# Patient Record
Sex: MALE | Race: WHITE | Employment: OTHER | URBAN - METROPOLITAN AREA
[De-identification: names, ages, dates, MRNs, and addresses within clinical notes are randomized per-mention and may not be internally consistent; named-entity substitution may affect disease eponyms.]

---

## 2017-01-01 ENCOUNTER — APPOINTMENT (OUTPATIENT)
Dept: GENERAL RADIOLOGY | Facility: CLINIC | Age: 77
DRG: 682 | End: 2017-01-01
Attending: FAMILY MEDICINE
Payer: COMMERCIAL

## 2017-01-01 ENCOUNTER — OFFICE VISIT (OUTPATIENT)
Dept: ORTHOPEDICS | Facility: CLINIC | Age: 77
End: 2017-01-01

## 2017-01-01 ENCOUNTER — MYC MEDICAL ADVICE (OUTPATIENT)
Dept: ORTHOPEDICS | Facility: CLINIC | Age: 77
End: 2017-01-01

## 2017-01-01 ENCOUNTER — APPOINTMENT (OUTPATIENT)
Dept: OCCUPATIONAL THERAPY | Facility: CLINIC | Age: 77
DRG: 682 | End: 2017-01-01
Payer: COMMERCIAL

## 2017-01-01 ENCOUNTER — RESULTS ONLY (OUTPATIENT)
Dept: GASTROENTEROLOGY | Facility: CLINIC | Age: 77
End: 2017-01-01

## 2017-01-01 ENCOUNTER — APPOINTMENT (OUTPATIENT)
Dept: SPEECH THERAPY | Facility: CLINIC | Age: 77
DRG: 682 | End: 2017-01-01
Payer: COMMERCIAL

## 2017-01-01 ENCOUNTER — THERAPY VISIT (OUTPATIENT)
Dept: PHYSICAL THERAPY | Facility: CLINIC | Age: 77
End: 2017-01-01
Payer: COMMERCIAL

## 2017-01-01 ENCOUNTER — APPOINTMENT (OUTPATIENT)
Dept: CT IMAGING | Facility: CLINIC | Age: 77
DRG: 682 | End: 2017-01-01
Attending: INTERNAL MEDICINE
Payer: COMMERCIAL

## 2017-01-01 ENCOUNTER — APPOINTMENT (OUTPATIENT)
Dept: GENERAL RADIOLOGY | Facility: CLINIC | Age: 77
DRG: 057 | End: 2017-01-01
Attending: EMERGENCY MEDICINE
Payer: COMMERCIAL

## 2017-01-01 ENCOUNTER — ANESTHESIA EVENT (OUTPATIENT)
Dept: SURGERY | Facility: CLINIC | Age: 77
DRG: 682 | End: 2017-01-01
Payer: COMMERCIAL

## 2017-01-01 ENCOUNTER — MYC MEDICAL ADVICE (OUTPATIENT)
Dept: PHARMACY | Facility: CLINIC | Age: 77
End: 2017-01-01

## 2017-01-01 ENCOUNTER — APPOINTMENT (OUTPATIENT)
Dept: CT IMAGING | Facility: CLINIC | Age: 77
DRG: 682 | End: 2017-01-01
Payer: COMMERCIAL

## 2017-01-01 ENCOUNTER — CARE COORDINATION (OUTPATIENT)
Dept: CARE COORDINATION | Facility: CLINIC | Age: 77
End: 2017-01-01

## 2017-01-01 ENCOUNTER — APPOINTMENT (OUTPATIENT)
Dept: PHYSICAL THERAPY | Facility: CLINIC | Age: 77
DRG: 057 | End: 2017-01-01
Attending: NURSE PRACTITIONER
Payer: COMMERCIAL

## 2017-01-01 ENCOUNTER — HOSPITAL ENCOUNTER (INPATIENT)
Facility: CLINIC | Age: 77
LOS: 3 days | Discharge: NURSING, REHAB OR LONG-TERM CARE | DRG: 057 | End: 2017-02-22
Attending: EMERGENCY MEDICINE | Admitting: SURGERY
Payer: COMMERCIAL

## 2017-01-01 ENCOUNTER — CARE COORDINATION (OUTPATIENT)
Dept: CARDIOLOGY | Facility: CLINIC | Age: 77
End: 2017-01-01

## 2017-01-01 ENCOUNTER — APPOINTMENT (OUTPATIENT)
Dept: CT IMAGING | Facility: CLINIC | Age: 77
DRG: 682 | End: 2017-01-01
Attending: FAMILY MEDICINE
Payer: COMMERCIAL

## 2017-01-01 ENCOUNTER — TELEPHONE (OUTPATIENT)
Dept: ORTHOPEDICS | Facility: CLINIC | Age: 77
End: 2017-01-01

## 2017-01-01 ENCOUNTER — APPOINTMENT (OUTPATIENT)
Dept: GENERAL RADIOLOGY | Facility: CLINIC | Age: 77
DRG: 682 | End: 2017-01-01
Payer: COMMERCIAL

## 2017-01-01 ENCOUNTER — APPOINTMENT (OUTPATIENT)
Dept: GENERAL RADIOLOGY | Facility: CLINIC | Age: 77
DRG: 682 | End: 2017-01-01
Attending: INTERNAL MEDICINE
Payer: COMMERCIAL

## 2017-01-01 ENCOUNTER — APPOINTMENT (OUTPATIENT)
Dept: CARDIOLOGY | Facility: CLINIC | Age: 77
DRG: 682 | End: 2017-01-01
Attending: FAMILY MEDICINE
Payer: COMMERCIAL

## 2017-01-01 ENCOUNTER — APPOINTMENT (OUTPATIENT)
Dept: ULTRASOUND IMAGING | Facility: CLINIC | Age: 77
DRG: 682 | End: 2017-01-01
Attending: FAMILY MEDICINE
Payer: COMMERCIAL

## 2017-01-01 ENCOUNTER — OFFICE VISIT (OUTPATIENT)
Dept: NEUROLOGY | Facility: CLINIC | Age: 77
End: 2017-01-01

## 2017-01-01 ENCOUNTER — MEDICAL CORRESPONDENCE (OUTPATIENT)
Dept: HEALTH INFORMATION MANAGEMENT | Facility: CLINIC | Age: 77
End: 2017-01-01

## 2017-01-01 ENCOUNTER — APPOINTMENT (OUTPATIENT)
Dept: GENERAL RADIOLOGY | Facility: CLINIC | Age: 77
DRG: 682 | End: 2017-01-01
Attending: PHYSICIAN ASSISTANT
Payer: COMMERCIAL

## 2017-01-01 ENCOUNTER — APPOINTMENT (OUTPATIENT)
Dept: MRI IMAGING | Facility: CLINIC | Age: 77
DRG: 057 | End: 2017-01-01
Payer: COMMERCIAL

## 2017-01-01 ENCOUNTER — APPOINTMENT (OUTPATIENT)
Dept: CT IMAGING | Facility: CLINIC | Age: 77
DRG: 057 | End: 2017-01-01
Attending: EMERGENCY MEDICINE
Payer: COMMERCIAL

## 2017-01-01 ENCOUNTER — HOSPITAL ENCOUNTER (EMERGENCY)
Facility: CLINIC | Age: 77
Discharge: HOME | End: 2017-03-11
Attending: EMERGENCY MEDICINE | Admitting: EMERGENCY MEDICINE
Payer: COMMERCIAL

## 2017-01-01 ENCOUNTER — TRANSFERRED RECORDS (OUTPATIENT)
Dept: HEALTH INFORMATION MANAGEMENT | Facility: CLINIC | Age: 77
End: 2017-01-01

## 2017-01-01 ENCOUNTER — TELEPHONE (OUTPATIENT)
Dept: FAMILY MEDICINE | Facility: CLINIC | Age: 77
End: 2017-01-01

## 2017-01-01 ENCOUNTER — APPOINTMENT (OUTPATIENT)
Dept: ULTRASOUND IMAGING | Facility: CLINIC | Age: 77
DRG: 057 | End: 2017-01-01
Attending: PSYCHIATRY & NEUROLOGY
Payer: COMMERCIAL

## 2017-01-01 ENCOUNTER — HOSPITAL ENCOUNTER (INPATIENT)
Facility: CLINIC | Age: 77
LOS: 24 days | Discharge: NURSING, REHAB OR LONG-TERM CARE | DRG: 682 | End: 2017-04-25
Attending: INTERNAL MEDICINE | Admitting: FAMILY MEDICINE
Payer: COMMERCIAL

## 2017-01-01 ENCOUNTER — APPOINTMENT (OUTPATIENT)
Dept: CT IMAGING | Facility: CLINIC | Age: 77
DRG: 682 | End: 2017-01-01
Attending: HOSPITALIST
Payer: COMMERCIAL

## 2017-01-01 ENCOUNTER — APPOINTMENT (OUTPATIENT)
Dept: MRI IMAGING | Facility: CLINIC | Age: 77
DRG: 682 | End: 2017-01-01
Payer: COMMERCIAL

## 2017-01-01 ENCOUNTER — APPOINTMENT (OUTPATIENT)
Dept: ULTRASOUND IMAGING | Facility: CLINIC | Age: 77
DRG: 682 | End: 2017-01-01
Attending: HOSPITALIST
Payer: COMMERCIAL

## 2017-01-01 ENCOUNTER — ANESTHESIA (OUTPATIENT)
Dept: SURGERY | Facility: CLINIC | Age: 77
DRG: 682 | End: 2017-01-01
Payer: COMMERCIAL

## 2017-01-01 VITALS
OXYGEN SATURATION: 96 % | TEMPERATURE: 97.3 F | WEIGHT: 205 LBS | HEART RATE: 61 BPM | DIASTOLIC BLOOD PRESSURE: 75 MMHG | RESPIRATION RATE: 18 BRPM | HEIGHT: 64 IN | BODY MASS INDEX: 35 KG/M2 | SYSTOLIC BLOOD PRESSURE: 119 MMHG

## 2017-01-01 VITALS
TEMPERATURE: 98.5 F | OXYGEN SATURATION: 100 % | SYSTOLIC BLOOD PRESSURE: 135 MMHG | RESPIRATION RATE: 22 BRPM | HEIGHT: 64 IN | BODY MASS INDEX: 28.87 KG/M2 | HEART RATE: 80 BPM | WEIGHT: 169.09 LBS | DIASTOLIC BLOOD PRESSURE: 66 MMHG

## 2017-01-01 VITALS
HEIGHT: 64 IN | OXYGEN SATURATION: 98 % | HEART RATE: 61 BPM | WEIGHT: 199 LBS | BODY MASS INDEX: 33.97 KG/M2 | RESPIRATION RATE: 25 BRPM | SYSTOLIC BLOOD PRESSURE: 140 MMHG | DIASTOLIC BLOOD PRESSURE: 60 MMHG

## 2017-01-01 VITALS — BODY MASS INDEX: 30.61 KG/M2 | HEIGHT: 67 IN | WEIGHT: 195 LBS

## 2017-01-01 VITALS
HEART RATE: 63 BPM | DIASTOLIC BLOOD PRESSURE: 68 MMHG | SYSTOLIC BLOOD PRESSURE: 149 MMHG | RESPIRATION RATE: 18 BRPM | HEIGHT: 67 IN | OXYGEN SATURATION: 97 % | WEIGHT: 192.5 LBS | BODY MASS INDEX: 30.21 KG/M2 | TEMPERATURE: 96.2 F

## 2017-01-01 VITALS — WEIGHT: 190 LBS | BODY MASS INDEX: 32.44 KG/M2 | HEIGHT: 64 IN

## 2017-01-01 DIAGNOSIS — M85.9 LOW BONE DENSITY: ICD-10-CM

## 2017-01-01 DIAGNOSIS — M25.579 ANKLE PAIN: Primary | ICD-10-CM

## 2017-01-01 DIAGNOSIS — K59.00 CONSTIPATION, UNSPECIFIED CONSTIPATION TYPE: ICD-10-CM

## 2017-01-01 DIAGNOSIS — R13.10 DYSPHAGIA, UNSPECIFIED TYPE: Primary | ICD-10-CM
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
## 2017-01-01 DIAGNOSIS — E11.649 TYPE 2 DIABETES MELLITUS WITH HYPOGLYCEMIA WITHOUT COMA, WITH LONG-TERM CURRENT USE OF INSULIN (H): ICD-10-CM

## 2017-01-01 DIAGNOSIS — K21.9 GASTROESOPHAGEAL REFLUX DISEASE WITHOUT ESOPHAGITIS: ICD-10-CM

## 2017-01-01 DIAGNOSIS — S42.402A ELBOW FRACTURE, LEFT, CLOSED, INITIAL ENCOUNTER: ICD-10-CM

## 2017-01-01 DIAGNOSIS — S82.892A CLOSED LEFT ANKLE FRACTURE, INITIAL ENCOUNTER: Primary | ICD-10-CM

## 2017-01-01 DIAGNOSIS — I63.9 CEREBROVASCULAR ACCIDENT (CVA), UNSPECIFIED MECHANISM (H): ICD-10-CM

## 2017-01-01 DIAGNOSIS — H04.123 DRY EYES: ICD-10-CM
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
## 2017-01-01 DIAGNOSIS — D62 ACUTE POSTHEMORRHAGIC ANEMIA: ICD-10-CM

## 2017-01-01 DIAGNOSIS — T83.511A URINARY TRACT INFECTION ASSOCIATED WITH INDWELLING URETHRAL CATHETER, INITIAL ENCOUNTER (H): ICD-10-CM

## 2017-01-01 DIAGNOSIS — I15.9 SECONDARY HYPERTENSION: ICD-10-CM

## 2017-01-01 DIAGNOSIS — F01.518 VASCULAR DEMENTIA WITH BEHAVIOR DISTURBANCE (H): ICD-10-CM

## 2017-01-01 DIAGNOSIS — E11.22 TYPE 2 DIABETES MELLITUS WITH STAGE 3 CHRONIC KIDNEY DISEASE, WITH LONG-TERM CURRENT USE OF INSULIN (H): ICD-10-CM

## 2017-01-01 DIAGNOSIS — Z79.4 TYPE 2 DIABETES MELLITUS WITH HYPOGLYCEMIA WITHOUT COMA, WITH LONG-TERM CURRENT USE OF INSULIN (H): ICD-10-CM

## 2017-01-01 DIAGNOSIS — I60.9 SUBARACHNOID HEMORRHAGE (H): ICD-10-CM

## 2017-01-01 DIAGNOSIS — Z51.5 END OF LIFE CARE: ICD-10-CM

## 2017-01-01 DIAGNOSIS — I48.0 PAF (PAROXYSMAL ATRIAL FIBRILLATION) (H): Primary | ICD-10-CM

## 2017-01-01 DIAGNOSIS — Z79.4 TYPE 2 DIABETES MELLITUS WITH STAGE 3 CHRONIC KIDNEY DISEASE, WITH LONG-TERM CURRENT USE OF INSULIN (H): ICD-10-CM

## 2017-01-01 DIAGNOSIS — I69.319 COGNITIVE DEFICIT FOLLOWING CEREBROVASCULAR ACCIDENT (CVA): Primary | ICD-10-CM

## 2017-01-01 DIAGNOSIS — S82.892A CLOSED LEFT ANKLE FRACTURE, INITIAL ENCOUNTER: ICD-10-CM

## 2017-01-01 DIAGNOSIS — I69.319 COGNITIVE DEFICIT, POST-STROKE: ICD-10-CM

## 2017-01-01 DIAGNOSIS — R68.2 DRY MOUTH: ICD-10-CM

## 2017-01-01 DIAGNOSIS — S92.902S FOOT FRACTURE, LEFT, SEQUELA: Primary | ICD-10-CM

## 2017-01-01 DIAGNOSIS — B37.0 ORAL THRUSH: ICD-10-CM

## 2017-01-01 DIAGNOSIS — R11.0 NAUSEA: ICD-10-CM

## 2017-01-01 DIAGNOSIS — N18.30 TYPE 2 DIABETES MELLITUS WITH STAGE 3 CHRONIC KIDNEY DISEASE, WITH LONG-TERM CURRENT USE OF INSULIN (H): ICD-10-CM

## 2017-01-01 DIAGNOSIS — N17.9 ACUTE RENAL FAILURE, UNSPECIFIED ACUTE RENAL FAILURE TYPE (H): ICD-10-CM

## 2017-01-01 DIAGNOSIS — N39.0 URINARY TRACT INFECTION ASSOCIATED WITH INDWELLING URETHRAL CATHETER, INITIAL ENCOUNTER (H): ICD-10-CM

## 2017-01-01 DIAGNOSIS — E46 MALNUTRITION (H): ICD-10-CM

## 2017-01-01 DIAGNOSIS — M25.572 LEFT ANKLE PAIN, UNSPECIFIED CHRONICITY: Primary | ICD-10-CM

## 2017-01-01 DIAGNOSIS — G91.2 IDIOPATHIC NORMAL PRESSURE HYDROCEPHALUS (H): ICD-10-CM

## 2017-01-01 DIAGNOSIS — R53.81 PHYSICAL DECONDITIONING: Primary | ICD-10-CM

## 2017-01-01 DIAGNOSIS — J44.9 CHRONIC OBSTRUCTIVE PULMONARY DISEASE, UNSPECIFIED COPD TYPE (H): ICD-10-CM

## 2017-01-01 DIAGNOSIS — I50.33 DIASTOLIC CHF, ACUTE ON CHRONIC (H): ICD-10-CM

## 2017-01-01 DIAGNOSIS — F01.53 MULTI-INFARCT DEMENTIA WITH DEPRESSION (H): ICD-10-CM

## 2017-01-01 DIAGNOSIS — E16.2 HYPOGLYCEMIA: ICD-10-CM

## 2017-01-01 LAB
ABO + RH BLD: NORMAL
ALBUMIN SERPL-MCNC: 1.2 G/DL (ref 3.4–5)
ALBUMIN SERPL-MCNC: 1.4 G/DL (ref 3.4–5)
ALBUMIN SERPL-MCNC: 1.5 G/DL (ref 3.4–5)
ALBUMIN SERPL-MCNC: 1.6 G/DL (ref 3.4–5)
ALBUMIN SERPL-MCNC: 2 G/DL (ref 3.4–5)
ALBUMIN SERPL-MCNC: 2.8 G/DL (ref 3.4–5)
ALBUMIN SERPL-MCNC: 2.9 G/DL (ref 3.4–5)
ALBUMIN UR-MCNC: 10 MG/DL
ALBUMIN UR-MCNC: 30 MG/DL
ALP SERPL-CCNC: 100 U/L (ref 40–150)
ALP SERPL-CCNC: 108 U/L (ref 40–150)
ALP SERPL-CCNC: 110 U/L (ref 40–150)
ALP SERPL-CCNC: 112 U/L (ref 40–150)
ALP SERPL-CCNC: 118 U/L (ref 40–150)
ALP SERPL-CCNC: 55 U/L (ref 40–150)
ALP SERPL-CCNC: 69 U/L (ref 40–150)
ALP SERPL-CCNC: 70 U/L (ref 40–150)
ALP SERPL-CCNC: 71 U/L (ref 40–150)
ALP SERPL-CCNC: 73 U/L (ref 40–150)
ALP SERPL-CCNC: 79 U/L (ref 40–150)
ALP SERPL-CCNC: 79 U/L (ref 40–150)
ALP SERPL-CCNC: 80 U/L (ref 40–150)
ALP SERPL-CCNC: 83 U/L (ref 40–150)
ALP SERPL-CCNC: 83 U/L (ref 40–150)
ALP SERPL-CCNC: 86 U/L (ref 40–150)
ALP SERPL-CCNC: 91 U/L (ref 40–150)
ALP SERPL-CCNC: 96 U/L (ref 40–150)
ALT SERPL W P-5'-P-CCNC: 102 U/L (ref 0–70)
ALT SERPL W P-5'-P-CCNC: 128 U/L (ref 0–70)
ALT SERPL W P-5'-P-CCNC: 184 U/L (ref 0–70)
ALT SERPL W P-5'-P-CCNC: 233 U/L (ref 0–70)
ALT SERPL W P-5'-P-CCNC: 254 U/L (ref 0–70)
ALT SERPL W P-5'-P-CCNC: 26 U/L (ref 0–70)
ALT SERPL W P-5'-P-CCNC: 29 U/L (ref 0–70)
ALT SERPL W P-5'-P-CCNC: 31 U/L (ref 0–70)
ALT SERPL W P-5'-P-CCNC: 32 U/L (ref 0–70)
ALT SERPL W P-5'-P-CCNC: 33 U/L (ref 0–70)
ALT SERPL W P-5'-P-CCNC: 36 U/L (ref 0–70)
ALT SERPL W P-5'-P-CCNC: 378 U/L (ref 0–70)
ALT SERPL W P-5'-P-CCNC: 47 U/L (ref 0–70)
ALT SERPL W P-5'-P-CCNC: 49 U/L (ref 0–70)
ALT SERPL W P-5'-P-CCNC: 505 U/L (ref 0–70)
ALT SERPL W P-5'-P-CCNC: 582 U/L (ref 0–70)
ALT SERPL W P-5'-P-CCNC: 633 U/L (ref 0–70)
ALT SERPL W P-5'-P-CCNC: 80 U/L (ref 0–70)
AMMONIA PLAS-SCNC: 36 UMOL/L (ref 10–50)
ANION GAP SERPL CALCULATED.3IONS-SCNC: 10 MMOL/L (ref 3–14)
ANION GAP SERPL CALCULATED.3IONS-SCNC: 11 MMOL/L (ref 3–14)
ANION GAP SERPL CALCULATED.3IONS-SCNC: 13 MMOL/L (ref 3–14)
ANION GAP SERPL CALCULATED.3IONS-SCNC: 14 MMOL/L (ref 3–14)
ANION GAP SERPL CALCULATED.3IONS-SCNC: 6 MMOL/L (ref 3–14)
ANION GAP SERPL CALCULATED.3IONS-SCNC: 7 MMOL/L (ref 3–14)
ANION GAP SERPL CALCULATED.3IONS-SCNC: 8 MMOL/L (ref 3–14)
ANION GAP SERPL CALCULATED.3IONS-SCNC: 9 MMOL/L (ref 3–14)
ANISOCYTOSIS BLD QL SMEAR: SLIGHT
ANISOCYTOSIS BLD QL SMEAR: SLIGHT
APAP SERPL-MCNC: 6 MG/L (ref 10–20)
APPEARANCE UR: ABNORMAL
APPEARANCE UR: CLEAR
APTT PPP: 29 SEC (ref 22–37)
AST SERPL W P-5'-P-CCNC: 122 U/L (ref 0–45)
AST SERPL W P-5'-P-CCNC: 14 U/L (ref 0–45)
AST SERPL W P-5'-P-CCNC: 15 U/L (ref 0–45)
AST SERPL W P-5'-P-CCNC: 17 U/L (ref 0–45)
AST SERPL W P-5'-P-CCNC: 21 U/L (ref 0–45)
AST SERPL W P-5'-P-CCNC: 21 U/L (ref 0–45)
AST SERPL W P-5'-P-CCNC: 22 U/L (ref 0–45)
AST SERPL W P-5'-P-CCNC: 23 U/L (ref 0–45)
AST SERPL W P-5'-P-CCNC: 263 U/L (ref 0–45)
AST SERPL W P-5'-P-CCNC: 27 U/L (ref 0–45)
AST SERPL W P-5'-P-CCNC: 39 U/L (ref 0–45)
AST SERPL W P-5'-P-CCNC: 408 U/L (ref 0–45)
AST SERPL W P-5'-P-CCNC: 590 U/L (ref 0–45)
AST SERPL W P-5'-P-CCNC: 7 U/L (ref 0–45)
AST SERPL W P-5'-P-CCNC: 7 U/L (ref 0–45)
AST SERPL W P-5'-P-CCNC: 78 U/L (ref 0–45)
AST SERPL W P-5'-P-CCNC: 814 U/L (ref 0–45)
AST SERPL W P-5'-P-CCNC: ABNORMAL U/L (ref 0–45)
BACTERIA #/AREA URNS HPF: ABNORMAL /HPF
BACTERIA #/AREA URNS HPF: ABNORMAL /HPF
BACTERIA SPEC CULT: ABNORMAL
BACTERIA SPEC CULT: ABNORMAL
BACTERIA SPEC CULT: NO GROWTH
BACTERIA SPEC CULT: NORMAL
BASE DEFICIT BLDV-SCNC: 2.6 MMOL/L
BASOPHILS # BLD AUTO: 0 10E9/L (ref 0–0.2)
BASOPHILS # BLD AUTO: 0.1 10E9/L (ref 0–0.2)
BASOPHILS # BLD AUTO: 0.1 10E9/L (ref 0–0.2)
BASOPHILS NFR BLD AUTO: 0 %
BASOPHILS NFR BLD AUTO: 0 %
BASOPHILS NFR BLD AUTO: 0.1 %
BASOPHILS NFR BLD AUTO: 0.1 %
BASOPHILS NFR BLD AUTO: 0.2 %
BASOPHILS NFR BLD AUTO: 0.3 %
BASOPHILS NFR BLD AUTO: 0.4 %
BASOPHILS NFR BLD AUTO: 0.5 %
BASOPHILS NFR BLD AUTO: 0.6 %
BASOPHILS NFR BLD AUTO: 0.7 %
BASOPHILS NFR BLD AUTO: 0.8 %
BASOPHILS NFR BLD AUTO: 0.9 %
BASOPHILS NFR BLD AUTO: 1.2 %
BILIRUB DIRECT SERPL-MCNC: 0.1 MG/DL (ref 0–0.2)
BILIRUB DIRECT SERPL-MCNC: 0.2 MG/DL (ref 0–0.2)
BILIRUB SERPL-MCNC: 0.2 MG/DL (ref 0.2–1.3)
BILIRUB SERPL-MCNC: 0.2 MG/DL (ref 0.2–1.3)
BILIRUB SERPL-MCNC: 0.3 MG/DL (ref 0.2–1.3)
BILIRUB SERPL-MCNC: 0.4 MG/DL (ref 0.2–1.3)
BILIRUB SERPL-MCNC: 0.5 MG/DL (ref 0.2–1.3)
BILIRUB SERPL-MCNC: 0.6 MG/DL (ref 0.2–1.3)
BILIRUB SERPL-MCNC: 0.8 MG/DL (ref 0.2–1.3)
BILIRUB SERPL-MCNC: 1.2 MG/DL (ref 0.2–1.3)
BILIRUB SERPL-MCNC: 2.3 MG/DL (ref 0.2–1.3)
BILIRUB UR QL STRIP: NEGATIVE
BLD GP AB SCN SERPL QL: NORMAL
BLD PROD TYP BPU: NORMAL
BLD UNIT ID BPU: 0
BLOOD BANK CMNT PATIENT-IMP: NORMAL
BLOOD PRODUCT CODE: NORMAL
BPU ID: NORMAL
BUN SERPL-MCNC: 10 MG/DL (ref 7–30)
BUN SERPL-MCNC: 10 MG/DL (ref 7–30)
BUN SERPL-MCNC: 11 MG/DL (ref 7–30)
BUN SERPL-MCNC: 11 MG/DL (ref 7–30)
BUN SERPL-MCNC: 113 MG/DL (ref 7–30)
BUN SERPL-MCNC: 115 MG/DL (ref 7–30)
BUN SERPL-MCNC: 12 MG/DL (ref 7–30)
BUN SERPL-MCNC: 13 MG/DL (ref 7–30)
BUN SERPL-MCNC: 14 MG/DL (ref 7–30)
BUN SERPL-MCNC: 15 MG/DL (ref 7–30)
BUN SERPL-MCNC: 15 MG/DL (ref 7–30)
BUN SERPL-MCNC: 17 MG/DL (ref 7–30)
BUN SERPL-MCNC: 19 MG/DL (ref 7–30)
BUN SERPL-MCNC: 20 MG/DL (ref 7–30)
BUN SERPL-MCNC: 22 MG/DL (ref 7–30)
BUN SERPL-MCNC: 24 MG/DL (ref 7–30)
BUN SERPL-MCNC: 25 MG/DL (ref 7–30)
BUN SERPL-MCNC: 26 MG/DL (ref 7–30)
BUN SERPL-MCNC: 27 MG/DL (ref 7–30)
BUN SERPL-MCNC: 28 MG/DL (ref 7–30)
BUN SERPL-MCNC: 35 MG/DL (ref 7–30)
BUN SERPL-MCNC: 37 MG/DL (ref 7–30)
BUN SERPL-MCNC: 42 MG/DL (ref 7–30)
BUN SERPL-MCNC: 47 MG/DL (ref 7–30)
BUN SERPL-MCNC: 55 MG/DL (ref 7–30)
BUN SERPL-MCNC: 59 MG/DL (ref 7–30)
BUN SERPL-MCNC: 6 MG/DL (ref 7–30)
BUN SERPL-MCNC: 68 MG/DL (ref 7–30)
BUN SERPL-MCNC: 7 MG/DL (ref 7–30)
BUN SERPL-MCNC: 73 MG/DL (ref 7–30)
BUN SERPL-MCNC: 75 MG/DL (ref 7–30)
BUN SERPL-MCNC: 76 MG/DL (ref 7–30)
BUN SERPL-MCNC: 76 MG/DL (ref 7–30)
BUN SERPL-MCNC: 77 MG/DL (ref 7–30)
BUN SERPL-MCNC: 77 MG/DL (ref 7–30)
BUN SERPL-MCNC: 80 MG/DL (ref 7–30)
BUN SERPL-MCNC: 84 MG/DL (ref 7–30)
BUN SERPL-MCNC: 9 MG/DL (ref 7–30)
BUN SERPL-MCNC: 93 MG/DL (ref 7–30)
C DIFF TOX B STL QL: NORMAL
CA-I BLD-MCNC: 4.5 MG/DL (ref 4.4–5.2)
CALCIUM SERPL-MCNC: 5.6 MG/DL (ref 8.5–10.1)
CALCIUM SERPL-MCNC: 6.8 MG/DL (ref 8.5–10.1)
CALCIUM SERPL-MCNC: 6.8 MG/DL (ref 8.5–10.1)
CALCIUM SERPL-MCNC: 6.9 MG/DL (ref 8.5–10.1)
CALCIUM SERPL-MCNC: 6.9 MG/DL (ref 8.5–10.1)
CALCIUM SERPL-MCNC: 7 MG/DL (ref 8.5–10.1)
CALCIUM SERPL-MCNC: 7.1 MG/DL (ref 8.5–10.1)
CALCIUM SERPL-MCNC: 7.2 MG/DL (ref 8.5–10.1)
CALCIUM SERPL-MCNC: 7.3 MG/DL (ref 8.5–10.1)
CALCIUM SERPL-MCNC: 7.4 MG/DL (ref 8.5–10.1)
CALCIUM SERPL-MCNC: 7.5 MG/DL (ref 8.5–10.1)
CALCIUM SERPL-MCNC: 7.6 MG/DL (ref 8.5–10.1)
CALCIUM SERPL-MCNC: 7.7 MG/DL (ref 8.5–10.1)
CALCIUM SERPL-MCNC: 7.7 MG/DL (ref 8.5–10.1)
CALCIUM SERPL-MCNC: 7.8 MG/DL (ref 8.5–10.1)
CALCIUM SERPL-MCNC: 7.9 MG/DL (ref 8.5–10.1)
CALCIUM SERPL-MCNC: 8.1 MG/DL (ref 8.5–10.1)
CALCIUM SERPL-MCNC: 8.2 MG/DL (ref 8.5–10.1)
CALCIUM SERPL-MCNC: 8.2 MG/DL (ref 8.5–10.1)
CALCIUM SERPL-MCNC: 8.3 MG/DL (ref 8.5–10.1)
CALCIUM SERPL-MCNC: 8.3 MG/DL (ref 8.5–10.1)
CALCIUM SERPL-MCNC: 8.4 MG/DL (ref 8.5–10.1)
CALCIUM SERPL-MCNC: 8.5 MG/DL (ref 8.5–10.1)
CALCIUM SERPL-MCNC: 8.9 MG/DL (ref 8.5–10.1)
CALCIUM SERPL-MCNC: 9 MG/DL (ref 8.5–10.1)
CALCIUM SERPL-MCNC: 9.2 MG/DL (ref 8.5–10.1)
CALCIUM SERPL-MCNC: 9.3 MG/DL (ref 8.5–10.1)
CALCIUM SERPL-MCNC: 9.4 MG/DL (ref 8.5–10.1)
CAOX CRY #/AREA URNS HPF: ABNORMAL /HPF
CHLORIDE SERPL-SCNC: 101 MMOL/L (ref 94–109)
CHLORIDE SERPL-SCNC: 102 MMOL/L (ref 94–109)
CHLORIDE SERPL-SCNC: 103 MMOL/L (ref 94–109)
CHLORIDE SERPL-SCNC: 104 MMOL/L (ref 94–109)
CHLORIDE SERPL-SCNC: 104 MMOL/L (ref 94–109)
CHLORIDE SERPL-SCNC: 105 MMOL/L (ref 94–109)
CHLORIDE SERPL-SCNC: 105 MMOL/L (ref 94–109)
CHLORIDE SERPL-SCNC: 106 MMOL/L (ref 94–109)
CHLORIDE SERPL-SCNC: 108 MMOL/L (ref 94–109)
CHLORIDE SERPL-SCNC: 109 MMOL/L (ref 94–109)
CHLORIDE SERPL-SCNC: 110 MMOL/L (ref 94–109)
CHLORIDE SERPL-SCNC: 111 MMOL/L (ref 94–109)
CHLORIDE SERPL-SCNC: 112 MMOL/L (ref 94–109)
CHLORIDE SERPL-SCNC: 113 MMOL/L (ref 94–109)
CHLORIDE SERPL-SCNC: 114 MMOL/L (ref 94–109)
CHLORIDE SERPL-SCNC: 115 MMOL/L (ref 94–109)
CHLORIDE SERPL-SCNC: 116 MMOL/L (ref 94–109)
CHLORIDE SERPL-SCNC: 117 MMOL/L (ref 94–109)
CHLORIDE SERPL-SCNC: 118 MMOL/L (ref 94–109)
CHLORIDE SERPL-SCNC: 119 MMOL/L (ref 94–109)
CHLORIDE SERPL-SCNC: 119 MMOL/L (ref 94–109)
CHLORIDE SERPL-SCNC: 120 MMOL/L (ref 94–109)
CHLORIDE SERPL-SCNC: 120 MMOL/L (ref 94–109)
CHLORIDE SERPL-SCNC: 121 MMOL/L (ref 94–109)
CHLORIDE SERPL-SCNC: 122 MMOL/L (ref 94–109)
CHLORIDE SERPL-SCNC: 124 MMOL/L (ref 94–109)
CHLORIDE SERPL-SCNC: 124 MMOL/L (ref 94–109)
CHLORIDE SERPL-SCNC: 126 MMOL/L (ref 94–109)
CHLORIDE SERPL-SCNC: 127 MMOL/L (ref 94–109)
CHLORIDE SERPL-SCNC: 127 MMOL/L (ref 94–109)
CHOLEST SERPL-MCNC: 92 MG/DL
CHOLEST SERPL-MCNC: 93 MG/DL
CMV IGG SERPL QL IA: 0.4 AI (ref 0–0.8)
CO2 BLDCOV-SCNC: 24 MMOL/L (ref 21–28)
CO2 SERPL-SCNC: 17 MMOL/L (ref 20–32)
CO2 SERPL-SCNC: 19 MMOL/L (ref 20–32)
CO2 SERPL-SCNC: 19 MMOL/L (ref 20–32)
CO2 SERPL-SCNC: 20 MMOL/L (ref 20–32)
CO2 SERPL-SCNC: 21 MMOL/L (ref 20–32)
CO2 SERPL-SCNC: 22 MMOL/L (ref 20–32)
CO2 SERPL-SCNC: 23 MMOL/L (ref 20–32)
CO2 SERPL-SCNC: 23 MMOL/L (ref 20–32)
CO2 SERPL-SCNC: 24 MMOL/L (ref 20–32)
CO2 SERPL-SCNC: 24 MMOL/L (ref 20–32)
CO2 SERPL-SCNC: 25 MMOL/L (ref 20–32)
CO2 SERPL-SCNC: 25 MMOL/L (ref 20–32)
CO2 SERPL-SCNC: 26 MMOL/L (ref 20–32)
CO2 SERPL-SCNC: 27 MMOL/L (ref 20–32)
CO2 SERPL-SCNC: 27 MMOL/L (ref 20–32)
CO2 SERPL-SCNC: 28 MMOL/L (ref 20–32)
CO2 SERPL-SCNC: 29 MMOL/L (ref 20–32)
CO2 SERPL-SCNC: 30 MMOL/L (ref 20–32)
CO2 SERPL-SCNC: 32 MMOL/L (ref 20–32)
COLOR UR AUTO: ABNORMAL
COLOR UR AUTO: ABNORMAL
COLOR UR AUTO: YELLOW
COLOR UR AUTO: YELLOW
CREAT SERPL-MCNC: 0.91 MG/DL (ref 0.66–1.25)
CREAT SERPL-MCNC: 0.95 MG/DL (ref 0.66–1.25)
CREAT SERPL-MCNC: 0.96 MG/DL (ref 0.66–1.25)
CREAT SERPL-MCNC: 0.97 MG/DL (ref 0.66–1.25)
CREAT SERPL-MCNC: 0.97 MG/DL (ref 0.66–1.25)
CREAT SERPL-MCNC: 1 MG/DL (ref 0.66–1.25)
CREAT SERPL-MCNC: 1 MG/DL (ref 0.66–1.25)
CREAT SERPL-MCNC: 1.05 MG/DL (ref 0.66–1.25)
CREAT SERPL-MCNC: 1.06 MG/DL (ref 0.66–1.25)
CREAT SERPL-MCNC: 1.1 MG/DL (ref 0.66–1.25)
CREAT SERPL-MCNC: 1.13 MG/DL (ref 0.66–1.25)
CREAT SERPL-MCNC: 1.23 MG/DL (ref 0.66–1.25)
CREAT SERPL-MCNC: 1.23 MG/DL (ref 0.66–1.25)
CREAT SERPL-MCNC: 1.26 MG/DL (ref 0.66–1.25)
CREAT SERPL-MCNC: 1.29 MG/DL (ref 0.66–1.25)
CREAT SERPL-MCNC: 1.38 MG/DL (ref 0.66–1.25)
CREAT SERPL-MCNC: 1.41 MG/DL (ref 0.66–1.25)
CREAT SERPL-MCNC: 1.41 MG/DL (ref 0.66–1.25)
CREAT SERPL-MCNC: 1.42 MG/DL (ref 0.66–1.25)
CREAT SERPL-MCNC: 1.48 MG/DL (ref 0.66–1.25)
CREAT SERPL-MCNC: 1.49 MG/DL (ref 0.66–1.25)
CREAT SERPL-MCNC: 1.5 MG/DL (ref 0.66–1.25)
CREAT SERPL-MCNC: 1.51 MG/DL (ref 0.66–1.25)
CREAT SERPL-MCNC: 1.52 MG/DL (ref 0.66–1.25)
CREAT SERPL-MCNC: 1.55 MG/DL (ref 0.66–1.25)
CREAT SERPL-MCNC: 1.65 MG/DL (ref 0.66–1.25)
CREAT SERPL-MCNC: 1.72 MG/DL (ref 0.66–1.25)
CREAT SERPL-MCNC: 1.72 MG/DL (ref 0.66–1.25)
CREAT SERPL-MCNC: 1.8 MG/DL (ref 0.66–1.25)
CREAT SERPL-MCNC: 1.85 MG/DL (ref 0.66–1.25)
CREAT SERPL-MCNC: 1.87 MG/DL (ref 0.66–1.25)
CREAT SERPL-MCNC: 1.94 MG/DL (ref 0.66–1.25)
CREAT SERPL-MCNC: 2.04 MG/DL (ref 0.66–1.25)
CREAT SERPL-MCNC: 2.06 MG/DL (ref 0.66–1.25)
CREAT SERPL-MCNC: 2.08 MG/DL (ref 0.66–1.25)
CREAT SERPL-MCNC: 2.14 MG/DL (ref 0.66–1.25)
CREAT SERPL-MCNC: 2.25 MG/DL (ref 0.66–1.25)
CREAT SERPL-MCNC: 2.41 MG/DL (ref 0.66–1.25)
CREAT SERPL-MCNC: 2.53 MG/DL (ref 0.66–1.25)
CREAT SERPL-MCNC: 2.68 MG/DL (ref 0.66–1.25)
CREAT SERPL-MCNC: 2.92 MG/DL (ref 0.66–1.25)
CREAT SERPL-MCNC: 3.14 MG/DL (ref 0.66–1.25)
CREAT SERPL-MCNC: 3.57 MG/DL (ref 0.66–1.25)
CREAT SERPL-MCNC: 4.98 MG/DL (ref 0.66–1.25)
CREAT SERPL-MCNC: 5.39 MG/DL (ref 0.66–1.25)
CRP SERPL-MCNC: 120 MG/L (ref 0–8)
CRP SERPL-MCNC: 13 MG/L (ref 0–8)
CRP SERPL-MCNC: 18 MG/L (ref 0–8)
CRP SERPL-MCNC: 190 MG/L (ref 0–8)
CRP SERPL-MCNC: 21 MG/L (ref 0–8)
DIFFERENTIAL METHOD BLD: ABNORMAL
EBV VCA IGG SER QL IA: ABNORMAL AI (ref 0–0.8)
EBV VCA IGM SER QL IA: NORMAL AI (ref 0–0.8)
EOSINOPHIL # BLD AUTO: 0 10E9/L (ref 0–0.7)
EOSINOPHIL # BLD AUTO: 0.1 10E9/L (ref 0–0.7)
EOSINOPHIL # BLD AUTO: 0.2 10E9/L (ref 0–0.7)
EOSINOPHIL # BLD AUTO: 0.3 10E9/L (ref 0–0.7)
EOSINOPHIL # BLD AUTO: 0.4 10E9/L (ref 0–0.7)
EOSINOPHIL NFR BLD AUTO: 0.1 %
EOSINOPHIL NFR BLD AUTO: 0.9 %
EOSINOPHIL NFR BLD AUTO: 1 %
EOSINOPHIL NFR BLD AUTO: 1.1 %
EOSINOPHIL NFR BLD AUTO: 1.4 %
EOSINOPHIL NFR BLD AUTO: 1.7 %
EOSINOPHIL NFR BLD AUTO: 2 %
EOSINOPHIL NFR BLD AUTO: 2.2 %
EOSINOPHIL NFR BLD AUTO: 2.5 %
EOSINOPHIL NFR BLD AUTO: 2.6 %
EOSINOPHIL NFR BLD AUTO: 2.6 %
EOSINOPHIL NFR BLD AUTO: 2.8 %
EOSINOPHIL NFR BLD AUTO: 3.1 %
EOSINOPHIL NFR BLD AUTO: 3.2 %
EOSINOPHIL NFR BLD AUTO: 3.3 %
EOSINOPHIL NFR BLD AUTO: 3.5 %
EOSINOPHIL NFR BLD AUTO: 3.6 %
EOSINOPHIL NFR BLD AUTO: 4 %
EOSINOPHIL NFR BLD AUTO: 4.3 %
EOSINOPHIL NFR BLD AUTO: 4.4 %
EOSINOPHIL NFR BLD AUTO: 4.6 %
ERYTHROCYTE [DISTWIDTH] IN BLOOD BY AUTOMATED COUNT: 15.2 % (ref 10–15)
ERYTHROCYTE [DISTWIDTH] IN BLOOD BY AUTOMATED COUNT: 15.2 % (ref 10–15)
ERYTHROCYTE [DISTWIDTH] IN BLOOD BY AUTOMATED COUNT: 17.1 % (ref 10–15)
ERYTHROCYTE [DISTWIDTH] IN BLOOD BY AUTOMATED COUNT: 17.3 % (ref 10–15)
ERYTHROCYTE [DISTWIDTH] IN BLOOD BY AUTOMATED COUNT: 17.3 % (ref 10–15)
ERYTHROCYTE [DISTWIDTH] IN BLOOD BY AUTOMATED COUNT: 17.4 % (ref 10–15)
ERYTHROCYTE [DISTWIDTH] IN BLOOD BY AUTOMATED COUNT: 17.5 % (ref 10–15)
ERYTHROCYTE [DISTWIDTH] IN BLOOD BY AUTOMATED COUNT: 17.6 % (ref 10–15)
ERYTHROCYTE [DISTWIDTH] IN BLOOD BY AUTOMATED COUNT: 17.7 % (ref 10–15)
ERYTHROCYTE [DISTWIDTH] IN BLOOD BY AUTOMATED COUNT: 17.7 % (ref 10–15)
ERYTHROCYTE [DISTWIDTH] IN BLOOD BY AUTOMATED COUNT: 17.8 % (ref 10–15)
ERYTHROCYTE [DISTWIDTH] IN BLOOD BY AUTOMATED COUNT: 17.9 % (ref 10–15)
ERYTHROCYTE [DISTWIDTH] IN BLOOD BY AUTOMATED COUNT: 17.9 % (ref 10–15)
ERYTHROCYTE [DISTWIDTH] IN BLOOD BY AUTOMATED COUNT: 18.2 % (ref 10–15)
ERYTHROCYTE [DISTWIDTH] IN BLOOD BY AUTOMATED COUNT: 18.4 % (ref 10–15)
ERYTHROCYTE [DISTWIDTH] IN BLOOD BY AUTOMATED COUNT: 18.5 % (ref 10–15)
ERYTHROCYTE [DISTWIDTH] IN BLOOD BY AUTOMATED COUNT: 19.1 % (ref 10–15)
ERYTHROCYTE [DISTWIDTH] IN BLOOD BY AUTOMATED COUNT: 19.3 % (ref 10–15)
ERYTHROCYTE [DISTWIDTH] IN BLOOD BY AUTOMATED COUNT: 19.4 % (ref 10–15)
ERYTHROCYTE [DISTWIDTH] IN BLOOD BY AUTOMATED COUNT: 19.9 % (ref 10–15)
ERYTHROCYTE [DISTWIDTH] IN BLOOD BY AUTOMATED COUNT: 20 % (ref 10–15)
ERYTHROCYTE [DISTWIDTH] IN BLOOD BY AUTOMATED COUNT: 20.2 % (ref 10–15)
FLEXIBLE SIGMOIDOSCOPY: NORMAL
GFR SERPL CREATININE-BSD FRML MDRD: 10 ML/MIN/1.7M2
GFR SERPL CREATININE-BSD FRML MDRD: 11 ML/MIN/1.7M2
GFR SERPL CREATININE-BSD FRML MDRD: 17 ML/MIN/1.7M2
GFR SERPL CREATININE-BSD FRML MDRD: 19 ML/MIN/1.7M2
GFR SERPL CREATININE-BSD FRML MDRD: 21 ML/MIN/1.7M2
GFR SERPL CREATININE-BSD FRML MDRD: 23 ML/MIN/1.7M2
GFR SERPL CREATININE-BSD FRML MDRD: 25 ML/MIN/1.7M2
GFR SERPL CREATININE-BSD FRML MDRD: 26 ML/MIN/1.7M2
GFR SERPL CREATININE-BSD FRML MDRD: 28 ML/MIN/1.7M2
GFR SERPL CREATININE-BSD FRML MDRD: 30 ML/MIN/1.7M2
GFR SERPL CREATININE-BSD FRML MDRD: 31 ML/MIN/1.7M2
GFR SERPL CREATININE-BSD FRML MDRD: 31 ML/MIN/1.7M2
GFR SERPL CREATININE-BSD FRML MDRD: 32 ML/MIN/1.7M2
GFR SERPL CREATININE-BSD FRML MDRD: 34 ML/MIN/1.7M2
GFR SERPL CREATININE-BSD FRML MDRD: 35 ML/MIN/1.7M2
GFR SERPL CREATININE-BSD FRML MDRD: 36 ML/MIN/1.7M2
GFR SERPL CREATININE-BSD FRML MDRD: 37 ML/MIN/1.7M2
GFR SERPL CREATININE-BSD FRML MDRD: 39 ML/MIN/1.7M2
GFR SERPL CREATININE-BSD FRML MDRD: 39 ML/MIN/1.7M2
GFR SERPL CREATININE-BSD FRML MDRD: 41 ML/MIN/1.7M2
GFR SERPL CREATININE-BSD FRML MDRD: 44 ML/MIN/1.7M2
GFR SERPL CREATININE-BSD FRML MDRD: 45 ML/MIN/1.7M2
GFR SERPL CREATININE-BSD FRML MDRD: 46 ML/MIN/1.7M2
GFR SERPL CREATININE-BSD FRML MDRD: 46 ML/MIN/1.7M2
GFR SERPL CREATININE-BSD FRML MDRD: 48 ML/MIN/1.7M2
GFR SERPL CREATININE-BSD FRML MDRD: 49 ML/MIN/1.7M2
GFR SERPL CREATININE-BSD FRML MDRD: 49 ML/MIN/1.7M2
GFR SERPL CREATININE-BSD FRML MDRD: 50 ML/MIN/1.7M2
GFR SERPL CREATININE-BSD FRML MDRD: 54 ML/MIN/1.7M2
GFR SERPL CREATININE-BSD FRML MDRD: 55 ML/MIN/1.7M2
GFR SERPL CREATININE-BSD FRML MDRD: 57 ML/MIN/1.7M2
GFR SERPL CREATININE-BSD FRML MDRD: 57 ML/MIN/1.7M2
GFR SERPL CREATININE-BSD FRML MDRD: 63 ML/MIN/1.7M2
GFR SERPL CREATININE-BSD FRML MDRD: 65 ML/MIN/1.7M2
GFR SERPL CREATININE-BSD FRML MDRD: 68 ML/MIN/1.7M2
GFR SERPL CREATININE-BSD FRML MDRD: 68 ML/MIN/1.7M2
GFR SERPL CREATININE-BSD FRML MDRD: 72 ML/MIN/1.7M2
GFR SERPL CREATININE-BSD FRML MDRD: 72 ML/MIN/1.7M2
GFR SERPL CREATININE-BSD FRML MDRD: 75 ML/MIN/1.7M2
GFR SERPL CREATININE-BSD FRML MDRD: 75 ML/MIN/1.7M2
GFR SERPL CREATININE-BSD FRML MDRD: 76 ML/MIN/1.7M2
GFR SERPL CREATININE-BSD FRML MDRD: 77 ML/MIN/1.7M2
GFR SERPL CREATININE-BSD FRML MDRD: 80 ML/MIN/1.7M2
GLUCOSE BLDC GLUCOMTR-MCNC: 100 MG/DL (ref 70–99)
GLUCOSE BLDC GLUCOMTR-MCNC: 100 MG/DL (ref 70–99)
GLUCOSE BLDC GLUCOMTR-MCNC: 102 MG/DL (ref 70–99)
GLUCOSE BLDC GLUCOMTR-MCNC: 102 MG/DL (ref 70–99)
GLUCOSE BLDC GLUCOMTR-MCNC: 103 MG/DL (ref 70–99)
GLUCOSE BLDC GLUCOMTR-MCNC: 104 MG/DL (ref 70–99)
GLUCOSE BLDC GLUCOMTR-MCNC: 106 MG/DL (ref 70–99)
GLUCOSE BLDC GLUCOMTR-MCNC: 108 MG/DL (ref 70–99)
GLUCOSE BLDC GLUCOMTR-MCNC: 108 MG/DL (ref 70–99)
GLUCOSE BLDC GLUCOMTR-MCNC: 109 MG/DL (ref 70–99)
GLUCOSE BLDC GLUCOMTR-MCNC: 109 MG/DL (ref 70–99)
GLUCOSE BLDC GLUCOMTR-MCNC: 115 MG/DL (ref 70–99)
GLUCOSE BLDC GLUCOMTR-MCNC: 118 MG/DL (ref 70–99)
GLUCOSE BLDC GLUCOMTR-MCNC: 118 MG/DL (ref 70–99)
GLUCOSE BLDC GLUCOMTR-MCNC: 119 MG/DL (ref 70–99)
GLUCOSE BLDC GLUCOMTR-MCNC: 120 MG/DL (ref 70–99)
GLUCOSE BLDC GLUCOMTR-MCNC: 121 MG/DL (ref 70–99)
GLUCOSE BLDC GLUCOMTR-MCNC: 124 MG/DL (ref 70–99)
GLUCOSE BLDC GLUCOMTR-MCNC: 124 MG/DL (ref 70–99)
GLUCOSE BLDC GLUCOMTR-MCNC: 126 MG/DL (ref 70–99)
GLUCOSE BLDC GLUCOMTR-MCNC: 127 MG/DL (ref 70–99)
GLUCOSE BLDC GLUCOMTR-MCNC: 127 MG/DL (ref 70–99)
GLUCOSE BLDC GLUCOMTR-MCNC: 128 MG/DL (ref 70–99)
GLUCOSE BLDC GLUCOMTR-MCNC: 128 MG/DL (ref 70–99)
GLUCOSE BLDC GLUCOMTR-MCNC: 129 MG/DL (ref 70–99)
GLUCOSE BLDC GLUCOMTR-MCNC: 130 MG/DL (ref 70–99)
GLUCOSE BLDC GLUCOMTR-MCNC: 131 MG/DL (ref 70–99)
GLUCOSE BLDC GLUCOMTR-MCNC: 131 MG/DL (ref 70–99)
GLUCOSE BLDC GLUCOMTR-MCNC: 132 MG/DL (ref 70–99)
GLUCOSE BLDC GLUCOMTR-MCNC: 133 MG/DL (ref 70–99)
GLUCOSE BLDC GLUCOMTR-MCNC: 134 MG/DL (ref 70–99)
GLUCOSE BLDC GLUCOMTR-MCNC: 135 MG/DL (ref 70–99)
GLUCOSE BLDC GLUCOMTR-MCNC: 136 MG/DL (ref 70–99)
GLUCOSE BLDC GLUCOMTR-MCNC: 140 MG/DL (ref 70–99)
GLUCOSE BLDC GLUCOMTR-MCNC: 141 MG/DL (ref 70–99)
GLUCOSE BLDC GLUCOMTR-MCNC: 142 MG/DL (ref 70–99)
GLUCOSE BLDC GLUCOMTR-MCNC: 142 MG/DL (ref 70–99)
GLUCOSE BLDC GLUCOMTR-MCNC: 145 MG/DL (ref 70–99)
GLUCOSE BLDC GLUCOMTR-MCNC: 146 MG/DL (ref 70–99)
GLUCOSE BLDC GLUCOMTR-MCNC: 147 MG/DL (ref 70–99)
GLUCOSE BLDC GLUCOMTR-MCNC: 148 MG/DL (ref 70–99)
GLUCOSE BLDC GLUCOMTR-MCNC: 149 MG/DL (ref 70–99)
GLUCOSE BLDC GLUCOMTR-MCNC: 152 MG/DL (ref 70–99)
GLUCOSE BLDC GLUCOMTR-MCNC: 159 MG/DL (ref 70–99)
GLUCOSE BLDC GLUCOMTR-MCNC: 160 MG/DL (ref 70–99)
GLUCOSE BLDC GLUCOMTR-MCNC: 162 MG/DL (ref 70–99)
GLUCOSE BLDC GLUCOMTR-MCNC: 162 MG/DL (ref 70–99)
GLUCOSE BLDC GLUCOMTR-MCNC: 163 MG/DL (ref 70–99)
GLUCOSE BLDC GLUCOMTR-MCNC: 165 MG/DL (ref 70–99)
GLUCOSE BLDC GLUCOMTR-MCNC: 165 MG/DL (ref 70–99)
GLUCOSE BLDC GLUCOMTR-MCNC: 166 MG/DL (ref 70–99)
GLUCOSE BLDC GLUCOMTR-MCNC: 167 MG/DL (ref 70–99)
GLUCOSE BLDC GLUCOMTR-MCNC: 168 MG/DL (ref 70–99)
GLUCOSE BLDC GLUCOMTR-MCNC: 168 MG/DL (ref 70–99)
GLUCOSE BLDC GLUCOMTR-MCNC: 169 MG/DL (ref 70–99)
GLUCOSE BLDC GLUCOMTR-MCNC: 170 MG/DL (ref 70–99)
GLUCOSE BLDC GLUCOMTR-MCNC: 171 MG/DL (ref 70–99)
GLUCOSE BLDC GLUCOMTR-MCNC: 172 MG/DL (ref 70–99)
GLUCOSE BLDC GLUCOMTR-MCNC: 173 MG/DL (ref 70–99)
GLUCOSE BLDC GLUCOMTR-MCNC: 176 MG/DL (ref 70–99)
GLUCOSE BLDC GLUCOMTR-MCNC: 177 MG/DL (ref 70–99)
GLUCOSE BLDC GLUCOMTR-MCNC: 178 MG/DL (ref 70–99)
GLUCOSE BLDC GLUCOMTR-MCNC: 182 MG/DL (ref 70–99)
GLUCOSE BLDC GLUCOMTR-MCNC: 183 MG/DL (ref 70–99)
GLUCOSE BLDC GLUCOMTR-MCNC: 183 MG/DL (ref 70–99)
GLUCOSE BLDC GLUCOMTR-MCNC: 184 MG/DL (ref 70–99)
GLUCOSE BLDC GLUCOMTR-MCNC: 185 MG/DL (ref 70–99)
GLUCOSE BLDC GLUCOMTR-MCNC: 185 MG/DL (ref 70–99)
GLUCOSE BLDC GLUCOMTR-MCNC: 187 MG/DL (ref 70–99)
GLUCOSE BLDC GLUCOMTR-MCNC: 187 MG/DL (ref 70–99)
GLUCOSE BLDC GLUCOMTR-MCNC: 189 MG/DL (ref 70–99)
GLUCOSE BLDC GLUCOMTR-MCNC: 190 MG/DL (ref 70–99)
GLUCOSE BLDC GLUCOMTR-MCNC: 190 MG/DL (ref 70–99)
GLUCOSE BLDC GLUCOMTR-MCNC: 192 MG/DL (ref 70–99)
GLUCOSE BLDC GLUCOMTR-MCNC: 193 MG/DL (ref 70–99)
GLUCOSE BLDC GLUCOMTR-MCNC: 194 MG/DL (ref 70–99)
GLUCOSE BLDC GLUCOMTR-MCNC: 194 MG/DL (ref 70–99)
GLUCOSE BLDC GLUCOMTR-MCNC: 199 MG/DL (ref 70–99)
GLUCOSE BLDC GLUCOMTR-MCNC: 203 MG/DL (ref 70–99)
GLUCOSE BLDC GLUCOMTR-MCNC: 205 MG/DL (ref 70–99)
GLUCOSE BLDC GLUCOMTR-MCNC: 207 MG/DL (ref 70–99)
GLUCOSE BLDC GLUCOMTR-MCNC: 208 MG/DL (ref 70–99)
GLUCOSE BLDC GLUCOMTR-MCNC: 209 MG/DL (ref 70–99)
GLUCOSE BLDC GLUCOMTR-MCNC: 214 MG/DL (ref 70–99)
GLUCOSE BLDC GLUCOMTR-MCNC: 216 MG/DL (ref 70–99)
GLUCOSE BLDC GLUCOMTR-MCNC: 218 MG/DL (ref 70–99)
GLUCOSE BLDC GLUCOMTR-MCNC: 219 MG/DL (ref 70–99)
GLUCOSE BLDC GLUCOMTR-MCNC: 219 MG/DL (ref 70–99)
GLUCOSE BLDC GLUCOMTR-MCNC: 220 MG/DL (ref 70–99)
GLUCOSE BLDC GLUCOMTR-MCNC: 221 MG/DL (ref 70–99)
GLUCOSE BLDC GLUCOMTR-MCNC: 222 MG/DL (ref 70–99)
GLUCOSE BLDC GLUCOMTR-MCNC: 223 MG/DL (ref 70–99)
GLUCOSE BLDC GLUCOMTR-MCNC: 223 MG/DL (ref 70–99)
GLUCOSE BLDC GLUCOMTR-MCNC: 224 MG/DL (ref 70–99)
GLUCOSE BLDC GLUCOMTR-MCNC: 226 MG/DL (ref 70–99)
GLUCOSE BLDC GLUCOMTR-MCNC: 230 MG/DL (ref 70–99)
GLUCOSE BLDC GLUCOMTR-MCNC: 231 MG/DL (ref 70–99)
GLUCOSE BLDC GLUCOMTR-MCNC: 234 MG/DL (ref 70–99)
GLUCOSE BLDC GLUCOMTR-MCNC: 235 MG/DL (ref 70–99)
GLUCOSE BLDC GLUCOMTR-MCNC: 237 MG/DL (ref 70–99)
GLUCOSE BLDC GLUCOMTR-MCNC: 239 MG/DL (ref 70–99)
GLUCOSE BLDC GLUCOMTR-MCNC: 241 MG/DL (ref 70–99)
GLUCOSE BLDC GLUCOMTR-MCNC: 241 MG/DL (ref 70–99)
GLUCOSE BLDC GLUCOMTR-MCNC: 242 MG/DL (ref 70–99)
GLUCOSE BLDC GLUCOMTR-MCNC: 244 MG/DL (ref 70–99)
GLUCOSE BLDC GLUCOMTR-MCNC: 247 MG/DL (ref 70–99)
GLUCOSE BLDC GLUCOMTR-MCNC: 248 MG/DL (ref 70–99)
GLUCOSE BLDC GLUCOMTR-MCNC: 249 MG/DL (ref 70–99)
GLUCOSE BLDC GLUCOMTR-MCNC: 252 MG/DL (ref 70–99)
GLUCOSE BLDC GLUCOMTR-MCNC: 253 MG/DL (ref 70–99)
GLUCOSE BLDC GLUCOMTR-MCNC: 256 MG/DL (ref 70–99)
GLUCOSE BLDC GLUCOMTR-MCNC: 259 MG/DL (ref 70–99)
GLUCOSE BLDC GLUCOMTR-MCNC: 262 MG/DL (ref 70–99)
GLUCOSE BLDC GLUCOMTR-MCNC: 263 MG/DL (ref 70–99)
GLUCOSE BLDC GLUCOMTR-MCNC: 264 MG/DL (ref 70–99)
GLUCOSE BLDC GLUCOMTR-MCNC: 265 MG/DL (ref 70–99)
GLUCOSE BLDC GLUCOMTR-MCNC: 265 MG/DL (ref 70–99)
GLUCOSE BLDC GLUCOMTR-MCNC: 266 MG/DL (ref 70–99)
GLUCOSE BLDC GLUCOMTR-MCNC: 267 MG/DL (ref 70–99)
GLUCOSE BLDC GLUCOMTR-MCNC: 270 MG/DL (ref 70–99)
GLUCOSE BLDC GLUCOMTR-MCNC: 272 MG/DL (ref 70–99)
GLUCOSE BLDC GLUCOMTR-MCNC: 273 MG/DL (ref 70–99)
GLUCOSE BLDC GLUCOMTR-MCNC: 273 MG/DL (ref 70–99)
GLUCOSE BLDC GLUCOMTR-MCNC: 274 MG/DL (ref 70–99)
GLUCOSE BLDC GLUCOMTR-MCNC: 274 MG/DL (ref 70–99)
GLUCOSE BLDC GLUCOMTR-MCNC: 276 MG/DL (ref 70–99)
GLUCOSE BLDC GLUCOMTR-MCNC: 278 MG/DL (ref 70–99)
GLUCOSE BLDC GLUCOMTR-MCNC: 279 MG/DL (ref 70–99)
GLUCOSE BLDC GLUCOMTR-MCNC: 280 MG/DL (ref 70–99)
GLUCOSE BLDC GLUCOMTR-MCNC: 282 MG/DL (ref 70–99)
GLUCOSE BLDC GLUCOMTR-MCNC: 283 MG/DL (ref 70–99)
GLUCOSE BLDC GLUCOMTR-MCNC: 283 MG/DL (ref 70–99)
GLUCOSE BLDC GLUCOMTR-MCNC: 284 MG/DL (ref 70–99)
GLUCOSE BLDC GLUCOMTR-MCNC: 285 MG/DL (ref 70–99)
GLUCOSE BLDC GLUCOMTR-MCNC: 286 MG/DL (ref 70–99)
GLUCOSE BLDC GLUCOMTR-MCNC: 287 MG/DL (ref 70–99)
GLUCOSE BLDC GLUCOMTR-MCNC: 290 MG/DL (ref 70–99)
GLUCOSE BLDC GLUCOMTR-MCNC: 292 MG/DL (ref 70–99)
GLUCOSE BLDC GLUCOMTR-MCNC: 292 MG/DL (ref 70–99)
GLUCOSE BLDC GLUCOMTR-MCNC: 293 MG/DL (ref 70–99)
GLUCOSE BLDC GLUCOMTR-MCNC: 294 MG/DL (ref 70–99)
GLUCOSE BLDC GLUCOMTR-MCNC: 294 MG/DL (ref 70–99)
GLUCOSE BLDC GLUCOMTR-MCNC: 298 MG/DL (ref 70–99)
GLUCOSE BLDC GLUCOMTR-MCNC: 304 MG/DL (ref 70–99)
GLUCOSE BLDC GLUCOMTR-MCNC: 304 MG/DL (ref 70–99)
GLUCOSE BLDC GLUCOMTR-MCNC: 310 MG/DL (ref 70–99)
GLUCOSE BLDC GLUCOMTR-MCNC: 315 MG/DL (ref 70–99)
GLUCOSE BLDC GLUCOMTR-MCNC: 317 MG/DL (ref 70–99)
GLUCOSE BLDC GLUCOMTR-MCNC: 318 MG/DL (ref 70–99)
GLUCOSE BLDC GLUCOMTR-MCNC: 322 MG/DL (ref 70–99)
GLUCOSE BLDC GLUCOMTR-MCNC: 324 MG/DL (ref 70–99)
GLUCOSE BLDC GLUCOMTR-MCNC: 330 MG/DL (ref 70–99)
GLUCOSE BLDC GLUCOMTR-MCNC: 331 MG/DL (ref 70–99)
GLUCOSE BLDC GLUCOMTR-MCNC: 333 MG/DL (ref 70–99)
GLUCOSE BLDC GLUCOMTR-MCNC: 341 MG/DL (ref 70–99)
GLUCOSE BLDC GLUCOMTR-MCNC: 342 MG/DL (ref 70–99)
GLUCOSE BLDC GLUCOMTR-MCNC: 347 MG/DL (ref 70–99)
GLUCOSE BLDC GLUCOMTR-MCNC: 347 MG/DL (ref 70–99)
GLUCOSE BLDC GLUCOMTR-MCNC: 351 MG/DL (ref 70–99)
GLUCOSE BLDC GLUCOMTR-MCNC: 353 MG/DL (ref 70–99)
GLUCOSE BLDC GLUCOMTR-MCNC: 354 MG/DL (ref 70–99)
GLUCOSE BLDC GLUCOMTR-MCNC: 358 MG/DL (ref 70–99)
GLUCOSE BLDC GLUCOMTR-MCNC: 364 MG/DL (ref 70–99)
GLUCOSE BLDC GLUCOMTR-MCNC: 366 MG/DL (ref 70–99)
GLUCOSE BLDC GLUCOMTR-MCNC: 368 MG/DL (ref 70–99)
GLUCOSE BLDC GLUCOMTR-MCNC: 376 MG/DL (ref 70–99)
GLUCOSE BLDC GLUCOMTR-MCNC: 385 MG/DL (ref 70–99)
GLUCOSE BLDC GLUCOMTR-MCNC: 57 MG/DL (ref 70–99)
GLUCOSE BLDC GLUCOMTR-MCNC: 66 MG/DL (ref 70–99)
GLUCOSE BLDC GLUCOMTR-MCNC: 69 MG/DL (ref 70–99)
GLUCOSE BLDC GLUCOMTR-MCNC: 70 MG/DL (ref 70–99)
GLUCOSE BLDC GLUCOMTR-MCNC: 72 MG/DL (ref 70–99)
GLUCOSE BLDC GLUCOMTR-MCNC: 73 MG/DL (ref 70–99)
GLUCOSE BLDC GLUCOMTR-MCNC: 74 MG/DL (ref 70–99)
GLUCOSE BLDC GLUCOMTR-MCNC: 75 MG/DL (ref 70–99)
GLUCOSE BLDC GLUCOMTR-MCNC: 83 MG/DL (ref 70–99)
GLUCOSE BLDC GLUCOMTR-MCNC: 84 MG/DL (ref 70–99)
GLUCOSE BLDC GLUCOMTR-MCNC: 84 MG/DL (ref 70–99)
GLUCOSE BLDC GLUCOMTR-MCNC: 89 MG/DL (ref 70–99)
GLUCOSE BLDC GLUCOMTR-MCNC: 91 MG/DL (ref 70–99)
GLUCOSE BLDC GLUCOMTR-MCNC: 92 MG/DL (ref 70–99)
GLUCOSE BLDC GLUCOMTR-MCNC: 95 MG/DL (ref 70–99)
GLUCOSE SERPL-MCNC: 111 MG/DL (ref 70–99)
GLUCOSE SERPL-MCNC: 120 MG/DL (ref 70–99)
GLUCOSE SERPL-MCNC: 137 MG/DL (ref 70–99)
GLUCOSE SERPL-MCNC: 141 MG/DL (ref 70–99)
GLUCOSE SERPL-MCNC: 142 MG/DL (ref 70–99)
GLUCOSE SERPL-MCNC: 143 MG/DL (ref 70–99)
GLUCOSE SERPL-MCNC: 148 MG/DL (ref 70–99)
GLUCOSE SERPL-MCNC: 156 MG/DL (ref 70–99)
GLUCOSE SERPL-MCNC: 167 MG/DL (ref 70–99)
GLUCOSE SERPL-MCNC: 174 MG/DL (ref 70–99)
GLUCOSE SERPL-MCNC: 183 MG/DL (ref 70–99)
GLUCOSE SERPL-MCNC: 187 MG/DL (ref 70–99)
GLUCOSE SERPL-MCNC: 190 MG/DL (ref 70–99)
GLUCOSE SERPL-MCNC: 192 MG/DL (ref 70–99)
GLUCOSE SERPL-MCNC: 192 MG/DL (ref 70–99)
GLUCOSE SERPL-MCNC: 199 MG/DL (ref 70–99)
GLUCOSE SERPL-MCNC: 216 MG/DL (ref 70–99)
GLUCOSE SERPL-MCNC: 219 MG/DL (ref 70–99)
GLUCOSE SERPL-MCNC: 221 MG/DL (ref 70–99)
GLUCOSE SERPL-MCNC: 222 MG/DL (ref 70–99)
GLUCOSE SERPL-MCNC: 222 MG/DL (ref 70–99)
GLUCOSE SERPL-MCNC: 226 MG/DL (ref 70–99)
GLUCOSE SERPL-MCNC: 229 MG/DL (ref 70–99)
GLUCOSE SERPL-MCNC: 230 MG/DL (ref 70–99)
GLUCOSE SERPL-MCNC: 233 MG/DL (ref 70–99)
GLUCOSE SERPL-MCNC: 244 MG/DL (ref 70–99)
GLUCOSE SERPL-MCNC: 252 MG/DL (ref 70–99)
GLUCOSE SERPL-MCNC: 252 MG/DL (ref 70–99)
GLUCOSE SERPL-MCNC: 253 MG/DL (ref 70–99)
GLUCOSE SERPL-MCNC: 254 MG/DL (ref 70–99)
GLUCOSE SERPL-MCNC: 256 MG/DL (ref 70–99)
GLUCOSE SERPL-MCNC: 266 MG/DL (ref 70–99)
GLUCOSE SERPL-MCNC: 267 MG/DL (ref 70–99)
GLUCOSE SERPL-MCNC: 270 MG/DL (ref 70–99)
GLUCOSE SERPL-MCNC: 282 MG/DL (ref 70–99)
GLUCOSE SERPL-MCNC: 285 MG/DL (ref 70–99)
GLUCOSE SERPL-MCNC: 286 MG/DL (ref 70–99)
GLUCOSE SERPL-MCNC: 299 MG/DL (ref 70–99)
GLUCOSE SERPL-MCNC: 344 MG/DL (ref 70–99)
GLUCOSE SERPL-MCNC: 50 MG/DL (ref 70–99)
GLUCOSE SERPL-MCNC: 80 MG/DL (ref 70–99)
GLUCOSE SERPL-MCNC: 83 MG/DL (ref 70–99)
GLUCOSE SERPL-MCNC: 85 MG/DL (ref 70–99)
GLUCOSE SERPL-MCNC: 87 MG/DL (ref 70–99)
GLUCOSE SERPL-MCNC: 95 MG/DL (ref 70–99)
GLUCOSE UR STRIP-MCNC: 70 MG/DL
GLUCOSE UR STRIP-MCNC: >1000 MG/DL
GLUCOSE UR STRIP-MCNC: NEGATIVE MG/DL
GLUCOSE UR STRIP-MCNC: NEGATIVE MG/DL
H PYLORI AG STL QL IA: NORMAL
HAV IGG SER QL IA: ABNORMAL
HBA1C MFR BLD: 6.1 % (ref 4.3–6)
HBA1C MFR BLD: 6.4 % (ref 4.3–6)
HBV SURFACE AG SERPL QL IA: NONREACTIVE
HCO3 BLDV-SCNC: 22 MMOL/L (ref 21–28)
HCT VFR BLD AUTO: 24.1 % (ref 40–53)
HCT VFR BLD AUTO: 24.3 % (ref 40–53)
HCT VFR BLD AUTO: 25.2 % (ref 40–53)
HCT VFR BLD AUTO: 25.6 % (ref 40–53)
HCT VFR BLD AUTO: 26.3 % (ref 40–53)
HCT VFR BLD AUTO: 26.8 % (ref 40–53)
HCT VFR BLD AUTO: 27 % (ref 40–53)
HCT VFR BLD AUTO: 27 % (ref 40–53)
HCT VFR BLD AUTO: 27.3 % (ref 40–53)
HCT VFR BLD AUTO: 27.5 % (ref 40–53)
HCT VFR BLD AUTO: 27.5 % (ref 40–53)
HCT VFR BLD AUTO: 27.6 % (ref 40–53)
HCT VFR BLD AUTO: 27.9 % (ref 40–53)
HCT VFR BLD AUTO: 27.9 % (ref 40–53)
HCT VFR BLD AUTO: 28 % (ref 40–53)
HCT VFR BLD AUTO: 28.2 % (ref 40–53)
HCT VFR BLD AUTO: 28.2 % (ref 40–53)
HCT VFR BLD AUTO: 28.7 % (ref 40–53)
HCT VFR BLD AUTO: 28.7 % (ref 40–53)
HCT VFR BLD AUTO: 29.1 % (ref 40–53)
HCT VFR BLD AUTO: 29.4 % (ref 40–53)
HCT VFR BLD AUTO: 29.7 % (ref 40–53)
HCT VFR BLD AUTO: 29.8 % (ref 40–53)
HCT VFR BLD AUTO: 30.2 % (ref 40–53)
HCT VFR BLD AUTO: 30.3 % (ref 40–53)
HCT VFR BLD AUTO: 30.4 % (ref 40–53)
HCT VFR BLD AUTO: 30.7 % (ref 40–53)
HCT VFR BLD AUTO: 36.1 % (ref 40–53)
HCT VFR BLD AUTO: 37.4 % (ref 40–53)
HCT VFR BLD AUTO: 37.4 % (ref 40–53)
HCT VFR BLD AUTO: 39.2 % (ref 40–53)
HCV AB SERPL QL IA: NORMAL
HDLC SERPL-MCNC: 24 MG/DL
HDLC SERPL-MCNC: 25 MG/DL
HEMOCCULT STL QL IA: POSITIVE
HGB BLD-MCNC: 10.3 G/DL (ref 13.3–17.7)
HGB BLD-MCNC: 10.4 G/DL (ref 13.3–17.7)
HGB BLD-MCNC: 11.3 G/DL (ref 13.3–17.7)
HGB BLD-MCNC: 11.5 G/DL (ref 13.3–17.7)
HGB BLD-MCNC: 12.1 G/DL (ref 13.3–17.7)
HGB BLD-MCNC: 7 G/DL (ref 13.3–17.7)
HGB BLD-MCNC: 7.4 G/DL (ref 13.3–17.7)
HGB BLD-MCNC: 7.6 G/DL (ref 13.3–17.7)
HGB BLD-MCNC: 7.7 G/DL (ref 13.3–17.7)
HGB BLD-MCNC: 7.7 G/DL (ref 13.3–17.7)
HGB BLD-MCNC: 7.8 G/DL (ref 13.3–17.7)
HGB BLD-MCNC: 7.9 G/DL (ref 13.3–17.7)
HGB BLD-MCNC: 7.9 G/DL (ref 13.3–17.7)
HGB BLD-MCNC: 8 G/DL (ref 13.3–17.7)
HGB BLD-MCNC: 8 G/DL (ref 13.3–17.7)
HGB BLD-MCNC: 8.1 G/DL (ref 13.3–17.7)
HGB BLD-MCNC: 8.2 G/DL (ref 13.3–17.7)
HGB BLD-MCNC: 8.2 G/DL (ref 13.3–17.7)
HGB BLD-MCNC: 8.3 G/DL (ref 13.3–17.7)
HGB BLD-MCNC: 8.4 G/DL (ref 13.3–17.7)
HGB BLD-MCNC: 8.5 G/DL (ref 13.3–17.7)
HGB BLD-MCNC: 8.5 G/DL (ref 13.3–17.7)
HGB BLD-MCNC: 8.6 G/DL (ref 13.3–17.7)
HGB BLD-MCNC: 8.6 G/DL (ref 13.3–17.7)
HGB BLD-MCNC: 8.8 G/DL (ref 13.3–17.7)
HGB BLD-MCNC: 8.9 G/DL (ref 13.3–17.7)
HGB BLD-MCNC: 9 G/DL (ref 13.3–17.7)
HGB BLD-MCNC: 9 G/DL (ref 13.3–17.7)
HGB BLD-MCNC: 9.1 G/DL (ref 13.3–17.7)
HGB BLD-MCNC: 9.1 G/DL (ref 13.3–17.7)
HGB BLD-MCNC: 9.3 G/DL (ref 13.3–17.7)
HGB BLD-MCNC: 9.5 G/DL (ref 13.3–17.7)
HGB BLD-MCNC: 9.6 G/DL (ref 13.3–17.7)
HGB BLD-MCNC: 9.7 G/DL (ref 13.3–17.7)
HGB BLD-MCNC: 9.8 G/DL (ref 13.3–17.7)
HGB BLD-MCNC: 9.9 G/DL (ref 13.3–17.7)
HGB BLD-MCNC: 9.9 G/DL (ref 13.3–17.7)
HGB UR QL STRIP: ABNORMAL
HGB UR QL STRIP: NEGATIVE
IMM GRANULOCYTES # BLD: 0 10E9/L (ref 0–0.4)
IMM GRANULOCYTES # BLD: 0.1 10E9/L (ref 0–0.4)
IMM GRANULOCYTES # BLD: 0.2 10E9/L (ref 0–0.4)
IMM GRANULOCYTES # BLD: 0.5 10E9/L (ref 0–0.4)
IMM GRANULOCYTES NFR BLD: 0 %
IMM GRANULOCYTES NFR BLD: 0.1 %
IMM GRANULOCYTES NFR BLD: 0.1 %
IMM GRANULOCYTES NFR BLD: 0.2 %
IMM GRANULOCYTES NFR BLD: 0.3 %
IMM GRANULOCYTES NFR BLD: 0.4 %
IMM GRANULOCYTES NFR BLD: 0.4 %
IMM GRANULOCYTES NFR BLD: 0.6 %
IMM GRANULOCYTES NFR BLD: 1 %
IMM GRANULOCYTES NFR BLD: 1.3 %
IMM GRANULOCYTES NFR BLD: 1.6 %
IMM GRANULOCYTES NFR BLD: 4.1 %
IMM PLASMA CELLS NFR BLD: 0.9 %
INR PPP: 1.12 (ref 0.86–1.14)
INR PPP: 1.16 (ref 0.86–1.14)
INR PPP: 1.17 (ref 0.86–1.14)
INR PPP: 1.18 (ref 0.86–1.14)
INR PPP: 1.23 (ref 0.86–1.14)
INR PPP: 1.25 (ref 0.86–1.14)
INR PPP: 1.25 (ref 0.86–1.14)
INR PPP: 1.27 (ref 0.86–1.14)
INR PPP: 1.33 (ref 0.86–1.14)
INR PPP: 1.36 (ref 0.86–1.14)
INR PPP: 1.4 (ref 0.86–1.14)
INR PPP: 1.49 (ref 0.86–1.14)
INR PPP: 1.93 (ref 0.86–1.14)
INR PPP: 2.1 (ref 0.86–1.14)
INR PPP: 2.68 (ref 0.86–1.14)
INR PPP: 3.58 (ref 0.86–1.14)
INR PPP: 6.26 (ref 0.86–1.14)
INTERPRETATION ECG - MUSE: NORMAL
KETONES UR STRIP-MCNC: NEGATIVE MG/DL
LACTATE BLD-SCNC: 1.2 MMOL/L (ref 0.7–2.1)
LACTATE BLD-SCNC: 1.3 MMOL/L (ref 0.7–2.1)
LACTATE BLD-SCNC: 1.4 MMOL/L (ref 0.7–2.1)
LACTATE BLD-SCNC: 1.7 MMOL/L (ref 0.7–2.1)
LACTATE BLD-SCNC: 2.1 MMOL/L (ref 0.7–2.1)
LACTATE SERPL-SCNC: 1.2 MMOL/L (ref 0.4–2)
LACTATE SERPL-SCNC: 2.5 MMOL/L (ref 0.4–2)
LDLC SERPL CALC-MCNC: 43 MG/DL
LDLC SERPL CALC-MCNC: 48 MG/DL
LEUKOCYTE ESTERASE UR QL STRIP: ABNORMAL
LIPASE SERPL-CCNC: 203 U/L (ref 73–393)
LYMPHOCYTES # BLD AUTO: 1 10E9/L (ref 0.8–5.3)
LYMPHOCYTES # BLD AUTO: 1.1 10E9/L (ref 0.8–5.3)
LYMPHOCYTES # BLD AUTO: 1.1 10E9/L (ref 0.8–5.3)
LYMPHOCYTES # BLD AUTO: 1.2 10E9/L (ref 0.8–5.3)
LYMPHOCYTES # BLD AUTO: 1.3 10E9/L (ref 0.8–5.3)
LYMPHOCYTES # BLD AUTO: 1.4 10E9/L (ref 0.8–5.3)
LYMPHOCYTES # BLD AUTO: 1.4 10E9/L (ref 0.8–5.3)
LYMPHOCYTES # BLD AUTO: 1.5 10E9/L (ref 0.8–5.3)
LYMPHOCYTES # BLD AUTO: 1.6 10E9/L (ref 0.8–5.3)
LYMPHOCYTES # BLD AUTO: 1.7 10E9/L (ref 0.8–5.3)
LYMPHOCYTES # BLD AUTO: 1.8 10E9/L (ref 0.8–5.3)
LYMPHOCYTES # BLD AUTO: 1.9 10E9/L (ref 0.8–5.3)
LYMPHOCYTES # BLD AUTO: 2 10E9/L (ref 0.8–5.3)
LYMPHOCYTES # BLD AUTO: 2 10E9/L (ref 0.8–5.3)
LYMPHOCYTES # BLD AUTO: 2.1 10E9/L (ref 0.8–5.3)
LYMPHOCYTES # BLD AUTO: 2.3 10E9/L (ref 0.8–5.3)
LYMPHOCYTES NFR BLD AUTO: 10.7 %
LYMPHOCYTES NFR BLD AUTO: 13.2 %
LYMPHOCYTES NFR BLD AUTO: 15.8 %
LYMPHOCYTES NFR BLD AUTO: 19.5 %
LYMPHOCYTES NFR BLD AUTO: 19.7 %
LYMPHOCYTES NFR BLD AUTO: 20.1 %
LYMPHOCYTES NFR BLD AUTO: 20.3 %
LYMPHOCYTES NFR BLD AUTO: 20.6 %
LYMPHOCYTES NFR BLD AUTO: 22 %
LYMPHOCYTES NFR BLD AUTO: 22.2 %
LYMPHOCYTES NFR BLD AUTO: 22.7 %
LYMPHOCYTES NFR BLD AUTO: 23 %
LYMPHOCYTES NFR BLD AUTO: 23.6 %
LYMPHOCYTES NFR BLD AUTO: 23.7 %
LYMPHOCYTES NFR BLD AUTO: 23.8 %
LYMPHOCYTES NFR BLD AUTO: 24.9 %
LYMPHOCYTES NFR BLD AUTO: 25.3 %
LYMPHOCYTES NFR BLD AUTO: 25.5 %
LYMPHOCYTES NFR BLD AUTO: 25.9 %
LYMPHOCYTES NFR BLD AUTO: 26 %
LYMPHOCYTES NFR BLD AUTO: 27.1 %
LYMPHOCYTES NFR BLD AUTO: 27.3 %
LYMPHOCYTES NFR BLD AUTO: 28 %
LYMPHOCYTES NFR BLD AUTO: 28.8 %
LYMPHOCYTES NFR BLD AUTO: 31 %
LYMPHOCYTES NFR BLD AUTO: 31.1 %
LYMPHOCYTES NFR BLD AUTO: 8.3 %
Lab: ABNORMAL
Lab: ABNORMAL
Lab: NORMAL
Lab: NORMAL
MAGNESIUM SERPL-MCNC: 1 MG/DL (ref 1.6–2.3)
MAGNESIUM SERPL-MCNC: 1.5 MG/DL (ref 1.6–2.3)
MAGNESIUM SERPL-MCNC: 1.6 MG/DL (ref 1.6–2.3)
MAGNESIUM SERPL-MCNC: 1.7 MG/DL (ref 1.6–2.3)
MAGNESIUM SERPL-MCNC: 1.7 MG/DL (ref 1.6–2.3)
MAGNESIUM SERPL-MCNC: 1.8 MG/DL (ref 1.6–2.3)
MAGNESIUM SERPL-MCNC: 1.9 MG/DL (ref 1.6–2.3)
MAGNESIUM SERPL-MCNC: 2 MG/DL (ref 1.6–2.3)
MAGNESIUM SERPL-MCNC: 2.1 MG/DL (ref 1.6–2.3)
MAGNESIUM SERPL-MCNC: 2.1 MG/DL (ref 1.6–2.3)
MAGNESIUM SERPL-MCNC: 2.2 MG/DL (ref 1.6–2.3)
MAGNESIUM SERPL-MCNC: 2.2 MG/DL (ref 1.6–2.3)
MAGNESIUM SERPL-MCNC: 2.6 MG/DL (ref 1.6–2.3)
MAGNESIUM SERPL-MCNC: 2.6 MG/DL (ref 1.6–2.3)
MAGNESIUM SERPL-MCNC: 2.7 MG/DL (ref 1.6–2.3)
MAGNESIUM SERPL-MCNC: 3.1 MG/DL (ref 1.6–2.3)
MCH RBC QN AUTO: 26.6 PG (ref 26.5–33)
MCH RBC QN AUTO: 26.9 PG (ref 26.5–33)
MCH RBC QN AUTO: 27 PG (ref 26.5–33)
MCH RBC QN AUTO: 27.2 PG (ref 26.5–33)
MCH RBC QN AUTO: 27.2 PG (ref 26.5–33)
MCH RBC QN AUTO: 27.3 PG (ref 26.5–33)
MCH RBC QN AUTO: 27.4 PG (ref 26.5–33)
MCH RBC QN AUTO: 27.5 PG (ref 26.5–33)
MCH RBC QN AUTO: 27.6 PG (ref 26.5–33)
MCH RBC QN AUTO: 27.6 PG (ref 26.5–33)
MCH RBC QN AUTO: 27.8 PG (ref 26.5–33)
MCH RBC QN AUTO: 27.9 PG (ref 26.5–33)
MCH RBC QN AUTO: 28.1 PG (ref 26.5–33)
MCH RBC QN AUTO: 28.3 PG (ref 26.5–33)
MCH RBC QN AUTO: 28.4 PG (ref 26.5–33)
MCH RBC QN AUTO: 28.5 PG (ref 26.5–33)
MCH RBC QN AUTO: 28.5 PG (ref 26.5–33)
MCH RBC QN AUTO: 28.7 PG (ref 26.5–33)
MCHC RBC AUTO-ENTMCNC: 28.5 G/DL (ref 31.5–36.5)
MCHC RBC AUTO-ENTMCNC: 28.8 G/DL (ref 31.5–36.5)
MCHC RBC AUTO-ENTMCNC: 29.1 G/DL (ref 31.5–36.5)
MCHC RBC AUTO-ENTMCNC: 29.4 G/DL (ref 31.5–36.5)
MCHC RBC AUTO-ENTMCNC: 29.5 G/DL (ref 31.5–36.5)
MCHC RBC AUTO-ENTMCNC: 29.6 G/DL (ref 31.5–36.5)
MCHC RBC AUTO-ENTMCNC: 29.6 G/DL (ref 31.5–36.5)
MCHC RBC AUTO-ENTMCNC: 29.9 G/DL (ref 31.5–36.5)
MCHC RBC AUTO-ENTMCNC: 29.9 G/DL (ref 31.5–36.5)
MCHC RBC AUTO-ENTMCNC: 30 G/DL (ref 31.5–36.5)
MCHC RBC AUTO-ENTMCNC: 30 G/DL (ref 31.5–36.5)
MCHC RBC AUTO-ENTMCNC: 30.1 G/DL (ref 31.5–36.5)
MCHC RBC AUTO-ENTMCNC: 30.2 G/DL (ref 31.5–36.5)
MCHC RBC AUTO-ENTMCNC: 30.3 G/DL (ref 31.5–36.5)
MCHC RBC AUTO-ENTMCNC: 30.4 G/DL (ref 31.5–36.5)
MCHC RBC AUTO-ENTMCNC: 30.6 G/DL (ref 31.5–36.5)
MCHC RBC AUTO-ENTMCNC: 30.7 G/DL (ref 31.5–36.5)
MCHC RBC AUTO-ENTMCNC: 30.7 G/DL (ref 31.5–36.5)
MCHC RBC AUTO-ENTMCNC: 30.8 G/DL (ref 31.5–36.5)
MCHC RBC AUTO-ENTMCNC: 30.9 G/DL (ref 31.5–36.5)
MCHC RBC AUTO-ENTMCNC: 31.1 G/DL (ref 31.5–36.5)
MCHC RBC AUTO-ENTMCNC: 31.2 G/DL (ref 31.5–36.5)
MCHC RBC AUTO-ENTMCNC: 31.7 G/DL (ref 31.5–36.5)
MCHC RBC AUTO-ENTMCNC: 31.9 G/DL (ref 31.5–36.5)
MCHC RBC AUTO-ENTMCNC: 32.7 G/DL (ref 31.5–36.5)
MCV RBC AUTO: 87 FL (ref 78–100)
MCV RBC AUTO: 89 FL (ref 78–100)
MCV RBC AUTO: 89 FL (ref 78–100)
MCV RBC AUTO: 90 FL (ref 78–100)
MCV RBC AUTO: 91 FL (ref 78–100)
MCV RBC AUTO: 92 FL (ref 78–100)
MCV RBC AUTO: 93 FL (ref 78–100)
MCV RBC AUTO: 94 FL (ref 78–100)
MCV RBC AUTO: 95 FL (ref 78–100)
METAMYELOCYTES # BLD: 0.1 10E9/L
METAMYELOCYTES # BLD: 0.2 10E9/L
METAMYELOCYTES NFR BLD MANUAL: 0.9 %
METAMYELOCYTES NFR BLD MANUAL: 1.8 %
MICRO REPORT STATUS: ABNORMAL
MICRO REPORT STATUS: ABNORMAL
MICRO REPORT STATUS: NORMAL
MICROORGANISM SPEC CULT: ABNORMAL
MICROORGANISM SPEC CULT: ABNORMAL
MONOCYTES # BLD AUTO: 0.2 10E9/L (ref 0–1.3)
MONOCYTES # BLD AUTO: 0.4 10E9/L (ref 0–1.3)
MONOCYTES # BLD AUTO: 0.4 10E9/L (ref 0–1.3)
MONOCYTES # BLD AUTO: 0.5 10E9/L (ref 0–1.3)
MONOCYTES # BLD AUTO: 0.6 10E9/L (ref 0–1.3)
MONOCYTES # BLD AUTO: 0.7 10E9/L (ref 0–1.3)
MONOCYTES # BLD AUTO: 0.8 10E9/L (ref 0–1.3)
MONOCYTES # BLD AUTO: 0.9 10E9/L (ref 0–1.3)
MONOCYTES # BLD AUTO: 1 10E9/L (ref 0–1.3)
MONOCYTES # BLD AUTO: 1.2 10E9/L (ref 0–1.3)
MONOCYTES # BLD AUTO: 1.3 10E9/L (ref 0–1.3)
MONOCYTES NFR BLD AUTO: 1.7 %
MONOCYTES NFR BLD AUTO: 10.4 %
MONOCYTES NFR BLD AUTO: 10.8 %
MONOCYTES NFR BLD AUTO: 10.9 %
MONOCYTES NFR BLD AUTO: 11.2 %
MONOCYTES NFR BLD AUTO: 11.3 %
MONOCYTES NFR BLD AUTO: 11.8 %
MONOCYTES NFR BLD AUTO: 12.2 %
MONOCYTES NFR BLD AUTO: 14.3 %
MONOCYTES NFR BLD AUTO: 14.7 %
MONOCYTES NFR BLD AUTO: 16.1 %
MONOCYTES NFR BLD AUTO: 5.9 %
MONOCYTES NFR BLD AUTO: 6.2 %
MONOCYTES NFR BLD AUTO: 6.6 %
MONOCYTES NFR BLD AUTO: 6.7 %
MONOCYTES NFR BLD AUTO: 7 %
MONOCYTES NFR BLD AUTO: 7 %
MONOCYTES NFR BLD AUTO: 7.5 %
MONOCYTES NFR BLD AUTO: 8.4 %
MONOCYTES NFR BLD AUTO: 8.7 %
MONOCYTES NFR BLD AUTO: 9.2 %
MONOCYTES NFR BLD AUTO: 9.4 %
MONOCYTES NFR BLD AUTO: 9.5 %
MONOCYTES NFR BLD AUTO: 9.6 %
MONOCYTES NFR BLD AUTO: 9.9 %
MUCOUS THREADS #/AREA URNS LPF: PRESENT /LPF
NEUTROPHILS # BLD AUTO: 10.5 10E9/L (ref 1.6–8.3)
NEUTROPHILS # BLD AUTO: 2.1 10E9/L (ref 1.6–8.3)
NEUTROPHILS # BLD AUTO: 2.6 10E9/L (ref 1.6–8.3)
NEUTROPHILS # BLD AUTO: 2.6 10E9/L (ref 1.6–8.3)
NEUTROPHILS # BLD AUTO: 2.7 10E9/L (ref 1.6–8.3)
NEUTROPHILS # BLD AUTO: 2.8 10E9/L (ref 1.6–8.3)
NEUTROPHILS # BLD AUTO: 2.9 10E9/L (ref 1.6–8.3)
NEUTROPHILS # BLD AUTO: 3 10E9/L (ref 1.6–8.3)
NEUTROPHILS # BLD AUTO: 3.1 10E9/L (ref 1.6–8.3)
NEUTROPHILS # BLD AUTO: 3.2 10E9/L (ref 1.6–8.3)
NEUTROPHILS # BLD AUTO: 3.4 10E9/L (ref 1.6–8.3)
NEUTROPHILS # BLD AUTO: 3.8 10E9/L (ref 1.6–8.3)
NEUTROPHILS # BLD AUTO: 3.8 10E9/L (ref 1.6–8.3)
NEUTROPHILS # BLD AUTO: 4 10E9/L (ref 1.6–8.3)
NEUTROPHILS # BLD AUTO: 4.2 10E9/L (ref 1.6–8.3)
NEUTROPHILS # BLD AUTO: 4.3 10E9/L (ref 1.6–8.3)
NEUTROPHILS # BLD AUTO: 4.5 10E9/L (ref 1.6–8.3)
NEUTROPHILS # BLD AUTO: 4.7 10E9/L (ref 1.6–8.3)
NEUTROPHILS # BLD AUTO: 5 10E9/L (ref 1.6–8.3)
NEUTROPHILS # BLD AUTO: 5.5 10E9/L (ref 1.6–8.3)
NEUTROPHILS # BLD AUTO: 6.1 10E9/L (ref 1.6–8.3)
NEUTROPHILS # BLD AUTO: 6.8 10E9/L (ref 1.6–8.3)
NEUTROPHILS # BLD AUTO: 6.9 10E9/L (ref 1.6–8.3)
NEUTROPHILS # BLD AUTO: 7 10E9/L (ref 1.6–8.3)
NEUTROPHILS # BLD AUTO: 7.4 10E9/L (ref 1.6–8.3)
NEUTROPHILS # BLD AUTO: 7.6 10E9/L (ref 1.6–8.3)
NEUTROPHILS # BLD AUTO: 7.7 10E9/L (ref 1.6–8.3)
NEUTROPHILS NFR BLD AUTO: 53.1 %
NEUTROPHILS NFR BLD AUTO: 53.7 %
NEUTROPHILS NFR BLD AUTO: 56 %
NEUTROPHILS NFR BLD AUTO: 56.2 %
NEUTROPHILS NFR BLD AUTO: 56.2 %
NEUTROPHILS NFR BLD AUTO: 56.8 %
NEUTROPHILS NFR BLD AUTO: 58.3 %
NEUTROPHILS NFR BLD AUTO: 58.7 %
NEUTROPHILS NFR BLD AUTO: 59.1 %
NEUTROPHILS NFR BLD AUTO: 60.3 %
NEUTROPHILS NFR BLD AUTO: 60.7 %
NEUTROPHILS NFR BLD AUTO: 61 %
NEUTROPHILS NFR BLD AUTO: 61.7 %
NEUTROPHILS NFR BLD AUTO: 62.1 %
NEUTROPHILS NFR BLD AUTO: 64.5 %
NEUTROPHILS NFR BLD AUTO: 64.6 %
NEUTROPHILS NFR BLD AUTO: 64.8 %
NEUTROPHILS NFR BLD AUTO: 66.4 %
NEUTROPHILS NFR BLD AUTO: 67.6 %
NEUTROPHILS NFR BLD AUTO: 68.2 %
NEUTROPHILS NFR BLD AUTO: 69.4 %
NEUTROPHILS NFR BLD AUTO: 70 %
NEUTROPHILS NFR BLD AUTO: 70 %
NEUTROPHILS NFR BLD AUTO: 73.7 %
NEUTROPHILS NFR BLD AUTO: 77.9 %
NEUTROPHILS NFR BLD AUTO: 80.7 %
NEUTROPHILS NFR BLD AUTO: 81.9 %
NITRATE UR QL: NEGATIVE
NONHDLC SERPL-MCNC: 67 MG/DL
NONHDLC SERPL-MCNC: 69 MG/DL
NRBC # BLD AUTO: 0 10*3/UL
NRBC # BLD AUTO: 0.1 10*3/UL
NRBC BLD AUTO-RTO: 0 /100
NRBC BLD AUTO-RTO: 1 /100
NT-PROBNP SERPL-MCNC: 1049 PG/ML (ref 0–1800)
NT-PROBNP SERPL-MCNC: 2552 PG/ML (ref 0–1800)
NUM BPU REQUESTED: 1
NUM BPU REQUESTED: 3
NUM BPU REQUESTED: 4
O2/TOTAL GAS SETTING VFR VENT: 21 %
OSMOLALITY SERPL: 297 MMOL/KG (ref 280–301)
OSMOLALITY SERPL: 299 MMOL/KG (ref 280–301)
OSMOLALITY UR: 262 MMOL/KG (ref 100–1200)
OSMOLALITY UR: 337 MMOL/KG (ref 100–1200)
PCO2 BLDV: 32 MM HG (ref 40–50)
PCO2 BLDV: 34 MM HG (ref 40–50)
PH BLDV: 7.43 PH (ref 7.32–7.43)
PH BLDV: 7.47 PH (ref 7.32–7.43)
PH UR STRIP: 6 PH (ref 5–7)
PH UR STRIP: 6.5 PH (ref 5–7)
PH UR STRIP: 7.5 PH (ref 5–7)
PHOSPHATE SERPL-MCNC: 1.5 MG/DL (ref 2.5–4.5)
PHOSPHATE SERPL-MCNC: 1.7 MG/DL (ref 2.5–4.5)
PHOSPHATE SERPL-MCNC: 1.7 MG/DL (ref 2.5–4.5)
PHOSPHATE SERPL-MCNC: 1.8 MG/DL (ref 2.5–4.5)
PHOSPHATE SERPL-MCNC: 1.8 MG/DL (ref 2.5–4.5)
PHOSPHATE SERPL-MCNC: 1.9 MG/DL (ref 2.5–4.5)
PHOSPHATE SERPL-MCNC: 2 MG/DL (ref 2.5–4.5)
PHOSPHATE SERPL-MCNC: 2.2 MG/DL (ref 2.5–4.5)
PHOSPHATE SERPL-MCNC: 2.2 MG/DL (ref 2.5–4.5)
PHOSPHATE SERPL-MCNC: 2.3 MG/DL (ref 2.5–4.5)
PHOSPHATE SERPL-MCNC: 2.3 MG/DL (ref 2.5–4.5)
PHOSPHATE SERPL-MCNC: 2.4 MG/DL (ref 2.5–4.5)
PHOSPHATE SERPL-MCNC: 2.4 MG/DL (ref 2.5–4.5)
PHOSPHATE SERPL-MCNC: 2.5 MG/DL (ref 2.5–4.5)
PHOSPHATE SERPL-MCNC: 2.5 MG/DL (ref 2.5–4.5)
PHOSPHATE SERPL-MCNC: 2.7 MG/DL (ref 2.5–4.5)
PHOSPHATE SERPL-MCNC: 2.9 MG/DL (ref 2.5–4.5)
PHOSPHATE SERPL-MCNC: 3.1 MG/DL (ref 2.5–4.5)
PHOSPHATE SERPL-MCNC: 3.2 MG/DL (ref 2.5–4.5)
PHOSPHATE SERPL-MCNC: 3.4 MG/DL (ref 2.5–4.5)
PLASMA CELLS # BLD MANUAL: 0.1 10E9/L
PLATELET # BLD AUTO: 107 10E9/L (ref 150–450)
PLATELET # BLD AUTO: 115 10E9/L (ref 150–450)
PLATELET # BLD AUTO: 116 10E9/L (ref 150–450)
PLATELET # BLD AUTO: 122 10E9/L (ref 150–450)
PLATELET # BLD AUTO: 124 10E9/L (ref 150–450)
PLATELET # BLD AUTO: 124 10E9/L (ref 150–450)
PLATELET # BLD AUTO: 125 10E9/L (ref 150–450)
PLATELET # BLD AUTO: 130 10E9/L (ref 150–450)
PLATELET # BLD AUTO: 134 10E9/L (ref 150–450)
PLATELET # BLD AUTO: 138 10E9/L (ref 150–450)
PLATELET # BLD AUTO: 138 10E9/L (ref 150–450)
PLATELET # BLD AUTO: 139 10E9/L (ref 150–450)
PLATELET # BLD AUTO: 140 10E9/L (ref 150–450)
PLATELET # BLD AUTO: 143 10E9/L (ref 150–450)
PLATELET # BLD AUTO: 143 10E9/L (ref 150–450)
PLATELET # BLD AUTO: 152 10E9/L (ref 150–450)
PLATELET # BLD AUTO: 156 10E9/L (ref 150–450)
PLATELET # BLD AUTO: 165 10E9/L (ref 150–450)
PLATELET # BLD AUTO: 165 10E9/L (ref 150–450)
PLATELET # BLD AUTO: 166 10E9/L (ref 150–450)
PLATELET # BLD AUTO: 167 10E9/L (ref 150–450)
PLATELET # BLD AUTO: 170 10E9/L (ref 150–450)
PLATELET # BLD AUTO: 172 10E9/L (ref 150–450)
PLATELET # BLD AUTO: 178 10E9/L (ref 150–450)
PLATELET # BLD AUTO: 189 10E9/L (ref 150–450)
PLATELET # BLD AUTO: 197 10E9/L (ref 150–450)
PLATELET # BLD AUTO: 205 10E9/L (ref 150–450)
PLATELET # BLD AUTO: 206 10E9/L (ref 150–450)
PLATELET # BLD AUTO: 210 10E9/L (ref 150–450)
PLATELET # BLD AUTO: 253 10E9/L (ref 150–450)
PLATELET # BLD AUTO: 278 10E9/L (ref 150–450)
PLATELET # BLD EST: ABNORMAL 10*3/UL
PO2 BLDV: 34 MM HG (ref 25–47)
PO2 BLDV: 37 MM HG (ref 25–47)
POIKILOCYTOSIS BLD QL SMEAR: SLIGHT
POTASSIUM SERPL-SCNC: 3 MMOL/L (ref 3.4–5.3)
POTASSIUM SERPL-SCNC: 3.1 MMOL/L (ref 3.4–5.3)
POTASSIUM SERPL-SCNC: 3.2 MMOL/L (ref 3.4–5.3)
POTASSIUM SERPL-SCNC: 3.3 MMOL/L (ref 3.4–5.3)
POTASSIUM SERPL-SCNC: 3.3 MMOL/L (ref 3.4–5.3)
POTASSIUM SERPL-SCNC: 3.4 MMOL/L (ref 3.4–5.3)
POTASSIUM SERPL-SCNC: 3.5 MMOL/L (ref 3.4–5.3)
POTASSIUM SERPL-SCNC: 3.6 MMOL/L (ref 3.4–5.3)
POTASSIUM SERPL-SCNC: 3.7 MMOL/L (ref 3.4–5.3)
POTASSIUM SERPL-SCNC: 3.8 MMOL/L (ref 3.4–5.3)
POTASSIUM SERPL-SCNC: 3.9 MMOL/L (ref 3.4–5.3)
POTASSIUM SERPL-SCNC: 4 MMOL/L (ref 3.4–5.3)
POTASSIUM SERPL-SCNC: 4 MMOL/L (ref 3.4–5.3)
POTASSIUM SERPL-SCNC: 4.1 MMOL/L (ref 3.4–5.3)
POTASSIUM SERPL-SCNC: 4.2 MMOL/L (ref 3.4–5.3)
POTASSIUM SERPL-SCNC: 4.3 MMOL/L (ref 3.4–5.3)
POTASSIUM SERPL-SCNC: 4.3 MMOL/L (ref 3.4–5.3)
POTASSIUM SERPL-SCNC: 4.4 MMOL/L (ref 3.4–5.3)
POTASSIUM SERPL-SCNC: 4.4 MMOL/L (ref 3.4–5.3)
POTASSIUM SERPL-SCNC: 4.5 MMOL/L (ref 3.4–5.3)
POTASSIUM SERPL-SCNC: 5.6 MMOL/L (ref 3.4–5.3)
PREALB SERPL IA-MCNC: 9 MG/DL (ref 15–45)
PROCALCITONIN SERPL-MCNC: 0.06 NG/ML
PROCALCITONIN SERPL-MCNC: 0.07 NG/ML
PROCALCITONIN SERPL-MCNC: 0.08 NG/ML
PROCALCITONIN SERPL-MCNC: 0.32 NG/ML
PROT SERPL-MCNC: 3.6 G/DL (ref 6.8–8.8)
PROT SERPL-MCNC: 4.7 G/DL (ref 6.8–8.8)
PROT SERPL-MCNC: 4.8 G/DL (ref 6.8–8.8)
PROT SERPL-MCNC: 4.9 G/DL (ref 6.8–8.8)
PROT SERPL-MCNC: 5 G/DL (ref 6.8–8.8)
PROT SERPL-MCNC: 5.1 G/DL (ref 6.8–8.8)
PROT SERPL-MCNC: 5.1 G/DL (ref 6.8–8.8)
PROT SERPL-MCNC: 5.2 G/DL (ref 6.8–8.8)
PROT SERPL-MCNC: 6.8 G/DL (ref 6.8–8.8)
PROT SERPL-MCNC: 6.9 G/DL (ref 6.8–8.8)
PROT SERPL-MCNC: 7.1 G/DL (ref 6.8–8.8)
RADIOLOGIST FLAGS: ABNORMAL
RBC # BLD AUTO: 2.59 10E12/L (ref 4.4–5.9)
RBC # BLD AUTO: 2.73 10E12/L (ref 4.4–5.9)
RBC # BLD AUTO: 2.8 10E12/L (ref 4.4–5.9)
RBC # BLD AUTO: 2.87 10E12/L (ref 4.4–5.9)
RBC # BLD AUTO: 2.91 10E12/L (ref 4.4–5.9)
RBC # BLD AUTO: 2.93 10E12/L (ref 4.4–5.9)
RBC # BLD AUTO: 2.97 10E12/L (ref 4.4–5.9)
RBC # BLD AUTO: 2.97 10E12/L (ref 4.4–5.9)
RBC # BLD AUTO: 2.98 10E12/L (ref 4.4–5.9)
RBC # BLD AUTO: 2.99 10E12/L (ref 4.4–5.9)
RBC # BLD AUTO: 3.07 10E12/L (ref 4.4–5.9)
RBC # BLD AUTO: 3.08 10E12/L (ref 4.4–5.9)
RBC # BLD AUTO: 3.08 10E12/L (ref 4.4–5.9)
RBC # BLD AUTO: 3.1 10E12/L (ref 4.4–5.9)
RBC # BLD AUTO: 3.11 10E12/L (ref 4.4–5.9)
RBC # BLD AUTO: 3.17 10E12/L (ref 4.4–5.9)
RBC # BLD AUTO: 3.17 10E12/L (ref 4.4–5.9)
RBC # BLD AUTO: 3.2 10E12/L (ref 4.4–5.9)
RBC # BLD AUTO: 3.23 10E12/L (ref 4.4–5.9)
RBC # BLD AUTO: 3.23 10E12/L (ref 4.4–5.9)
RBC # BLD AUTO: 3.27 10E12/L (ref 4.4–5.9)
RBC # BLD AUTO: 3.31 10E12/L (ref 4.4–5.9)
RBC # BLD AUTO: 3.35 10E12/L (ref 4.4–5.9)
RBC # BLD AUTO: 3.44 10E12/L (ref 4.4–5.9)
RBC # BLD AUTO: 3.47 10E12/L (ref 4.4–5.9)
RBC # BLD AUTO: 3.82 10E12/L (ref 4.4–5.9)
RBC # BLD AUTO: 4 10E12/L (ref 4.4–5.9)
RBC # BLD AUTO: 4.05 10E12/L (ref 4.4–5.9)
RBC # BLD AUTO: 4.31 10E12/L (ref 4.4–5.9)
RBC #/AREA URNS AUTO: 1 /HPF (ref 0–2)
RBC #/AREA URNS AUTO: 14 /HPF (ref 0–2)
RBC #/AREA URNS AUTO: 30 /HPF (ref 0–2)
RBC #/AREA URNS AUTO: 7 /HPF (ref 0–2)
SAO2 % BLDV FROM PO2: 71 %
SODIUM SERPL-SCNC: 137 MMOL/L (ref 133–144)
SODIUM SERPL-SCNC: 138 MMOL/L (ref 133–144)
SODIUM SERPL-SCNC: 139 MMOL/L (ref 133–144)
SODIUM SERPL-SCNC: 140 MMOL/L (ref 133–144)
SODIUM SERPL-SCNC: 141 MMOL/L (ref 133–144)
SODIUM SERPL-SCNC: 142 MMOL/L (ref 133–144)
SODIUM SERPL-SCNC: 143 MMOL/L (ref 133–144)
SODIUM SERPL-SCNC: 143 MMOL/L (ref 133–144)
SODIUM SERPL-SCNC: 144 MMOL/L (ref 133–144)
SODIUM SERPL-SCNC: 144 MMOL/L (ref 133–144)
SODIUM SERPL-SCNC: 146 MMOL/L (ref 133–144)
SODIUM SERPL-SCNC: 146 MMOL/L (ref 133–144)
SODIUM SERPL-SCNC: 147 MMOL/L (ref 133–144)
SODIUM SERPL-SCNC: 147 MMOL/L (ref 133–144)
SODIUM SERPL-SCNC: 148 MMOL/L (ref 133–144)
SODIUM SERPL-SCNC: 149 MMOL/L (ref 133–144)
SODIUM SERPL-SCNC: 149 MMOL/L (ref 133–144)
SODIUM SERPL-SCNC: 150 MMOL/L (ref 133–144)
SODIUM SERPL-SCNC: 151 MMOL/L (ref 133–144)
SODIUM SERPL-SCNC: 151 MMOL/L (ref 133–144)
SODIUM SERPL-SCNC: 153 MMOL/L (ref 133–144)
SODIUM SERPL-SCNC: 154 MMOL/L (ref 133–144)
SODIUM SERPL-SCNC: 155 MMOL/L (ref 133–144)
SODIUM SERPL-SCNC: 157 MMOL/L (ref 133–144)
SP GR UR STRIP: 1 (ref 1–1.03)
SP GR UR STRIP: 1.01 (ref 1–1.03)
SPECIMEN EXP DATE BLD: NORMAL
SPECIMEN SOURCE: ABNORMAL
SPECIMEN SOURCE: ABNORMAL
SPECIMEN SOURCE: NORMAL
T4 FREE SERPL-MCNC: 1.01 NG/DL (ref 0.76–1.46)
TRANSFUSION STATUS PATIENT QL: NORMAL
TRIGL SERPL-MCNC: 106 MG/DL
TRIGL SERPL-MCNC: 124 MG/DL
TROPONIN I SERPL-MCNC: 0.05 UG/L (ref 0–0.04)
TROPONIN I SERPL-MCNC: 0.06 UG/L (ref 0–0.04)
TROPONIN I SERPL-MCNC: 0.06 UG/L (ref 0–0.04)
TROPONIN I SERPL-MCNC: 0.07 UG/L (ref 0–0.04)
TROPONIN I SERPL-MCNC: 0.08 UG/L (ref 0–0.04)
TROPONIN I SERPL-MCNC: 0.08 UG/L (ref 0–0.04)
TROPONIN I SERPL-MCNC: 0.12 UG/L (ref 0–0.04)
TROPONIN I SERPL-MCNC: 0.13 UG/L (ref 0–0.04)
TSH SERPL DL<=0.05 MIU/L-ACNC: 3.07 MU/L (ref 0.4–4)
UPPER GI ENDOSCOPY: NORMAL
URN SPEC COLLECT METH UR: ABNORMAL
UROBILINOGEN UR STRIP-MCNC: NORMAL MG/DL (ref 0–2)
VANCOMYCIN SERPL-MCNC: 12.8 MG/L
VANCOMYCIN SERPL-MCNC: 22.3 MG/L
WBC # BLD AUTO: 11 10E9/L (ref 4–11)
WBC # BLD AUTO: 11.4 10E9/L (ref 4–11)
WBC # BLD AUTO: 12.9 10E9/L (ref 4–11)
WBC # BLD AUTO: 3.9 10E9/L (ref 4–11)
WBC # BLD AUTO: 4.4 10E9/L (ref 4–11)
WBC # BLD AUTO: 4.7 10E9/L (ref 4–11)
WBC # BLD AUTO: 4.7 10E9/L (ref 4–11)
WBC # BLD AUTO: 4.8 10E9/L (ref 4–11)
WBC # BLD AUTO: 4.9 10E9/L (ref 4–11)
WBC # BLD AUTO: 4.9 10E9/L (ref 4–11)
WBC # BLD AUTO: 5.1 10E9/L (ref 4–11)
WBC # BLD AUTO: 5.1 10E9/L (ref 4–11)
WBC # BLD AUTO: 5.3 10E9/L (ref 4–11)
WBC # BLD AUTO: 5.9 10E9/L (ref 4–11)
WBC # BLD AUTO: 6 10E9/L (ref 4–11)
WBC # BLD AUTO: 6.1 10E9/L (ref 4–11)
WBC # BLD AUTO: 6.6 10E9/L (ref 4–11)
WBC # BLD AUTO: 6.7 10E9/L (ref 4–11)
WBC # BLD AUTO: 6.8 10E9/L (ref 4–11)
WBC # BLD AUTO: 7 10E9/L (ref 4–11)
WBC # BLD AUTO: 7.2 10E9/L (ref 4–11)
WBC # BLD AUTO: 8 10E9/L (ref 4–11)
WBC # BLD AUTO: 8.1 10E9/L (ref 4–11)
WBC # BLD AUTO: 8.5 10E9/L (ref 4–11)
WBC # BLD AUTO: 9 10E9/L (ref 4–11)
WBC # BLD AUTO: 9.1 10E9/L (ref 4–11)
WBC # BLD AUTO: 9.3 10E9/L (ref 4–11)
WBC # BLD AUTO: 9.4 10E9/L (ref 4–11)
WBC # BLD AUTO: 9.8 10E9/L (ref 4–11)
WBC #/AREA URNS AUTO: 135 /HPF (ref 0–2)
WBC #/AREA URNS AUTO: 20 /HPF (ref 0–2)
WBC #/AREA URNS AUTO: >182 /HPF (ref 0–2)
WBC #/AREA URNS AUTO: >182 /HPF (ref 0–2)
WBC CLUMPS #/AREA URNS HPF: PRESENT /HPF

## 2017-01-01 PROCEDURE — 99212 OFFICE O/P EST SF 10 MIN: CPT

## 2017-01-01 PROCEDURE — 25000131 ZZH RX MED GY IP 250 OP 636 PS 637: Performed by: NURSE PRACTITIONER

## 2017-01-01 PROCEDURE — 12000008 ZZH R&B INTERMEDIATE UMMC

## 2017-01-01 PROCEDURE — 71000016 ZZH RECOVERY PHASE 1 LEVEL 3 FIRST HR: Performed by: SURGERY

## 2017-01-01 PROCEDURE — 93010 ELECTROCARDIOGRAM REPORT: CPT | Performed by: INTERNAL MEDICINE

## 2017-01-01 PROCEDURE — 85610 PROTHROMBIN TIME: CPT | Performed by: FAMILY MEDICINE

## 2017-01-01 PROCEDURE — 86850 RBC ANTIBODY SCREEN: CPT | Performed by: FAMILY MEDICINE

## 2017-01-01 PROCEDURE — 36000051 ZZH SURGERY LEVEL 2 1ST 30 MIN - UMMC: Performed by: SURGERY

## 2017-01-01 PROCEDURE — 80061 LIPID PANEL: CPT | Performed by: FAMILY MEDICINE

## 2017-01-01 PROCEDURE — 86900 BLOOD TYPING SEROLOGIC ABO: CPT | Performed by: FAMILY MEDICINE

## 2017-01-01 PROCEDURE — 36569 INSJ PICC 5 YR+ W/O IMAGING: CPT

## 2017-01-01 PROCEDURE — 70553 MRI BRAIN STEM W/O & W/DYE: CPT

## 2017-01-01 PROCEDURE — 00000146 ZZHCL STATISTIC GLUCOSE BY METER IP

## 2017-01-01 PROCEDURE — 83935 ASSAY OF URINE OSMOLALITY: CPT | Performed by: HOSPITALIST

## 2017-01-01 PROCEDURE — 93005 ELECTROCARDIOGRAM TRACING: CPT | Performed by: INTERNAL MEDICINE

## 2017-01-01 PROCEDURE — 25000132 ZZH RX MED GY IP 250 OP 250 PS 637: Performed by: FAMILY MEDICINE

## 2017-01-01 PROCEDURE — 80053 COMPREHEN METABOLIC PANEL: CPT | Performed by: EMERGENCY MEDICINE

## 2017-01-01 PROCEDURE — 86923 COMPATIBILITY TEST ELECTRIC: CPT | Performed by: FAMILY MEDICINE

## 2017-01-01 PROCEDURE — 85025 COMPLETE CBC W/AUTO DIFF WBC: CPT | Performed by: FAMILY MEDICINE

## 2017-01-01 PROCEDURE — 25800025 ZZH RX 258: Performed by: FAMILY MEDICINE

## 2017-01-01 PROCEDURE — 94640 AIRWAY INHALATION TREATMENT: CPT

## 2017-01-01 PROCEDURE — 85610 PROTHROMBIN TIME: CPT | Performed by: PSYCHIATRY & NEUROLOGY

## 2017-01-01 PROCEDURE — 99285 EMERGENCY DEPT VISIT HI MDM: CPT | Mod: 25 | Performed by: INTERNAL MEDICINE

## 2017-01-01 PROCEDURE — 40000558 ZZH STATISTIC CVC DRESSING CHANGE

## 2017-01-01 PROCEDURE — 27210577 ZZ H INTRODUCER MICRO SET

## 2017-01-01 PROCEDURE — 36415 COLL VENOUS BLD VENIPUNCTURE: CPT | Performed by: STUDENT IN AN ORGANIZED HEALTH CARE EDUCATION/TRAINING PROGRAM

## 2017-01-01 PROCEDURE — 27210437 ZZH NUTRITION PRODUCT SEMIELEM INTERMED LITER

## 2017-01-01 PROCEDURE — 37000008 ZZH ANESTHESIA TECHNICAL FEE, 1ST 30 MIN: Performed by: SURGERY

## 2017-01-01 PROCEDURE — 36592 COLLECT BLOOD FROM PICC: CPT | Performed by: FAMILY MEDICINE

## 2017-01-01 PROCEDURE — 73502 X-RAY EXAM HIP UNI 2-3 VIEWS: CPT

## 2017-01-01 PROCEDURE — 84100 ASSAY OF PHOSPHORUS: CPT | Performed by: FAMILY MEDICINE

## 2017-01-01 PROCEDURE — 85018 HEMOGLOBIN: CPT | Performed by: FAMILY MEDICINE

## 2017-01-01 PROCEDURE — 40000275 ZZH STATISTIC RCP TIME EA 10 MIN

## 2017-01-01 PROCEDURE — 40000739 ZZH STATISTIC STROKE CODE W/O ACCESS

## 2017-01-01 PROCEDURE — 25000132 ZZH RX MED GY IP 250 OP 250 PS 637: Performed by: NURSE PRACTITIONER

## 2017-01-01 PROCEDURE — 12000006 ZZH R&B IMCU INTERMEDIATE UMMC

## 2017-01-01 PROCEDURE — 40000264 ECHO COMPLETE WITH OPTISON

## 2017-01-01 PROCEDURE — 86140 C-REACTIVE PROTEIN: CPT | Performed by: FAMILY MEDICINE

## 2017-01-01 PROCEDURE — 83690 ASSAY OF LIPASE: CPT | Performed by: FAMILY MEDICINE

## 2017-01-01 PROCEDURE — 80053 COMPREHEN METABOLIC PANEL: CPT | Performed by: FAMILY MEDICINE

## 2017-01-01 PROCEDURE — 25000131 ZZH RX MED GY IP 250 OP 636 PS 637: Performed by: FAMILY MEDICINE

## 2017-01-01 PROCEDURE — 40000802 ZZH SITE CHECK

## 2017-01-01 PROCEDURE — 40000264 ECHO LIMITED WITH OPTISON

## 2017-01-01 PROCEDURE — 40000133 ZZH STATISTIC OT WARD VISIT: Performed by: OCCUPATIONAL THERAPIST

## 2017-01-01 PROCEDURE — 83735 ASSAY OF MAGNESIUM: CPT | Performed by: FAMILY MEDICINE

## 2017-01-01 PROCEDURE — 25000125 ZZHC RX 250: Performed by: FAMILY MEDICINE

## 2017-01-01 PROCEDURE — 36415 COLL VENOUS BLD VENIPUNCTURE: CPT | Performed by: PSYCHIATRY & NEUROLOGY

## 2017-01-01 PROCEDURE — 25000128 H RX IP 250 OP 636: Performed by: FAMILY MEDICINE

## 2017-01-01 PROCEDURE — 92526 ORAL FUNCTION THERAPY: CPT | Mod: GN

## 2017-01-01 PROCEDURE — 84443 ASSAY THYROID STIM HORMONE: CPT | Performed by: PSYCHIATRY & NEUROLOGY

## 2017-01-01 PROCEDURE — 36415 COLL VENOUS BLD VENIPUNCTURE: CPT | Performed by: FAMILY MEDICINE

## 2017-01-01 PROCEDURE — 96361 HYDRATE IV INFUSION ADD-ON: CPT | Performed by: INTERNAL MEDICINE

## 2017-01-01 PROCEDURE — 80053 COMPREHEN METABOLIC PANEL: CPT | Performed by: NURSE PRACTITIONER

## 2017-01-01 PROCEDURE — 73610 X-RAY EXAM OF ANKLE: CPT | Mod: LT

## 2017-01-01 PROCEDURE — 97110 THERAPEUTIC EXERCISES: CPT | Mod: GO

## 2017-01-01 PROCEDURE — 0DH97UZ INSERTION OF FEEDING DEVICE INTO DUODENUM, VIA NATURAL OR ARTIFICIAL OPENING: ICD-10-PCS | Performed by: RADIOLOGY

## 2017-01-01 PROCEDURE — 25000125 ZZHC RX 250: Performed by: STUDENT IN AN ORGANIZED HEALTH CARE EDUCATION/TRAINING PROGRAM

## 2017-01-01 PROCEDURE — 99223 1ST HOSP IP/OBS HIGH 75: CPT | Performed by: CLINICAL NURSE SPECIALIST

## 2017-01-01 PROCEDURE — 94660 CPAP INITIATION&MGMT: CPT

## 2017-01-01 PROCEDURE — 25000125 ZZHC RX 250

## 2017-01-01 PROCEDURE — 36592 COLLECT BLOOD FROM PICC: CPT | Performed by: HOSPITALIST

## 2017-01-01 PROCEDURE — 93975 VASCULAR STUDY: CPT | Mod: TC

## 2017-01-01 PROCEDURE — 97110 THERAPEUTIC EXERCISES: CPT | Mod: GO | Performed by: OCCUPATIONAL THERAPIST

## 2017-01-01 PROCEDURE — 93880 EXTRACRANIAL BILAT STUDY: CPT

## 2017-01-01 PROCEDURE — 87088 URINE BACTERIA CULTURE: CPT | Performed by: EMERGENCY MEDICINE

## 2017-01-01 PROCEDURE — 27210794 ZZH OR GENERAL SUPPLY STERILE: Performed by: SURGERY

## 2017-01-01 PROCEDURE — P9016 RBC LEUKOCYTES REDUCED: HCPCS | Performed by: FAMILY MEDICINE

## 2017-01-01 PROCEDURE — 71010 XR CHEST PORT 1 VW: CPT

## 2017-01-01 PROCEDURE — 80048 BASIC METABOLIC PNL TOTAL CA: CPT | Performed by: FAMILY MEDICINE

## 2017-01-01 PROCEDURE — 25000132 ZZH RX MED GY IP 250 OP 250 PS 637

## 2017-01-01 PROCEDURE — 94640 AIRWAY INHALATION TREATMENT: CPT | Mod: 76

## 2017-01-01 PROCEDURE — 97530 THERAPEUTIC ACTIVITIES: CPT | Mod: GP | Performed by: PHYSICAL THERAPIST

## 2017-01-01 PROCEDURE — 85018 HEMOGLOBIN: CPT | Performed by: HOSPITALIST

## 2017-01-01 PROCEDURE — 85027 COMPLETE CBC AUTOMATED: CPT | Performed by: NURSE PRACTITIONER

## 2017-01-01 PROCEDURE — 99221 1ST HOSP IP/OBS SF/LOW 40: CPT | Performed by: CLINICAL NURSE SPECIALIST

## 2017-01-01 PROCEDURE — 87086 URINE CULTURE/COLONY COUNT: CPT | Performed by: FAMILY MEDICINE

## 2017-01-01 PROCEDURE — 74176 CT ABD & PELVIS W/O CONTRAST: CPT

## 2017-01-01 PROCEDURE — 25800025 ZZH RX 258: Performed by: ANESTHESIOLOGY

## 2017-01-01 PROCEDURE — 25000132 ZZH RX MED GY IP 250 OP 250 PS 637: Performed by: HOSPITALIST

## 2017-01-01 PROCEDURE — 25000128 H RX IP 250 OP 636: Performed by: INTERNAL MEDICINE

## 2017-01-01 PROCEDURE — 84145 PROCALCITONIN (PCT): CPT | Performed by: FAMILY MEDICINE

## 2017-01-01 PROCEDURE — 25500064 ZZH RX 255 OP 636: Performed by: PSYCHIATRY & NEUROLOGY

## 2017-01-01 PROCEDURE — 81003 URINALYSIS AUTO W/O SCOPE: CPT | Performed by: FAMILY MEDICINE

## 2017-01-01 PROCEDURE — 25000128 H RX IP 250 OP 636: Performed by: NURSE PRACTITIONER

## 2017-01-01 PROCEDURE — 96365 THER/PROPH/DIAG IV INF INIT: CPT | Performed by: INTERNAL MEDICINE

## 2017-01-01 PROCEDURE — 97535 SELF CARE MNGMENT TRAINING: CPT | Mod: GO | Performed by: OCCUPATIONAL THERAPIST

## 2017-01-01 PROCEDURE — 40000225 ZZH STATISTIC SLP WARD VISIT

## 2017-01-01 PROCEDURE — 99223 1ST HOSP IP/OBS HIGH 75: CPT | Mod: GC | Performed by: INTERNAL MEDICINE

## 2017-01-01 PROCEDURE — 40000225 ZZH STATISTIC SLP WARD VISIT: Performed by: SPEECH-LANGUAGE PATHOLOGIST

## 2017-01-01 PROCEDURE — 40000940 XR CHEST PORT 1 VW

## 2017-01-01 PROCEDURE — 40000171 ZZH STATISTIC PRE-PROCEDURE ASSESSMENT III: Performed by: SURGERY

## 2017-01-01 PROCEDURE — 87493 C DIFF AMPLIFIED PROBE: CPT | Performed by: HOSPITALIST

## 2017-01-01 PROCEDURE — 80076 HEPATIC FUNCTION PANEL: CPT | Performed by: FAMILY MEDICINE

## 2017-01-01 PROCEDURE — 0DJD8ZZ INSPECTION OF LOWER INTESTINAL TRACT, VIA NATURAL OR ARTIFICIAL OPENING ENDOSCOPIC: ICD-10-PCS | Performed by: INTERNAL MEDICINE

## 2017-01-01 PROCEDURE — 93010 ELECTROCARDIOGRAM REPORT: CPT | Mod: Z6 | Performed by: INTERNAL MEDICINE

## 2017-01-01 PROCEDURE — 93971 EXTREMITY STUDY: CPT | Mod: LT

## 2017-01-01 PROCEDURE — 25900017 H RX MED GY IP 259 OP 259 PS 637: Performed by: NURSE PRACTITIONER

## 2017-01-01 PROCEDURE — 37000009 ZZH ANESTHESIA TECHNICAL FEE, EACH ADDTL 15 MIN: Performed by: SURGERY

## 2017-01-01 PROCEDURE — 81001 URINALYSIS AUTO W/SCOPE: CPT | Performed by: HOSPITALIST

## 2017-01-01 PROCEDURE — 25000128 H RX IP 250 OP 636: Performed by: STUDENT IN AN ORGANIZED HEALTH CARE EDUCATION/TRAINING PROGRAM

## 2017-01-01 PROCEDURE — 92610 EVALUATE SWALLOWING FUNCTION: CPT | Mod: GN | Performed by: SPEECH-LANGUAGE PATHOLOGIST

## 2017-01-01 PROCEDURE — 70450 CT HEAD/BRAIN W/O DYE: CPT

## 2017-01-01 PROCEDURE — 36000053 ZZH SURGERY LEVEL 2 EA 15 ADDTL MIN - UMMC: Performed by: SURGERY

## 2017-01-01 PROCEDURE — 29515 APPLICATION SHORT LEG SPLINT: CPT | Performed by: EMERGENCY MEDICINE

## 2017-01-01 PROCEDURE — 27210995 ZZH RX 272

## 2017-01-01 PROCEDURE — 70547 MR ANGIOGRAPHY NECK W/O DYE: CPT

## 2017-01-01 PROCEDURE — 82803 BLOOD GASES ANY COMBINATION: CPT

## 2017-01-01 PROCEDURE — 83735 ASSAY OF MAGNESIUM: CPT | Performed by: NURSE PRACTITIONER

## 2017-01-01 PROCEDURE — 36415 COLL VENOUS BLD VENIPUNCTURE: CPT | Performed by: EMERGENCY MEDICINE

## 2017-01-01 PROCEDURE — 25000566 ZZH SEVOFLURANE, EA 15 MIN: Performed by: SURGERY

## 2017-01-01 PROCEDURE — 86901 BLOOD TYPING SEROLOGIC RH(D): CPT | Performed by: FAMILY MEDICINE

## 2017-01-01 PROCEDURE — 85610 PROTHROMBIN TIME: CPT | Performed by: HOSPITALIST

## 2017-01-01 PROCEDURE — 86708 HEPATITIS A ANTIBODY: CPT | Performed by: FAMILY MEDICINE

## 2017-01-01 PROCEDURE — 86140 C-REACTIVE PROTEIN: CPT | Performed by: EMERGENCY MEDICINE

## 2017-01-01 PROCEDURE — 99285 EMERGENCY DEPT VISIT HI MDM: CPT | Mod: 25 | Performed by: EMERGENCY MEDICINE

## 2017-01-01 PROCEDURE — 94660 CPAP INITIATION&MGMT: CPT | Performed by: OPTOMETRIST

## 2017-01-01 PROCEDURE — 84100 ASSAY OF PHOSPHORUS: CPT | Performed by: SURGERY

## 2017-01-01 PROCEDURE — 40000115 ZZH STATISTIC NURSE TLC VISIT: Performed by: CLINICAL NURSE SPECIALIST

## 2017-01-01 PROCEDURE — 97166 OT EVAL MOD COMPLEX 45 MIN: CPT | Mod: GO | Performed by: OCCUPATIONAL THERAPIST

## 2017-01-01 PROCEDURE — 86665 EPSTEIN-BARR CAPSID VCA: CPT | Performed by: FAMILY MEDICINE

## 2017-01-01 PROCEDURE — 84295 ASSAY OF SERUM SODIUM: CPT | Performed by: HOSPITALIST

## 2017-01-01 PROCEDURE — 36415 COLL VENOUS BLD VENIPUNCTURE: CPT | Performed by: NURSE PRACTITIONER

## 2017-01-01 PROCEDURE — 80048 BASIC METABOLIC PNL TOTAL CA: CPT | Performed by: INTERNAL MEDICINE

## 2017-01-01 PROCEDURE — 97110 THERAPEUTIC EXERCISES: CPT | Mod: GP | Performed by: PHYSICAL THERAPIST

## 2017-01-01 PROCEDURE — 84100 ASSAY OF PHOSPHORUS: CPT | Performed by: HOSPITALIST

## 2017-01-01 PROCEDURE — 43235 EGD DIAGNOSTIC BRUSH WASH: CPT | Performed by: INTERNAL MEDICINE

## 2017-01-01 PROCEDURE — 0DH63UZ INSERTION OF FEEDING DEVICE INTO STOMACH, PERCUTANEOUS APPROACH: ICD-10-PCS | Performed by: SURGERY

## 2017-01-01 PROCEDURE — 93005 ELECTROCARDIOGRAM TRACING: CPT | Performed by: OPTOMETRIST

## 2017-01-01 PROCEDURE — 40000556 ZZH STATISTIC PERIPHERAL IV START W US GUIDANCE

## 2017-01-01 PROCEDURE — 84484 ASSAY OF TROPONIN QUANT: CPT | Performed by: FAMILY MEDICINE

## 2017-01-01 PROCEDURE — 83036 HEMOGLOBIN GLYCOSYLATED A1C: CPT | Performed by: NURSE PRACTITIONER

## 2017-01-01 PROCEDURE — 27210195 ZZH KIT POWER PICC DOUBLE LUMEN

## 2017-01-01 PROCEDURE — 92526 ORAL FUNCTION THERAPY: CPT | Mod: GN | Performed by: SPEECH-LANGUAGE PATHOLOGIST

## 2017-01-01 PROCEDURE — 99233 SBSQ HOSP IP/OBS HIGH 50: CPT | Performed by: HOSPITALIST

## 2017-01-01 PROCEDURE — 40000275 ZZH STATISTIC RCP TIME EA 10 MIN: Performed by: OPTOMETRIST

## 2017-01-01 PROCEDURE — 80048 BASIC METABOLIC PNL TOTAL CA: CPT | Performed by: EMERGENCY MEDICINE

## 2017-01-01 PROCEDURE — 27210995 ZZH RX 272: Performed by: FAMILY MEDICINE

## 2017-01-01 PROCEDURE — 87186 SC STD MICRODIL/AGAR DIL: CPT | Performed by: EMERGENCY MEDICINE

## 2017-01-01 PROCEDURE — 85610 PROTHROMBIN TIME: CPT | Performed by: NURSE PRACTITIONER

## 2017-01-01 PROCEDURE — 36415 COLL VENOUS BLD VENIPUNCTURE: CPT | Performed by: INTERNAL MEDICINE

## 2017-01-01 PROCEDURE — 25000125 ZZHC RX 250: Performed by: ANESTHESIOLOGY

## 2017-01-01 PROCEDURE — 40000193 ZZH STATISTIC PT WARD VISIT

## 2017-01-01 PROCEDURE — 93005 ELECTROCARDIOGRAM TRACING: CPT

## 2017-01-01 PROCEDURE — 25800025 ZZH RX 258

## 2017-01-01 PROCEDURE — 85027 COMPLETE CBC AUTOMATED: CPT | Performed by: FAMILY MEDICINE

## 2017-01-01 PROCEDURE — 84134 ASSAY OF PREALBUMIN: CPT | Performed by: HOSPITALIST

## 2017-01-01 PROCEDURE — 84484 ASSAY OF TROPONIN QUANT: CPT | Performed by: EMERGENCY MEDICINE

## 2017-01-01 PROCEDURE — 97112 NEUROMUSCULAR REEDUCATION: CPT | Mod: GO | Performed by: OCCUPATIONAL THERAPIST

## 2017-01-01 PROCEDURE — 36416 COLLJ CAPILLARY BLOOD SPEC: CPT | Performed by: FAMILY MEDICINE

## 2017-01-01 PROCEDURE — 99283 EMERGENCY DEPT VISIT LOW MDM: CPT | Performed by: EMERGENCY MEDICINE

## 2017-01-01 PROCEDURE — 84295 ASSAY OF SERUM SODIUM: CPT | Performed by: FAMILY MEDICINE

## 2017-01-01 PROCEDURE — 84132 ASSAY OF SERUM POTASSIUM: CPT | Performed by: FAMILY MEDICINE

## 2017-01-01 PROCEDURE — 87186 SC STD MICRODIL/AGAR DIL: CPT | Performed by: INTERNAL MEDICINE

## 2017-01-01 PROCEDURE — 97530 THERAPEUTIC ACTIVITIES: CPT | Mod: GO | Performed by: OCCUPATIONAL THERAPIST

## 2017-01-01 PROCEDURE — C9399 UNCLASSIFIED DRUGS OR BIOLOG: HCPCS | Performed by: NURSE ANESTHETIST, CERTIFIED REGISTERED

## 2017-01-01 PROCEDURE — 83605 ASSAY OF LACTIC ACID: CPT | Performed by: EMERGENCY MEDICINE

## 2017-01-01 PROCEDURE — 83735 ASSAY OF MAGNESIUM: CPT | Performed by: HOSPITALIST

## 2017-01-01 PROCEDURE — 99283 EMERGENCY DEPT VISIT LOW MDM: CPT | Mod: Z6 | Performed by: EMERGENCY MEDICINE

## 2017-01-01 PROCEDURE — 87086 URINE CULTURE/COLONY COUNT: CPT | Performed by: INTERNAL MEDICINE

## 2017-01-01 PROCEDURE — 80048 BASIC METABOLIC PNL TOTAL CA: CPT | Performed by: STUDENT IN AN ORGANIZED HEALTH CARE EDUCATION/TRAINING PROGRAM

## 2017-01-01 PROCEDURE — 85025 COMPLETE CBC W/AUTO DIFF WBC: CPT | Performed by: HOSPITALIST

## 2017-01-01 PROCEDURE — 70549 MR ANGIOGRAPH NECK W/O&W/DYE: CPT

## 2017-01-01 PROCEDURE — 85025 COMPLETE CBC W/AUTO DIFF WBC: CPT | Performed by: EMERGENCY MEDICINE

## 2017-01-01 PROCEDURE — 82803 BLOOD GASES ANY COMBINATION: CPT | Performed by: FAMILY MEDICINE

## 2017-01-01 PROCEDURE — 83605 ASSAY OF LACTIC ACID: CPT | Performed by: FAMILY MEDICINE

## 2017-01-01 PROCEDURE — 84100 ASSAY OF PHOSPHORUS: CPT | Performed by: PHYSICIAN ASSISTANT

## 2017-01-01 PROCEDURE — 82140 ASSAY OF AMMONIA: CPT | Performed by: FAMILY MEDICINE

## 2017-01-01 PROCEDURE — 44500 INTRO GASTROINTESTINAL TUBE: CPT

## 2017-01-01 PROCEDURE — 80202 ASSAY OF VANCOMYCIN: CPT | Performed by: FAMILY MEDICINE

## 2017-01-01 PROCEDURE — 40000133 ZZH STATISTIC OT WARD VISIT

## 2017-01-01 PROCEDURE — 36592 COLLECT BLOOD FROM PICC: CPT | Performed by: SURGERY

## 2017-01-01 PROCEDURE — 93306 TTE W/DOPPLER COMPLETE: CPT | Mod: 26 | Performed by: INTERNAL MEDICINE

## 2017-01-01 PROCEDURE — 83880 ASSAY OF NATRIURETIC PEPTIDE: CPT | Performed by: STUDENT IN AN ORGANIZED HEALTH CARE EDUCATION/TRAINING PROGRAM

## 2017-01-01 PROCEDURE — 83690 ASSAY OF LIPASE: CPT | Performed by: HOSPITALIST

## 2017-01-01 PROCEDURE — 87338 HPYLORI STOOL AG IA: CPT | Performed by: HOSPITALIST

## 2017-01-01 PROCEDURE — 83930 ASSAY OF BLOOD OSMOLALITY: CPT | Performed by: FAMILY MEDICINE

## 2017-01-01 PROCEDURE — 96374 THER/PROPH/DIAG INJ IV PUSH: CPT | Performed by: EMERGENCY MEDICINE

## 2017-01-01 PROCEDURE — 81001 URINALYSIS AUTO W/SCOPE: CPT | Performed by: INTERNAL MEDICINE

## 2017-01-01 PROCEDURE — 97535 SELF CARE MNGMENT TRAINING: CPT | Mod: GO

## 2017-01-01 PROCEDURE — 99285 EMERGENCY DEPT VISIT HI MDM: CPT | Performed by: EMERGENCY MEDICINE

## 2017-01-01 PROCEDURE — 40000894 ZZH STATISTIC OT IP EVAL DEFER: Performed by: OCCUPATIONAL THERAPIST

## 2017-01-01 PROCEDURE — 83605 ASSAY OF LACTIC ACID: CPT | Performed by: PHYSICIAN ASSISTANT

## 2017-01-01 PROCEDURE — 85049 AUTOMATED PLATELET COUNT: CPT | Performed by: NURSE PRACTITIONER

## 2017-01-01 PROCEDURE — 97161 PT EVAL LOW COMPLEX 20 MIN: CPT | Mod: GP

## 2017-01-01 PROCEDURE — 29515 APPLICATION SHORT LEG SPLINT: CPT | Mod: Z6 | Performed by: EMERGENCY MEDICINE

## 2017-01-01 PROCEDURE — 99207 ZZC CDG-CORRECTLY CODED, REVIEWED AND AGREE: CPT | Performed by: CLINICAL NURSE SPECIALIST

## 2017-01-01 PROCEDURE — 76700 US EXAM ABDOM COMPLETE: CPT

## 2017-01-01 PROCEDURE — 86644 CMV ANTIBODY: CPT | Performed by: FAMILY MEDICINE

## 2017-01-01 PROCEDURE — 25000132 ZZH RX MED GY IP 250 OP 250 PS 637: Performed by: INTERNAL MEDICINE

## 2017-01-01 PROCEDURE — 25000132 ZZH RX MED GY IP 250 OP 250 PS 637: Performed by: PSYCHIATRY & NEUROLOGY

## 2017-01-01 PROCEDURE — 84100 ASSAY OF PHOSPHORUS: CPT | Performed by: NURSE PRACTITIONER

## 2017-01-01 PROCEDURE — 25000128 H RX IP 250 OP 636: Performed by: NURSE ANESTHETIST, CERTIFIED REGISTERED

## 2017-01-01 PROCEDURE — 36415 COLL VENOUS BLD VENIPUNCTURE: CPT | Performed by: HOSPITALIST

## 2017-01-01 PROCEDURE — 25000125 ZZHC RX 250: Performed by: INTERNAL MEDICINE

## 2017-01-01 PROCEDURE — 85014 HEMATOCRIT: CPT | Performed by: HOSPITALIST

## 2017-01-01 PROCEDURE — 83036 HEMOGLOBIN GLYCOSYLATED A1C: CPT | Performed by: FAMILY MEDICINE

## 2017-01-01 PROCEDURE — 83605 ASSAY OF LACTIC ACID: CPT | Performed by: HOSPITALIST

## 2017-01-01 PROCEDURE — 0DJ08ZZ INSPECTION OF UPPER INTESTINAL TRACT, VIA NATURAL OR ARTIFICIAL OPENING ENDOSCOPIC: ICD-10-PCS | Performed by: INTERNAL MEDICINE

## 2017-01-01 PROCEDURE — 40000274 ZZH STATISTIC RCP CONSULT EA 30 MIN

## 2017-01-01 PROCEDURE — 25000128 H RX IP 250 OP 636: Performed by: EMERGENCY MEDICINE

## 2017-01-01 PROCEDURE — 83935 ASSAY OF URINE OSMOLALITY: CPT | Performed by: FAMILY MEDICINE

## 2017-01-01 PROCEDURE — 87040 BLOOD CULTURE FOR BACTERIA: CPT | Performed by: FAMILY MEDICINE

## 2017-01-01 PROCEDURE — 80048 BASIC METABOLIC PNL TOTAL CA: CPT | Performed by: SURGERY

## 2017-01-01 PROCEDURE — 45330 DIAGNOSTIC SIGMOIDOSCOPY: CPT | Performed by: INTERNAL MEDICINE

## 2017-01-01 PROCEDURE — C1769 GUIDE WIRE: HCPCS

## 2017-01-01 PROCEDURE — 83735 ASSAY OF MAGNESIUM: CPT | Performed by: SURGERY

## 2017-01-01 PROCEDURE — 83880 ASSAY OF NATRIURETIC PEPTIDE: CPT | Performed by: FAMILY MEDICINE

## 2017-01-01 PROCEDURE — 82274 ASSAY TEST FOR BLOOD FECAL: CPT | Performed by: FAMILY MEDICINE

## 2017-01-01 PROCEDURE — A9585 GADOBUTROL INJECTION: HCPCS | Performed by: PSYCHIATRY & NEUROLOGY

## 2017-01-01 PROCEDURE — 99152 MOD SED SAME PHYS/QHP 5/>YRS: CPT | Performed by: INTERNAL MEDICINE

## 2017-01-01 PROCEDURE — 36415 COLL VENOUS BLD VENIPUNCTURE: CPT

## 2017-01-01 PROCEDURE — 82330 ASSAY OF CALCIUM: CPT | Performed by: FAMILY MEDICINE

## 2017-01-01 PROCEDURE — 80329 ANALGESICS NON-OPIOID 1 OR 2: CPT | Performed by: FAMILY MEDICINE

## 2017-01-01 PROCEDURE — 80076 HEPATIC FUNCTION PANEL: CPT | Performed by: HOSPITALIST

## 2017-01-01 PROCEDURE — 80048 BASIC METABOLIC PNL TOTAL CA: CPT | Performed by: HOSPITALIST

## 2017-01-01 PROCEDURE — 84132 ASSAY OF SERUM POTASSIUM: CPT | Performed by: HOSPITALIST

## 2017-01-01 PROCEDURE — 87088 URINE BACTERIA CULTURE: CPT | Performed by: INTERNAL MEDICINE

## 2017-01-01 PROCEDURE — 86803 HEPATITIS C AB TEST: CPT | Performed by: FAMILY MEDICINE

## 2017-01-01 PROCEDURE — 25000125 ZZHC RX 250: Performed by: NURSE ANESTHETIST, CERTIFIED REGISTERED

## 2017-01-01 PROCEDURE — 81001 URINALYSIS AUTO W/SCOPE: CPT | Performed by: FAMILY MEDICINE

## 2017-01-01 PROCEDURE — 68200002 ZZH TRAUMA EVALUATION W/O CC LEVEL II: Performed by: EMERGENCY MEDICINE

## 2017-01-01 PROCEDURE — 81001 URINALYSIS AUTO W/SCOPE: CPT | Performed by: EMERGENCY MEDICINE

## 2017-01-01 PROCEDURE — 87086 URINE CULTURE/COLONY COUNT: CPT | Performed by: EMERGENCY MEDICINE

## 2017-01-01 PROCEDURE — 85730 THROMBOPLASTIN TIME PARTIAL: CPT | Performed by: FAMILY MEDICINE

## 2017-01-01 PROCEDURE — 85610 PROTHROMBIN TIME: CPT | Performed by: EMERGENCY MEDICINE

## 2017-01-01 PROCEDURE — 85025 COMPLETE CBC W/AUTO DIFF WBC: CPT | Performed by: SURGERY

## 2017-01-01 PROCEDURE — 83605 ASSAY OF LACTIC ACID: CPT

## 2017-01-01 PROCEDURE — 36592 COLLECT BLOOD FROM PICC: CPT | Performed by: PHYSICIAN ASSISTANT

## 2017-01-01 PROCEDURE — P9041 ALBUMIN (HUMAN),5%, 50ML: HCPCS | Performed by: NURSE ANESTHETIST, CERTIFIED REGISTERED

## 2017-01-01 PROCEDURE — 87040 BLOOD CULTURE FOR BACTERIA: CPT | Performed by: INTERNAL MEDICINE

## 2017-01-01 PROCEDURE — 99207 ZZC NO BILLABLE SERVICE THIS VISIT: CPT | Performed by: FAMILY MEDICINE

## 2017-01-01 PROCEDURE — 40000141 ZZH STATISTIC PERIPHERAL IV START W/O US GUIDANCE

## 2017-01-01 PROCEDURE — 84484 ASSAY OF TROPONIN QUANT: CPT | Performed by: INTERNAL MEDICINE

## 2017-01-01 PROCEDURE — 73630 X-RAY EXAM OF FOOT: CPT | Mod: LT

## 2017-01-01 PROCEDURE — 80053 COMPREHEN METABOLIC PANEL: CPT | Performed by: HOSPITALIST

## 2017-01-01 PROCEDURE — 25000125 ZZHC RX 250: Performed by: RADIOLOGY

## 2017-01-01 PROCEDURE — 0DH67UZ INSERTION OF FEEDING DEVICE INTO STOMACH, VIA NATURAL OR ARTIFICIAL OPENING: ICD-10-PCS

## 2017-01-01 PROCEDURE — 25500064 ZZH RX 255 OP 636: Performed by: FAMILY MEDICINE

## 2017-01-01 PROCEDURE — 73590 X-RAY EXAM OF LOWER LEG: CPT | Mod: LT

## 2017-01-01 PROCEDURE — 84439 ASSAY OF FREE THYROXINE: CPT | Performed by: PSYCHIATRY & NEUROLOGY

## 2017-01-01 PROCEDURE — 87340 HEPATITIS B SURFACE AG IA: CPT | Performed by: FAMILY MEDICINE

## 2017-01-01 RX ORDER — ACETAMINOPHEN 325 MG/1
975 TABLET ORAL
Status: DISCONTINUED | OUTPATIENT
Start: 2017-01-01 | End: 2017-01-01

## 2017-01-01 RX ORDER — FENTANYL CITRATE 50 UG/ML
25 INJECTION, SOLUTION INTRAMUSCULAR; INTRAVENOUS ONCE
Status: COMPLETED | OUTPATIENT
Start: 2017-01-01 | End: 2017-01-01

## 2017-01-01 RX ORDER — METOPROLOL TARTRATE 1 MG/ML
2.5 INJECTION, SOLUTION INTRAVENOUS EVERY 6 HOURS
Status: DISCONTINUED | OUTPATIENT
Start: 2017-01-01 | End: 2017-01-01

## 2017-01-01 RX ORDER — AMOXICILLIN 250 MG
1-2 CAPSULE ORAL
Status: DISCONTINUED | OUTPATIENT
Start: 2017-01-01 | End: 2017-01-01 | Stop reason: HOSPADM

## 2017-01-01 RX ORDER — POTASSIUM CHLORIDE 750 MG/1
20-40 TABLET, EXTENDED RELEASE ORAL
Status: DISCONTINUED | OUTPATIENT
Start: 2017-01-01 | End: 2017-01-01 | Stop reason: HOSPADM

## 2017-01-01 RX ORDER — SULFAMETHOXAZOLE/TRIMETHOPRIM 800-160 MG
1 TABLET ORAL ONCE
Refills: 0 | DISCHARGE
Start: 2017-01-01 | End: 2017-01-01

## 2017-01-01 RX ORDER — ONDANSETRON 2 MG/ML
INJECTION INTRAMUSCULAR; INTRAVENOUS PRN
Status: DISCONTINUED | OUTPATIENT
Start: 2017-01-01 | End: 2017-01-01

## 2017-01-01 RX ORDER — HYDROMORPHONE HYDROCHLORIDE 1 MG/ML
0.5 INJECTION, SOLUTION INTRAMUSCULAR; INTRAVENOUS; SUBCUTANEOUS ONCE
Status: COMPLETED | OUTPATIENT
Start: 2017-01-01 | End: 2017-01-01

## 2017-01-01 RX ORDER — WARFARIN SODIUM 3 MG/1
3 TABLET ORAL
Status: COMPLETED | OUTPATIENT
Start: 2017-01-01 | End: 2017-01-01

## 2017-01-01 RX ORDER — SODIUM CHLORIDE 9 MG/ML
1000 INJECTION, SOLUTION INTRAVENOUS CONTINUOUS
Status: DISCONTINUED | OUTPATIENT
Start: 2017-01-01 | End: 2017-01-01

## 2017-01-01 RX ORDER — AMOXICILLIN 250 MG
1 CAPSULE ORAL DAILY
Qty: 100 TABLET | Refills: 0 | DISCHARGE
Start: 2017-01-01

## 2017-01-01 RX ORDER — ATROPINE SULFATE 10 MG/ML
1-2 SOLUTION/ DROPS OPHTHALMIC
Status: DISCONTINUED | OUTPATIENT
Start: 2017-01-01 | End: 2017-01-01 | Stop reason: HOSPADM

## 2017-01-01 RX ORDER — HYDROMORPHONE HYDROCHLORIDE 1 MG/ML
.5-1 INJECTION, SOLUTION INTRAMUSCULAR; INTRAVENOUS; SUBCUTANEOUS
Status: DISCONTINUED | OUTPATIENT
Start: 2017-01-01 | End: 2017-01-01

## 2017-01-01 RX ORDER — DEXTROSE, SODIUM CHLORIDE, SODIUM LACTATE, POTASSIUM CHLORIDE, AND CALCIUM CHLORIDE 5; .6; .31; .03; .02 G/100ML; G/100ML; G/100ML; G/100ML; G/100ML
INJECTION, SOLUTION INTRAVENOUS CONTINUOUS
Status: DISCONTINUED | OUTPATIENT
Start: 2017-01-01 | End: 2017-01-01

## 2017-01-01 RX ORDER — POTASSIUM CHLORIDE 7.45 MG/ML
10 INJECTION INTRAVENOUS
Status: DISCONTINUED | OUTPATIENT
Start: 2017-01-01 | End: 2017-01-01 | Stop reason: DRUGHIGH

## 2017-01-01 RX ORDER — CEFDINIR 300 MG/1
300 CAPSULE ORAL 2 TIMES DAILY
Status: DISCONTINUED | OUTPATIENT
Start: 2017-01-01 | End: 2017-01-01

## 2017-01-01 RX ORDER — ACETAMINOPHEN 325 MG/1
975 TABLET ORAL EVERY 6 HOURS PRN
Qty: 100 TABLET | Status: ON HOLD | DISCHARGE
Start: 2017-01-01 | End: 2017-01-01

## 2017-01-01 RX ORDER — HEPARIN SODIUM,PORCINE 10 UNIT/ML
5-10 VIAL (ML) INTRAVENOUS EVERY 24 HOURS
Status: DISCONTINUED | OUTPATIENT
Start: 2017-01-01 | End: 2017-01-01 | Stop reason: HOSPADM

## 2017-01-01 RX ORDER — ROSUVASTATIN CALCIUM 10 MG/1
20 TABLET, COATED ORAL AT BEDTIME
Status: DISCONTINUED | OUTPATIENT
Start: 2017-01-01 | End: 2017-01-01 | Stop reason: HOSPADM

## 2017-01-01 RX ORDER — FEXOFENADINE HCL 180 MG/1
180 TABLET ORAL DAILY
Status: DISCONTINUED | OUTPATIENT
Start: 2017-01-01 | End: 2017-01-01

## 2017-01-01 RX ORDER — WARFARIN SODIUM 3 MG/1
3 TABLET ORAL DAILY
Qty: 30 TABLET | Status: ON HOLD | DISCHARGE
Start: 2017-01-01 | End: 2017-01-01

## 2017-01-01 RX ORDER — MORPHINE SULFATE 2 MG/ML
1-2 INJECTION, SOLUTION INTRAMUSCULAR; INTRAVENOUS
Status: DISCONTINUED | OUTPATIENT
Start: 2017-01-01 | End: 2017-01-01 | Stop reason: HOSPADM

## 2017-01-01 RX ORDER — DEXTROSE MONOHYDRATE 50 MG/ML
INJECTION, SOLUTION INTRAVENOUS CONTINUOUS
Status: DISCONTINUED | OUTPATIENT
Start: 2017-01-01 | End: 2017-01-01

## 2017-01-01 RX ORDER — FEXOFENADINE HCL 180 MG/1
180 TABLET ORAL DAILY
Status: DISCONTINUED | OUTPATIENT
Start: 2017-01-01 | End: 2017-01-01 | Stop reason: HOSPADM

## 2017-01-01 RX ORDER — METOCLOPRAMIDE HYDROCHLORIDE 5 MG/ML
5 INJECTION INTRAMUSCULAR; INTRAVENOUS EVERY 6 HOURS PRN
Status: DISCONTINUED | OUTPATIENT
Start: 2017-01-01 | End: 2017-01-01 | Stop reason: HOSPADM

## 2017-01-01 RX ORDER — LIDOCAINE HYDROCHLORIDE 20 MG/ML
INJECTION, SOLUTION INFILTRATION; PERINEURAL PRN
Status: DISCONTINUED | OUTPATIENT
Start: 2017-01-01 | End: 2017-01-01

## 2017-01-01 RX ORDER — POTASSIUM CHLORIDE 1.5 G/1.58G
20-40 POWDER, FOR SOLUTION ORAL
Status: DISCONTINUED | OUTPATIENT
Start: 2017-01-01 | End: 2017-01-01 | Stop reason: DRUGHIGH

## 2017-01-01 RX ORDER — AMOXICILLIN 250 MG
1-2 CAPSULE ORAL 2 TIMES DAILY
Status: DISCONTINUED | OUTPATIENT
Start: 2017-01-01 | End: 2017-01-01 | Stop reason: HOSPADM

## 2017-01-01 RX ORDER — WARFARIN SODIUM 3 MG/1
3 TABLET ORAL
Status: DISCONTINUED | OUTPATIENT
Start: 2017-01-01 | End: 2017-01-01 | Stop reason: HOSPADM

## 2017-01-01 RX ORDER — LORAZEPAM 0.5 MG/1
.5-1 TABLET ORAL
Status: DISCONTINUED | OUTPATIENT
Start: 2017-01-01 | End: 2017-01-01 | Stop reason: HOSPADM

## 2017-01-01 RX ORDER — CEFTRIAXONE 1 G/1
1 INJECTION, POWDER, FOR SOLUTION INTRAMUSCULAR; INTRAVENOUS EVERY 24 HOURS
Status: DISCONTINUED | OUTPATIENT
Start: 2017-01-01 | End: 2017-01-01

## 2017-01-01 RX ORDER — BISACODYL 10 MG
10 SUPPOSITORY, RECTAL RECTAL
Status: DISCONTINUED | OUTPATIENT
Start: 2017-01-01 | End: 2017-01-01 | Stop reason: HOSPADM

## 2017-01-01 RX ORDER — DEXTROSE MONOHYDRATE, SODIUM CHLORIDE, AND POTASSIUM CHLORIDE 50; 1.49; 4.5 G/1000ML; G/1000ML; G/1000ML
INJECTION, SOLUTION INTRAVENOUS CONTINUOUS
Status: DISCONTINUED | OUTPATIENT
Start: 2017-01-01 | End: 2017-01-01

## 2017-01-01 RX ORDER — PROCHLORPERAZINE 25 MG
12.5 SUPPOSITORY, RECTAL RECTAL EVERY 12 HOURS PRN
Status: DISCONTINUED | OUTPATIENT
Start: 2017-01-01 | End: 2017-01-01 | Stop reason: HOSPADM

## 2017-01-01 RX ORDER — ACETAMINOPHEN 325 MG/1
975 TABLET ORAL EVERY 8 HOURS
Status: DISCONTINUED | OUTPATIENT
Start: 2017-01-01 | End: 2017-01-01 | Stop reason: HOSPADM

## 2017-01-01 RX ORDER — SULFAMETHOXAZOLE/TRIMETHOPRIM 800-160 MG
1 TABLET ORAL DAILY
Status: ON HOLD | COMMUNITY
End: 2017-01-01

## 2017-01-01 RX ORDER — FENTANYL CITRATE 50 UG/ML
25-50 INJECTION, SOLUTION INTRAMUSCULAR; INTRAVENOUS EVERY 5 MIN PRN
Status: DISCONTINUED | OUTPATIENT
Start: 2017-01-01 | End: 2017-01-01 | Stop reason: HOSPADM

## 2017-01-01 RX ORDER — DEXTROSE MONOHYDRATE, SODIUM CHLORIDE, AND POTASSIUM CHLORIDE 50; 1.49; 4.5 G/1000ML; G/1000ML; G/1000ML
INJECTION, SOLUTION INTRAVENOUS
Status: COMPLETED
Start: 2017-01-01 | End: 2017-01-01

## 2017-01-01 RX ORDER — POTASSIUM CHLORIDE 750 MG/1
20-40 TABLET, EXTENDED RELEASE ORAL
Status: DISCONTINUED | OUTPATIENT
Start: 2017-01-01 | End: 2017-01-01 | Stop reason: DRUGHIGH

## 2017-01-01 RX ORDER — AMPICILLIN AND SULBACTAM 2; 1 G/1; G/1
3 INJECTION, POWDER, FOR SOLUTION INTRAMUSCULAR; INTRAVENOUS EVERY 6 HOURS
Status: DISCONTINUED | OUTPATIENT
Start: 2017-01-01 | End: 2017-01-01

## 2017-01-01 RX ORDER — FERROUS SULFATE 325(65) MG
325 TABLET ORAL
Status: DISCONTINUED | OUTPATIENT
Start: 2017-01-01 | End: 2017-01-01

## 2017-01-01 RX ORDER — NALOXONE HYDROCHLORIDE 0.4 MG/ML
.1-.4 INJECTION, SOLUTION INTRAMUSCULAR; INTRAVENOUS; SUBCUTANEOUS
Status: DISCONTINUED | OUTPATIENT
Start: 2017-01-01 | End: 2017-01-01 | Stop reason: HOSPADM

## 2017-01-01 RX ORDER — POLYETHYLENE GLYCOL 3350 17 G/17G
17 POWDER, FOR SOLUTION ORAL 2 TIMES DAILY
Status: DISCONTINUED | OUTPATIENT
Start: 2017-01-01 | End: 2017-01-01

## 2017-01-01 RX ORDER — ASPIRIN 325 MG
325 TABLET ORAL DAILY
Status: DISCONTINUED | OUTPATIENT
Start: 2017-01-01 | End: 2017-01-01

## 2017-01-01 RX ORDER — EPHEDRINE SULFATE 50 MG/ML
INJECTION, SOLUTION INTRAMUSCULAR; INTRAVENOUS; SUBCUTANEOUS PRN
Status: DISCONTINUED | OUTPATIENT
Start: 2017-01-01 | End: 2017-01-01

## 2017-01-01 RX ORDER — AMOXICILLIN 250 MG
1-2 CAPSULE ORAL 2 TIMES DAILY
Status: DISCONTINUED | OUTPATIENT
Start: 2017-01-01 | End: 2017-01-01

## 2017-01-01 RX ORDER — SODIUM CHLORIDE, SODIUM LACTATE, POTASSIUM CHLORIDE, CALCIUM CHLORIDE 600; 310; 30; 20 MG/100ML; MG/100ML; MG/100ML; MG/100ML
INJECTION, SOLUTION INTRAVENOUS CONTINUOUS
Status: DISCONTINUED | OUTPATIENT
Start: 2017-01-01 | End: 2017-01-01 | Stop reason: HOSPADM

## 2017-01-01 RX ORDER — FENTANYL CITRATE 50 UG/ML
INJECTION, SOLUTION INTRAMUSCULAR; INTRAVENOUS PRN
Status: DISCONTINUED | OUTPATIENT
Start: 2017-01-01 | End: 2017-01-01

## 2017-01-01 RX ORDER — CEFAZOLIN SODIUM 2 G/100ML
2 INJECTION, SOLUTION INTRAVENOUS
Status: DISCONTINUED | OUTPATIENT
Start: 2017-01-01 | End: 2017-01-01

## 2017-01-01 RX ORDER — BUMETANIDE 0.5 MG/1
0.5 TABLET ORAL DAILY
Status: DISCONTINUED | OUTPATIENT
Start: 2017-01-01 | End: 2017-01-01 | Stop reason: HOSPADM

## 2017-01-01 RX ORDER — ASPIRIN 300 MG/1
300 SUPPOSITORY RECTAL DAILY
Status: DISCONTINUED | OUTPATIENT
Start: 2017-01-01 | End: 2017-01-01

## 2017-01-01 RX ORDER — POTASSIUM CHLORIDE 750 MG/1
10 TABLET, EXTENDED RELEASE ORAL DAILY
Qty: 30 TABLET | Refills: 0 | DISCHARGE
Start: 2017-01-01 | End: 2017-01-01

## 2017-01-01 RX ORDER — ACETAMINOPHEN 650 MG/1
650 SUPPOSITORY RECTAL EVERY 6 HOURS PRN
Qty: 60 SUPPOSITORY | DISCHARGE
Start: 2017-01-01

## 2017-01-01 RX ORDER — FUROSEMIDE 10 MG/ML
40 INJECTION INTRAMUSCULAR; INTRAVENOUS ONCE
Status: COMPLETED | OUTPATIENT
Start: 2017-01-01 | End: 2017-01-01

## 2017-01-01 RX ORDER — METOPROLOL SUCCINATE 50 MG/1
50 TABLET, EXTENDED RELEASE ORAL AT BEDTIME
Status: DISCONTINUED | OUTPATIENT
Start: 2017-01-01 | End: 2017-01-01 | Stop reason: HOSPADM

## 2017-01-01 RX ORDER — LACTOBACILLUS RHAMNOSUS GG 10B CELL
1 CAPSULE ORAL 2 TIMES DAILY
Status: DISCONTINUED | OUTPATIENT
Start: 2017-01-01 | End: 2017-01-01 | Stop reason: HOSPADM

## 2017-01-01 RX ORDER — ONDANSETRON 4 MG/1
4 TABLET, ORALLY DISINTEGRATING ORAL EVERY 6 HOURS PRN
Status: DISCONTINUED | OUTPATIENT
Start: 2017-01-01 | End: 2017-01-01 | Stop reason: HOSPADM

## 2017-01-01 RX ORDER — MAGNESIUM CARB/ALUMINUM HYDROX 105-160MG
148 TABLET,CHEWABLE ORAL
Status: DISCONTINUED | OUTPATIENT
Start: 2017-01-01 | End: 2017-01-01

## 2017-01-01 RX ORDER — MAGNESIUM SULFATE HEPTAHYDRATE 40 MG/ML
4 INJECTION, SOLUTION INTRAVENOUS EVERY 4 HOURS PRN
Status: DISCONTINUED | OUTPATIENT
Start: 2017-01-01 | End: 2017-01-01 | Stop reason: HOSPADM

## 2017-01-01 RX ORDER — DEXTROSE MONOHYDRATE 25 G/50ML
25-50 INJECTION, SOLUTION INTRAVENOUS
DISCHARGE
Start: 2017-01-01

## 2017-01-01 RX ORDER — FENTANYL CITRATE 50 UG/ML
INJECTION, SOLUTION INTRAMUSCULAR; INTRAVENOUS PRN
Status: DISCONTINUED | OUTPATIENT
Start: 2017-01-01 | End: 2017-01-01 | Stop reason: HOSPADM

## 2017-01-01 RX ORDER — AMOXICILLIN 250 MG
1-2 CAPSULE ORAL 2 TIMES DAILY
Qty: 100 TABLET | Status: ON HOLD | DISCHARGE
Start: 2017-01-01 | End: 2017-01-01

## 2017-01-01 RX ORDER — MORPHINE SULFATE 20 MG/ML
5-10 SOLUTION ORAL
Status: DISCONTINUED | OUTPATIENT
Start: 2017-01-01 | End: 2017-01-01 | Stop reason: HOSPADM

## 2017-01-01 RX ORDER — LIDOCAINE 40 MG/G
CREAM TOPICAL
Status: DISCONTINUED | OUTPATIENT
Start: 2017-01-01 | End: 2017-01-01

## 2017-01-01 RX ORDER — LORAZEPAM 2 MG/ML
1 INJECTION INTRAMUSCULAR ONCE
Status: DISCONTINUED | OUTPATIENT
Start: 2017-01-01 | End: 2017-01-01 | Stop reason: CLARIF

## 2017-01-01 RX ORDER — ONDANSETRON 4 MG/1
4 TABLET, ORALLY DISINTEGRATING ORAL EVERY 6 HOURS PRN
Qty: 120 TABLET | DISCHARGE
Start: 2017-01-01

## 2017-01-01 RX ORDER — NYSTATIN 100000/ML
SUSPENSION, ORAL (FINAL DOSE FORM) ORAL
Qty: 280 ML | DISCHARGE
Start: 2017-01-01

## 2017-01-01 RX ORDER — L. ACIDOPHILUS/PECTIN, CITRUS 25MM-100MG
1 TABLET ORAL
Status: DISCONTINUED | OUTPATIENT
Start: 2017-01-01 | End: 2017-01-01

## 2017-01-01 RX ORDER — MORPHINE SULFATE 10 MG/5ML
5-10 SOLUTION ORAL
Status: DISCONTINUED | OUTPATIENT
Start: 2017-01-01 | End: 2017-01-01 | Stop reason: HOSPADM

## 2017-01-01 RX ORDER — NICOTINE POLACRILEX 4 MG
15-30 LOZENGE BUCCAL
Status: DISCONTINUED | OUTPATIENT
Start: 2017-01-01 | End: 2017-01-01

## 2017-01-01 RX ORDER — NITROFURANTOIN 25; 75 MG/1; MG/1
100 CAPSULE ORAL EVERY 12 HOURS SCHEDULED
Status: DISCONTINUED | OUTPATIENT
Start: 2017-01-01 | End: 2017-01-01 | Stop reason: HOSPADM

## 2017-01-01 RX ORDER — POTASSIUM CHLORIDE 1.5 G/1.58G
20-40 POWDER, FOR SOLUTION ORAL
Status: DISCONTINUED | OUTPATIENT
Start: 2017-01-01 | End: 2017-01-01 | Stop reason: HOSPADM

## 2017-01-01 RX ORDER — SULFAMETHOXAZOLE/TRIMETHOPRIM 800-160 MG
1 TABLET ORAL ONCE
Status: COMPLETED | OUTPATIENT
Start: 2017-01-01 | End: 2017-01-01

## 2017-01-01 RX ORDER — DEXTROSE MONOHYDRATE 25 G/50ML
25-50 INJECTION, SOLUTION INTRAVENOUS
Status: DISCONTINUED | OUTPATIENT
Start: 2017-01-01 | End: 2017-01-01 | Stop reason: HOSPADM

## 2017-01-01 RX ORDER — BUMETANIDE 0.5 MG/1
0.5 TABLET ORAL
Status: DISCONTINUED | OUTPATIENT
Start: 2017-01-01 | End: 2017-01-01

## 2017-01-01 RX ORDER — ASPIRIN 81 MG/1
81 TABLET ORAL DAILY
Status: DISCONTINUED | OUTPATIENT
Start: 2017-01-01 | End: 2017-01-01

## 2017-01-01 RX ORDER — SIMETHICONE
LIQUID (ML) MISCELLANEOUS PRN
Status: DISCONTINUED | OUTPATIENT
Start: 2017-01-01 | End: 2017-01-01 | Stop reason: HOSPADM

## 2017-01-01 RX ORDER — POLYETHYLENE GLYCOL 3350 17 G/17G
17 POWDER, FOR SOLUTION ORAL DAILY PRN
Status: DISCONTINUED | OUTPATIENT
Start: 2017-01-01 | End: 2017-01-01 | Stop reason: HOSPADM

## 2017-01-01 RX ORDER — PHYTONADIONE 1 MG/.5ML
5 INJECTION, EMULSION INTRAMUSCULAR; INTRAVENOUS; SUBCUTANEOUS ONCE
Status: DISCONTINUED | OUTPATIENT
Start: 2017-01-01 | End: 2017-01-01

## 2017-01-01 RX ORDER — AMOXICILLIN 250 MG
1-2 CAPSULE ORAL
Status: DISCONTINUED | OUTPATIENT
Start: 2017-01-01 | End: 2017-01-01

## 2017-01-01 RX ORDER — HEPARIN SODIUM,PORCINE 10 UNIT/ML
5-10 VIAL (ML) INTRAVENOUS
Status: DISCONTINUED | OUTPATIENT
Start: 2017-01-01 | End: 2017-01-01 | Stop reason: HOSPADM

## 2017-01-01 RX ORDER — NIACIN 500 MG
500 TABLET ORAL AT BEDTIME
Status: DISCONTINUED | OUTPATIENT
Start: 2017-01-01 | End: 2017-01-01

## 2017-01-01 RX ORDER — FLUTICASONE PROPIONATE 50 MCG
2 SPRAY, SUSPENSION (ML) NASAL DAILY
Status: DISCONTINUED | OUTPATIENT
Start: 2017-01-01 | End: 2017-01-01

## 2017-01-01 RX ORDER — SODIUM CHLORIDE, SODIUM LACTATE, POTASSIUM CHLORIDE, CALCIUM CHLORIDE 600; 310; 30; 20 MG/100ML; MG/100ML; MG/100ML; MG/100ML
INJECTION, SOLUTION INTRAVENOUS CONTINUOUS
Status: DISCONTINUED | OUTPATIENT
Start: 2017-01-01 | End: 2017-01-01

## 2017-01-01 RX ORDER — NYSTATIN 100000/ML
500000 SUSPENSION, ORAL (FINAL DOSE FORM) ORAL 4 TIMES DAILY
Status: DISCONTINUED | OUTPATIENT
Start: 2017-01-01 | End: 2017-01-01 | Stop reason: HOSPADM

## 2017-01-01 RX ORDER — FENTANYL CITRATE 50 UG/ML
INJECTION, SOLUTION INTRAMUSCULAR; INTRAVENOUS
Status: DISCONTINUED
Start: 2017-01-01 | End: 2017-01-01 | Stop reason: HOSPADM

## 2017-01-01 RX ORDER — LACTOBACILLUS RHAMNOSUS GG 10B CELL
1 CAPSULE ORAL 2 TIMES DAILY
Status: DISCONTINUED | OUTPATIENT
Start: 2017-01-01 | End: 2017-01-01

## 2017-01-01 RX ORDER — FERROUS SULFATE 325(65) MG
325 TABLET ORAL
Status: DISCONTINUED | OUTPATIENT
Start: 2017-01-01 | End: 2017-01-01 | Stop reason: HOSPADM

## 2017-01-01 RX ORDER — POTASSIUM CHLORIDE 750 MG/1
20-40 TABLET, EXTENDED RELEASE ORAL
Status: DISCONTINUED | OUTPATIENT
Start: 2017-01-01 | End: 2017-01-01

## 2017-01-01 RX ORDER — NICOTINE POLACRILEX 4 MG
15-30 LOZENGE BUCCAL
DISCHARGE
Start: 2017-01-01

## 2017-01-01 RX ORDER — NITROFURANTOIN 25; 75 MG/1; MG/1
100 CAPSULE ORAL EVERY 12 HOURS
Qty: 11 CAPSULE | Refills: 0 | DISCHARGE
Start: 2017-01-01 | End: 2017-01-01

## 2017-01-01 RX ORDER — MEPERIDINE HYDROCHLORIDE 25 MG/ML
12.5 INJECTION INTRAMUSCULAR; INTRAVENOUS; SUBCUTANEOUS
Status: DISCONTINUED | OUTPATIENT
Start: 2017-01-01 | End: 2017-01-01 | Stop reason: HOSPADM

## 2017-01-01 RX ORDER — HEPARIN SODIUM 5000 [USP'U]/.5ML
5000 INJECTION, SOLUTION INTRAVENOUS; SUBCUTANEOUS EVERY 12 HOURS
Status: DISCONTINUED | OUTPATIENT
Start: 2017-01-01 | End: 2017-01-01 | Stop reason: HOSPADM

## 2017-01-01 RX ORDER — MULTIPLE VITAMINS W/ MINERALS TAB 9MG-400MCG
1 TAB ORAL DAILY
Status: DISCONTINUED | OUTPATIENT
Start: 2017-01-01 | End: 2017-01-01 | Stop reason: HOSPADM

## 2017-01-01 RX ORDER — ASPIRIN 81 MG/1
81 TABLET ORAL
Status: DISCONTINUED | OUTPATIENT
Start: 2017-01-01 | End: 2017-01-01

## 2017-01-01 RX ORDER — POTASSIUM CHLORIDE 1.5 G/1.58G
20-40 POWDER, FOR SOLUTION ORAL
Status: DISCONTINUED | OUTPATIENT
Start: 2017-01-01 | End: 2017-01-01

## 2017-01-01 RX ORDER — POLYETHYLENE GLYCOL 3350 17 G/17G
17 POWDER, FOR SOLUTION ORAL DAILY PRN
Qty: 7 PACKET | Refills: 11 | DISCHARGE
Start: 2017-01-01

## 2017-01-01 RX ORDER — POLYETHYLENE GLYCOL 3350 17 G/17G
17 POWDER, FOR SOLUTION ORAL DAILY PRN
Status: DISCONTINUED | OUTPATIENT
Start: 2017-01-01 | End: 2017-01-01

## 2017-01-01 RX ORDER — ONDANSETRON 4 MG/1
4 TABLET, ORALLY DISINTEGRATING ORAL EVERY 30 MIN PRN
Status: DISCONTINUED | OUTPATIENT
Start: 2017-01-01 | End: 2017-01-01 | Stop reason: HOSPADM

## 2017-01-01 RX ORDER — POTASSIUM CHLORIDE 7.45 MG/ML
10 INJECTION INTRAVENOUS
Status: DISCONTINUED | OUTPATIENT
Start: 2017-01-01 | End: 2017-01-01 | Stop reason: HOSPADM

## 2017-01-01 RX ORDER — LIDOCAINE 40 MG/G
CREAM TOPICAL
Status: DISCONTINUED | OUTPATIENT
Start: 2017-01-01 | End: 2017-01-01 | Stop reason: HOSPADM

## 2017-01-01 RX ORDER — MAGNESIUM OXIDE 400 MG/1
800 TABLET ORAL DAILY
Status: DISCONTINUED | OUTPATIENT
Start: 2017-01-01 | End: 2017-01-01 | Stop reason: HOSPADM

## 2017-01-01 RX ORDER — BUMETANIDE 0.5 MG/1
0.5 TABLET ORAL 2 TIMES DAILY
Status: CANCELLED | OUTPATIENT
Start: 2017-01-01

## 2017-01-01 RX ORDER — ONDANSETRON 2 MG/ML
4 INJECTION INTRAMUSCULAR; INTRAVENOUS EVERY 6 HOURS PRN
Status: DISCONTINUED | OUTPATIENT
Start: 2017-01-01 | End: 2017-01-01 | Stop reason: HOSPADM

## 2017-01-01 RX ORDER — SODIUM CHLORIDE 450 MG/100ML
INJECTION, SOLUTION INTRAVENOUS CONTINUOUS
Status: DISCONTINUED | OUTPATIENT
Start: 2017-01-01 | End: 2017-01-01

## 2017-01-01 RX ORDER — DEXTROSE MONOHYDRATE 50 MG/ML
INJECTION, SOLUTION INTRAVENOUS CONTINUOUS
Status: DISPENSED | OUTPATIENT
Start: 2017-01-01 | End: 2017-01-01

## 2017-01-01 RX ORDER — LORAZEPAM 2 MG/ML
.5-1 INJECTION INTRAMUSCULAR
Status: DISCONTINUED | OUTPATIENT
Start: 2017-01-01 | End: 2017-01-01 | Stop reason: HOSPADM

## 2017-01-01 RX ORDER — NICOTINE POLACRILEX 4 MG
15-30 LOZENGE BUCCAL
Status: DISCONTINUED | OUTPATIENT
Start: 2017-01-01 | End: 2017-01-01 | Stop reason: HOSPADM

## 2017-01-01 RX ORDER — SODIUM CHLORIDE 9 MG/ML
INJECTION, SOLUTION INTRAVENOUS
Status: DISCONTINUED
Start: 2017-01-01 | End: 2017-01-01 | Stop reason: HOSPADM

## 2017-01-01 RX ORDER — AMPICILLIN AND SULBACTAM 1; .5 G/1; G/1
1.5 INJECTION, POWDER, FOR SOLUTION INTRAMUSCULAR; INTRAVENOUS EVERY 6 HOURS
Status: DISCONTINUED | OUTPATIENT
Start: 2017-01-01 | End: 2017-01-01

## 2017-01-01 RX ORDER — MINERAL OIL/HYDROPHIL PETROLAT
OINTMENT (GRAM) TOPICAL EVERY 8 HOURS PRN
Status: DISCONTINUED | OUTPATIENT
Start: 2017-01-01 | End: 2017-01-01 | Stop reason: HOSPADM

## 2017-01-01 RX ORDER — ROSUVASTATIN CALCIUM 20 MG/1
20 TABLET, COATED ORAL
Status: DISCONTINUED | OUTPATIENT
Start: 2017-01-01 | End: 2017-01-01

## 2017-01-01 RX ORDER — ACETAMINOPHEN 325 MG/1
975 TABLET ORAL EVERY 6 HOURS PRN
Status: DISCONTINUED | OUTPATIENT
Start: 2017-01-01 | End: 2017-01-01

## 2017-01-01 RX ORDER — POTASSIUM CHLORIDE 7.45 MG/ML
10 INJECTION INTRAVENOUS
Status: DISCONTINUED | OUTPATIENT
Start: 2017-01-01 | End: 2017-01-01

## 2017-01-01 RX ORDER — ASPIRIN 81 MG/1
81 TABLET ORAL DAILY
Status: DISCONTINUED | OUTPATIENT
Start: 2017-01-01 | End: 2017-01-01 | Stop reason: HOSPADM

## 2017-01-01 RX ORDER — FLUTICASONE PROPIONATE 50 MCG
2 SPRAY, SUSPENSION (ML) NASAL DAILY
Status: DISCONTINUED | OUTPATIENT
Start: 2017-01-01 | End: 2017-01-01 | Stop reason: HOSPADM

## 2017-01-01 RX ORDER — UREA 10 %
500 LOTION (ML) TOPICAL DAILY
Status: DISCONTINUED | OUTPATIENT
Start: 2017-01-01 | End: 2017-01-01 | Stop reason: HOSPADM

## 2017-01-01 RX ORDER — NYSTATIN 100000/ML
500000 SUSPENSION, ORAL (FINAL DOSE FORM) ORAL 4 TIMES DAILY
Status: DISCONTINUED | OUTPATIENT
Start: 2017-01-01 | End: 2017-01-01

## 2017-01-01 RX ORDER — FUROSEMIDE 10 MG/ML
40 INJECTION INTRAMUSCULAR; INTRAVENOUS ONCE
Status: DISCONTINUED | OUTPATIENT
Start: 2017-01-01 | End: 2017-01-01

## 2017-01-01 RX ORDER — POTASSIUM CHLORIDE 29.8 MG/ML
20 INJECTION INTRAVENOUS
Status: DISCONTINUED | OUTPATIENT
Start: 2017-01-01 | End: 2017-01-01 | Stop reason: DRUGHIGH

## 2017-01-01 RX ORDER — LORAZEPAM 2 MG/ML
0.5 INJECTION INTRAMUSCULAR
Status: COMPLETED | OUTPATIENT
Start: 2017-01-01 | End: 2017-01-01

## 2017-01-01 RX ORDER — ACETAMINOPHEN 650 MG/1
650 SUPPOSITORY RECTAL EVERY 6 HOURS PRN
Status: DISCONTINUED | OUTPATIENT
Start: 2017-01-01 | End: 2017-01-01 | Stop reason: HOSPADM

## 2017-01-01 RX ORDER — ROSUVASTATIN CALCIUM 20 MG/1
20 TABLET, COATED ORAL DAILY
Status: DISCONTINUED | OUTPATIENT
Start: 2017-01-01 | End: 2017-01-01 | Stop reason: HOSPADM

## 2017-01-01 RX ORDER — SODIUM CHLORIDE AND POTASSIUM CHLORIDE 150; 900 MG/100ML; MG/100ML
INJECTION, SOLUTION INTRAVENOUS CONTINUOUS
Status: DISCONTINUED | OUTPATIENT
Start: 2017-01-01 | End: 2017-01-01

## 2017-01-01 RX ORDER — DEXTROSE MONOHYDRATE 25 G/50ML
25-50 INJECTION, SOLUTION INTRAVENOUS
Status: DISCONTINUED | OUTPATIENT
Start: 2017-01-01 | End: 2017-01-01

## 2017-01-01 RX ORDER — METOPROLOL SUCCINATE 50 MG/1
50 TABLET, EXTENDED RELEASE ORAL AT BEDTIME
Status: DISCONTINUED | OUTPATIENT
Start: 2017-01-01 | End: 2017-01-01

## 2017-01-01 RX ORDER — NIACIN 500 MG/1
500 TABLET, EXTENDED RELEASE ORAL AT BEDTIME
Status: DISCONTINUED | OUTPATIENT
Start: 2017-01-01 | End: 2017-01-01

## 2017-01-01 RX ORDER — POTASSIUM CHLORIDE 1.5 G/1.58G
40 POWDER, FOR SOLUTION ORAL ONCE
Status: COMPLETED | OUTPATIENT
Start: 2017-01-01 | End: 2017-01-01

## 2017-01-01 RX ORDER — CEFAZOLIN SODIUM 1 G/3ML
1 INJECTION, POWDER, FOR SOLUTION INTRAMUSCULAR; INTRAVENOUS SEE ADMIN INSTRUCTIONS
Status: DISCONTINUED | OUTPATIENT
Start: 2017-01-01 | End: 2017-01-01

## 2017-01-01 RX ORDER — PROCHLORPERAZINE MALEATE 5 MG
5 TABLET ORAL EVERY 6 HOURS PRN
Status: DISCONTINUED | OUTPATIENT
Start: 2017-01-01 | End: 2017-01-01 | Stop reason: HOSPADM

## 2017-01-01 RX ORDER — FUROSEMIDE 10 MG/ML
INJECTION INTRAMUSCULAR; INTRAVENOUS
Status: DISCONTINUED
Start: 2017-01-01 | End: 2017-01-01 | Stop reason: HOSPADM

## 2017-01-01 RX ORDER — BUMETANIDE 0.5 MG/1
TABLET ORAL
Qty: 60 TABLET | Refills: 0 | DISCHARGE
Start: 2017-01-01

## 2017-01-01 RX ORDER — HALOPERIDOL 5 MG/ML
.5-1 INJECTION INTRAMUSCULAR
Status: DISCONTINUED | OUTPATIENT
Start: 2017-01-01 | End: 2017-01-01 | Stop reason: HOSPADM

## 2017-01-01 RX ORDER — CEFTRIAXONE 1 G/1
1 INJECTION, POWDER, FOR SOLUTION INTRAMUSCULAR; INTRAVENOUS ONCE
Status: COMPLETED | OUTPATIENT
Start: 2017-01-01 | End: 2017-01-01

## 2017-01-01 RX ORDER — HYDROMORPHONE HCL/0.9% NACL/PF 0.2MG/0.2
0.2 SYRINGE (ML) INTRAVENOUS
Status: DISCONTINUED | OUTPATIENT
Start: 2017-01-01 | End: 2017-01-01 | Stop reason: HOSPADM

## 2017-01-01 RX ORDER — GADOBUTROL 604.72 MG/ML
10 INJECTION INTRAVENOUS ONCE
Status: COMPLETED | OUTPATIENT
Start: 2017-01-01 | End: 2017-01-01

## 2017-01-01 RX ORDER — ALBUMIN, HUMAN INJ 5% 5 %
SOLUTION INTRAVENOUS CONTINUOUS PRN
Status: DISCONTINUED | OUTPATIENT
Start: 2017-01-01 | End: 2017-01-01

## 2017-01-01 RX ORDER — BUMETANIDE 0.5 MG/1
0.5 TABLET ORAL
Qty: 60 TABLET | Refills: 0 | DISCHARGE
Start: 2017-01-01 | End: 2017-01-01

## 2017-01-01 RX ORDER — NIACIN 500 MG/1
500 TABLET, EXTENDED RELEASE ORAL AT BEDTIME
Status: DISCONTINUED | OUTPATIENT
Start: 2017-01-01 | End: 2017-01-01 | Stop reason: HOSPADM

## 2017-01-01 RX ORDER — MINERAL OIL/HYDROPHIL PETROLAT
OINTMENT (GRAM) TOPICAL
Status: DISCONTINUED | OUTPATIENT
Start: 2017-01-01 | End: 2017-01-01 | Stop reason: HOSPADM

## 2017-01-01 RX ORDER — METOCLOPRAMIDE 5 MG/1
5 TABLET ORAL EVERY 6 HOURS PRN
Status: DISCONTINUED | OUTPATIENT
Start: 2017-01-01 | End: 2017-01-01 | Stop reason: HOSPADM

## 2017-01-01 RX ORDER — ONDANSETRON 2 MG/ML
4 INJECTION INTRAMUSCULAR; INTRAVENOUS EVERY 30 MIN PRN
Status: DISCONTINUED | OUTPATIENT
Start: 2017-01-01 | End: 2017-01-01 | Stop reason: HOSPADM

## 2017-01-01 RX ORDER — LIDOCAINE 50 MG/G
1-2 PATCH TOPICAL
Status: DISCONTINUED | OUTPATIENT
Start: 2017-01-01 | End: 2017-01-01 | Stop reason: HOSPADM

## 2017-01-01 RX ORDER — PROPOFOL 10 MG/ML
INJECTION, EMULSION INTRAVENOUS PRN
Status: DISCONTINUED | OUTPATIENT
Start: 2017-01-01 | End: 2017-01-01

## 2017-01-01 RX ORDER — FERROUS SULFATE 325(65) MG
325 TABLET ORAL DAILY
Status: DISCONTINUED | OUTPATIENT
Start: 2017-01-01 | End: 2017-01-01

## 2017-01-01 RX ORDER — POTASSIUM CHLORIDE 750 MG/1
TABLET, EXTENDED RELEASE ORAL
Qty: 30 TABLET | Refills: 0 | DISCHARGE
Start: 2017-01-01

## 2017-01-01 RX ORDER — HEPARIN SODIUM,PORCINE 10 UNIT/ML
2-5 VIAL (ML) INTRAVENOUS
Status: COMPLETED | OUTPATIENT
Start: 2017-01-01 | End: 2017-01-01

## 2017-01-01 RX ORDER — ROSUVASTATIN CALCIUM 20 MG/1
20 TABLET, COATED ORAL DAILY
Status: DISCONTINUED | OUTPATIENT
Start: 2017-01-01 | End: 2017-01-01

## 2017-01-01 RX ORDER — POTASSIUM CHLORIDE 29.8 MG/ML
20 INJECTION INTRAVENOUS
Status: DISCONTINUED | OUTPATIENT
Start: 2017-01-01 | End: 2017-01-01

## 2017-01-01 RX ORDER — POTASSIUM CHLORIDE 29.8 MG/ML
20 INJECTION INTRAVENOUS
Status: DISCONTINUED | OUTPATIENT
Start: 2017-01-01 | End: 2017-01-01 | Stop reason: HOSPADM

## 2017-01-01 RX ORDER — FUROSEMIDE 10 MG/ML
20 INJECTION INTRAMUSCULAR; INTRAVENOUS ONCE
Status: COMPLETED | OUTPATIENT
Start: 2017-01-01 | End: 2017-01-01

## 2017-01-01 RX ORDER — WARFARIN SODIUM 1 MG/1
1 TABLET ORAL
Status: COMPLETED | OUTPATIENT
Start: 2017-01-01 | End: 2017-01-01

## 2017-01-01 RX ORDER — HYDROMORPHONE HYDROCHLORIDE 1 MG/ML
.3-.5 INJECTION, SOLUTION INTRAMUSCULAR; INTRAVENOUS; SUBCUTANEOUS EVERY 10 MIN PRN
Status: DISCONTINUED | OUTPATIENT
Start: 2017-01-01 | End: 2017-01-01 | Stop reason: HOSPADM

## 2017-01-01 RX ADMIN — METOPROLOL TARTRATE 2.5 MG: 5 INJECTION INTRAVENOUS at 08:55

## 2017-01-01 RX ADMIN — Medication 500 MCG: at 09:10

## 2017-01-01 RX ADMIN — Medication 325 MG: at 08:10

## 2017-01-01 RX ADMIN — Medication 7 ML: at 08:16

## 2017-01-01 RX ADMIN — METOPROLOL TARTRATE 2.5 MG: 5 INJECTION, SOLUTION INTRAVENOUS at 04:18

## 2017-01-01 RX ADMIN — NYSTATIN 500000 UNITS: 500000 SUSPENSION ORAL at 12:23

## 2017-01-01 RX ADMIN — POTASSIUM CHLORIDE 10 MEQ: 14.9 INJECTION, SOLUTION, CONCENTRATE PARENTERAL at 03:09

## 2017-01-01 RX ADMIN — INSULIN ASPART 3 UNITS: 100 INJECTION, SOLUTION INTRAVENOUS; SUBCUTANEOUS at 15:55

## 2017-01-01 RX ADMIN — AMPICILLIN SODIUM AND SULBACTAM SODIUM 3 G: 2; 1 INJECTION, POWDER, FOR SOLUTION INTRAMUSCULAR; INTRAVENOUS at 09:34

## 2017-01-01 RX ADMIN — PANTOPRAZOLE SODIUM 40 MG: 40 INJECTION, POWDER, FOR SOLUTION INTRAVENOUS at 08:00

## 2017-01-01 RX ADMIN — METOPROLOL TARTRATE 2.5 MG: 5 INJECTION INTRAVENOUS at 22:26

## 2017-01-01 RX ADMIN — METOPROLOL TARTRATE 2.5 MG: 5 INJECTION, SOLUTION INTRAVENOUS at 17:32

## 2017-01-01 RX ADMIN — INSULIN GLARGINE 15 UNITS: 100 INJECTION, SOLUTION SUBCUTANEOUS at 08:51

## 2017-01-01 RX ADMIN — SODIUM CHLORIDE, POTASSIUM CHLORIDE, SODIUM LACTATE AND CALCIUM CHLORIDE: 600; 310; 30; 20 INJECTION, SOLUTION INTRAVENOUS at 08:08

## 2017-01-01 RX ADMIN — METOPROLOL TARTRATE 2.5 MG: 5 INJECTION INTRAVENOUS at 06:13

## 2017-01-01 RX ADMIN — CEFTRIAXONE 1 G: 1 INJECTION, POWDER, FOR SOLUTION INTRAMUSCULAR; INTRAVENOUS at 23:21

## 2017-01-01 RX ADMIN — METOPROLOL TARTRATE 50 MG: 100 TABLET, FILM COATED ORAL at 09:19

## 2017-01-01 RX ADMIN — PANTOPRAZOLE SODIUM 40 MG: 40 TABLET, DELAYED RELEASE ORAL at 19:54

## 2017-01-01 RX ADMIN — NYSTATIN 500000 UNITS: 500000 SUSPENSION ORAL at 21:12

## 2017-01-01 RX ADMIN — POTASSIUM PHOSPHATE, MONOBASIC AND POTASSIUM PHOSPHATE, DIBASIC 20 MMOL: 224; 236 INJECTION, SOLUTION INTRAVENOUS at 11:29

## 2017-01-01 RX ADMIN — Medication 1 CAPSULE: at 09:15

## 2017-01-01 RX ADMIN — PANTOPRAZOLE SODIUM 40 MG: 40 INJECTION, POWDER, FOR SOLUTION INTRAVENOUS at 12:05

## 2017-01-01 RX ADMIN — IPRATROPIUM BROMIDE 0.5 MG: 0.5 SOLUTION RESPIRATORY (INHALATION) at 08:09

## 2017-01-01 RX ADMIN — POTASSIUM CHLORIDE, DEXTROSE MONOHYDRATE AND SODIUM CHLORIDE: 150; 5; 450 INJECTION, SOLUTION INTRAVENOUS at 13:53

## 2017-01-01 RX ADMIN — SODIUM CHLORIDE, SODIUM LACTATE, POTASSIUM CHLORIDE, CALCIUM CHLORIDE AND DEXTROSE MONOHYDRATE: 5; 600; 310; 30; 20 INJECTION, SOLUTION INTRAVENOUS at 10:17

## 2017-01-01 RX ADMIN — SODIUM CHLORIDE, PRESERVATIVE FREE 5 ML: 5 INJECTION INTRAVENOUS at 07:31

## 2017-01-01 RX ADMIN — Medication 1 CAPSULE: at 08:16

## 2017-01-01 RX ADMIN — METOPROLOL TARTRATE 2.5 MG: 5 INJECTION, SOLUTION INTRAVENOUS at 10:29

## 2017-01-01 RX ADMIN — ACETAMINOPHEN 975 MG: 325 TABLET, FILM COATED ORAL at 09:27

## 2017-01-01 RX ADMIN — INSULIN ASPART 1 UNITS: 100 INJECTION, SOLUTION INTRAVENOUS; SUBCUTANEOUS at 12:54

## 2017-01-01 RX ADMIN — SODIUM CHLORIDE 1000 ML: 9 INJECTION, SOLUTION INTRAVENOUS at 04:30

## 2017-01-01 RX ADMIN — FEXOFENADINE HYDROCHLORIDE 180 MG: 180 TABLET, FILM COATED ORAL at 17:50

## 2017-01-01 RX ADMIN — NYSTATIN 500000 UNITS: 500000 SUSPENSION ORAL at 10:03

## 2017-01-01 RX ADMIN — MULTIVITAMIN 15 ML: LIQUID ORAL at 07:49

## 2017-01-01 RX ADMIN — POTASSIUM CHLORIDE 20 MEQ: 29.8 INJECTION, SOLUTION INTRAVENOUS at 07:33

## 2017-01-01 RX ADMIN — INSULIN ASPART 1 UNITS: 100 INJECTION, SOLUTION INTRAVENOUS; SUBCUTANEOUS at 04:25

## 2017-01-01 RX ADMIN — Medication 2 TABLET: at 09:28

## 2017-01-01 RX ADMIN — FLUTICASONE PROPIONATE 2 SPRAY: 50 SPRAY, METERED NASAL at 09:22

## 2017-01-01 RX ADMIN — INSULIN ASPART 4 UNITS: 100 INJECTION, SOLUTION INTRAVENOUS; SUBCUTANEOUS at 18:14

## 2017-01-01 RX ADMIN — SODIUM CHLORIDE, PRESERVATIVE FREE 5 ML: 5 INJECTION INTRAVENOUS at 09:53

## 2017-01-01 RX ADMIN — NYSTATIN 500000 UNITS: 500000 SUSPENSION ORAL at 20:01

## 2017-01-01 RX ADMIN — IPRATROPIUM BROMIDE 0.5 MG: 0.5 SOLUTION RESPIRATORY (INHALATION) at 20:19

## 2017-01-01 RX ADMIN — INSULIN ASPART 3 UNITS: 100 INJECTION, SOLUTION INTRAVENOUS; SUBCUTANEOUS at 08:50

## 2017-01-01 RX ADMIN — IPRATROPIUM BROMIDE 0.5 MG: 0.5 SOLUTION RESPIRATORY (INHALATION) at 16:09

## 2017-01-01 RX ADMIN — Medication 220 MG: at 07:49

## 2017-01-01 RX ADMIN — METOPROLOL TARTRATE 2.5 MG: 5 INJECTION, SOLUTION INTRAVENOUS at 15:25

## 2017-01-01 RX ADMIN — INSULIN ASPART 7 UNITS: 100 INJECTION, SOLUTION INTRAVENOUS; SUBCUTANEOUS at 16:31

## 2017-01-01 RX ADMIN — NYSTATIN 500000 UNITS: 500000 SUSPENSION ORAL at 12:27

## 2017-01-01 RX ADMIN — Medication 500 MG: at 21:39

## 2017-01-01 RX ADMIN — PANTOPRAZOLE SODIUM 40 MG: 40 TABLET, DELAYED RELEASE ORAL at 07:52

## 2017-01-01 RX ADMIN — INSULIN ASPART 2 UNITS: 100 INJECTION, SOLUTION INTRAVENOUS; SUBCUTANEOUS at 18:00

## 2017-01-01 RX ADMIN — METOPROLOL TARTRATE 2.5 MG: 5 INJECTION, SOLUTION INTRAVENOUS at 03:45

## 2017-01-01 RX ADMIN — DEXTROSE MONOHYDRATE: 50 INJECTION, SOLUTION INTRAVENOUS at 09:20

## 2017-01-01 RX ADMIN — IRON 325 MG: 65 TABLET ORAL at 08:44

## 2017-01-01 RX ADMIN — PANTOPRAZOLE SODIUM 40 MG: 40 INJECTION, POWDER, FOR SOLUTION INTRAVENOUS at 20:27

## 2017-01-01 RX ADMIN — SUGAMMADEX 200 MG: 100 INJECTION, SOLUTION INTRAVENOUS at 13:46

## 2017-01-01 RX ADMIN — IPRATROPIUM BROMIDE 1 PUFF: 17 AEROSOL, METERED RESPIRATORY (INHALATION) at 19:39

## 2017-01-01 RX ADMIN — POTASSIUM CHLORIDE, DEXTROSE MONOHYDRATE AND SODIUM CHLORIDE: 150; 5; 450 INJECTION, SOLUTION INTRAVENOUS at 22:25

## 2017-01-01 RX ADMIN — METOPROLOL TARTRATE 25 MG: 100 TABLET, FILM COATED ORAL at 08:10

## 2017-01-01 RX ADMIN — NYSTATIN 500000 UNITS: 500000 SUSPENSION ORAL at 20:57

## 2017-01-01 RX ADMIN — FEXOFENADINE HYDROCHLORIDE 180 MG: 180 TABLET, FILM COATED ORAL at 09:11

## 2017-01-01 RX ADMIN — PANTOPRAZOLE SODIUM 40 MG: 40 INJECTION, POWDER, FOR SOLUTION INTRAVENOUS at 07:52

## 2017-01-01 RX ADMIN — SODIUM CHLORIDE, PRESERVATIVE FREE 5 ML: 5 INJECTION INTRAVENOUS at 16:42

## 2017-01-01 RX ADMIN — NYSTATIN 500000 UNITS: 500000 SUSPENSION ORAL at 21:26

## 2017-01-01 RX ADMIN — METOPROLOL TARTRATE 2.5 MG: 5 INJECTION, SOLUTION INTRAVENOUS at 22:06

## 2017-01-01 RX ADMIN — POTASSIUM & SODIUM PHOSPHATES POWDER PACK 280-160-250 MG 1 PACKET: 280-160-250 PACK at 08:13

## 2017-01-01 RX ADMIN — ROSUVASTATIN CALCIUM 20 MG: 10 TABLET, FILM COATED ORAL at 21:12

## 2017-01-01 RX ADMIN — SODIUM CHLORIDE 1000 ML: 900 INJECTION, SOLUTION INTRAVENOUS at 22:50

## 2017-01-01 RX ADMIN — INSULIN ASPART 2 UNITS: 100 INJECTION, SOLUTION INTRAVENOUS; SUBCUTANEOUS at 09:34

## 2017-01-01 RX ADMIN — SODIUM CHLORIDE, PRESERVATIVE FREE 5 ML: 5 INJECTION INTRAVENOUS at 05:58

## 2017-01-01 RX ADMIN — Medication 1 CAPSULE: at 20:01

## 2017-01-01 RX ADMIN — ACETAMINOPHEN 650 MG: 160 SOLUTION ORAL at 19:44

## 2017-01-01 RX ADMIN — FLUTICASONE PROPIONATE 2 SPRAY: 50 SPRAY, METERED NASAL at 08:44

## 2017-01-01 RX ADMIN — AMPICILLIN SODIUM AND SULBACTAM SODIUM 3 G: 2; 1 INJECTION, POWDER, FOR SOLUTION INTRAMUSCULAR; INTRAVENOUS at 10:29

## 2017-01-01 RX ADMIN — METOPROLOL TARTRATE 2.5 MG: 5 INJECTION INTRAVENOUS at 08:00

## 2017-01-01 RX ADMIN — NYSTATIN 500000 UNITS: 500000 SUSPENSION ORAL at 15:40

## 2017-01-01 RX ADMIN — PANTOPRAZOLE SODIUM 40 MG: 40 TABLET, DELAYED RELEASE ORAL at 08:12

## 2017-01-01 RX ADMIN — ACETAMINOPHEN 975 MG: 325 TABLET, FILM COATED ORAL at 09:11

## 2017-01-01 RX ADMIN — SODIUM CHLORIDE, PRESERVATIVE FREE 5 ML: 5 INJECTION INTRAVENOUS at 07:14

## 2017-01-01 RX ADMIN — NITROFURANTOIN (MONOHYDRATE/MACROCRYSTALS) 100 MG: 75; 25 CAPSULE ORAL at 21:22

## 2017-01-01 RX ADMIN — SODIUM CHLORIDE, POTASSIUM CHLORIDE, SODIUM LACTATE AND CALCIUM CHLORIDE: 600; 310; 30; 20 INJECTION, SOLUTION INTRAVENOUS at 20:10

## 2017-01-01 RX ADMIN — POTASSIUM & SODIUM PHOSPHATES POWDER PACK 280-160-250 MG 1 PACKET: 280-160-250 PACK at 13:42

## 2017-01-01 RX ADMIN — INSULIN ASPART 2 UNITS: 100 INJECTION, SOLUTION INTRAVENOUS; SUBCUTANEOUS at 15:55

## 2017-01-01 RX ADMIN — NYSTATIN 500000 UNITS: 500000 SUSPENSION ORAL at 07:59

## 2017-01-01 RX ADMIN — HEPARIN SODIUM 5000 UNITS: 5000 INJECTION, SOLUTION INTRAVENOUS; SUBCUTANEOUS at 09:29

## 2017-01-01 RX ADMIN — FLUTICASONE FUROATE AND VILANTEROL TRIFENATATE 1 PUFF: 100; 25 POWDER RESPIRATORY (INHALATION) at 09:30

## 2017-01-01 RX ADMIN — MULTIVITAMIN 15 ML: LIQUID ORAL at 07:56

## 2017-01-01 RX ADMIN — INSULIN GLARGINE 30 UNITS: 100 INJECTION, SOLUTION SUBCUTANEOUS at 09:11

## 2017-01-01 RX ADMIN — DEXTROSE MONOHYDRATE: 50 INJECTION, SOLUTION INTRAVENOUS at 23:14

## 2017-01-01 RX ADMIN — IPRATROPIUM BROMIDE 0.5 MG: 0.5 SOLUTION RESPIRATORY (INHALATION) at 16:14

## 2017-01-01 RX ADMIN — IPRATROPIUM BROMIDE 0.5 MG: 0.5 SOLUTION RESPIRATORY (INHALATION) at 17:01

## 2017-01-01 RX ADMIN — POTASSIUM & SODIUM PHOSPHATES POWDER PACK 280-160-250 MG 1 PACKET: 280-160-250 PACK at 07:57

## 2017-01-01 RX ADMIN — METOPROLOL TARTRATE 2.5 MG: 5 INJECTION INTRAVENOUS at 10:14

## 2017-01-01 RX ADMIN — ASPIRIN 300 MG: 300 SUPPOSITORY RECTAL at 16:22

## 2017-01-01 RX ADMIN — NYSTATIN 500000 UNITS: 500000 SUSPENSION ORAL at 20:13

## 2017-01-01 RX ADMIN — Medication 1 CAPSULE: at 20:26

## 2017-01-01 RX ADMIN — INSULIN GLARGINE 6 UNITS: 100 INJECTION, SOLUTION SUBCUTANEOUS at 19:55

## 2017-01-01 RX ADMIN — ROSUVASTATIN CALCIUM 20 MG: 20 TABLET, FILM COATED ORAL at 10:05

## 2017-01-01 RX ADMIN — IPRATROPIUM BROMIDE 0.5 MG: 0.5 SOLUTION RESPIRATORY (INHALATION) at 00:00

## 2017-01-01 RX ADMIN — METOPROLOL TARTRATE 25 MG: 100 TABLET, FILM COATED ORAL at 19:56

## 2017-01-01 RX ADMIN — LORAZEPAM 0.5 MG: 2 INJECTION INTRAMUSCULAR; INTRAVENOUS at 18:03

## 2017-01-01 RX ADMIN — FLUTICASONE FUROATE AND VILANTEROL TRIFENATATE 1 PUFF: 200; 25 POWDER RESPIRATORY (INHALATION) at 17:53

## 2017-01-01 RX ADMIN — NYSTATIN 500000 UNITS: 500000 SUSPENSION ORAL at 16:59

## 2017-01-01 RX ADMIN — INSULIN ASPART 2 UNITS: 100 INJECTION, SOLUTION INTRAVENOUS; SUBCUTANEOUS at 20:00

## 2017-01-01 RX ADMIN — PHENYLEPHRINE HYDROCHLORIDE 200 MCG: 10 INJECTION, SOLUTION INTRAMUSCULAR; INTRAVENOUS; SUBCUTANEOUS at 13:34

## 2017-01-01 RX ADMIN — IPRATROPIUM BROMIDE 0.5 MG: 0.5 SOLUTION RESPIRATORY (INHALATION) at 04:03

## 2017-01-01 RX ADMIN — Medication 7 ML: at 09:21

## 2017-01-01 RX ADMIN — METOPROLOL TARTRATE 2.5 MG: 5 INJECTION, SOLUTION INTRAVENOUS at 11:03

## 2017-01-01 RX ADMIN — Medication 1 CAPSULE: at 07:56

## 2017-01-01 RX ADMIN — IPRATROPIUM BROMIDE 0.5 MG: 0.5 SOLUTION RESPIRATORY (INHALATION) at 07:58

## 2017-01-01 RX ADMIN — METOPROLOL TARTRATE 2.5 MG: 5 INJECTION INTRAVENOUS at 21:33

## 2017-01-01 RX ADMIN — IPRATROPIUM BROMIDE 1 PUFF: 17 AEROSOL, METERED RESPIRATORY (INHALATION) at 12:09

## 2017-01-01 RX ADMIN — DEXTROSE MONOHYDRATE: 50 INJECTION, SOLUTION INTRAVENOUS at 17:16

## 2017-01-01 RX ADMIN — INSULIN ASPART 2 UNITS: 100 INJECTION, SOLUTION INTRAVENOUS; SUBCUTANEOUS at 18:18

## 2017-01-01 RX ADMIN — IPRATROPIUM BROMIDE 0.5 MG: 0.5 SOLUTION RESPIRATORY (INHALATION) at 20:11

## 2017-01-01 RX ADMIN — PANTOPRAZOLE SODIUM 40 MG: 40 INJECTION, POWDER, FOR SOLUTION INTRAVENOUS at 08:38

## 2017-01-01 RX ADMIN — SODIUM CHLORIDE, PRESERVATIVE FREE 5 ML: 5 INJECTION INTRAVENOUS at 07:08

## 2017-01-01 RX ADMIN — INSULIN ASPART 8 UNITS: 100 INJECTION, SOLUTION INTRAVENOUS; SUBCUTANEOUS at 17:14

## 2017-01-01 RX ADMIN — AMPICILLIN SODIUM AND SULBACTAM SODIUM 3 G: 2; 1 INJECTION, POWDER, FOR SOLUTION INTRAMUSCULAR; INTRAVENOUS at 22:27

## 2017-01-01 RX ADMIN — ROSUVASTATIN CALCIUM 20 MG: 10 TABLET, FILM COATED ORAL at 21:26

## 2017-01-01 RX ADMIN — INSULIN ASPART 1 UNITS: 100 INJECTION, SOLUTION INTRAVENOUS; SUBCUTANEOUS at 00:44

## 2017-01-01 RX ADMIN — Medication 1 CAPSULE: at 21:27

## 2017-01-01 RX ADMIN — VITAMIN D, TAB 1000IU (100/BT) 2000 UNITS: 25 TAB at 09:10

## 2017-01-01 RX ADMIN — POTASSIUM & SODIUM PHOSPHATES POWDER PACK 280-160-250 MG 1 PACKET: 280-160-250 PACK at 20:01

## 2017-01-01 RX ADMIN — UMECLIDINIUM BROMIDE AND VILANTEROL TRIFENATATE 1 PUFF: 62.5; 25 POWDER RESPIRATORY (INHALATION) at 08:44

## 2017-01-01 RX ADMIN — INSULIN ASPART 1 UNITS: 100 INJECTION, SOLUTION INTRAVENOUS; SUBCUTANEOUS at 03:51

## 2017-01-01 RX ADMIN — IPRATROPIUM BROMIDE 1 PUFF: 17 AEROSOL, METERED RESPIRATORY (INHALATION) at 20:02

## 2017-01-01 RX ADMIN — NYSTATIN 500000 UNITS: 500000 SUSPENSION ORAL at 12:21

## 2017-01-01 RX ADMIN — SODIUM CHLORIDE, POTASSIUM CHLORIDE, SODIUM LACTATE AND CALCIUM CHLORIDE: 600; 310; 30; 20 INJECTION, SOLUTION INTRAVENOUS at 11:29

## 2017-01-01 RX ADMIN — AMPICILLIN SODIUM AND SULBACTAM SODIUM 3 G: 2; 1 INJECTION, POWDER, FOR SOLUTION INTRAMUSCULAR; INTRAVENOUS at 10:39

## 2017-01-01 RX ADMIN — SODIUM CHLORIDE, PRESERVATIVE FREE 5 ML: 5 INJECTION INTRAVENOUS at 13:54

## 2017-01-01 RX ADMIN — SODIUM CHLORIDE, PRESERVATIVE FREE 5 ML: 5 INJECTION INTRAVENOUS at 11:10

## 2017-01-01 RX ADMIN — RANITIDINE HYDROCHLORIDE 150 MG: 150 TABLET, FILM COATED ORAL at 09:29

## 2017-01-01 RX ADMIN — Medication 1 CAPSULE: at 19:57

## 2017-01-01 RX ADMIN — IPRATROPIUM BROMIDE 0.5 MG: 0.5 SOLUTION RESPIRATORY (INHALATION) at 09:01

## 2017-01-01 RX ADMIN — INSULIN ASPART 3 UNITS: 100 INJECTION, SOLUTION INTRAVENOUS; SUBCUTANEOUS at 13:30

## 2017-01-01 RX ADMIN — NYSTATIN 500000 UNITS: 500000 SUSPENSION ORAL at 15:36

## 2017-01-01 RX ADMIN — AMPICILLIN SODIUM AND SULBACTAM SODIUM 3 G: 2; 1 INJECTION, POWDER, FOR SOLUTION INTRAMUSCULAR; INTRAVENOUS at 05:08

## 2017-01-01 RX ADMIN — NYSTATIN 500000 UNITS: 500000 SUSPENSION ORAL at 15:53

## 2017-01-01 RX ADMIN — ROSUVASTATIN CALCIUM 20 MG: 20 TABLET, FILM COATED ORAL at 22:16

## 2017-01-01 RX ADMIN — BUMETANIDE 0.5 MG: 0.5 TABLET ORAL at 16:30

## 2017-01-01 RX ADMIN — NYSTATIN 500000 UNITS: 500000 SUSPENSION ORAL at 07:55

## 2017-01-01 RX ADMIN — MULTIVITAMIN 15 ML: LIQUID ORAL at 08:38

## 2017-01-01 RX ADMIN — NYSTATIN 500000 UNITS: 500000 SUSPENSION ORAL at 20:53

## 2017-01-01 RX ADMIN — FLUTICASONE PROPIONATE 2 SPRAY: 50 SPRAY, METERED NASAL at 08:55

## 2017-01-01 RX ADMIN — SODIUM CHLORIDE, PRESERVATIVE FREE 5 ML: 5 INJECTION INTRAVENOUS at 16:48

## 2017-01-01 RX ADMIN — INSULIN ASPART 1 UNITS: 100 INJECTION, SOLUTION INTRAVENOUS; SUBCUTANEOUS at 16:17

## 2017-01-01 RX ADMIN — FEXOFENADINE HYDROCHLORIDE 180 MG: 180 TABLET, FILM COATED ORAL at 08:23

## 2017-01-01 RX ADMIN — IPRATROPIUM BROMIDE 1 PUFF: 17 AEROSOL, METERED RESPIRATORY (INHALATION) at 07:57

## 2017-01-01 RX ADMIN — AMPICILLIN SODIUM AND SULBACTAM SODIUM 3 G: 2; 1 INJECTION, POWDER, FOR SOLUTION INTRAMUSCULAR; INTRAVENOUS at 16:20

## 2017-01-01 RX ADMIN — PANTOPRAZOLE SODIUM 40 MG: 40 TABLET, DELAYED RELEASE ORAL at 21:11

## 2017-01-01 RX ADMIN — FLUTICASONE PROPIONATE 2 SPRAY: 50 SPRAY, METERED NASAL at 08:17

## 2017-01-01 RX ADMIN — ROCURONIUM BROMIDE 30 MG: 10 INJECTION INTRAVENOUS at 13:22

## 2017-01-01 RX ADMIN — DEXTROSE MONOHYDRATE: 50 INJECTION, SOLUTION INTRAVENOUS at 16:29

## 2017-01-01 RX ADMIN — SODIUM CHLORIDE, SODIUM LACTATE, POTASSIUM CHLORIDE, CALCIUM CHLORIDE AND DEXTROSE MONOHYDRATE: 5; 600; 310; 30; 20 INJECTION, SOLUTION INTRAVENOUS at 01:20

## 2017-01-01 RX ADMIN — AMPICILLIN SODIUM AND SULBACTAM SODIUM 3 G: 2; 1 INJECTION, POWDER, FOR SOLUTION INTRAMUSCULAR; INTRAVENOUS at 11:07

## 2017-01-01 RX ADMIN — INSULIN ASPART 9 UNITS: 100 INJECTION, SOLUTION INTRAVENOUS; SUBCUTANEOUS at 07:54

## 2017-01-01 RX ADMIN — IPRATROPIUM BROMIDE 0.5 MG: 0.5 SOLUTION RESPIRATORY (INHALATION) at 20:48

## 2017-01-01 RX ADMIN — AMPICILLIN SODIUM AND SULBACTAM SODIUM 3 G: 2; 1 INJECTION, POWDER, FOR SOLUTION INTRAMUSCULAR; INTRAVENOUS at 22:38

## 2017-01-01 RX ADMIN — PANTOPRAZOLE SODIUM 40 MG: 40 INJECTION, POWDER, FOR SOLUTION INTRAVENOUS at 19:13

## 2017-01-01 RX ADMIN — UMECLIDINIUM BROMIDE AND VILANTEROL TRIFENATATE 1 PUFF: 62.5; 25 POWDER RESPIRATORY (INHALATION) at 17:56

## 2017-01-01 RX ADMIN — PANTOPRAZOLE SODIUM 40 MG: 40 TABLET, DELAYED RELEASE ORAL at 08:16

## 2017-01-01 RX ADMIN — UMECLIDINIUM BROMIDE AND VILANTEROL TRIFENATATE 1 PUFF: 62.5; 25 POWDER RESPIRATORY (INHALATION) at 09:30

## 2017-01-01 RX ADMIN — FLUTICASONE PROPIONATE 2 SPRAY: 50 SPRAY, METERED NASAL at 07:52

## 2017-01-01 RX ADMIN — NYSTATIN 500000 UNITS: 500000 SUSPENSION ORAL at 16:14

## 2017-01-01 RX ADMIN — BUMETANIDE 0.5 MG: 0.5 TABLET ORAL at 09:30

## 2017-01-01 RX ADMIN — NYSTATIN 500000 UNITS: 500000 SUSPENSION ORAL at 19:54

## 2017-01-01 RX ADMIN — FLUTICASONE PROPIONATE 2 SPRAY: 50 SPRAY, METERED NASAL at 08:22

## 2017-01-01 RX ADMIN — METOPROLOL TARTRATE 25 MG: 100 TABLET, FILM COATED ORAL at 09:26

## 2017-01-01 RX ADMIN — MAGNESIUM OXIDE TAB 400 MG (241.3 MG ELEMENTAL MG) 800 MG: 400 (241.3 MG) TAB at 08:43

## 2017-01-01 RX ADMIN — IPRATROPIUM BROMIDE 1 PUFF: 17 AEROSOL, METERED RESPIRATORY (INHALATION) at 20:59

## 2017-01-01 RX ADMIN — INSULIN ASPART 2 UNITS: 100 INJECTION, SOLUTION INTRAVENOUS; SUBCUTANEOUS at 19:30

## 2017-01-01 RX ADMIN — FLUTICASONE FUROATE AND VILANTEROL TRIFENATATE 1 PUFF: 200; 25 POWDER RESPIRATORY (INHALATION) at 12:08

## 2017-01-01 RX ADMIN — FLUTICASONE PROPIONATE 2 SPRAY: 50 SPRAY, METERED NASAL at 09:07

## 2017-01-01 RX ADMIN — INSULIN ASPART 1 UNITS: 100 INJECTION, SOLUTION INTRAVENOUS; SUBCUTANEOUS at 10:01

## 2017-01-01 RX ADMIN — POTASSIUM PHOSPHATE, MONOBASIC AND POTASSIUM PHOSPHATE, DIBASIC 15 MMOL: 224; 236 INJECTION, SOLUTION INTRAVENOUS at 23:37

## 2017-01-01 RX ADMIN — DEXTROSE MONOHYDRATE 500 ML: 50 INJECTION, SOLUTION INTRAVENOUS at 14:11

## 2017-01-01 RX ADMIN — NYSTATIN 500000 UNITS: 500000 SUSPENSION ORAL at 21:20

## 2017-01-01 RX ADMIN — NYSTATIN 500000 UNITS: 500000 SUSPENSION ORAL at 07:57

## 2017-01-01 RX ADMIN — NYSTATIN 500000 UNITS: 500000 SUSPENSION ORAL at 19:59

## 2017-01-01 RX ADMIN — ALBUMIN (HUMAN): 12.5 SOLUTION INTRAVENOUS at 13:30

## 2017-01-01 RX ADMIN — BUMETANIDE 0.5 MG: 0.5 TABLET ORAL at 09:19

## 2017-01-01 RX ADMIN — METOPROLOL TARTRATE 2.5 MG: 5 INJECTION, SOLUTION INTRAVENOUS at 22:20

## 2017-01-01 RX ADMIN — PANTOPRAZOLE SODIUM 40 MG: 40 INJECTION, POWDER, FOR SOLUTION INTRAVENOUS at 07:55

## 2017-01-01 RX ADMIN — ONDANSETRON 4 MG: 2 INJECTION INTRAMUSCULAR; INTRAVENOUS at 13:44

## 2017-01-01 RX ADMIN — IPRATROPIUM BROMIDE 0.5 MG: 0.5 SOLUTION RESPIRATORY (INHALATION) at 12:28

## 2017-01-01 RX ADMIN — METOPROLOL TARTRATE 50 MG: 100 TABLET, FILM COATED ORAL at 20:38

## 2017-01-01 RX ADMIN — AMPICILLIN SODIUM AND SULBACTAM SODIUM 3 G: 2; 1 INJECTION, POWDER, FOR SOLUTION INTRAMUSCULAR; INTRAVENOUS at 04:25

## 2017-01-01 RX ADMIN — PANTOPRAZOLE SODIUM 40 MG: 40 INJECTION, POWDER, FOR SOLUTION INTRAVENOUS at 21:25

## 2017-01-01 RX ADMIN — SODIUM CHLORIDE, POTASSIUM CHLORIDE, SODIUM LACTATE AND CALCIUM CHLORIDE 1000 ML: 600; 310; 30; 20 INJECTION, SOLUTION INTRAVENOUS at 05:11

## 2017-01-01 RX ADMIN — FUROSEMIDE 40 MG: 10 INJECTION, SOLUTION INTRAVENOUS at 09:50

## 2017-01-01 RX ADMIN — IPRATROPIUM BROMIDE 0.5 MG: 0.5 SOLUTION RESPIRATORY (INHALATION) at 07:26

## 2017-01-01 RX ADMIN — Medication 220 MG: at 09:19

## 2017-01-01 RX ADMIN — NYSTATIN 500000 UNITS: 500000 SUSPENSION ORAL at 08:21

## 2017-01-01 RX ADMIN — NYSTATIN 500000 UNITS: 500000 SUSPENSION ORAL at 11:29

## 2017-01-01 RX ADMIN — Medication 1 CAPSULE: at 09:19

## 2017-01-01 RX ADMIN — POTASSIUM PHOSPHATE, MONOBASIC AND POTASSIUM PHOSPHATE, DIBASIC 15 MMOL: 224; 236 INJECTION, SOLUTION INTRAVENOUS at 12:55

## 2017-01-01 RX ADMIN — PANTOPRAZOLE SODIUM 40 MG: 40 TABLET, DELAYED RELEASE ORAL at 20:01

## 2017-01-01 RX ADMIN — METOPROLOL TARTRATE 25 MG: 100 TABLET, FILM COATED ORAL at 21:16

## 2017-01-01 RX ADMIN — IPRATROPIUM BROMIDE 1 PUFF: 17 AEROSOL, METERED RESPIRATORY (INHALATION) at 08:05

## 2017-01-01 RX ADMIN — NYSTATIN 500000 UNITS: 500000 SUSPENSION ORAL at 11:22

## 2017-01-01 RX ADMIN — PANTOPRAZOLE SODIUM 40 MG: 40 INJECTION, POWDER, FOR SOLUTION INTRAVENOUS at 20:13

## 2017-01-01 RX ADMIN — FLUTICASONE FUROATE AND VILANTEROL TRIFENATATE 1 PUFF: 200; 25 POWDER RESPIRATORY (INHALATION) at 08:55

## 2017-01-01 RX ADMIN — FLUTICASONE PROPIONATE 2 SPRAY: 50 SPRAY, METERED NASAL at 09:29

## 2017-01-01 RX ADMIN — INSULIN ASPART 3 UNITS: 100 INJECTION, SOLUTION INTRAVENOUS; SUBCUTANEOUS at 00:25

## 2017-01-01 RX ADMIN — POTASSIUM PHOSPHATE, MONOBASIC AND POTASSIUM PHOSPHATE, DIBASIC 15 MMOL: 224; 236 INJECTION, SOLUTION INTRAVENOUS at 08:18

## 2017-01-01 RX ADMIN — IPRATROPIUM BROMIDE 1 PUFF: 17 AEROSOL, METERED RESPIRATORY (INHALATION) at 08:21

## 2017-01-01 RX ADMIN — FLUTICASONE FUROATE AND VILANTEROL TRIFENATATE 1 PUFF: 200; 25 POWDER RESPIRATORY (INHALATION) at 08:21

## 2017-01-01 RX ADMIN — METOPROLOL TARTRATE 50 MG: 100 TABLET, FILM COATED ORAL at 10:06

## 2017-01-01 RX ADMIN — Medication 1 CAPSULE: at 19:14

## 2017-01-01 RX ADMIN — PANTOPRAZOLE SODIUM 40 MG: 40 INJECTION, POWDER, FOR SOLUTION INTRAVENOUS at 08:23

## 2017-01-01 RX ADMIN — NYSTATIN 500000 UNITS: 500000 SUSPENSION ORAL at 16:34

## 2017-01-01 RX ADMIN — SODIUM CHLORIDE, PRESERVATIVE FREE 5 ML: 5 INJECTION INTRAVENOUS at 15:57

## 2017-01-01 RX ADMIN — SODIUM CHLORIDE 1000 ML: 9 INJECTION, SOLUTION INTRAVENOUS at 07:57

## 2017-01-01 RX ADMIN — METOPROLOL TARTRATE 2.5 MG: 5 INJECTION, SOLUTION INTRAVENOUS at 15:50

## 2017-01-01 RX ADMIN — INSULIN ASPART 2 UNITS: 100 INJECTION, SOLUTION INTRAVENOUS; SUBCUTANEOUS at 09:41

## 2017-01-01 RX ADMIN — IPRATROPIUM BROMIDE 0.5 MG: 0.5 SOLUTION RESPIRATORY (INHALATION) at 16:47

## 2017-01-01 RX ADMIN — PANTOPRAZOLE SODIUM 40 MG: 40 INJECTION, POWDER, FOR SOLUTION INTRAVENOUS at 08:13

## 2017-01-01 RX ADMIN — IPRATROPIUM BROMIDE 0.5 MG: 0.5 SOLUTION RESPIRATORY (INHALATION) at 16:08

## 2017-01-01 RX ADMIN — METOPROLOL TARTRATE 2.5 MG: 5 INJECTION INTRAVENOUS at 08:21

## 2017-01-01 RX ADMIN — AMPICILLIN SODIUM AND SULBACTAM SODIUM 3 G: 2; 1 INJECTION, POWDER, FOR SOLUTION INTRAMUSCULAR; INTRAVENOUS at 15:35

## 2017-01-01 RX ADMIN — AMPICILLIN SODIUM AND SULBACTAM SODIUM 3 G: 2; 1 INJECTION, POWDER, FOR SOLUTION INTRAMUSCULAR; INTRAVENOUS at 15:56

## 2017-01-01 RX ADMIN — INSULIN GLARGINE 14 UNITS: 100 INJECTION, SOLUTION SUBCUTANEOUS at 21:22

## 2017-01-01 RX ADMIN — FLUTICASONE FUROATE AND VILANTEROL TRIFENATATE 1 PUFF: 200; 25 POWDER RESPIRATORY (INHALATION) at 08:13

## 2017-01-01 RX ADMIN — INSULIN GLARGINE 14 UNITS: 100 INJECTION, SOLUTION SUBCUTANEOUS at 22:09

## 2017-01-01 RX ADMIN — Medication 10 MG: at 13:37

## 2017-01-01 RX ADMIN — FEXOFENADINE HYDROCHLORIDE 180 MG: 180 TABLET, FILM COATED ORAL at 08:44

## 2017-01-01 RX ADMIN — UMECLIDINIUM BROMIDE AND VILANTEROL TRIFENATATE 1 PUFF: 62.5; 25 POWDER RESPIRATORY (INHALATION) at 09:07

## 2017-01-01 RX ADMIN — ACETAMINOPHEN 650 MG: 160 SOLUTION ORAL at 18:14

## 2017-01-01 RX ADMIN — NYSTATIN 500000 UNITS: 500000 SUSPENSION ORAL at 08:13

## 2017-01-01 RX ADMIN — DEXTROSE MONOHYDRATE: 50 INJECTION, SOLUTION INTRAVENOUS at 23:40

## 2017-01-01 RX ADMIN — NITROFURANTOIN (MONOHYDRATE/MACROCRYSTALS) 100 MG: 75; 25 CAPSULE ORAL at 19:50

## 2017-01-01 RX ADMIN — NYSTATIN 500000 UNITS: 500000 SUSPENSION ORAL at 12:57

## 2017-01-01 RX ADMIN — SODIUM CHLORIDE, PRESERVATIVE FREE 5 ML: 5 INJECTION INTRAVENOUS at 15:41

## 2017-01-01 RX ADMIN — SODIUM CHLORIDE, POTASSIUM CHLORIDE, SODIUM LACTATE AND CALCIUM CHLORIDE: 600; 310; 30; 20 INJECTION, SOLUTION INTRAVENOUS at 13:10

## 2017-01-01 RX ADMIN — Medication 1 CAPSULE: at 21:11

## 2017-01-01 RX ADMIN — DEXTROSE MONOHYDRATE 50 ML: 25 INJECTION, SOLUTION INTRAVENOUS at 12:27

## 2017-01-01 RX ADMIN — POTASSIUM CHLORIDE 10 MEQ: 7.46 INJECTION, SOLUTION INTRAVENOUS at 16:50

## 2017-01-01 RX ADMIN — IPRATROPIUM BROMIDE 0.5 MG: 0.5 SOLUTION RESPIRATORY (INHALATION) at 08:08

## 2017-01-01 RX ADMIN — METOPROLOL TARTRATE 25 MG: 100 TABLET, FILM COATED ORAL at 07:49

## 2017-01-01 RX ADMIN — POTASSIUM CHLORIDE 20 MEQ: 1.5 POWDER, FOR SOLUTION ORAL at 15:57

## 2017-01-01 RX ADMIN — SULFAMETHOXAZOLE AND TRIMETHOPRIM 1 TABLET: 800; 160 TABLET ORAL at 17:51

## 2017-01-01 RX ADMIN — NYSTATIN 500000 UNITS: 500000 SUSPENSION ORAL at 11:39

## 2017-01-01 RX ADMIN — BUMETANIDE 0.5 MG: 0.5 TABLET ORAL at 08:16

## 2017-01-01 RX ADMIN — DEXTROSE MONOHYDRATE 25 ML: 25 INJECTION, SOLUTION INTRAVENOUS at 02:17

## 2017-01-01 RX ADMIN — INSULIN GLARGINE 14 UNITS: 100 INJECTION, SOLUTION SUBCUTANEOUS at 22:46

## 2017-01-01 RX ADMIN — METOPROLOL TARTRATE 2.5 MG: 5 INJECTION, SOLUTION INTRAVENOUS at 23:10

## 2017-01-01 RX ADMIN — INSULIN GLARGINE 7 UNITS: 100 INJECTION, SOLUTION SUBCUTANEOUS at 08:21

## 2017-01-01 RX ADMIN — WARFARIN SODIUM 1 MG: 1 TABLET ORAL at 18:00

## 2017-01-01 RX ADMIN — Medication 2 TABLET: at 08:45

## 2017-01-01 RX ADMIN — ASPIRIN 300 MG: 300 SUPPOSITORY RECTAL at 11:45

## 2017-01-01 RX ADMIN — NYSTATIN 500000 UNITS: 500000 SUSPENSION ORAL at 08:04

## 2017-01-01 RX ADMIN — METOPROLOL SUCCINATE 50 MG: 50 TABLET, FILM COATED, EXTENDED RELEASE ORAL at 21:24

## 2017-01-01 RX ADMIN — ASPIRIN 300 MG: 300 SUPPOSITORY RECTAL at 09:33

## 2017-01-01 RX ADMIN — RANITIDINE HYDROCHLORIDE 150 MG: 150 TABLET, FILM COATED ORAL at 17:50

## 2017-01-01 RX ADMIN — SENNOSIDES AND DOCUSATE SODIUM 2 TABLET: 8.6; 5 TABLET ORAL at 09:30

## 2017-01-01 RX ADMIN — IPRATROPIUM BROMIDE 1 PUFF: 17 AEROSOL, METERED RESPIRATORY (INHALATION) at 19:56

## 2017-01-01 RX ADMIN — MULTIVITAMIN 15 ML: LIQUID ORAL at 08:14

## 2017-01-01 RX ADMIN — IPRATROPIUM BROMIDE 0.5 MG: 0.5 SOLUTION RESPIRATORY (INHALATION) at 20:15

## 2017-01-01 RX ADMIN — FLUTICASONE PROPIONATE 2 SPRAY: 50 SPRAY, METERED NASAL at 08:23

## 2017-01-01 RX ADMIN — SODIUM CHLORIDE, POTASSIUM CHLORIDE, SODIUM LACTATE AND CALCIUM CHLORIDE 500 ML: 600; 310; 30; 20 INJECTION, SOLUTION INTRAVENOUS at 06:13

## 2017-01-01 RX ADMIN — SENNOSIDES AND DOCUSATE SODIUM 2 TABLET: 8.6; 5 TABLET ORAL at 09:10

## 2017-01-01 RX ADMIN — INSULIN ASPART 3 UNITS: 100 INJECTION, SOLUTION INTRAVENOUS; SUBCUTANEOUS at 17:56

## 2017-01-01 RX ADMIN — IPRATROPIUM BROMIDE 0.5 MG: 0.5 SOLUTION RESPIRATORY (INHALATION) at 07:56

## 2017-01-01 RX ADMIN — SODIUM CHLORIDE, POTASSIUM CHLORIDE, SODIUM LACTATE AND CALCIUM CHLORIDE: 600; 310; 30; 20 INJECTION, SOLUTION INTRAVENOUS at 00:12

## 2017-01-01 RX ADMIN — IPRATROPIUM BROMIDE 1 PUFF: 17 AEROSOL, METERED RESPIRATORY (INHALATION) at 08:13

## 2017-01-01 RX ADMIN — METOPROLOL SUCCINATE 50 MG: 50 TABLET, FILM COATED, EXTENDED RELEASE ORAL at 21:26

## 2017-01-01 RX ADMIN — ASPIRIN 81 MG: 81 TABLET, COATED ORAL at 09:30

## 2017-01-01 RX ADMIN — INSULIN ASPART 10 UNITS: 100 INJECTION, SOLUTION INTRAVENOUS; SUBCUTANEOUS at 11:39

## 2017-01-01 RX ADMIN — ASPIRIN 300 MG: 300 SUPPOSITORY RECTAL at 21:34

## 2017-01-01 RX ADMIN — Medication 325 MG: at 09:26

## 2017-01-01 RX ADMIN — SODIUM CHLORIDE 1000 ML: 9 INJECTION, SOLUTION INTRAVENOUS at 02:53

## 2017-01-01 RX ADMIN — INSULIN ASPART 2 UNITS: 100 INJECTION, SOLUTION INTRAVENOUS; SUBCUTANEOUS at 18:37

## 2017-01-01 RX ADMIN — PANTOPRAZOLE SODIUM 40 MG: 40 INJECTION, POWDER, FOR SOLUTION INTRAVENOUS at 08:14

## 2017-01-01 RX ADMIN — FLUTICASONE PROPIONATE 2 SPRAY: 50 SPRAY, METERED NASAL at 08:02

## 2017-01-01 RX ADMIN — MULTIVITAMIN 15 ML: LIQUID ORAL at 09:34

## 2017-01-01 RX ADMIN — FLUTICASONE FUROATE AND VILANTEROL TRIFENATATE 1 PUFF: 200; 25 POWDER RESPIRATORY (INHALATION) at 08:02

## 2017-01-01 RX ADMIN — PANTOPRAZOLE SODIUM 40 MG: 40 INJECTION, POWDER, FOR SOLUTION INTRAVENOUS at 21:17

## 2017-01-01 RX ADMIN — NYSTATIN 500000 UNITS: 500000 SUSPENSION ORAL at 12:03

## 2017-01-01 RX ADMIN — METOPROLOL TARTRATE 2.5 MG: 5 INJECTION, SOLUTION INTRAVENOUS at 16:14

## 2017-01-01 RX ADMIN — POTASSIUM CHLORIDE AND SODIUM CHLORIDE: 900; 150 INJECTION, SOLUTION INTRAVENOUS at 00:58

## 2017-01-01 RX ADMIN — SODIUM CHLORIDE, PRESERVATIVE FREE 5 ML: 5 INJECTION INTRAVENOUS at 06:17

## 2017-01-01 RX ADMIN — POTASSIUM & SODIUM PHOSPHATES POWDER PACK 280-160-250 MG 1 PACKET: 280-160-250 PACK at 15:53

## 2017-01-01 RX ADMIN — Medication 1 CAPSULE: at 08:38

## 2017-01-01 RX ADMIN — Medication 220 MG: at 09:25

## 2017-01-01 RX ADMIN — PANTOPRAZOLE SODIUM 40 MG: 40 INJECTION, POWDER, FOR SOLUTION INTRAVENOUS at 09:50

## 2017-01-01 RX ADMIN — AMPICILLIN SODIUM AND SULBACTAM SODIUM 3 G: 2; 1 INJECTION, POWDER, FOR SOLUTION INTRAMUSCULAR; INTRAVENOUS at 21:22

## 2017-01-01 RX ADMIN — METOPROLOL TARTRATE 2.5 MG: 5 INJECTION, SOLUTION INTRAVENOUS at 15:56

## 2017-01-01 RX ADMIN — METOPROLOL TARTRATE 2.5 MG: 5 INJECTION, SOLUTION INTRAVENOUS at 16:19

## 2017-01-01 RX ADMIN — FLUTICASONE PROPIONATE 2 SPRAY: 50 SPRAY, METERED NASAL at 09:30

## 2017-01-01 RX ADMIN — NYSTATIN 500000 UNITS: 500000 SUSPENSION ORAL at 20:09

## 2017-01-01 RX ADMIN — METOPROLOL TARTRATE 2.5 MG: 5 INJECTION INTRAVENOUS at 13:48

## 2017-01-01 RX ADMIN — DEXTROSE MONOHYDRATE 50 ML: 25 INJECTION, SOLUTION INTRAVENOUS at 06:21

## 2017-01-01 RX ADMIN — SODIUM CHLORIDE, POTASSIUM CHLORIDE, SODIUM LACTATE AND CALCIUM CHLORIDE: 600; 310; 30; 20 INJECTION, SOLUTION INTRAVENOUS at 06:39

## 2017-01-01 RX ADMIN — MULTIPLE VITAMINS W/ MINERALS TAB 1 TABLET: TAB at 17:51

## 2017-01-01 RX ADMIN — PROPOFOL 80 MG: 10 INJECTION, EMULSION INTRAVENOUS at 13:22

## 2017-01-01 RX ADMIN — LIDOCAINE HYDROCHLORIDE 15 ML: 20 SOLUTION ORAL; TOPICAL at 11:21

## 2017-01-01 RX ADMIN — Medication 220 MG: at 19:55

## 2017-01-01 RX ADMIN — NITROFURANTOIN (MONOHYDRATE/MACROCRYSTALS) 100 MG: 75; 25 CAPSULE ORAL at 08:45

## 2017-01-01 RX ADMIN — MULTIPLE VITAMINS W/ MINERALS TAB 1 TABLET: TAB at 09:29

## 2017-01-01 RX ADMIN — MAGNESIUM OXIDE TAB 400 MG (241.3 MG ELEMENTAL MG) 800 MG: 400 (241.3 MG) TAB at 09:28

## 2017-01-01 RX ADMIN — ACETAMINOPHEN 975 MG: 325 TABLET, FILM COATED ORAL at 00:09

## 2017-01-01 RX ADMIN — LIDOCAINE HYDROCHLORIDE 5 ML: 10 INJECTION, SOLUTION INFILTRATION; PERINEURAL at 21:38

## 2017-01-01 RX ADMIN — INSULIN GLARGINE 30 UNITS: 100 INJECTION, SOLUTION SUBCUTANEOUS at 09:58

## 2017-01-01 RX ADMIN — SENNOSIDES AND DOCUSATE SODIUM 2 TABLET: 8.6; 5 TABLET ORAL at 19:50

## 2017-01-01 RX ADMIN — INSULIN ASPART 2 UNITS: 100 INJECTION, SOLUTION INTRAVENOUS; SUBCUTANEOUS at 23:52

## 2017-01-01 RX ADMIN — INSULIN ASPART 1 UNITS: 100 INJECTION, SOLUTION INTRAVENOUS; SUBCUTANEOUS at 10:04

## 2017-01-01 RX ADMIN — PANTOPRAZOLE SODIUM 40 MG: 40 TABLET, DELAYED RELEASE ORAL at 20:27

## 2017-01-01 RX ADMIN — INSULIN ASPART 1 UNITS: 100 INJECTION, SOLUTION INTRAVENOUS; SUBCUTANEOUS at 08:24

## 2017-01-01 RX ADMIN — INSULIN ASPART 2 UNITS: 100 INJECTION, SOLUTION INTRAVENOUS; SUBCUTANEOUS at 12:57

## 2017-01-01 RX ADMIN — ACETAMINOPHEN 975 MG: 325 TABLET, FILM COATED ORAL at 17:26

## 2017-01-01 RX ADMIN — Medication 2 TABLET: at 09:10

## 2017-01-01 RX ADMIN — MAGNESIUM OXIDE TAB 400 MG (241.3 MG ELEMENTAL MG) 800 MG: 400 (241.3 MG) TAB at 09:10

## 2017-01-01 RX ADMIN — POTASSIUM & SODIUM PHOSPHATES POWDER PACK 280-160-250 MG 1 PACKET: 280-160-250 PACK at 15:13

## 2017-01-01 RX ADMIN — METOPROLOL TARTRATE 2.5 MG: 5 INJECTION, SOLUTION INTRAVENOUS at 10:03

## 2017-01-01 RX ADMIN — Medication 1 CAPSULE: at 20:02

## 2017-01-01 RX ADMIN — PANTOPRAZOLE SODIUM 40 MG: 40 TABLET, DELAYED RELEASE ORAL at 09:18

## 2017-01-01 RX ADMIN — INSULIN ASPART 2 UNITS: 100 INJECTION, SOLUTION INTRAVENOUS; SUBCUTANEOUS at 04:25

## 2017-01-01 RX ADMIN — NYSTATIN 500000 UNITS: 500000 SUSPENSION ORAL at 16:49

## 2017-01-01 RX ADMIN — INSULIN ASPART 1 UNITS: 100 INJECTION, SOLUTION INTRAVENOUS; SUBCUTANEOUS at 19:43

## 2017-01-01 RX ADMIN — NIACIN 500 MG: 500 TABLET, FILM COATED, EXTENDED RELEASE ORAL at 21:26

## 2017-01-01 RX ADMIN — INSULIN ASPART 6 UNITS: 100 INJECTION, SOLUTION INTRAVENOUS; SUBCUTANEOUS at 08:18

## 2017-01-01 RX ADMIN — SODIUM CHLORIDE, POTASSIUM CHLORIDE, SODIUM LACTATE AND CALCIUM CHLORIDE: 600; 310; 30; 20 INJECTION, SOLUTION INTRAVENOUS at 11:43

## 2017-01-01 RX ADMIN — METOPROLOL TARTRATE 2.5 MG: 5 INJECTION, SOLUTION INTRAVENOUS at 15:27

## 2017-01-01 RX ADMIN — METOPROLOL TARTRATE 25 MG: 100 TABLET, FILM COATED ORAL at 20:01

## 2017-01-01 RX ADMIN — IPRATROPIUM BROMIDE 1 PUFF: 17 AEROSOL, METERED RESPIRATORY (INHALATION) at 08:16

## 2017-01-01 RX ADMIN — NYSTATIN 500000 UNITS: 500000 SUSPENSION ORAL at 11:28

## 2017-01-01 RX ADMIN — NYSTATIN 500000 UNITS: 500000 SUSPENSION ORAL at 12:29

## 2017-01-01 RX ADMIN — IPRATROPIUM BROMIDE 0.5 MG: 0.5 SOLUTION RESPIRATORY (INHALATION) at 07:54

## 2017-01-01 RX ADMIN — IPRATROPIUM BROMIDE 0.5 MG: 0.5 SOLUTION RESPIRATORY (INHALATION) at 07:57

## 2017-01-01 RX ADMIN — NYSTATIN 500000 UNITS: 500000 SUSPENSION ORAL at 15:28

## 2017-01-01 RX ADMIN — METOPROLOL TARTRATE 2.5 MG: 5 INJECTION, SOLUTION INTRAVENOUS at 09:54

## 2017-01-01 RX ADMIN — VITAMIN D, TAB 1000IU (100/BT) 2000 UNITS: 25 TAB at 17:50

## 2017-01-01 RX ADMIN — Medication 500 MCG: at 09:29

## 2017-01-01 RX ADMIN — PANTOPRAZOLE SODIUM 40 MG: 40 INJECTION, POWDER, FOR SOLUTION INTRAVENOUS at 20:00

## 2017-01-01 RX ADMIN — PANTOPRAZOLE SODIUM 40 MG: 40 TABLET, DELAYED RELEASE ORAL at 21:26

## 2017-01-01 RX ADMIN — FLUTICASONE PROPIONATE 2 SPRAY: 50 SPRAY, METERED NASAL at 09:49

## 2017-01-01 RX ADMIN — CEFTRIAXONE 1 G: 1 INJECTION, POWDER, FOR SOLUTION INTRAMUSCULAR; INTRAVENOUS at 21:30

## 2017-01-01 RX ADMIN — SODIUM CHLORIDE, POTASSIUM CHLORIDE, SODIUM LACTATE AND CALCIUM CHLORIDE: 600; 310; 30; 20 INJECTION, SOLUTION INTRAVENOUS at 00:15

## 2017-01-01 RX ADMIN — IPRATROPIUM BROMIDE 1 PUFF: 17 AEROSOL, METERED RESPIRATORY (INHALATION) at 21:04

## 2017-01-01 RX ADMIN — FLUTICASONE FUROATE AND VILANTEROL TRIFENATATE 1 PUFF: 100; 25 POWDER RESPIRATORY (INHALATION) at 09:07

## 2017-01-01 RX ADMIN — Medication 500 MCG: at 08:45

## 2017-01-01 RX ADMIN — METOPROLOL TARTRATE 2.5 MG: 5 INJECTION, SOLUTION INTRAVENOUS at 09:29

## 2017-01-01 RX ADMIN — MIDAZOLAM HYDROCHLORIDE 1 MG: 1 INJECTION, SOLUTION INTRAMUSCULAR; INTRAVENOUS at 16:15

## 2017-01-01 RX ADMIN — VITAMIN D, TAB 1000IU (100/BT) 2000 UNITS: 25 TAB at 08:44

## 2017-01-01 RX ADMIN — POTASSIUM PHOSPHATE, MONOBASIC AND POTASSIUM PHOSPHATE, DIBASIC 20 MMOL: 224; 236 INJECTION, SOLUTION INTRAVENOUS at 12:55

## 2017-01-01 RX ADMIN — Medication 220 MG: at 08:10

## 2017-01-01 RX ADMIN — Medication 2 TABLET: at 17:52

## 2017-01-01 RX ADMIN — PANTOPRAZOLE SODIUM 40 MG: 40 INJECTION, POWDER, FOR SOLUTION INTRAVENOUS at 20:11

## 2017-01-01 RX ADMIN — METOPROLOL TARTRATE 25 MG: 100 TABLET, FILM COATED ORAL at 21:27

## 2017-01-01 RX ADMIN — ASPIRIN 300 MG: 300 SUPPOSITORY RECTAL at 09:57

## 2017-01-01 RX ADMIN — POTASSIUM & SODIUM PHOSPHATES POWDER PACK 280-160-250 MG 1 PACKET: 280-160-250 PACK at 11:18

## 2017-01-01 RX ADMIN — SENNOSIDES AND DOCUSATE SODIUM 2 TABLET: 8.6; 5 TABLET ORAL at 20:10

## 2017-01-01 RX ADMIN — INSULIN GLARGINE 14 UNITS: 100 INJECTION, SOLUTION SUBCUTANEOUS at 21:33

## 2017-01-01 RX ADMIN — Medication 1 CAPSULE: at 20:28

## 2017-01-01 RX ADMIN — SODIUM CHLORIDE, PRESERVATIVE FREE 5 ML: 5 INJECTION INTRAVENOUS at 17:28

## 2017-01-01 RX ADMIN — FLUTICASONE PROPIONATE 2 SPRAY: 50 SPRAY, METERED NASAL at 07:53

## 2017-01-01 RX ADMIN — METOPROLOL TARTRATE 2.5 MG: 5 INJECTION, SOLUTION INTRAVENOUS at 15:36

## 2017-01-01 RX ADMIN — INSULIN GLARGINE 14 UNITS: 100 INJECTION, SOLUTION SUBCUTANEOUS at 22:27

## 2017-01-01 RX ADMIN — INSULIN ASPART 1 UNITS: 100 INJECTION, SOLUTION INTRAVENOUS; SUBCUTANEOUS at 00:32

## 2017-01-01 RX ADMIN — NYSTATIN 500000 UNITS: 500000 SUSPENSION ORAL at 20:07

## 2017-01-01 RX ADMIN — PHENYLEPHRINE HYDROCHLORIDE 200 MCG: 10 INJECTION, SOLUTION INTRAMUSCULAR; INTRAVENOUS; SUBCUTANEOUS at 13:27

## 2017-01-01 RX ADMIN — POTASSIUM CHLORIDE 40 MEQ: 1.5 POWDER, FOR SOLUTION ORAL at 08:08

## 2017-01-01 RX ADMIN — METOPROLOL SUCCINATE 50 MG: 50 TABLET, FILM COATED, EXTENDED RELEASE ORAL at 22:16

## 2017-01-01 RX ADMIN — INSULIN GLARGINE 14 UNITS: 100 INJECTION, SOLUTION SUBCUTANEOUS at 12:19

## 2017-01-01 RX ADMIN — Medication 325 MG: at 07:49

## 2017-01-01 RX ADMIN — INSULIN ASPART 1 UNITS: 100 INJECTION, SOLUTION INTRAVENOUS; SUBCUTANEOUS at 20:50

## 2017-01-01 RX ADMIN — POTASSIUM CHLORIDE AND SODIUM CHLORIDE: 900; 150 INJECTION, SOLUTION INTRAVENOUS at 06:51

## 2017-01-01 RX ADMIN — IPRATROPIUM BROMIDE 0.5 MG: 0.5 SOLUTION RESPIRATORY (INHALATION) at 20:12

## 2017-01-01 RX ADMIN — FLUTICASONE PROPIONATE 2 SPRAY: 50 SPRAY, METERED NASAL at 07:55

## 2017-01-01 RX ADMIN — SODIUM CHLORIDE, PRESERVATIVE FREE 5 ML: 5 INJECTION INTRAVENOUS at 07:12

## 2017-01-01 RX ADMIN — Medication 7 ML: at 09:30

## 2017-01-01 RX ADMIN — NIACIN 500 MG: 500 TABLET, FILM COATED, EXTENDED RELEASE ORAL at 22:51

## 2017-01-01 RX ADMIN — INSULIN ASPART 2 UNITS: 100 INJECTION, SOLUTION INTRAVENOUS; SUBCUTANEOUS at 12:19

## 2017-01-01 RX ADMIN — METOPROLOL TARTRATE 2.5 MG: 5 INJECTION, SOLUTION INTRAVENOUS at 09:35

## 2017-01-01 RX ADMIN — RANITIDINE HYDROCHLORIDE 150 MG: 150 TABLET, FILM COATED ORAL at 09:10

## 2017-01-01 RX ADMIN — SODIUM CHLORIDE, POTASSIUM CHLORIDE, SODIUM LACTATE AND CALCIUM CHLORIDE: 600; 310; 30; 20 INJECTION, SOLUTION INTRAVENOUS at 01:14

## 2017-01-01 RX ADMIN — IPRATROPIUM BROMIDE 1 PUFF: 17 AEROSOL, METERED RESPIRATORY (INHALATION) at 09:20

## 2017-01-01 RX ADMIN — Medication 1 CAPSULE: at 08:23

## 2017-01-01 RX ADMIN — FEXOFENADINE HYDROCHLORIDE 180 MG: 180 TABLET, FILM COATED ORAL at 09:15

## 2017-01-01 RX ADMIN — RANITIDINE HYDROCHLORIDE 150 MG: 150 TABLET, FILM COATED ORAL at 08:43

## 2017-01-01 RX ADMIN — IPRATROPIUM BROMIDE 0.5 MG: 0.5 SOLUTION RESPIRATORY (INHALATION) at 20:27

## 2017-01-01 RX ADMIN — POTASSIUM CHLORIDE 10 MEQ: 14.9 INJECTION, SOLUTION, CONCENTRATE PARENTERAL at 04:52

## 2017-01-01 RX ADMIN — SODIUM CHLORIDE 1000 ML: 9 INJECTION, SOLUTION INTRAVENOUS at 13:16

## 2017-01-01 RX ADMIN — FLUTICASONE FUROATE AND VILANTEROL TRIFENATATE 1 PUFF: 200; 25 POWDER RESPIRATORY (INHALATION) at 09:43

## 2017-01-01 RX ADMIN — POTASSIUM CHLORIDE 20 MEQ: 29.8 INJECTION, SOLUTION INTRAVENOUS at 08:08

## 2017-01-01 RX ADMIN — FLUTICASONE FUROATE AND VILANTEROL TRIFENATATE 1 PUFF: 200; 25 POWDER RESPIRATORY (INHALATION) at 07:57

## 2017-01-01 RX ADMIN — METOPROLOL TARTRATE 2.5 MG: 5 INJECTION, SOLUTION INTRAVENOUS at 21:38

## 2017-01-01 RX ADMIN — ROSUVASTATIN CALCIUM 20 MG: 20 TABLET, FILM COATED ORAL at 09:21

## 2017-01-01 RX ADMIN — ASPIRIN 300 MG: 300 SUPPOSITORY RECTAL at 08:18

## 2017-01-01 RX ADMIN — FENTANYL CITRATE 25 MCG: 50 INJECTION, SOLUTION INTRAMUSCULAR; INTRAVENOUS at 12:00

## 2017-01-01 RX ADMIN — INSULIN ASPART 2 UNITS: 100 INJECTION, SOLUTION INTRAVENOUS; SUBCUTANEOUS at 03:40

## 2017-01-01 RX ADMIN — ROSUVASTATIN CALCIUM 20 MG: 10 TABLET, FILM COATED ORAL at 21:11

## 2017-01-01 RX ADMIN — HEPARIN SODIUM 5000 UNITS: 5000 INJECTION, SOLUTION INTRAVENOUS; SUBCUTANEOUS at 09:11

## 2017-01-01 RX ADMIN — INSULIN ASPART 8 UNITS: 100 INJECTION, SOLUTION INTRAVENOUS; SUBCUTANEOUS at 17:05

## 2017-01-01 RX ADMIN — FLUTICASONE PROPIONATE 2 SPRAY: 50 SPRAY, METERED NASAL at 07:57

## 2017-01-01 RX ADMIN — INSULIN ASPART 2 UNITS: 100 INJECTION, SOLUTION INTRAVENOUS; SUBCUTANEOUS at 20:07

## 2017-01-01 RX ADMIN — NYSTATIN 500000 UNITS: 500000 SUSPENSION ORAL at 07:52

## 2017-01-01 RX ADMIN — POTASSIUM CHLORIDE, DEXTROSE MONOHYDRATE AND SODIUM CHLORIDE: 150; 5; 450 INJECTION, SOLUTION INTRAVENOUS at 21:19

## 2017-01-01 RX ADMIN — ASPIRIN 300 MG: 300 SUPPOSITORY RECTAL at 08:25

## 2017-01-01 RX ADMIN — METOPROLOL TARTRATE 2.5 MG: 5 INJECTION, SOLUTION INTRAVENOUS at 10:30

## 2017-01-01 RX ADMIN — PANTOPRAZOLE SODIUM 40 MG: 40 INJECTION, POWDER, FOR SOLUTION INTRAVENOUS at 19:57

## 2017-01-01 RX ADMIN — Medication 220 MG: at 08:16

## 2017-01-01 RX ADMIN — METOPROLOL TARTRATE 25 MG: 100 TABLET, FILM COATED ORAL at 19:55

## 2017-01-01 RX ADMIN — DEXTROSE MONOHYDRATE: 50 INJECTION, SOLUTION INTRAVENOUS at 06:54

## 2017-01-01 RX ADMIN — SODIUM CHLORIDE, PRESERVATIVE FREE 5 ML: 5 INJECTION INTRAVENOUS at 07:21

## 2017-01-01 RX ADMIN — POTASSIUM CHLORIDE 40 MEQ: 1.5 POWDER, FOR SOLUTION ORAL at 12:57

## 2017-01-01 RX ADMIN — POTASSIUM & SODIUM PHOSPHATES POWDER PACK 280-160-250 MG 1 PACKET: 280-160-250 PACK at 20:28

## 2017-01-01 RX ADMIN — FLUTICASONE PROPIONATE 2 SPRAY: 50 SPRAY, METERED NASAL at 10:05

## 2017-01-01 RX ADMIN — IPRATROPIUM BROMIDE 1 PUFF: 17 AEROSOL, METERED RESPIRATORY (INHALATION) at 08:55

## 2017-01-01 RX ADMIN — ROSUVASTATIN CALCIUM 20 MG: 10 TABLET, FILM COATED ORAL at 22:01

## 2017-01-01 RX ADMIN — IPRATROPIUM BROMIDE 1 PUFF: 17 AEROSOL, METERED RESPIRATORY (INHALATION) at 08:35

## 2017-01-01 RX ADMIN — FLUTICASONE PROPIONATE 2 SPRAY: 50 SPRAY, METERED NASAL at 08:15

## 2017-01-01 RX ADMIN — MULTIVITAMIN 15 ML: LIQUID ORAL at 08:23

## 2017-01-01 RX ADMIN — VANCOMYCIN HYDROCHLORIDE 1500 MG: 10 INJECTION, POWDER, LYOPHILIZED, FOR SOLUTION INTRAVENOUS at 01:29

## 2017-01-01 RX ADMIN — AMPICILLIN SODIUM AND SULBACTAM SODIUM 3 G: 2; 1 INJECTION, POWDER, FOR SOLUTION INTRAMUSCULAR; INTRAVENOUS at 23:04

## 2017-01-01 RX ADMIN — INSULIN GLARGINE 7 UNITS: 100 INJECTION, SOLUTION SUBCUTANEOUS at 20:00

## 2017-01-01 RX ADMIN — INSULIN ASPART 3 UNITS: 100 INJECTION, SOLUTION INTRAVENOUS; SUBCUTANEOUS at 13:26

## 2017-01-01 RX ADMIN — SODIUM CHLORIDE, PRESERVATIVE FREE 5 ML: 5 INJECTION INTRAVENOUS at 05:48

## 2017-01-01 RX ADMIN — Medication 1 CAPSULE: at 20:53

## 2017-01-01 RX ADMIN — SODIUM CHLORIDE, PRESERVATIVE FREE 5 ML: 5 INJECTION INTRAVENOUS at 09:23

## 2017-01-01 RX ADMIN — BUMETANIDE 0.5 MG: 0.5 TABLET ORAL at 08:44

## 2017-01-01 RX ADMIN — ACETAMINOPHEN 975 MG: 325 TABLET, FILM COATED ORAL at 00:38

## 2017-01-01 RX ADMIN — METOPROLOL TARTRATE 2.5 MG: 5 INJECTION INTRAVENOUS at 15:27

## 2017-01-01 RX ADMIN — MULTIVITAMIN 15 ML: LIQUID ORAL at 08:16

## 2017-01-01 RX ADMIN — POTASSIUM CHLORIDE 10 MEQ: 7.46 INJECTION, SOLUTION INTRAVENOUS at 18:54

## 2017-01-01 RX ADMIN — IPRATROPIUM BROMIDE 0.5 MG: 0.5 SOLUTION RESPIRATORY (INHALATION) at 04:00

## 2017-01-01 RX ADMIN — LIDOCAINE HYDROCHLORIDE 3.5 ML: 10 INJECTION, SOLUTION INFILTRATION; PERINEURAL at 15:32

## 2017-01-01 RX ADMIN — Medication 325 MG: at 09:18

## 2017-01-01 RX ADMIN — METOPROLOL TARTRATE 2.5 MG: 5 INJECTION INTRAVENOUS at 02:18

## 2017-01-01 RX ADMIN — Medication 325 MG: at 08:17

## 2017-01-01 RX ADMIN — NYSTATIN 500000 UNITS: 500000 SUSPENSION ORAL at 11:51

## 2017-01-01 RX ADMIN — INSULIN ASPART 3 UNITS: 100 INJECTION, SOLUTION INTRAVENOUS; SUBCUTANEOUS at 16:21

## 2017-01-01 RX ADMIN — POTASSIUM CHLORIDE, DEXTROSE MONOHYDRATE AND SODIUM CHLORIDE: 150; 5; 450 INJECTION, SOLUTION INTRAVENOUS at 05:47

## 2017-01-01 RX ADMIN — POTASSIUM PHOSPHATE, MONOBASIC AND POTASSIUM PHOSPHATE, DIBASIC 15 MMOL: 224; 236 INJECTION, SOLUTION INTRAVENOUS at 21:19

## 2017-01-01 RX ADMIN — FLUTICASONE PROPIONATE 1 SPRAY: 50 SPRAY, METERED NASAL at 09:43

## 2017-01-01 RX ADMIN — METOPROLOL TARTRATE 25 MG: 100 TABLET, FILM COATED ORAL at 20:28

## 2017-01-01 RX ADMIN — IPRATROPIUM BROMIDE 0.5 MG: 0.5 SOLUTION RESPIRATORY (INHALATION) at 12:05

## 2017-01-01 RX ADMIN — Medication 1 CAPSULE: at 08:21

## 2017-01-01 RX ADMIN — SODIUM CHLORIDE, PRESERVATIVE FREE 5 ML: 5 INJECTION INTRAVENOUS at 18:52

## 2017-01-01 RX ADMIN — FLUTICASONE PROPIONATE 2 SPRAY: 50 SPRAY, METERED NASAL at 08:12

## 2017-01-01 RX ADMIN — PANTOPRAZOLE SODIUM 40 MG: 40 INJECTION, POWDER, FOR SOLUTION INTRAVENOUS at 20:53

## 2017-01-01 RX ADMIN — POTASSIUM CHLORIDE 20 MEQ: 29.8 INJECTION, SOLUTION INTRAVENOUS at 09:34

## 2017-01-01 RX ADMIN — GADOBUTROL 10 ML: 604.72 INJECTION INTRAVENOUS at 14:37

## 2017-01-01 RX ADMIN — NYSTATIN 500000 UNITS: 500000 SUSPENSION ORAL at 16:48

## 2017-01-01 RX ADMIN — IPRATROPIUM BROMIDE 0.5 MG: 0.5 SOLUTION RESPIRATORY (INHALATION) at 00:05

## 2017-01-01 RX ADMIN — LIDOCAINE HYDROCHLORIDE 10 ML: 20 SOLUTION ORAL; TOPICAL at 14:52

## 2017-01-01 RX ADMIN — ACETAMINOPHEN 975 MG: 325 TABLET, FILM COATED ORAL at 17:45

## 2017-01-01 RX ADMIN — Medication 2 TABLET: at 17:45

## 2017-01-01 RX ADMIN — FLUTICASONE FUROATE AND VILANTEROL TRIFENATATE 1 PUFF: 200; 25 POWDER RESPIRATORY (INHALATION) at 08:24

## 2017-01-01 RX ADMIN — PANTOPRAZOLE SODIUM 40 MG: 40 INJECTION, POWDER, FOR SOLUTION INTRAVENOUS at 08:21

## 2017-01-01 RX ADMIN — MULTIVITAMIN 15 ML: LIQUID ORAL at 08:21

## 2017-01-01 RX ADMIN — NYSTATIN 500000 UNITS: 500000 SUSPENSION ORAL at 09:45

## 2017-01-01 RX ADMIN — IPRATROPIUM BROMIDE 0.5 MG: 0.5 SOLUTION RESPIRATORY (INHALATION) at 12:26

## 2017-01-01 RX ADMIN — FEXOFENADINE HYDROCHLORIDE 180 MG: 180 TABLET, FILM COATED ORAL at 09:21

## 2017-01-01 RX ADMIN — METOPROLOL TARTRATE 2.5 MG: 5 INJECTION, SOLUTION INTRAVENOUS at 22:35

## 2017-01-01 RX ADMIN — INSULIN ASPART 1 UNITS: 100 INJECTION, SOLUTION INTRAVENOUS; SUBCUTANEOUS at 09:23

## 2017-01-01 RX ADMIN — ROSUVASTATIN CALCIUM 20 MG: 20 TABLET, FILM COATED ORAL at 21:22

## 2017-01-01 RX ADMIN — NYSTATIN 500000 UNITS: 500000 SUSPENSION ORAL at 20:28

## 2017-01-01 RX ADMIN — METOPROLOL TARTRATE 50 MG: 100 TABLET, FILM COATED ORAL at 19:56

## 2017-01-01 RX ADMIN — IPRATROPIUM BROMIDE 0.5 MG: 0.5 SOLUTION RESPIRATORY (INHALATION) at 08:18

## 2017-01-01 RX ADMIN — NYSTATIN 500000 UNITS: 100000 SUSPENSION ORAL at 08:23

## 2017-01-01 RX ADMIN — FENTANYL CITRATE 100 MCG: 50 INJECTION, SOLUTION INTRAMUSCULAR; INTRAVENOUS at 13:22

## 2017-01-01 RX ADMIN — DEXTROSE MONOHYDRATE: 50 INJECTION, SOLUTION INTRAVENOUS at 03:31

## 2017-01-01 RX ADMIN — Medication 2 TABLET: at 17:26

## 2017-01-01 RX ADMIN — ROSUVASTATIN CALCIUM 20 MG: 20 TABLET, FILM COATED ORAL at 21:26

## 2017-01-01 RX ADMIN — FEXOFENADINE HYDROCHLORIDE 180 MG: 180 TABLET, FILM COATED ORAL at 10:06

## 2017-01-01 RX ADMIN — METOPROLOL TARTRATE 2.5 MG: 5 INJECTION, SOLUTION INTRAVENOUS at 05:11

## 2017-01-01 RX ADMIN — FLUTICASONE PROPIONATE 2 SPRAY: 50 SPRAY, METERED NASAL at 09:20

## 2017-01-01 RX ADMIN — INSULIN ASPART 3 UNITS: 100 INJECTION, SOLUTION INTRAVENOUS; SUBCUTANEOUS at 08:23

## 2017-01-01 RX ADMIN — POTASSIUM PHOSPHATE, MONOBASIC AND POTASSIUM PHOSPHATE, DIBASIC 20 MMOL: 224; 236 INJECTION, SOLUTION INTRAVENOUS at 09:39

## 2017-01-01 RX ADMIN — SODIUM CHLORIDE, PRESERVATIVE FREE 5 ML: 5 INJECTION INTRAVENOUS at 16:14

## 2017-01-01 RX ADMIN — Medication 7 ML: at 08:10

## 2017-01-01 RX ADMIN — SODIUM CHLORIDE, POTASSIUM CHLORIDE, SODIUM LACTATE AND CALCIUM CHLORIDE: 600; 310; 30; 20 INJECTION, SOLUTION INTRAVENOUS at 13:11

## 2017-01-01 RX ADMIN — FLUTICASONE FUROATE AND VILANTEROL TRIFENATATE 1 PUFF: 100; 25 POWDER RESPIRATORY (INHALATION) at 17:56

## 2017-01-01 RX ADMIN — INSULIN ASPART 1 UNITS: 100 INJECTION, SOLUTION INTRAVENOUS; SUBCUTANEOUS at 04:39

## 2017-01-01 RX ADMIN — SENNOSIDES AND DOCUSATE SODIUM 2 TABLET: 8.6; 5 TABLET ORAL at 09:21

## 2017-01-01 RX ADMIN — FUROSEMIDE 40 MG: 10 INJECTION, SOLUTION INTRAVENOUS at 12:06

## 2017-01-01 RX ADMIN — Medication 325 MG: at 08:22

## 2017-01-01 RX ADMIN — INSULIN ASPART 3 UNITS: 100 INJECTION, SOLUTION INTRAVENOUS; SUBCUTANEOUS at 17:57

## 2017-01-01 RX ADMIN — ACETAMINOPHEN 975 MG: 325 TABLET, FILM COATED ORAL at 00:01

## 2017-01-01 RX ADMIN — PANTOPRAZOLE SODIUM 40 MG: 40 INJECTION, POWDER, FOR SOLUTION INTRAVENOUS at 09:53

## 2017-01-01 RX ADMIN — METOPROLOL TARTRATE 2.5 MG: 5 INJECTION, SOLUTION INTRAVENOUS at 22:27

## 2017-01-01 RX ADMIN — NYSTATIN 500000 UNITS: 500000 SUSPENSION ORAL at 08:17

## 2017-01-01 RX ADMIN — NYSTATIN 500000 UNITS: 500000 SUSPENSION ORAL at 16:21

## 2017-01-01 RX ADMIN — NYSTATIN 500000 UNITS: 500000 SUSPENSION ORAL at 15:48

## 2017-01-01 RX ADMIN — SODIUM CHLORIDE, PRESERVATIVE FREE 5 ML: 5 INJECTION INTRAVENOUS at 05:33

## 2017-01-01 RX ADMIN — WARFARIN SODIUM 3 MG: 3 TABLET ORAL at 18:38

## 2017-01-01 RX ADMIN — Medication 1 CAPSULE: at 08:13

## 2017-01-01 RX ADMIN — PANTOPRAZOLE SODIUM 40 MG: 40 INJECTION, POWDER, FOR SOLUTION INTRAVENOUS at 20:09

## 2017-01-01 RX ADMIN — IPRATROPIUM BROMIDE 1 PUFF: 17 AEROSOL, METERED RESPIRATORY (INHALATION) at 09:47

## 2017-01-01 RX ADMIN — HUMAN ALBUMIN MICROSPHERES AND PERFLUTREN 3 ML: 10; .22 INJECTION, SOLUTION INTRAVENOUS at 11:45

## 2017-01-01 RX ADMIN — IPRATROPIUM BROMIDE 0.5 MG: 0.5 SOLUTION RESPIRATORY (INHALATION) at 23:47

## 2017-01-01 RX ADMIN — SODIUM CHLORIDE: 4.5 INJECTION, SOLUTION INTRAVENOUS at 11:55

## 2017-01-01 RX ADMIN — INSULIN GLARGINE 14 UNITS: 100 INJECTION, SOLUTION SUBCUTANEOUS at 08:25

## 2017-01-01 RX ADMIN — NYSTATIN 500000 UNITS: 500000 SUSPENSION ORAL at 15:17

## 2017-01-01 RX ADMIN — NYSTATIN 500000 UNITS: 500000 SUSPENSION ORAL at 09:10

## 2017-01-01 RX ADMIN — INSULIN ASPART 8 UNITS: 100 INJECTION, SOLUTION INTRAVENOUS; SUBCUTANEOUS at 11:20

## 2017-01-01 RX ADMIN — IPRATROPIUM BROMIDE 0.5 MG: 0.5 SOLUTION RESPIRATORY (INHALATION) at 12:16

## 2017-01-01 RX ADMIN — PANTOPRAZOLE SODIUM 40 MG: 40 INJECTION, POWDER, FOR SOLUTION INTRAVENOUS at 09:15

## 2017-01-01 RX ADMIN — FEXOFENADINE HYDROCHLORIDE 180 MG: 180 TABLET, FILM COATED ORAL at 08:38

## 2017-01-01 RX ADMIN — HEPARIN SODIUM 5000 UNITS: 5000 INJECTION, SOLUTION INTRAVENOUS; SUBCUTANEOUS at 20:08

## 2017-01-01 RX ADMIN — METOPROLOL TARTRATE 2.5 MG: 5 INJECTION INTRAVENOUS at 16:14

## 2017-01-01 RX ADMIN — SODIUM CHLORIDE, PRESERVATIVE FREE 5 ML: 5 INJECTION INTRAVENOUS at 06:43

## 2017-01-01 RX ADMIN — HEPARIN SODIUM 5000 UNITS: 5000 INJECTION, SOLUTION INTRAVENOUS; SUBCUTANEOUS at 21:23

## 2017-01-01 RX ADMIN — SODIUM CHLORIDE, PRESERVATIVE FREE 5 ML: 5 INJECTION INTRAVENOUS at 17:32

## 2017-01-01 RX ADMIN — NIACIN 500 MG: 500 TABLET, FILM COATED, EXTENDED RELEASE ORAL at 22:16

## 2017-01-01 RX ADMIN — PANTOPRAZOLE SODIUM 40 MG: 40 INJECTION, POWDER, FOR SOLUTION INTRAVENOUS at 20:24

## 2017-01-01 RX ADMIN — DEXTROSE MONOHYDRATE: 50 INJECTION, SOLUTION INTRAVENOUS at 00:32

## 2017-01-01 RX ADMIN — NYSTATIN 500000 UNITS: 500000 SUSPENSION ORAL at 11:45

## 2017-01-01 RX ADMIN — LACTULOSE 20 G: 20 POWDER, FOR SOLUTION ORAL at 13:16

## 2017-01-01 RX ADMIN — FLUTICASONE PROPIONATE 2 SPRAY: 50 SPRAY, METERED NASAL at 20:26

## 2017-01-01 RX ADMIN — INSULIN ASPART 1 UNITS: 100 INJECTION, SOLUTION INTRAVENOUS; SUBCUTANEOUS at 16:37

## 2017-01-01 RX ADMIN — NYSTATIN 500000 UNITS: 500000 SUSPENSION ORAL at 09:29

## 2017-01-01 RX ADMIN — CEFTRIAXONE 1 G: 1 INJECTION, POWDER, FOR SOLUTION INTRAMUSCULAR; INTRAVENOUS at 22:12

## 2017-01-01 RX ADMIN — BUMETANIDE 0.5 MG: 0.5 TABLET ORAL at 09:11

## 2017-01-01 RX ADMIN — NYSTATIN 500000 UNITS: 500000 SUSPENSION ORAL at 16:01

## 2017-01-01 RX ADMIN — INSULIN ASPART 1 UNITS: 100 INJECTION, SOLUTION INTRAVENOUS; SUBCUTANEOUS at 08:07

## 2017-01-01 RX ADMIN — Medication 500 MCG: at 17:51

## 2017-01-01 RX ADMIN — PANTOPRAZOLE SODIUM 40 MG: 40 INJECTION, POWDER, FOR SOLUTION INTRAVENOUS at 08:11

## 2017-01-01 RX ADMIN — MULTIPLE VITAMINS W/ MINERALS TAB 1 TABLET: TAB at 08:45

## 2017-01-01 RX ADMIN — Medication 1 CAPSULE: at 07:57

## 2017-01-01 RX ADMIN — METOPROLOL TARTRATE 2.5 MG: 5 INJECTION INTRAVENOUS at 20:53

## 2017-01-01 RX ADMIN — IPRATROPIUM BROMIDE 1 PUFF: 17 AEROSOL, METERED RESPIRATORY (INHALATION) at 20:25

## 2017-01-01 RX ADMIN — NYSTATIN 500000 UNITS: 500000 SUSPENSION ORAL at 19:55

## 2017-01-01 RX ADMIN — FLUTICASONE PROPIONATE 2 SPRAY: 50 SPRAY, METERED NASAL at 09:15

## 2017-01-01 RX ADMIN — POTASSIUM CHLORIDE 10 MEQ: 14.9 INJECTION, SOLUTION, CONCENTRATE PARENTERAL at 01:55

## 2017-01-01 RX ADMIN — IRON 325 MG: 65 TABLET ORAL at 09:10

## 2017-01-01 RX ADMIN — NITROFURANTOIN (MONOHYDRATE/MACROCRYSTALS) 100 MG: 75; 25 CAPSULE ORAL at 09:28

## 2017-01-01 RX ADMIN — NYSTATIN 500000 UNITS: 500000 SUSPENSION ORAL at 08:38

## 2017-01-01 RX ADMIN — MULTIVITAMIN 15 ML: LIQUID ORAL at 09:18

## 2017-01-01 RX ADMIN — IPRATROPIUM BROMIDE 1 PUFF: 17 AEROSOL, METERED RESPIRATORY (INHALATION) at 10:09

## 2017-01-01 RX ADMIN — IPRATROPIUM BROMIDE 0.5 MG: 0.5 SOLUTION RESPIRATORY (INHALATION) at 20:52

## 2017-01-01 RX ADMIN — IPRATROPIUM BROMIDE 1 PUFF: 17 AEROSOL, METERED RESPIRATORY (INHALATION) at 20:21

## 2017-01-01 RX ADMIN — POTASSIUM CHLORIDE 10 MEQ: 7.46 INJECTION, SOLUTION INTRAVENOUS at 15:37

## 2017-01-01 RX ADMIN — DEXTROSE MONOHYDRATE: 50 INJECTION, SOLUTION INTRAVENOUS at 22:46

## 2017-01-01 RX ADMIN — AMPICILLIN SODIUM AND SULBACTAM SODIUM 3 G: 2; 1 INJECTION, POWDER, FOR SOLUTION INTRAMUSCULAR; INTRAVENOUS at 04:04

## 2017-01-01 RX ADMIN — ACETAMINOPHEN 975 MG: 325 TABLET, FILM COATED ORAL at 17:50

## 2017-01-01 RX ADMIN — SODIUM CHLORIDE, POTASSIUM CHLORIDE, SODIUM LACTATE AND CALCIUM CHLORIDE: 600; 310; 30; 20 INJECTION, SOLUTION INTRAVENOUS at 09:31

## 2017-01-01 RX ADMIN — POTASSIUM PHOSPHATE, MONOBASIC AND POTASSIUM PHOSPHATE, DIBASIC 20 MMOL: 224; 236 INJECTION, SOLUTION INTRAVENOUS at 12:21

## 2017-01-01 RX ADMIN — NYSTATIN 500000 UNITS: 500000 SUSPENSION ORAL at 12:36

## 2017-01-01 RX ADMIN — MAGNESIUM SULFATE IN WATER 4 G: 40 INJECTION, SOLUTION INTRAVENOUS at 10:17

## 2017-01-01 RX ADMIN — POTASSIUM CHLORIDE 20 MEQ: 29.8 INJECTION, SOLUTION INTRAVENOUS at 06:33

## 2017-01-01 RX ADMIN — INSULIN ASPART 1 UNITS: 100 INJECTION, SOLUTION INTRAVENOUS; SUBCUTANEOUS at 19:50

## 2017-01-01 RX ADMIN — INSULIN GLARGINE 30 UNITS: 100 INJECTION, SOLUTION SUBCUTANEOUS at 19:50

## 2017-01-01 RX ADMIN — SODIUM CHLORIDE, POTASSIUM CHLORIDE, SODIUM LACTATE AND CALCIUM CHLORIDE: 600; 310; 30; 20 INJECTION, SOLUTION INTRAVENOUS at 05:59

## 2017-01-01 RX ADMIN — METOPROLOL TARTRATE 2.5 MG: 5 INJECTION INTRAVENOUS at 20:00

## 2017-01-01 RX ADMIN — NIACIN 500 MG: 500 TABLET, FILM COATED, EXTENDED RELEASE ORAL at 22:12

## 2017-01-01 RX ADMIN — PANTOPRAZOLE SODIUM 40 MG: 40 INJECTION, POWDER, FOR SOLUTION INTRAVENOUS at 21:00

## 2017-01-01 RX ADMIN — PANTOPRAZOLE SODIUM 40 MG: 40 TABLET, DELAYED RELEASE ORAL at 09:27

## 2017-01-01 RX ADMIN — IPRATROPIUM BROMIDE 1 PUFF: 17 AEROSOL, METERED RESPIRATORY (INHALATION) at 21:43

## 2017-01-01 RX ADMIN — PANTOPRAZOLE SODIUM 40 MG: 40 INJECTION, POWDER, FOR SOLUTION INTRAVENOUS at 13:39

## 2017-01-01 RX ADMIN — FEXOFENADINE HYDROCHLORIDE 180 MG: 180 TABLET, FILM COATED ORAL at 09:28

## 2017-01-01 RX ADMIN — NIACIN 500 MG: 500 TABLET, FILM COATED, EXTENDED RELEASE ORAL at 21:24

## 2017-01-01 RX ADMIN — SENNOSIDES AND DOCUSATE SODIUM 1 TABLET: 8.6; 5 TABLET ORAL at 19:56

## 2017-01-01 RX ADMIN — POTASSIUM PHOSPHATE, MONOBASIC AND POTASSIUM PHOSPHATE, DIBASIC 15 MMOL: 224; 236 INJECTION, SOLUTION INTRAVENOUS at 15:41

## 2017-01-01 RX ADMIN — DEXTROSE MONOHYDRATE: 50 INJECTION, SOLUTION INTRAVENOUS at 10:24

## 2017-01-01 RX ADMIN — IPRATROPIUM BROMIDE 1 PUFF: 17 AEROSOL, METERED RESPIRATORY (INHALATION) at 09:25

## 2017-01-01 RX ADMIN — IPRATROPIUM BROMIDE 0.5 MG: 0.5 SOLUTION RESPIRATORY (INHALATION) at 16:07

## 2017-01-01 RX ADMIN — INSULIN ASPART 1 UNITS: 100 INJECTION, SOLUTION INTRAVENOUS; SUBCUTANEOUS at 00:19

## 2017-01-01 RX ADMIN — INSULIN ASPART 3 UNITS: 100 INJECTION, SOLUTION INTRAVENOUS; SUBCUTANEOUS at 12:32

## 2017-01-01 RX ADMIN — ROSUVASTATIN CALCIUM 20 MG: 10 TABLET, FILM COATED ORAL at 21:53

## 2017-01-01 RX ADMIN — IPRATROPIUM BROMIDE 0.5 MG: 0.5 SOLUTION RESPIRATORY (INHALATION) at 20:36

## 2017-01-01 RX ADMIN — VANCOMYCIN HYDROCHLORIDE 1250 MG: 10 INJECTION, POWDER, LYOPHILIZED, FOR SOLUTION INTRAVENOUS at 10:05

## 2017-01-01 RX ADMIN — AMPICILLIN SODIUM AND SULBACTAM SODIUM 3 G: 2; 1 INJECTION, POWDER, FOR SOLUTION INTRAMUSCULAR; INTRAVENOUS at 17:30

## 2017-01-01 RX ADMIN — MAGNESIUM OXIDE TAB 400 MG (241.3 MG ELEMENTAL MG) 800 MG: 400 (241.3 MG) TAB at 17:51

## 2017-01-01 RX ADMIN — POTASSIUM CHLORIDE 10 MEQ: 7.46 INJECTION, SOLUTION INTRAVENOUS at 18:01

## 2017-01-01 RX ADMIN — METOPROLOL TARTRATE 25 MG: 100 TABLET, FILM COATED ORAL at 09:23

## 2017-01-01 RX ADMIN — PANTOPRAZOLE SODIUM 40 MG: 40 INJECTION, POWDER, FOR SOLUTION INTRAVENOUS at 19:59

## 2017-01-01 RX ADMIN — IPRATROPIUM BROMIDE 0.5 MG: 0.5 SOLUTION RESPIRATORY (INHALATION) at 21:27

## 2017-01-01 RX ADMIN — METOPROLOL TARTRATE 2.5 MG: 5 INJECTION, SOLUTION INTRAVENOUS at 15:40

## 2017-01-01 RX ADMIN — FEXOFENADINE HYDROCHLORIDE 180 MG: 180 TABLET, FILM COATED ORAL at 09:30

## 2017-01-01 RX ADMIN — IPRATROPIUM BROMIDE 0.5 MG: 0.5 SOLUTION RESPIRATORY (INHALATION) at 20:31

## 2017-01-01 RX ADMIN — SODIUM CHLORIDE, POTASSIUM CHLORIDE, SODIUM LACTATE AND CALCIUM CHLORIDE: 600; 310; 30; 20 INJECTION, SOLUTION INTRAVENOUS at 19:44

## 2017-01-01 RX ADMIN — SENNOSIDES AND DOCUSATE SODIUM 2 TABLET: 8.6; 5 TABLET ORAL at 10:06

## 2017-01-01 RX ADMIN — INSULIN ASPART 2 UNITS: 100 INJECTION, SOLUTION INTRAVENOUS; SUBCUTANEOUS at 12:39

## 2017-01-01 RX ADMIN — POTASSIUM & SODIUM PHOSPHATES POWDER PACK 280-160-250 MG 1 PACKET: 280-160-250 PACK at 11:27

## 2017-01-01 RX ADMIN — ACETAMINOPHEN 975 MG: 325 TABLET, FILM COATED ORAL at 08:43

## 2017-01-01 RX ADMIN — METOPROLOL TARTRATE 2.5 MG: 5 INJECTION, SOLUTION INTRAVENOUS at 16:21

## 2017-01-01 RX ADMIN — FLUTICASONE FUROATE AND VILANTEROL TRIFENATATE 1 PUFF: 200; 25 POWDER RESPIRATORY (INHALATION) at 09:21

## 2017-01-01 RX ADMIN — NIACIN 500 MG: 500 TABLET, FILM COATED, EXTENDED RELEASE ORAL at 22:27

## 2017-01-01 RX ADMIN — NYSTATIN 500000 UNITS: 500000 SUSPENSION ORAL at 19:58

## 2017-01-01 RX ADMIN — MULTIVITAMIN 15 ML: LIQUID ORAL at 21:39

## 2017-01-01 RX ADMIN — SODIUM CHLORIDE 1000 ML: 9 INJECTION, SOLUTION INTRAVENOUS at 21:19

## 2017-01-01 RX ADMIN — SODIUM CHLORIDE, PRESERVATIVE FREE 5 ML: 5 INJECTION INTRAVENOUS at 09:33

## 2017-01-01 RX ADMIN — LIDOCAINE HYDROCHLORIDE 100 MG: 20 INJECTION, SOLUTION INFILTRATION; PERINEURAL at 13:22

## 2017-01-01 RX ADMIN — Medication 0.5 MG: at 18:17

## 2017-01-01 RX ADMIN — FLUTICASONE FUROATE AND VILANTEROL TRIFENATATE 1 PUFF: 100; 25 POWDER RESPIRATORY (INHALATION) at 08:44

## 2017-01-01 RX ADMIN — METOPROLOL TARTRATE 2.5 MG: 5 INJECTION, SOLUTION INTRAVENOUS at 04:05

## 2017-01-01 RX ADMIN — IPRATROPIUM BROMIDE 0.5 MG: 0.5 SOLUTION RESPIRATORY (INHALATION) at 08:01

## 2017-01-01 RX ADMIN — INSULIN ASPART 3 UNITS: 100 INJECTION, SOLUTION INTRAVENOUS; SUBCUTANEOUS at 08:48

## 2017-01-01 RX ADMIN — SODIUM CHLORIDE, PRESERVATIVE FREE 5 ML: 5 INJECTION INTRAVENOUS at 15:44

## 2017-01-01 RX ADMIN — HEPARIN SODIUM 5000 UNITS: 5000 INJECTION, SOLUTION INTRAVENOUS; SUBCUTANEOUS at 08:44

## 2017-01-01 RX ADMIN — Medication 220 MG: at 11:37

## 2017-01-01 RX ADMIN — FERROUS SULFATE TAB 325 MG (65 MG ELEMENTAL FE) 325 MG: 325 (65 FE) TAB at 09:30

## 2017-01-01 RX ADMIN — INSULIN GLARGINE 14 UNITS: 100 INJECTION, SOLUTION SUBCUTANEOUS at 08:31

## 2017-01-01 RX ADMIN — METOPROLOL TARTRATE 2.5 MG: 5 INJECTION, SOLUTION INTRAVENOUS at 04:39

## 2017-01-01 RX ADMIN — METOPROLOL TARTRATE 2.5 MG: 5 INJECTION INTRAVENOUS at 04:37

## 2017-01-01 RX ADMIN — HEPARIN SODIUM 5000 UNITS: 5000 INJECTION, SOLUTION INTRAVENOUS; SUBCUTANEOUS at 19:50

## 2017-01-01 RX ADMIN — FUROSEMIDE 20 MG: 10 INJECTION, SOLUTION INTRAVENOUS at 16:33

## 2017-01-01 RX ADMIN — NYSTATIN 500000 UNITS: 100000 SUSPENSION ORAL at 13:45

## 2017-01-01 RX ADMIN — DEXTROSE MONOHYDRATE: 100 INJECTION, SOLUTION INTRAVENOUS at 08:32

## 2017-01-01 RX ADMIN — INSULIN GLARGINE 14 UNITS: 100 INJECTION, SOLUTION SUBCUTANEOUS at 22:17

## 2017-01-01 RX ADMIN — POTASSIUM PHOSPHATE, MONOBASIC AND POTASSIUM PHOSPHATE, DIBASIC 15 MMOL: 224; 236 INJECTION, SOLUTION INTRAVENOUS at 10:44

## 2017-01-01 RX ADMIN — FLUTICASONE FUROATE AND VILANTEROL TRIFENATATE 1 PUFF: 200; 25 POWDER RESPIRATORY (INHALATION) at 10:12

## 2017-01-01 RX ADMIN — SODIUM CHLORIDE, PRESERVATIVE FREE 5 ML: 5 INJECTION INTRAVENOUS at 15:48

## 2017-01-01 RX ADMIN — Medication 1 CAPSULE: at 19:59

## 2017-01-01 RX ADMIN — IRON 325 MG: 65 TABLET ORAL at 09:29

## 2017-01-01 RX ADMIN — INSULIN ASPART 2 UNITS: 100 INJECTION, SOLUTION INTRAVENOUS; SUBCUTANEOUS at 13:39

## 2017-01-01 RX ADMIN — SENNOSIDES AND DOCUSATE SODIUM 2 TABLET: 8.6; 5 TABLET ORAL at 09:28

## 2017-01-01 RX ADMIN — SODIUM CHLORIDE, PRESERVATIVE FREE 5 ML: 5 INJECTION INTRAVENOUS at 05:59

## 2017-01-01 RX ADMIN — INSULIN ASPART 9 UNITS: 100 INJECTION, SOLUTION INTRAVENOUS; SUBCUTANEOUS at 08:31

## 2017-01-01 RX ADMIN — SODIUM CHLORIDE, PRESERVATIVE FREE 10 ML: 5 INJECTION INTRAVENOUS at 05:46

## 2017-01-01 RX ADMIN — Medication 7 ML: at 13:46

## 2017-01-01 RX ADMIN — VITAMIN D, TAB 1000IU (100/BT) 2000 UNITS: 25 TAB at 09:29

## 2017-01-01 RX ADMIN — INSULIN ASPART 3 UNITS: 100 INJECTION, SOLUTION INTRAVENOUS; SUBCUTANEOUS at 20:51

## 2017-01-01 RX ADMIN — INSULIN ASPART 3 UNITS: 100 INJECTION, SOLUTION INTRAVENOUS; SUBCUTANEOUS at 11:50

## 2017-01-01 RX ADMIN — HYDROMORPHONE HYDROCHLORIDE 0.5 MG: 10 INJECTION, SOLUTION INTRAMUSCULAR; INTRAVENOUS; SUBCUTANEOUS at 13:25

## 2017-01-01 RX ADMIN — ASPIRIN 81 MG: 81 TABLET, COATED ORAL at 08:44

## 2017-01-01 RX ADMIN — POTASSIUM & SODIUM PHOSPHATES POWDER PACK 280-160-250 MG 1 PACKET: 280-160-250 PACK at 16:01

## 2017-01-01 RX ADMIN — METOPROLOL TARTRATE 2.5 MG: 5 INJECTION, SOLUTION INTRAVENOUS at 04:29

## 2017-01-01 RX ADMIN — FLUTICASONE PROPIONATE 2 SPRAY: 50 SPRAY, METERED NASAL at 08:38

## 2017-01-01 RX ADMIN — METOPROLOL SUCCINATE 50 MG: 50 TABLET, FILM COATED, EXTENDED RELEASE ORAL at 22:12

## 2017-01-01 RX ADMIN — DEXTROSE MONOHYDRATE: 50 INJECTION, SOLUTION INTRAVENOUS at 14:57

## 2017-01-01 RX ADMIN — INSULIN GLARGINE 14 UNITS: 100 INJECTION, SOLUTION SUBCUTANEOUS at 09:33

## 2017-01-01 RX ADMIN — IPRATROPIUM BROMIDE 0.5 MG: 0.5 SOLUTION RESPIRATORY (INHALATION) at 14:46

## 2017-01-01 RX ADMIN — PANTOPRAZOLE SODIUM 40 MG: 40 INJECTION, POWDER, FOR SOLUTION INTRAVENOUS at 19:37

## 2017-01-01 RX ADMIN — NYSTATIN 500000 UNITS: 500000 SUSPENSION ORAL at 16:37

## 2017-01-01 RX ADMIN — PHYTONADIONE 5 MG: 10 INJECTION, EMULSION INTRAMUSCULAR; INTRAVENOUS; SUBCUTANEOUS at 10:28

## 2017-01-01 RX ADMIN — DEXTROSE MONOHYDRATE: 50 INJECTION, SOLUTION INTRAVENOUS at 15:15

## 2017-01-01 RX ADMIN — NYSTATIN 500000 UNITS: 500000 SUSPENSION ORAL at 20:27

## 2017-01-01 RX ADMIN — FLUTICASONE PROPIONATE 2 SPRAY: 50 SPRAY, METERED NASAL at 12:06

## 2017-01-01 RX ADMIN — PANTOPRAZOLE SODIUM 40 MG: 40 INJECTION, POWDER, FOR SOLUTION INTRAVENOUS at 09:16

## 2017-01-01 RX ADMIN — METOPROLOL TARTRATE 25 MG: 100 TABLET, FILM COATED ORAL at 08:16

## 2017-01-01 RX ADMIN — IPRATROPIUM BROMIDE 0.5 MG: 0.5 SOLUTION RESPIRATORY (INHALATION) at 11:48

## 2017-01-01 RX ADMIN — NYSTATIN 500000 UNITS: 500000 SUSPENSION ORAL at 11:37

## 2017-01-01 RX ADMIN — MULTIPLE VITAMINS W/ MINERALS TAB 1 TABLET: TAB at 09:10

## 2017-01-01 RX ADMIN — ROSUVASTATIN CALCIUM 20 MG: 20 TABLET, FILM COATED ORAL at 09:29

## 2017-01-01 RX ADMIN — Medication 7 ML: at 07:49

## 2017-01-01 RX ADMIN — INSULIN ASPART 1 UNITS: 100 INJECTION, SOLUTION INTRAVENOUS; SUBCUTANEOUS at 15:48

## 2017-01-01 RX ADMIN — METOPROLOL TARTRATE 2.5 MG: 5 INJECTION, SOLUTION INTRAVENOUS at 04:00

## 2017-01-01 RX ADMIN — POTASSIUM CHLORIDE 10 MEQ: 14.9 INJECTION, SOLUTION, CONCENTRATE PARENTERAL at 03:58

## 2017-01-01 RX ADMIN — NYSTATIN 500000 UNITS: 500000 SUSPENSION ORAL at 11:40

## 2017-01-01 RX ADMIN — NIACIN 500 MG: 500 TABLET, FILM COATED, EXTENDED RELEASE ORAL at 21:59

## 2017-01-01 RX ADMIN — INSULIN ASPART 1 UNITS: 100 INJECTION, SOLUTION INTRAVENOUS; SUBCUTANEOUS at 00:28

## 2017-01-01 RX ADMIN — INSULIN ASPART 2 UNITS: 100 INJECTION, SOLUTION INTRAVENOUS; SUBCUTANEOUS at 11:40

## 2017-01-01 RX ADMIN — METOPROLOL TARTRATE 2.5 MG: 5 INJECTION, SOLUTION INTRAVENOUS at 21:44

## 2017-01-01 RX ADMIN — INSULIN GLARGINE 30 UNITS: 100 INJECTION, SOLUTION SUBCUTANEOUS at 20:08

## 2017-01-01 RX ADMIN — Medication 1 CAPSULE: at 09:25

## 2017-01-01 RX ADMIN — NYSTATIN 500000 UNITS: 100000 SUSPENSION ORAL at 17:19

## 2017-01-01 RX ADMIN — Medication 1 CAPSULE: at 19:40

## 2017-01-01 RX ADMIN — Medication 220 MG: at 09:30

## 2017-01-01 RX ADMIN — PANTOPRAZOLE SODIUM 40 MG: 40 INJECTION, POWDER, FOR SOLUTION INTRAVENOUS at 09:45

## 2017-01-01 RX ADMIN — ASPIRIN 81 MG: 81 TABLET, COATED ORAL at 13:50

## 2017-01-01 RX ADMIN — IPRATROPIUM BROMIDE 0.5 MG: 0.5 SOLUTION RESPIRATORY (INHALATION) at 07:13

## 2017-01-01 RX ADMIN — INSULIN ASPART 10 UNITS: 100 INJECTION, SOLUTION INTRAVENOUS; SUBCUTANEOUS at 11:11

## 2017-01-01 RX ADMIN — METOPROLOL TARTRATE 2.5 MG: 5 INJECTION, SOLUTION INTRAVENOUS at 22:12

## 2017-01-01 ASSESSMENT — ENCOUNTER SYMPTOMS
VOMITING: 0
COLOR CHANGE: 0
SHORTNESS OF BREATH: 0
CONFUSION: 0
BLOOD IN STOOL: 0
SHORTNESS OF BREATH: 0
BACK PAIN: 0
NECK STIFFNESS: 0
CHILLS: 0
BLOOD IN STOOL: 0
FEVER: 0
DECREASED CONCENTRATION: 1
HEMATURIA: 0
ABDOMINAL PAIN: 0
BLOOD IN STOOL: 1
COUGH: 1
BLOOD IN STOOL: 0
ABDOMINAL PAIN: 0
BLOOD IN STOOL: 0
HEADACHES: 0
HEADACHES: 0
COUGH: 1
BLOOD IN STOOL: 0
SHORTNESS OF BREATH: 0
NECK PAIN: 0
BLOOD IN STOOL: 1
BLOOD IN STOOL: 0
BLOOD IN STOOL: 0
COUGH: 1
EYE REDNESS: 0
NAUSEA: 0
BLOOD IN STOOL: 0
ABDOMINAL PAIN: 0
BLOOD IN STOOL: 0
BLOOD IN STOOL: 0
BACK PAIN: 0
ABDOMINAL PAIN: 0
SHORTNESS OF BREATH: 0
SHORTNESS OF BREATH: 0
NECK PAIN: 0
FEVER: 0
ABDOMINAL PAIN: 0
DIFFICULTY URINATING: 0
CONFUSION: 1
ARTHRALGIAS: 0
BLOOD IN STOOL: 0
DIFFICULTY URINATING: 1
COUGH: 1
ADENOPATHY: 0

## 2017-01-01 ASSESSMENT — PAIN SCALES - GENERAL: PAINLEVEL: NO PAIN (0)

## 2017-01-01 ASSESSMENT — ACTIVITIES OF DAILY LIVING (ADL)
RETIRED_COMMUNICATION: 0-->UNDERSTANDS/COMMUNICATES WITHOUT DIFFICULTY
COGNITION: 0 - NO COGNITION ISSUES REPORTED
TOILETING: 2-->ASSISTIVE PERSON
BATHING: 2-->ASSISTIVE PERSON
DRESS: 2-->ASSISTIVE PERSON
FALL_HISTORY_WITHIN_LAST_SIX_MONTHS: YES
NUMBER_OF_TIMES_PATIENT_HAS_FALLEN_WITHIN_LAST_SIX_MONTHS: 1
RETIRED_EATING: 0-->INDEPENDENT
TRANSFERRING: 1-->ASSISTIVE EQUIPMENT
SWALLOWING: 0-->SWALLOWS FOODS/LIQUIDS WITHOUT DIFFICULTY
WHICH_OF_THE_ABOVE_FUNCTIONAL_RISKS_HAD_A_RECENT_ONSET_OR_CHANGE?: AMBULATION;TRANSFERRING;TOILETING;BATHING;DRESSING
AMBULATION: 1-->ASSISTIVE EQUIPMENT

## 2017-01-01 ASSESSMENT — PAIN DESCRIPTION - DESCRIPTORS
DESCRIPTORS: OTHER (COMMENT)
DESCRIPTORS: OTHER (COMMENT);PATIENT UNABLE TO DESCRIBE

## 2017-01-01 ASSESSMENT — COPD QUESTIONNAIRES: COPD: 1

## 2017-01-05 NOTE — TELEPHONE ENCOUNTER
1-4-17      mt notes pt. Exhibiting known side effects of donepezil . His wife should contact neurology for what to do .    Oma Gagnon Rph.  Medication Therapy Management Provider  [phone]

## 2017-01-10 NOTE — TELEPHONE ENCOUNTER
Dw pt wife.  States that Fredrick has had elbow and shoulder numbness and pain for a while (unsure how long, ?1 month).  Told this to to neuro at a visit and they ordered XR and told him to fu with ortho after fx was noted.    Regarding his foot, he is out of his boot and walking with rolling walker.    Plan to discuss further treatment in person at his visit next week.

## 2017-01-13 PROBLEM — M25.579 ANKLE PAIN: Status: ACTIVE | Noted: 2017-01-01

## 2017-01-19 NOTE — Clinical Note
1/19/2017      RE: Melvin Bhatia  [address]   [facility] [address]       HPI      cc: fu multiple L foot fx's  - working with PT now  - is using rolling walker around the house  - only uses wheel chair to sit in occasionally, but does not need it to get around  - pain continues to improve since the last visit    L Elbow Fx  - shoulder and elbow pain x7-8 months  - notes some swelling in the L arm   - thought to be neuropathy by PCP  - denies any known injury to the elbow, although he has had a few falls over the past 4-5 years  - describes pain more as a numbness that is b/l from elbow down  - neuro did not have any concern that the fx was impinging on a neurologic structure per pt and wife  - denies limits to ROM in elbow      Medical History     Past Surgical History   Procedure Laterality Date     Surgical history of -   2001     R wrist surg fx/ steel plate     Surgical history of -   age 20     Inguinal hernia     C dexa, bone density, axial skel  3/2006     Osteoporosis     Cardiac nuc wilman stress test nl  5/06     No ischemia, EF 44 %     Hc colonoscopy thru stoma, diagnostic  2/2003     Normal, repeat 10 yr     Upper gi endoscopy  2/2003     esophageal ulcers, gastritis, duodenitis     Upper gi endoscopy  4/2003     erosive gastropathy from ASA     Surgical history of -   2003     Bladder stim surg, not sucessful     Tonsillectomy  child     Cardiac nuc wilman stress test nl  6/2008, 3/09     sm to mod fixed apical defect EF 46%     Hc doppler echo pulsed, complete       EF 50-55%     Surgical history of -        Lymphedema treatment of legs     Open reduction internal fixation tibial plateau  3/2011     left     Implant shunt ventriculoperitoneal  2/28/2012     Procedure:IMPLANT SHUNT VENTRICULOPERITONEAL; Ventriculoperitoneal Shunt Placement; Surgeon:ALESHA VELAZQUEZ; Location:[facility] OR       Past Medical History   Diagnosis Date     Chronic airway obstruction, not elsewhere  classified      Type II or unspecified type diabetes mellitus without mention of complication, not stated as uncontrolled      Mixed hyperlipidemia      Essential hypertension, benign      Hypersomnia with sleep apnea, unspecified      Bpap     Unspecified disorder resulting from impaired renal function      Stage 3 Chronic Renal Dz     Palpitations      HOLTER     Cauda equina syndrome with neurogenic bladder (H)      self catheter     Lumbago      Other specified disorders of rotator cuff syndrome of shoulder and allied disorders      Other osteoporosis 3/2006     Other specified anemias 2002     Nl colonoscopy/EGD; Nl iron studies 4/06     Congestive heart failure, unspecified 5/06     EF 48%     Other testicular hypofunction      on Androgel     Hemorrhage of gastrointestinal tract, unspecified 2/2003     UGI Bleed with transfusion     Diabetes      Atrial fibrillation (H)      first diagnosed 11-08     Coronary artery disease      abnl mps with ef 46% and small-med area of ischemia '08 [facility]     Lumbosacral plexopathy 10/09     L diabetic myotrophy     Legionella pneumonia (H) 8/09     ARDS (adult respiratory distress syndrome) (H)      CHF (congestive heart failure) (H)      Bell's palsy 1/2010     CVA (cerebral infarction)      old lacunar infarct in the posterior left     Tibial plateau fracture 2/2011     left     CKD (chronic kidney disease) stage 4, GFR 15-29 ml/min (H)      Intraventricular hemorrhage 1/2012     Subarachnoid hemorrhage (H) 1/2012     Hydrocephalus      plan  Shunt     Heart attack (H)      Gastro-oesophageal reflux disease      Sleep apnea      uses cpap     Chronic infection      VRE     Cerebellar infarct (H) 4/2012     left cerebellar infarct.  felt embolic hx afib       Allergies   Allergen Reactions     Penicillins Hives     Albuterol Other (See Comments)     Albuterol Sulfate Other (See Comments)     SVT/tachycardia with albuterol     Atorvastatin Other (See Comments)      Description: Lipitor, Description: Lipitor     Atorvastatin Calcium      Cipro [Ciprofloxacin] Diarrhea     Ciprofloxacin Other (See Comments)     Description: Cipro, Description: Cipro     Coumadin      Intraventricular hemorhage, subarrachnoid hem, hydrocephalous due to falls    Per wife, pt is not allergic to this med     Simvastatin Other (See Comments)     Description: Zocor, Description: Zocor  C/o weak muscles and severe abdominal pain.     Warfarin Unknown       Social History     Social History     Marital Status:      Spouse Name: N/A     Number of Children: N/A     Years of Education: N/A     Occupational History     Not on file.     Social History Main Topics     Smoking status: Former Smoker     Quit date: 06/26/1997     Smokeless tobacco: Never Used      Comment: quit 1997       2-3 ppd for 45 yrs      Alcohol Use: No      Comment: recovering since 1982       Drug Use: No     Sexual Activity: Not on file     Other Topics Concern     Parent/Sibling W/ Cabg, Mi Or Angioplasty Before 65f 55m? Yes     Social History Narrative    Dairy/d 1 servings/d.     Caffeine 0 servings/d    Exercise walks 7 x week    Sunscreen used - No    Seatbelts used - No    Working smoke/CO detectors in the home - Yes    Guns stored in the home - No    Self Breast Exams - NA    Self Testicular Exam - No    Eye Exam up to date - Yes    Dental Exam up to date - Yes    Pap Smear up to date - NA    Mammogram up to date - NA    PSA up to date - Yes    Dexa Scan up to date - Yes 3/06    Flex Sig / Colonoscopy up to date - Yes    Immunizations up to date - No  Declines with possible allergic rxn as child    Abuse: Current or Past(Physical, Sexual or Emotional)- No    Do you feel safe in your environment - Yes    Consuelo Alfonso RN    4/30/08       Family History   Problem Relation Age of Onset     C.A.D. Father      DIABETES Maternal Grandmother        Current Outpatient Prescriptions   Medication     fexofenadine (ALLEGRA) 180  "MG tablet     budesonide-formoterol (SYMBICORT) 160-4.5 MCG/ACT Inhaler     Ipratropium-Albuterol (COMBIVENT RESPIMAT)  MCG/ACT inhaler     rosuvastatin (CRESTOR) 20 MG tablet     sulfamethoxazole-trimethoprim (BACTRIM DS/SEPTRA DS) 800-160 MG per tablet     RANITIDINE HCL PO     donepezil (ARICEPT) 5 MG tablet     calcium citrate (CALCITRATE) 950 MG tablet     aspirin 81 MG tablet     Cyanocobalamin (VITAMIN B12) 1000 MCG TBCR     calcium citrate-vitamin D (CITRACAL) 315-200 MG-UNIT TABS     denosumab (PROLIA) 60 MG/ML SOLN     exenatide (BYETTA) 5 mcg/0.02mL per dose (60 doses per 1.2 mL prefilled pen)     metoprolol (TOPROL-XL) 50 MG 24 hr tablet     insulin lispro (HUMALOG KWIKPEN) 100 UNIT/ML soln     fluticasone (FLONASE) 50 MCG/ACT nasal spray     insulin pen needle (B-D U/F) 31G X 8 MM     sulfamethoxazole-trimethoprim (BACTRIM DS) 800-160 MG per tablet     blood glucose monitoring (NO BRAND SPECIFIED) test strip     ONE TOUCH LANCETS MISC     ketoconazole (NIZORAL) 2 % shampoo     insulin glargine (LANTUS SOLOSTAR) 100 UNIT/ML PEN     nitroglycerin (NITROSTAT) 0.4 MG SL tablet     fluocinolone (SYNALAR) 0.01 % external solution     order for DME     ACE/ARB NOT PRESCRIBED, INTENTIONAL,     multivitamin, therapeutic with minerals (THERA-VIT-M) TABS     ferrous sulfate 325 (65 FE) MG tablet     VITAMIN D, CHOLECALCIFEROL, PO     bumetanide (BUMEX) 0.5 MG tablet     insulin syringes, disposable, U-100 0.3 ML MISC     ORDER FOR DME     Catheters MISC     magnesium oxide (MAG-OX) 400 MG tablet     niacin (NIASPAN) 500 MG CR tablet     No current facility-administered medications for this visit.           Objective Findings     Filed Vitals:    01/19/17 1354   Height: 5' 4\" (1.626 m)   Weight: 190 lb (86.183 kg)         Physical Exam:  Gen: A&O in NAD  CV: regular rate, extremities well perfused x4  Pulm: regular rate, without respiratory distress  Derm: no rashes or lesions  Psych: normal affect    MSK:  L " Foot - notable atrophy of musculature diffusely, stable at known baseline.  Some diffuse edema as well, at baseline.  Mild TTP over the dorsal metatarsal heads, though I am uncertain if pain to palpation in this area is truly bony or more related to his chronic edema.  Very limited ankle ROM in all planes of motion, unable to significantly test muscle strength of the ankle due to this.  L Elbow - no gross deformity.  No bony TTP over the ulna, radius, radial head, medial/lateral epidondyle.  Full active ext/flexion of the elbow.  5/5 strength of the L UE.  SILT throughout forearms b/l.  No obvious swelling/ecchymosis or other signs of acute injury.        Assessment / Plan     1) Multiple L Foot Fx's  - suspect his residual pain on exam is more likely due to his chronic stasis edema in that extremity and less likely due to osseous pathology at this point  - would consider his fx sites essentially healed and he is cleared for all activity, though this will likely continue to be limited to ambulation with a 4 point walker due to significant deconditioning  - dw pt and wife that goals of care presently are minimization not full resolution of pain and conditioning him to the point that he is able to ambulate and perform ADLs with as little impairment as possible  - cont to see PT and work on HEP  - fu in 2-3 months for evaluation of progress    2) L Elbow Fx  - personally reviewed XR from neuro visit, well corticated loose body noted as well as some arthritis of the joint  - as the large fragment seen on XR does not appear to cause any deficits with ROM and is unlikely to be contributing to his current pain or symptoms, dw pt and wife that I would recommend observation only from an ortho standpoint  - fu PRN    3) Osteoporosis  - managed by PCP      patient seen and case dw Dr. Nory Huntley D.O.  Sports Medicine Fellow      Attending Note:   I have personally examined this patient and have reviewed the  clinical presentation and progress note with the fellow. I agree with the treatment plan as outlined. The plan was formulated with the fellow on the day of the patient's visit. I have reviewed all imaging with the fellow and agree with the findings in the documentation.     Rin Cueto MD, CAQ, CCD  [facility]  Sports Medicine and Bone Health      Marquez ALEX Huntley MD

## 2017-01-19 NOTE — PROGRESS NOTES
HPI      cc: fu multiple L foot fx's  - working with PT now  - is using rolling walker around the house  - only uses wheel chair to sit in occasionally, but does not need it to get around  - pain continues to improve since the last visit    L Elbow Fx  - shoulder and elbow pain x7-8 months  - notes some swelling in the L arm   - thought to be neuropathy by PCP  - denies any known injury to the elbow, although he has had a few falls over the past 4-5 years  - describes pain more as a numbness that is b/l from elbow down  - neuro did not have any concern that the fx was impinging on a neurologic structure per pt and wife  - denies limits to ROM in elbow      Medical History     Past Surgical History   Procedure Laterality Date     Surgical history of -   2001     R wrist surg fx/ steel plate     Surgical history of -   age 20     Inguinal hernia     C dexa, bone density, axial skel  3/2006     Osteoporosis     Cardiac nuc wilman stress test nl  5/06     No ischemia, EF 44 %     Hc colonoscopy thru stoma, diagnostic  2/2003     Normal, repeat 10 yr     Upper gi endoscopy  2/2003     esophageal ulcers, gastritis, duodenitis     Upper gi endoscopy  4/2003     erosive gastropathy from ASA     Surgical history of -   2003     Bladder stim surg, not sucessful     Tonsillectomy  child     Cardiac nuc wilman stress test nl  6/2008, 3/09     sm to mod fixed apical defect EF 46%     Hc doppler echo pulsed, complete       EF 50-55%     Surgical history of -        Lymphedema treatment of legs     Open reduction internal fixation tibial plateau  3/2011     left     Implant shunt ventriculoperitoneal  2/28/2012     Procedure:IMPLANT SHUNT VENTRICULOPERITONEAL; Ventriculoperitoneal Shunt Placement; Surgeon:ALESHA VELAZQUEZ; Location:[facility] OR       Past Medical History   Diagnosis Date     Chronic airway obstruction, not elsewhere classified      Type II or unspecified type diabetes mellitus without mention of complication, not  stated as uncontrolled      Mixed hyperlipidemia      Essential hypertension, benign      Hypersomnia with sleep apnea, unspecified      Bpap     Unspecified disorder resulting from impaired renal function      Stage 3 Chronic Renal Dz     Palpitations      HOLTER     Cauda equina syndrome with neurogenic bladder (H)      self catheter     Lumbago      Other specified disorders of rotator cuff syndrome of shoulder and allied disorders      Other osteoporosis 3/2006     Other specified anemias 2002     Nl colonoscopy/EGD; Nl iron studies 4/06     Congestive heart failure, unspecified 5/06     EF 48%     Other testicular hypofunction      on Androgel     Hemorrhage of gastrointestinal tract, unspecified 2/2003     UGI Bleed with transfusion     Diabetes      Atrial fibrillation (H)      first diagnosed 11-08     Coronary artery disease      abnl mps with ef 46% and small-med area of ischemia '08 [facility]     Lumbosacral plexopathy 10/09     L diabetic myotrophy     Legionella pneumonia (H) 8/09     ARDS (adult respiratory distress syndrome) (H)      CHF (congestive heart failure) (H)      Bell's palsy 1/2010     CVA (cerebral infarction)      old lacunar infarct in the posterior left     Tibial plateau fracture 2/2011     left     CKD (chronic kidney disease) stage 4, GFR 15-29 ml/min (H)      Intraventricular hemorrhage 1/2012     Subarachnoid hemorrhage (H) 1/2012     Hydrocephalus      plan  Shunt     Heart attack (H)      Gastro-oesophageal reflux disease      Sleep apnea      uses cpap     Chronic infection      VRE     Cerebellar infarct (H) 4/2012     left cerebellar infarct.  felt embolic hx afib       Allergies   Allergen Reactions     Penicillins Hives     Albuterol Other (See Comments)     Albuterol Sulfate Other (See Comments)     SVT/tachycardia with albuterol     Atorvastatin Other (See Comments)     Description: Lipitor, Description: Lipitor     Atorvastatin Calcium      Cipro [Ciprofloxacin] Diarrhea      Ciprofloxacin Other (See Comments)     Description: Cipro, Description: Cipro     Coumadin      Intraventricular hemorhage, subarrachnoid hem, hydrocephalous due to falls    Per wife, pt is not allergic to this med     Simvastatin Other (See Comments)     Description: Zocor, Description: Zocor  C/o weak muscles and severe abdominal pain.     Warfarin Unknown       Social History     Social History     Marital Status:      Spouse Name: N/A     Number of Children: N/A     Years of Education: N/A     Occupational History     Not on file.     Social History Main Topics     Smoking status: Former Smoker     Quit date: 06/26/1997     Smokeless tobacco: Never Used      Comment: quit 1997       2-3 ppd for 45 yrs      Alcohol Use: No      Comment: recovering since 1982       Drug Use: No     Sexual Activity: Not on file     Other Topics Concern     Parent/Sibling W/ Cabg, Mi Or Angioplasty Before 65f 55m? Yes     Social History Narrative    Dairy/d 1 servings/d.     Caffeine 0 servings/d    Exercise walks 7 x week    Sunscreen used - No    Seatbelts used - No    Working smoke/CO detectors in the home - Yes    Guns stored in the home - No    Self Breast Exams - NA    Self Testicular Exam - No    Eye Exam up to date - Yes    Dental Exam up to date - Yes    Pap Smear up to date - NA    Mammogram up to date - NA    PSA up to date - Yes    Dexa Scan up to date - Yes 3/06    Flex Sig / Colonoscopy up to date - Yes    Immunizations up to date - No  Declines with possible allergic rxn as child    Abuse: Current or Past(Physical, Sexual or Emotional)- No    Do you feel safe in your environment - Yes    Consuelo Alfonso RN    4/30/08       Family History   Problem Relation Age of Onset     C.A.D. Father      DIABETES Maternal Grandmother        Current Outpatient Prescriptions   Medication     fexofenadine (ALLEGRA) 180 MG tablet     budesonide-formoterol (SYMBICORT) 160-4.5 MCG/ACT Inhaler     Ipratropium-Albuterol  "(COMBIVENT RESPIMAT)  MCG/ACT inhaler     rosuvastatin (CRESTOR) 20 MG tablet     sulfamethoxazole-trimethoprim (BACTRIM DS/SEPTRA DS) 800-160 MG per tablet     RANITIDINE HCL PO     donepezil (ARICEPT) 5 MG tablet     calcium citrate (CALCITRATE) 950 MG tablet     aspirin 81 MG tablet     Cyanocobalamin (VITAMIN B12) 1000 MCG TBCR     calcium citrate-vitamin D (CITRACAL) 315-200 MG-UNIT TABS     denosumab (PROLIA) 60 MG/ML SOLN     exenatide (BYETTA) 5 mcg/0.02mL per dose (60 doses per 1.2 mL prefilled pen)     metoprolol (TOPROL-XL) 50 MG 24 hr tablet     insulin lispro (HUMALOG KWIKPEN) 100 UNIT/ML soln     fluticasone (FLONASE) 50 MCG/ACT nasal spray     insulin pen needle (B-D U/F) 31G X 8 MM     sulfamethoxazole-trimethoprim (BACTRIM DS) 800-160 MG per tablet     blood glucose monitoring (NO BRAND SPECIFIED) test strip     ONE TOUCH LANCETS MISC     ketoconazole (NIZORAL) 2 % shampoo     insulin glargine (LANTUS SOLOSTAR) 100 UNIT/ML PEN     nitroglycerin (NITROSTAT) 0.4 MG SL tablet     fluocinolone (SYNALAR) 0.01 % external solution     order for DME     ACE/ARB NOT PRESCRIBED, INTENTIONAL,     multivitamin, therapeutic with minerals (THERA-VIT-M) TABS     ferrous sulfate 325 (65 FE) MG tablet     VITAMIN D, CHOLECALCIFEROL, PO     bumetanide (BUMEX) 0.5 MG tablet     insulin syringes, disposable, U-100 0.3 ML MISC     ORDER FOR DME     Catheters MISC     magnesium oxide (MAG-OX) 400 MG tablet     niacin (NIASPAN) 500 MG CR tablet     No current facility-administered medications for this visit.           Objective Findings     Filed Vitals:    01/19/17 1354   Height: 5' 4\" (1.626 m)   Weight: 190 lb (86.183 kg)         Physical Exam:  Gen: A&O in NAD  CV: regular rate, extremities well perfused x4  Pulm: regular rate, without respiratory distress  Derm: no rashes or lesions  Psych: normal affect    MSK:  L Foot - notable atrophy of musculature diffusely, stable at known baseline.  Some diffuse edema as " well, at baseline.  Mild TTP over the dorsal metatarsal heads, though I am uncertain if pain to palpation in this area is truly bony or more related to his chronic edema.  Very limited ankle ROM in all planes of motion, unable to significantly test muscle strength of the ankle due to this.  L Elbow - no gross deformity.  No bony TTP over the ulna, radius, radial head, medial/lateral epidondyle.  Full active ext/flexion of the elbow.  5/5 strength of the L UE.  SILT throughout forearms b/l.  No obvious swelling/ecchymosis or other signs of acute injury.        Assessment / Plan     1) Multiple L Foot Fx's  - suspect his residual pain on exam is more likely due to his chronic stasis edema in that extremity and less likely due to osseous pathology at this point  - would consider his fx sites essentially healed and he is cleared for all activity, though this will likely continue to be limited to ambulation with a 4 point walker due to significant deconditioning  - dw pt and wife that goals of care presently are minimization not full resolution of pain and conditioning him to the point that he is able to ambulate and perform ADLs with as little impairment as possible  - cont to see PT and work on HEP  - fu in 2-3 months for evaluation of progress    2) L Elbow Fx  - personally reviewed XR from neuro visit, well corticated loose body noted as well as some arthritis of the joint  - as the large fragment seen on XR does not appear to cause any deficits with ROM and is unlikely to be contributing to his current pain or symptoms, dw pt and wife that I would recommend observation only from an ortho standpoint  - fu PRN    3) Osteoporosis  - managed by PCP      patient seen and case dw ARON [email or web address].  Sports Medicine Fellow

## 2017-01-19 NOTE — PROGRESS NOTES
" Subjective:   Melvin Bhatia is a 76 year old male who is here today to follow up with left foot pain. He reports he is able to bear weight and walk, but reports that it takes him longer to ambulate.    Date of injury: 9/22/16  Date last seen: 12/8/2016  Following Therapeutic Plan: Yes   Pain: Unchanged  Function: Unchanged       PAST MEDICAL, SOCIAL, SURGICAL AND FAMILY HISTORY: {HISTORY:712313434}  ALLERGIES: He is allergic to penicillins; albuterol; albuterol sulfate; atorvastatin; atorvastatin calcium; cipro; ciprofloxacin; coumadin; simvastatin; and warfarin.    CURRENT MEDICATIONS: He has a current medication list which includes the following prescription(s): fexofenadine, budesonide-formoterol, ipratropium-albuterol, rosuvastatin, sulfamethoxazole-trimethoprim, ranitidine hcl, donepezil, calcium citrate, aspirin, vitamin b12, calcium citrate-vitamin d, denosumab, exenatide, metoprolol, insulin lispro, fluticasone, insulin pen needle, sulfamethoxazole-trimethoprim, blood glucose monitoring, one touch lancets, ketoconazole, insulin glargine, nitroglycerin, fluocinolone, order for dme, ACE/ARB NOT PRESCRIBED, INTENTIONAL,, multivitamin, therapeutic with minerals, ferrous sulfate, cholecalciferol, bumetanide, insulin syringes (disposable), order for dme, catheters, magnesium oxide, and niacin.     REVIEW OF SYSTEMS: {ORTHO ROS:[id number]}     Exam:   Ht 5' 4\" (1.626 m)  Wt 190 lb (86.183 kg)  BMI 32.60 kg/m2           CONSTITUTIONIAL: {GENERAL APPEARANCE:354337::\"healthy\",\"alert\",\"no distress\"}  HEAD: {HEAD EXAM:301::\"Normocephalic. No masses, lesions, tenderness or abnormalities\"}  SKIN: {[facility] SKIN:[id number]::\"no suspicious lesions or rashes\"}  GAIT: {EXAM GAIT:957848870::\"normal\"}  NEUROLOGIC: {[facility] NEURO EXAM:[id number]::\"Non-focal\"}  PSYCHIATRIC: {JOE PATIENT PSYCH APPEARANCE:306633::\"mentation appears normal.\",\"affect normal/bright\"}    MUSCULOSKELETAL: ***       Assessment/Plan:   ***    X-RAY " INTERPRETATION:   {[facility] JAMISON XRAY INTERP:[id number]}

## 2017-01-19 NOTE — PROGRESS NOTES
Attending Note:   I have personally examined this patient and have reviewed the clinical presentation and progress note with the fellow. I agree with the treatment plan as outlined. The plan was formulated with the fellow on the day of the patient's visit. I have reviewed all imaging with the fellow and agree with the findings in the documentation.     Rin Cueto MD, CAQ, CCD  [facility]  Sports Medicine and Bone Health

## 2017-02-01 NOTE — TELEPHONE ENCOUNTER
Biju Charles's wife the physical therapy rec's that home therapy may be of more value to Melvin, which I agree with, and she is agreeable to doing this.  We will have her contact his PCPs office for the order and for the long term follow up as at this point I would consider his fractures resolve, but continue the normal PT until it is set up.  If PCP takes over the long term home PT, then follow up is to be with them, however, if not, he is welcome to follow with me as initially planned in 2-3 months.  Dw her to contact our office with any further questions or issues that arise.

## 2017-02-02 NOTE — MR AVS SNAPSHOT
After Visit Summary   2/2/2017    Melvin Bhatia    MRN: [id number]           Patient Information     Date Of Birth          1940        Visit Information        Provider Department      2/2/2017 5:00 PM Casey Corey MD [facility]         Follow-ups after your visit        Follow-up notes from your care team     Return in about 3 months (around 5/2/2017).      Your next 10 appointments already scheduled     Feb 03, 2017 11:30 AM   MICHAEL Extremity with Freeman Jones PT   [facility] [place] ( [facility])    [address] [place] [id number]   [phone]            Feb 10, 2017 11:30 AM   MICHAEL Extremity with Freeman Jones PT   [facility] [place] ( [facility])    [address] [place] [address]   [phone]            May 02, 2017  3:00 PM   (Arrive by 2:45 PM)   Return Visit with Casey Corey MD   [facility] ([facility])    [address] [place] [id number] [phone]            May 23, 2017 11:00 AM   SHORT with Oma Gagnon RPH   [facility] ([facility])    [address] [place] [address] [phone]              Who to contact     Please call your clinic at [phone] to:    Ask questions about your health    Make or cancel appointments    Discuss your medicines    Learn about your test results    Speak to your doctor   If you have compliments or concerns about an experience at your clinic, or if you wish to file a complaint, please contact [facility] Patient Relations at [phone] or email us at [email or web address]         Additional Information About Your Visit        MyChart Information     [employer] gives you secure access to your electronic health record. If  "you see a primary care provider, you can also send messages to your care team and make appointments. If you have questions, please call your primary care clinic.  If you do not have a primary care provider, please call [phone] and they will assist you.      [employer] is an electronic gateway that provides easy, online access to your medical records. With [employer], you can request a clinic appointment, read your test results, renew a prescription or communicate with your care team.     To access your existing account, please contact your [facility] or call [phone] for assistance.        Care EveryWhere ID     This is your Care EveryWhere ID. This could be used by other organizations to access your [place] medical records  [id number]        Your Vitals Were     Pulse Respirations Height BMI (Body Mass Index) Pulse Oximetry       61 25 1.626 m (5' 4\") 34.14 kg/m2 98%        Blood Pressure from Last 3 Encounters:   02/02/17 140/60   12/16/16 132/73   12/15/16 135/53    Weight from Last 3 Encounters:   02/02/17 90.266 kg (199 lb)   01/19/17 86.183 kg (190 lb)   12/16/16 86.183 kg (190 lb)              Today, you had the following     No orders found for display         Today's Medication Changes          These changes are accurate as of: 2/2/17  5:28 PM.  If you have any questions, ask your nurse or doctor.               These medicines have changed or have updated prescriptions.        Dose/Directions    insulin glargine 100 UNIT/ML injection   Commonly known as:  LANTUS SOLOSTAR   This may have changed:    - when to take this  - additional instructions   Used for:  Type 2 diabetes with stage 3 chronic kidney disease GFR 30-59 (H)        Inject 55 units twice daily  under the skin daily.   Quantity:  60 mL   Refills:  6                Primary Care Provider Office Phone # Fax #    Ash Bowman [phone]       [facility] [address] [facility] [address]        Thank you!     Thank you for choosing [facility]  for your care. Our goal is always to provide you with excellent care. Hearing back from our patients is one way we can continue to improve our services. Please take a few minutes to complete the written survey that you may receive in the mail after your visit with us. Thank you!             Your Updated Medication List - Protect others around you: Learn how to safely use, store and throw away your medicines at www.disposemymeds.org.          This list is accurate as of: 2/2/17  5:28 PM.  Always use your most recent med list.                   Brand Name Dispense Instructions for use    * ACE/ARB NOT PRESCRIBED (INTENTIONAL)      ACE & ARB not prescribed due to Hyperkalemia (baseline K+ > 5.5), Worsening renal function on ACE/ARB therapy and CKD (Chronic Kidney Disease)       aspirin 81 MG tablet     100 tablet    Take 1 tablet (81 mg) by mouth daily       * BACTRIM -160 MG per tablet   Generic drug:  sulfamethoxazole-trimethoprim          * sulfamethoxazole-trimethoprim 800-160 MG per tablet    BACTRIM DS/SEPTRA DS    90 tablet    Take 1 tablet by mouth daily       blood glucose monitoring test strip    no brand specified    3 Box    1 strip by In Vitro route 4 times daily       budesonide-formoterol 160-4.5 MCG/ACT Inhaler    SYMBICORT    1 Inhaler    Inhale 2 puffs into the lungs 2 times daily       bumetanide 0.5 MG tablet    BUMEX     Take 0.5 mg by mouth 2 times daily       calcium citrate 950 MG tablet    CALCITRATE     Take 1 tablet by mouth daily       calcium citrate-vitamin D 315-200 MG-UNIT Tabs per tablet    CITRACAL     Take 2 tablets by mouth 2 times daily 2 tabs contain 500mg elemental calcium + 800 iu's vitamin D3 in them .       Catheters Misc     200 each    As needed       denosumab 60 MG/ML Soln injection    PROLIA     Inject 60 mg Subcutaneous every 6 months       exenatide 5 MCG/0.02ML Sopn injection    BYETTA 5 MCG  PEN    1 Syringe    Inject 5mcg under the skin twice daily 60 minutes before meals.       ferrous sulfate 325 (65 FE) MG tablet    IRON     Take 325 mg by mouth daily (with breakfast) 65mg elemental iron       fexofenadine 180 MG tablet    ALLEGRA    90 tablet    Take 1 tablet (180 mg) by mouth daily       fluocinolone 0.01 % solution    SYNALAR    60 mL    Apply 1 to 2 times daily to scaly areas of scalp as needed.Profile Rx: patient will contact pharmacy when needed       fluticasone 50 MCG/ACT spray    FLONASE    48 g    Spray 2 sprays into both nostrils daily       insulin glargine 100 UNIT/ML injection    LANTUS SOLOSTAR    60 mL    Inject 55 units twice daily  under the skin daily.       insulin lispro 100 UNIT/ML injection    HumaLOG KWIKpen    60 mL    Inject 30 units under the skin three times daily before meals as directed.       insulin pen needle 31G X 8 MM    B-D U/F    300 each    Use 7 daily or as directed.       insulin syringes (disposable) U-100 0.3 ML Misc     100 each    1 each 4 times daily.       Ipratropium-Albuterol  MCG/ACT inhaler    COMBIVENT RESPIMAT    3 Inhaler    Inhale 1 puff into the lungs 2 times daily Not to exceed 6 doses per day.       ketoconazole 2 % shampoo    NIZORAL    120 mL    Apply to the affected area and wash off after 5 minutes.       MAG- MG tablet   Generic drug:  magnesium oxide      Take 2 tablets by mouth daily.       metoprolol 50 MG 24 hr tablet    TOPROL-XL    90 tablet    Take 1 tablet (50 mg) by mouth At Bedtime       multivitamin, therapeutic with minerals Tabs tablet      Take 1 tablet by mouth daily       niacin 500 MG CR tablet    NIASPAN    90 tablet    Take 1 tablet by mouth At Bedtime.       nitroglycerin 0.4 MG sublingual tablet    NITROSTAT    20 tablet    Place 1 tablet (0.4 mg) under the tongue every 5 minutes as needed up to 3 tablets per episode.       ONE TOUCH LANCETS Misc     120 each    use as directed 4 x daily and prn       *  order for DME     1 each    Equipment being ordered: CPAP supplies       order for DME     1 Device    Equipment being ordered: 4 wheeled rolling walker.       RANITIDINE HCL PO      Take 150 mg by mouth daily       rosuvastatin 20 MG tablet    CRESTOR    90 tablet    Take 1 tablet (20 mg) by mouth daily Needs fasting labs before any further refills       Vitamin B12 1000 MCG Tbcr     100 tablet    Take 1,000 mcg by mouth daily       VITAMIN D (CHOLECALCIFEROL) PO      Take 2,000 Units by mouth daily       * Notice:  This list has 4 medication(s) that are the same as other medications prescribed for you. Read the directions carefully, and ask your doctor or other care provider to review them with you.

## 2017-02-02 NOTE — LETTER
2017       RE: Melvin Bhatia  [address]   [facility] [address]     Dear Colleague,    Thank you for referring your patient, Melvin Bhatia, to the [facility] at [facility]. Please see a copy of my visit note below.    2017      Ash Bowman MD   [facility]    [address] [place] [address]      RE: Melvin Bhatia   MRN: [id number]   : 1940      Dear Dr. Bowman:        Mr. Bhatia is a 76-year-old gentleman that I have seen on one occasion for followup after Dr. Cole retired.  At his last visit, his wife thought his left arm was weaker but he did have detected fracture which has been seen by Orthopedic and kind of a frozen shoulder.  We did order an MRI to see his vascular system.  He has severe vascular disease, vasculopathy and due to hypertension, lipid disorder and diabetes.  He has had a shunt put in before.  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  They return today to go over the results.  He did have apparently a reaction to the donepezil and the wife says he got very agitated and she discontinued it.  He says there is some numbness around the left arm but is not sure about it.  He turns to his wife for the answers as he does not know how to respond.        PHYSICAL EXAMINATION:   His exam showed him to be alert, pleasant man.  His eye movements are full.  Face symmetrical.  Left arm has weakness and their  is about equal in both arms, legs about equal.  He is in a wheelchair.  His MRI was reviewed with them.  It shows vascular disease.  He is on Crestor and watching his blood sugar which has been good, and also hypertension control.      In summary, his condition is stable.  He is followed with Dr. Neri regarding the subdural hygromas and I do not see there is any need to rescan him know.  Wife will let me know if there is any change in mentation.  She is  pretty much doing an excellent job as his attendant.      We will see him in 3 months for a followup.      His blood pressure today is 140/60, pulse 61.      Sincerely,       Casey Corey MD      D: 2017 17:26   T: 2017 09:46   MT: TOM      Name:     VALERIE ARRIAGA   MRN:      [id number]        Account:      [id number]   :      1940           Service Date: 2017      Document: [id number]

## 2017-02-03 NOTE — PROGRESS NOTES
Subjective:    HPI                    Objective:    System    Physical Exam    General     ROS    Assessment/Plan:      PROGRESS  REPORT    OVERALL IMPRESSION:  Pt has demonstrated little progress in PT secondary to cognitive barriers (associated with age and prior stroke) which have prevented him from becoming independent with a home exercise program.  Additionally, the patient demonstrates various safety concerns with sit to stand transfers from the wheel chair despite significant verbal cuing to correct.  The patient would benefit from home therapy because he relies on transportation from a friend and can only make it to our clinic one time per week.  I recommend therapy 3x per week given the patients cognitive issues and functional deconditioning.  Additionally, the patient would likely perform better cognitively in a closed environment at home and would be more likely to become independent with a home exercise program.    SUBJECTIVE  Pt reports no new changes.  Pt's friend reports that once again the patient is having a challenging day cognitively.     OBJECTIVE  No new changes in strength or function.  Pt requires 100% verbal cuing for completion of his exercise program along with safety concerns with transfers.    ASSESSMENT/PLAN  Updated problem list and treatment plan: Diagnosis 1:  Ankle fracture, deconditioning, cognitive delay  Pain -  hot/cold therapy  Decreased ROM/flexibility - manual therapy and therapeutic exercise  Decreased joint mobility - manual therapy and therapeutic exercise  Decreased strength - therapeutic exercise and therapeutic activities  Impaired balance - neuro re-education and therapeutic activities  Decreased proprioception - neuro re-education and therapeutic activities  Impaired gait - gait training  Impaired muscle performance - neuro re-education  Decreased function - therapeutic activities  Impaired posture - neuro re-education  STG/LTGs have been met or progress has been made  towards goals:  Yes (See Goal flow sheet completed today.)  Assessment of Progress: The patient's condition has potential to improve.  Self Management Plans:  Patient has been instructed in a home treatment program.  I have re-evaluated this patient and find that the nature, scope, duration and intensity of the therapy is appropriate for the medical condition of the patient.  Melvin continues to require the following intervention to meet STG and LTG's:  PT    Recommendations:  This patient would benefit from continued therapy, but in a home environment.  Frequency:  3 X week, once daily  Duration:  for 8 weeks        Please refer to the daily flowsheet for treatment today, total treatment time and time spent performing 1:1 timed codes.

## 2017-02-03 NOTE — PROGRESS NOTES
2017      Ash Bowman MD   [facility]    [address] [place] [address]      RE: Melvin Bhatia   MRN: [id number]   : 1940      Dear Dr. Bowman:        Mr. Bhatia is a 76-year-old gentleman that I have seen on one occasion for followup after Dr. Cole retired.  At his last visit, his wife thought his left arm was weaker but he did have detected fracture which has been seen by Orthopedic and kind of a frozen shoulder.  We did order an MRI to see his vascular system.  He has severe vascular disease, vasculopathy and due to hypertension, lipid disorder and diabetes.  He has had a shunt put in before.  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  They return today to go over the results.  He did have apparently a reaction to the donepezil and the wife says he got very agitated and she discontinued it.  He says there is some numbness around the left arm but is not sure about it.  He turns to his wife for the answers as he does not know how to respond.        PHYSICAL EXAMINATION:   His exam showed him to be alert, pleasant man.  His eye movements are full.  Face symmetrical.  Left arm has weakness and their  is about equal in both arms, legs about equal.  He is in a wheelchair.  His MRI was reviewed with them.  It shows vascular disease.  He is on Crestor and watching his blood sugar which has been good, and also hypertension control.      In summary, his condition is stable.  He is followed with Dr. Neri regarding the subdural hygromas and I do not see there is any need to rescan him know.  Wife will let me know if there is any change in mentation.  She is pretty much doing an excellent job as his attendant.      We will see him in 3 months for a followup.      His blood pressure today is 140/60, pulse 61.      Sincerely,       MD TAHIRA Ulrich MD             D: 2017 17:26   T: 2017 09:46   MT: AKA      Name:      VALERIE ARRIAGA   MRN:      [id number]        Account:      [id number]   :      1940           Service Date: 2017      Document: [id number]

## 2017-02-04 NOTE — TELEPHONE ENCOUNTER
2-3-17      mtm will decrease humalog dose at supper by 10 units .    Recheck bs's next week with patient.    Oma Gagnon Rph.  Medication Therapy Management Provider  [phone]

## 2017-02-07 NOTE — TELEPHONE ENCOUNTER
----- Message from Jimmy Huntley MD sent at 2/7/2017  3:46 PM CST -----  Regarding: RE: Order needed please  Contact: [phone]  I've never seen an order to delay the start of home care to be honest.  I'm happy to do that, but I'm not sure what order exactly is needed... .  If you can point me in the right direction as to what they need, I'd be happy to enter it.    - Marquez      ----- Message -----     From: Sintia Horn RN     Sent: 2/7/2017   2:04 PM       To: Yvon Massey RN, Jimmy Huntley MD, #  Subject: Order needed please                              Hi,  Home care needs order to delay care until next week per wife's request.  Yvon [facility] [place] RN.  Provider is Dr Lindo  Thanks,  Sintia LUCAS Left on Yvon Massey RN voicemail.  Sintia Horn RN 4:02 PM 2/7/2017

## 2017-02-07 NOTE — TELEPHONE ENCOUNTER
Verbal ok given to Yvon on voicemail for homecare PT for pt under Dr Bowman.     Lillie Sahh, RN, BSN

## 2017-02-07 NOTE — TELEPHONE ENCOUNTER
Reason for Call:  Home Health Care    Yvon with [facility] called regarding (reason for call): PT orders, clarification    Orders are needed for this patient. PT    PT: Yvon states there already already PT orders, but are they not signed, so need verbal orders. Yvon states there is confusion with the PT referral, it is for outpatient and inpatient, but can't be both.    Pt Provider: Dr. Bowman    Phone Number Homecare Nurse can be reached at: [phone]    Can we leave a detailed message on this number? YES    Phone number patient can be reached at: Home number on file [phone] (home)    Call taken on 2/7/2017 at 11:43 AM by Angi Noriega

## 2017-02-07 NOTE — TELEPHONE ENCOUNTER
Yvon called back. Pt has been going to outpt PT and he has outpt appt on 2/10 and he can't have both  homecare and out pt. She is going to close the file    I spoke to wife, she has been POA for years  . His last out pt PT is 2/10. When asked if he is homebound she said he is losing strength and too hard to get out.     His physical therapist spoke to Dr Huntley (orthopedist) as he felt in home would be helpful . Dr Huntley gave order just today for homecare so wife will call to schedule. I left this message also on Yvon's voicemail.        Lillie Shah, RN, BSN

## 2017-02-14 NOTE — TELEPHONE ENCOUNTER
2-14-17      mt notes bs at bedtime dropping again --54 last night despite lowering dinner humalog to 10 units.      Plan:    1. Lets lower dinner time humalog to 5 units now.    Recheck bs's --send message next week with progress report.      Oma Gagnon [facility].  Medication Therapy Management Provider  [phone]

## 2017-02-15 NOTE — TELEPHONE ENCOUNTER
Reason for Call:  Home Health Care    [facility] (reason for call): Orders    Orders are needed for this patient. PT    PT: 1 time a week for 5 weeks    Pt Provider: Dr. Bowman    Phone Number Homecare Nurse can be reached at: [phone]    Can we leave a detailed message on this number? YES    Phone number patient can be reached at: Home number on file [phone] (home)    Call taken on 2/15/2017 at 11:57 AM by Angi Noriega

## 2017-02-19 PROBLEM — S82.53XA: Status: ACTIVE | Noted: 2017-01-01

## 2017-02-19 NOTE — IP AVS SNAPSHOT
` `     [address] [facility] BANK: [phone]            Medication Administration Report for Melvin Bhatia as of 02/22/17 8187   Legend:    Given Hold Not Given Due Canceled Entry Other Actions    Time Time (Time) Time  Time-Action       Inactive    Active    Linked        Medications 02/16/17 02/17/17 02/18/17 02/19/17 02/20/17 02/21/17 02/22/17    acetaminophen (TYLENOL) tablet 975 mg  Dose: 975 mg Freq: EVERY 8 HOURS Route: PO  Start: 02/19/17 1645   Admin Instructions: Alternate ibuprofen (if ordered) with acetaminophen.  Maximum acetaminophen dose from all sources = 75 mg/kg/day not to exceed 4 grams/day.        1750 (975 mg)-Given        0009 (975 mg)-Given       0911 (975 mg)-Given       1745 (975 mg)-Given        0038 (975 mg)-Given       0927 (975 mg)-Given       1726 (975 mg)-Given        0001 (975 mg)-Given       0843 (975 mg)-Given       [ ] 1600           aspirin EC EC tablet 81 mg  Dose: 81 mg Freq: DAILY Route: PO  Start: 02/20/17 1330   Admin Instructions: DO NOT CRUSH.         1350 (81 mg)-Given        0930 (81 mg)-Given        0844 (81 mg)-Given           bumetanide (BUMEX) tablet 0.5 mg  Dose: 0.5 mg Freq: DAILY Route: PO  Start: 02/19/17 1715       (1752)-Not Given        0911 (0.5 mg)-Given        0930 (0.5 mg)-Given        0844 (0.5 mg)-Given           calcium citrate-vitamin D (CITRACAL) 315-250 MG-UNIT per tablet 2 tablet  Dose: 2 tablet Freq: 2 TIMES DAILY. Route: PO  Indications of Use: HYPOCALCEMIA  Start: 02/19/17 1700       1752 (2 tablet)-Given        0910 (2 tablet)-Given       1745 (2 tablet)-Given        0928 (2 tablet)-Given       1726 (2 tablet)-Given        0845 (2 tablet)-Given       [ ] 1600           cholecalciferol (vitamin D) tablet 2,000 Units  Dose: 2,000 Units Freq: DAILY Route: PO  Start: 02/19/17 1715       1750 (2,000 Units)-Given        0910 (2,000 Units)-Given        0929 (2,000 Units)-Given        0844 (2,000 Units)-Given           cyanocolbalamin (vitamin   B-12) tablet 500 mcg  Dose: 500 mcg Freq: DAILY Route: PO  Start: 02/19/17 1700       1751 (500 mcg)-Given        0910 (500 mcg)-Given        0929 (500 mcg)-Given        0845 (500 mcg)-Given           ferrous sulfate (IRON) tablet 325 mg  Dose: 325 mg Freq: DAILY WITH BREAKFAST Route: PO  Start: 02/20/17 0800   Admin Instructions: Absorbed best on an empty stomach. If stomach upset occurs, can take with meals.         0910 (325 mg)-Given        0929 (325 mg)-Given        0844 (325 mg)-Given           fexofenadine (ALLEGRA) tablet 180 mg  Dose: 180 mg Freq: DAILY Route: PO  Start: 02/19/17 1700       1750 (180 mg)-Given        0911 (180 mg)-Given        0928 (180 mg)-Given        0844 (180 mg)-Given           fluticasone (FLONASE) 50 MCG/ACT spray 2 spray  Dose: 2 spray Freq: DAILY Route: BOTH NOSTRIL  Start: 02/19/17 1700       (1755)-Not Given        0907 (2 spray)-Given        0930 (2 spray)-Given        0844 (2 spray)-Given           fluticasone-vilanterol (BREO ELLIPTA) 100-25 MCG/INH oral inhaler 1 puff  Dose: 1 puff Freq: DAILY Route: IN  Start: 02/19/17 1700   Admin Instructions: *Do not use more frequently than twice daily.*  Rinse mouth after use.<br>[employer] per policy for Symbicort (non-formulary home med)        1756 (1 puff)-Given        0907 (1 puff)-Given        0930 (1 puff)-Given        0844 (1 puff)-Given           glucose 40 % gel 15-30 g  Dose: 15-30 g Freq: EVERY 15 MIN PRN Route: PO  PRN Reason: low blood sugar  Start: 02/19/17 1640   Admin Instructions: Give 15 g for BG 51 to 69 mg/dL IF patient is conscious and able to swallow. Give 30 g for BG less than or equal to 50 mg/dL IF patient is conscious and able to swallow. Do NOT give glucose gel via enteral tube.  IF patient has enteral tube: give apple juice 120 mL (4 oz or 15 g of CHO) via enteral tube for BG 51 to 69 mg/dL.  Give apple juice 240 mL (8 oz or 30 g of CHO) via enteral tube for BG less than or equal to 50 mg/dL.               Or  dextrose 50 % injection 25-50 mL  Dose: 25-50 mL Freq: EVERY 15 MIN PRN Route: IV  PRN Reason: low blood sugar  Start: 02/19/17 1640   Admin Instructions: Use if have IV access, BG less than 70 mg/dL and meet dose criteria below:  Dose if conscious and alert (or disorientated) and NPO = 25 mL  Dose if unconscious / not alert = 50 mL  Vesicant.              Or  glucagon injection 1 mg  Dose: 1 mg Freq: EVERY 15 MIN PRN Route: SC  PRN Reason: low blood sugar  PRN Comment: May repeat x 1 only  Start: 02/19/17 1640   Admin Instructions: May give SQ or IM. IF BG less than or equal to 50 mg/dL and no IV access.  ONLY use glucagon IF patient has NO IV access AND is UNABLE to swallow.               heparin sodium PF injection 5,000 Units  Dose: 5,000 Units Freq: EVERY 12 HOURS Route: SC  Start: 02/19/17 1700   Admin Instructions: HOLD heparin IF platelet count falls below 50% baseline or less than 100,000 /  L and notify provider. Use this product If CrCl less than 30 mL/min.               2008 (5,000 Units)-Given        0911 (5,000 Units)-Given       1950 (5,000 Units)-Given        0929 (5,000 Units)-Given       2123 (5,000 Units)-Given        0844 (5,000 Units)-Given       [ ] 2000           HYDROmorphone (DILAUDID) injection 0.2 mg  Dose: 0.2 mg Freq: EVERY 2 HOURS PRN Route: IV  PRN Reason: severe pain  Start: 02/19/17 1640   Admin Instructions: Hold while on PCA.               insulin aspart (NovoLOG) inj (RAPID ACTING)  Dose: 1-5 Units Freq: AT BEDTIME Route: SC  Start: 02/19/17 2200   Admin Instructions: MEDIUM INSULIN RESISTANCE DOSING    Do Not give Bedtime Correction Insulin if BG less than  200.   For  - 249 give 1 units.   For  - 299 give 2 units.   For  - 349 give 3 units.   For  -399 give 4 units.   For BG greater than or equal to 400 give 5 units.  Notify provider if glucose greater than or equal to 350 mg/dL after administration of correction dose.  If given at mealtime, must be  administered 5 min before meal or immediately after.        2127 (2 Units)-Given        (2216)-Not Given        (2123)-Not Given        [ ] 2200           insulin aspart (NovoLOG) inj (RAPID ACTING)  Dose: 1-7 Units Freq: 3 TIMES DAILY BEFORE MEALS Route: SC  Start: 02/19/17 1700   Admin Instructions: Correction Scale - MEDIUM INSULIN RESISTANCE DOSING     Do Not give Correction Insulin if Pre-Meal BG less than 140.   For Pre-Meal  - 189 give 1 unit.   For Pre-Meal  - 239 give 2 units.   For Pre-Meal  - 289 give 3 units.   For Pre-Meal  - 339 give 4 units.   For Pre-Meal - 399 give 5 units.   For Pre-Meal -449 give 6 units  For Pre-Meal BG greater than or equal to 450 give 7 units.   To be given with prandial insulin, and based on pre-meal blood glucose.    Notify provider if glucose greater than or equal to 350 mg/dL after administration of correction dose.  If given at mealtime, must be administered 5 min before meal or immediately after.        1818 (3 Units)-Given        (0901)-Not Given       (1209)-Not Given [C]       1929 (2 Units)-Given [C]        (0931)-Not Given       1504 (1 Units)-Given       (1727)-Not Given        (1035)-Not Given       1415 (2 Units)-Given       [ ] 1700           insulin aspart (NovoLOG) inj (RAPID ACTING)  Freq: WITH SNACKS OR SUPPLEMENTS Route: SC  PRN Reason: high blood sugar  Start: 02/19/17 1640   Admin Instructions: DOSE:  1 units per 15 grams of carbohydrate. Only chart total amount of units given.  Do not give if pre-prandial glucose is less than 60 mg/dL.  If given at mealtime, must be administered 5 min before meal or immediately after.         1350 (5 Units)-Given [C]             insulin aspart (NovoLOG) inj (RAPID ACTING)  Freq: DAILY WITH SUPPER Route: SC  Start: 02/19/17 1700   Admin Instructions: DOSE:  1units per 15 grams of carbohydrate. Only chart total amount of units given.  Do not give if pre-prandial glucose is less than 60  mg/dL.  If given at mealtime, must be administered 5 min before meal or immediately after.        1818 (2 Units)-Given        1930 (2 Units)-Given        1837 (2 Units)-Given        [ ] 1700           insulin aspart (NovoLOG) inj (RAPID ACTING)  Freq: DAILY WITH LUNCH Route: SC  Start: 02/20/17 1200   Admin Instructions: DOSE:  1 units per 15 grams of carbohydrate. Only chart total amount of units given.  Do not give if pre-prandial glucose is less than 60 mg/dL.  If given at mealtime, must be administered 5 min before meal or immediately after.         1351 ( )-Given [C]        (1504)-Not Given        1443 (1 Units)-Given           insulin aspart (NovoLOG) inj (RAPID ACTING)  Freq: EVERY MORNING BEFORE BREAKFAST Route: SC  Start: 02/20/17 0800   Admin Instructions: DOSE:  1 units per 15 grams of carbohydrate.  Only chart total amount of units given.  Do not give if pre-prandial glucose is less than 60 mg/dL.  If given at mealtime, must be administered 5 min before meal or immediately after.         (0912)-Not Given        (1210)-Not Given        1035 (6 Units)-Given           insulin glargine (LANTUS) injection 30 Units  Dose: 30 Units Freq: EVERY MORNING BEFORE BREAKFAST Route: SC  Start: 02/22/17 0800          0844 (30 Units)-Given           lidocaine (LIDODERM) 5 % Patch 1-2 patch  Dose: 1-2 patch Freq: EVERY 24 HOURS 2000 Route: TD  Start: 02/19/17 2000   Admin Instructions: Apply patch(s) to painful areas on left leg. To prevent lidocaine toxicity, patient should be patch free for 12 hrs daily. Patches may be cut to smaller size prior to removing release liner.  NEVER APPLY HEAT OVER PATCH which will increase absorption and may lead to risk of local anesthetic toxicity. Do not apply over area where liposomal bupivacaine was injected for 96 hours post injection.        (2013)-Not Given        (1942)-Not Given        (2122)-Not Given [C]        [ ] 2000          And  lidocaine (LIDODERM) patch REMOVAL  Freq:  "EVERY 24 HOURS 0800 Route: TD  Start: 02/20/17 0800   Admin Instructions: Remove lidocaine Patch.         (0902)-Not Given        (0931)-Not Given        (0845)-Not Given          And  lidocaine (LIDODERM) Patch in Place  Freq: EVERY 8 HOURS Route: TD  Start: 02/19/17 2000   Admin Instructions: Chart every shift, confirming that patch is still in place on patient (no barcode scan needed). See patch order for dose information.  NEVER APPLY HEAT OVER PATCH which will increase absorption and may lead to risk of local anesthetic toxicity. Do not apply over area where liposomal bupivacaine injected for 96 hours.        (2013)-Not Given        (0427)-Not Given       (1153)-Not Given [C]       (1942)-Not Given        (0337)-Not Given       (1211)-Not Given        0118 ( )-Negative [C]       (0845)-Not Given       [ ] 1600           lidocaine (LMX4) kit  Freq: EVERY 1 HOUR PRN Route: Top  PRN Reason: mild pain  PRN Comment: with VAD insertion or accessing implanted port,  Start: 02/19/17 1640   Admin Instructions: Do NOT give if patient has a history of allergy to any local anesthetic or any \"kiko\" product.   Apply 30 min prior to VAD insertion or port access.  MAX Dose:  2.5 gm (  of 5 gm tube)               lidocaine 1 % 1 mL  Dose: 1 mL Freq: EVERY 1 HOUR PRN Route: OTHER  PRN Comment: mild pain with VAD insertion or accessing implanted port,  Start: 02/19/17 1640   Admin Instructions: Do NOT give if patient has a history of allergy to any local anesthetic or any \"kiko\" product. MAX dose 1 mL subcutaneous OR intradermal in divided doses.               magnesium oxide (MAG-OX) tablet 800 mg  Dose: 800 mg Freq: DAILY Route: PO  Start: 02/19/17 1715       1751 (800 mg)-Given        0910 (800 mg)-Given        0928 (800 mg)-Given        0843 (800 mg)-Given           magnesium sulfate 4 g in 100 mL sterile water (premade)  Dose: 4 g Freq: EVERY 4 HOURS PRN Route: IV  PRN Reason: magnesium supplementation  Start: 02/19/17 " 1640   Admin Instructions: For serum Mg++ less than 1.6 mg/dL  Give 4 g and recheck magnesium level 2 hours after dose, and next AM.               metoprolol (TOPROL-XL) 24 hr tablet 50 mg  Dose: 50 mg Freq: AT BEDTIME Route: PO  Start: 02/19/17 2200   Admin Instructions: DO NOT CRUSH. Tablet may be split in half along score line.        2126 (50 mg)-Given        2216 (50 mg)-Given        2124 (50 mg)-Given        [ ] 2200           multivitamin, therapeutic with minerals (THERA-VIT-M) tablet 1 tablet  Dose: 1 tablet Freq: DAILY Route: PO  Start: 02/19/17 1715       1751 (1 tablet)-Given        0910 (1 tablet)-Given        0929 (1 tablet)-Given        0845 (1 tablet)-Given           naloxone (NARCAN) injection 0.1-0.4 mg  Dose: 0.1-0.4 mg Freq: EVERY 2 MIN PRN Route: IV  PRN Reason: opioid reversal  Start: 02/19/17 1640   Admin Instructions: For respiratory rate LESS than or EQUAL to 8.  Partial reversal dose:  0.1 mg titrated q 2 minutes for Analgesia Side Effects Monitoring Sedation Level of 3 (frequently drowsy, arousable, drifts to sleep during conversation).Full reversal dose:  0.4 mg bolus for Analgesia Side Effects Monitoring Sedation Level of 4 (somnolent, minimal or no response to stimulation).               niacin (NIASPAN) CR tablet 500 mg  Dose: 500 mg Freq: AT BEDTIME Route: PO  Start: 02/19/17 2200   Admin Instructions: DO NOT CRUSH.        2126 (500 mg)-Given        2216 (500 mg)-Given        2124 (500 mg)-Given        [ ] 2200           nitrofurantoin (macrocrystal-monohydrate) (MACROBID) capsule 100 mg  Dose: 100 mg Freq: EVERY 12 HOURS SCHEDULED Route: PO  Indications of Use: URINARY TRACT INFECTION  Start: 02/20/17 2000        1950 (100 mg)-Given        0928 (100 mg)-Given       2122 (100 mg)-Given        0845 (100 mg)-Given       [ ] 2000           ondansetron (ZOFRAN-ODT) ODT tab 4 mg  Dose: 4 mg Freq: EVERY 6 HOURS PRN Route: PO  PRN Reason: nausea  Start: 02/19/17 1640   Admin Instructions:  This is Step 1 of nausea and vomiting management.  If nausea not resolved in 15 minutes, go to Step 2 prochlorperazine (COMPAZINE). Do not push through foil backing. Peel back foil and gently remove. Place on tongue immediately. Administration with liquid unnecessary              Or  ondansetron (ZOFRAN) injection 4 mg  Dose: 4 mg Freq: EVERY 6 HOURS PRN Route: IV  PRN Reasons: nausea,vomiting  Start: 02/19/17 1640   Admin Instructions: This is Step 1 of nausea and vomiting management.  If nausea not resolved in 15 minutes, go to Step 2 prochlorperazine (COMPAZINE).  Irritant.               polyethylene glycol (MIRALAX/GLYCOLAX) Packet 17 g  Dose: 17 g Freq: DAILY PRN Route: PO  PRN Reason: constipation  Indications of Use: CONSTIPATION  Start: 02/19/17 1640   Admin Instructions: Give in 8 ounces of water, juice, or soda.  Hold for loose stools.  1 Packet = 17 grams. Mixed prescribed dose in 8 ounces of water. Follow with 8 oz. of water.               potassium chloride (KLOR-CON) Packet 20-40 mEq  Dose: 20-40 mEq Freq: EVERY 2 HOURS PRN Route: ORAL OR FEED  PRN Reason: potassium supplementation  Start: 02/19/17 1640   Admin Instructions: Use if unable to tolerate tablets.  If Serum K+ 3.0-3.3, dose = 60 mEq po total dose (40 mEq x1 followed in 2 hours by 20 mEq x1). Recheck K+ level 4 hours after dose and the next AM.  If Serum K+ 2.5-2.9, dose = 80 mEq po total dose (40 mEq Q2H x2). Recheck K+ level 4 hours after dose and the next AM.  If Serum K+ less than 2.5, See IV order.  Dissolve packet contents in 4-8 ounces of cold water or juice.               potassium chloride 10 mEq in 100 mL intermittent infusion  Dose: 10 mEq Freq: EVERY 1 HOUR PRN Route: IV  PRN Reason: potassium supplementation  Start: 02/19/17 1640   Admin Instructions: Infuse via PERIPHERAL LINE or CENTRAL LINE. Use for central line replacement if patient weight less than 65 kg, if patient is on TPN with high potassium content or if unit does not  stock 20 mEq bags.   If Serum K+ 3.0-3.3, dose = 10 mEq/hr x4 doses (40 mEq IV total dose). Recheck K+ level 2 hours after dose and the next AM.   If Serum K+ less than 3.0, dose = 10 mEq/hr x6 doses (60 mEq IV total dose). Recheck K+ level 2 hours after dose and the next AM.               potassium chloride 10 mEq in 100 mL intermittent infusion with 10 mg lidocaine  Dose: 10 mEq Freq: EVERY 1 HOUR PRN Route: IV  PRN Reason: potassium supplementation  Start: 02/19/17 1640   Admin Instructions: Infuse via PERIPHERAL LINE. Use potassium with lidocaine for pain with peripheral administration.  If Serum K+ 3.0-3.3, dose = 10 mEq/hr x4 doses (40 mEq IV total dose). Recheck K+ level 2 hours after dose and the next AM.  If Serum K+ less than 3.0, dose = 10 mEq/hr x6 doses (60 mEq IV total dose). Recheck K+ level 2 hours after dose and the next AM.               potassium chloride 20 mEq in 50 mL intermittent infusion  Dose: 20 mEq Freq: EVERY 1 HOUR PRN Route: IV  PRN Reason: potassium supplementation  Start: 02/19/17 1640   Admin Instructions: Infuse via CENTRAL LINE Only. May need EKG if less than 65 kg or on TPN - Max rate is 0.3 mEq/kg/hr for patients not on EKG monitoring.   If Serum K+ 3.0-3.3, dose = 20 mEq/hr x2 doses (40 mEq IV total dose). Recheck K+ level 2 hours after dose and the next AM.  If Serum K+ less than 3.0, dose = 20 mEq/hr x3 doses (60 mEq IV total dose). Recheck K+ level 2 hours after dose and the next AM.               potassium chloride SA (K-DUR/KLOR-CON M) CR tablet 20-40 mEq  Dose: 20-40 mEq Freq: EVERY 2 HOURS PRN Route: PO  PRN Reason: potassium supplementation  Start: 02/19/17 1640   Admin Instructions: Use if able to take PO.   If Serum K+ 3.0-3.3, dose = 60 mEq po total dose (40 mEq x1 followed in 2 hours by 20 mEq x1). Recheck K+ level 4 hours after dose and the next AM.  If Serum K+ 2.5-2.9, dose = 80 mEq po total dose (40 mEq Q2H x2). Recheck K+ level 4 hours after dose and the next  AM.  If Serum K+ less than 2.5, See IV order.  DO NOT CRUSH.               ranitidine (ZANTAC) tablet 150 mg  Dose: 150 mg Freq: DAILY Route: PO  Start: 02/19/17 1715       1750 (150 mg)-Given        0910 (150 mg)-Given        0929 (150 mg)-Given        0843 (150 mg)-Given           rosuvastatin (CRESTOR) tablet 20 mg  Dose: 20 mg Freq: DAILY Route: PO  Start: 02/19/17 1715              2126 (20 mg)-Given        2216 (20 mg)-Given        2122 (20 mg)-Given        [ ] 2200           senna-docusate (SENOKOT-S;PERICOLACE) 8.6-50 MG per tablet 1-2 tablet  Dose: 1-2 tablet Freq: 2 TIMES DAILY Route: PO  Start: 02/19/17 2000   Admin Instructions: Start with 1 tablet po BID. If no bowel movement in 24 hours, increase to 2 tablets po BID. Hold for loose stools.        2010 (2 tablet)-Given        0910 (2 tablet)-Given       1950 (2 tablet)-Given        0928 (2 tablet)-Given       (2124)-Not Given [C]        (0845)-Not Given       [ ] 2000           sodium chloride (PF) 0.9% PF flush 3 mL  Dose: 3 mL Freq: EVERY 8 HOURS Route: IK  Start: 02/19/17 1645   Admin Instructions: And Q1H PRN, to lock peripheral IV dormant line.                0013 (3 mL)-Given       0912 (3 mL)-Given       1950 (3 mL)-Given        0039 (3 mL)-Given       0932 (3 mL)-Given       1727 (3 mL)-Given        0119 (3 mL)-Given       0846 (3 mL)-Given       [ ] 1600           sodium chloride (PF) 0.9% PF flush 3 mL  Dose: 3 mL Freq: EVERY 1 HOUR PRN Route: IK  PRN Reasons: line flush,post meds or blood draw  Start: 02/19/17 1640   Admin Instructions: for peripheral IV flush post IV meds               umeclidinium-vilanterol (ANORO ELLIPTA) 62.5-25 MCG/INH oral inhaler 1 puff  Dose: 1 puff Freq: DAILY Route: IN  Start: 02/19/17 1715   Admin Instructions: [employer] per policy for Combivent Respimat (non-formulary home med)        1756 (1 puff)-Given        0907 (1 puff)-Given        0930 (1 puff)-Given        0844 (1 puff)-Given           Warfarin Therapy  Reminder (Check START DATE - warfarin may be starting in the FUTURE)  Freq: CONTINUOUS PRN Route: XX  Start: 02/21/17 1310   Admin Instructions: *Note to reorder warfarin daily*  Pharmacy Warfarin Dosing Service  Patient is on Warfarin Therapy - check for daily order              Future Medications  Medications 02/16/17 02/17/17 02/18/17 02/19/17 02/20/17 02/21/17 02/22/17       insulin glargine (LANTUS) injection 15 Units  Dose: 15 Units Freq: AT BEDTIME Route: SC  Start: 02/22/17 2200          [ ] 2200           warfarin (COUMADIN) tablet 3 mg  Dose: 3 mg Freq: ONCE AT 6PM Route: PO  Start: 02/22/17 1800          [ ] 1800          Completed Medications  Medications 02/16/17 02/17/17 02/18/17 02/19/17 02/20/17 02/21/17 02/22/17         Dose: 10 mL Freq: ONCE Route: IV  Start: 02/21/17 1445   End: 02/21/17 1437         1437 (10 mL)-Given              Dose: 1 tablet Freq: ONCE Route: PO  Indications of Use: URINARY TRACT INFECTION  Start: 02/19/17 1700   End: 02/19/17 1751       1751 (1 tablet)-Given                Dose: 3 mg Freq: ONCE AT 6PM Route: PO  Start: 02/21/17 1800   End: 02/21/17 1838         1838 (3 mg)-Given           Discontinued Medications  Medications 02/16/17 02/17/17 02/18/17 02/19/17 02/20/17 02/21/17 02/22/17         Dose: 300 mg Freq: 2 TIMES DAILY Route: PO  Indications of Use: URINARY TRACT INFECTION  Start: 02/20/17 2000   End: 02/20/17 1317        1317-Med Discontinued           Dose: 20 Units Freq: AT BEDTIME Route: SC  Start: 02/21/17 2200   End: 02/22/17 1157         2123 (20 Units)-Given        1157-Med Discontinued         Dose: 30 Units Freq: 2 TIMES DAILY Route: SC  Start: 02/19/17 2000   End: 02/21/17 1130       2008 (30 Units)-Given        0911 (30 Units)-Given       1950 (30 Units)-Given        0958 (30 Units)-Given       1130-Med Discontinued

## 2017-02-19 NOTE — IP AVS SNAPSHOT
"    [address] [facility]: [phone]                                              INTERAGENCY TRANSFER FORM - LAB / IMAGING / EKG / EMG RESULTS   2017                    Hospital Admission Date: 2017  VALERIE ARRIAGA   : 1940  Sex: Male        Attending Provider: Karen Vázquez MD     Allergies:  Penicillins, Albuterol, Albuterol Sulfate, Atorvastatin, Atorvastatin Calcium, Cipro [Ciprofloxacin], Ciprofloxacin, Simvastatin, Warfarin    Infection:  VRE-Contact Isolation   Service:  TRAUMA    Ht:  1.702 m (5' 7\")   Wt:  87.3 kg (192 lb 8 oz)   Admission Wt:  88.5 kg (195 lb)    BMI:  30.15 kg/m 2   BSA:  2.03 m 2            Patient PCP Information     Provider PCP Type    Ash Bowman MD General         Lab Results - 3 Days      Glucose by meter [[id number]] (Abnormal)  Resulted: 17 1335, Result status: Final result    Ordering provider: Daniel López MD  17 1308 Resulting lab: POINT OF CARE TEST, GLUCOSE    Specimen Information    Type Source Collected On     17 1308          Components       Value Reference Range Flag Lab   Glucose 230 70 - 99 mg/dL H 170            TSH [[id number]]  Resulted: 17 1103, Result status: Final result    Ordering provider: Jerod Saleh MD  17 0752 Resulting lab: [facility]    Specimen Information    Type Source Collected On   Blood  17 1004          Components       Value Reference Range Flag Lab   TSH 3.07 0.40 - 4.00 mU/L  51            T4 free [[id number]]  Resulted: 17 1059, Result status: Final result    Ordering provider: Jerod Saleh MD  17 0752 Resulting lab: [facility]    Specimen Information    Type Source Collected On   Blood  17 1004          Components       Value Reference Range Flag Lab   T4 Free 1.01 0.76 - 1.46 ng/dL  51            Glucose by meter [[id number]] (Abnormal)  Resulted: 17 0836, " Result status: Final result    Ordering provider: Daniel López MD  02/22/17 0804 Resulting lab: POINT OF CARE TEST, GLUCOSE    Specimen Information    Type Source Collected On     02/22/17 0804          Components       Value Reference Range Flag Lab   Glucose 106 70 - 99 mg/dL H 170            INR [[id number]]  Resulted: 02/22/17 0820, Result status: Final result    Ordering provider: Jerod Saleh MD  02/22/17 0100 Resulting lab: [facility]    Specimen Information    Type Source Collected On   Blood  02/22/17 0758          Components       Value Reference Range Flag Lab   INR 1.12 0.86 - 1.14  51            Platelet count [[id number]]  Resulted: 02/22/17 0812, Result status: Final result    Ordering provider: Laurel Fields APRN CNP  02/22/17 0000 Resulting lab: [facility]    Specimen Information    Type Source Collected On   Blood  02/22/17 0758          Components       Value Reference Range Flag Lab   Platelet Count 152 150 - 450 10e9/L  51            Glucose by meter [[id number]]  Resulted: 02/22/17 0655, Result status: Final result    Ordering provider: Daniel López MD  02/22/17 0556 Resulting lab: POINT OF CARE TEST, GLUCOSE    Specimen Information    Type Source Collected On     02/22/17 0556          Components       Value Reference Range Flag Lab   Glucose 74 70 - 99 mg/dL  170            Glucose by meter [[id number]]  Resulted: 02/22/17 0655, Result status: Final result    Ordering provider: Daniel López MD  02/22/17 0623 Resulting lab: POINT OF CARE TEST, GLUCOSE    Specimen Information    Type Source Collected On     02/22/17 0623          Components       Value Reference Range Flag Lab   Glucose 84 70 - 99 mg/dL  170            Glucose by meter [[id number]]  Resulted: 02/22/17 0221, Result status: Final result    Ordering provider: Daniel López MD  02/22/17 0211 Resulting lab: POINT OF  CARE TEST, GLUCOSE    Specimen Information    Type Source Collected On     02/22/17 0211          Components       Value Reference Range Flag Lab   Glucose 95 70 - 99 mg/dL  170            Glucose by meter [[id number]] (Abnormal)  Resulted: 02/21/17 2136, Result status: Final result    Ordering provider: Daniel López MD  02/21/17 2118 Resulting lab: POINT OF CARE TEST, GLUCOSE    Specimen Information    Type Source Collected On     02/21/17 2118          Components       Value Reference Range Flag Lab   Glucose 177 70 - 99 mg/dL H 170            Urine Culture Aerobic Bacterial [[id number]] (Abnormal)  Resulted: 02/21/17 2056, Result status: Final result    Ordering provider: Jerald Adan MD  02/19/17 1210 Resulting lab: INFECTIOUS DISEASE [facility]NOSTIC LAB[facility]ATORY    Specimen Information    Type Source Collected On     02/19/17 1210          Components       Value Reference Range Flag Lab   Specimen Description Catheterized Urine   51   Special Requests Specimen received in preservative   75   Culture Micro --  A 225   Result:         50,000 to 100,000 colonies/mL Enterococcus faecalis  10,000 to 50,000 colonies/mL urogenital georgie Susceptibility testing not   routinely done     Micro Report Status FINAL 02/21/2017   225   Result:     Organism: 50,000 to 100,000 colonies/mL Enterococcus faecalis   225            Glucose by meter [[id number]] (Abnormal)  Resulted: 02/21/17 1727, Result status: Final result    Ordering provider: Daniel López MD  02/21/17 1720 Resulting lab: POINT OF CARE TEST, GLUCOSE    Specimen Information    Type Source Collected On     02/21/17 1720          Components       Value Reference Range Flag Lab   Glucose 131 70 - 99 mg/dL H 170            Glucose by meter [[id number]] (Abnormal)  Resulted: 02/21/17 1510, Result status: Final result    Ordering provider: Daniel López MD  02/21/17 1503 Resulting lab: POINT OF CARE TEST, GLUCOSE    Specimen Information     Type Source Collected On     02/21/17 1503          Components       Value Reference Range Flag Lab   Glucose 209 70 - 99 mg/dL H 170            INR [[id number]] (Abnormal)  Resulted: 02/21/17 1417, Result status: Final result    Ordering provider: Karen Vázquez MD  02/21/17 1317 Resulting lab: [facility]    Specimen Information    Type Source Collected On   Blood  02/21/17 1330          Components       Value Reference Range Flag Lab   INR 1.18 0.86 - 1.14 H 51            Glucose by meter [[id number]] (Abnormal)  Resulted: 02/21/17 1241, Result status: Final result    Ordering provider: Daniel López MD  02/21/17 1230 Resulting lab: POINT OF CARE TEST, GLUCOSE    Specimen Information    Type Source Collected On     02/21/17 1230          Components       Value Reference Range Flag Lab   Glucose 214 70 - 99 mg/dL H 170            Basic metabolic panel [[id number]] (Abnormal)  Resulted: 02/21/17 0853, Result status: Final result    Ordering provider: Mac Mueller MD  02/21/17 0100 Resulting lab: [facility]    Specimen Information    Type Source Collected On   Blood  02/21/17 0815          Components       Value Reference Range Flag Lab   Sodium 142 133 - 144 mmol/L  51   Potassium 3.6 3.4 - 5.3 mmol/L  51   Chloride 106 94 - 109 mmol/L  51   Carbon Dioxide 28 20 - 32 mmol/L  51   Anion Gap 8 3 - 14 mmol/L  51   Glucose 83 70 - 99 mg/dL  51   Urea Nitrogen 24 7 - 30 mg/dL  51   Creatinine 1.87 0.66 - 1.25 mg/dL H 51   GFR Estimate 35 >60 mL/min/1.7m2 L 51   Comment:  Non  GFR Calc   GFR Estimate If Black 42 >60 mL/min/1.7m2 L 51   Comment:  African American GFR Calc   Calcium 9.0 8.5 - 10.1 mg/dL  51            Glucose by meter [[id number]]  Resulted: 02/21/17 0815, Result status: Final result    Ordering provider: Daniel López MD  02/21/17 0801 Resulting lab: POINT OF CARE TEST, GLUCOSE    Specimen  Information    Type Source Collected On     02/21/17 0801          Components       Value Reference Range Flag Lab   Glucose 72 70 - 99 mg/dL  170            Glucose by meter [[id number]] (Abnormal)  Resulted: 02/21/17 0211, Result status: Final result    Ordering provider: Daniel López MD  02/21/17 0204 Resulting lab: POINT OF CARE TEST, GLUCOSE    Specimen Information    Type Source Collected On     02/21/17 0204          Components       Value Reference Range Flag Lab   Glucose 100 70 - 99 mg/dL H 170            Glucose by meter [[id number]] (Abnormal)  Resulted: 02/20/17 2231, Result status: Final result    Ordering provider: Daniel López MD  02/20/17 2205 Resulting lab: POINT OF CARE TEST, GLUCOSE    Specimen Information    Type Source Collected On     02/20/17 2205          Components       Value Reference Range Flag Lab   Glucose 177 70 - 99 mg/dL H 170            Glucose by meter [[id number]] (Abnormal)  Resulted: 02/20/17 1940, Result status: Final result    Ordering provider: Daniel López MD  02/20/17 1928 Resulting lab: POINT OF CARE TEST, GLUCOSE    Specimen Information    Type Source Collected On     02/20/17 1928          Components       Value Reference Range Flag Lab   Glucose 237 70 - 99 mg/dL H 170            Glucose by meter [[id number]] (Abnormal)  Resulted: 02/20/17 1825, Result status: Final result    Ordering provider: Daniel López MD  02/20/17 1819 Resulting lab: POINT OF CARE TEST, GLUCOSE    Specimen Information    Type Source Collected On     02/20/17 1819          Components       Value Reference Range Flag Lab   Glucose 190 70 - 99 mg/dL H 170            Glucose by meter [[id number]] (Abnormal)  Resulted: 02/20/17 1401, Result status: Final result    Ordering provider: Daniel López MD  02/20/17 1350 Resulting lab: POINT OF CARE TEST, GLUCOSE    Specimen Information    Type Source Collected On     02/20/17 1350          Components        Value Reference Range Flag Lab   Glucose 205 70 - 99 mg/dL H 170            Glucose by meter [[id number]] (Abnormal)  Resulted: 02/20/17 1241, Result status: Final result    Ordering provider: Daniel López MD  02/20/17 1203 Resulting lab: POINT OF CARE TEST, GLUCOSE    Specimen Information    Type Source Collected On     02/20/17 1203          Components       Value Reference Range Flag Lab   Glucose 126 70 - 99 mg/dL H 170            Hemoglobin A1c [[id number]] (Abnormal)  Resulted: 02/20/17 1101, Result status: Final result    Ordering provider: Laurel Fields APRN CNP  02/20/17 0100 Resulting lab: [facility]    Specimen Information    Type Source Collected On   Blood  02/20/17 1005          Components       Value Reference Range Flag Lab   Hemoglobin A1C 6.1 4.3 - 6.0 % H 51            Comprehensive metabolic panel [[id number]] (Abnormal)  Resulted: 02/20/17 1056, Result status: Final result    Ordering provider: Laurel Fields APRN CNP  02/20/17 0100 Resulting lab: [facility]    Specimen Information    Type Source Collected On   Blood  02/20/17 1005          Components       Value Reference Range Flag Lab   Sodium 137 133 - 144 mmol/L  51   Potassium 4.1 3.4 - 5.3 mmol/L  51   Chloride 103 94 - 109 mmol/L  51   Carbon Dioxide 27 20 - 32 mmol/L  51   Anion Gap 6 3 - 14 mmol/L  51   Glucose 137 70 - 99 mg/dL H 51   Urea Nitrogen 26 7 - 30 mg/dL  51   Creatinine 2.04 0.66 - 1.25 mg/dL H 51   GFR Estimate 32 >60 mL/min/1.7m2 L 51   Comment:  Non  GFR Calc   GFR Estimate If Black 38 >60 mL/min/1.7m2 L 51   Comment:  African American GFR Calc   Calcium 9.4 8.5 - 10.1 mg/dL  51   Bilirubin Total 0.5 0.2 - 1.3 mg/dL  51   Albumin 2.9 3.4 - 5.0 g/dL L 51   Protein Total 7.1 6.8 - 8.8 g/dL  51   Alkaline Phosphatase 79 40 - 150 U/L  51   ALT 33 0 - 70 U/L  51   AST 23 0 - 45 U/L  51            Magnesium [[id number]]  (Abnormal)  Resulted: 02/20/17 1056, Result status: Final result    Ordering provider: Laurel Fields APRN CNP  02/20/17 0100 Resulting lab: [facility]    Specimen Information    Type Source Collected On   Blood  02/20/17 1005          Components       Value Reference Range Flag Lab   Magnesium 2.6 1.6 - 2.3 mg/dL H 51            Phosphorus [[id number]]  Resulted: 02/20/17 1056, Result status: Final result    Ordering provider: Laurel Fields APRN CNP  02/20/17 0100 Resulting lab: [facility]    Specimen Information    Type Source Collected On   Blood  02/20/17 1005          Components       Value Reference Range Flag Lab   Phosphorus 2.9 2.5 - 4.5 mg/dL  51            INR [[id number]] (Abnormal)  Resulted: 02/20/17 1038, Result status: Final result    Ordering provider: Laurel Fields APRN CNP  02/20/17 0100 Resulting lab: [facility]    Specimen Information    Type Source Collected On   Blood  02/20/17 1005          Components       Value Reference Range Flag Lab   INR 1.16 0.86 - 1.14 H 51            CBC with platelets [[id number]] (Abnormal)  Resulted: 02/20/17 1027, Result status: Final result    Ordering provider: Laurel Fields APRN CNP  02/20/17 0100 Resulting lab: [facility]    Specimen Information    Type Source Collected On   Blood  02/20/17 1005          Components       Value Reference Range Flag Lab   WBC 8.5 4.0 - 11.0 10e9/L  51   RBC Count 4.05 4.4 - 5.9 10e12/L L 51   Hemoglobin 11.5 13.3 - 17.7 g/dL L 51   Hematocrit 37.4 40.0 - 53.0 % L 51   MCV 92 78 - 100 fl  51   MCH 28.4 26.5 - 33.0 pg  51   MCHC 30.7 31.5 - 36.5 g/dL L 51   RDW 15.2 10.0 - 15.0 % H 51   Platelet Count 156 150 - 450 10e9/L  51            Glucose by meter [[id number]] (Abnormal)  Resulted: 02/20/17 0841, Result status: Final result    Ordering provider: Daniel López MD   02/20/17 0830 Resulting lab: POINT OF CARE TEST, GLUCOSE    Specimen Information    Type Source Collected On     02/20/17 0830          Components       Value Reference Range Flag Lab   Glucose 134 70 - 99 mg/dL H 170            Glucose by meter [[id number]] (Abnormal)  Resulted: 02/19/17 2131, Result status: Final result    Ordering provider: Daniel López MD  02/19/17 2122 Resulting lab: POINT OF CARE TEST, GLUCOSE    Specimen Information    Type Source Collected On     02/19/17 2122          Components       Value Reference Range Flag Lab   Glucose 256 70 - 99 mg/dL H 170            Platelet count [[id number]]  Resulted: 02/19/17 1930, Result status: Final result    Ordering provider: Laurel Fields APRN CNP  02/19/17 1640 Resulting lab: [facility]    Specimen Information    Type Source Collected On   Blood  02/19/17 1906          Components       Value Reference Range Flag Lab   Platelet Count 170 150 - 450 10e9/L  51            Glucose by meter [[id number]] (Abnormal)  Resulted: 02/19/17 1735, Result status: Final result    Ordering provider: Daniel López MD  02/19/17 1729 Resulting lab: POINT OF CARE TEST, GLUCOSE    Specimen Information    Type Source Collected On     02/19/17 1729          Components       Value Reference Range Flag Lab   Glucose 274 70 - 99 mg/dL H 170            UA with Microscopic reflex to Culture [[id number]] (Abnormal)  Resulted: 02/19/17 1438, Result status: Final result    Ordering provider: Jerald Adan MD  02/19/17 1217 Resulting lab: [facility]    Specimen Information    Type Source Collected On   Urine Urine catheter 02/19/17 1314          Components       Value Reference Range Flag Lab   Color Urine Yellow   51   Appearance Urine Slightly Cloudy   51   Glucose Urine 70 NEG mg/dL A 51   Bilirubin Urine Negative NEG  51   Ketones Urine Negative NEG mg/dL  51   Specific Gravity Urine 1.012  1.003 - 1.035  51   Blood Urine Trace NEG A 51   pH Urine 6.5 5.0 - 7.0 pH  51   Protein Albumin Urine 30 NEG mg/dL A 51   Urobilinogen mg/dL Normal 0.0 - 2.0 mg/dL  51   Nitrite Urine Negative NEG  51   Leukocyte Esterase Urine Large NEG A 51   Source Catheterized Urine   51   WBC Urine 135 0 - 2 /HPF H 51   RBC Urine 7 0 - 2 /HPF H 51   Bacteria Urine Many NEG /HPF A 51            Comprehensive metabolic panel [[id number]] (Abnormal)  Resulted: 02/19/17 1137, Result status: Final result    Ordering provider: Jerald Adan MD  02/19/17 8320 Resulting lab: [facility]    Specimen Information    Type Source Collected On   Blood  02/19/17 1048          Components       Value Reference Range Flag Lab   Sodium 140 133 - 144 mmol/L  51   Potassium 4.2 3.4 - 5.3 mmol/L  51   Chloride 104 94 - 109 mmol/L  51   Carbon Dioxide 29 20 - 32 mmol/L  51   Anion Gap 7 3 - 14 mmol/L  51   Glucose 190 70 - 99 mg/dL H 51   Urea Nitrogen 27 7 - 30 mg/dL  51   Creatinine 2.06 0.66 - 1.25 mg/dL H 51   GFR Estimate 31 >60 mL/min/1.7m2 L 51   Comment:  Non  GFR Calc   GFR Estimate If Black 38 >60 mL/min/1.7m2 L 51   Comment:  African American GFR Calc   Calcium 9.2 8.5 - 10.1 mg/dL  51   Bilirubin Total 0.2 0.2 - 1.3 mg/dL  51   Albumin 2.8 3.4 - 5.0 g/dL L 51   Protein Total 6.9 6.8 - 8.8 g/dL  51   Alkaline Phosphatase 79 40 - 150 U/L  51   ALT 32 0 - 70 U/L  51   AST 22 0 - 45 U/L  51            CBC with platelets differential [[id number]] (Abnormal)  Resulted: 02/19/17 1115, Result status: Final result    Ordering provider: Jerald Adan MD  02/19/17 7049 Resulting lab: [facility]    Specimen Information    Type Source Collected On   Blood  02/19/17 1048          Components       Value Reference Range Flag Lab   WBC 6.7 4.0 - 11.0 10e9/L  51   RBC Count 4.00 4.4 - 5.9 10e12/L L 51   Hemoglobin 11.3 13.3 - 17.7 g/dL L 51   Hematocrit 37.4 40.0 - 53.0 % L  51   MCV 94 78 - 100 fl  51   MCH 28.3 26.5 - 33.0 pg  51   MCHC 30.2 31.5 - 36.5 g/dL L 51   RDW 15.2 10.0 - 15.0 % H 51   Platelet Count 165 150 - 450 10e9/L  51   Diff Method Automated Method   51   % Neutrophils 66.4 %  51   % Lymphocytes 20.3 %  51   % Monocytes 9.6 %  51   % Eosinophils 3.3 %  51   % Basophils 0.3 %  51   % Immature Granulocytes 0.1 %  51   Nucleated RBCs 1 0 /100 H 51   Absolute Neutrophil 4.5 1.6 - 8.3 10e9/L  51   Absolute Lymphocytes 1.4 0.8 - 5.3 10e9/L  51   Absolute Monocytes 0.6 0.0 - 1.3 10e9/L  51   Absolute Eosinophils 0.2 0.0 - 0.7 10e9/L  51   Absolute Basophils 0.0 0.0 - 0.2 10e9/L  51   Abs Immature Granulocytes 0.0 0 - 0.4 10e9/L  51   Absolute Nucleated RBC 0.1   51            Testing Performed By     Lab - Abbreviation Name Director Address Valid Date Range    51 - Unknown [facility] Unknown [address] [facility] [id number] 12/31/14 1010 - Present    75 - Unknown [facility] Unknown [address] [facility] [address] 01/15/15 1019 - Present    170 - Unknown POINT OF CARE TEST, GLUCOSE Unknown Unknown 10/31/11 1114 - Present    225 - Unknown [facility] Unknown [address] [facility] [id number] 12/19/14 0954 - Present            Unresulted Labs (24h ago through future)    Start       Ordered    02/22/17 0700  INR  (warfarin (COUMADIN) Pharmacy Consult-INITIAL ORDER)  DAILY,   Routine      02/21/17 1311    02/22/17 0600  Platelet count  (Pharmacological Prophylaxis- heparin (If CrCl less than 30 mL/min)- UU,UR,UA,SH,RH,PH,W[place])  EVERY THREE DAYS,   Routine     Comments:  Repeat every 3 days while on VTE prophylaxis. If no result is listed, this lab has not been done the past 365 days. LATEST LAB RESULT: Platelet Count (10e9/L)       Date                     Value                 02/19/2017               165              ----------      02/19/17 1640    Unscheduled  Potassium   "(Potassium Replacement - \"Standard\" - For K levels less than 3.4 mmol/L - UU,UR,UA,RH,SH,PH,W[place] )  CONDITIONAL (SPECIFY),   Routine     Comments:  Obtain Potassium Level for these conditions:  *IF no potassium result within 24 hours before initiation of order set, draw potassium level with next lab collect.    *2 HOURS AFTER last IV potassium replacement dose and 4 hours after an oral replacement dose.  *Next morning after potassium dose.     Repeat Potassium Replacement if necessary.    02/19/17 1640    Unscheduled  Magnesium  (Magnesium Replacement -  Adult - \"Standard\" - Replacement for all levels less than 1.6 mg/dL )  CONDITIONAL (SPECIFY),   Routine     Comments:  Obtain Magnesium Level for these conditions:  *IF no magnesium result within 24 hrs before initiation of order set, draw magnesium level with next lab collect.    *2 HOURS AFTER last magnesium replacement dose when magnesium replacement given for level less than 1.6   *Next morning after magnesium dose.     Repeat Magnesium Replacement if necessary.    02/19/17 1640         Imaging Results - 3 Days      MRA Brain and Neck & Angiogram [[id number]]  Resulted: 02/21/17 1630, Result status: Final result    Ordering provider: Romario Finley MD  02/20/17 1957 Resulted by: Romario Welsh MD Ozutemiz, Can, MD    Performed: 02/21/17 1420 - 02/21/17 1438 Resulting lab: RADIOLOGY RESULTS    Narrative:       MRA of the head without contrast  Neck MRA without and with contrast    History:  R carotid dissection vs stenosis 1/3/17 - re-evaluate.  Patient has programmable strata  Shunt - should get 1.5T MRI.    Comparison:  Head MRI and head angiography from 1/3/2017      Technique:   Head MRA: 3D time-of-flight MRA of the [place] of [place] was performed  without intravenous contrast.  Neck MRA: Noncontrast T1 and T2-weighted fat-saturated turbo spin-echo  axial images of the neck structures. Limited non contrast 2DTOF images  were obtained of the " mid-cervical region. Following intravenous  gadolinium-based contrast administration, a contrast enhanced MRA of  the neck/cervical vessels was performed.  Three-dimensional reconstructions of the neck and head MRA were  created, which were reviewed by the radiologist.     Dose: 10cc Gadavist Injected    Head MRA findings: Suboptimal head MRA due to motion artifact. The  calibration of the right internal carotid artery within the lacerum  segment, cavernous segment, clinoid segment and supraclinoid distal  segment is thinner compared to normal contralateral site. There is  again peripheral increased T2 signal and vague heterogeneous T1 signal  within the periphery of the right internal carotid artery, suggestive  of chronic dissection. Diminutive appearance of right A1 segment of  anterior cerebral artery. Right middle cerebral artery and its  branches appear patent although there is decreased signal due to flow  related artifacts. Patent left internal carotid artery and its  branches. Right anterior cerebral artery is filled via the anterior  communicating artery. Patent left posterior communicating artery. No  visible right posterior communicating artery. Basilar artery and its  branches are patent. No apparent aneurysm formation.     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 Patent distal V3 and V4 segments of left vertebral  artery.    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 Intermittent partial filling in the V2 and proximal V3  segment. Near complete occlusion of right internal carotid artery  origin, unchanged compared to prior study. No significant stenosis is  noted in the left carotid bifurcation. Left extracranial internal  carotid artery appears patent throughout its course. Right vertebral  artery appears patent.    Partial visualization of relatively unchanged chronic encephalomalacic  changes in the left cerebellar  hemisphere and right cerebral  hemisphere based on limited axial T2-weighted images.      Impression:       Impression:  1. Continued high-grade narrowing the proximal right internal carotid  artery, with continued long segment narrowing of the remainder of the  cervical, cavernous, and supraclinoid segments of the right internal  carotid artery, consistent with sequelae of chronic dissection.  2. 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 While this is an apparent improvement compared to prior MRIs,  this could be unchanged as today's neck MRA was performed with  intravenous gadolinium and the prior studies were noncontrast  time-of-flight MRA.  3. 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    I have personally reviewed the examination and initial interpretation  and I agree with the findings.    TRISTON RIOS MD      Head CT w/o contrast [[id number]]  Resulted: 02/19/17 1345, Result status: Final result    Ordering provider: Jerald Adan MD  02/19/17 1217 Resulted by: Valeriy Young MD Jones, Adi Prado MD    Performed: 02/19/17 1249 - 02/19/17 1301 Resulting lab: RADIOLOGY RESULTS    Narrative:       CT HEAD W/O CONTRAST 2/19/2017 1:01 PM    Provided History: Fall, trauma    Comparison: MRI of the brain 3 January, 2017, CT of the head 24 February 2016.    Technique: Using multidetector thin collimation helical acquisition  technique, axial, coronal and sagittal CT images from the skull base  to the vertex were obtained without intravenous contrast.     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 Stable chronic  left PICA territory infarct. No new loss of gray-white matter  differentiation. Mild leukoaraiosis.    Right frontal approach  ventriculoperitoneal shunt catheter courses  through the body of the left lateral ventricle with tip in the left  thalamus. 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    No intracranial hemorrhage, mass effect, midline shift or abnormal  extra axial fluid collection.    The visualized paranasal sinuses are clear. The mastoid air cells are  clear. Bilateral pseudophakia. Calvarium is intact.       Impression:       Impression:   1. No evidence of acute traumatic injury.  2. Increase in size of the lateral and third ventricles, possibly  related to decreased effacement associated with resolution of  previously demonstrated bilateral hemispheric subdural hygromas since  1/3/2017. Stable positioning of right frontal ventriculostomy  catheter.  3. Stable chronic infarcts involving the posterior right MCA  territory, right hemispheric external border zones, and left PICA  territory.    I have personally reviewed the examination and initial interpretation  and I agree with the findings.    LETI PICKERING MD      XR Ankle Left G/E 3 Views [[id number]]  Resulted: 02/19/17 1322, Result status: Final result    Ordering provider: Jerald Adan MD  02/19/17 1057 Resulted by: Radha Page MD Jones, Richard Hayden, MD    Performed: 02/19/17 1112 - 02/19/17 1156 Resulting lab: RADIOLOGY RESULTS    Narrative:       XR TIBIA & FIBULA LT 2 VW, XR ANKLE LT G/E 3 VW, XR FOOT LT G/E  3 VW, XR HIP LEFT 2-3 VIEWS   2/19/2017 11:59 AM      HISTORY: Pain, trauma    COMPARISON: CT of the left knee 22 February, 2011; left foot  radiographs 8 December, 2016; left foot MRI 6 October, 2016; left  ankle radiographs 26 June, 2013    FINDINGS:   No femur fracture. The distal half of the femur is not included on  these radiographs.    Plate and screw fixation of prior posterior lateral tibial  plateau  fracture. No signs of hardware failure. No knee effusion.     Chronic bimalleolar fracture. Superimposed transverse fracture of the  medial malleolus with some comminution; there is greatest continuity  in the medial cortical surface. The medial tibiotalar joint space is  also widened up to 5 mm compared to 10 mm in the lateral aspect of the  joint space. Vascular calcifications.    Demineralization throughout the bones of the foot. Mildly displaced  chronic intra-articular fracture through the first metatarsal head,  base of the third metatarsal, fourth and fifth metatarsal necks    Diffuse osteopenia and fatty muscular atrophy.      Impression:       IMPRESSION:   1. Acute transverse fracture left medial malleolus. Chronic widening  of the medial tibiotalar joint space suggestive of ligamentous injury.  Changes are superimposed on chronic bimalleolar fracture; additional  fractures may be obscured.     2. Numerous chronic metatarsal and phalangeal fractures. Most  angulated fracture is of the 1st metatarsal head.     3. Stable appearance of internal fixation of left lateral tibial  plateau fracture.    4. No femur fracture.    5. Diffuse osteopenia and muscular atrophy. Vascular calcifications.    Findings discussed with Dr. Adan by Dr. Baum at 1215 hours on 19 February 2017.    I have personally reviewed the examination and initial interpretation  and I agree with the findings.    RADHA PAGE MD      Foot XR, G/E 3 views, left [[id number]]  Resulted: 02/19/17 1322, Result status: Final result    Ordering provider: Jerald Adan MD  02/19/17 1116 Resulted by: Radha Page MD Jones, Adi Prado MD    Performed: 02/19/17 1118 - 02/19/17 1200 Resulting lab: RADIOLOGY RESULTS    Narrative:       XR TIBIA & FIBULA LT 2 VW, XR ANKLE LT G/E 3 VW, XR FOOT LT G/E  3 VW, XR HIP LEFT 2-3 VIEWS   2/19/2017 11:59 AM      HISTORY: Pain, trauma    COMPARISON: CT of the left knee 22 February, 2011;  left foot  radiographs 8 December, 2016; left foot MRI 6 October, 2016; left  ankle radiographs 26 June, 2013    FINDINGS:   No femur fracture. The distal half of the femur is not included on  these radiographs.    Plate and screw fixation of prior posterior lateral tibial plateau  fracture. No signs of hardware failure. No knee effusion.     Chronic bimalleolar fracture. Superimposed transverse fracture of the  medial malleolus with some comminution; there is greatest continuity  in the medial cortical surface. The medial tibiotalar joint space is  also widened up to 5 mm compared to 10 mm in the lateral aspect of the  joint space. Vascular calcifications.    Demineralization throughout the bones of the foot. Mildly displaced  chronic intra-articular fracture through the first metatarsal head,  base of the third metatarsal, fourth and fifth metatarsal necks    Diffuse osteopenia and fatty muscular atrophy.      Impression:       IMPRESSION:   1. Acute transverse fracture left medial malleolus. Chronic widening  of the medial tibiotalar joint space suggestive of ligamentous injury.  Changes are superimposed on chronic bimalleolar fracture; additional  fractures may be obscured.     2. Numerous chronic metatarsal and phalangeal fractures. Most  angulated fracture is of the 1st metatarsal head.     3. Stable appearance of internal fixation of left lateral tibial  plateau fracture.    4. No femur fracture.    5. Diffuse osteopenia and muscular atrophy. Vascular calcifications.    Findings discussed with Dr. Adan by Dr. Baum at 1215 hours on 19 February 2017.    I have personally reviewed the examination and initial interpretation  and I agree with the findings.    RADHA PAGE MD      Hip XR, 2+ views, left [[id number]]  Resulted: 02/19/17 1322, Result status: Final result    Ordering provider: Jerald Adan MD  02/19/17 1116 Resulted by: Radha Page MD Jones, Richard Hayden, MD    Performed: 02/19/17  1119 - 02/19/17 1200 Resulting lab: RADIOLOGY RESULTS    Narrative:       XR TIBIA & FIBULA LT 2 VW, XR ANKLE LT G/E 3 VW, XR FOOT LT G/E  3 VW, XR HIP LEFT 2-3 VIEWS   2/19/2017 11:59 AM      HISTORY: Pain, trauma    COMPARISON: CT of the left knee 22 February, 2011; left foot  radiographs 8 December, 2016; left foot MRI 6 October, 2016; left  ankle radiographs 26 June, 2013    FINDINGS:   No femur fracture. The distal half of the femur is not included on  these radiographs.    Plate and screw fixation of prior posterior lateral tibial plateau  fracture. No signs of hardware failure. No knee effusion.     Chronic bimalleolar fracture. Superimposed transverse fracture of the  medial malleolus with some comminution; there is greatest continuity  in the medial cortical surface. The medial tibiotalar joint space is  also widened up to 5 mm compared to 10 mm in the lateral aspect of the  joint space. Vascular calcifications.    Demineralization throughout the bones of the foot. Mildly displaced  chronic intra-articular fracture through the first metatarsal head,  base of the third metatarsal, fourth and fifth metatarsal necks    Diffuse osteopenia and fatty muscular atrophy.      Impression:       IMPRESSION:   1. Acute transverse fracture left medial malleolus. Chronic widening  of the medial tibiotalar joint space suggestive of ligamentous injury.  Changes are superimposed on chronic bimalleolar fracture; additional  fractures may be obscured.     2. Numerous chronic metatarsal and phalangeal fractures. Most  angulated fracture is of the 1st metatarsal head.     3. Stable appearance of internal fixation of left lateral tibial  plateau fracture.    4. No femur fracture.    5. Diffuse osteopenia and muscular atrophy. Vascular calcifications.    Findings discussed with Dr. Adan by Dr. Baum at 1215 hours on 19 February 2017.    I have personally reviewed the examination and initial interpretation  and I agree with the  findings.    RADHA PAGE MD      Tib/Fib XR, left [[id number]]  Resulted: 02/19/17 1322, Result status: Final result    Ordering provider: Jerald Adan MD  02/19/17 1116 Resulted by: Radha Page MD Jones, Adi Prado MD    Performed: 02/19/17 1119 - 02/19/17 1159 Resulting lab: RADIOLOGY RESULTS    Narrative:       XR TIBIA & FIBULA LT 2 VW, XR ANKLE LT G/E 3 VW, XR FOOT LT G/E  3 VW, XR HIP LEFT 2-3 VIEWS   2/19/2017 11:59 AM      HISTORY: Pain, trauma    COMPARISON: CT of the left knee 22 February, 2011; left foot  radiographs 8 December, 2016; left foot MRI 6 October, 2016; left  ankle radiographs 26 June, 2013    FINDINGS:   No femur fracture. The distal half of the femur is not included on  these radiographs.    Plate and screw fixation of prior posterior lateral tibial plateau  fracture. No signs of hardware failure. No knee effusion.     Chronic bimalleolar fracture. Superimposed transverse fracture of the  medial malleolus with some comminution; there is greatest continuity  in the medial cortical surface. The medial tibiotalar joint space is  also widened up to 5 mm compared to 10 mm in the lateral aspect of the  joint space. Vascular calcifications.    Demineralization throughout the bones of the foot. Mildly displaced  chronic intra-articular fracture through the first metatarsal head,  base of the third metatarsal, fourth and fifth metatarsal necks    Diffuse osteopenia and fatty muscular atrophy.      Impression:       IMPRESSION:   1. Acute transverse fracture left medial malleolus. Chronic widening  of the medial tibiotalar joint space suggestive of ligamentous injury.  Changes are superimposed on chronic bimalleolar fracture; additional  fractures may be obscured.     2. Numerous chronic metatarsal and phalangeal fractures. Most  angulated fracture is of the 1st metatarsal head.     3. Stable appearance of internal fixation of left lateral tibial  plateau fracture.    4. No femur  fracture.    5. Diffuse osteopenia and muscular atrophy. Vascular calcifications.    Findings discussed with Dr. Adan by Dr. Baum at 1215 hours on 19 February 2017.    I have personally reviewed the examination and initial interpretation  and I agree with the findings.    KEVEN MADRID MD      Testing Performed By     Lab - Abbreviation Name Director Address Valid Date Range    104 - [facility] RADIOLOGY RESULTS Unknown Unknown 02/16/05 1553 - Present            Encounter-Level Documents:     There are no encounter-level documents.      Order-Level Documents:     There are no order-level documents.

## 2017-02-19 NOTE — ED NOTES
"Triage Assessment & Note:    Pulse 62  Temp 97.3  F (36.3  C) (Oral)  Resp 18  Ht 1.702 m (5' 7\")  Wt 88.5 kg (195 lb)  SpO2 95%  BMI 30.54 kg/m2      Patient presents with: Pt with dementia BIBA from home with left ankle trauma.  Per EMS pt fell out of bed this AM and has had multiple traumas in the past to this leg with past surgeries. No reports of fever, cough, or LOC.     Home Treatments/Remedies: Pt given 100 mcg of Fentanyl at 1015Home medication    Febrile / Afebrile: Afebrile    Duration of C/o: <2 hours    Janki Mathias RN  February 19, 2017        "

## 2017-02-19 NOTE — H&P
[facility], [place]    History and Physical / Consult note: Trauma Service     Date of Admission:  2/19/2017  Date of Service (when I saw the patient): 02/19/17    Assessment & Plan   Trauma mechanism:Fall from wheelchair  Time/date of injury:2/19; am   Known Injuries:  1. Fracture of left medial malleolus   2. Widening of the medial tibiotalar joint space suggestive of ligamentous injury  Other diagnoses:   1. Dementia  2. Idiopathic normal pressure hydrocephalus  3. CVAs  4. DMII  5. CKD Stage 3  6. COPD  7. Paroxysmal A-Fib  8. CAD  9. Anemia of chronic renal disease  10. GERD  11. SHIVAM  12. Systolic and Diastolic CHF\  13. Neurogenic bladder requiring straight cath to void     Procedure:    Plan:  1. Admit to trauma, 7B, Dr. López   2. Orthopedic consult: Fracture of left medial malleolus and possible ligamentous injury.  Being splinted in ED by ED MD.  Ortho to see for further management  3. Neurology consult: Encephalopathy which has become acutely worse in last 7-10 days according to patient's wife. Mr. Bhatia is a patient of Dr. Corey of neurology and is followed by Dr. Neri regarding his known subdural hygromas.  Head CT on admission with no acute pathology, but shows increased size of lateral and third ventricles since 1/3/2017.  Stable chronic infarcts noted involving right MCA, left PICA and right hemispheric external border zones. Neurology to see for ongoing management regarding worsening mental status  4. DMII: patient follows closely with [facility] Oma Gagnon for BG management.  Current management includes lantus 37 units BID, Humalog 30 units with breakfast and 10 units with dinner, and Byetta 5mcg, twice daily, 60 minutes before breakfast and lunch. While inpatient BG checks TID with meals and at bedtime and 0200, medium SS insulin, 1unit/15g carb coverage with meals and snacks, and diabetes management consult.  Lantus dosing 30 units twice daily until needs are  better established; hold Byetta.    5. COPD: continue PTA inhalers  Pulse ox spot checks. Supplemental oxygen PRN to keep Spo2>90.   6. CAD/paroxysmal A-Fib/CHF: continue PTA medications. Patient follows with Dr. Bui, most recent visit was in January, 2017. Patient recommended to follow up in one year.  No acute issues at admission  7. CKD Stage 3: Creatinine 2.06 on admission.  Baseline creatinine 1.6-2.0.  Avoid nephrotoxic agents, avoid dehydration, continue PTA Bumex, monitor BMP daily.  Continue PTA vitamin supplementation.   8. Chronic UTI: continue PTA Bactrim   9. Anemia of chronic kidney disease: Hbg on admission 11.3.  Continue PTA iron supplementation and vitamin supplementation   10. Neurogenic bladder: patient requires straight cath to void. Able to make needs known. Will order straight cath QID and PRN  11. PT/OT  12. SW for discharge planning. According to patient's wife, his memory has declined significantly very recently and she is concerned she is not able to continue caring for him at home with the resources he is currently requiring.      Code status: DNR/DNI confirmed with patient and spouse.     General Cares:  GI Prophylaxis: Continue PTA Ranitidine   DVT Prophylaxis: SQ heparin while inpatient.   Date of last stool/Bowel Regimen:PTA/senna/miralax PRN  Pulmonary toilet:IS/continue PTA medications.     ETOH: This patient was asked if in the last 3-6 months there has been a time when he had  5 or more drinks in a single day/outing.. Patient answer to the screening question was in the negative. No intervention needed.  Primary Care Physician   Ash Bowman    Chief Complaint   Left ankle pain     History is obtained from the patient and patient's spouse     History of Present Illness   Melvin Bhatia is a 76 year old male with complex past medical history including encephalopathy,  shunt, dementia, multiple CVAs, DMII, CAD, and CHF who presents to the emergency department today via  ambulance accompanied by his wife.  The patient has significant memory loss, but is able to state he is at the [facility] and is here because he hurt his ankle while transferring out of be to his wheelchair. This story is corroborated by the patient's wife. According to the patient and his wife the patient is wheelchair bound at baseline. This morning he was attempting to transfer from bed to his chair when he fell, got tangled in the wheelchair and injured his left leg.  He immediately felt pain in his left hip and ankle.  According to patient and wife he did not hit his head, neck or back during the fall.  There was no reported loss of consciousness. The patient was unable to get up, and EMS was summoned.  The patient has history of multiple previous falls and fractures including the left foot and ankle.   Upon arrival to [facility] xrays of the lower extremities where performed which demonstrate a left medial malleolus fracture.  Ortho was asked to see the patient and a splint to the lower extremity was applied in the ED.  The patient denies any additional pain or injuries. Head CT was performed which was negative for acute traumatic pathology.    Additionally, according to the patient's wife the patient has had a significant change in mental status recently.  Of note, Mr. Bhatia's wife reports he was recently started on Donepezil by his neurologist Dr. Corey, and he developed angry outbursts and mood changes.  This medication was stopped by the patient's wife.  Since that time he has no longer had any angry outbursts but his memory reportedly continues to decline rapidly.  He has been following with neurology for these changes.  We will ask neurology to see the patient while in house    Past Medical History    I have reviewed this patient's medical history and updated it with pertinent information if needed.   Past Medical History   Diagnosis Date     ARDS (adult respiratory distress syndrome) (H)       Atrial fibrillation (H)      first diagnosed 11-08     Bell's palsy 1/2010     Cauda equina syndrome with neurogenic bladder (H)      self catheter     Cerebellar infarct (H) 4/2012     left cerebellar infarct.  felt embolic hx afib     CHF (congestive heart failure) (H)      Chronic airway obstruction, not elsewhere classified      Chronic infection      VRE     CKD (chronic kidney disease) stage 4, GFR 15-29 ml/min (H)      Congestive heart failure, unspecified 5/06     EF 48%     Coronary artery disease      abnl mps with ef 46% and small-med area of ischemia '08 [facility]     CVA (cerebral infarction)      old lacunar infarct in the posterior left     Diabetes      Essential hypertension, benign      Gastro-oesophageal reflux disease      Heart attack (H)      Hemorrhage of gastrointestinal tract, unspecified 2/2003     UGI Bleed with transfusion     Hydrocephalus      plan  Shunt     Hypersomnia with sleep apnea, unspecified      Bpap     Intraventricular hemorrhage 1/2012     Legionella pneumonia (H) 8/09     Lumbago      Lumbosacral plexopathy 10/09     L diabetic myotrophy     Mixed hyperlipidemia      Other osteoporosis 3/2006     Other specified anemias 2002     Nl colonoscopy/EGD; Nl iron studies 4/06     Other specified disorders of rotator cuff syndrome of shoulder and allied disorders      Other testicular hypofunction      on Androgel     Palpitations      HOLTER     Sleep apnea      uses cpap     Subarachnoid hemorrhage (H) 1/2012     Tibial plateau fracture 2/2011     left     Type II or unspecified type diabetes mellitus without mention of complication, not stated as uncontrolled      Unspecified disorder resulting from impaired renal function      Stage 3 Chronic Renal Dz       Past Surgical History   I have reviewed this patient's surgical history and updated it with pertinent information if needed.  Past Surgical History   Procedure Laterality Date     Surgical history of -   2001     R wrist  surg fx/ steel plate     Surgical history of -   age 20     Inguinal hernia     C dexa, bone density, axial skel  3/2006     Osteoporosis     Cardiac nuc wilman stress test nl  5/06     No ischemia, EF 44 %     Hc colonoscopy thru stoma, diagnostic  2/2003     Normal, repeat 10 yr     Upper gi endoscopy  2/2003     esophageal ulcers, gastritis, duodenitis     Upper gi endoscopy  4/2003     erosive gastropathy from ASA     Surgical history of -   2003     Bladder stim surg, not sucessful     Tonsillectomy  child     Cardiac nuc wilman stress test nl  6/2008, 3/09     sm to mod fixed apical defect EF 46%     Hc doppler echo pulsed, complete       EF 50-55%     Surgical history of -        Lymphedema treatment of legs     Open reduction internal fixation tibial plateau  3/2011     left     Implant shunt ventriculoperitoneal  2/28/2012     Procedure:IMPLANT SHUNT VENTRICULOPERITONEAL; Ventriculoperitoneal Shunt Placement; Surgeon:ALESHA VELAZQUEZ; Location:[facility] OR     Prior to Admission Medications   Prior to Admission Medications   Prescriptions Last Dose Informant Patient Reported? Taking?   ACE/ARB NOT PRESCRIBED, INTENTIONAL,  Self Yes No   Sig: ACE & ARB not prescribed due to Hyperkalemia (baseline K+ > 5.5), Worsening renal function on ACE/ARB therapy and CKD (Chronic Kidney Disease)   Catheters MISC  Self No No   Sig: As needed   Cyanocobalamin (VITAMIN B12) 1000 MCG TBCR  Self Yes No   Sig: Take 1,000 mcg by mouth daily   Ipratropium-Albuterol (COMBIVENT RESPIMAT)  MCG/ACT inhaler   No No   Sig: Inhale 1 puff into the lungs 2 times daily Not to exceed 6 doses per day.   ONE TOUCH LANCETS MISC  Self No No   Sig: use as directed 4 x daily and prn   ORDER FOR DME  Self No No   Sig: Equipment being ordered: CPAP supplies   RANITIDINE HCL PO  Self Yes No   Sig: Take 150 mg by mouth daily   VITAMIN D, CHOLECALCIFEROL, PO  Self Yes No   Sig: Take 2,000 Units by mouth daily   aspirin 81 MG tablet  Self Yes No    Sig: Take 1 tablet (81 mg) by mouth daily   blood glucose monitoring (NO BRAND SPECIFIED) test strip  Self No No   Si strip by In Vitro route 4 times daily   budesonide-formoterol (SYMBICORT) 160-4.5 MCG/ACT Inhaler   No No   Sig: Inhale 2 puffs into the lungs 2 times daily   bumetanide (BUMEX) 0.5 MG tablet  Self Yes No   Sig: Take 0.5 mg by mouth 2 times daily   calcium citrate (CALCITRATE) 950 MG tablet  Self Yes No   Sig: Take 1 tablet by mouth daily   calcium citrate-vitamin D (CITRACAL) 315-200 MG-UNIT TABS  Self Yes No   Sig: Take 2 tablets by mouth 2 times daily 2 tabs contain 500mg elemental calcium + 800 iu's vitamin D3 in them .   denosumab (PROLIA) 60 MG/ML SOLN  Self Yes No   Sig: Inject 60 mg Subcutaneous every 6 months   exenatide (BYETTA) 5 mcg/0.02mL per dose (60 doses per 1.2 mL prefilled pen)  Self No No   Sig: Inject 5mcg under the skin twice daily 60 minutes before meals.   ferrous sulfate 325 (65 FE) MG tablet  Self Yes No   Sig: Take 325 mg by mouth daily (with breakfast) 65mg elemental iron   fexofenadine (ALLEGRA) 180 MG tablet   No No   Sig: Take 1 tablet (180 mg) by mouth daily   fluocinolone (SYNALAR) 0.01 % external solution  Self No No   Sig: Apply 1 to 2 times daily to scaly areas of scalp as needed.Profile Rx: patient will contact pharmacy when needed   fluticasone (FLONASE) 50 MCG/ACT nasal spray  Self No No   Sig: Spray 2 sprays into both nostrils daily   insulin glargine (LANTUS SOLOSTAR) 100 UNIT/ML PEN  Self No No   Sig: Inject 55 units twice daily  under the skin daily.   Patient taking differently: 2 times daily Inject 37 units twice daily  under the skin   insulin lispro (HUMALOG KWIKPEN) 100 UNIT/ML soln  Self No No   Sig: Inject 30 units under the skin three times daily before meals as directed.   insulin pen needle (B-D U/F) 31G X 8 MM  Self No No   Sig: Use 7 daily or as directed.   insulin syringes, disposable, U-100 0.3 ML MISC  Self No No   Si each 4 times  daily.   ketoconazole (NIZORAL) 2 % shampoo  Self No No   Sig: Apply to the affected area and wash off after 5 minutes.   magnesium oxide (MAG-OX) 400 MG tablet  Self Yes No   Sig: Take 2 tablets by mouth daily.   metoprolol (TOPROL-XL) 50 MG 24 hr tablet  Self No No   Sig: Take 1 tablet (50 mg) by mouth At Bedtime   multivitamin, therapeutic with minerals (THERA-VIT-M) TABS  Self Yes No   Sig: Take 1 tablet by mouth daily   niacin (NIASPAN) 500 MG CR tablet  Self No No   Sig: Take 1 tablet by mouth At Bedtime.   nitroglycerin (NITROSTAT) 0.4 MG SL tablet  Self No No   Sig: Place 1 tablet (0.4 mg) under the tongue every 5 minutes as needed up to 3 tablets per episode.   order for DME  Self No No   Sig: Equipment being ordered: 4 wheeled rolling walker.   rosuvastatin (CRESTOR) 20 MG tablet   No No   Sig: Take 1 tablet (20 mg) by mouth daily Needs fasting labs before any further refills   sulfamethoxazole-trimethoprim (BACTRIM DS) 800-160 MG per tablet  Self Yes No   sulfamethoxazole-trimethoprim (BACTRIM DS/SEPTRA DS) 800-160 MG per tablet   No No   Sig: Take 1 tablet by mouth daily      Facility-Administered Medications: None     Allergies   Allergies   Allergen Reactions     Penicillins Hives     Albuterol Other (See Comments)     Albuterol Sulfate Other (See Comments)     SVT/tachycardia with albuterol     Atorvastatin Other (See Comments)     Description: Lipitor, Description: Lipitor     Atorvastatin Calcium      Cipro [Ciprofloxacin] Diarrhea     Ciprofloxacin Other (See Comments)     Description: Cipro, Description: Cipro     Coumadin      Intraventricular hemorhage, subarrachnoid hem, hydrocephalous due to falls    Per wife, pt is not allergic to this med     Simvastatin Other (See Comments)     Description: Zocor, Description: Zocor  C/o weak muscles and severe abdominal pain.     Warfarin Unknown and Other (See Comments)       Social History   Social History     Social History     Marital status:       Spouse name: N/A     Number of children: N/A     Years of education: N/A     Occupational History     Not on file.     Social History Main Topics     Smoking status: Former Smoker     Quit date: 6/26/1997     Smokeless tobacco: Never Used      Comment: quit 1997       2-3 ppd for 45 yrs      Alcohol use No      Comment: recovering since 1982       Drug use: No     Sexual activity: Not on file     Other Topics Concern     Parent/Sibling W/ Cabg, Mi Or Angioplasty Before 65f 55m? Yes     Social History Narrative    Dairy/d 1 servings/d.     Caffeine 0 servings/d    Exercise walks 7 x week    Sunscreen used - No    Seatbelts used - No    Working smoke/CO detectors in the home - Yes    Guns stored in the home - No    Self Breast Exams - NA    Self Testicular Exam - No    Eye Exam up to date - Yes    Dental Exam up to date - Yes    Pap Smear up to date - NA    Mammogram up to date - NA    PSA up to date - Yes    Dexa Scan up to date - Yes 3/06    Flex Sig / Colonoscopy up to date - Yes    Immunizations up to date - No  Declines with possible allergic rxn as child    Abuse: Current or Past(Physical, Sexual or Emotional)- No    Do you feel safe in your environment - Yes    Consuelo Alfonso RN    4/30/08       Family History   Family history reviewed with patient and is noncontributory.    Review of Systems : may be limited by cognitive deficits   CONSTITUTIONAL: No fever, chills, sweats, fatigue   EYES: no visual blurring, no double vision or visual loss  ENT: no decrease in hearing, no tinnitus, no vertigo, no hoarseness  RESPIRATORY: chronic shortness of breath with exertion, no cough, no sputum   CARDIOVASCULAR: no palpitations, no chest  pain, no exertional chest pain or pressure  GASTROINTESTINAL: no nausea or vomiting, or abd pain  GENITOURINARY: no dysuria, no frequency or hesitancy, no hematuria. Straight cath to void 2/2 neurogenic bladder   MUSCULOSKELETAL: Pain in LLE/ankle with redness and joint pain     SKIN: Ecchymosis on LLE/ankle. no rashes, abrasions or lacerations  NEUROLOGIC:  numbness or tingling of hands and feet at baseline, no syncope, no tremors; generalized weakness  PSYCHIATRIC: no sleep disturbances, no anxiety or depression    Physical Exam   Temp: 97.3  F (36.3  C) Temp src: Oral BP: 130/84 Pulse: 62   Resp: 18 SpO2: 96 % O2 Device: None (Room air)    Vital Signs with Ranges  Temp:  [97.3  F (36.3  C)] 97.3  F (36.3  C)  Pulse:  [62] 62  Resp:  [18] 18  BP: (130-155)/() 130/84  SpO2:  [94 %-96 %] 96 % 195 lbs 0 oz    Primary Survey:  Airway: patient talking  Breathing: symmetric respiratory effort bilaterally  Circulation: central pulses present and peripheral pulses present  Disability: Pupils - left 4 mm and brisk, right 4 mm and brisk   Sofia Coma Scale - Total 14/15 (V4)   Eye Response (E): 4= spontaneous,  3= to verbal/voice, 2=  to pain, 1= No response   Verbal Response (V):  5= Orientated, converses,  4= Confused, converses, 3= Inappropriate words,  2= Incomprehensible sounds,  1=No response   Motor Response (M)  6= Obeys commands, 5= Localizes to pain, 4= Withdrawal to pain, 3=Fexion to pain, 2= Extension to pain, 1= No response    Secondary Survey:  General: alert, oriented to person, place, situation. Disoriented to time   Head: atraumatic, normocephalic, trachea midline  Eyes: PERRLA, pupils 4mm, EOMI, corneas and conjunctivae clear  Ears:  non-inflamed external ear canals  Nose: nares patent, no drainage, nasal septum non-tender  Mouth/Throat: no exudates or erythema,  no dental tenderness or malocclusions, no tongue lacerations  Neck:  No cervical collar present. No midline posterior tenderness, full AROM without pain or tenderness   Chest/Pulmonary: normal respiratory rate and rhythm,  bilateral clear breath sounds, significantly dminished throughout  Cardiovascular: S1, S2,  normal and regular rate and rhythm, no murmurs  Abdomen: soft, non-tender, no guarding, no rebound  tenderness and no tenderness to palpation  :no domingo, no urine assess  Back/Spine: no deformity, no midline tenderness, no sacral tenderness,  no step-offs and no abrasions or contusions  Musculoskel/Extremities: LLE/ankle with redness/swelling/movement limited 2/2 pain. Weak PP bilaterally   Otherwise normal extremities, full AROM of major joints without tenderness, edema, erythema, ecchymosis, or abrasions. Mild LE edema, more so on left   Hand: no gross deformities of hands or fingers. Full AROM of hand and fingers in flexion and extension.  strength equal and symmetric.   Skin: no rashes, laceration, ecchymosis, skin warm and dry.   Neuro: PERRLA, alert, oriented x 3 (disoriented to time). CN II-XII grossly intact. No focal deficits. Strength 5/5 x upper extremities.  Sensation decreased in BLEs and fingers   Psychiatric: affect/mood normal, cooperative, memory loss/cognitive deficits noted.     Data   UA RESULTS:  Recent Labs   Lab Test  12/01/16   1404  02/24/16   1639   COLOR  Yellow  Light Yellow   APPEARANCE  Cloudy  Slightly Cloudy   URINEGLC  Negative  Negative   URINEBILI  Negative  Negative   URINEKETONE  Negative  Negative   SG  1.020  1.010   UBLD  Moderate*  Small*   URINEPH  7.0  6.0   PROTEIN  100*  30*   UROBILINOGEN  0.2   --    NITRITE  Negative  Negative   LEUKEST  Large*  Large*   RBCU   --   11*   WBCU   --   >182*      Results for orders placed or performed during the hospital encounter of 02/19/17 (from the past 24 hour(s))   CBC with platelets differential   Result Value Ref Range    WBC 6.7 4.0 - 11.0 10e9/L    RBC Count 4.00 (L) 4.4 - 5.9 10e12/L    Hemoglobin 11.3 (L) 13.3 - 17.7 g/dL    Hematocrit 37.4 (L) 40.0 - 53.0 %    MCV 94 78 - 100 fl    MCH 28.3 26.5 - 33.0 pg    MCHC 30.2 (L) 31.5 - 36.5 g/dL    RDW 15.2 (H) 10.0 - 15.0 %    Platelet Count 165 150 - 450 10e9/L    Diff Method Automated Method     % Neutrophils 66.4 %    % Lymphocytes 20.3 %    % Monocytes 9.6 %    %  Eosinophils 3.3 %    % Basophils 0.3 %    % Immature Granulocytes 0.1 %    Nucleated RBCs 1 (H) 0 /100    Absolute Neutrophil 4.5 1.6 - 8.3 10e9/L    Absolute Lymphocytes 1.4 0.8 - 5.3 10e9/L    Absolute Monocytes 0.6 0.0 - 1.3 10e9/L    Absolute Eosinophils 0.2 0.0 - 0.7 10e9/L    Absolute Basophils 0.0 0.0 - 0.2 10e9/L    Abs Immature Granulocytes 0.0 0 - 0.4 10e9/L    Absolute Nucleated RBC 0.1    Comprehensive metabolic panel   Result Value Ref Range    Sodium 140 133 - 144 mmol/L    Potassium 4.2 3.4 - 5.3 mmol/L    Chloride 104 94 - 109 mmol/L    Carbon Dioxide 29 20 - 32 mmol/L    Anion Gap 7 3 - 14 mmol/L    Glucose 190 (H) 70 - 99 mg/dL    Urea Nitrogen 27 7 - 30 mg/dL    Creatinine 2.06 (H) 0.66 - 1.25 mg/dL    GFR Estimate 31 (L) >60 mL/min/1.7m2    GFR Estimate If Black 38 (L) >60 mL/min/1.7m2    Calcium 9.2 8.5 - 10.1 mg/dL    Bilirubin Total 0.2 0.2 - 1.3 mg/dL    Albumin 2.8 (L) 3.4 - 5.0 g/dL    Protein Total 6.9 6.8 - 8.8 g/dL    Alkaline Phosphatase 79 40 - 150 U/L    ALT 32 0 - 70 U/L    AST 22 0 - 45 U/L   XR Ankle Left G/E 3 Views    Narrative    XR TIBIA & FIBULA LT 2 VW, XR ANKLE LT G/E 3 VW, XR FOOT LT G/E  3 VW, XR HIP LEFT 2-3 VIEWS   2/19/2017 11:59 AM      HISTORY: Pain, trauma    COMPARISON: CT of the left knee 22 February, 2011; left foot  radiographs 8 December, 2016; left foot MRI 6 October, 2016; left  ankle radiographs 26 June, 2013    FINDINGS:   No femur fracture. The distal half of the femur is not included on  these radiographs.    Plate and screw fixation of prior posterior lateral tibial plateau  fracture. No signs of hardware failure. No knee effusion.     Chronic bimalleolar fracture. Superimposed transverse fracture of the  medial malleolus with some comminution; there is greatest continuity  in the medial cortical surface. The medial tibiotalar joint space is  also widened up to 5 mm compared to 10 mm in the lateral aspect of the  joint space. Vascular  calcifications.    Demineralization throughout the bones of the foot. Mildly displaced  chronic intra-articular fracture through the first metatarsal head,  base of the third metatarsal, fourth and fifth metatarsal necks    Diffuse osteopenia and fatty muscular atrophy.      Impression    IMPRESSION:   1. Acute transverse fracture left medial malleolus. Chronic widening  of the medial tibiotalar joint space suggestive of ligamentous injury.  Changes are superimposed on chronic bimalleolar fracture; additional  fractures may be obscured.     2. Numerous chronic metatarsal and phalangeal fractures. Most  angulated fracture is of the 1st metatarsal head.     3. Stable appearance of internal fixation of left lateral tibial  plateau fracture.    4. No femur fracture.    5. Diffuse osteopenia and muscular atrophy. Vascular calcifications.    Findings discussed with Dr. Adan by Dr. Baum at 1215 hours on 19 February 2017.    I have personally reviewed the examination and initial interpretation  and I agree with the findings.    KEVEN MADRID MD   Tib/Fib XR, left    Narrative    XR TIBIA & FIBULA LT 2 VW, XR ANKLE LT G/E 3 VW, XR FOOT LT G/E  3 VW, XR HIP LEFT 2-3 VIEWS   2/19/2017 11:59 AM      HISTORY: Pain, trauma    COMPARISON: CT of the left knee 22 February, 2011; left foot  radiographs 8 December, 2016; left foot MRI 6 October, 2016; left  ankle radiographs 26 June, 2013    FINDINGS:   No femur fracture. The distal half of the femur is not included on  these radiographs.    Plate and screw fixation of prior posterior lateral tibial plateau  fracture. No signs of hardware failure. No knee effusion.     Chronic bimalleolar fracture. Superimposed transverse fracture of the  medial malleolus with some comminution; there is greatest continuity  in the medial cortical surface. The medial tibiotalar joint space is  also widened up to 5 mm compared to 10 mm in the lateral aspect of the  joint space. Vascular  calcifications.    Demineralization throughout the bones of the foot. Mildly displaced  chronic intra-articular fracture through the first metatarsal head,  base of the third metatarsal, fourth and fifth metatarsal necks    Diffuse osteopenia and fatty muscular atrophy.      Impression    IMPRESSION:   1. Acute transverse fracture left medial malleolus. Chronic widening  of the medial tibiotalar joint space suggestive of ligamentous injury.  Changes are superimposed on chronic bimalleolar fracture; additional  fractures may be obscured.     2. Numerous chronic metatarsal and phalangeal fractures. Most  angulated fracture is of the 1st metatarsal head.     3. Stable appearance of internal fixation of left lateral tibial  plateau fracture.    4. No femur fracture.    5. Diffuse osteopenia and muscular atrophy. Vascular calcifications.    Findings discussed with Dr. Adan by Dr. Baum at 1215 hours on 19 February 2017.    I have personally reviewed the examination and initial interpretation  and I agree with the findings.    KEVEN MADRID MD   Foot XR, G/E 3 views, left    Narrative    XR TIBIA & FIBULA LT 2 VW, XR ANKLE LT G/E 3 VW, XR FOOT LT G/E  3 VW, XR HIP LEFT 2-3 VIEWS   2/19/2017 11:59 AM      HISTORY: Pain, trauma    COMPARISON: CT of the left knee 22 February, 2011; left foot  radiographs 8 December, 2016; left foot MRI 6 October, 2016; left  ankle radiographs 26 June, 2013    FINDINGS:   No femur fracture. The distal half of the femur is not included on  these radiographs.    Plate and screw fixation of prior posterior lateral tibial plateau  fracture. No signs of hardware failure. No knee effusion.     Chronic bimalleolar fracture. Superimposed transverse fracture of the  medial malleolus with some comminution; there is greatest continuity  in the medial cortical surface. The medial tibiotalar joint space is  also widened up to 5 mm compared to 10 mm in the lateral aspect of the  joint space. Vascular  calcifications.    Demineralization throughout the bones of the foot. Mildly displaced  chronic intra-articular fracture through the first metatarsal head,  base of the third metatarsal, fourth and fifth metatarsal necks    Diffuse osteopenia and fatty muscular atrophy.      Impression    IMPRESSION:   1. Acute transverse fracture left medial malleolus. Chronic widening  of the medial tibiotalar joint space suggestive of ligamentous injury.  Changes are superimposed on chronic bimalleolar fracture; additional  fractures may be obscured.     2. Numerous chronic metatarsal and phalangeal fractures. Most  angulated fracture is of the 1st metatarsal head.     3. Stable appearance of internal fixation of left lateral tibial  plateau fracture.    4. No femur fracture.    5. Diffuse osteopenia and muscular atrophy. Vascular calcifications.    Findings discussed with Dr. Adan by Dr. Baum at 1215 hours on 19 February 2017.    I have personally reviewed the examination and initial interpretation  and I agree with the findings.    KEVEN MADRID MD   Hip XR, 2+ views, left    Narrative    XR TIBIA & FIBULA LT 2 VW, XR ANKLE LT G/E 3 VW, XR FOOT LT G/E  3 VW, XR HIP LEFT 2-3 VIEWS   2/19/2017 11:59 AM      HISTORY: Pain, trauma    COMPARISON: CT of the left knee 22 February, 2011; left foot  radiographs 8 December, 2016; left foot MRI 6 October, 2016; left  ankle radiographs 26 June, 2013    FINDINGS:   No femur fracture. The distal half of the femur is not included on  these radiographs.    Plate and screw fixation of prior posterior lateral tibial plateau  fracture. No signs of hardware failure. No knee effusion.     Chronic bimalleolar fracture. Superimposed transverse fracture of the  medial malleolus with some comminution; there is greatest continuity  in the medial cortical surface. The medial tibiotalar joint space is  also widened up to 5 mm compared to 10 mm in the lateral aspect of the  joint space. Vascular  calcifications.    Demineralization throughout the bones of the foot. Mildly displaced  chronic intra-articular fracture through the first metatarsal head,  base of the third metatarsal, fourth and fifth metatarsal necks    Diffuse osteopenia and fatty muscular atrophy.      Impression    IMPRESSION:   1. Acute transverse fracture left medial malleolus. Chronic widening  of the medial tibiotalar joint space suggestive of ligamentous injury.  Changes are superimposed on chronic bimalleolar fracture; additional  fractures may be obscured.     2. Numerous chronic metatarsal and phalangeal fractures. Most  angulated fracture is of the 1st metatarsal head.     3. Stable appearance of internal fixation of left lateral tibial  plateau fracture.    4. No femur fracture.    5. Diffuse osteopenia and muscular atrophy. Vascular calcifications.    Findings discussed with Dr. Adan by Dr. Baum at 1215 hours on 19 February 2017.    I have personally reviewed the examination and initial interpretation  and I agree with the findings.    KEVEN MADRID MD   Head CT w/o contrast    Narrative    CT HEAD W/O CONTRAST 2/19/2017 1:01 PM    Provided History: Fall, trauma    Comparison: MRI of the brain 3 January, 2017, CT of the head 24 February 2016.    Technique: Using multidetector thin collimation helical acquisition  technique, axial, coronal and sagittal CT images from the skull base  to the vertex were obtained without intravenous contrast.     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 Stable chronic  left PICA territory infarct. No new loss of gray-white matter  differentiation. Mild leukoaraiosis.    Right frontal approach ventriculoperitoneal shunt catheter courses  through the body of the left lateral ventricle with tip in the left  thalamus. 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    No intracranial hemorrhage, mass effect, midline shift or abnormal  extra axial fluid collection.    The visualized paranasal sinuses are clear. The mastoid air cells are  clear. Bilateral pseudophakia. Calvarium is intact.       Impression    Impression:   1. No evidence of acute traumatic injury.  2. Increase in size of the lateral and third ventricles, possibly  related to decreased effacement associated with resolution of  previously demonstrated bilateral hemispheric subdural hygromas since  1/3/2017. Stable positioning of right frontal ventriculostomy  catheter.  3. Stable chronic infarcts involving the posterior right MCA  territory, right hemispheric external border zones, and left PICA  territory.    I have personally reviewed the examination and initial interpretation  and I agree with the findings.    LETI PICKERING MD   UA with Microscopic reflex to Culture   Result Value Ref Range    Color Urine Yellow     Appearance Urine Slightly Cloudy     Glucose Urine 70 (A) NEG mg/dL    Bilirubin Urine Negative NEG    Ketones Urine Negative NEG mg/dL    Specific Gravity Urine 1.012 1.003 - 1.035    Blood Urine Trace (A) NEG    pH Urine 6.5 5.0 - 7.0 pH    Protein Albumin Urine 30 (A) NEG mg/dL    Urobilinogen mg/dL Normal 0.0 - 2.0 mg/dL    Nitrite Urine Negative NEG    Leukocyte Esterase Urine Large (A) NEG    Source Catheterized Urine     WBC Urine 135 (H) 0 - 2 /HPF    RBC Urine 7 (H) 0 - 2 /HPF    Bacteria Urine Many (A) NEG /HPF   Orthopedic injury treatment    Narrative    Edilson Edmond MD     2/19/2017  1:50 PM  Orthopedic injury tx  Date/Time: 2/19/2017 1:46 PM  Performed by: EDILSON EDMOND  Authorized by: EDILSON EDMOND   Consent: Verbal consent obtained.  Risks and benefits: risks, benefits and alternatives were discussed  Consent given by: patient  Imaging studies: imaging studies available  Patient  identity confirmed: verbally with patient  Injury location: ankle  Location details: left ankle  Injury type: fracture  Fracture type: bimalleolar  Pre-procedure neurovascular assessment: neurovascularly intact  Pre-procedure distal perfusion: normal  Pre-procedure neurological function: normal  Pre-procedure range of motion: reduced  Local anesthesia used: no    Anesthesia:  Local anesthesia used: no  Sedation:  Patient sedated: no    Manipulation performed: yes  Immobilization: splint  Splint type: ankle stirrup  Supplies used: Ortho-Glass  Post-procedure neurovascular assessment: post-procedure neurovascularly   intact  Post-procedure distal perfusion: normal  Post-procedure neurological function: normal  Post-procedure range of motion: unchanged  Patient tolerance: Patient tolerated the procedure well with no immediate   complications       *Note: Due to a large number of results and/or encounters for the requested time period, some results have not been displayed. A complete set of results can be found in Results Review.       Laurel Fields

## 2017-02-19 NOTE — LETTER
Transition Communication Hand-off for Care Transitions to Next Level of Care Provider    Name: Melvin Bhatia  MRN #: [id number]  Primary Care Provider: Ash Bowman     Primary Clinic: [address] [address]     Reason for Hospitalization:  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]  Fall, initial encounter [W19.XXXA]  Closed left ankle fracture, initial encounter [S82.892A]  Admit Date/Time: 2/19/2017 10:47 AM  Discharge Date: 2/22/17  Payor Source: Payor: UCARE / Plan: UCARE FOR SENIORS / Product Type: HMO /          Reason for Communication Hand-off Referral: Other D/c to TCU with potential for LTC    Discharge Plan:  Social Work Services Discharge Note      Patient Name:  Melvin Bhatia     Anticipated Discharge Date:  2/22/17    Discharge Disposition:   TCU:  [address] [address]  Fax: [phone]  Main: [phone]  Admissions: [phone]     Pre-Admission Screening (PAS) online form has been completed.  The Level of Care (LOC) is:  Determined  Confirmation Code is:  [id number]  Patient/caregiver informed of referral to [facility] for Pre-Admission Screening for skilled nursing facility (SNF) placement and to expect a phone call post discharge from SNF.     Additional Services/Equipment Arranged:  Stretcher transport arranged via [employer] ([phone]) for today at 4pm.      Patient / Family response to discharge plan:  Pt is only oriented to self but pt's wife Dian is agreeable and aware of out of pocket expense for transport.      Persons notified of above discharge plan:  Pt's wife Dian ([phone]), bedside nurse, charge nurse, NST, receiving facility, Neurology team     Concern for non-adherence with plan of care:   Y/N Unknown  Discharge Needs Assessment:  Needs       Most Recent Value    Equipment Currently Used at Home wheelchair [bed rail]    Transportation  Available family or friend will provide          Already enrolled in Tele-monitoring program and name of program:  Unknown  Follow-up specialty is recommended: Unknown    Follow-up plan:  Future Appointments  Date Time Provider Department Center   2/23/2017 6:00 AM Pratima Milligan PT [facility]   5/2/2017 3:00 PM Casey Corey MD [facility]   5/23/2017 11:00 AM Oma Gagnon, [facility]       Any outstanding tests or procedures:        Referrals     Future Labs/Procedures    Inr Clinic Referral     Comments:    Please schedule for 2/25    NEUROLOGY ADULT REFERRAL     Comments:    Pt follows with Dr. Corey. Please schedule appointment within 4 weeks.    Neurology Adult Referral     Comments:    Please contact Stroke Clinic- Neurology Clinic at [phone], pick option #1,  if you have not been contacted to schedule a stroke follow up appointment in 5 days of discharge.  If you have other stroke related questions, please call [phone], pick option #3, and ask to speak to Dileep Graham RN Stroke/Endovascular Care Coordinator.  If you have any urgent stroke related questions after hours, please contact the hospital  at [phone] and ask to be connected to a stroke provider.    Neurosurgery Referral     Comments:    Pt follows with Dr. Neri. Please schedule within 4 weeks    Occupational Therapy Adult Consult     Comments:    Evaluate and treat as clinically indicated.    Orthopedics Adult Referral     Comments:    Please schedule within 1 week at Non-Op Ortho clinic.    Physical Therapy Adult Consult     Comments:    Evaluate and treat as clinically indicated.    Speech Language Path Adult Consult     Comments:    Evaluate and treat as clinically indicated.            Abbi Blanc, AWAIS, MSW  7B   [phone] (pager) [phone]  2/22/2017

## 2017-02-19 NOTE — IP AVS SNAPSHOT
` ` Patient Information     Patient Name Sex     Melvin Arriaga ([id number]) Male 1940       Room Bed    [facility]      Patient Demographics     Address Phone E-mail Address    [address] [facility] [address]  [facility] [address] [phone] (Home) *Preferred*  [phone] (Work) [email or web address]      Patient Ethnicity & Race     Ethnic Group Patient Race    American White      Emergency Contact(s)     Name Relation Home Work Mobile    AKANKSHA ARRIAGA Spouse [phone]     NO SECONDARY CONTACT  NONE        Documents on File        Status Date Received Description       Documents for the Patient    Insurance Card  ()      Waiver - Payment       Privacy Notice - [place] Received 11     Insurance Card  () 08/15/05     Face Sheet Received () 09     Insurance Card  () 08     Consent Form  08     Waiver - Payment  08     External Medication Information Consent Accepted () 09     Insurance Card  () 09     MICHAEL Face Sheet Received but Refused Research () 09     Waiver - Payment  09     Face Sheet Received () 02/26/10     MICHAEL Face Sheet Received () 06/22/10     Waiver - Payment  06/22/10     External Medication Information Consent Patient Refused () 07/27/10     Insurance Card  09/27/10     Patient ID Received () 12 [employer]    Consent for Services - Hospital/Clinic Received () 11     Insurance Card Received () 12     Consent for Services - Hospital/Clinic Received () 10/27/10     Consent for Services - Hospital/Clinic Received () 11/04/10     Consent for Services - Hospital/Clinic Received () 11/04/10     Privacy Notice - [place] Received 11/26/10     HIM BENJAMIN Authorization   12/15/10 [employer]    Consent for Services - Hospital/Clinic Received () 11     External Medication Information  Consent Patient Refused () 10/24/11     Consent for Services - Hospital/Clinic  () 12     Privacy Notice - [place]  12 ACKNOWLEDGMENT OF RECEIPT OF NOTICE OF PRIVACY PRACTICE    Other Received 12 PAS    HIM BENJAMIN Authorization - File Only   12 [facility]    HIM BENJAMIN Authorization  12 HEALTH INFORM. RELEASE, 2012    HIM BENJAMIN Authorization  12 HEALTH INFOR AUROTH. INFO,2012    Other Received 12 PAS    Advance Directives and Living Will Received 10/12/05 Health Care Directive 10-12-05    Consent for Services - Hospital/Clinic Received () 12     Consent for Services - Hospital/Clinic       HIM BENJAMIN Authorization - File Only  12 RELEASE OF INFORMATION FROM DR AZAR, 12    Other Received 12 patient bill of rights    Other Patient Refused 12 BILL OF RIGHTS    External Medication Information Consent Patient Refused 12     Consent for Services - Hospital/Clinic Received () 13     Consent for EHR Access  13 Copied from existing Consent for services - C/HOD collected on 2013    [facility] Specified Other       Insurance Card Received 13 [facility]    Consent to Communicate Received 13     Consent to Communicate Received 13     Business/Insurance/Care Coordination/Health Form - Patient.2   [employer]-Multiple Medications Program 13    HIM BENJAMIN Authorization  13     HIM BENJAMIN Authorization  13     HIM BENJAMIN Authorization  13     Consent for Services - Hospital/Clinic  () 14 CONSENT FOR SERVICE    Consent for Services - Hospital/Clinic Received () 14     Consent for Services - Hospital/Clinic       Occupational Therapy Certification Received 06/10/14 5/15/14    Consent for Services - Hospital/Clinic Received () 08/11/15     HIM BENJAMIN Authorization  08/28/15     Advance Directives and Living Will Not Received  Validation of AD  10/12/05    Business/Insurance/Care Coordination/Health Form - Patient  01/21/16 UCARE-ELVIS NUÑEZ INJCTR APPROVAL 12/20/15-01/18/19    Consent for Services/Privacy Notice - Hospital/Clinic Received 02/24/16     Insurance Card Received 03/22/16     Consent for Services - [facility]       Consent for Services/Privacy Notice - Hospital/Clinic-[facility]sign Received 02/19/17     HIM BENJAMIN Authorization  08/10/16     HIM BENJAMIN Authorization  08/11/16     HIM BENJAMIN Authorization  09/14/16 [facility]    Patient ID       Business/Insurance/Care Coordination/Health Form - Patient  11/22/16 UCARE, PROLIA SYRINGE APPOVED, 10/17/16 - 11/16/19    Consent to Communicate Received 12/30/16 consent to EMAIL    HIM BENJAMIN Authorization  01/13/17 PATIENT/[facility]    Insurance Card Received 02/19/17     Insurance Card  (Deleted) 01/11/06     Waiver - Payment  (Deleted) 04/05/06     Consent Form  (Deleted) 04/05/06     Insurance Card  (Deleted) 04/26/07     Consent Form  (Deleted) 05/28/08     Waiver - Payment  (Deleted) 05/28/08        Documents for the Encounter    CMS IM for Patient Signature Received 02/19/17       Admission Information     Attending Provider Admitting Provider Admission Type Admission Date/Time    Karen Vázquez MD Beilman, Daniel Watts MD Emergency 02/19/17  1047    Discharge Date Hospital Service Auth/Cert Status Service Area     Trauma Incomplete [facility]    Unit Room/Bed Admission Status       UU U7B [id number] Admission (Confirmed)       Admission     Complaint    Fx medial malleolus-closed      Hospital Account     Name Acct ID Class Status Primary Coverage    Melvin Bhatia [id number] Inpatient Open UCARE - UCARE FOR SENIORS            Guarantor Account (for Hospital Account #[id number])     Name Relation to Pt Service Area Active? Acct Type    Melvin Bhatia  FCS Yes Personal/Family    Address Phone          [address] [place] [address]  [phone](H)  none(O)              Coverage Information (for Hospital Account #[id number])     F/O Payor/Plan Precert #    UCARE/UCARE FOR SENIORS     Subscriber Subscriber #    Melvin Bhatia [id number]    Address Phone    [address]  [place] [address] [phone]

## 2017-02-19 NOTE — IP AVS SNAPSHOT
MRN:[id number]                      After Visit Summary   2/19/2017    Melvin Bhatia    MRN: [id number]           Thank you!     Thank you for choosing [facility] for your care. Our goal is always to provide you with excellent care. Hearing back from our patients is one way we can continue to improve our services. Please take a few minutes to complete the written survey that you may receive in the mail after you visit with us. Thank you!        Patient Information     Date Of Birth          1940        About your hospital stay     You were admitted on:  February 19, 2017 You last received care in the:  [facility] [facility]    You were discharged on:  February 22, 2017       Who to Call     For medical emergencies, please call 911.  For non-urgent questions about your medical care, please call your primary care provider or clinic, [phone]          Attending Provider     Provider Specialty    Jerald Adan MD Emergency Medicine    Kelle, Diana Bowman MD Emergency Medicine    Rene, Daniel Watts MD Transplant    Karen Vázquez MD Neurology       Primary Care Provider Office Phone # Fax #    Ash Johnathan Bowman [phone]       [address] [address]        Your next 10 appointments already scheduled     May 02, 2017  3:00 PM CDT   (Arrive by 2:45 PM)   Return Visit with Casey Croey MD   [facility] Neurology ([facility])    [address] [id number] [phone]            May 23, 2017 11:00 AM CDT   SHORT with Oma Gagnon [facility] ([facility])    [address] [place] [address] A  [place] [address]   [phone]              Pending Results     No orders found from 2/17/2017 to 2/20/2017.            Admission Information     Date & Time Provider Department Dept. Phone    2/19/2017 Karen Vázquez  "MD [facility] [phone]      Your Vitals Were     Blood Pressure Pulse Temperature Respirations Height Weight    149/68 (BP Location: Left arm) 63 96.2  F (35.7  C) (Oral) 18 1.702 m (5' 7\") 87.3 kg (192 lb 8 oz)    Pulse Oximetry BMI (Body Mass Index)                97% 30.15 kg/m2          [employer] Information     [employer] gives you secure access to your electronic health record. If you see a primary care provider, you can also send messages to your care team and make appointments. If you have questions, please call your primary care clinic.  If you do not have a primary care provider, please call [phone] and they will assist you.        Care EveryWhere ID     This is your Care EveryWhere ID. This could be used by other organizations to access your [facility] medical records  [id number]           Review of your medicines      UNREVIEWED medicines. Ask your doctor about these medicines        Dose / Directions    * ACE/ARB NOT PRESCRIBED (INTENTIONAL)   Used for:  Type 2 diabetes, HbA1C goal < 8% (H)   Ask about: Which instructions should I use?        ACE & ARB not prescribed due to Hyperkalemia (baseline K+ > 5.5), Worsening renal function on ACE/ARB therapy and CKD (Chronic Kidney Disease)   Refills:  0       aspirin 81 MG tablet   Used for:  Type 2 diabetes mellitus with stage 3 chronic kidney disease, with long-term current use of insulin (H)        Dose:  81 mg   Take 1 tablet (81 mg) by mouth daily   Quantity:  100 tablet   Refills:  3       * BACTRIM -160 MG per tablet   Generic drug:  sulfamethoxazole-trimethoprim        Refills:  0       * sulfamethoxazole-trimethoprim 800-160 MG per tablet   Commonly known as:  BACTRIM DS/SEPTRA DS   Used for:  Personal history of urinary tract infection        Dose:  1 tablet   Take 1 tablet by mouth daily   Quantity:  90 tablet   Refills:  1       budesonide-formoterol 160-4.5 MCG/ACT Inhaler   Commonly known as:  SYMBICORT   Used " for:  Chronic obstructive pulmonary disease, unspecified COPD type (H)        Dose:  2 puff   Inhale 2 puffs into the lungs 2 times daily   Quantity:  1 Inhaler   Refills:  11       bumetanide 0.5 MG tablet   Commonly known as:  BUMEX        Dose:  0.5 mg   Take 0.5 mg by mouth 2 times daily   Refills:  0       calcium citrate 950 MG tablet   Commonly known as:  CALCITRATE        Dose:  1 tablet   Take 1 tablet by mouth daily   Refills:  0       calcium citrate-vitamin D 315-200 MG-UNIT Tabs per tablet   Commonly known as:  CITRACAL   Indication:  Low Amount of Calcium in the Blood        Dose:  2 tablet   Take 2 tablets by mouth 2 times daily 2 tabs contain 500mg elemental calcium + 800 iu's vitamin D3 in them .   Refills:  0       denosumab 60 MG/ML Soln injection   Commonly known as:  PROLIA   Indication:  Osteoporosis        Dose:  60 mg   Inject 60 mg Subcutaneous every 6 months   Refills:  0       exenatide 5 MCG/0.02ML Sopn injection   Commonly known as:  BYETTA 5 MCG PEN   Used for:  Type 2 diabetes mellitus with stage 3 chronic kidney disease, with long-term current use of insulin (H)        Inject 5mcg under the skin twice daily 60 minutes before meals.   Quantity:  1 Syringe   Refills:  1       ferrous sulfate 325 (65 FE) MG tablet   Commonly known as:  IRON        Dose:  325 mg   Take 325 mg by mouth daily (with breakfast) 65mg elemental iron   Refills:  0       fexofenadine 180 MG tablet   Commonly known as:  ALLEGRA   Used for:  Seasonal allergic rhinitis        Dose:  180 mg   Take 1 tablet (180 mg) by mouth daily   Quantity:  90 tablet   Refills:  0       fluocinolone 0.01 % solution   Commonly known as:  SYNALAR   Used for:  Seborrheic dermatitis of scalp        Apply 1 to 2 times daily to scaly areas of scalp as needed.Profile Rx: patient will contact pharmacy when needed   Quantity:  60 mL   Refills:  11       fluticasone 50 MCG/ACT spray   Commonly known as:  FLONASE   Used for:  Allergic  rhinitis        Dose:  2 spray   Spray 2 sprays into both nostrils daily   Quantity:  48 g   Refills:  1       insulin glargine 100 UNIT/ML injection   Commonly known as:  LANTUS SOLOSTAR   Used for:  Type 2 diabetes with stage 3 chronic kidney disease GFR 30-59 (H)        Inject 55 units twice daily  under the skin daily.   Quantity:  60 mL   Refills:  6       insulin lispro 100 UNIT/ML injection   Commonly known as:  HumaLOG KWIKpen   Used for:  Type 2 diabetes mellitus with stage 3 chronic kidney disease, with long-term current use of insulin (H)        Inject 30 units under the skin three times daily before meals as directed.   Quantity:  60 mL   Refills:  3       Ipratropium-Albuterol  MCG/ACT inhaler   Commonly known as:  COMBIVENT RESPIMAT   Used for:  Chronic obstructive pulmonary disease, unspecified COPD type (H)        Dose:  1 puff   Inhale 1 puff into the lungs 2 times daily Not to exceed 6 doses per day.   Quantity:  3 Inhaler   Refills:  3       ketoconazole 2 % shampoo   Commonly known as:  NIZORAL   Used for:  Seborrhea capitis        Apply to the affected area and wash off after 5 minutes.   Quantity:  120 mL   Refills:  2       MAG- MG tablet   Used for:  Cerebellar infarct (H)   Generic drug:  magnesium oxide        Dose:  800 mg   Take 2 tablets by mouth daily.   Refills:  0       metoprolol 50 MG 24 hr tablet   Commonly known as:  TOPROL-XL   Used for:  Essential hypertension with goal blood pressure less than 140/90        Dose:  50 mg   Take 1 tablet (50 mg) by mouth At Bedtime   Quantity:  90 tablet   Refills:  1       multivitamin, therapeutic with minerals Tabs tablet        Dose:  1 tablet   Take 1 tablet by mouth daily   Refills:  0       niacin 500 MG CR tablet   Commonly known as:  NIASPAN   Used for:  Mixed hyperlipidemia        Dose:  500 mg   Take 1 tablet by mouth At Bedtime.   Quantity:  90 tablet   Refills:  2       nitroglycerin 0.4 MG sublingual tablet   Commonly  known as:  NITROSTAT   Used for:  Chronic obstructive pulmonary disease, unspecified COPD type (H)        Dose:  0.4 mg   Place 1 tablet (0.4 mg) under the tongue every 5 minutes as needed up to 3 tablets per episode.   Quantity:  20 tablet   Refills:  11       * order for DME   Used for:  SHIVAM (obstructive sleep apnea)   Ask about: Which instructions should I use?        Equipment being ordered: CPAP supplies   Quantity:  1 each   Refills:  0       RANITIDINE HCL PO        Dose:  150 mg   Take 150 mg by mouth daily   Refills:  0       rosuvastatin 20 MG tablet   Commonly known as:  CRESTOR   Used for:  Hyperlipidemia LDL goal <70        Dose:  20 mg   Take 1 tablet (20 mg) by mouth daily Needs fasting labs before any further refills   Quantity:  90 tablet   Refills:  0       Vitamin B12 1000 MCG Tbcr        Dose:  1000 mcg   Take 1,000 mcg by mouth daily   Quantity:  100 tablet   Refills:  3       VITAMIN D (CHOLECALCIFEROL) PO        Dose:  2000 Units   Take 2,000 Units by mouth daily   Refills:  0       * Notice:  This list has 4 medication(s) that are the same as other medications prescribed for you. Read the directions carefully, and ask your doctor or other care provider to review them with you.      CONTINUE these medicines which have NOT CHANGED        Dose / Directions    blood glucose monitoring test strip   Commonly known as:  no brand specified   Used for:  Type 2 diabetes with stage 3 chronic kidney disease GFR 30-59 (H)        Dose:  1 strip   1 strip by In Vitro route 4 times daily   Quantity:  3 Box   Refills:  11       Catheters Misc   Used for:  Neurogeneses, bladder        As needed   Quantity:  200 each   Refills:  10       insulin pen needle 31G X 8 MM   Commonly known as:  B-D U/F   Used for:  Type 2 diabetes mellitus with stage 3 chronic kidney disease, with long-term current use of insulin (H)        Use 7 daily or as directed.   Quantity:  300 each   Refills:  5       insulin syringes  (disposable) U-100 0.3 ML Misc   Used for:  Type 2 diabetes, HbA1C goal < 8% (H)        Dose:  1 each   1 each 4 times daily.   Quantity:  100 each   Refills:  prn       ONE TOUCH LANCETS Misc   Used for:  Type 2 diabetes with stage 3 chronic kidney disease GFR 30-59 (H)        use as directed 4 x daily and prn   Quantity:  120 each   Refills:  3       order for DME   Used for:  Generalized muscle weakness        Equipment being ordered: 4 wheeled rolling walker.   Quantity:  1 Device   Refills:  0                Protect others around you: Learn how to safely use, store and throw away your medicines at www.disposemymeds.org.             Medication List: This is a list of all your medications and when to take them. Check marks below indicate your daily home schedule. Keep this list as a reference.      Medications           Morning Afternoon Evening Bedtime As Needed    aspirin 81 MG tablet   Take 1 tablet (81 mg) by mouth daily                                * BACTRIM -160 MG per tablet   Last time this was given:  1 tablet on 2/19/2017  5:51 PM   Generic drug:  sulfamethoxazole-trimethoprim                                * sulfamethoxazole-trimethoprim 800-160 MG per tablet   Commonly known as:  BACTRIM DS/SEPTRA DS   Take 1 tablet by mouth daily   Last time this was given:  1 tablet on 2/19/2017  5:51 PM                                blood glucose monitoring test strip   Commonly known as:  no brand specified   1 strip by In Vitro route 4 times daily                                budesonide-formoterol 160-4.5 MCG/ACT Inhaler   Commonly known as:  SYMBICORT   Inhale 2 puffs into the lungs 2 times daily                                bumetanide 0.5 MG tablet   Commonly known as:  BUMEX   Take 0.5 mg by mouth 2 times daily   Last time this was given:  0.5 mg on 2/22/2017  8:44 AM                                calcium citrate 950 MG tablet   Commonly known as:  CALCITRATE   Take 1 tablet by mouth daily                                 calcium citrate-vitamin D 315-200 MG-UNIT Tabs per tablet   Commonly known as:  CITRACAL   Take 2 tablets by mouth 2 times daily 2 tabs contain 500mg elemental calcium + 800 iu's vitamin D3 in them .                                Catheters Misc   As needed                                denosumab 60 MG/ML Soln injection   Commonly known as:  PROLIA   Inject 60 mg Subcutaneous every 6 months                                exenatide 5 MCG/0.02ML Sopn injection   Commonly known as:  BYETTA 5 MCG PEN   Inject 5mcg under the skin twice daily 60 minutes before meals.                                ferrous sulfate 325 (65 FE) MG tablet   Commonly known as:  IRON   Take 325 mg by mouth daily (with breakfast) 65mg elemental iron   Last time this was given:  325 mg on 2/22/2017  8:44 AM                                fexofenadine 180 MG tablet   Commonly known as:  ALLEGRA   Take 1 tablet (180 mg) by mouth daily   Last time this was given:  180 mg on 2/22/2017  8:44 AM                                fluocinolone 0.01 % solution   Commonly known as:  SYNALAR   Apply 1 to 2 times daily to scaly areas of scalp as needed.Profile Rx: patient will contact pharmacy when needed                                fluticasone 50 MCG/ACT spray   Commonly known as:  FLONASE   Spray 2 sprays into both nostrils daily   Last time this was given:  2 sprays on 2/22/2017  8:44 AM                                insulin glargine 100 UNIT/ML injection   Commonly known as:  LANTUS SOLOSTAR   Inject 55 units twice daily  under the skin daily.   Last time this was given:  30 Units on 2/22/2017  8:44 AM                                insulin lispro 100 UNIT/ML injection   Commonly known as:  HumaLOG KWIKpen   Inject 30 units under the skin three times daily before meals as directed.                                insulin pen needle 31G X 8 MM   Commonly known as:  B-D U/F   Use 7 daily or as directed.                                 insulin syringes (disposable) U-100 0.3 ML Misc   1 each 4 times daily.                                Ipratropium-Albuterol  MCG/ACT inhaler   Commonly known as:  COMBIVENT RESPIMAT   Inhale 1 puff into the lungs 2 times daily Not to exceed 6 doses per day.                                ketoconazole 2 % shampoo   Commonly known as:  NIZORAL   Apply to the affected area and wash off after 5 minutes.                                MAG- MG tablet   Take 2 tablets by mouth daily.   Last time this was given:  800 mg on 2/22/2017  8:43 AM   Generic drug:  magnesium oxide                                metoprolol 50 MG 24 hr tablet   Commonly known as:  TOPROL-XL   Take 1 tablet (50 mg) by mouth At Bedtime   Last time this was given:  50 mg on 2/21/2017  9:24 PM                                multivitamin, therapeutic with minerals Tabs tablet   Take 1 tablet by mouth daily   Last time this was given:  1 tablet on 2/22/2017  8:45 AM                                niacin 500 MG CR tablet   Commonly known as:  NIASPAN   Take 1 tablet by mouth At Bedtime.   Last time this was given:  500 mg on 2/21/2017  9:24 PM                                nitroglycerin 0.4 MG sublingual tablet   Commonly known as:  NITROSTAT   Place 1 tablet (0.4 mg) under the tongue every 5 minutes as needed up to 3 tablets per episode.                                ONE TOUCH LANCETS Misc   use as directed 4 x daily and prn                                order for DME   Equipment being ordered: 4 wheeled rolling walker.                                RANITIDINE HCL PO   Take 150 mg by mouth daily   Last time this was given:  150 mg on 2/22/2017  8:43 AM                                rosuvastatin 20 MG tablet   Commonly known as:  CRESTOR   Take 1 tablet (20 mg) by mouth daily Needs fasting labs before any further refills   Last time this was given:  20 mg on 2/21/2017  9:22 PM                                Vitamin B12 1000 MCG Tbcr    Take 1,000 mcg by mouth daily                                VITAMIN D (CHOLECALCIFEROL) PO   Take 2,000 Units by mouth daily   Last time this was given:  2,000 Units on 2/22/2017  8:44 AM                                * Notice:  This list has 2 medication(s) that are the same as other medications prescribed for you. Read the directions carefully, and ask your doctor or other care provider to review them with you.      ASK your doctor about these medications           Morning Afternoon Evening Bedtime As Needed    * ACE/ARB NOT PRESCRIBED (INTENTIONAL)   ACE & ARB not prescribed due to Hyperkalemia (baseline K+ > 5.5), Worsening renal function on ACE/ARB therapy and CKD (Chronic Kidney Disease)   Ask about: Which instructions should I use?                                * order for DME   Equipment being ordered: CPAP supplies   Ask about: Which instructions should I use?                                * Notice:  This list has 2 medication(s) that are the same as other medications prescribed for you. Read the directions carefully, and ask your doctor or other care provider to review them with you.

## 2017-02-19 NOTE — IP AVS SNAPSHOT
"    [address] [facility]: [phone]                                              INTERAGENCY TRANSFER FORM - PHYSICIAN ORDERS   2017                    Hospital Admission Date: 2017  VALERIE ARRIAGA   : 1940  Sex: Male        Attending Provider: Karen Vázquez MD     Allergies:  Penicillins, Albuterol, Albuterol Sulfate, Atorvastatin, Atorvastatin Calcium, Cipro [Ciprofloxacin], Ciprofloxacin, Simvastatin, Warfarin    Infection:  VRE-Contact Isolation   Service:  TRAUMA    Ht:  1.702 m (5' 7\")   Wt:  87.3 kg (192 lb 8 oz)   Admission Wt:  88.5 kg (195 lb)    BMI:  30.15 kg/m 2   BSA:  2.03 m 2            Patient PCP Information     Provider PCP Type    Ash Bowman MD General      ED Clinical Impression     Diagnosis Description Comment Added By Time Added    Fall, initial encounter [W19.XXXA] Fall, initial encounter [W19.XXXA]  Jerald Adan MD 2017 12:32 PM    Closed left ankle fracture, initial encounter [S82.892A] Closed left ankle fracture, initial encounter [S82.892A]  Jerald Adan MD 2017 12:33 PM    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] 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]  Jerald Adan MD 2017 12:34 PM      Hospital Problems as of 2017              Priority Class Noted POA    Fx medial malleolus-closed Medium  2017 Yes      Non-Hospital Problems as of 2017              Priority Class Noted    Osteoporosis   2006    PAF (paroxysmal atrial fibrillation) (H)   2009    Lumbosacral plexopathy   Unknown    Testosterone deficiency   2010    Hypertension goal BP (blood pressure) < 140/90   2010    Atrial fibrillation (H)   2011    CKD (chronic kidney disease) stage 3, GFR 30-59 ml/min   Unknown    Hyperlipidemia LDL goal <70   2011    Coronary artery disease   Unknown    Health Care Home   3/22/2011    Elevated PSA   2011    Subarachnoid hemorrhage (H)   2012    " Anemia in chronic renal disease   2/1/2012    Urinary bladder neurogenic dysfunction   2/14/2012    Idiopathic normal pressure hydrocephalus (INPH) Medium Chronic 2/26/2012    Stroke (H)   5/11/2012    Cognitive deficit, post-stroke   6/13/2012    HL (hearing loss)   9/24/2012    Personal history of fall Medium Chronic 9/26/2012    GERD (gastroesophageal reflux disease)   11/26/2012    COPD exacerbation (H)   2/16/2013    SHIVAM (obstructive sleep apnea)   2/16/2013    LVH (left ventricular hypertrophy)   2/16/2013    Diastolic CHF, acute on chronic (H)   2/16/2013    Hypoxemia   2/16/2013    Tachyarrhythmia   2/18/2013    Systolic and diastolic CHF, chronic (H)   2/19/2013    Gait abnormality   5/20/2013    Iron deficiency anemia Medium  3/20/2014    Allergic rhinitis   4/7/2014    Altered mental status   5/15/2014    VRE (vancomycin-resistant Enterococci)   5/16/2014    Hypoglycemia   5/16/2014    Lower urinary tract infectious disease   5/17/2014    Type 2 diabetes with stage 3 chronic kidney disease GFR 30-59 (H) Medium  10/14/2015    Chronic obstructive pulmonary disease, unspecified COPD type (H) Medium  11/20/2015    ACP (advance care planning)   1/7/2016    Ankle pain Medium  1/13/2017      Code Status History     Date Active Date Inactive Code Status Order ID Comments User Context    2/22/2017  2:26 PM  DNR/DNI [id number]  Jerod Saleh MD Outpatient    2/19/2017  4:40 PM 2/22/2017  2:26 PM DNR/DNI [id number]  Laurel Fields, WHITNEY CNP Inpatient    5/19/2014  9:33 AM 2/19/2017  4:40 PM Full Code [id number]  Johnny Laura MD Outpatient    2/20/2013  7:56 AM 5/19/2014  9:33 AM DNR/DNI [id number]  Jailyn Lindsey MD Outpatient    2/17/2013  1:34 AM 2/20/2013  7:56 AM DNR/DNI [id number]  Kellie Pickett RN Inpatient    2/16/2013  3:17 AM 2/17/2013  1:34 AM DNR/DNI [id number]  Kirk Rick NP Inpatient    5/23/2012 10:21 AM 2/16/2013  3:17 AM DNR/DNI [id number]  Castillo Gibbs MD Outpatient     5/10/2012  4:46 PM 5/23/2012 10:21 AM DNR/DNI [id number]  Kenan Hernandez MD Inpatient    5/7/2012  4:22 AM 5/10/2012  4:46 PM DNR/DNI [id number]  Katie Dumas RN Inpatient    5/6/2012  5:07 AM 5/7/2012  3:59 AM DNR/DNI [id number]  Katie Dumas RN Inpatient    5/3/2012  9:45 AM 5/6/2012  5:07 AM Full Code [id number]  Antonio Molina MD Outpatient    5/1/2012  6:26 AM 5/3/2012  9:45 AM DNR/DNI [id number]  Maria Antonia Fried RN Inpatient    3/14/2012 10:45 AM 5/1/2012  6:26 AM DNR/DNI [id number]  Kenan Hernandez MD Outpatient    3/1/2012  5:56 PM 3/14/2012 10:45 AM DNR/DNI [id number]  Kenan Hernandez MD Inpatient    2/29/2012 12:27 PM 3/1/2012  5:56 PM Full Code [id number]  Mayra Rueda MD Outpatient         Medication Review      START taking        Dose / Directions Comments    acetaminophen 325 MG tablet   Commonly known as:  TYLENOL   Used for:  Fall, initial encounter        Dose:  975 mg   Take 3 tablets (975 mg) by mouth every 6 hours as needed for mild pain   Quantity:  100 tablet   Refills:  0        nitrofurantoin (macrocrystal-monohydrate) 100 MG capsule   Commonly known as:  MACROBID   Indication:  Urinary Tract Infection   Used for:  Fall, initial encounter        Dose:  100 mg   Take 1 capsule (100 mg) by mouth every 12 hours   Quantity:  11 capsule   Refills:  0    For urinary tract infection. Take through 2/27. After finishing this course, resume bactrim.       polyethylene glycol Packet   Commonly known as:  MIRALAX/GLYCOLAX   Indication:  Constipation   Used for:  Fall, initial encounter        Dose:  17 g   Take 17 g by mouth daily as needed for constipation   Quantity:  7 packet   Refills:  11    Hold if diarrhea.       senna-docusate 8.6-50 MG per tablet   Commonly known as:  SENOKOT-S;PERICOLACE   Used for:  Fall, initial encounter        Dose:  1-2 tablet   Take 1-2 tablets by mouth 2 times daily   Quantity:  100 tablet   Refills:  0    Stool softener. Hold if  diarrhea       warfarin 3 MG tablet   Commonly known as:  COUMADIN   Used for:  PAF (paroxysmal atrial fibrillation) (H)        Dose:  3 mg   Take 1 tablet (3 mg) by mouth daily   Quantity:  30 tablet   Refills:  0    Blood thinner for atrial fibrillation. Goal INR 2-3         CONTINUE these medications which may have CHANGED, or have new prescriptions. If we are uncertain of the size of tablets/capsules you have at home, strength may be listed as something that might have changed.        Dose / Directions Comments    * insulin glargine 100 UNIT/ML injection   Commonly known as:  LANTUS   This may have changed:  You were already taking a medication with the same name, and this prescription was added. Make sure you understand how and when to take each.   Used for:  Type 2 diabetes mellitus with stage 3 chronic kidney disease, with long-term current use of insulin (H)        Dose:  15 Units   Inject 15 Units Subcutaneous At Bedtime   Refills:  0    Diabetes: anticipate increasing insulin requirements as appetite improves       * insulin glargine 100 UNIT/ML injection   Commonly known as:  LANTUS SOLOSTAR   This may have changed:    - how much to take  - how to take this  - when to take this  - additional instructions   Used for:  Type 2 diabetes mellitus with stage 3 chronic kidney disease, with long-term current use of insulin (H)        Dose:  30 Units   Inject 30 Units Subcutaneous every morning (before breakfast)   Refills:  0    Anticipate increasing insulin needs as appetite improves.       sulfamethoxazole-trimethoprim 800-160 MG per tablet   Commonly known as:  BACTRIM DS   This may have changed:    - how much to take  - how to take this  - when to take this  - Another medication with the same name was removed. Continue taking this medication, and follow the directions you see here.   Used for:  Vascular dementia with behavior disturbance        Dose:  1 tablet   Take 1 tablet by mouth once for 1 dose    Refills:  0    Antibiotic for neurogenic bladder. Begin after finishing course of nitrofurantoin.       * Notice:  This list has 2 medication(s) that are the same as other medications prescribed for you. Read the directions carefully, and ask your doctor or other care provider to review them with you.      CONTINUE these medications which have NOT CHANGED        Dose / Directions Comments    * ACE/ARB NOT PRESCRIBED (INTENTIONAL)   Used for:  Type 2 diabetes, HbA1C goal < 8% (H)        ACE & ARB not prescribed due to Hyperkalemia (baseline K+ > 5.5), Worsening renal function on ACE/ARB therapy and CKD (Chronic Kidney Disease)   Refills:  0        aspirin 81 MG tablet   Used for:  Type 2 diabetes mellitus with stage 3 chronic kidney disease, with long-term current use of insulin (H)        Dose:  81 mg   Take 1 tablet (81 mg) by mouth daily   Quantity:  100 tablet   Refills:  3    Blood thinner. Stop when INR 2-3       blood glucose monitoring test strip   Commonly known as:  no brand specified   Used for:  Type 2 diabetes with stage 3 chronic kidney disease GFR 30-59 (H)        Dose:  1 strip   1 strip by In Vitro route 4 times daily   Quantity:  3 Box   Refills:  11    Ok to refill his formulary approved bs test strips.       budesonide-formoterol 160-4.5 MCG/ACT Inhaler   Commonly known as:  SYMBICORT   Used for:  Chronic obstructive pulmonary disease, unspecified COPD type (H)        Dose:  2 puff   Inhale 2 puffs into the lungs 2 times daily   Quantity:  1 Inhaler   Refills:  11        bumetanide 0.5 MG tablet   Commonly known as:  BUMEX        Dose:  0.5 mg   Take 0.5 mg by mouth 2 times daily   Refills:  0        calcium citrate 950 MG tablet   Commonly known as:  CALCITRATE        Dose:  1 tablet   Take 1 tablet by mouth daily   Refills:  0        calcium citrate-vitamin D 315-200 MG-UNIT Tabs per tablet   Commonly known as:  CITRACAL   Indication:  Low Amount of Calcium in the Blood        Dose:  2 tablet    Take 2 tablets by mouth 2 times daily 2 tabs contain 500mg elemental calcium + 800 iu's vitamin D3 in them .   Refills:  0        Catheters Misc   Used for:  Neurogeneses, bladder        As needed   Quantity:  200 each   Refills:  10        denosumab 60 MG/ML Soln injection   Commonly known as:  PROLIA   Indication:  Osteoporosis        Dose:  60 mg   Inject 60 mg Subcutaneous every 6 months   Refills:  0        exenatide 5 MCG/0.02ML Sopn injection   Commonly known as:  BYETTA 5 MCG PEN   Used for:  Type 2 diabetes mellitus with stage 3 chronic kidney disease, with long-term current use of insulin (H)        Inject 5mcg under the skin twice daily 60 minutes before meals.   Quantity:  1 Syringe   Refills:  1        ferrous sulfate 325 (65 FE) MG tablet   Commonly known as:  IRON        Dose:  325 mg   Take 325 mg by mouth daily (with breakfast) 65mg elemental iron   Refills:  0        fexofenadine 180 MG tablet   Commonly known as:  ALLEGRA   Used for:  Seasonal allergic rhinitis        Dose:  180 mg   Take 1 tablet (180 mg) by mouth daily   Quantity:  90 tablet   Refills:  0        fluocinolone 0.01 % solution   Commonly known as:  SYNALAR   Used for:  Seborrheic dermatitis of scalp        Apply 1 to 2 times daily to scaly areas of scalp as needed.Profile Rx: patient will contact pharmacy when needed   Quantity:  60 mL   Refills:  11        fluticasone 50 MCG/ACT spray   Commonly known as:  FLONASE   Used for:  Allergic rhinitis        Dose:  2 spray   Spray 2 sprays into both nostrils daily   Quantity:  48 g   Refills:  1        insulin pen needle 31G X 8 MM   Commonly known as:  B-D U/F   Used for:  Type 2 diabetes mellitus with stage 3 chronic kidney disease, with long-term current use of insulin (H)        Use 7 daily or as directed.   Quantity:  300 each   Refills:  5        insulin syringes (disposable) U-100 0.3 ML Misc   Used for:  Type 2 diabetes, HbA1C goal < 8% (H)        Dose:  1 each   1 each 4 times  daily.   Quantity:  100 each   Refills:  prn        Ipratropium-Albuterol  MCG/ACT inhaler   Commonly known as:  COMBIVENT RESPIMAT   Used for:  Chronic obstructive pulmonary disease, unspecified COPD type (H)        Dose:  1 puff   Inhale 1 puff into the lungs 2 times daily Not to exceed 6 doses per day.   Quantity:  3 Inhaler   Refills:  3        ketoconazole 2 % shampoo   Commonly known as:  NIZORAL   Used for:  Seborrhea capitis        Apply to the affected area and wash off after 5 minutes.   Quantity:  120 mL   Refills:  2        MAG- MG tablet   Used for:  Cerebellar infarct (H)   Generic drug:  magnesium oxide        Dose:  800 mg   Take 2 tablets by mouth daily.   Refills:  0        metoprolol 50 MG 24 hr tablet   Commonly known as:  TOPROL-XL   Used for:  Essential hypertension with goal blood pressure less than 140/90        Dose:  50 mg   Take 1 tablet (50 mg) by mouth At Bedtime   Quantity:  90 tablet   Refills:  1    New dose and dosage form.       multivitamin, therapeutic with minerals Tabs tablet        Dose:  1 tablet   Take 1 tablet by mouth daily   Refills:  0        niacin 500 MG CR tablet   Commonly known as:  NIASPAN   Used for:  Mixed hyperlipidemia        Dose:  500 mg   Take 1 tablet by mouth At Bedtime.   Quantity:  90 tablet   Refills:  2        nitroglycerin 0.4 MG sublingual tablet   Commonly known as:  NITROSTAT   Used for:  Chronic obstructive pulmonary disease, unspecified COPD type (H)        Dose:  0.4 mg   Place 1 tablet (0.4 mg) under the tongue every 5 minutes as needed up to 3 tablets per episode.   Quantity:  20 tablet   Refills:  11    Profile Rx: patient will contact pharmacy when needed       ONE TOUCH LANCETS Misc   Used for:  Type 2 diabetes with stage 3 chronic kidney disease GFR 30-59 (H)        use as directed 4 x daily and prn   Quantity:  120 each   Refills:  3        * order for DME   Used for:  SHIVAM (obstructive sleep apnea)        Equipment being  ordered: CPAP supplies   Quantity:  1 each   Refills:  0        order for DME   Used for:  Generalized muscle weakness        Equipment being ordered: 4 wheeled rolling walker.   Quantity:  1 Device   Refills:  0        RANITIDINE HCL PO        Dose:  150 mg   Take 150 mg by mouth daily   Refills:  0        rosuvastatin 20 MG tablet   Commonly known as:  CRESTOR   Used for:  Hyperlipidemia LDL goal <70        Dose:  20 mg   Take 1 tablet (20 mg) by mouth daily Needs fasting labs before any further refills   Quantity:  90 tablet   Refills:  0    Last refill needs fasting lipids       Vitamin B12 1000 MCG Tbcr        Dose:  1000 mcg   Take 1,000 mcg by mouth daily   Quantity:  100 tablet   Refills:  3        VITAMIN D (CHOLECALCIFEROL) PO        Dose:  2000 Units   Take 2,000 Units by mouth daily   Refills:  0        * Notice:  This list has 2 medication(s) that are the same as other medications prescribed for you. Read the directions carefully, and ask your doctor or other care provider to review them with you.      STOP taking     insulin lispro 100 UNIT/ML injection   Commonly known as:  Vipul Schultz                   Summary of Visit     Reason for your hospital stay       Confusion: we have adjusted your shunt, treated a urinary tract infection, and readdressed your paroxysmal atrial fibrillation.             After Care     Activity       Your activity upon discharge: non-weight bearing, fall precautions       Advance Diet as Tolerated       Follow this diet upon discharge: Orders Placed This Encounter      Room Service      Moderate Consistent CHO Diet       Continuous Passive Motion Machine       Start CPM Day of Surgery.  0 - 90 degrees. Advance as tolerated.       Daily weights       Call Provider for weight gain of more than 2 pounds per day or 5 pounds per week.       Diet       Follow this diet upon discharge: Orders Placed This Encounter      Room Service      Moderate Consistent CHO Diet       Fall  precautions           General info for SNF       Length of Stay Estimate: Short Term Care: Estimated # of Days 31-90  Condition at Discharge: Improving  Level of care:skilled   Rehabilitation Potential: Good  Admission H&P remains valid and up-to-date: Yes  Recent Chemotherapy: N/A  Use Nursing Home Standing Orders: Yes       Glucose monitor nursing POCT       Before meals and at bedtime       Mantoux instructions       Give two-step Mantoux (PPD) Per Facility Policy Yes       Wound care       Site:  L ankle  Instructions:  Keep clean and dry       Wound care and dressings       Instructions to care for your wound at home: keep clean and dry             Referrals     Inr Clinic Referral       Please schedule for 2/25       NEUROLOGY ADULT REFERRAL       Pt follows with Dr. Corey. Please schedule appointment within 4 weeks.       Neurology Adult Referral       Please contact Stroke Clinic- Neurology Clinic at [phone], pick option #1,  if you have not been contacted to schedule a stroke follow up appointment in 5 days of discharge.  If you have other stroke related questions, please call [phone], pick option #3, and ask to speak to Dileep Graham RN Stroke/Endovascular Care Coordinator.  If you have any urgent stroke related questions after hours, please contact the hospital  at [phone] and ask to be connected to a stroke provider.       Neurosurgery Referral       Pt follows with Dr. Neri. Please schedule within 4 weeks       Occupational Therapy Adult Consult       Evaluate and treat as clinically indicated.       Orthopedics Adult Referral       Please schedule within 1 week at Non-Op Ortho clinic.       Physical Therapy Adult Consult       Evaluate and treat as clinically indicated.       Speech Language Path Adult Consult       Evaluate and treat as clinically indicated.             Your next 10 appointments already scheduled     May 02, 2017  3:00 PM CDT   (Arrive by 2:45 PM)   Return  Visit with Casey Corey MD   [facility] ([facility])    [address]  3rd Floor  [place] [address] [phone]            May 23, 2017 11:00 AM CDT   [facility]    [address]  [address]  [place] [id number] [phone]              Follow-Up Appointment Instructions     Future Labs/Procedures    Adult [facility] Follow-up and recommended labs and tests     Comments:    Follow up with primary care provider, Ash Bowman, within 7 days for hospital follow- up.  The following labs/tests are recommended: INR.      Appointments on [place] and/or [facility] (with [facility] or [facility] provider or service). Call [phone] if you haven't heard regarding these appointments within 7 days of discharge.    Follow Up and recommended labs and tests     Comments:    Follow up with [facility] physician.  The following labs/tests are recommended: INR.      Follow-Up Appointment Instructions     Adult [facility] Follow-up and recommended labs and tests       Follow up with primary care provider, Ash Bowman, within 7 days for hospital follow- up.  The following labs/tests are recommended: INR.      Appointments on [place] and/or [facility] (with [facility] or [facility] provider or service). Call [phone] if you haven't heard regarding these appointments within 7 days of discharge.       Follow Up and recommended labs and tests       Follow up with [facility] physician.  The following labs/tests are recommended: INR.             Statement of Approval     Ordered          02/22/17 1429  I have reviewed and agree with all the recommendations and orders detailed in this document.  EFFECTIVE NOW     Approved and electronically signed by:  Karen Vázquez MD

## 2017-02-19 NOTE — IP AVS SNAPSHOT
"` `           [address] [facility]: [phone]                                              INTERAGENCY TRANSFER FORM - NURSING   2017                    Hospital Admission Date: 2017  VALERIE ARRIAGA   : 1940  Sex: Male        Attending Provider: Karen Vázquez MD     Allergies:  Penicillins, Albuterol, Albuterol Sulfate, Atorvastatin, Atorvastatin Calcium, Cipro [Ciprofloxacin], Ciprofloxacin, Simvastatin, Warfarin    Infection:  VRE-Contact Isolation   Service:  TRAUMA    Ht:  1.702 m (5' 7\")   Wt:  87.3 kg (192 lb 8 oz)   Admission Wt:  88.5 kg (195 lb)    BMI:  30.15 kg/m 2   BSA:  2.03 m 2            Patient PCP Information     Provider PCP Type    Ash Bowman MD General      Current Code Status     Date Active Code Status Order ID Comments User Context       2017  4:40 PM DNR/DNI [id number]  Laurel Fields APRN CNP Inpatient       Code Status History     Date Active Date Inactive Code Status Order ID Comments User Context    2014  9:33 AM 2017  4:40 PM Full Code [id number]  Johnny Laura MD Outpatient    2013  7:56 AM 2014  9:33 AM DNR/DNI [id number]  Jailyn Lindsey MD Outpatient    2013  1:34 AM 2013  7:56 AM DNR/DNI [id number]  Kellie Pickett RN Inpatient    2013  3:17 AM 2013  1:34 AM DNR/DNI [id number]  Kirk Rick NP Inpatient    2012 10:21 AM 2013  3:17 AM DNR/DNI [id number]  Castillo Gibbs MD Outpatient    5/10/2012  4:46 PM 2012 10:21 AM DNR/DNI [id number]  Kenan Hernandez MD Inpatient    2012  4:22 AM 5/10/2012  4:46 PM DNR/DNI [id number]  Katie Dumas RN Inpatient    2012  5:07 AM 2012  3:59 AM DNR/DNI [id number]  Katie Dumas, RN Inpatient    5/3/2012  9:45 AM 2012  5:07 AM Full Code [id number]  Antonio Molina MD Outpatient    2012  6:26 AM 5/3/2012  9:45 AM DNR/DNI [id number]  Maria Antonia Fried RN Inpatient    3/14/2012 10:45 AM " 5/1/2012  6:26 AM DNR/DNI [id number]  Kenan Hernandez MD Outpatient    3/1/2012  5:56 PM 3/14/2012 10:45 AM DNR/DNI [id number]  Kenan Hernandez MD Inpatient    2/29/2012 12:27 PM 3/1/2012  5:56 PM Full Code [id number]  Mayra Rueda MD Outpatient      Advance Directives        Does patient have a scanned Advance Directive/ACP document in EPIC?           Yes        Hospital Problems as of 2/22/2017              Priority Class Noted POA    Fx medial malleolus-closed Medium  2/19/2017 Yes      Non-Hospital Problems as of 2/22/2017              Priority Class Noted    Osteoporosis   4/19/2006    PAF (paroxysmal atrial fibrillation) (H)   11/6/2009    Lumbosacral plexopathy   Unknown    Testosterone deficiency   9/21/2010    Hypertension goal BP (blood pressure) < 140/90   12/6/2010    Atrial fibrillation (H)   1/19/2011    CKD (chronic kidney disease) stage 3, GFR 30-59 ml/min   Unknown    Hyperlipidemia LDL goal <70   1/26/2011    Coronary artery disease   Unknown    Health Care Home   3/22/2011    Elevated PSA   11/9/2011    Subarachnoid hemorrhage (H)   1/1/2012    Anemia in chronic renal disease   2/1/2012    Urinary bladder neurogenic dysfunction   2/14/2012    Idiopathic normal pressure hydrocephalus (INPH) Medium Chronic 2/26/2012    Stroke (H)   5/11/2012    Cognitive deficit, post-stroke   6/13/2012    HL (hearing loss)   9/24/2012    Personal history of fall Medium Chronic 9/26/2012    GERD (gastroesophageal reflux disease)   11/26/2012    COPD exacerbation (H)   2/16/2013    SHIAVM (obstructive sleep apnea)   2/16/2013    LVH (left ventricular hypertrophy)   2/16/2013    Diastolic CHF, acute on chronic (H)   2/16/2013    Hypoxemia   2/16/2013    Tachyarrhythmia   2/18/2013    Systolic and diastolic CHF, chronic (H)   2/19/2013    Gait abnormality   5/20/2013    Iron deficiency anemia Medium  3/20/2014    Allergic rhinitis   4/7/2014    Altered mental status   5/15/2014    VRE (vancomycin-resistant  Enterococci)   5/16/2014    Hypoglycemia   5/16/2014    Lower urinary tract infectious disease   5/17/2014    Type 2 diabetes with stage 3 chronic kidney disease GFR 30-59 (H) Medium  10/14/2015    Chronic obstructive pulmonary disease, unspecified COPD type (H) Medium  11/20/2015    ACP (advance care planning)   1/7/2016    Ankle pain Medium  1/13/2017      Immunizations     Name Date      Influenza (High Dose) 3 valent vaccine 11/02/16     Influenza (High Dose) 3 valent vaccine 11/10/15     Influenza (High Dose) 3 valent vaccine 10/07/14     Influenza (High Dose) 3 valent vaccine 11/09/11     Influenza (IIV3) 09/12/12     Influenza (IIV3) 09/21/10     Influenza (IIV3) 11/07/07     Influenza (IIV3) 10/18/06     Influenza (IIV3) 12/14/05     Influenza Vaccine IM 3yrs+ 4 Valent IIV4 10/22/13     Pneumococcal (PCV 13) 11/10/15     Pneumococcal 23 valent 04/30/08          END      ASSESSMENT     Discharge Profile Flowsheet     DISCHARGE NEEDS ASSESSMENT     Incontinence Containment Product  incontinence brief 02/22/17 0904    Concerns Comments  -- (discharged from home care ) 06/11/14 1135   COMMUNICATION ASSESSMENT      Equipment Currently Used at Home  wheelchair (bed rail) 02/21/17 1245   Patient's communication style  spoken language (English or Bilingual) 02/19/17 1653    Transportation Available  family or friend will provide 02/21/17 1245   SKIN      Equipment Used at Home  bath bench;grab bar;walker, rolling;wheelchair 05/16/14 1354   Inspection  Full 02/22/17 0904    GASTROINTESTINAL (ADULT,PEDIATRIC,OB)     Skin areas NOT inspected  Coccyx;Sacrum;Buttock, right;Buttock, left 02/22/17 0144    GI WDL  ex 02/22/17 0904   Skin WDL  WDL 02/22/17 0904    Abdominal Appearance  obese;rounded 02/22/17 0904   SAFETY      Last Bowel Movement  02/22/17 02/22/17 0904   Safety WDL  WDL 02/22/17 0904                 Assessment WDL (Within Defined Limits) Definitions           Safety WDL     Effective: 09/28/15    Row  "Information: <b>WDL Definition:</b> Bed in low position, wheels locked; call light in reach; upper side rails up x 2; ID band on<br> <font color=\"gray\"><i>Item=AS safety wdl>>List=AS safety wdl>>Version=F14</i></font>      Skin WDL     Effective: 09/28/15    Row Information: <b>WDL Definition:</b> Warm; dry; intact; elastic; without discoloration; pressure points without redness<br> <font color=\"gray\"><i>Item=AS skin wdl>>List=AS skin wdl>>Version=F14</i></font>      Vitals     Vital Signs Flowsheet     VITAL SIGNS     Height  1.702 m (5' 7\") 02/19/17 1050    Temp  96.2  F (35.7  C) 02/22/17 0807   Height Method  Stated 02/19/17 1050    Temp src  Oral 02/22/17 0807   Weight  87.3 kg (192 lb 8 oz) 02/20/17 2207    Resp  18 02/22/17 0807   Estimated Weight (From ED)  88.5 kg (195 lb) 02/19/17 1048    Pulse  63 02/22/17 0807   BSA (Calculated - sq m)  2.04 02/19/17 1050    Heart Rate  60 02/19/17 1558   BMI (Calculated)  30.61 02/19/17 1050    Pulse/Heart Rate Source  Monitor 02/22/17 0807   FANNIE COMA SCALE      BP  149/68 02/22/17 0807   Best Eye Response  4-->(E4) spontaneous 02/22/17 0904    BP Location  Left arm 02/22/17 0807   Best Motor Response  6-->(M6) obeys commands 02/22/17 0904    OXYGEN THERAPY     Best Verbal Response  4-->(V4) confused 02/22/17 0904    SpO2  97 % 02/22/17 0807   Fannie Coma Scale Score  14 02/22/17 0904    O2 Device  None (Room air) 02/22/17 0807   POSITIONING      PAIN/COMFORT     Body Position  supine, head elevated 02/22/17 0904    Patient Currently in Pain  denies 02/22/17 0314   Head of Bed (HOB)  HOB at 30 degrees 02/22/17 0904    Preferred Pain Scale  CAPA (Clinically Aligned Pain Assessment) ([facility] Adults Only) 02/21/17 0059   DAILY CARE      CLINICALLY ALIGNED PAIN ASSESSMENT (CAPA) ([facility] ADULTS ONLY)     Activity Type  bedrest 02/22/17 0904    Comfort  negligible pain 02/19/17 2222   Activity Level of Assistance  assistance, 2 people " 02/22/17 0904    Change in Pain  about the same 02/19/17 1613   POINT OF CARE TESTING      Functioning  can do everything I need to 02/19/17 1613   Puncture Site  fingertip 02/21/17 2201    Sleep  awake with occasional pain 02/19/17 1613   Bedside Glucose (mg/dl )   177 mg/dl 02/21/17 2201    HEIGHT AND WEIGHT                   Patient Lines/Drains/Airways Status    Active LINES/DRAINS/AIRWAYS     Name: Placement date: Placement time: Site: Days: Last dressing change:    Peripheral IV 02/19/17 Right Lower forearm 02/19/17   1046   Lower forearm   3     Peripheral IV Left Lower forearm       Lower forearm        Wound 05/03/12 Left Hand 05/03/12      Hand   1756     Wound 05/15/14 Left;Lower leg, pinkish red site 05/15/14   1947      1013     Incision/Surgical Site Anterior;Lateral;Right Head             Incision/Surgical Site 02/28/12 Anterior;Lateral;Right Abdomen 02/28/12       1821     Incision/Surgical Site Right;Lower Abdomen                     Patient Lines/Drains/Airways Status    Active PICC/CVC     None            Intake/Output Detail Report     Date Intake     Output Net    Shift P.O. I.V. IV Piggyback Total Urine Total       Day 02/21/17 0000 - 02/21/17 0659 -- -- -- -- 700 700 -700    Molly 02/21/17 0700 - 02/21/17 1459 240 -- -- 240 225 225 15    Noc 02/21/17 1500 - 02/21/17 2359 480 -- -- 480 800 800 -320    Day 02/22/17 0000 - 02/22/17 0659 240 -- -- 240 300 300 -60    Molly 02/22/17 0700 - 02/22/17 1459 240 -- -- 240 700 700 -460      Last Void/BM       Most Recent Value    Urine Occurrence     Stool Occurrence 1 at 02/22/2017 1217      Case Management/Discharge Planning     Case Management/Discharge Planning Flowsheet     REFERRAL INFORMATION     Equipment Used at Home  bath bench;grab bar;walker, rolling;wheelchair 05/16/14 1354    Arrived From  emergency department 05/01/12 0632   FINAL RESOURCES      LIVING ENVIRONMENT     Equipment Currently Used at Home  wheelchair (bed rail) 02/21/17 1245     Lives With  spouse 02/21/17 1245   / CAREGIVER      Living Arrangements  condominium 02/21/17 1245   Filed Complexity Screen Score  9 02/20/17 0834    COPING/STRESS     ABUSE RISK SCREEN      Major Change/Loss/Stressor  hospitalization 02/19/17 1653   QUESTION TO PATIENT:  Has a member of your family or a partner(now or in the past) intimidated, hurt, manipulated, or controlled you in any way?  no 02/19/17 1653    Patient Personal Strengths  expressive of emotions;expressive of needs 03/02/12 1313   QUESTION TO PATIENT: Do you feel safe going back to the place where you are living?  yes 02/19/17 1653    ASSESSMENT/CONCERNS TO BE ADDRESSED     OBSERVATION: Is there reason to believe there has been maltreatment of a vulnerable adult (ie. Physical/Sexual/Emotional abuse, self neglect, lack of adequate food, shelter, medical care, or financial exploitation)?  no 02/19/17 1653    Concerns Comments  -- (discharged from home care ) 06/11/14 1135   (R) MENTAL HEALTH SUICIDE RISK      DISCHARGE PLANNING     Are you depressed or being treated for depression?  No 02/19/17 1653    Transportation Available  family or friend will provide 02/21/17 1245   HOMICIDE RISK      Skilled Nursing Facility  -- ([facility]) 05/10/12 1107   Homicidal Ideation  no 02/19/17 1050    Skilled Nursing Facility Phone Number  -- ([phone]) 05/10/12 1104

## 2017-02-19 NOTE — ED PROVIDER NOTES
History     Chief Complaint   Patient presents with     Trauma     left ankle     HPI  Melvin Bhatia is a 76 year old male with a history of diabetes, chronic kidney disease stage IV, hypertension, atrial fibrillation, CAD, MI, CHF, cerebellar infarct, osteoporosis, multiple fractures who presents after a mechanical fall. The patient's significant other reports that the patient was going to get out of bed and transfer to his wheelchair this morning, and was unsteady when he got up. He then got tangled up in his wheelchair and fell down. He complains of left hip and left ankle pain since the fall. He denies any headache, neck pain or back pain, and denies hitting his head or any loss of consciousness during the fall. The patient has a history of multiple previous fractures of the left ankle and foot as a result of previous falls.     Past Medical History   Diagnosis Date     ARDS (adult respiratory distress syndrome) (H)      Atrial fibrillation (H)      first diagnosed 11-08     Bell's palsy 1/2010     Cauda equina syndrome with neurogenic bladder (H)      self catheter     Cerebellar infarct (H) 4/2012     left cerebellar infarct.  felt embolic hx afib     CHF (congestive heart failure) (H)      Chronic airway obstruction, not elsewhere classified      Chronic infection      VRE     CKD (chronic kidney disease) stage 4, GFR 15-29 ml/min (H)      Congestive heart failure, unspecified 5/06     EF 48%     Coronary artery disease      abnl mps with ef 46% and small-med area of ischemia '08 [facility]     CVA (cerebral infarction)      old lacunar infarct in the posterior left     Diabetes      Essential hypertension, benign      Gastro-oesophageal reflux disease      Heart attack (H)      Hemorrhage of gastrointestinal tract, unspecified 2/2003     UGI Bleed with transfusion     Hydrocephalus      plan  Shunt     Hypersomnia with sleep apnea, unspecified      Bpap     Intraventricular hemorrhage 1/2012      Legionella pneumonia (H) 8/09     Lumbago      Lumbosacral plexopathy 10/09     L diabetic myotrophy     Mixed hyperlipidemia      Other osteoporosis 3/2006     Other specified anemias 2002     Nl colonoscopy/EGD; Nl iron studies 4/06     Other specified disorders of rotator cuff syndrome of shoulder and allied disorders      Other testicular hypofunction      on Androgel     Palpitations      HOLTER     Sleep apnea      uses cpap     Subarachnoid hemorrhage (H) 1/2012     Tibial plateau fracture 2/2011     left     Type II or unspecified type diabetes mellitus without mention of complication, not stated as uncontrolled      Unspecified disorder resulting from impaired renal function      Stage 3 Chronic Renal Dz       Past Surgical History   Procedure Laterality Date     Surgical history of -   2001     R wrist surg fx/ steel plate     Surgical history of -   age 20     Inguinal hernia     C dexa, bone density, axial skel  3/2006     Osteoporosis     Cardiac nuc wilman stress test nl  5/06     No ischemia, EF 44 %     Hc colonoscopy thru stoma, diagnostic  2/2003     Normal, repeat 10 yr     Upper gi endoscopy  2/2003     esophageal ulcers, gastritis, duodenitis     Upper gi endoscopy  4/2003     erosive gastropathy from ASA     Surgical history of -   2003     Bladder stim surg, not sucessful     Tonsillectomy  child     Cardiac nuc wilman stress test nl  6/2008, 3/09     sm to mod fixed apical defect EF 46%     Hc doppler echo pulsed, complete       EF 50-55%     Surgical history of -        Lymphedema treatment of legs     Open reduction internal fixation tibial plateau  3/2011     left     Implant shunt ventriculoperitoneal  2/28/2012     Procedure:IMPLANT SHUNT VENTRICULOPERITONEAL; Ventriculoperitoneal Shunt Placement; Surgeon:ALESHA VELAZQUEZ; Location:[facility] OR       Family History   Problem Relation Age of Onset     C.A.D. Father      DIABETES Maternal Grandmother        Social History   Substance Use  Topics     Smoking status: Former Smoker     Quit date: 6/26/1997     Smokeless tobacco: Never Used      Comment: quit 1997       2-3 ppd for 45 yrs      Alcohol use No      Comment: recovering since 1982       No current facility-administered medications for this encounter.      Current Outpatient Prescriptions   Medication     fexofenadine (ALLEGRA) 180 MG tablet     budesonide-formoterol (SYMBICORT) 160-4.5 MCG/ACT Inhaler     Ipratropium-Albuterol (COMBIVENT RESPIMAT)  MCG/ACT inhaler     rosuvastatin (CRESTOR) 20 MG tablet     sulfamethoxazole-trimethoprim (BACTRIM DS/SEPTRA DS) 800-160 MG per tablet     RANITIDINE HCL PO     calcium citrate (CALCITRATE) 950 MG tablet     aspirin 81 MG tablet     Cyanocobalamin (VITAMIN B12) 1000 MCG TBCR     calcium citrate-vitamin D (CITRACAL) 315-200 MG-UNIT TABS     denosumab (PROLIA) 60 MG/ML SOLN     exenatide (BYETTA) 5 mcg/0.02mL per dose (60 doses per 1.2 mL prefilled pen)     metoprolol (TOPROL-XL) 50 MG 24 hr tablet     insulin lispro (HUMALOG KWIKPEN) 100 UNIT/ML soln     fluticasone (FLONASE) 50 MCG/ACT nasal spray     insulin pen needle (B-D U/F) 31G X 8 MM     sulfamethoxazole-trimethoprim (BACTRIM DS) 800-160 MG per tablet     blood glucose monitoring (NO BRAND SPECIFIED) test strip     ONE TOUCH LANCETS MISC     ketoconazole (NIZORAL) 2 % shampoo     insulin glargine (LANTUS SOLOSTAR) 100 UNIT/ML PEN     nitroglycerin (NITROSTAT) 0.4 MG SL tablet     fluocinolone (SYNALAR) 0.01 % external solution     order for DME     ACE/ARB NOT PRESCRIBED, INTENTIONAL,     multivitamin, therapeutic with minerals (THERA-VIT-M) TABS     ferrous sulfate 325 (65 FE) MG tablet     VITAMIN D, CHOLECALCIFEROL, PO     bumetanide (BUMEX) 0.5 MG tablet     insulin syringes, disposable, U-100 0.3 ML MISC     ORDER FOR DME     Catheters MISC     magnesium oxide (MAG-OX) 400 MG tablet     niacin (NIASPAN) 500 MG CR tablet        Allergies   Allergen Reactions     Penicillins Hives  "    Albuterol Other (See Comments)     Albuterol Sulfate Other (See Comments)     SVT/tachycardia with albuterol     Atorvastatin Other (See Comments)     Description: Lipitor, Description: Lipitor     Atorvastatin Calcium      Cipro [Ciprofloxacin] Diarrhea     Ciprofloxacin Other (See Comments)     Description: Cipro, Description: Cipro     Coumadin      Intraventricular hemorhage, subarrachnoid hem, hydrocephalous due to falls    Per wife, pt is not allergic to this med     Simvastatin Other (See Comments)     Description: Zocor, Description: Zocor  C/o weak muscles and severe abdominal pain.     Warfarin Unknown and Other (See Comments)      I have reviewed the Medications, Allergies, Past Medical and Surgical History, and Social History in the Epic system.    Review of Systems   Constitutional: Negative for fever.   HENT: Negative for congestion.    Eyes: Negative for redness.   Respiratory: Negative for shortness of breath.    Cardiovascular: Negative for chest pain.   Gastrointestinal: Negative for abdominal pain.   Genitourinary: Negative for difficulty urinating.   Musculoskeletal: Negative for arthralgias, back pain, neck pain and neck stiffness.        Positive for left hip and left ankle pain.   Skin: Negative for color change.   Neurological: Negative for headaches.   Psychiatric/Behavioral: Negative for confusion.   All other systems reviewed and are negative.      Physical Exam   Pulse: 62  Temp: 97.3  F (36.3  C)  Resp: 18  Height: 170.2 cm (5' 7\")  Weight: 88.5 kg (195 lb)  SpO2: 95 %  Physical Exam   Constitutional: No distress.   HENT:   Head: Atraumatic.   Mouth/Throat: Oropharynx is clear and moist. No oropharyngeal exudate.   Eyes: Pupils are equal, round, and reactive to light. No scleral icterus.   Cardiovascular: Normal heart sounds and intact distal pulses.    Pulmonary/Chest: Breath sounds normal. No respiratory distress.   Abdominal: Soft. Bowel sounds are normal. There is no tenderness. "   Musculoskeletal: He exhibits no edema or tenderness.   Skin: Skin is warm. No rash noted. He is not diaphoretic.       ED Course     ED Course     Orthopedic injury tx  Date/Time: 2/19/2017 1:46 PM  Performed by: EDILSON ADAN  Authorized by: EDILSON ADAN   Consent: Verbal consent obtained.  Risks and benefits: risks, benefits and alternatives were discussed  Consent given by: patient  Imaging studies: imaging studies available  Patient identity confirmed: verbally with patient  Injury location: ankle  Location details: left ankle  Injury type: fracture  Fracture type: bimalleolar  Pre-procedure neurovascular assessment: neurovascularly intact  Pre-procedure distal perfusion: normal  Pre-procedure neurological function: normal  Pre-procedure range of motion: reduced  Local anesthesia used: no    Anesthesia:  Local anesthesia used: no  Sedation:  Patient sedated: no    Manipulation performed: yes  Immobilization: splint  Splint type: ankle stirrup  Supplies used: Ortho-Glass  Post-procedure neurovascular assessment: post-procedure neurovascularly intact  Post-procedure distal perfusion: normal  Post-procedure neurological function: normal  Post-procedure range of motion: unchanged  Patient tolerance: Patient tolerated the procedure well with no immediate complications           11:08 AM  The patient was seen and examined by Dr. Adan in Room 1.          Results for orders placed or performed during the hospital encounter of 02/19/17   XR Ankle Left G/E 3 Views    Narrative    XR TIBIA & FIBULA LT 2 VW, XR ANKLE LT G/E 3 VW, XR FOOT LT G/E  3 VW, XR HIP LEFT 2-3 VIEWS   2/19/2017 11:59 AM      HISTORY: Pain, trauma    COMPARISON: CT of the left knee 22 February, 2011; left foot  radiographs 8 December, 2016; left foot MRI 6 October, 2016; left  ankle radiographs 26 June, 2013    FINDINGS:   No femur fracture. The distal half of the femur is not included on  these radiographs.    Plate and screw fixation of prior  posterior lateral tibial plateau  fracture. No signs of hardware failure. No knee effusion.     Chronic bimalleolar fracture. Superimposed transverse fracture of the  medial malleolus with some comminution; there is greatest continuity  in the medial cortical surface. The medial tibiotalar joint space is  also widened up to 5 mm compared to 10 mm in the lateral aspect of the  joint space. Vascular calcifications.    Demineralization throughout the bones of the foot. Mildly displaced  chronic intra-articular fracture through the first metatarsal head,  base of the third metatarsal, fourth and fifth metatarsal necks    Diffuse osteopenia and fatty muscular atrophy.      Impression    IMPRESSION:   1. Acute transverse fracture left medial malleolus. Chronic widening  of the medial tibiotalar joint space suggestive of ligamentous injury.  Changes are superimposed on chronic bimalleolar fracture; additional  fractures may be obscured.     2. Numerous chronic metatarsal and phalangeal fractures. Most  angulated fracture is of the 1st metatarsal head.     3. Stable appearance of internal fixation of left lateral tibial  plateau fracture.    4. No femur fracture.    5. Diffuse osteopenia and muscular atrophy. Vascular calcifications.    Findings discussed with Dr. Adan by Dr. Baum at 1215 hours on 19 February 2017.    I have personally reviewed the examination and initial interpretation  and I agree with the findings.    KEVEN MADRID MD   Foot XR, G/E 3 views, left    Narrative    XR TIBIA & FIBULA LT 2 VW, XR ANKLE LT G/E 3 VW, XR FOOT LT G/E  3 VW, XR HIP LEFT 2-3 VIEWS   2/19/2017 11:59 AM      HISTORY: Pain, trauma    COMPARISON: CT of the left knee 22 February, 2011; left foot  radiographs 8 December, 2016; left foot MRI 6 October, 2016; left  ankle radiographs 26 June, 2013    FINDINGS:   No femur fracture. The distal half of the femur is not included on  these radiographs.    Plate and screw fixation of prior  posterior lateral tibial plateau  fracture. No signs of hardware failure. No knee effusion.     Chronic bimalleolar fracture. Superimposed transverse fracture of the  medial malleolus with some comminution; there is greatest continuity  in the medial cortical surface. The medial tibiotalar joint space is  also widened up to 5 mm compared to 10 mm in the lateral aspect of the  joint space. Vascular calcifications.    Demineralization throughout the bones of the foot. Mildly displaced  chronic intra-articular fracture through the first metatarsal head,  base of the third metatarsal, fourth and fifth metatarsal necks    Diffuse osteopenia and fatty muscular atrophy.      Impression    IMPRESSION:   1. Acute transverse fracture left medial malleolus. Chronic widening  of the medial tibiotalar joint space suggestive of ligamentous injury.  Changes are superimposed on chronic bimalleolar fracture; additional  fractures may be obscured.     2. Numerous chronic metatarsal and phalangeal fractures. Most  angulated fracture is of the 1st metatarsal head.     3. Stable appearance of internal fixation of left lateral tibial  plateau fracture.    4. No femur fracture.    5. Diffuse osteopenia and muscular atrophy. Vascular calcifications.    Findings discussed with Dr. Adan by Dr. Baum at 1215 hours on 19 February 2017.    I have personally reviewed the examination and initial interpretation  and I agree with the findings.    KEVEN MADRID MD   Hip XR, 2+ views, left    Narrative    XR TIBIA & FIBULA LT 2 VW, XR ANKLE LT G/E 3 VW, XR FOOT LT G/E  3 VW, XR HIP LEFT 2-3 VIEWS   2/19/2017 11:59 AM      HISTORY: Pain, trauma    COMPARISON: CT of the left knee 22 February, 2011; left foot  radiographs 8 December, 2016; left foot MRI 6 October, 2016; left  ankle radiographs 26 June, 2013    FINDINGS:   No femur fracture. The distal half of the femur is not included on  these radiographs.    Plate and screw fixation of prior  posterior lateral tibial plateau  fracture. No signs of hardware failure. No knee effusion.     Chronic bimalleolar fracture. Superimposed transverse fracture of the  medial malleolus with some comminution; there is greatest continuity  in the medial cortical surface. The medial tibiotalar joint space is  also widened up to 5 mm compared to 10 mm in the lateral aspect of the  joint space. Vascular calcifications.    Demineralization throughout the bones of the foot. Mildly displaced  chronic intra-articular fracture through the first metatarsal head,  base of the third metatarsal, fourth and fifth metatarsal necks    Diffuse osteopenia and fatty muscular atrophy.      Impression    IMPRESSION:   1. Acute transverse fracture left medial malleolus. Chronic widening  of the medial tibiotalar joint space suggestive of ligamentous injury.  Changes are superimposed on chronic bimalleolar fracture; additional  fractures may be obscured.     2. Numerous chronic metatarsal and phalangeal fractures. Most  angulated fracture is of the 1st metatarsal head.     3. Stable appearance of internal fixation of left lateral tibial  plateau fracture.    4. No femur fracture.    5. Diffuse osteopenia and muscular atrophy. Vascular calcifications.    Findings discussed with Dr. Adan by Dr. Baum at 1215 hours on 19 February 2017.    I have personally reviewed the examination and initial interpretation  and I agree with the findings.    KEVEN MADRID MD   Tib/Fib XR, left    Narrative    XR TIBIA & FIBULA LT 2 VW, XR ANKLE LT G/E 3 VW, XR FOOT LT G/E  3 VW, XR HIP LEFT 2-3 VIEWS   2/19/2017 11:59 AM      HISTORY: Pain, trauma    COMPARISON: CT of the left knee 22 February, 2011; left foot  radiographs 8 December, 2016; left foot MRI 6 October, 2016; left  ankle radiographs 26 June, 2013    FINDINGS:   No femur fracture. The distal half of the femur is not included on  these radiographs.    Plate and screw fixation of prior posterior  lateral tibial plateau  fracture. No signs of hardware failure. No knee effusion.     Chronic bimalleolar fracture. Superimposed transverse fracture of the  medial malleolus with some comminution; there is greatest continuity  in the medial cortical surface. The medial tibiotalar joint space is  also widened up to 5 mm compared to 10 mm in the lateral aspect of the  joint space. Vascular calcifications.    Demineralization throughout the bones of the foot. Mildly displaced  chronic intra-articular fracture through the first metatarsal head,  base of the third metatarsal, fourth and fifth metatarsal necks    Diffuse osteopenia and fatty muscular atrophy.      Impression    IMPRESSION:   1. Acute transverse fracture left medial malleolus. Chronic widening  of the medial tibiotalar joint space suggestive of ligamentous injury.  Changes are superimposed on chronic bimalleolar fracture; additional  fractures may be obscured.     2. Numerous chronic metatarsal and phalangeal fractures. Most  angulated fracture is of the 1st metatarsal head.     3. Stable appearance of internal fixation of left lateral tibial  plateau fracture.    4. No femur fracture.    5. Diffuse osteopenia and muscular atrophy. Vascular calcifications.    Findings discussed with Dr. Adan by Dr. Baum at 1215 hours on 19 February 2017.    I have personally reviewed the examination and initial interpretation  and I agree with the findings.    KEVEN MADRID MD   Head CT w/o contrast    Narrative    CT HEAD W/O CONTRAST 2/19/2017 1:01 PM    Provided History: Fall, trauma    Comparison: MRI of the brain 3 January, 2017, CT of the head 24 February 2016.    Technique: Using multidetector thin collimation helical acquisition  technique, axial, coronal and sagittal CT images from the skull base  to the vertex were obtained without intravenous contrast.     Findings:  Stable chronic infarcts involving the posterior right MCA territory  and right cerebral  hemisphere external border zones. Stable chronic  left PICA territory infarct. No new loss of gray-white matter  differentiation. Mild leukoaraiosis.    Right frontal approach ventriculoperitoneal shunt catheter courses  through the body of the left lateral ventricle with tip in the left  thalamus. 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    No intracranial hemorrhage, mass effect, midline shift or abnormal  extra axial fluid collection.    The visualized paranasal sinuses are clear. The mastoid air cells are  clear. Bilateral pseudophakia. Calvarium is intact.       Impression    Impression:   1. No evidence of acute traumatic injury.  2. Increase in size of the lateral and third ventricles, possibly  related to decreased effacement associated with resolution of  previously demonstrated bilateral hemispheric subdural hygromas since  1/3/2017. Stable positioning of right frontal ventriculostomy  catheter.  3. Stable chronic infarcts involving the posterior right MCA  territory, right hemispheric external border zones, and left PICA  territory.    I have personally reviewed the examination and initial interpretation  and I agree with the findings.    LETI PICKERING MD   CBC with platelets differential   Result Value Ref Range    WBC 6.7 4.0 - 11.0 10e9/L    RBC Count 4.00 (L) 4.4 - 5.9 10e12/L    Hemoglobin 11.3 (L) 13.3 - 17.7 g/dL    Hematocrit 37.4 (L) 40.0 - 53.0 %    MCV 94 78 - 100 fl    MCH 28.3 26.5 - 33.0 pg    MCHC 30.2 (L) 31.5 - 36.5 g/dL    RDW 15.2 (H) 10.0 - 15.0 %    Platelet Count 165 150 - 450 10e9/L    Diff Method Automated Method     % Neutrophils 66.4 %    % Lymphocytes 20.3 %    % Monocytes 9.6 %    % Eosinophils 3.3 %    % Basophils 0.3 %    % Immature Granulocytes 0.1 %    Nucleated RBCs 1 (H) 0 /100    Absolute Neutrophil 4.5 1.6 - 8.3 10e9/L     Absolute Lymphocytes 1.4 0.8 - 5.3 10e9/L    Absolute Monocytes 0.6 0.0 - 1.3 10e9/L    Absolute Eosinophils 0.2 0.0 - 0.7 10e9/L    Absolute Basophils 0.0 0.0 - 0.2 10e9/L    Abs Immature Granulocytes 0.0 0 - 0.4 10e9/L    Absolute Nucleated RBC 0.1    Comprehensive metabolic panel   Result Value Ref Range    Sodium 140 133 - 144 mmol/L    Potassium 4.2 3.4 - 5.3 mmol/L    Chloride 104 94 - 109 mmol/L    Carbon Dioxide 29 20 - 32 mmol/L    Anion Gap 7 3 - 14 mmol/L    Glucose 190 (H) 70 - 99 mg/dL    Urea Nitrogen 27 7 - 30 mg/dL    Creatinine 2.06 (H) 0.66 - 1.25 mg/dL    GFR Estimate 31 (L) >60 mL/min/1.7m2    GFR Estimate If Black 38 (L) >60 mL/min/1.7m2    Calcium 9.2 8.5 - 10.1 mg/dL    Bilirubin Total 0.2 0.2 - 1.3 mg/dL    Albumin 2.8 (L) 3.4 - 5.0 g/dL    Protein Total 6.9 6.8 - 8.8 g/dL    Alkaline Phosphatase 79 40 - 150 U/L    ALT 32 0 - 70 U/L    AST 22 0 - 45 U/L   UA with Microscopic reflex to Culture   Result Value Ref Range    Color Urine Yellow     Appearance Urine Slightly Cloudy     Glucose Urine 70 (A) NEG mg/dL    Bilirubin Urine Negative NEG    Ketones Urine Negative NEG mg/dL    Specific Gravity Urine 1.012 1.003 - 1.035    Blood Urine Trace (A) NEG    pH Urine 6.5 5.0 - 7.0 pH    Protein Albumin Urine 30 (A) NEG mg/dL    Urobilinogen mg/dL Normal 0.0 - 2.0 mg/dL    Nitrite Urine Negative NEG    Leukocyte Esterase Urine Large (A) NEG    Source Catheterized Urine     WBC Urine Pending 0 - 2 /HPF    RBC Urine Pending 0 - 2 /HPF     *Note: Due to a large number of results and/or encounters for the requested time period, some results have not been displayed. A complete set of results can be found in Results Review.            Labs Ordered and Resulted from Time of ED Arrival Up to the Time of Departure from the ED   CBC WITH PLATELETS DIFFERENTIAL - Abnormal; Notable for the following:        Result Value    RBC Count 4.00 (*)     Hemoglobin 11.3 (*)     Hematocrit 37.4 (*)     MCHC 30.2 (*)      RDW 15.2 (*)     Nucleated RBCs 1 (*)     All other components within normal limits   COMPREHENSIVE METABOLIC PANEL - Abnormal; Notable for the following:     Glucose 190 (*)     Creatinine 2.06 (*)     GFR Estimate 31 (*)     GFR Estimate If Black 38 (*)     Albumin 2.8 (*)     All other components within normal limits   ROUTINE UA WITH MICROSCOPIC REFLEX TO CULTURE - Abnormal; Notable for the following:     Glucose Urine 70 (*)     Blood Urine Trace (*)     Protein Albumin Urine 30 (*)     Leukocyte Esterase Urine Large (*)     All other components within normal limits   STRAIGHT CATH FOR URINE       Assessments & Plan (with Medical Decision Making)   76-year-old male with history of vascular dementia as well as previous stroke and frequent falls who presents following a fall with left leg pain.  History was difficult to obtain from patient secondary to underlying medical condition dementia.  Exam revealed inability to fully answer questions, disorientation as well as pain in the left hip and pain and swelling in the left ankle.  Workup included laboratories including CBC and comprehensive metabolic panel revealing mild anemia, elevated glucose in the setting of diabetes, elevated creatinine in the setting of known kidney disease.  Head CT was obtained as it was unsure if the client had struck his head and he could not contribute to history.  There was an increase in size of the lateral and 3rd ventricles as well as stable chronic infarcts of the right posterior middle cerebral artery.  The case was discussed at length with trauma surgery who will admit the patient for further evaluation and management.  Case was also discussed with orthopedics who requested that a splint be placed in his left leg which was performed as noted above.  I have reviewed the nursing notes.    I have reviewed the findings, diagnosis, plan and need for follow up with the patient.    New Prescriptions    No medications on file       Final  diagnoses:   Fall, initial encounter   Closed left ankle fracture, initial encounter   Vascular dementia with behavior disturbance     ILeonor, am serving as a trained medical scribe to document services personally performed by Kee Adan MD, based on the provider's statements to me.   Kee JAMES MD, was physically present and have reviewed and verified the accuracy of this note documented by Leonor Burr.     2/19/2017   [facility], [place], EMERGENCY DEPARTMENT     Jerald Adan MD  02/19/17 [id number]

## 2017-02-19 NOTE — IP AVS SNAPSHOT
[address] [address]    Phone:  [phone]                                       After Visit Summary   2/19/2017    Melvin Bhatia    MRN: [id number]           After Visit Summary Signature Page     I have received my discharge instructions, and my questions have been answered. I have discussed any challenges I see with this plan with the nurse or doctor.    ..........................................................................................................................................  Patient/Patient Representative Signature      ..........................................................................................................................................  Patient Representative Print Name and Relationship to Patient    ..................................................               ................................................  Date                                            Time    ..........................................................................................................................................  Reviewed by Signature/Title    ...................................................              ..............................................  Date                                                            Time

## 2017-02-19 NOTE — CONSULTS
[facility] Orthopedic Consultation    Melvin Bhatia MRN# [id number]   Age: 76 year old YOB: 1940   Date of Admission:  2/19/2017    Reason for consult: Left ankle fracture   Requesting physician: Daniel López,*   Level of consult: Consult, follow and place orders          Impression and Recommendation:   Impression:  Melvin Bhatia is an otherwise healthy 76 year old male with complex past medical history and previous left tri-mal ankle fracture in 2013 treated non op who presents with new left bimalleolar ankle fracture.     Recomendations:  Operative Plan: None    Given patients complex medical history and minimally displaced fracture pattern, orthopedics recommends non operative management with cast immobilization. Given diabetes, he will require prolonged casting for slow bone healing and loss of protective sensation; likely ~8 weeks casting and NWB.     Activity: NWB LLE.  Splint: LLE splint applied by ED on 02/19/17. This should be continued until clinic follow up in 1-2 weeks. Likely transition into cast at that time. Given DM, likely 8 weeks of cast immobilization will be required.  Antibiotics: none  Labs: Eval for osteoporosis  Diet: ok to eat  Pain: per primary  DVT ppx:  Would recommend starting on DVT ppx in setting of complex medical conditions and new ankle fracture. ASA 162mg while immobilized is sufficient.   Imaging: No further imaging need at this time. Repeat ankle films at 1-2 weeks  PT/OT: Mobility, gait training, ADLs  Consults: none  Dispo: Per primary team. OK to d/c once safe to self care  Follow up: 1-2 weeks with Ortho non-op clinic.         Chief Complaint:   Left ankle pain         History of Present Illness:   This patient is a 76 year old male with complex medical history including DM2, multiple CVAs, dementia, CKD, COPD, A-fib, CAD, and CHF who presents s/p fall while transferring from bed to wheelchair today. During the fall, he  twisted his ankle and noted immediate pain and disability. No other injuries reported. He was brought to [facility] by EMS and work up revealed new bimalleolar ankle fracture on left in setting of prior trimalleolar ankle fracture sustained in 2013.    At baseline, the patient reports being wheelchair bound. Lives at home with wife.     History obtained from patient interview and chart review.        Past Medical History:     Past Medical History   Diagnosis Date     ARDS (adult respiratory distress syndrome) (H)      Atrial fibrillation (H)      first diagnosed 11-08     Bell's palsy 1/2010     Cauda equina syndrome with neurogenic bladder (H)      self catheter     Cerebellar infarct (H) 4/2012     left cerebellar infarct.  felt embolic hx afib     CHF (congestive heart failure) (H)      Chronic airway obstruction, not elsewhere classified      Chronic infection      VRE     CKD (chronic kidney disease) stage 4, GFR 15-29 ml/min (H)      Congestive heart failure, unspecified 5/06     EF 48%     Coronary artery disease      abnl mps with ef 46% and small-med area of ischemia '08 [facility]     CVA (cerebral infarction)      old lacunar infarct in the posterior left     Diabetes      Essential hypertension, benign      Gastro-oesophageal reflux disease      Heart attack (H)      Hemorrhage of gastrointestinal tract, unspecified 2/2003     UGI Bleed with transfusion     Hydrocephalus      plan  Shunt     Hypersomnia with sleep apnea, unspecified      Bpap     Intraventricular hemorrhage 1/2012     Legionella pneumonia (H) 8/09     Lumbago      Lumbosacral plexopathy 10/09     L diabetic myotrophy     Mixed hyperlipidemia      Other osteoporosis 3/2006     Other specified anemias 2002     Nl colonoscopy/EGD; Nl iron studies 4/06     Other specified disorders of rotator cuff syndrome of shoulder and allied disorders      Other testicular hypofunction      on Androgel     Palpitations      HOLTER     Sleep apnea      uses cpap      Subarachnoid hemorrhage (H) 1/2012     Tibial plateau fracture 2/2011     left     Type II or unspecified type diabetes mellitus without mention of complication, not stated as uncontrolled      Unspecified disorder resulting from impaired renal function      Stage 3 Chronic Renal Dz             Past Surgical History:     Past Surgical History   Procedure Laterality Date     Surgical history of -   2001     R wrist surg fx/ steel plate     Surgical history of -   age 20     Inguinal hernia     C dexa, bone density, axial skel  3/2006     Osteoporosis     Cardiac nuc wilman stress test nl  5/06     No ischemia, EF 44 %     Hc colonoscopy thru stoma, diagnostic  2/2003     Normal, repeat 10 yr     Upper gi endoscopy  2/2003     esophageal ulcers, gastritis, duodenitis     Upper gi endoscopy  4/2003     erosive gastropathy from ASA     Surgical history of -   2003     Bladder stim surg, not sucessful     Tonsillectomy  child     Cardiac nuc wilman stress test nl  6/2008, 3/09     sm to mod fixed apical defect EF 46%     Hc doppler echo pulsed, complete       EF 50-55%     Surgical history of -        Lymphedema treatment of legs     Open reduction internal fixation tibial plateau  3/2011     left     Implant shunt ventriculoperitoneal  2/28/2012     Procedure:IMPLANT SHUNT VENTRICULOPERITONEAL; Ventriculoperitoneal Shunt Placement; Surgeon:ALESHA VELAZQUEZ; Location: OR             Social History:   Tobacco use: quit 1997  Alcohol use: denies  Elicit drug use: Patient denies  Living situation: Lives at home with wife          Family History:   No family history of anesthesia, bleeding or clotting complications.           Allergies:     Allergies   Allergen Reactions     Penicillins Hives     Albuterol Other (See Comments)     Albuterol Sulfate Other (See Comments)     SVT/tachycardia with albuterol     Atorvastatin Other (See Comments)     Description: Lipitor, Description: Lipitor     Atorvastatin Calcium   "    Cipro [Ciprofloxacin] Diarrhea     Ciprofloxacin Other (See Comments)     Description: Cipro, Description: Cipro     Coumadin      Intraventricular hemorhage, subarrachnoid hem, hydrocephalous due to falls    Per wife, pt is not allergic to this med     Simvastatin Other (See Comments)     Description: Zocor, Description: Zocor  C/o weak muscles and severe abdominal pain.     Warfarin Unknown and Other (See Comments)             Medications:   Medication reviewed with patient and in chart.  Anticoagulation: None  Antibiotics: None          Review of Systems:   CONSTITUTIONAL:  negative for  fevers, chills, sweats, fatigue, malaise and weight loss  HEENT:  negative for tinnitus, earaches, nasal congestion, epistaxis, sore throat  RESPIRATORY:  +COPD and SHIVAM symptoms  CARDIOVASCULAR:  negative for chest pain, palpitations, orthopnea, edema, syncope.  GASTROINTESTINAL:  negative for nausea, vomiting, diarrhea, constipation, abdominal pain.  GENITOURINARY:  negative for dysuria, nocturia, urinary incontinence and hematuria  INTEGUMENT/BREAST:  negative for rash and skin lesions  HEMATOLOGIC/LYMPHATIC:  negative for easy bruising, bleeding, swelling/edema  ALLERGIC/IMMUNOLOGIC:  negative for recurrent infections and drug reactions  ENDOCRINE: +DM  MUSCULOSKELETAL: see HPI  NEUROLOGICAL: peripheral neuropathy in feet          Physical Exam:     /65  Pulse 60  Temp 98.7  F (37.1  C) (Oral)  Resp 18  Ht 1.702 m (5' 7\")  Wt 88.5 kg (195 lb)  SpO2 99%  BMI 30.54 kg/m2  General: awake, alert, cooperative, no apparent distress, resting in bed  HEENT: normocephalic, atraumatic  Respiratory: breathing comfortably on mask face  Cardiovascular: peripheral pulses 2+ and symmetric, capillary refill < 2sec, skin wwp  Skin: no rashes or lesions  Neurological: A&Ox3, CN II-XII grossly intact  Musculoskeletal:  LLE: No gross deformity. Splint is in place from ED. Toes exposed and well perfused. +EHL/FHL. Notable " numbness/parethesia in the toes which is baseline and comparable to right side.          Imaging:   Review of left films from 2/19/2017 demonstrate minimally displaced bimalleolar ankle fracture with evidence of prior tri-mal ankle fracture. Significant OA at TT joint. Calcific arteries noted.          Laboratory date:   CBC:  Lab Results   Component Value Date    WBC 6.7 02/19/2017    HGB 11.3 (L) 02/19/2017     02/19/2017       BMP:  Lab Results   Component Value Date     02/19/2017    POTASSIUM 4.2 02/19/2017    CHLORIDE 104 02/19/2017    CO2 29 02/19/2017    BUN 27 02/19/2017    CR 2.06 (H) 02/19/2017    ANIONGAP 7 02/19/2017    LORENA 9.2 02/19/2017     (H) 02/19/2017       Leodan Ngo MD   Orthopedic Surgery Resident, PGY-4  [phone]    Attestation:  This patient will be discussed with Dr Rashid, orthopedics attending.

## 2017-02-20 NOTE — CONSULTS
[facility]  Neurology Consult  DOS:  February 20, 2017  Reason for Consult: Altered mental status     HPI  Melvin Bhatia (Bob) is a 76 year old male with pmh vascular dementia, hydrocephalus w/  shunt, DM, HTD, paroxysmal A fib, who presented to the ED 2/19 after a fall. Source of information is the patient's spouse, Dian, who is his sole caretaker, and review of the chart. Neurology is presently consulted for worsened mentation over the past couple weeks.    On 2/19 the patient suffered a mechanical fall from his wheelchair during a transfer. Pt did not hit his head or lose consciousness.  HCT in the ED was negative for acute intracranial pathology however pt was found to have a transverse fracture of the left medial malleolus. This has now been casted. The patient's wife has raised concerns about his ability to safely return home due to worsening mentation.    In January 2012 Fredrick had multiple falls while on coumadin for A fib, which led to the discovery of a subarachnoid hemorrhage February 2012. Coumadin was stopped. His course was complicated by confusion and hydrocephalus, for which he had a  shunt with a strata valve placed 2/28/2012. His setting was initially 1.5 postoperatively, and he had some early improvement noted early on. The shunt was further decreased to 1.0 by Dr. Reyes on 4/7/12, in hopes of improving his mentation further. He continued to be cognitively impaired postoperatively but he improved over the next year. Neuropsych saw him 3/13/12 and 8/22/12 and reported a marked improvement between the two assessments.    After stopping warfarin he developed several areas of infarct:    5/1/12 he presented to the ED with dizziness - found to have a new L cerebellar stroke.    5/15/14 he was seen in the ED for AMS and found to have new areas of hypodensity on CT in the R hemisphere, including the R frontal, parietal, and temporal lobes.    2/24/16 he was evaluated for confusion  "and CT revealed new or increased area of encephalomalacia in the R occipital lobe.    1/3/17 he underwent MRI/MRA which showed chronic R cerebral borderzone infarcts, and a new R ICA severe stenosis compared with prior vessel imaging in 2012.    According to Dian, Fredrick's mentation remained relatively stable for several years following his shunt placement. In the summer of 2016 he stopped doing things he used to do often such as reading the newspaper avidly, \"now he just stares at it\". He was seen neuropsych 7/22/16, who noted a significant decline in cognition since 2012. They felt his impairments were most consistent with vascular dementia, less likely Alzheimer's.     He was seen by Dr. Corey 12/15/2016, who started a trial of donepezil 5mg qhs. Pt was on the medication for 3-4 days but began having anger outbursts and mood changes. Dian spoke with Dr. Corey over the phone & the decision was made to stop the Donepezil. 2-3 days after stopping the medication, the outbursts subsided & pt returned to baseline.  Pt was seen by Dr. Corey for a follow up appointment on 02/02/2017 to go over results. He reported the pt's condition was stable and to follow up in 3 months. Since that appointment, over the last 2 weeks, Fredrick has been much more disorientated to situation/place, not knowing where he is, even while at home, requiring repeated reminders.    Neurosurgery saw the pt yesterday, and noted an increased size of his ventricles compared to a head CT from 1 year ago. His  shunt was interrogated yesterday and shown to be set incorrectly set at 2.0 and was readjusted to 1.5.     Pt also has chronic UTI, for which he takes bactrim, has not missed any doses. Pt's wife catheterizes him and has not noticed acute changes in his urine, but states that it has been cloudy. He has not had any fevers or change in oral intake recently.     10-point ROS non-contributory except as noted above.    Past Medical/Surgical History  Past " Medical History   Diagnosis Date     ARDS (adult respiratory distress syndrome) (H)      Atrial fibrillation (H)      first diagnosed 11-08     Bell's palsy 1/2010     Cauda equina syndrome with neurogenic bladder (H)      self catheter     Cerebellar infarct (H) 4/2012     left cerebellar infarct.  felt embolic hx afib     CHF (congestive heart failure) (H)      Chronic airway obstruction, not elsewhere classified      Chronic infection      VRE     CKD (chronic kidney disease) stage 4, GFR 15-29 ml/min (H)      Congestive heart failure, unspecified 5/06     EF 48%     Coronary artery disease      abnl mps with ef 46% and small-med area of ischemia '08 [facility]     CVA (cerebral infarction)      old lacunar infarct in the posterior left     Diabetes      Essential hypertension, benign      Gastro-oesophageal reflux disease      Heart attack (H)      Hemorrhage of gastrointestinal tract, unspecified 2/2003     UGI Bleed with transfusion     Hydrocephalus      plan  Shunt     Hypersomnia with sleep apnea, unspecified      Bpap     Intraventricular hemorrhage 1/2012     Legionella pneumonia (H) 8/09     Lumbago      Lumbosacral plexopathy 10/09     L diabetic myotrophy     Mixed hyperlipidemia      Other osteoporosis 3/2006     Other specified anemias 2002     Nl colonoscopy/EGD; Nl iron studies 4/06     Other specified disorders of rotator cuff syndrome of shoulder and allied disorders      Other testicular hypofunction      on Androgel     Palpitations      HOLTER     Sleep apnea      uses cpap     Subarachnoid hemorrhage (H) 1/2012     Tibial plateau fracture 2/2011     left     Type II or unspecified type diabetes mellitus without mention of complication, not stated as uncontrolled      Unspecified disorder resulting from impaired renal function      Stage 3 Chronic Renal Dz     Past Surgical History   Procedure Laterality Date     Surgical history of -   2001     R wrist surg fx/ steel plate     Surgical history  of -   age 20     Inguinal hernia     C dexa, bone density, axial skel  3/2006     Osteoporosis     Cardiac nuc wilman stress test nl  5/06     No ischemia, EF 44 %     Hc colonoscopy thru stoma, diagnostic  2/2003     Normal, repeat 10 yr     Upper gi endoscopy  2/2003     esophageal ulcers, gastritis, duodenitis     Upper gi endoscopy  4/2003     erosive gastropathy from ASA     Surgical history of -   2003     Bladder stim surg, not sucessful     Tonsillectomy  child     Cardiac nuc wilman stress test nl  6/2008, 3/09     sm to mod fixed apical defect EF 46%     Hc doppler echo pulsed, complete       EF 50-55%     Surgical history of -        Lymphedema treatment of legs     Open reduction internal fixation tibial plateau  3/2011     left     Implant shunt ventriculoperitoneal  2/28/2012     Procedure:IMPLANT SHUNT VENTRICULOPERITONEAL; Ventriculoperitoneal Shunt Placement; Surgeon:ALESHA VELAZQUEZ; Location:[facility] OR     Current Facility-Administered Medications   Medication     cefdinir (OMNICEF) capsule 300 mg     bumetanide (BUMEX) tablet 0.5 mg     fluticasone-vilanterol (BREO ELLIPTA) 100-25 MCG/INH oral inhaler 1 puff     calcium citrate-vitamin D (CITRACAL) 315-250 MG-UNIT per tablet 2 tablet     cyanocolbalamin (vitamin  B-12) tablet 500 mcg     ferrous sulfate (IRON) tablet 325 mg     fexofenadine (ALLEGRA) tablet 180 mg     fluticasone (FLONASE) 50 MCG/ACT spray 2 spray     insulin glargine (LANTUS) injection 30 Units     umeclidinium-vilanterol (ANORO ELLIPTA) 62.5-25 MCG/INH oral inhaler 1 puff     magnesium oxide (MAG-OX) tablet 800 mg     metoprolol (TOPROL-XL) 24 hr tablet 50 mg     multivitamin, therapeutic with minerals (THERA-VIT-M) tablet 1 tablet     niacin (NIASPAN) CR tablet 500 mg     ranitidine (ZANTAC) tablet 150 mg     rosuvastatin (CRESTOR) tablet 20 mg     cholecalciferol (vitamin D) tablet 2,000 Units     glucose 40 % gel 15-30 g    Or     dextrose 50 % injection 25-50 mL    Or      glucagon injection 1 mg     insulin aspart (NovoLOG) inj (RAPID ACTING)     insulin aspart (NovoLOG) inj (RAPID ACTING)     insulin aspart (NovoLOG) inj (RAPID ACTING)     insulin aspart (NovoLOG) inj (RAPID ACTING)     insulin aspart (NovoLOG) inj (RAPID ACTING)     insulin aspart (NovoLOG) inj (RAPID ACTING)     naloxone (NARCAN) injection 0.1-0.4 mg     lidocaine 1 % 1 mL     lidocaine (LMX4) kit     sodium chloride (PF) 0.9% PF flush 3 mL     sodium chloride (PF) 0.9% PF flush 3 mL     heparin sodium PF injection 5,000 Units     potassium chloride SA (K-DUR/KLOR-CON M) CR tablet 20-40 mEq     potassium chloride (KLOR-CON) Packet 20-40 mEq     potassium chloride 10 mEq in 100 mL intermittent infusion     potassium chloride 10 mEq in 100 mL intermittent infusion with 10 mg lidocaine     potassium chloride 20 mEq in 50 mL intermittent infusion     magnesium sulfate 4 g in 100 mL sterile water (premade)     acetaminophen (TYLENOL) tablet 975 mg     lidocaine (LIDODERM) 5 % Patch 1-2 patch    And     lidocaine (LIDODERM) patch REMOVAL    And     lidocaine (LIDODERM) Patch in Place     HYDROmorphone (DILAUDID) injection 0.2 mg     senna-docusate (SENOKOT-S;PERICOLACE) 8.6-50 MG per tablet 1-2 tablet     polyethylene glycol (MIRALAX/GLYCOLAX) Packet 17 g     ondansetron (ZOFRAN-ODT) ODT tab 4 mg    Or     ondansetron (ZOFRAN) injection 4 mg     Allergies   Allergen Reactions     Penicillins Hives     Albuterol Other (See Comments)     Albuterol Sulfate Other (See Comments)     SVT/tachycardia with albuterol     Atorvastatin Other (See Comments)     Description: Lipitor, Description: Lipitor     Atorvastatin Calcium      Cipro [Ciprofloxacin] Diarrhea     Ciprofloxacin Other (See Comments)     Description: Cipro, Description: Cipro     Coumadin      Intraventricular hemorhage, subarrachnoid hem, hydrocephalous due to falls    Per wife, pt is not allergic to this med     Simvastatin Other (See Comments)     Description:  "Zocor, Description: Zocor  C/o weak muscles and severe abdominal pain.     Warfarin Unknown and Other (See Comments)       Family History  Family History   Problem Relation Age of Onset     C.A.D. Father      DIABETES Maternal Grandmother        Social History  Social History   Substance Use Topics     Smoking status: Former Smoker     Quit date: 1997     Smokeless tobacco: Never Used      Comment: quit        2-3 ppd for 45 yrs      Alcohol use No      Comment: recovering since        Pt lives with his wife in [place].       OBJECTIVE  /67 (BP Location: Right arm)  Pulse 63  Temp 96.4  F (35.8  C) (Oral)  Resp 16  Ht 1.702 m (5' 7\")  Wt 88.5 kg (195 lb)  SpO2 95%  BMI 30.54 kg/m2  GEN Cooperative. NAD.  HEENT Sclerae anicteric, MMM  CVS Peripheral Pulse palpable  PULM No respiratory distress  ABD NT. No masses  MSK Cast over the L leg.  PSYC Affect pleasant  NEURO   MS Pt is alert. Knows name, spouse's name, his own . Says the date is 1956. Believes we are in a hotel. States that the president is [employer], then later says Johanna. Says that \"Iker\" was president before [employer]. Unable to say what happened on 2001. Says there are 8 quarters in $1.75. Recalls 0/3 objects at 1 minute.   CN Visual fields intact. PERRL. EOMI. Facial movements symmetric. Hearing intact to conversation.   STR No involuntary movements observed. Paratonias of the arms.   4+ R elbow flex/ext, wrist ext. 4 L elbow flex/ext, wrist ext. Lifts the R leg antigravity and provides 4/5 hip flexion. L leg not tested d/t cast.   SNS Symmetric to light touch in the face & arms. Intact in the R leg.    RFLX 2+ in the right side biceps, brach, knee, ankle. R plantar up. 3+ L biceps, 4+ L brach. Left sided lower extremity reflexes deferred 2/2 leg in cast. Positive palmomental on the L. Positive finger flexors on the L. Grasp reflex bilaterally, L>R. No jaw jerk. No snouting, rooting, or meyerson's.   CRD FN intact. No " tremor.    GAIT Non-ambulatory    All labs and imaging reviewed.      IMPRESSION  Patient with multi-infarct dementia, and NPH s/p  shunt, with progressive decline over the last year, with acute worsening in the last 2 weeks. Differential includes worsening dementia/strokes, worsening NPH, toxic metabolic encephalopathy, subclinical seizures, or some combination of the above.  --CT scan shows enlarged ventricles compared with 2016 CT - may represent ex vacuo vs worsening NPH. Appears patient had greater cognition while at a VPS setting of 1.0 (corresponding to drainage pressure of 40mmHg), than he did at 1.5 (80mmHg). Seen by neurosurgery who noted his VPS is inadvertently set to 2.0 (110mmHg). VPS setting may have changed following his MRI on 1/3/17. This correlates temporally with his worsened mentation over the last month, and may explain the changes we are now seeing. Could consider pursuing high volume tap to confirm this, though it appears the patient's response to this may be delayed, based on prior experience.  --Increasing areas of infarcts noted on HCT and new R ICA stenosis noted on recent MRA. Discussed with stroke team - will plan on doing repeat MRA with contrast, as prior MRA was poor quality. Warfarin would be indicated, given history of A fib - benefit likely outweighs the risks. Will discuss further with family tomorrow.  --UA is quite dirty, though this may represent a colonization, rather than infection. Will continue chronic bactrim for now, and hold on escalating abx unless urine culture concerning for acute infection.    PLAN  1. We will gladly accept the patient on our service  2. Will discuss with neurosurgery the possibility of changing VPS back to 1.0  3. Continue aspirin for now. Consider switching to warfarin tomorrow  4. MRA head and neck, with contrast  5. Social work consult for TCU on discharge    Patient seen and discussed with medical student, Tangela Lacy, MS3, who assisted in  writing this note. I have updated the findings and assessment to reflect our joint plan.  Patient discussed with attending neurologist, Dr. Vázquez, who agrees with the plan.  Romario Finley MD. Neurology PGY-4. Team Pager: [phone]        ATTESTATION:  I personally saw and examined Melvin with the neurology inpatient/ consult team. I edited the above note as needed and I agree with Dr. Finley's examination, assessment, and plan.     Melvin remains confused, but per wife at bedside, this has been his baseline for the past weeks-months. Discussed case with stroke team who is in agreement for anticoagulation given vascular-associated dementia in the setting of atrial fibrillation and a likely ICA dissection. Repeating MRA today. Will discuss anticoagulation, MRA results, and need to reprogram shunt with neurosurgery team. UA concerning for infection, awaiting final culture results. PT/ OT, social work are all assisting with placement. Will need neurology and neurosurgery follow-up as outpatient.     I spent over 25 minutes on the unit today personally evaluating and managing the care of this patient. Over 50% of this time was spent counseling the patient and any present family members on changes in his neurologic-related status, discussing any new testing/ imaging results, and coordinating further work-up.  Karen Vázquez MD  Neuro-oncology  02/21/17

## 2017-02-20 NOTE — PROGRESS NOTES
[facility], [place]    Trauma Service Tertiary Survey     Date of Service: 02/20/2017    Trauma mechanism:Fall from wheelchair  Time/date of injury:2/19; am   Known Injuries:  Fracture of left medial malleolus   Widening of the medial tibiotalar joint space suggestive of ligamentous injury  Other diagnoses:   Dementia  Idiopathic normal pressure hydrocephalus  CVAs  DMII  CKD Stage 3  COPD  Paroxysmal A-Fib  CAD  Anemia of chronic renal disease  GERD  SHIVAM  Systolic and Diastolic CHF\  Neurogenic bladder requiring straight cath to void    Procedure:     Plan:  Tertiary exam performed 2/20 without evidence of additional injury. Patient denies any pain.    Orthopedic consult: Fracture of left medial malleolus and possible ligamentous injury. Being splinted in ED by ED MD. Per ortho: casting and non-weight bearing for likely 8 weeks.  Follow up in clinic in 1-2 weeks, likely transition into cast at that time.  Anticoagulation with SQ heparin while inpatient.  162 asa PO daily at discharge.   Neurology consult: Encephalopathy which has become acutely worse in last 7-10 days according to patient's wife. Mr. Bhatia is a patient of Dr. Corey of neurology and is followed by Dr. Neri regarding his known subdural hygromas. Head CT on admission with no acute pathology, but shows increased size of lateral and third ventricles since 1/3/2017. Stable chronic infarcts noted involving right MCA, left PICA and right hemispheric external border zones. Neurology to see for ongoing management regarding worsening mental status. Neurology apparently reviewed chart and requested NSG consult  Neurosurgery: Shunt valve was adjusted by NSG.  Request patient follow-up in clinic in 4 weeks with Dr. Neri with head CT prior to visit.    DMII: patient follows closely with [facility] ED Oma Gagnon for BG management. Current management includes lantus 37 units BID, Humalog 30 units with breakfast and 10 units with  dinner, and Byetta 5mcg, twice daily, 60 minutes before breakfast and lunch. While inpatient BG checks TID with meals and at bedtime and 0200, medium SS insulin, 1unit/15g carb coverage with meals and snacks, and diabetes management consult. Lantus dosing 30 units twice daily until needs are better established; hold Byetta.   COPD: continue PTA inhalers Pulse ox spot checks. Supplemental oxygen PRN to keep Spo2>90.   CAD/paroxysmal A-Fib/CHF: continue PTA medications. Patient follows with Dr. Bui, most recent visit was in January, 2017. Patient recommended to follow up in one year. No acute issues at admission  CKD Stage 3: Creatinine 2.06 on admission. Baseline creatinine 1.6-2.0. Avoid nephrotoxic agents, avoid dehydration, continue PTA Bumex, monitor BMP daily. Continue PTA vitamin supplementation.   Chronic UTI: continue PTA Bactrim   Anemia of chronic kidney disease: Hbg on admission 11.3. Continue PTA iron supplementation and vitamin supplementation   Neurogenic bladder: patient requires straight cath to void. Able to make needs known. Will order straight cath QID and PRN  PT/OT  SW for discharge planning. According to patient's wife, his memory has declined significantly very recently and she is concerned she is not able to continue caring for him at home with the resources he is currently requiring.   Palliative care consult to assist with symptom management given complex medical history and recent decline in mental status   Internal medicine consult given complex medical history      Code status: DNR/DNI confirmed with patient and spouse.      General Cares:  GI Prophylaxis: Continue PTA Ranitidine   DVT Prophylaxis: SQ heparin while inpatient.   Date of last stool/Bowel Regimen: 2/20 /senna/miralax PRN  Pulmonary toilet:IS/continue PTA medications.      ETOH: This patient was asked if in the last 3-6 months there has been a time when he had 5 or more drinks in a single day/outing.. Patient answer to the  "screening question was in the negative. No intervention needed.  SUBJECTIVE: patient states he feels fine.  No complaints.  Denies pain, SOB, any recent changes in health.  Given baseline dementia, unable to perform further review of systems.  Wife not present during exam.   Review of Systems   Unable to perform ROS: Dementia       OBJECTIVE:  Blood pressure 143/68, pulse 68, temperature 97.4  F (36.3  C), temperature source Axillary, resp. rate 16, height 1.702 m (5' 7\"), weight 88.5 kg (195 lb), SpO2 97 %.  Physical Exam   Constitutional: He appears well-developed and well-nourished. No distress.   HENT:   Head: Normocephalic and atraumatic.   Right Ear: External ear normal.   Left Ear: External ear normal.   Nose: Nose normal.   Mouth/Throat: Oropharynx is clear and moist. No oropharyngeal exudate.   Eyes: Conjunctivae and EOM are normal. Pupils are equal, round, and reactive to light. Right eye exhibits no discharge. Left eye exhibits no discharge. No scleral icterus.   Neck: Normal range of motion. Neck supple. No JVD present. No tracheal deviation present.   Cardiovascular: Normal rate and regular rhythm.    Distant heart tones, unable to palpate pedal pulses on left foot due to splinting.  Doppler on right.     Pulmonary/Chest: Effort normal. No stridor. No respiratory distress. He has no wheezes. He has no rales. He exhibits no tenderness.   Diminished breath sounds throughout.  Decreased air exchange   Abdominal: Soft. Bowel sounds are normal. He exhibits distension. There is no tenderness. There is no rebound and no guarding.   Musculoskeletal: He exhibits edema.   Splint covering LLE from foot to knee.  ROM limited due to splint.  Normal ROM in upper extremities.  Normal strength in upper extremities.  Strength 4/5 in BLE.  BLE 1+ edema     Neurological: He is alert. No cranial nerve deficit.   Oriented to self only.  Increasing confusion/declining mental status in past two months. Numbness in BLEs and in " fingers at baseline 2/2 peripheral neuropathy.  Weakness in BLE and wheelchair bound at baseline. Neurogenic bladder requiring straight cath to void.    Skin: Skin is warm and dry. No rash noted. He is not diaphoretic. There is erythema. No pallor.   Skin changes in BLEs 2/2 venous insufficiency    Psychiatric: He has a normal mood and affect. His behavior is normal.   Dementia at baseline. Patient is calm and cooperative        ROUTINE LABS: (Last four results)  CMP  Recent Labs  Lab 02/19/17  1048      POTASSIUM 4.2   CHLORIDE 104   CO2 29   ANIONGAP 7   *   BUN 27   CR 2.06*   GFRESTIMATED 31*   GFRESTBLACK 38*   LORENA 9.2   PROTTOTAL 6.9   ALBUMIN 2.8*   BILITOTAL 0.2   ALKPHOS 79   AST 22   ALT 32     CBC  Recent Labs  Lab 02/19/17  1906 02/19/17  1048   WBC  --  6.7   RBC  --  4.00*   HGB  --  11.3*   HCT  --  37.4*   MCV  --  94   MCH  --  28.3   MCHC  --  30.2*   RDW  --  15.2*    165     INRNo lab results found in last 7 days.  Arterial Blood GasNo lab results found in last 7 days.    RADIOLOGY:  Recent Results (from the past 24 hour(s))   XR Ankle Left G/E 3 Views    Narrative    XR TIBIA & FIBULA LT 2 VW, XR ANKLE LT G/E 3 VW, XR FOOT LT G/E  3 VW, XR HIP LEFT 2-3 VIEWS   2/19/2017 11:59 AM      HISTORY: Pain, trauma    COMPARISON: CT of the left knee 22 February, 2011; left foot  radiographs 8 December, 2016; left foot MRI 6 October, 2016; left  ankle radiographs 26 June, 2013    FINDINGS:   No femur fracture. The distal half of the femur is not included on  these radiographs.    Plate and screw fixation of prior posterior lateral tibial plateau  fracture. No signs of hardware failure. No knee effusion.     Chronic bimalleolar fracture. Superimposed transverse fracture of the  medial malleolus with some comminution; there is greatest continuity  in the medial cortical surface. The medial tibiotalar joint space is  also widened up to 5 mm compared to 10 mm in the lateral aspect of  the  joint space. Vascular calcifications.    Demineralization throughout the bones of the foot. Mildly displaced  chronic intra-articular fracture through the first metatarsal head,  base of the third metatarsal, fourth and fifth metatarsal necks    Diffuse osteopenia and fatty muscular atrophy.      Impression    IMPRESSION:   1. Acute transverse fracture left medial malleolus. Chronic widening  of the medial tibiotalar joint space suggestive of ligamentous injury.  Changes are superimposed on chronic bimalleolar fracture; additional  fractures may be obscured.     2. Numerous chronic metatarsal and phalangeal fractures. Most  angulated fracture is of the 1st metatarsal head.     3. Stable appearance of internal fixation of left lateral tibial  plateau fracture.    4. No femur fracture.    5. Diffuse osteopenia and muscular atrophy. Vascular calcifications.    Findings discussed with Dr. Adan by Dr. Baum at 1215 hours on 19 February 2017.    I have personally reviewed the examination and initial interpretation  and I agree with the findings.    KEVEN MADRID MD   Tib/Fib XR, left    Narrative    XR TIBIA & FIBULA LT 2 VW, XR ANKLE LT G/E 3 VW, XR FOOT LT G/E  3 VW, XR HIP LEFT 2-3 VIEWS   2/19/2017 11:59 AM      HISTORY: Pain, trauma    COMPARISON: CT of the left knee 22 February, 2011; left foot  radiographs 8 December, 2016; left foot MRI 6 October, 2016; left  ankle radiographs 26 June, 2013    FINDINGS:   No femur fracture. The distal half of the femur is not included on  these radiographs.    Plate and screw fixation of prior posterior lateral tibial plateau  fracture. No signs of hardware failure. No knee effusion.     Chronic bimalleolar fracture. Superimposed transverse fracture of the  medial malleolus with some comminution; there is greatest continuity  in the medial cortical surface. The medial tibiotalar joint space is  also widened up to 5 mm compared to 10 mm in the lateral aspect of the  joint  space. Vascular calcifications.    Demineralization throughout the bones of the foot. Mildly displaced  chronic intra-articular fracture through the first metatarsal head,  base of the third metatarsal, fourth and fifth metatarsal necks    Diffuse osteopenia and fatty muscular atrophy.      Impression    IMPRESSION:   1. Acute transverse fracture left medial malleolus. Chronic widening  of the medial tibiotalar joint space suggestive of ligamentous injury.  Changes are superimposed on chronic bimalleolar fracture; additional  fractures may be obscured.     2. Numerous chronic metatarsal and phalangeal fractures. Most  angulated fracture is of the 1st metatarsal head.     3. Stable appearance of internal fixation of left lateral tibial  plateau fracture.    4. No femur fracture.    5. Diffuse osteopenia and muscular atrophy. Vascular calcifications.    Findings discussed with Dr. Adan by Dr. Baum at 1215 hours on 19 February 2017.    I have personally reviewed the examination and initial interpretation  and I agree with the findings.    KEVEN MADRID MD   Foot XR, G/E 3 views, left    Narrative    XR TIBIA & FIBULA LT 2 VW, XR ANKLE LT G/E 3 VW, XR FOOT LT G/E  3 VW, XR HIP LEFT 2-3 VIEWS   2/19/2017 11:59 AM      HISTORY: Pain, trauma    COMPARISON: CT of the left knee 22 February, 2011; left foot  radiographs 8 December, 2016; left foot MRI 6 October, 2016; left  ankle radiographs 26 June, 2013    FINDINGS:   No femur fracture. The distal half of the femur is not included on  these radiographs.    Plate and screw fixation of prior posterior lateral tibial plateau  fracture. No signs of hardware failure. No knee effusion.     Chronic bimalleolar fracture. Superimposed transverse fracture of the  medial malleolus with some comminution; there is greatest continuity  in the medial cortical surface. The medial tibiotalar joint space is  also widened up to 5 mm compared to 10 mm in the lateral aspect of the  joint  space. Vascular calcifications.    Demineralization throughout the bones of the foot. Mildly displaced  chronic intra-articular fracture through the first metatarsal head,  base of the third metatarsal, fourth and fifth metatarsal necks    Diffuse osteopenia and fatty muscular atrophy.      Impression    IMPRESSION:   1. Acute transverse fracture left medial malleolus. Chronic widening  of the medial tibiotalar joint space suggestive of ligamentous injury.  Changes are superimposed on chronic bimalleolar fracture; additional  fractures may be obscured.     2. Numerous chronic metatarsal and phalangeal fractures. Most  angulated fracture is of the 1st metatarsal head.     3. Stable appearance of internal fixation of left lateral tibial  plateau fracture.    4. No femur fracture.    5. Diffuse osteopenia and muscular atrophy. Vascular calcifications.    Findings discussed with Dr. Adan by Dr. Baum at 1215 hours on 19 February 2017.    I have personally reviewed the examination and initial interpretation  and I agree with the findings.    KEVEN MADRID MD   Hip XR, 2+ views, left    Narrative    XR TIBIA & FIBULA LT 2 VW, XR ANKLE LT G/E 3 VW, XR FOOT LT G/E  3 VW, XR HIP LEFT 2-3 VIEWS   2/19/2017 11:59 AM      HISTORY: Pain, trauma    COMPARISON: CT of the left knee 22 February, 2011; left foot  radiographs 8 December, 2016; left foot MRI 6 October, 2016; left  ankle radiographs 26 June, 2013    FINDINGS:   No femur fracture. The distal half of the femur is not included on  these radiographs.    Plate and screw fixation of prior posterior lateral tibial plateau  fracture. No signs of hardware failure. No knee effusion.     Chronic bimalleolar fracture. Superimposed transverse fracture of the  medial malleolus with some comminution; there is greatest continuity  in the medial cortical surface. The medial tibiotalar joint space is  also widened up to 5 mm compared to 10 mm in the lateral aspect of the  joint space.  Vascular calcifications.    Demineralization throughout the bones of the foot. Mildly displaced  chronic intra-articular fracture through the first metatarsal head,  base of the third metatarsal, fourth and fifth metatarsal necks    Diffuse osteopenia and fatty muscular atrophy.      Impression    IMPRESSION:   1. Acute transverse fracture left medial malleolus. Chronic widening  of the medial tibiotalar joint space suggestive of ligamentous injury.  Changes are superimposed on chronic bimalleolar fracture; additional  fractures may be obscured.     2. Numerous chronic metatarsal and phalangeal fractures. Most  angulated fracture is of the 1st metatarsal head.     3. Stable appearance of internal fixation of left lateral tibial  plateau fracture.    4. No femur fracture.    5. Diffuse osteopenia and muscular atrophy. Vascular calcifications.    Findings discussed with Dr. Adan by Dr. Baum at 1215 hours on 19 February 2017.    I have personally reviewed the examination and initial interpretation  and I agree with the findings.    KEVEN MADRID MD   Head CT w/o contrast    Narrative    CT HEAD W/O CONTRAST 2/19/2017 1:01 PM    Provided History: Fall, trauma    Comparison: MRI of the brain 3 January, 2017, CT of the head 24 February 2016.    Technique: Using multidetector thin collimation helical acquisition  technique, axial, coronal and sagittal CT images from the skull base  to the vertex were obtained without intravenous contrast.     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 Stable chronic  left PICA territory infarct. No new loss of gray-white matter  differentiation. Mild leukoaraiosis.    Right frontal approach ventriculoperitoneal shunt catheter courses  through the body of the left lateral ventricle with tip in the left  thalamus. Increase in size of the lateral and third ventricles since  1/3/2017, possibly  related to decreased effacement from resolution of  the previously seen bilateral subdural hygromas.    No intracranial hemorrhage, mass effect, midline shift or abnormal  extra axial fluid collection.    The visualized paranasal sinuses are clear. The mastoid air cells are  clear. Bilateral pseudophakia. Calvarium is intact.       Impression    Impression:   1. No evidence of acute traumatic injury.  2. Increase in size of the lateral and third ventricles, possibly  related to decreased effacement associated with resolution of  previously demonstrated bilateral hemispheric subdural hygromas since  1/3/2017. Stable positioning of right frontal ventriculostomy  catheter.  3. Stable chronic infarcts involving the posterior right MCA  territory, right hemispheric external border zones, and left PICA  territory.    I have personally reviewed the examination and initial interpretation  and I agree with the findings.    MD Laurel GE APRN CNP  Administratively Closed by [facility] 2-21-24 JORGE

## 2017-02-20 NOTE — CONSULTS
"    INTERNAL MEDICINE CONSULT   Melvin Bhatia ([id number]) admitted on 2/19/2017  Primary care provider: Ash Bowman         Chief Complaint:     Acute on chronic AMS, FTT         History of Present Illness     Melvin Bhatia is a 76 year old male with a PMHx of dementia, multiple CVAs, DMII, COPD, pAfib (not on warfarin 2/2 to falls), HTN, CKD stage IV and NPH s/p  shunt placement (2/28/2012) who presents after a mechanical fall while transferring to his wheelchair found to have an acute transverse fracture of the left medial malleolus. Internal medicine consulted given medical complexity and acute on chronic AMS.     The patient is able to provide limited history given underlying dementia. In speaking with the pt's wife, it appears he fell 2/19 while transferring from bed to his wheel chair. The fall occurred while making a \"half turn to sit down.\" His wife denies any LOC and notes he did not hit his head. His mental status was at baseline both before and after the fall. She states he did note complain of dizziness however \"he is not a complainer.\" She notes that he has been in a wheelchair almost exclusively since suffering a similar fall with lower leg injury in Sept '16. She did not note any slurred speech or focal weakness that she could observe.     She additionally notes a decline in his cognition since Feb 1st 2017. She notes that he is confused as to where they live, often asking how long they have been living in [place] (they currently reside in [facility]). 6 months ago he would be confused ~1-2 timers per day however in the past month has been confused almost all the time. She notes no bladder incontinence. She notes no fevers, new or changing cough and no diarrhea. He does not drink Etoh. He was started on Donepzil in Dec '16 however stopped 3-4 days after starting due to increasing agitation.          Past Medical History       Patient Active Problem List   Diagnosis     " Osteoporosis     PAF (paroxysmal atrial fibrillation) (H)     Lumbosacral plexopathy     Testosterone deficiency     Hypertension goal BP (blood pressure) < 140/90     Atrial fibrillation (H)     CKD (chronic kidney disease) stage 3, GFR 30-59 ml/min     Hyperlipidemia LDL goal <70     Coronary artery disease     Health Care Home     ACP (advance care planning)     Elevated PSA     Anemia in chronic renal disease     Subarachnoid hemorrhage (H)     Idiopathic normal pressure hydrocephalus (INPH)     Urinary bladder neurogenic dysfunction     Stroke (H)     Cognitive deficit, post-stroke     HL (hearing loss)     Personal history of fall     GERD (gastroesophageal reflux disease)     COPD exacerbation (H)     SHIVAM (obstructive sleep apnea)     LVH (left ventricular hypertrophy)     Diastolic CHF, acute on chronic (H)     Hypoxemia     Tachyarrhythmia     Systolic and diastolic CHF, chronic (H)     Gait abnormality     Iron deficiency anemia     Allergic rhinitis     Altered mental status     VRE (vancomycin-resistant Enterococci)     Hypoglycemia     Lower urinary tract infectious disease     Type 2 diabetes with stage 3 chronic kidney disease GFR 30-59 (H)     Chronic obstructive pulmonary disease, unspecified COPD type (H)     Ankle pain     Fx medial malleolus-closed           Past Surgical History       Past Surgical History   Procedure Laterality Date     Surgical history of -   2001     R wrist surg fx/ steel plate     Surgical history of -   age 20     Inguinal hernia     C dexa, bone density, axial skel  3/2006     Osteoporosis     Cardiac nuc wilman stress test nl  5/06     No ischemia, EF 44 %     Hc colonoscopy thru stoma, diagnostic  2/2003     Normal, repeat 10 yr     Upper gi endoscopy  2/2003     esophageal ulcers, gastritis, duodenitis     Upper gi endoscopy  4/2003     erosive gastropathy from ASA     Surgical history of -   2003     Bladder stim surg, not sucessful     Tonsillectomy  child     Cardiac  nuc wilman stress test nl  6/2008, 3/09     sm to mod fixed apical defect EF 46%     Hc doppler echo pulsed, complete       EF 50-55%     Surgical history of -        Lymphedema treatment of legs     Open reduction internal fixation tibial plateau  3/2011     left     Implant shunt ventriculoperitoneal  2/28/2012     Procedure:IMPLANT SHUNT VENTRICULOPERITONEAL; Ventriculoperitoneal Shunt Placement; Surgeon:ALESHA VELAZQUEZ; Location:[facility] OR            Medications     No current facility-administered medications on file prior to encounter.   Current Outpatient Prescriptions on File Prior to Encounter:  fexofenadine (ALLEGRA) 180 MG tablet Take 1 tablet (180 mg) by mouth daily   budesonide-formoterol (SYMBICORT) 160-4.5 MCG/ACT Inhaler Inhale 2 puffs into the lungs 2 times daily   Ipratropium-Albuterol (COMBIVENT RESPIMAT)  MCG/ACT inhaler Inhale 1 puff into the lungs 2 times daily Not to exceed 6 doses per day.   rosuvastatin (CRESTOR) 20 MG tablet Take 1 tablet (20 mg) by mouth daily Needs fasting labs before any further refills   RANITIDINE HCL PO Take 150 mg by mouth daily   calcium citrate (CALCITRATE) 950 MG tablet Take 1 tablet by mouth daily   aspirin 81 MG tablet Take 1 tablet (81 mg) by mouth daily   Cyanocobalamin (VITAMIN B12) 1000 MCG TBCR Take 1,000 mcg by mouth daily   calcium citrate-vitamin D (CITRACAL) 315-200 MG-UNIT TABS Take 2 tablets by mouth 2 times daily 2 tabs contain 500mg elemental calcium + 800 iu's vitamin D3 in them .   denosumab (PROLIA) 60 MG/ML SOLN Inject 60 mg Subcutaneous every 6 months   exenatide (BYETTA) 5 mcg/0.02mL per dose (60 doses per 1.2 mL prefilled pen) Inject 5mcg under the skin twice daily 60 minutes before meals.   metoprolol (TOPROL-XL) 50 MG 24 hr tablet Take 1 tablet (50 mg) by mouth At Bedtime   insulin lispro (HUMALOG KWIKPEN) 100 UNIT/ML soln Inject 30 units under the skin three times daily before meals as directed.   fluticasone (FLONASE) 50 MCG/ACT  nasal spray Spray 2 sprays into both nostrils daily   sulfamethoxazole-trimethoprim (BACTRIM DS) 800-160 MG per tablet    insulin glargine (LANTUS SOLOSTAR) 100 UNIT/ML PEN Inject 55 units twice daily  under the skin daily. (Patient taking differently: 2 times daily Inject 37 units twice daily  under the skin)   multivitamin, therapeutic with minerals (THERA-VIT-M) TABS Take 1 tablet by mouth daily   ferrous sulfate 325 (65 FE) MG tablet Take 325 mg by mouth daily (with breakfast) 65mg elemental iron   VITAMIN D, CHOLECALCIFEROL, PO Take 2,000 Units by mouth daily   bumetanide (BUMEX) 0.5 MG tablet Take 0.5 mg by mouth 2 times daily   magnesium oxide (MAG-OX) 400 MG tablet Take 2 tablets by mouth daily.   niacin (NIASPAN) 500 MG CR tablet Take 1 tablet by mouth At Bedtime.   sulfamethoxazole-trimethoprim (BACTRIM DS/SEPTRA DS) 800-160 MG per tablet Take 1 tablet by mouth daily   insulin pen needle (B-D U/F) 31G X 8 MM Use 7 daily or as directed.   blood glucose monitoring (NO BRAND SPECIFIED) test strip 1 strip by In Vitro route 4 times daily   ONE TOUCH LANCETS MISC use as directed 4 x daily and prn   ketoconazole (NIZORAL) 2 % shampoo Apply to the affected area and wash off after 5 minutes.   nitroglycerin (NITROSTAT) 0.4 MG SL tablet Place 1 tablet (0.4 mg) under the tongue every 5 minutes as needed up to 3 tablets per episode.   fluocinolone (SYNALAR) 0.01 % external solution Apply 1 to 2 times daily to scaly areas of scalp as needed.Profile Rx: patient will contact pharmacy when needed   order for DME Equipment being ordered: 4 wheeled rolling walker.   ACE/ARB NOT PRESCRIBED, INTENTIONAL, ACE & ARB not prescribed due to Hyperkalemia (baseline K+ > 5.5), Worsening renal function on ACE/ARB therapy and CKD (Chronic Kidney Disease)   insulin syringes, disposable, U-100 0.3 ML MISC 1 each 4 times daily.   ORDER FOR DME Equipment being ordered: CPAP supplies   Catheters MISC As needed            Allergies      Allergies   Allergen Reactions     Penicillins Hives     Albuterol Other (See Comments)     Albuterol Sulfate Other (See Comments)     SVT/tachycardia with albuterol     Atorvastatin Other (See Comments)     Description: Lipitor, Description: Lipitor     Atorvastatin Calcium      Cipro [Ciprofloxacin] Diarrhea     Ciprofloxacin Other (See Comments)     Description: Cipro, Description: Cipro     Coumadin      Intraventricular hemorhage, subarrachnoid hem, hydrocephalous due to falls    Per wife, pt is not allergic to this med     Simvastatin Other (See Comments)     Description: Zocor, Description: Zocor  C/o weak muscles and severe abdominal pain.     Warfarin Unknown and Other (See Comments)            Social History     Social History     Social History     Marital status:      Spouse name: N/A     Number of children: N/A     Years of education: N/A     Occupational History     Not on file.     Social History Main Topics     Smoking status: Former Smoker     Quit date: 6/26/1997     Smokeless tobacco: Never Used      Comment: quit 1997       2-3 ppd for 45 yrs      Alcohol use No      Comment: recovering since 1982       Drug use: No     Sexual activity: Not on file     Other Topics Concern     Parent/Sibling W/ Cabg, Mi Or Angioplasty Before 65f 55m? Yes     Social History Narrative    Dairy/d 1 servings/d.     Caffeine 0 servings/d    Exercise walks 7 x week    Sunscreen used - No    Seatbelts used - No    Working smoke/CO detectors in the home - Yes    Guns stored in the home - No    Self Breast Exams - NA    Self Testicular Exam - No    Eye Exam up to date - Yes    Dental Exam up to date - Yes    Pap Smear up to date - NA    Mammogram up to date - NA    PSA up to date - Yes    Dexa Scan up to date - Yes 3/06    Flex Sig / Colonoscopy up to date - Yes    Immunizations up to date - No  Declines with possible allergic rxn as child    Abuse: Current or Past(Physical, Sexual or Emotional)- No    Do you  "feel safe in your environment - Yes    Consuelo Alfonso RN    4/30/08            Family History     Family History   Problem Relation Age of Onset     C.A.D. Father      DIABETES Maternal Grandmother             Review of Systems     A comprehensive review of systems is limited by pt's underlying dementia.          Vitals and Exam     Physical exam:  /68 (BP Location: Left arm)  Pulse 68  Temp 97.4  F (36.3  C) (Axillary)  Resp 16  Ht 1.702 m (5' 7\")  Wt 88.5 kg (195 lb)  SpO2 97%  BMI 30.54 kg/m2  Wt Readings from Last 2 Encounters:   02/19/17 88.5 kg (195 lb)   02/02/17 90.3 kg (199 lb)       Intake/Output Summary (Last 24 hours) at 02/20/17 1008  Last data filed at 02/20/17 0300   Gross per 24 hour   Intake                0 ml   Output              650 ml   Net             -650 ml       Physical Exam:   General: Alert, pleasant male  HEENT: No Scleral icterus. NCAT, MMM. PERRL, EOMI.  Cardiac: Normal rate, regular rhythm. No m/r/g. Normal S1, S2. II/VI systolic murmer heard best at left sternal border c/w known moderate MS.   Pulm: CTAB, no wheezes, or crackles. Normal respiratory effort  Abd: Soft, obese, non distended, non tender abdomen. No hepatosplenomegaly.  Skin: No jaundice, No rash  Extremities: No LE edema. Left ankle wrapped in brace.   Neuro: A&Ox1 (person), CN I- XII grossly intact, no focal deficits  Psych: Good mood, full affect         Labs   CBC  Recent Labs  Lab 02/19/17  1906 02/19/17  1048   WBC  --  6.7   RBC  --  4.00*   HGB  --  11.3*   HCT  --  37.4*   MCV  --  94   MCH  --  28.3   MCHC  --  30.2*   RDW  --  15.2*    165       BMP  Recent Labs  Lab 02/19/17  1048      POTASSIUM 4.2   CHLORIDE 104   CO2 29   ANIONGAP 7   *   BUN 27   CR 2.06*   GFRESTIMATED 31*   GFRESTBLACK 38*   LORENA 9.2        INRNo lab results found in last 7 days.    Liver panel  Recent Labs  Lab 02/19/17  1048   PROTTOTAL 6.9   ALBUMIN 2.8*   BILITOTAL 0.2   ALKPHOS 79   AST 22   ALT 32 "       Urinalysis  Recent Labs   Lab Test  02/19/17   1314  12/01/16   1404   COLOR  Yellow  Yellow   APPEARANCE  Slightly Cloudy  Cloudy   URINEGLC  70*  Negative   URINEBILI  Negative  Negative   URINEKETONE  Negative  Negative   SG  1.012  1.020   UBLD  Trace*  Moderate*   URINEPH  6.5  7.0   PROTEIN  30*  100*   UROBILINOGEN   --   0.2   NITRITE  Negative  Negative   LEUKEST  Large*  Large*   RBCU  7*   --    WBCU  135*   --        Cultures  Urine: pending     Imaging/procedure results:  Head CT: 2/19/17  Impression:   1. No evidence of acute traumatic injury.  2. Increase in size of the lateral and third ventricles, possibly  related to decreased effacement associated with resolution of  previously demonstrated bilateral hemispheric subdural hygromas since  1/3/2017. Stable positioning of right frontal ventriculostomy  catheter.  3. Stable chronic infarcts involving the posterior right MCA  territory, right hemispheric external border zones, and left PICA  territory.      XR TIBIA & FIBULA LT 2 VW, XR ANKLE LT G/E 3 VW, XR FOOT LT G/E  3 VW, XR HIP LEFT 2-3 VIEWS 2/19/2017   IMPRESSION:   1. Acute transverse fracture left medial malleolus. Chronic widening  of the medial tibiotalar joint space suggestive of ligamentous injury.  Changes are superimposed on chronic bimalleolar fracture; additional  fractures may be obscured.      2. Numerous chronic metatarsal and phalangeal fractures. Most  angulated fracture is of the 1st metatarsal head.      3. Stable appearance of internal fixation of left lateral tibial  plateau fracture.     4. No femur fracture.     5. Diffuse osteopenia and muscular atrophy. Vascular calcifications.    ASSESSMENT & PLAN :  Melvin Bhatia is a 76 year old male with a PMHx of dementia, multiple CVAs, DMII, COPD, pAfib (not on warfarin 2/2 to falls), HTN, CKD stage IV and NPH s/p  shunt placement (2/28/2012) who presents after a mechanical fall while transferring to his wheelchair  found to have acute transverse fracture of the left medial malleolus. Internal medicine consulted given medical complexity and acute on chronic AMS.     # Acute on chronic encephalopathy:   # H/o Dementia:  # H/o multiple CVAs:  Per wife, pt has increased confusion over past ~ three weeks. Head CT on admission showed increased size of lateral and third ventricles and stable chronic infarcts noted involving right MCA, left PICA and right hemispheric external border zones. Most likely on ddx for AMS is acute on chronic UTI and acute on chronic NPH. New stroke is also possible given h/o of PAF not on anticoagulation however pt has no focal deficits on exam and head CT showed stable chronic infarcts. Could consider MRI is AMS fails to improve. No laboratory values to suggest a toxic metabolic etiology.  -Abx for UTI as below    -Shunt management per NS as below     # Acute on chronic UTI:  Pt straight caths at baseline for neurogenic bladder. Urine cx from Feb and May '16 grew >100,000 colonies/mL Klebsiella pneumoniae, susceptible to bactrim. On Bactrim PTA. UA on admission shows pyuria with urine cx now growing Enterococcus faecalis, susceptibilities pending.   -macrobid 100 mg BID (2/20/2017 - )  -susceptibilities from urine cx pending   -d/c PTA bactrim     # Increased ventricular size on heat CT compared to 1 year ago:  # H/o NPH s/p shunt in Feb '12:  Seen by NS on admission. Appears shunt incorrectly programmed. Shunt settings reset by NS. No intervention indicated.  -f/u with NS in 4 weeks with repeat head CT    # New left medial malleolus ankle fracture 2/2 to mechanical fall:  Fell while transferring to wheel chair 2/19. Seen by ortho with plans for non-operative management given pt's medical complexity and minimally displaced fracture pattern.   -Ongoing care per ortho     # FTT:  Pt has mechanical falls x2 in the past 1 year. Wife expressed interest in assistance with placement for ongoing cares.    -Palliative care consult, appreciate recs   -PT/OT    ===Chronic Medical Problems===    # DMII:  Current management includes lantus 37 units BID, Humalog 30 units with breakfast and 10 units with dinner, and Byetta 5mcg, twice daily, 60 minutes before breakfast and lunch.   -Medium dose SSI  -1U/15 gram carb coverage   -Lantus 30 U BID  -hold Byetta     # COPD:  -continue PTA inhalers    # Paroxsymal afib:   Rate controlled on BB. [id number] is 7 (Age, HTN, stroke history, vascular disease and DMII) however on ASA alone given h/o multiple falls.  -continue Toprol XL 50 mg QHS  -continue ASA     # CKD:  Baseline 1.6-2.0. Cr on admit 2.06.   -avoid nephrotoxic agents     # HTN:  -continue PTA bumex    # Chronic Anemia:  Baseline Hgb 12-13, 11.2 on admit.  Likely 2/2 to CKD, also on Fe and b12 replacement.   -continue PTA iron supplementation and vitamin supplementation     # Neurogenic bladder:  Requires straight cath to void.   -continue straight cath QID and PRN     # HLD:  -continue PTA statin    # SHIVAM:  -continue CPAP     FEN: Carb consistent diet   Prophylaxis:  DVT: SQ heparin   Disposition: Will need placement   Code Status: DNR/DNI    Rashawn Mueller   Pager Number [phone]    Patient was seen and discussed with attending physician Dr. Villalobos who agrees with above assessment and plan.    Attending Attestation   This patient has been seen and evaluated by me, Sadiq Villalobos MD.  I have discussed the patient and today's care plan with the house staff team and agree with the findings and plan in this note and any edits by me are indicated above in blue.      I have reviewed today's care team notes, Medications, Vital Signs, Labs and Imaging    Sadiq Villalobos MD  Med-Peds Hospitalist  Pager [phone]

## 2017-02-20 NOTE — PROGRESS NOTES
"SPIRITUAL HEALTH SERVICES  [facility] ([place]  ON-CALL VISIT     REFERRAL SOURCE: Initial on-call  visit with pt and spouse, per request for hospital  visit as noted in initial nursing assessment.     Pt very pleasant, but spouse shared that pt having memory problems. Pt is [employer]; I asked if there was anything in particular to pray for, spouse asked me to pray \"just for today, that it be a good day\".   I shared prayer and blessing for pt.     PLAN: unit  will be informed of visit.     Abdon Ortega M.Div (Bill)., [facility]  Staff   Pager [phone]                                                                                          "

## 2017-02-20 NOTE — PLAN OF CARE
Problem: Goal Outcome Summary  Goal: Goal Outcome Summary  Outcome: No Change  Pt admitted from ED after a fall at home this morning when he broke his left ankle. Ankle is splinted and wrapped. No surgery planned. Pt has a history of dementia, CKD, stage IV, DMII, CAD, CHF,  shunt and osteoporosis. At baseline pt was currently wheelchair bound after a 2 week decline. Pt oriented to self and situation but always place and time. Pt has a neurogenic bladder and can alert us to the need to be straight cathed. Should be done minimally every 6 hrs. Vital signs are stable. Pt denies pain in the broken ankle. Unsure if pt will be discharged home. Wife not sure she is able to care for pt with his current needs. Continue POC.

## 2017-02-20 NOTE — CONSULTS
[facility]       NEUROSURGERY CONSULTATION NOTE    This consultation was requested by Dr. López  from the Trauma service.    Reason for Consultation:   Recent fall, evaluation for shunt malfunction    HPI:  Mr. Bhatia is a 75 yo male with h/o dementia, multiple CVAs, DM2, CAD, COPD, pAfib (not on warfarin), CHF, and NPH s/p  shunt placement (2/28/2012 by Dr. Neri, Strata valve set to 1.0) who presents with after a fall while transferring out of his wheelchair found to have UTI upon presentation.  Patient is a poor historian and no family members present to interview.  Per chart review, he has reportedly had significant change in mental status recently with rapid decline in memory.          PAST MEDICAL HISTORY:   Past Medical History   Diagnosis Date     ARDS (adult respiratory distress syndrome) (H)      Atrial fibrillation (H)      first diagnosed 11-08     Bell's palsy 1/2010     Cauda equina syndrome with neurogenic bladder (H)      self catheter     Cerebellar infarct (H) 4/2012     left cerebellar infarct.  felt embolic hx afib     CHF (congestive heart failure) (H)      Chronic airway obstruction, not elsewhere classified      Chronic infection      VRE     CKD (chronic kidney disease) stage 4, GFR 15-29 ml/min (H)      Congestive heart failure, unspecified 5/06     EF 48%     Coronary artery disease      abnl mps with ef 46% and small-med area of ischemia '08 [facility]     CVA (cerebral infarction)      old lacunar infarct in the posterior left     Diabetes      Essential hypertension, benign      Gastro-oesophageal reflux disease      Heart attack (H)      Hemorrhage of gastrointestinal tract, unspecified 2/2003     UGI Bleed with transfusion     Hydrocephalus      plan  Shunt     Hypersomnia with sleep apnea, unspecified      Bpap     Intraventricular hemorrhage 1/2012     Legionella pneumonia (H) 8/09     Lumbago      Lumbosacral plexopathy 10/09      L diabetic myotrophy     Mixed hyperlipidemia      Other osteoporosis 3/2006     Other specified anemias 2002     Nl colonoscopy/EGD; Nl iron studies 4/06     Other specified disorders of rotator cuff syndrome of shoulder and allied disorders      Other testicular hypofunction      on Androgel     Palpitations      HOLTER     Sleep apnea      uses cpap     Subarachnoid hemorrhage (H) 1/2012     Tibial plateau fracture 2/2011     left     Type II or unspecified type diabetes mellitus without mention of complication, not stated as uncontrolled      Unspecified disorder resulting from impaired renal function      Stage 3 Chronic Renal Dz       PAST SURGICAL HISTORY:   Past Surgical History   Procedure Laterality Date     Surgical history of -   2001     R wrist surg fx/ steel plate     Surgical history of -   age 20     Inguinal hernia     C dexa, bone density, axial skel  3/2006     Osteoporosis     Cardiac nuc wilman stress test nl  5/06     No ischemia, EF 44 %     Hc colonoscopy thru stoma, diagnostic  2/2003     Normal, repeat 10 yr     Upper gi endoscopy  2/2003     esophageal ulcers, gastritis, duodenitis     Upper gi endoscopy  4/2003     erosive gastropathy from ASA     Surgical history of -   2003     Bladder stim surg, not sucessful     Tonsillectomy  child     Cardiac nuc wilman stress test nl  6/2008, 3/09     sm to mod fixed apical defect EF 46%     Hc doppler echo pulsed, complete       EF 50-55%     Surgical history of -        Lymphedema treatment of legs     Open reduction internal fixation tibial plateau  3/2011     left     Implant shunt ventriculoperitoneal  2/28/2012     Procedure:IMPLANT SHUNT VENTRICULOPERITONEAL; Ventriculoperitoneal Shunt Placement; Surgeon:ALESHA VELAZQUEZ; Location: OR       FAMILY HISTORY:   Family History   Problem Relation Age of Onset     C.A.D. Father      DIABETES Maternal Grandmother        SOCIAL HISTORY:   Social History   Substance Use Topics     Smoking  "status: Former Smoker     Quit date: 6/26/1997     Smokeless tobacco: Never Used      Comment: quit 1997       2-3 ppd for 45 yrs      Alcohol use No      Comment: recovering since 1982         MEDICATIONS:  No current outpatient prescriptions on file.       Allergies:  Allergies   Allergen Reactions     Penicillins Hives     Albuterol Other (See Comments)     Albuterol Sulfate Other (See Comments)     SVT/tachycardia with albuterol     Atorvastatin Other (See Comments)     Description: Lipitor, Description: Lipitor     Atorvastatin Calcium      Cipro [Ciprofloxacin] Diarrhea     Ciprofloxacin Other (See Comments)     Description: Cipro, Description: Cipro     Coumadin      Intraventricular hemorhage, subarrachnoid hem, hydrocephalous due to falls    Per wife, pt is not allergic to this med     Simvastatin Other (See Comments)     Description: Zocor, Description: Zocor  C/o weak muscles and severe abdominal pain.     Warfarin Unknown and Other (See Comments)         ROS: 10 point ROS of systems including Constitutional, Eyes, Respiratory, Cardiovascular, Gastroenterology, Genitourinary, Integumentary, Muscularskeletal, Psychiatric were all negative except for pertinent positives noted in my HPI.      PE:  Blood pressure 127/65, pulse 60, temperature 98.7  F (37.1  C), temperature source Oral, resp. rate 18, height 1.702 m (5' 7\"), weight 88.5 kg (195 lb), SpO2 99 %.  Gen: elderly, unkempt male laying in bed, not in acute distress  MS: A&Ox1, Speech fluent and conversant   CN: Pupils round and reactive, extraocular movements intact, face symmetric, tongue midline, uvula & palate elevate symmetrically   Motor: 5/5 in b/l UE& LEs, LLE with cast in place difficult to evaluate  Reflexes: 2/4 in b/l UE& LEs  Sensory: intact to light touch   No drift    Non labored breathing on RA      Labs:  - Dirty UA  - Cr 2.06    Imaging:   CT head demonstrates increased ventricular size relative to CT head from 2/24/2016, relatively " stable ventricular size compared to MRI brain obtained 1/3/2017.    ASSESSMENT:   Mr. Bhatia is a 75 yo male who presents after a fall, found to have increased ventricular size compared to CT head from 1 year ago.  Shunt interrogation reveals his Strata valve was incorrectly set to 2.0, this was reprogrammed to 1.5, according to chart review, is the most recent setting the shunt was set at by Dr. Neri.  The shunt was set to 1.5 today because of interval history of subdural hygromas in addition to ventricular expansion which appears to have occurred since Mr. Bhatia's last neurosurgical evaluation.        The following conditions are also present, complicating the patient's current management, as described in the Assessment and Plan:   CKD, encephalopathy       RECOMMENDATIONS:  - No neurosurgical intervention indicated at this time   - Shunt reset to 1.5  - Follow-up with Dr. Neri in 4 weeks with CT head prior to visit     The patient was discussed with Dr. Bui, neurosurgery chief resident, and she agrees with the above.    Brian Ortega MD,PhD  Neurosurgery PGY-1            Please contact neurosurgery resident on call with questions.    Dial * * [phone], enter [phone] when prompted.

## 2017-02-20 NOTE — TELEPHONE ENCOUNTER
mtm notes claire fell and boek ankle again.    [facility], located within [facility].  Medication Therapy Management Provider  [phone]

## 2017-02-20 NOTE — CONSULTS
[facility]  Palliative Care Consultation         Melvin Bhatia MRN# [id number]   Age: 76 year old YOB: 1940   Date of Admission: 2/19/2017    Reason for consult: Goals of care  Patient and family support    With a complex PMH with recent decline in mental status     Requesting physician/service: Laurel Fields, WHITNEY, CNP       Recommendations        Goals of Care  Visited with patient and wife today. Introduced palliative care team to patient and wife. Discussed the many issues going on for Fredrick and recent changes in his mental status which are very concerning to the wife. She seems to understand pretty well the impact of this fall in the setting of multiple falls and injuries and the many other co morbidities that he has the impact this has on his life. She is appropriately seeking a safer environment for Fredrick to be taken care of and began to discuss given these changes what might be goals she is hopeing for Bobs care. Wife is most importantly is working toward a safe place for Fredrick to be taken care of and appreciates the visit today.    Palliative Care was consulted for goals of care. At this time goals are clear and there is minimal symptom burden, so we will sign off. Please feel free to reconsult us if we can be helpful in the future. Thank you for the opportunity to be involved in the care of this patient.    POLST -will need prior to discharge    WHITNEY Thomas CNS  Palliative Care Consult Team  Pager: [phone]    40 minutes spent with patient, with >50% counseling and in care coordination.        Disease Process/es & Symptoms     Fall from wheelchair with fracture of the left medial malleolus and widening of hte medial tibiotalar joint space suggestive of ligamentous injury  Multiple fractures  Encephalopathy  Idiopathic normal pressure hydrocephalus with  shunt  Dementia  Multiple CVAs  CKD stage 3 COPD  Anemia  DMII  CAD  CHF  Neurogenic  "bladder       Support/Coping   (We typically ask each of our patients the following questions:)    How do you make sense of this? Not discussed due to patients AMS, wife does not seem surprised  Are you Congregational? Spiritual? Not so much? Was previously but due to cognitive changes is no longer interested in these things  Who are your \"go-to\" people when you need support? Wife    Plan for psychosocial/spiritual support/follow-up from palliative team: Will discuss case during palliative interdisciplinary team rounds and involve LICSW and  as needed.       Decision-Making & Goals of Care     Discussion/counseling today about prognosis/goals of care/decisions: See above  Patient has a completed health care directive available in the chart (Y/N): Yes  Health care agent (only if patient has an available, complete HCD): Dian Bhatia  There is no POLST (Physician orders for life-sustaining treatment) form on file for this patient  Code Status: Do not resuscitate / Do not intubate   Patient has decision-making capacity (Y/N): No    Coordination of Care     Findings & plan of care discussed with: Trauma   Follow-up plan from palliative team:will sign of given symptom burden is low and goals are clear    Thank you for involving us in the patient's care.           Chief Complaint     Goals of care in patient with dementia and multiple falls with injury           History of Present Illness     History obtained from: chart review    Melvin Bhatia is a 76 year old male with a complex PMH of ecephalopathy, idiopathic normal pressure hydrocephalus with  shunt, dementia, multiple CVAs, CKD stage 3 COPD, anemia. DMII, CAD, CHF, and neurogenic bladder who presented to the ED after falling while transferring out of his wheelchair. He was found to have a left medial malleolus fracture and widening of the medial tibiotalar space suggestive of ligamentous injury which was splinted in the ED yesterday. Orthopedics was " consulted and recommended casting and NWB for eight weeks. Patient is wheelchair bound at baseline and wife assists with transfers. Per wife, has a significant decline in mental status recently and .    Palliative care team consulted 2/20 for goals of care in the setting of fall from wheelchair with injury and PMH of recent decline in mental status          Past Medical History:   This patient has no significant past medical history  This patient  has a past medical history of ARDS (adult respiratory distress syndrome) (H); Atrial fibrillation (H); Bell's palsy (1/2010); Cauda equina syndrome with neurogenic bladder (H); Cerebellar infarct (H) (4/2012); CHF (congestive heart failure) (H); Chronic airway obstruction, not elsewhere classified; Chronic infection; CKD (chronic kidney disease) stage 4, GFR 15-29 ml/min (H); Congestive heart failure, unspecified (5/06); Coronary artery disease; CVA (cerebral infarction); Diabetes; Essential hypertension, benign; Gastro-oesophageal reflux disease; Heart attack (H); Hemorrhage of gastrointestinal tract, unspecified (2/2003); Hydrocephalus; Hypersomnia with sleep apnea, unspecified; Intraventricular hemorrhage (1/2012); Legionella pneumonia (H) (8/09); Lumbago; Lumbosacral plexopathy (10/09); Mixed hyperlipidemia; Other osteoporosis (3/2006); Other specified anemias (2002); Other specified disorders of rotator cuff syndrome of shoulder and allied disorders; Other testicular hypofunction; Palpitations; Sleep apnea; Subarachnoid hemorrhage (H) (1/2012); Tibial plateau fracture (2/2011); Type II or unspecified type diabetes mellitus without mention of complication, not stated as uncontrolled; and Unspecified disorder resulting from impaired renal function.           Past Surgical History:   I have reviewed this patient's past surgical history  This patient  has a past surgical history that includes surgical history of - (2001); surgical history of - (age 20); DEXA, BONE  DENSITY, AXIAL SKEL (3/2006); cardiac nuc wilman stress test nl (5/06); COLONOSCOPY THRU STOMA, DIAGNOSTIC (2/2003); upper gi endoscopy (2/2003); upper gi endoscopy (4/2003); surgical history of - (2003); tonsillectomy (child); cardiac nuc wilman stress test nl (6/2008, 3/09); DOPPLER ECHO PULSED, COMPLETE; surgical history of -; Open reduction internal fixation tibial plateau (3/2011); and Implant shunt ventriculoperitoneal (2/28/2012).            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 in his father; DIABETES in his maternal grandmother.              Allergies:   All allergies reviewed and addressed  This patient is allergic to is allergic to penicillins; albuterol; albuterol sulfate; atorvastatin; atorvastatin calcium; cipro [ciprofloxacin]; ciprofloxacin; coumadin; simvastatin; and warfarin.           Medications:   I have reviewed this patient's medication profile and medications during this hospitalization    Medications of Note  Scheduled tylenol 975 mg q8h   Lidoderm patch  Hydromorphone 0.2 mg q2h PRN  Ondanstron 4 mg q6h PRN  Miralax daily PRN             Review of Systems:   The comprehensive review of systems is negative other than noted here and in the HPI.    Palliative Symptom Review (0=no symptom/no concern, 1=mild, 2=moderate, 3=severe):      Pain: denies      Given AMS, difficult to assess, however does not report other symptoms at this time             Physical Exam:   Vitals were reviewed  Temp: 96.4  F (35.8  C) Temp src: Oral BP: 162/67 Pulse: 63 Heart Rate: 60 Resp: 16 SpO2: 95 % O2 Device: None (Room air)    Constitutional: Awake, cooperative, no apparent distress  Lungs: No increased work of breathing  Neurologic: Awake, oriented to name only  Neuropsychiatric: pleasantly confused           Data Reviewed:     ROUTINE ICU LABS (Last four results)  CMP  Recent Labs  Lab 02/19/17  1048      POTASSIUM 4.2   CHLORIDE 104   CO2 29   ANIONGAP 7   *   BUN 27   CR 2.06*   GFRESTIMATED 31*   GFRESTBLACK 38*   LORENA 9.2   PROTTOTAL 6.9   ALBUMIN 2.8*   BILITOTAL 0.2   ALKPHOS 79   AST 22   ALT 32     CBC  Recent Labs  Lab 02/19/17  1906 02/19/17  1048   WBC  --  6.7   RBC  --  4.00*   HGB  --  11.3*   HCT  --  37.4*   MCV  --  94   MCH  --  28.3   MCHC  --  30.2*   RDW  --  15.2*    165     INRNo lab results found in last 7 days.  Arterial Blood GasNo lab results found in last 7 days.

## 2017-02-20 NOTE — PROGRESS NOTES
[facility]  Patient is currently open to home care services with [facility].  The patient is currently receiving PT services.  [facility]  and team have been notified of patient admission.  [facility] liaison will continue to follow patient during stay.  If appropriate provide orders to resume home care at time of discharge.    Julissa Milligan RN  [facility] Liaison

## 2017-02-20 NOTE — PLAN OF CARE
"Problem: Goal Outcome Summary  Goal: Goal Outcome Summary  Outcome: No Change  /62 (BP Location: Right arm)  Pulse 58  Temp 96.7  F (35.9  C) (Axillary)  Resp 14  Ht 1.702 m (5' 7\")  Wt 88.5 kg (195 lb)  SpO2 95%  BMI 30.54 kg/m2  Patient afebrile, VSS. Patient alert and oriented x2. Patients pain controlled with scheduled tylenol. Pt with a neurogenic bladder and straight cath x1 with 250cc. Pt denies pain in the broken ankle. Patient on CPAP overnight and tolerated it well. Patient appears to rest between cares. Cont with POC.      "

## 2017-02-20 NOTE — PROGRESS NOTES
Reviewed and demonstrated checking cap refill, sensation, and motion with pt. Spouse not here. Instructed on need to not bear weight, keep elevated. Pt verbalizes understanding, but wife needs education as he forgets. Folder left with pt. Will return and provide wife with information and reinforce education with pt.

## 2017-02-21 NOTE — PLAN OF CARE
Problem: Goal Outcome Summary  Goal: Goal Outcome Summary  Outcome: No Change  8901-3969: Pt oriented to self only. Confused and talking illogically at times, easily redirectable. VSS on CPAP. Denies pain and nausea and rested well throughout the night. CPAP on while sleeping; pt tolerated well.  overnight. PIV patent and SL'ed. Pt has neurogenic bladder, straight cathed with adequate urine output. Pt incontinent of stool x1 overnight that was small, soft, and brown. Pt able to somewhat move himself in bed independently but occasionally needs repo assistance. Soft cast to LLE intact. On consistent CHO diet. BA on for safety. Plan for MRI of brain/ neck today. Continue to monitor.

## 2017-02-21 NOTE — PLAN OF CARE
Vitals:    02/20/17 1259 02/20/17 1631 02/20/17 2019 02/20/17 2204   BP: 162/67 133/63 145/62 152/62   BP Location: Right arm Right arm Right arm Right arm   Pulse: 63 64 60 67   Resp: 16 16 16 16   Temp: 96.4  F (35.8  C) 98.7  F (37.1  C) 97.1  F (36.2  C) 96.9  F (36.1  C)   TempSrc: Oral Oral Axillary Axillary   SpO2: 95% 95% 98% 99%   Weight:    87.3 kg (192 lb 8 oz)   Height:         8065-6748  Afebrile, slightly HTN scheduled metoprolol administered, 95-97% RA. Denies pain, scheduled Tylenol administered. Oriented to self only, baseline due to dementia. Pt with neurogenic bladder, straight cathed, adequate urine output. , covered per POC. BG at bedtime 177, no coverage needed. CMS intact. Numbness and tingling per baseline. PIV saline locked. CPAP at night. Assist of 2, turned and repositioned. Continue plan of care.

## 2017-02-21 NOTE — PROGRESS NOTES
PALLIATIVE CONSULT SERVICE  SPIRITUAL ASSESSMENT Progress Note  [facility]    Referred to help facilitate POLST completion. Talked with Fredrick and his wife (Dian) about the POLST content and purpose. They expressed their desire to complete it. DNR/DNI selected. POLST signed by BRADLY Thomas with the inpatient palliative consult service. Original and copies given to Fredrick and Dian. Copies also submitted to paper chart and HIMS. I have no plan for follow-up as Fredrick receives ongoing support from the inpatient palliative consult service.    Johnny Ortega M.Div., [facility]  Palliative Consult Service   Pager [phone]

## 2017-02-21 NOTE — PLAN OF CARE
Problem: Goal Outcome Summary  Goal: Goal Outcome Summary  OT/7B:  Defer - OT orders received and acknowledged.  Per discussion with PT, pt is dependent with ADLs at baseline.  PT to continue to follow for mobility and transfer goals. No inpatient OT needs indicated in this setting, will complete orders.  Thank you for the referral.

## 2017-02-21 NOTE — PROGRESS NOTES
Hospital Medicine  Consult Note   Date of Service: February 21, 2017    Patient: Melvin Bhatia  MRN: [id number]  Admission Date: 2/19/2017  Hospital Day # 2      Assessment & Plan: Melvin Bhatia is a 76 year old man with PMH of dementia, multiple CVAs, NPH s/p  shunt (2012), pAFib not on anticoagulation, neurogenic bladder, admitted with acute transverse fracture of his left medial malleolus after mechanical fall. The patient was also discovered to have acute on chronic deterioration of his cognitive status. The patient was transferred to the Neurology service on 2/21/17.    # Acute on chronic cognitive impairment  # History of multi-infarct dementia  # History of NPH  The patient's decline over the past several weeks is most likely related to worsening NPH vs hydrocephalus ex vacuo. Mental status changes seem to correlate with changes in his  shunt. No evidence of metabolic derangement that might explain the patient's acute changes. The patient has been afebrile with normal WBC and my suspicion for infection is low. The patient does have pyuria/bacteruria, but in the absence of other signs/symptoms of infection I am inclined to view it as colonization -- particularly given what seems to be a very plausible alternative explanation.  The patient was started on macrobid, which was reasonable given the patient's AMS.  However, my recommendation would be to discontinue the antibiotic and monitor clinically (macrobid itself has been associated with AMS in the elderly and is on the BEERS list).  The decision to treat for a UTI should be made based on the level of confidence in an alternative diagnosis. Now that the Neurology service has assumed care of this patient, I will defer that decision to them.  - would stop macrobid, restart the patient's home bactrim for ppx  - agree with plan for MRI today  - remainder of work-up per Neurology service     # Asymptomatic pyuria/bacteruria  As above, I would  "recommend stopping the patient's macrobid (likely colonizer). However, will defer to Neurology service.     # DM2, insulin dependent  Insulin needs have been lower in the hospital. Given the patient's low fasting blood sugars this morning, I did decrease his bedtime lantus.  - lantus currently 30 units qAM, 20 units qPM  - mealtime insulin with carb coverage  - correctional scale  - hold byetta    # Left medial malleolus fracture  - management per Ortho    # COPD  - continue home inhalers    # Paroxysmal AFib:  - continue metoprolol, aspirin  - defer warfarin decision to primary team    Pt's care was discussed with bedside RN.      Management of the patient's other medical problems per the primary team. We will sign off, but please do not hesitate to contact me if you have any questions.  Thank you!    Khalif Cao MD  [facility]   [facility]   Pager: [phone]    Team: Medicine Consult   Page Cross Cover after 5 pm: pager [phone]     ___________________________________________________________________    Subjective & Interval Hx:    Nursing notes reviewed.  Pt's primary concern is his cough, which is intermittently productive. Per his wife, this started prior to any PO intake this morning.   Pt denies any chest pain, abd pain, nausea/vomiting.  Pt's wife says that he is more oriented than he has been in the past 2 weeks.     Medications: Reviewed in EPIC.     Physical Exam:    Blood pressure 114/56, pulse 60, temperature 96.1  F (35.6  C), temperature source Axillary, resp. rate 20, height 1.702 m (5' 7\"), weight 87.3 kg (192 lb 8 oz), SpO2 100 %.    General: NAD.   HEENT: No icterus or injection.  CV: RRR. Distant heart sounds. No audible m/g/r.   Lungs: Coarse breath sounds bilaterally with expiration.   Abd: Soft, NTND. Bowel sounds present.   MSK/Ext: No peripheral edema on RLE. LLE with brace in place.   Skin: No rashes.   Neuro: Alert, oriented to self and \"hospital.\" With " some hesitation is able to identify that it is February 2017. Will look to wife for answers to some questions.       Labs & Studies of Note: I personally all lab and imaging studies in the last 24h.     All cultures:    Recent Labs  Lab 02/19/17  1210   CULT 50,000 to 100,000 colonies/mL Enterococcus faecalis Susceptibility testing in [id number],000 to 50,000 colonies/mL urogenital georgie Susceptibility testing not routinely done*

## 2017-02-21 NOTE — PROGRESS NOTES
02/21/17 1242   Quick Adds   Type of Visit Initial PT Evaluation       Present no   Language english   Living Environment   Lives With spouse   Living Arrangements condominium   Home Accessibility no concerns   Transportation Available family or friend will provide   Living Environment Comment Pt lives in IND senior living apartment with wife who cares for him   Self-Care   Usual Activity Tolerance poor   Current Activity Tolerance poor   Regular Exercise no   Equipment Currently Used at Home wheelchair  (bed rail)   Activity/Exercise/Self-Care Comment Pt is dependent in dressing, bathing and eating per spouse report. Pt requires MaxA to transfer from bed to w/c   Functional Level Prior   Ambulation 3-->assistive equipment and person   Transferring 3-->assistive equipment and person   Toileting 2-->assistive person   Bathing 3-->assistive equipment and person   Dressing 2-->assistive person   Eating 2-->assistive person   Communication 2-->difficulty speaking (not related to language barrier)   Swallowing 0-->swallows foods/liquids without difficulty   Cognition 1 - attention or memory deficits   Fall history within last six months yes   Number of times patient has fallen within last six months 2   Which of the above functional risks had a recent onset or change? ambulation;transferring   Prior Functional Level Comment Pt's PLF is max assist for bed mobility and transferring and w/c dependent/ non ambulatory. Pt was dependent in all ADLs   General Information   Onset of Illness/Injury or Date of Surgery - Date 02/19/17   Referring Physician Laurel Fields APRN CNP   Pertinent History of Current Problem (include personal factors and/or comorbidities that impact the POC) Melvin Bhatia is a 76 year old male with a history of diabetes, chronic kidney disease stage IV, hypertension, atrial fibrillation, CAD, MI, CHF, cerebellar infarct, osteoporosis, multiple fractures who presents  after a mechanical fall. The patient's significant other reports that the patient was going to get out of bed and transfer to his wheelchair this morning, and was unsteady when he got up. He then got tangled up in his wheelchair and fell down   Precautions/Limitations fall precautions   Weight-Bearing Status - LUE full weight-bearing   Weight-Bearing Status - RUE full weight-bearing   Weight-Bearing Status - LLE nonweight-bearing   Weight-Bearing Status - RLE full weight-bearing   General Observations soft cast on LLE, domingo, IV   Cognitive Status Examination   Orientation person   Level of Consciousness confused;alert   Follows Commands and Answers Questions 50% of the time   Personal Safety and Judgment at risk behaviors demonstrated;impaired   Memory impaired   Pain Assessment   Patient Currently in Pain No   Integumentary/Edema   Integumentary/Edema Comments swelling in BLEs   Posture    Posture Kyphosis;Protracted shoulders;Forward head position   Range of Motion (ROM)   ROM Comment Not formally assessed. Pt demonstrates full PROM throughout and decreased AROM d/t poot muscle control   Strength   Strength Comments Not formally assessed. Pt demonstrates decreased strength throughout. At least 3/5 strenght in BUEs, less than 3/5 strength in BLEs and trunk.   Bed Mobility   Bed Mobility Comments Pt requires MOd-maxA to roll R and L in bed   Transfer Skills   Transfer Comments Per spouse report pt is maxA to transfer supine<>sit and maxA transferring bed to W/C by performing a squat pivot transfer   Gait   Gait Comments not appropriate to assess. Non-ambulatory at baseline   Balance   Balance Comments Pt has fair sitting balance but requires maxA for set up and body positioning   Sensory Examination   Sensory Perception no deficits were identified   General Therapy Interventions   Planned Therapy Interventions bed mobility training;transfer training;progressive activity/exercise;home program guidelines;risk factor  "education;strengthening;ROM   Clinical Impression   Criteria for Skilled Therapeutic Intervention yes, treatment indicated   PT Diagnosis Impaired mobility d/t L ankle fx from fall   Influenced by the following impairments decreased strength throughout, dementia, pain   Functional limitations due to impairments bed mobility, transfers   Clinical Presentation Evolving/Changing   Clinical Presentation Rationale Pt is complicated PMHx, recent changes in cognitive status, below baseline of functional mobility and new ankle fx   Clinical Decision Making (Complexity) Low complexity   Therapy Frequency` 5 times/week   Predicted Duration of Therapy Intervention (days/wks) 1 week   Anticipated Discharge Disposition Transitional Care Facility   Risk & Benefits of therapy have been explained Yes   Patient, Family & other staff in agreement with plan of care Yes   [facility] [employer] TM \"6 Clicks\"   2016, Trustees of [facility], under license to [employer].  All rights reserved.   6 Clicks Short Forms Basic Mobility Inpatient Short Form   [facility] [employer]  \"6 Clicks\" V.2 Basic Mobility Inpatient Short Form   1. Turning from your back to your side while in a flat bed without using bedrails? 2 - A Lot   2. Moving from lying on your back to sitting on the side of a flat bed without using bedrails? 1 - Total   3. Moving to and from a bed to a chair (including a wheelchair)? 1 - Total   4. Standing up from a chair using your arms (e.g., wheelchair, or bedside chair)? 1 - Total   5. To walk in hospital room? 1 - Total   6. Climbing 3-5 steps with a railing? 1 - Total   Basic Mobility Raw Score (Score out of 24.Lower scores equate to lower levels of function) 7   Total Evaluation Time   Total Evaluation Time (Minutes) 20     "

## 2017-02-21 NOTE — PROGRESS NOTES
Neurology Progress Note  2017    Melvin Bhatia (Bob) is a 76 year old year old male with a pertinent PMHx of vascular dementia, hydrocephalus w/  shunt, DM, HTD, and paroxysmal A fib who was admitted on 2017 following a mechanical fall w/ resulting fractured left medial malleolus and is now being further evaluated for acute decline in mentation and orientation in the 2 weeks prior to his admission.     24 hour events: No acute events  1. continues to be disorientated to date and situation  2. Stroke team nadine - recommended MRA 2/2 concern for carotid stenosis vs. Carotid dissection - continue warfarin, no intervention  3. blood glucose dropped to 83 this morning - insulin adjusted  4. Urine culture showed Enterococcus and urogentital georgie - patient started on Nitrofurantoin yesterday       24 Hour Vital Signs Summary:  Temperatures:  Current - Temp: 97  F (36.1  C); Max - Temp  Av.1  F (36.2  C)  Min: 96.1  F (35.6  C)  Max: 98.7  F (37.1  C)  Respiration range: Resp  Av.3  Min: 16  Max: 20  Pulse range: Pulse  Av.8  Min: 56  Max: 67  Blood pressure range: Systolic (24hrs), Av , Min:110 , Max:152   ; Diastolic (24hrs), Av, Min:48, Max:63    Pulse oximetry range: SpO2  Av.3 %  Min: 95 %  Max: 100 %    Ventilator Settings  Resp: 20      Intake/Output Summary (Last 24 hours) at 17 1356  Last data filed at 17 1018   Gross per 24 hour   Intake                0 ml   Output             1525 ml   Net            -1525 ml            Current Medications:    [START ON 2017] insulin glargine  30 Units Subcutaneous QAM AC     insulin glargine  20 Units Subcutaneous At Bedtime     nitrofurantoin (macrocrystal-monohydrate)  100 mg Oral Q12H AKANKSHA     aspirin EC  81 mg Oral Daily     bumetanide  0.5 mg Oral Daily     fluticasone-vilanterol  1 puff Inhalation Daily     calcium citrate-vitamin D  2 tablet Oral BID     cyanocolbalamin  500 mcg Oral Daily     ferrous  sulfate  325 mg Oral Daily with breakfast     fexofenadine  180 mg Oral Daily     fluticasone  2 spray Both Nostrils Daily     umeclidinium-vilanterol  1 puff Inhalation Daily     magnesium oxide  800 mg Oral Daily     metoprolol  50 mg Oral At Bedtime     multivitamin, therapeutic with minerals  1 tablet Oral Daily     niacin  500 mg Oral At Bedtime     ranitidine  150 mg Oral Daily     rosuvastatin  20 mg Oral Daily     cholecalciferol  2,000 Units Oral Daily     insulin aspart   Subcutaneous QAM AC     insulin aspart   Subcutaneous Daily with lunch     insulin aspart   Subcutaneous Daily with supper     insulin aspart  1-7 Units Subcutaneous TID AC     insulin aspart  1-5 Units Subcutaneous At Bedtime     sodium chloride (PF)  3 mL Intracatheter Q8H     heparin  5,000 Units Subcutaneous Q12H     acetaminophen  975 mg Oral Q8H     lidocaine  1-2 patch Transdermal Q24h    And     lidocaine   Transdermal Q24H    And     lidocaine   Transdermal Q8H     senna-docusate  1-2 tablet Oral BID       PRN Medications:  Warfarin Therapy Reminder, glucose **OR** dextrose **OR** glucagon, insulin aspart, naloxone, lidocaine, lidocaine 4%, sodium chloride (PF), potassium chloride, potassium chloride, potassium chloride, potassium chloride with lidocaine, potassium chloride, magnesium sulfate, HYDROmorphone, polyethylene glycol, ondansetron **OR** ondansetron    Infusions:    Warfarin Therapy Reminder         Allergies   Allergen Reactions     Penicillins Hives     Albuterol Other (See Comments)     Albuterol Sulfate Other (See Comments)     SVT/tachycardia with albuterol     Atorvastatin Other (See Comments)     Description: Lipitor, Description: Lipitor     Atorvastatin Calcium      Cipro [Ciprofloxacin] Diarrhea     Ciprofloxacin Other (See Comments)     Description: Cipro, Description: Cipro     Simvastatin Other (See Comments)     Description: Zocor, Description: Zocor  C/o weak muscles and severe abdominal pain.      "Warfarin Unknown and Other (See Comments)       Physical Examination:  /54 (BP Location: Right arm)  Pulse 56  Temp 97  F (36.1  C)  Resp 20  Ht 1.702 m (5' 7\")  Wt 87.3 kg (192 lb 8 oz)  SpO2 98%  BMI 30.15 kg/m2      General Examination  Level of Alertness: Alert, cooperative, NAD.  HEENT: MMM. Anicteric  CVS: Palpable peripheral pulses  PULM: Protecting airway, Saturating well on RA.   ABD: NT, ND  LE: Cast on left leg.   PSYCH: pleasant affect     Neurological Examination  MS Appropriate in alertness & attention, orientated to name, ,  knows who wife is & her name. Said the year is , that he is in a  [employer], and does not remember the president.   CN PERRL. EOMI. No facial asymmetry. No paresthesia or loss of sensation in face. Hearing intact to conversation. Tongue Central  MTR No abnormal or involuntary movements observed. Paratonia in both arms. L shoulder abduction limited to 30 degrees, Elbow ext/flx: Right 4+, Left 4.  strength: 4+ right, 4 left. R hip flexion 4/5, L hip flexion 3/5.   SNS Intact to light touch in all extremities  RFLX 2+ in the right side biceps, brach, knee, ankle. R plantar up. 3+ L biceps, 4+ L brach. Left sided lower extremity reflexes deferred 2/2 leg in cast.   CRD FNF intact b/l  Gait  Non-ambulatory       Labs/Studies:  Recent Labs   Lab Test  17   0815  17   1005  17   1906  17   1048  16   1335   NA  142  137   --   140  138   POTASSIUM  3.6  4.1   --   4.2  5.2   CHLORIDE  106  103   --   104  105   CO2  28  27   --   29  28   ANIONGAP  8  6   --   7  6   GLC  83  137*   --   190*  167*   BUN  24  26   --   27  28   CR  1.87*  2.04*   --   2.06*  1.82*   LORENA  9.0  9.4   --   9.2  8.6   WBC   --   8.5   --   6.7  7.8   RBC   --   4.05*   --   4.00*  4.08*   HGB   --   11.5*   --   11.3*  11.9*   PLT   --   156  170  165  191       Recent Labs   Lab Test  17   1005  12   0412  12   0801  12   1847  12   " 0751   02/22/11   0737   INR  1.16*  1.11  1.06  0.99  1.05   < >  2.14*   PTT   --    --    --   27  26   --   41*    < > = values in this interval not displayed.       Plan  Neuro:  #Normal Pressure Hydrocephalus - first diagnosed in 2012 following decline in mental status & multiple falls with a CT scan showing ventriculomegaly.  strata shunt in Feb 2012 which corresponded with improvement of the pt's symptoms as well as decrease in size of his ventricles. CT scan on this admission now shows increased size of the ventricles compared to CT scan 1 year ago. Shunt interrogation on day of admission revealed shunt incorrectly set at 2.0 (possibly inadvertently altered during MRI in January) and it was readjusted to 1.5   - keep  shunt at 1.5 as per NUS    #multi-infarct dementia in setting of chronic A-fib - PTA only on ASA. Was previously on warfarin but it was discontinued in 2012 in setting of multiple falls & development of intraventricular and subarchnoid hemorrhages. CHADSVASC score is  8 & pt has had multiple embolic events since stopping warfarin. Additionally, MRA imaging now shows significant narrowing of the Right ICA. At this point, the potential benefit of warfarin outweighs the risk. His wife is also in agreement. Will work to improve safety and decrease the risk of falls.   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 Discussed with stroke team, will not pursue aggressive management and will continue warfarin as above     #acute on chronic cognitive impairment - etiology is not entirely clear at this point but likely hydrocephalus (2/2 incorrect shunt settings) and possible UTI in the setting of vulnerable brain (vascular dementia/NPH).   - monitor patient for changes following adjustments to shunt and starting anticoagulation   - Continue following with Dr. Corey as outpatient      Resp:  #COPD - continue home inhalers & medications  #SHIVAM - CPAP     CV:  #Paraxysomal A-fib - only on ASA & metoprolol prior to admission. CHADSVASC of 8. Will start anticoagulation  - continue home ASA (stop when INR therapeutic), metoprolol for rate control  - start Warfarin    #HLD - continue home niacin & rosuvastatin   #CHF/HTN - continue metoprolol     Renal:  #CKD - no acute changes in creatinine. Continue to monitor     Endo:   - DMII requiring insulin: pt had low fasting glucose this morning, decreased bedtime dosage accordingly  - lantus currently 30 units qAM, 20 units qPM  - mealtime insulin with carb coverage  - Sliding scale insulin   - hold byetta  - POCT glucose monitoring     GI:  #neurogenic bladder - chronic self catheterization for 20+ years (prior to Dx of hydrocephalus)   - straight cath    #GERD - continue home ranitidine     ID:  #UTI v. colonization - urine culture showing 50,000 to 100,000 colonies/mL of Enterococcus faecalis   - F/U sensitivities on enterococcus  - 7 day course of nitrofurantoin started on 2/20/17 then resume pta bactrim    ORTHO:   #left medial malleolus fracture  - casted, F/U outpatient for removal     FEN: IVF, normal diet  PPX:    DVT prophylaxis: Heparin      Code Status: DNR/DNI     Dispo: Pending further workup and arrangement for placement.     Tangela Lacy MS3 assisted in writing this note. Edited by Jerod Saleh MD who is responsible for this progress note.     Jerod Saleh MD  Neurology G2  [phone]        ATTESTATION:  Please see attestation statement from progress note dated 2/20/2017.  Karen Vázquez MD  Neuro-oncology  02/21/17

## 2017-02-21 NOTE — PLAN OF CARE
Problem: Goal Outcome Summary  Goal: Goal Outcome Summary  PT/7B: Pt unable to be historian d/t baseline dementia. Spouse in room and able to answer questions about baseline function, home environment and DC plan. Pt required maxA for bed mobility. Unable to assess transferring skills d/t pt needing to use bed pan. PT is recommends DC to TCU once medically cleared. Wife agrees with DC plan and will assess if pt needs nursing home once in TCU. OT can defer at this time d/t no need at this time.

## 2017-02-21 NOTE — PLAN OF CARE
Vitals:    02/20/17 0338 02/20/17 0834 02/20/17 1259 02/20/17 1631   BP: 129/62 143/68 162/67 133/63   BP Location: Right arm Left arm Right arm Right arm   Pulse: 58 68 63 64   Resp: 14 16 16 16   Temp: 96.7  F (35.9  C) 97.4  F (36.3  C) 96.4  F (35.8  C) 98.7  F (37.1  C)   TempSrc: Axillary Axillary Oral Oral   SpO2: 95% 97% 95% 95%   Weight:       Height:         1795-9692  Afebrile, slightly HTN, 95-97% RA. Denies pain, scheduled tylenol administered. Pt with neurogenic bladder, straight cathed, adequate urine output. Large BM x2. ,126,205, covered per POC. CMS intact. Numbness and tingling per baseline. PIV saline locked. CPAP at night. Assist of 2, turned and repositioned q2hrs. Spouse at bedside. Continue plan of care.

## 2017-02-21 NOTE — PHARMACY-ANTICOAGULATION SERVICE
Clinical Pharmacy - Warfarin Dosing Consult     Pharmacy has been consulted to manage this patient s warfarin therapy.  Indication: Atrial Fibrillation  Therapy Goal: INR 2-3  Warfarin Prior to Admission: No  Warfarin PTA Regimen: was on warfarin back in ~2012  Significant drug interactions: ASA, niacin, rosuvastatin, SQH  Recent documented change in oral intake/nutrition: No  Dose Comments: Pt is fall risk, h/o bleeds 2/2 falls    INR   Date Value Ref Range Status   02/21/2017 1.18 (H) 0.86 - 1.14 Final   02/20/2017 1.16 (H) 0.86 - 1.14 Final       Recommend warfarin 3 mg today.  Pharmacy will monitor Melvin Bhatia daily and order warfarin doses to achieve specified goal.      Please contact pharmacy as soon as possible if the warfarin needs to be held for a procedure or if the warfarin goals change.      Cory Emanuel, PharmD

## 2017-02-21 NOTE — CONSULTS
Social Work: Assessment with Discharge Plan    Patient Name:  Melvin Bhatia  :  1940  Age:  76 year old  MRN:  [id number]  Risk/Complexity Score:  Filed Complexity Screen Score: 9  Completed assessment with:  Pt, spouse    Presenting Information   Reason for Referral:  Discharge plan  Date of Intake:  2017  Referral Source:  Chart Review  Decision Maker:  Pt when able, during this admission, decision making has been completed by spouse.  Alternate Decision Maker:  Spouse Dian (designated caregiver)  Health Care Directive:  Copy in Chart  Living Situation:  Apartment  Previous Functional Status:  Assistance with Other:  ADLs at [facility]  Patient and family understanding of hospitalization:  Spouse states she is wanting the patient to get some rehab and then move into long term care/nursing home.  Cultural/Language/Spiritual Considerations:  None reported for today.  Adjustment to Illness:  Spouse is very aware of her resources in the community.  Pt in agreement with plan to complete rehab and go to longer term care.    Physical Health  Reason for Admission:    1. Fall, initial encounter    2. Closed left ankle fracture, initial encounter    3. Vascular dementia with behavior disturbance    4. Accidental fall from wheelchair, initial encounter      Services Needed/Recommended:  TCU with transition to long term care.    Mental Health/Chemical Dependency  Diagnosis:  Baseline of dementia reported in notes.  Support/Services in Place:  NA, spouse would like to pursue long term care.  Services Needed/Recommended:  Long term care after TCU.    Support System  Significant relationship at present time:  Spouse  Family of origin is available for support:  Yes  Other support available:  Yes  Gaps in support system:  Spouse reports pt will need to move into long term care.  Spouse would like for pt to start with TCU and then move.  Patient is caregiver to:  None     Provider Information   Primary Care  Physician:  Ash Bowman   [phone]   [facility]  [place] [address] [facility] [address]      :  GUS    Financial   Income Source:  Retired  Financial Concerns:  None reported, spouse is very familiar with payment systems for TCU/LTC.  Insurance:    Payor/Plan Subscriber Name Rel Member # Group #   [employer] - [employer] FOR [employer]ERIE RUSS * [place] [id number] [facility]      [address]       Discharge Plan   Patient and family discharge goal:  TCU with transition to LTC.  Provided education on discharge plan:  YES  Patient agreeable to discharge plan:  YES  A list of Medicare Certified Facilities was provided to the patient and/or family to encourage patient choice. Patient's choices for facility are:    1) [facility]  PH: [phone]  F: [phone]    2) [facility]  PH: [phone]  F: [phone]    3) [facility]  PH: [phone]  F: [phone]    Will NH provide Skilled rehabilitation or complex medical:  YES  General information regarding anticipated insurance coverage and possible out of pocket cost was discussed. Patient and patient's family are aware patient may incur the cost of transportation to the facility, pending insurance payment: YES.  Spouse is aware of [employer] for [employer] copay.  Barriers to discharge:  Medical stability    Discharge Recommendations   Anticipated Disposition:  Facility:  TCU with transition to LTC.  Transportation Needs:  May require stretcher due to location of fracture.  Pt is wheelchair dependent and had a fall during mechanical transfer.  Will continue to assess for safest transportation method.  Name of Transportation Company and Phone:  TBD.    Additional comments   SW met with pt and spouse and introduced self and role in consult.  Spouse states the pt has been in other TCU facilities in the past and she is familiar with the LTC system.  Spouse chose the above facilities for referral.   Plan is to refer for TCU then to pursue LTC once the pt is placed.  SW discussed with family insurance coverage of TCU vs LTC with spouse.  Spouse is aware of coverage and did not have questions.  Spouse states she does not have any other SW questions/needs at this visit.  Will hand off to unit SW to follow for discharge planning.  Pt may be medically ready to discharge tomorrow 2/22/17.    AWAIS Bettencourt, APSW  6C Unit   Pager: [phone]  Phone: [phone]

## 2017-02-22 NOTE — PLAN OF CARE
Problem: Confusion, Chronic (Adult)  Goal: Identify Related Risk Factors and Signs and Symptoms  Related risk factors and signs and symptoms are identified upon initiation of Human Response Clinical Practice Guideline (CPG)   Outcome: No Change  Orientated to self only.  Pleasant and cooperative.  Does use call light to make needs known.  Bed alarm on.  AVSS.  O sats 98% on RA.  Abdomen soft/postive bowel sounds.  Last BM 2/21.  LLE casted.  Pt able to wiggle toes.  Toes warm, good cap refill. SC'd prn for adequate urine output.  Pt denies having pain.

## 2017-02-22 NOTE — PLAN OF CARE
Problem: Goal Outcome Summary  Goal: Goal Outcome Summary  PT: Pt discharged to TCU prior to PT session today.     Physical Therapy Discharge Summary     Reason for therapy discharge:    Discharged to transitional care facility.     Progress towards therapy goal(s). See goals on Care Plan in [facility] electronic health record for goal details.  Goals not met.  Barriers to achieving goals:   discharge from facility.     Therapy recommendation(s):    Continued therapy is recommended.  Rationale/Recommendations:  maximize functional mobility and ADL's.

## 2017-02-22 NOTE — PROGRESS NOTES
Social Work Services Progress Note    Hospital Day: 4  Date of Initial Social Work Evaluation:  2/21/17- please see for details  Collaborated with:  Chart review, team rounds, nursing facilities, Neurology team, pt's wife Dian    Data: Per chart review d/c plan for pt is TCU and then a likely transition to LTC. Referrals have been made. Per Neurology team pt is ready to d/c today.     Intervention:  Sw contacted the following facilities to follow up on referrals:  - [facility] (PH: [phone], F: [phone])- spoke with admissions staff and they will review pt's referral and return call, waiting to hear back. 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 Per chart this is listed as chronic. Admissions staff are also waiting to get confirmation from the business office for insurance purposes before they can officially accept pt. Admissions staff called back to report that they do not have a room available in an appropriate location given pt's dementia, so at this time are unable to accept pt.   - [facility] [address] (PH: [phone], F: [phone])- left message with admissions staff following up on referral, waiting to hear back.  - [facility] (PH: [phone], F: [phone], pager [phone])- paged admissions staff to follow up on referral, waiting to hear back. Eusebia in admissions called back reporting that facility can accept pt. Eusebia reported that an admission anytime is ok as long as they have enough notice.     Fabio called pt's wife Dian ([phone]) and left vm with brief update on acceptance and asked that she return call before proceeding with arranging transportation, waiting to hear back.     Tentative stretcher transport arranged via [employer] ([phone]) for today at 4pm.     PAS completed in anticipation of d/c- confirmation code- [id number]        Assessment:  TCU, see  bedside RN, PT/OT notes    Plan:    Anticipated Disposition:  Facility:  TCU, location to be determined    Barriers to d/c plan:  Bed availability/acceptance    Follow Up:  darlene ALEXANDER will continue to follow for d/c planning    AWAIS Brasher, MSW  7B   [phone] (pager) [phone]  2/22/2017

## 2017-02-22 NOTE — PLAN OF CARE
Problem: Goal Outcome Summary  Goal: Goal Outcome Summary  Outcome: No Change  Vitals:     02/21/17 0204 02/21/17 0810 02/21/17 1245 02/21/17 1606   BP: 125/48 114/56 110/54 147/66   BP Location: Right arm Right arm Right arm Left arm   Pulse: 58 60 56 67   Resp: 16 20 20 20   Temp: 96.6  F (35.9  C) 96.1  F (35.6  C) 97  F (36.1  C) 97.1  F (36.2  C)   TempSrc: Axillary Axillary   Axillary   SpO2: 100% 100% 98% 96%   Weight:           Height:             AVSS, pt oriented to self only. Left lower leg soft cast CDI.  CMS intact bilaterally. Pt straight cath'd x 3 d/t neurogenic bladder. Incontinent of stool x 3, used bedpan x 2. BG 83, 214, and 131, insulin given per sliding scale and carb coverage. PO intake poor, wife assisted with feeding. Able to make needs known. Continuing to monitor per POC.

## 2017-02-22 NOTE — DISCHARGE SUMMARY
[facility] Discharge Summary    Melvin Bhatia  MRN# [id number]    Age: 76 year old year old YOB: 1940      Date of Admission:  2/19/2017   Date of Discharge::  2/22/2017    Primary Care Provider: Ash Bowman           Admission Diagnoses:   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]  Fall, initial encounter [W19.XXXA]  Closed left ankle fracture, initial encounter [S82.892A]          Discharge Diagnosis:   #Acute on chronic decline in mentation   #Normal Pressure Hydrocephalus  #Vascular Dementia  #acute on chronic cystits  #metabolic encephalopathy  #High grade Right ICA Stenosis  #multi-infarct dementia  #Acute on Chronic Cystitis  #DM Type II  #hypoglycemia  #Left medial malleolus fracture  #COPD  #SHIVAM  #Paraxysomal A-fib  #HLD  #CHF  #HTN  #CKD        Procedures:   No procedures performed during this admission         Imaging:   #CT HEAD W/O CONTRAST 2/19/2017 1:01 PM  Impression:   1. No evidence of acute traumatic injury.  2. Increase in size of the lateral and third ventricles, possibly  related to decreased effacement associated with resolution of  previously demonstrated bilateral hemispheric subdural hygromas since  1/3/2017. Stable positioning of right frontal ventriculostomy  catheter.  3. Stable chronic infarcts involving the posterior right MCA  territory, right hemispheric external border zones, and left PICA  territory.    #XR TIBIA & FIBULA LT 2 VW, XR ANKLE LT G/E 3 VW, XR FOOT LT G/E  3 VW, XR HIP LEFT 2-3 VIEWS 2/19/2017 11:59 AM   Impression:  1. Acute transverse fracture left medial malleolus. Chronic widening  of the medial tibiotalar joint space suggestive of ligamentous injury.  Changes are superimposed on chronic bimalleolar fracture; additional  fractures may be obscured.      2. Numerous chronic metatarsal and phalangeal fractures. Most  angulated fracture is of the 1st metatarsal head.      3. Stable appearance of internal fixation of left  lateral tibial  plateau fracture.     4. No femur fracture.     5. Diffuse osteopenia and muscular atrophy. Vascular calcifications     #MRA of the head without contrast, Neck MRA without and with contrast - 2/21/17  Impression:  1. Continued high-grade narrowing the proximal right internal carotid  artery, with continued long segment narrowing of the remainder of the  cervical, cavernous, and supraclinoid segments of the right internal  carotid artery, consistent with sequelae of chronic dissection.  2. 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 While this is an apparent improvement compared to prior MRIs,  this could be unchanged as today's neck MRA was performed with  intravenous gadolinium and the prior studies were noncontrast  time-of-flight MRA.  3. 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    #US Carotid Bilateral - 2/22/17  Impression:     1. Right side: Nondiagnostic exam due to body habitus and inability to  cooperate. The internal carotid and vertebral arteries are not clearly  identified, though findings suggest internal carotid artery dissection  as demonstrated on prior neck MRA.     2. Left side:   Degree of stenosis: Scattered atherosclerotic plaques with <50%  stenosis of the proximal and mid internal carotid artery. The distal  internal carotid artery and vertebral artery are not visualized.      3. Given limited nature of exam, please refer to MRA dated previous  day for better evaluation of the carotid arteries.           Discharge Medications:     Discharge Medication List as of 2/22/2017  3:57 PM      START taking these medications    Details   acetaminophen (TYLENOL) 325 MG tablet Take 3 tablets (975 mg) by mouth every 6 hours as needed for mild pain, Disp-100 tablet, Transitional      warfarin (COUMADIN) 3 MG tablet Take 1  tablet (3 mg) by mouth daily, Disp-30 tablet, TransitionalBlood thinner for atrial fibrillation. Goal INR 2-3      polyethylene glycol (MIRALAX/GLYCOLAX) Packet Take 17 g by mouth daily as needed for constipation, Disp-7 packet, R-11, TransitionalHold if diarrhea.      senna-docusate (SENOKOT-S;PERICOLACE) 8.6-50 MG per tablet Take 1-2 tablets by mouth 2 times daily, Disp-100 tablet, TransitionalStool softener. Hold if diarrhea      nitrofurantoin, macrocrystal-monohydrate, (MACROBID) 100 MG capsule Take 1 capsule (100 mg) by mouth every 12 hours, Disp-11 capsule, R-0, TransitionalFor urinary tract infection. Take through 2/27. After finishing this course, resume bactrim.         CONTINUE these medications which have CHANGED    Details   aspirin 81 MG tablet Take 1 tablet (81 mg) by mouth daily, Disp-100 tablet, R-3, TransitionalBlood thinner. Stop when INR 2-3      !! insulin glargine (LANTUS) 100 UNIT/ML injection Inject 15 Units Subcutaneous At Bedtime, TransitionalDiabetes: anticipate increasing insulin requirements as appetite improves      !! insulin glargine (LANTUS SOLOSTAR) 100 UNIT/ML injection Inject 30 Units Subcutaneous every morning (before breakfast), TransitionalAnticipate increasing insulin needs as appetite improves.      sulfamethoxazole-trimethoprim (BACTRIM DS) 800-160 MG per tablet Take 1 tablet by mouth once for 1 dose, R-0, TransitionalAntibiotic for neurogenic bladder. Begin after finishing course of nitrofurantoin.       !! - Potential duplicate medications found. Please discuss with provider.      CONTINUE these medications which have NOT CHANGED    Details   fexofenadine (ALLEGRA) 180 MG tablet Take 1 tablet (180 mg) by mouth daily, Disp-90 tablet, R-0, E-Prescribe      budesonide-formoterol (SYMBICORT) 160-4.5 MCG/ACT Inhaler Inhale 2 puffs into the lungs 2 times daily, Disp-1 Inhaler, R-11, E-Prescribe      Ipratropium-Albuterol (COMBIVENT RESPIMAT)  MCG/ACT inhaler Inhale 1 puff  into the lungs 2 times daily Not to exceed 6 doses per day., Disp-3 Inhaler, R-3, E-Prescribe      rosuvastatin (CRESTOR) 20 MG tablet Take 1 tablet (20 mg) by mouth daily Needs fasting labs before any further refills, Disp-90 tablet, R-0, E-PrescribeLast refill needs fasting lipids      RANITIDINE HCL PO Take 150 mg by mouth daily, Historical      calcium citrate (CALCITRATE) 950 MG tablet Take 1 tablet by mouth daily, Historical      Cyanocobalamin (VITAMIN B12) 1000 MCG TBCR Take 1,000 mcg by mouth daily, Disp-100 tablet, R-3, Historical      calcium citrate-vitamin D (CITRACAL) 315-200 MG-UNIT TABS Take 2 tablets by mouth 2 times daily 2 tabs contain 500mg elemental calcium + 800 iu's vitamin D3 in them ., Historical      denosumab (PROLIA) 60 MG/ML SOLN Inject 60 mg Subcutaneous every 6 months, Historical      exenatide (BYETTA) 5 mcg/0.02mL per dose (60 doses per 1.2 mL prefilled pen) Inject 5mcg under the skin twice daily 60 minutes before meals., Disp-1 Syringe, R-1, Fax      metoprolol (TOPROL-XL) 50 MG 24 hr tablet Take 1 tablet (50 mg) by mouth At Bedtime, Disp-90 tablet, R-1, E-PrescribeNew dose and dosage form.      fluticasone (FLONASE) 50 MCG/ACT nasal spray Spray 2 sprays into both nostrils daily, Disp-48 g, R-1, E-Prescribe      multivitamin, therapeutic with minerals (THERA-VIT-M) TABS Take 1 tablet by mouth daily, Historical      ferrous sulfate 325 (65 FE) MG tablet Take 325 mg by mouth daily (with breakfast) 65mg elemental iron, Historical      VITAMIN D, CHOLECALCIFEROL, PO Take 2,000 Units by mouth daily, Historical      bumetanide (BUMEX) 0.5 MG tablet Take 0.5 mg by mouth 2 times daily, Historical      magnesium oxide (MAG-OX) 400 MG tablet Take 2 tablets by mouth daily., Historical      niacin (NIASPAN) 500 MG CR tablet Take 1 tablet by mouth At Bedtime., Disp-90 tablet, R-2, E-Prescribe      insulin pen needle (B-D U/F) 31G X 8 MM Use 7 daily or as directed.Disp-300 each, [id number]       blood glucose monitoring (NO BRAND SPECIFIED) test strip 1 strip by In Vitro route 4 times dailyDisp-3 Box, [id number] to refill his formulary approved bs test strips.      ONE TOUCH LANCETS MISC use as directed 4 x daily and prn, Disp-120 each, R-3, E-Prescribe      ketoconazole (NIZORAL) 2 % shampoo Apply to the affected area and wash off after 5 minutes.Disp-120 mL, [id number]      nitroglycerin (NITROSTAT) 0.4 MG SL tablet Place 1 tablet (0.4 mg) under the tongue every 5 minutes as needed up to 3 tablets per episode., Disp-20 tablet, R-11, E-PrescribeProfile Rx: patient will contact pharmacy when needed      fluocinolone (SYNALAR) 0.01 % external solution Apply 1 to 2 times daily to scaly areas of scalp as needed.Profile Rx: patient will contact pharmacy when neededDisp-60 mL, [id number]      !! order for DME Equipment being ordered: 4 wheeled rolling walker.Disp-1 Device, R-0, Local Print      ACE/ARB NOT PRESCRIBED, INTENTIONAL, ACE & ARB not prescribed due to Hyperkalemia (baseline K+ > 5.5), Worsening renal function on ACE/ARB therapy and CKD (Chronic Kidney Disease), Historical      insulin syringes, disposable, U-100 0.3 ML MISC 1 each 4 times daily., Disp-100 each, R-prn, E-Prescribe      !! ORDER FOR DME Equipment being ordered: CPAP suppliesDisp-1 each, R-0, Fax      Catheters MISC As needed, Disp-200 each, R-10, Fax       !! - Potential duplicate medications found. Please discuss with provider.      STOP taking these medications       insulin lispro (HUMALOG KWIKPEN) 100 UNIT/ML soln Comments:   Reason for Stopping:                     Consultations:   No consultations were requested during this admission          Brief History of Illness:    Melvin Bhatia (Bob) is a 76 year old year old male with a pertinent PMHx of vascular dementia, hydrocephalus w/  shunt, DM, HTD, and paroxysmal A fib who was admitted on 2/19/2017 following a mechanical fall w/ resulting fractured left medial  leo. Transferred tp Neurology for evaluation of acute on chronic decline in mentation and orientation in the weeks prior to admission.         Hospital Course by problem:   #Acute on chronic decline in mentation:   #Normal Pressure Hydrocephalus  #Vascular Dementia  #acute on chronic cystits  #metabolic encephalopathy  The etiology of this was believed to be multifactorial. On admission, the patient's head CT revealed enlarged ventricles. The shunt was evaluated by neurosurgery who noted that pt's strata  shunt was incorrectly set at 2.0 and thus readjusted to 1.5. Progression of cerebral ischemic disease also noted on imaging, suggesting step-wise decline of vascular dementia. Acute cystitis was also discovered and thus any metabolic encephalopathy contribution was also treated.   -follow up with NUS (Dr. Neri)  -follow up with Neurology (Dr. Corey)  -follow up with Stroke clinic    #High grade Right ICA Stenosis: on chart review, severe R internal carotid stenosis suggested by January 2017 MRA. Repeat MRA this hospitalization confirmed high grade carotid stenosis (ddx: dissection v. Athero). Will continue anticoagulation and close follow-up with endovascular.   - anticoagulate with warfarin, INR goal of 2-3  - discontinue ASA when INR is therapeutic  - F/U with warfarin clinic   - F/U with stroke clinic  - f/u with Endovascular clinic in 2 weeks with Dr. Espana    #multi-infarct dementia in the setting of chronic A-fib:   Anticoagulation with warfarin was previously discontinued in 2012 in the setting of multiple falls and development of subarachnoid hemorrhage with intraventricular extension. However pt has had multiple embolic events since discontinuing warfarin, has a CHADSVAS score of 8, and now has severe right ICA stenosis. After discussion with the stroke team & discussion with pt's wife, it was decided that the potential benefit of warfarin outweighs the potential risk.   - anticoagulate with  warfarin as above (INR goal 2-3), continue aspirin until therapeutic INR then stop.  - follow-up in stroke clinic  - Follow up with Dr. Corey in neurology clinic    #Acute on Chronic Cystitis - at baseline, pt is straight cathed for neurogenic bladder and PTA was being treated with bactrim for chronic urinary tract colonization. UA during this admission was positive for Enterococcus faecalis & pt was started on a 7 day course of Nitrofurantoin.   - complete 7 day course of Nitrofurantoin (last dose: 2/27)  - continue straight caths     #DM Type II  #hypoglycemia  During this admission, pt did have some episodes of low fasting blood sugars in the morning and his bedtime insulin dose was adjusted accordingly. Byetta was also held during this admission.   - home insulin reduced to 30qAM, 15qHS anticipate increasing insulin needs as patient's appetite improves  - resume pta Byetta     #Left medial malleolus fracture - confirmed with Xray in the ED. Pt was casted by ortho team  - Non-op ortho follow up 1 week after discharge     Chronic Problems:  #COPD - pt was continued on home inhalers & medications  #SHIVAM - CPAP   #Paraxysomal A-fib - only on ASA & metoprolol PTA. CHADSVASC of 8. Started anticoagulation  - continued home ASA (stop when INR therapeutic), metoprolol for rate control  - started Warfarin   #HLD - continued home niacin & rosuvastatin   #CHF/HTN - continued metoprolol   #CKD - pt had no acute changes in creatinine during this admission.        DISCHARGE:   Upon discharge, pt appeared to be back to his baseline cognitive level and was medically stable. The patient's wife, Dian, did have an extensive discussion with social work and thought that in order to keep Mr. Bhatia safe moving forward he will eventually need to transition to a LTC facility.   - provide support to improve safety and decrease risk of falls (especially in light of anticoagulation)         Physical Exam:   /68 (BP Location: Left  "arm)  Pulse 63  Temp 96.2  F (35.7  C) (Oral)  Resp 18  Ht 1.702 m (5' 7\")  Wt 87.3 kg (192 lb 8 oz)  SpO2 97%  BMI 30.15 kg/m2     General Examination  Level of Alertness: Alert, cooperative, NAD.  HEENT: MMM. No oral ulcers. Anicteric.   CVS: Palpable peripheral pulses. Regular rate.   PULM: Protecting airway, Saturating well on RA. Occasional episodes of coughing   ABD: NT, ND  LE: Cast on left leg.   PSYCH: pleasant affect      Neurological Examination  MS Appropriate in alertness, orientated to name, . Said the year is , it is the month of March or April, that he is in a hotel, but after being told the year, he did recall the current president.   CN: VFF, PERRL. EOMI. No nystagmus. No facial asymmetry. No paresthesia or loss of sensation in face. Hearing intact to conversation. Tongue Central & palate raises symmetrically. Shoulder shrug and scm intact  MTR No abnormal or involuntary movements observed. Paratonia in both arms. L shoulder abduction limited to 30 degrees Elbow ext/flx: Right 4+, Left 4.  strength: 4+ right, 4 left. R hip flexion 4/5, L hip flexion 3/5.   SNS Intact to light touch in all extremities  RFLX 2+ in the right side biceps, brach, knee, ankle. R plantar up. 3+ L biceps, 4+ L brach. Left sided lower extremity reflexes deferred 2/2 leg in cast.   CRD FNF intact b/l  Gait  Non-ambulatory          Discharge Instructions and Follow-Up:   Discharge diet:  CHO diet    Discharge activity:  Wheelchair, transfers with assistance, non-weight bearing    Discharge follow-up:  Neurology, Neurosurgery, Orthopedics, Warfarin Clinic           Discharge Disposition:   TCU        Discharge Procedure Orders  NEUROLOGY ADULT REFERRAL   Referral Type: Consultation     Neurosurgery Referral     Neurology Adult Referral     Orthopedics Adult Referral     Inr Clinic Referral     Neurosurgery Referral     Reason for your hospital stay   Order Comments: Confusion: we have adjusted your shunt, treated a " urinary tract infection, and readdressed your paroxysmal atrial fibrillation.     General info for SNF   Order Comments: Length of Stay Estimate: Short Term Care: Estimated # of Days 31-90  Condition at Discharge: Improving  Level of care:skilled   Rehabilitation Potential: Good  Admission H&P remains valid and up-to-date: Yes  Recent Chemotherapy: N/A  Use Nursing Home Standing Orders: Yes     Mantoux instructions   Order Comments: Give two-step Mantoux (PPD) Per Facility Policy Yes     Adult [facility] Follow-up and recommended labs and tests   Order Comments: Follow up with primary care provider, Ash Bowman, within 7 days for hospital follow- up.  The following labs/tests are recommended: INR.      Appointments on [facility] and/or [facility] (with [facility] or [facility] provider or service). Call [phone] if you haven't heard regarding these appointments within 7 days of discharge.     Activity   Order Comments: Your activity upon discharge: non-weight bearing, fall precautions   Order Specific Question Answer Comments   Is discharge order? Yes      Wound care and dressings   Order Comments: Instructions to care for your wound at home: keep clean and dry     Glucose monitor nursing POCT   Order Comments: Before meals and at bedtime     Daily weights   Order Comments: Call Provider for weight gain of more than 2 pounds per day or 5 pounds per week.     Wound care   Order Comments: Site:  L ankle  Instructions:  Keep clean and dry     Follow Up and recommended labs and tests   Order Comments: Follow up with [facility] physician.  The following labs/tests are recommended: INR.     Continuous Passive Motion Machine   Order Comments: Start CPM Day of Surgery.  0 - 90 degrees. Advance as tolerated.     DNR/DNI     Physical Therapy Adult Consult   Order Comments: Evaluate and treat as clinically indicated.     Occupational Therapy Adult Consult   Order Comments: Evaluate and treat as clinically indicated.      Speech Language Path Adult Consult   Order Comments: Evaluate and treat as clinically indicated.     Fall precautions     Diet   Order Comments: Follow this diet upon discharge: Orders Placed This Encounter     Room Service     Moderate Consistent CHO Diet   Order Specific Question Answer Comments   Is discharge order? Yes      Advance Diet as Tolerated   Order Comments: Follow this diet upon discharge: Orders Placed This Encounter     Room Service     Moderate Consistent CHO Diet   Order Specific Question Answer Comments   Is discharge order? Yes        Written with assistance from Tangela Lacy MS3. Edited by Jerod Saleh MD who is responsible for this discharge summary.     Jerod Saleh MD  Neurology G2  [phone]             ATTESTATION:  I personally saw and examined Melvin with the neurology inpatient/ consult team. I edited the above note as needed and I agree with Dr. Saleh's examination, assessment, and plan.     MRA with critical right ICA stenosis. Risk of surgical intervention/ stenting would be too significant given medical comorbidities. Given this finding in combination with atrial fibrillation in the setting of known vascular-associated dementia, will initiate anticoagulation. Prior use of anticoagulation was stopped in the setting of repeated falls and a traumatic subarachnoid hemorrhage. Patient is not ambulatory at this time and will be discharged to a TCU, under the 24-hour supervision of staff. Benefit of anticoagulation outweigh the risks at this point. INR goal of 2-3. Continued follow-up with outpatient neurology and neurosurgery will be essential at reassessing his clinical situation. To complete treatment of complicated UTI. Neurosurgery adjusted shunt following MRA.     I spent over 25 minutes on the unit today personally evaluating and managing the care of this patient. Over 50% of this time was spent counseling the patient and any present family members on changes in his  neurologic-related status, discussing any new testing/ imaging results, and coordinating discharge.  Karen Vázquez MD  Neuro-oncology  02/22/17

## 2017-02-22 NOTE — PROGRESS NOTES
"Brief Progress Note:     S: Was asked to evaluate LLE splint given report of discomfort from splint yesterday evening/overnight. This morning patient acknowledges some mild discomfort yesterday, but denies any concerns about splint today, stating it feels \"good\". He denies any specific points of irritation, pressure, pain. He denies new numbness or tingling to the toes. Speaking with nursing staff today, no report of issues with splint or concerns expressed by patient this morning; he has been compliant w/ NWB status this shift. No falls.     O:   Gen: alert, nad, resting in bed comfortably. Hard of hearing. Answering questions appropriately.  Resp: no respiratory distress   LLE: Fiberglass splint in place with ace in place, c/d/i. Wiggles toes. Fires EHL/FHL w/ 5/5 strength. SILT dp, sp, tibial nerve distribution at baseline per patient. + dopplerable DP through cast padding window. Toes wwp with good capillary refill.     A/P:   76 year old male with complex PMHx including DM, CVA, CAD/MI, CHF, CKD with previous L trimalleolar ankle fracture treated non operatively who presents w/ new minimally displaced L bimalleolar ankle fracture. ACE wrap was taken down, splint assessed and was c/d/i. Underlying cast padding layers clean and intact. CMS intact with dopplerable pulse and no reported discomfort from splint today. Edges of splint were reinforced with cast padding to reduce any possible future discomfort from rubbing/pressure. ACE was slightly loosened and rewrapped. He denied any discomfort following adjustment. Will keep in place for now with continued plan of NWB LLE and follow up in non op orthopaedic clinic in 1 week for likely transition to cast immobilization. Please do not hesitate to contact with further questions or concerns regarding splint; team will reassess and possible resplint if patient reports continued discomfort from splint.     Vaibhav De Jesus MD  Orthopaedic Surgery PGY-1    Please contact " me directly regarding this patient during normal business hours Mon-Fri before 5pm @ [phone].  IF it is Night, a Weekend, or there is No Response, then please page the Orthopaedic Resident On Call in [facility]. Thank you!

## 2017-02-22 NOTE — PROGRESS NOTES
VSS. Alert to self only.Calm and cooperative with cares. Denies pain. Left foot immobilized in a cast. CMS intact. Pulses to LLE not assessed. Able to wiggle toes upon request. Straight cath x1 with 300 cc output. +BS.Had one small BM. BS at 02.00 was 95.Offered apple juice patient refused. Will recheck  BS at 06.00 am. CPAP overnight. Continue with plan of care    Addendum: BS 74 at 06.00 am. BS came up to 84 after 8 oz of apple juice given. Refused [facility] cath at this time.

## 2017-02-22 NOTE — PROGRESS NOTES
Social Work Services Discharge Note      Patient Name:  Melvin Bhatia     Anticipated Discharge Date:  2/22/17    Discharge Disposition:   TCU:  [facility]  [address], [place] [address]  Fax: [phone]  Main: [phone]  Admissions: [phone]     Pre-Admission Screening (PAS) online form has been completed.  The Level of Care (LOC) is:  Determined  Confirmation Code is:  [id number]  Patient/caregiver informed of referral to [facility] for Pre-Admission Screening for skilled nursing facility (SNF) placement and to expect a phone call post discharge from SNF.     Additional Services/Equipment Arranged:  Stretcher transport arranged via [facility] ([phone]) for today at 4pm.      Patient / Family response to discharge plan:  Pt is only oriented to self but pt's wife Dian is agreeable and aware of out of pocket expense for transport.      Persons notified of above discharge plan:  Pt's wife Dian ([phone]), bedside nurse, charge nurse, NST, receiving facility, Neurology team    Staff Discharge Instructions:  Please fax discharge orders and signed hard scripts for any controlled substances.  Please print a packet and send with patient.     CTS Handoff completed:  YES    AWAIS Brasher, MSW  7B   [phone] (pager) [phone]  2/22/2017

## 2017-02-22 NOTE — PLAN OF CARE
Problem: Goal Outcome Summary  Goal: Goal Outcome Summary  Outcome: Adequate for Discharge Date Met:  02/22/17  Vitals:     02/21/17 2200 02/22/17 0314 02/22/17 0327 02/22/17 0807   BP: 144/52   125/54 149/68   BP Location: Left arm   Left arm Left arm   Pulse: 62   70 63   Resp: 20 18 18   Temp: 97.1  F (36.2  C)   96.5  F (35.8  C) 96.2  F (35.7  C)   TempSrc: Oral   Axillary Oral   SpO2: 98% 98% 100% 97%   Weight:           Height:             AVSS, pt oriented to self only. Minimal complaints of pain, PO tylenol given as ordered. Left leg soft cast CDI. Pt straight cath'd x 3 today without difficulty. Pt incontinent of stool x 1 and had a large BM on bedpan x 1. Pt adequate for discharge to TCU. Report given to nurse at [facility]. Pt has a ride scheduled for 4 pm. Continuing to monitor.

## 2017-02-23 NOTE — PROGRESS NOTES
"[facility]  \"Hello, my name is Daniela Alicea , and I am calling from the [facility].  I want to check in and see how you are doing, after leaving the hospital.  You may also receive a call from your Care Coordinator (care team), but I want to make sure you don t have any urgent needs.  I have a couple questions to review with you:     Post-Discharge Outreach                                                    Melvin Bhatia is a 76 year old male     Follow up with primary care provider, Ash Bowman, within 7 days for hospital follow- up. The following labs/tests are recommended: INR.        Your next 10 appointments already scheduled            May 02, 2017 3:00 PM CDT   (Arrive by 2:45 PM)   Return Visit with Casey Corey MD   [facility] Neurology ([facility])     [address] [place] [address] [phone]                  May 23, 2017 11:00 AM CDT   SHORT with Oma Gagnon RPH   [facility] ([facility])              Care Team:    Patient Care Team       Relationship Specialty Notifications Start End    Ash Bowman MD PCP - General Family Practice  6/30/14     Phone: [phone] Fax: [phone]         [facility] [address] [facility] [address]    Jayant Chaudhari MD MD Nephrology  7/16/13     Phone: [phone] Fax: [phone]         [facility] [address] [facility] [address]    Oma Gagnon RPH Pharmacist Pharmacist  5/19/14      [facility] [address] [facility] [address]    Nya Owens, RN Nurse Coordinator Neurology Admissions 6/16/16     Phone: [phone] Pager: [phone]        Una Young, LP   Psychology  6/30/16     Phone: [phone] Fax: [phone]         [facility] [address] [facility] [address] [facility] [address]    Jimmy Huntley MD MD Student in " organized health care education/training program  10/15/16     Phone: [phone] Fax: [phone]         [address] [facility] [address]            Transition of Care Review                                                      Did you have a surgery or procedure during your hospital visit? No   If yes, do you have any of the following:     Signs of infection: No    Pain:  yes     Pain Scale (0-10) 3/10     Location: Ankle    Wound/incision concerns? NA    Do you have all of your medications/refills?  Yes    Are you having any side effects or questions about your medication(s)? No    Do you have any new or worsening symptoms?  No    Do you have any future appointments scheduled?   Yes       Next 5 appointments (look out 90 days)     May 23, 2017 11:00 AM CDT   SHORT with Oma Gagnon RPH   [facility] ([facility])    [address] [place] [address] [phone]                      Plan                                                      Thanks for your time.  Your Care Coordinator may follow-up within the next couple days.  In the meantime if you have questions, concerns or problems call your care team.        Daniela Alicea

## 2017-02-24 NOTE — TELEPHONE ENCOUNTER
2-24-17    Sorry to hear claire is having confusion issues --hopefully care facility will be good place for him?    -Oma

## 2017-02-27 NOTE — TELEPHONE ENCOUNTER
Contacted Dian.  She said that the following regimen has been most effective:    Humalo units at breakfast    30 unit at lunch,      5 units at dinner    Byetta:      5 micrograms at breakfast          5 micrograms at dinner    Lantus:     37 units at breakfast,          37 units at bedtime        If/when approved, order can be faxed to [phone]

## 2017-02-27 NOTE — TELEPHONE ENCOUNTER
2-27-17    mtm notes insulin dosing progress.    Oma Gagnon Rph.  Medication Therapy Management Provider  [phone]

## 2017-02-27 NOTE — TELEPHONE ENCOUNTER
mtm re-verified Yesica verbally called/fax insulin orders into [facility].    Byetta-5 --inject 5 mcg. 2 x day     Humalog : inject 30 units before breakfast and lunch and 5 units before dinner    Lantus :    Inject 37 units twice daily (breakfast and dinner).          Oma Gagnon ankush.  Medication Therapy Management Provider  [phone]

## 2017-02-27 NOTE — TELEPHONE ENCOUNTER
Dyan called from [facility] requesting patient insulin dosage and has questions    Please advise

## 2017-03-02 NOTE — MR AVS SNAPSHOT
After Visit Summary   3/2/2017    Melvin Bhatia    MRN: [id number]           Patient Information     Date Of Birth          1940        Visit Information        Provider Department      3/2/2017 3:40 PM Jimmy Huntley MD M [facility] Sports Medicine        Today's Diagnoses     Closed left ankle fracture, initial encounter    -  1      Care Instructions    Cast Care:Fiberglass    1. It will take up to 2 HO URS for the fiberglass to fully harden. Do not apply weight on the cast during that time or else it may break  2. To prevent swelling under the cast, for the first 48 HOURS :    If the cast is on your ARM: keep it in a sling or raised to shoulder level when sitting or standing; rest it on your chest or on a pillow at your side when lying down.    If the cast is on your LEG: keep it propped up above the level of your hip when sitting or lying. Sleep with the cast raised on a pillow. Avoid crutch walking as much as possible during this time.    Move Fingers or Toes as much as possible to avoid swelling    3. Keep the cast completely dry at all times. Bathe with your cast well out of the water, protected with a large, rubber- banded plastic bag. If your cast does become wet, use a hair dryer to warm the cast and speed up the drying process. A wet cast may cause skin problems.  4. After a broken bone has fractured, it needs rest to heal properly. Physicians commonly use casts to support and protect broken bones. While casts are uncomfortable and often heavy, they effectively immobilize and support the fracture during the healing process.  5. Do not alter your cast in any way, such as trimming or removal of padding.   6. Never place anything inside your cast, such as a coat  or ruler to scratch an itch. If itching occurs, with the setting on cold, use a hair drier to blow cool room temperature air into the cast in line with the itchy spot (DO NOT use a Heated Setting). This should  provide you some relief. As a general rule, itching is from moisture and the added air circulation tends to evaporate any excess moisture and eliminates the itch.  7. Keep dirt, sand, and powder away from the inside of your splint or cast.  Follow Up:  Follow up with your doctor as advised by our staff.    Get Prompt Medical Attention if any of the following occur:    Skin Irritation     Burning     Constant Pain     Numbness     Loss of Circulation     Excessive swelling                Follow-ups after your visit        Your next 10 appointments already scheduled     Mar 23, 2017  1:20 PM CDT   XR ANKLE LEFT G/E 3 VIEWS with [id number]   [facility] Orthopaedics XRay ([facility])    [address]  4th Floor  [facility] [id number] [phone]           Please bring a list of your current medicines to your exam. (Include vitamins, minerals and over-thecounter medicines.) Leave your valuables at home.  Tell your doctor if there is a chance you may be pregnant.  You do not need to do anything special for this exam.            Mar 23, 2017  1:40 PM CDT   (Arrive by 1:25 PM)   Return Visit with Jimmy Huntley MD   [facility] Sports Medicine ([facility])    [address]  5th Floor  [facility] [id number] [phone]            May 02, 2017  3:00 PM CDT   (Arrive by 2:45 PM)   Return Visit with Casey Corey MD   [facility] Neurology ([facility])    [address]  3rd Floor  [facility] [id number] [phone]            May 23, 2017 11:00 AM CDT   SHORT with Oma Gagnon RPH   [facility] ([facility])    [address]  [address]  [place] [id number] [phone]              Who to contact     Please call your clinic at [phone] to:    Ask questions about your health    Make or cancel appointments    Discuss your medicines    Learn about your test  "results    Speak to your doctor   If you have compliments or concerns about an experience at your clinic, or if you wish to file a complaint, please contact [facility] Patient Relations at [phone] or email us at [email or web address]         Additional Information About Your Visit        [employer] Information     [employer] gives you secure access to your electronic health record. If you see a primary care provider, you can also send messages to your care team and make appointments. If you have questions, please call your primary care clinic.  If you do not have a primary care provider, please call [phone] and they will assist you.      [employer] is an electronic gateway that provides easy, online access to your medical records. With [employer], you can request a clinic appointment, read your test results, renew a prescription or communicate with your care team.     To access your existing account, please contact your [facility] or call [phone] for assistance.        Care EveryWhere ID     This is your Care EveryWhere ID. This could be used by other organizations to access your [facility] medical records  [id number]        Your Vitals Were     Height BMI (Body Mass Index)                5' 7\" (1.702 m) 30.54 kg/m2           Blood Pressure from Last 3 Encounters:   02/22/17 149/68   02/02/17 140/60   12/16/16 132/73    Weight from Last 3 Encounters:   03/02/17 195 lb (88.5 kg)   02/20/17 192 lb 8 oz (87.3 kg)   02/02/17 199 lb (90.3 kg)               Primary Care Provider Office Phone # Fax #    Ash Bowman [phone]       [facility] [address]  [facility] [address]        Thank you!     Thank you for choosing [facility]  for your care. Our goal is always to provide you with excellent care. Hearing back from our patients is one way we can continue to improve our services. Please take a few " minutes to complete the written survey that you may receive in the mail after your visit with us. Thank you!             Your Updated Medication List - Protect others around you: Learn how to safely use, store and throw away your medicines at www.disposemymeds.org.          This list is accurate as of: 3/2/17  5:08 PM.  Always use your most recent med list.                   Brand Name Dispense Instructions for use    * ACE/ARB NOT PRESCRIBED (INTENTIONAL)      ACE & ARB not prescribed due to Hyperkalemia (baseline K+ > 5.5), Worsening renal function on ACE/ARB therapy and CKD (Chronic Kidney Disease)       acetaminophen 325 MG tablet    TYLENOL    100 tablet    Take 3 tablets (975 mg) by mouth every 6 hours as needed for mild pain       aspirin 81 MG tablet     100 tablet    Take 1 tablet (81 mg) by mouth daily       blood glucose monitoring test strip    no brand specified    3 Box    1 strip by In Vitro route 4 times daily       budesonide-formoterol 160-4.5 MCG/ACT Inhaler    SYMBICORT    1 Inhaler    Inhale 2 puffs into the lungs 2 times daily       bumetanide 0.5 MG tablet    BUMEX     Take 0.5 mg by mouth 2 times daily       calcium citrate 950 MG tablet    CALCITRATE     Take 1 tablet by mouth daily       calcium citrate-vitamin D 315-200 MG-UNIT Tabs per tablet    CITRACAL     Take 2 tablets by mouth 2 times daily 2 tabs contain 500mg elemental calcium + 800 iu's vitamin D3 in them .       Catheters Misc     200 each    As needed       denosumab 60 MG/ML Soln injection    PROLIA     Inject 60 mg Subcutaneous every 6 months       exenatide 5 MCG/0.02ML Sopn injection    BYETTA 5 MCG PEN    1 Syringe    Inject 5mcg under the skin twice daily 60 minutes before meals.       ferrous sulfate 325 (65 FE) MG tablet    IRON     Take 325 mg by mouth daily (with breakfast) 65mg elemental iron       fexofenadine 180 MG tablet    ALLEGRA    90 tablet    Take 1 tablet (180 mg) by mouth daily       fluocinolone 0.01 %  solution    SYNALAR    60 mL    Apply 1 to 2 times daily to scaly areas of scalp as needed.Profile Rx: patient will contact pharmacy when needed       fluticasone 50 MCG/ACT spray    FLONASE    48 g    Spray 2 sprays into both nostrils daily       insulin glargine 100 UNIT/ML injection    LANTUS SOLOSTAR    30 mL    Inject 37 Units Subcutaneous 2 times daily       insulin lispro 100 UNIT/ML injection    HumaLOG KWIKpen    30 mL    Inject 30 units before breakfast and lunch and 5 units before dinner  as directed.       insulin pen needle 31G X 8 MM    B-D U/F    300 each    Use 7 daily or as directed.       insulin syringes (disposable) U-100 0.3 ML Misc     100 each    1 each 4 times daily.       Ipratropium-Albuterol  MCG/ACT inhaler    COMBIVENT RESPIMAT    3 Inhaler    Inhale 1 puff into the lungs 2 times daily Not to exceed 6 doses per day.       ketoconazole 2 % shampoo    NIZORAL    120 mL    Apply to the affected area and wash off after 5 minutes.       MAG- MG tablet   Generic drug:  magnesium oxide      Take 2 tablets by mouth daily.       metoprolol 50 MG 24 hr tablet    TOPROL-XL    90 tablet    Take 1 tablet (50 mg) by mouth At Bedtime       multivitamin, therapeutic with minerals Tabs tablet      Take 1 tablet by mouth daily       niacin 500 MG CR tablet    NIASPAN    90 tablet    Take 1 tablet by mouth At Bedtime.       nitrofurantoin (macrocrystal-monohydrate) 100 MG capsule    MACROBID    11 capsule    Take 1 capsule (100 mg) by mouth every 12 hours       nitroglycerin 0.4 MG sublingual tablet    NITROSTAT    20 tablet    Place 1 tablet (0.4 mg) under the tongue every 5 minutes as needed up to 3 tablets per episode.       ONE TOUCH LANCETS Misc     120 each    use as directed 4 x daily and prn       * order for DME     1 each    Equipment being ordered: CPAP supplies       order for DME     1 Device    Equipment being ordered: 4 wheeled rolling walker.       polyethylene glycol Packet     MIRALAX/GLYCOLAX    7 packet    Take 17 g by mouth daily as needed for constipation       RANITIDINE HCL PO      Take 150 mg by mouth daily       rosuvastatin 20 MG tablet    CRESTOR    90 tablet    Take 1 tablet (20 mg) by mouth daily Needs fasting labs before any further refills       senna-docusate 8.6-50 MG per tablet    SENOKOT-S;PERICOLACE    100 tablet    Take 1-2 tablets by mouth 2 times daily       Vitamin B12 1000 MCG Tbcr     100 tablet    Take 1,000 mcg by mouth daily       VITAMIN D (CHOLECALCIFEROL) PO      Take 2,000 Units by mouth daily       warfarin 3 MG tablet    COUMADIN    30 tablet    Take 1 tablet (3 mg) by mouth daily       * Notice:  This list has 2 medication(s) that are the same as other medications prescribed for you. Read the directions carefully, and ask your doctor or other care provider to review them with you.

## 2017-03-02 NOTE — PROGRESS NOTES
Subjective:   Melvin Bhatia is a 76 year old male who is here for left ankle pain after fall while transferring to wheelchair from bed.     Background:   Date of injury: 2/19/17     - was hospitalized after the fall due to significant medical comorbidities  - ortho was consulted in the hospital and recommended non-operative management with splint then cast  - pain is presently controlled  - he was taken to [facility] after release from the hospital  - presently pain is controlled    PAST MEDICAL, SOCIAL, SURGICAL AND FAMILY HISTORY: He  has a past medical history of ARDS (adult respiratory distress syndrome) (H); Atrial fibrillation (H); Bell's palsy (1/2010); Cauda equina syndrome with neurogenic bladder (H); Cerebellar infarct (H) (4/2012); CHF (congestive heart failure) (H); Chronic airway obstruction, not elsewhere classified; Chronic infection; CKD (chronic kidney disease) stage 4, GFR 15-29 ml/min (H); Congestive heart failure, unspecified (5/06); Coronary artery disease; CVA (cerebral infarction); Diabetes; Essential hypertension, benign; Gastro-oesophageal reflux disease; Heart attack (H); Hemorrhage of gastrointestinal tract, unspecified (2/2003); Hydrocephalus; Hypersomnia with sleep apnea, unspecified; Intraventricular hemorrhage (1/2012); Legionella pneumonia (H) (8/09); Lumbago; Lumbosacral plexopathy (10/09); Mixed hyperlipidemia; Other osteoporosis (3/2006); Other specified anemias (2002); Other specified disorders of rotator cuff syndrome of shoulder and allied disorders; Other testicular hypofunction; Palpitations; Sleep apnea; Subarachnoid hemorrhage (H) (1/2012); Tibial plateau fracture (2/2011); Type II or unspecified type diabetes mellitus without mention of complication, not stated as uncontrolled; and Unspecified disorder resulting from impaired renal function. He also has no past medical history of Pneumonia.  He  has a past surgical history that includes surgical  "history of - (2001); surgical history of - (age 20); DEXA, BONE DENSITY, AXIAL SKEL (3/2006); cardiac nuc wilman stress test nl (5/06); COLONOSCOPY THRU STOMA, DIAGNOSTIC (2/2003); upper gi endoscopy (2/2003); upper gi endoscopy (4/2003); surgical history of - (2003); tonsillectomy (child); cardiac nuc wilman stress test nl (6/2008, 3/09); DOPPLER ECHO PULSED, COMPLETE; surgical history of -; Open reduction internal fixation tibial plateau (3/2011); and Implant shunt ventriculoperitoneal (2/28/2012).  His family history includes C.A.D. in his father; DIABETES in his maternal grandmother.  He reports that he quit smoking about 19 years ago. He has never used smokeless tobacco. He reports that he does not drink alcohol or use illicit drugs.    ALLERGIES: He is allergic to penicillins; albuterol; albuterol sulfate; atorvastatin; atorvastatin calcium; cipro [ciprofloxacin]; ciprofloxacin; simvastatin; and warfarin.    CURRENT MEDICATIONS: He has a current medication list which includes the following prescription(s): insulin glargine, insulin lispro, aspirin, acetaminophen, warfarin, polyethylene glycol, senna-docusate, nitrofurantoin (macrocrystal-monohydrate), fexofenadine, budesonide-formoterol, ipratropium-albuterol, rosuvastatin, ranitidine hcl, calcium citrate, vitamin b12, calcium citrate-vitamin d, denosumab, exenatide, metoprolol, fluticasone, insulin pen needle, blood glucose monitoring, one touch lancets, ketoconazole, nitroglycerin, fluocinolone, order for dme, ACE/ARB NOT PRESCRIBED, INTENTIONAL,, multivitamin, therapeutic with minerals, ferrous sulfate, cholecalciferol, bumetanide, insulin syringes (disposable), order for dme, catheters, magnesium oxide, and niacin.     REVIEW OF SYSTEMS: 3 point review of systems is negative except as noted above.     Exam:   Ht 5' 7\" (1.702 m)  Wt 195 lb (88.5 kg)  BMI 30.54 kg/m2           CONSTITUTIONIAL: healthy, alert and no distress  HEAD: Normocephalic. No masses, " lesions, tenderness or abnormalities  SKIN: no suspicious lesions or rashes  GAIT: wheelchair  NEUROLOGIC: Non-focal  PSYCHIATRIC: affect normal/bright and mentation appears normal.    MUSCULOSKELETAL: L Foot / Ankle - limited exam secondary to pt mental status and pain, however, extremity is at it's baseline from which I have seen it in the past with chronic stasis and eczematous changes.  TTP over the medial and lateral maleoli. 2-4th toes with minor abrasions    X-RAY INTERPRETATION:   3V of the L ankle obtained and personally reviewed.  Prior fx pattern redemonstrated without significant change to alignment.     Assessment/Plan:   1) L Bi-mal Fracture  - continue non-operative mgmt  - foot casted in a short leg cast today  - continue NWB in wheel chair  - wound care ordered for care home for his toe abrasions  - plan for follow up in 3 weeks with repeat XRs      Patient seen and case dw Dr. Nory Huntley D.O.  Sports Medicine Fellow

## 2017-03-02 NOTE — NURSING NOTE
Relevant Diagnosis: left ankle fracture    short leg application and care    Type of Cast applied: short leg   Cast/Splinting material used: fiberglass    Person(s) involved in teaching:   Patient and Wife     Motivation Level:  Asks Questions: Yes  Eager to Learn: Yes  Cooperative: Yes  Receptive (willing/able to accept information): Yes     Patient demonstrates understanding of the following:   Reason for the appointment, diagnosis and treatment plan: Yes    Which situations necessitate calling provider and whom to contact: Yes     Teaching Concerns Addressed:   Comments: none     Proper use and care of short leg cast: Yes  Pain management techniques: Yes  Wound Care: Yes  How and/when to access community resources: Yes     Cast was applied in standard Manner:  Yes  Cast fit well:  Yes  Patient reports cast to fit comfortably:  Yes    Instructional Materials Used/Given: written instructions given and verbal, patient and wife agreeable.

## 2017-03-02 NOTE — PROGRESS NOTES
Attending Note:   I have personally examined this patient and have reviewed the clinical presentation and progress note with the fellow. I agree with the treatment plan as outlined. The plan was formulated with the fellow on the day of the patient's visit. I have reviewed all imaging with the fellow and agree with the findings in the documentation.     Rin Cueto MD, CAQ, CCD  [facility]  Sports Medicine and Bone Health

## 2017-03-02 NOTE — PATIENT INSTRUCTIONS
Cast Care:Fiberglass    1. It will take up to 2 HO URS for the fiberglass to fully harden. Do not apply weight on the cast during that time or else it may break  2. To prevent swelling under the cast, for the first 48 HOURS :    If the cast is on your ARM: keep it in a sling or raised to shoulder level when sitting or standing; rest it on your chest or on a pillow at your side when lying down.    If the cast is on your LEG: keep it propped up above the level of your hip when sitting or lying. Sleep with the cast raised on a pillow. Avoid crutch walking as much as possible during this time.    Move Fingers or Toes as much as possible to avoid swelling    3. Keep the cast completely dry at all times. Bathe with your cast well out of the water, protected with a large, rubber- banded plastic bag. If your cast does become wet, use a hair dryer to warm the cast and speed up the drying process. A wet cast may cause skin problems.  4. After a broken bone has fractured, it needs rest to heal properly. Physicians commonly use casts to support and protect broken bones. While casts are uncomfortable and often heavy, they effectively immobilize and support the fracture during the healing process.  5. Do not alter your cast in any way, such as trimming or removal of padding.   6. Never place anything inside your cast, such as a coat  or ruler to scratch an itch. If itching occurs, with the setting on cold, use a hair drier to blow cool room temperature air into the cast in line with the itchy spot (DO NOT use a Heated Setting). This should provide you some relief. As a general rule, itching is from moisture and the added air circulation tends to evaporate any excess moisture and eliminates the itch.  7. Keep dirt, sand, and powder away from the inside of your splint or cast.  Follow Up:  Follow up with your doctor as advised by our staff.    Get Prompt Medical Attention if any of the following occur:    Skin Irritation      Burning     Constant Pain     Numbness     Loss of Circulation     Excessive swelling

## 2017-03-02 NOTE — LETTER
3/2/2017      RE: Melvin Bhatia  [address]   [facility] [address]       Attending Note:   I have personally examined this patient and have reviewed the clinical presentation and progress note with the fellow. I agree with the treatment plan as outlined. The plan was formulated with the fellow on the day of the patient's visit. I have reviewed all imaging with the fellow and agree with the findings in the documentation.     Rin Cueto MD, CAQ, CCD  [facility]  Sports Medicine and Bone Health     Subjective:   Melvin Bhatia is a 76 year old male who is here for left ankle pain after fall while transferring to wheelchair from bed.     Background:   Date of injury: 2/19/17     - was hospitalized after the fall due to significant medical comorbidities  - ortho was consulted in the hospital and recommended non-operative management with splint then cast  - pain is presently controlled  - he was taken to [facility] after release from the hospital  - presently pain is controlled    PAST MEDICAL, SOCIAL, SURGICAL AND FAMILY HISTORY: He  has a past medical history of ARDS (adult respiratory distress syndrome) (H); Atrial fibrillation (H); Bell's palsy (1/2010); Cauda equina syndrome with neurogenic bladder (H); Cerebellar infarct (H) (4/2012); CHF (congestive heart failure) (H); Chronic airway obstruction, not elsewhere classified; Chronic infection; CKD (chronic kidney disease) stage 4, GFR 15-29 ml/min (H); Congestive heart failure, unspecified (5/06); Coronary artery disease; CVA (cerebral infarction); Diabetes; Essential hypertension, benign; Gastro-oesophageal reflux disease; Heart attack (H); Hemorrhage of gastrointestinal tract, unspecified (2/2003); Hydrocephalus; Hypersomnia with sleep apnea, unspecified; Intraventricular hemorrhage (1/2012); Legionella pneumonia (H) (8/09); Lumbago; Lumbosacral plexopathy (10/09); Mixed hyperlipidemia; Other  osteoporosis (3/2006); Other specified anemias (2002); Other specified disorders of rotator cuff syndrome of shoulder and allied disorders; Other testicular hypofunction; Palpitations; Sleep apnea; Subarachnoid hemorrhage (H) (1/2012); Tibial plateau fracture (2/2011); Type II or unspecified type diabetes mellitus without mention of complication, not stated as uncontrolled; and Unspecified disorder resulting from impaired renal function. He also has no past medical history of Pneumonia.  He  has a past surgical history that includes surgical history of - (2001); surgical history of - (age 20); DEXA, BONE DENSITY, AXIAL SKEL (3/2006); cardiac nuc wilman stress test nl (5/06); COLONOSCOPY THRU STOMA, DIAGNOSTIC (2/2003); upper gi endoscopy (2/2003); upper gi endoscopy (4/2003); surgical history of - (2003); tonsillectomy (child); cardiac nuc wilman stress test nl (6/2008, 3/09); DOPPLER ECHO PULSED, COMPLETE; surgical history of -; Open reduction internal fixation tibial plateau (3/2011); and Implant shunt ventriculoperitoneal (2/28/2012).  His family history includes C.A.D. in his father; DIABETES in his maternal grandmother.  He reports that he quit smoking about 19 years ago. He has never used smokeless tobacco. He reports that he does not drink alcohol or use illicit drugs.    ALLERGIES: He is allergic to penicillins; albuterol; albuterol sulfate; atorvastatin; atorvastatin calcium; cipro [ciprofloxacin]; ciprofloxacin; simvastatin; and warfarin.    CURRENT MEDICATIONS: He has a current medication list which includes the following prescription(s): insulin glargine, insulin lispro, aspirin, acetaminophen, warfarin, polyethylene glycol, senna-docusate, nitrofurantoin (macrocrystal-monohydrate), fexofenadine, budesonide-formoterol, ipratropium-albuterol, rosuvastatin, ranitidine hcl, calcium citrate, vitamin b12, calcium citrate-vitamin d, denosumab, exenatide, metoprolol, fluticasone, insulin pen needle, blood glucose  "monitoring, one touch lancets, ketoconazole, nitroglycerin, fluocinolone, order for dme, ACE/ARB NOT PRESCRIBED, INTENTIONAL,, multivitamin, therapeutic with minerals, ferrous sulfate, cholecalciferol, bumetanide, insulin syringes (disposable), order for dme, catheters, magnesium oxide, and niacin.     REVIEW OF SYSTEMS: 3 point review of systems is negative except as noted above.     Exam:   Ht 5' 7\" (1.702 m)  Wt 195 lb (88.5 kg)  BMI 30.54 kg/m2           CONSTITUTIONIAL: healthy, alert and no distress  HEAD: Normocephalic. No masses, lesions, tenderness or abnormalities  SKIN: no suspicious lesions or rashes  GAIT: wheelchair  NEUROLOGIC: Non-focal  PSYCHIATRIC: affect normal/bright and mentation appears normal.    MUSCULOSKELETAL: L Foot / Ankle - limited exam secondary to pt mental status and pain, however, extremity is at it's baseline from which I have seen it in the past with chronic stasis and eczematous changes.  TTP over the medial and lateral maleoli. 2-4th toes with minor abrasions    X-RAY INTERPRETATION:   3V of the L ankle obtained and personally reviewed.  Prior fx pattern redemonstrated without significant change to alignment.     Assessment/Plan:   1) L Bi-mal Fracture  - continue non-operative mgmt  - foot casted in a short leg cast today  - continue NWB in wheel chair  - wound care ordered for care home for his toe abrasions  - plan for follow up in 3 weeks with repeat XRs      Patient seen and case dw Dr. Nory Huntley D.O.  Sports Medicine Fellow        "

## 2017-03-06 NOTE — TELEPHONE ENCOUNTER
Called number below back to let them know Melvin's left LE weight bearing status is non-weight bearing.

## 2017-03-06 NOTE — TELEPHONE ENCOUNTER
----- Message from Maren Macias sent at 3/6/2017 11:52 AM CST -----  Regarding: weight bearing status  Contact: [phone]  Romario Davis calling to get weight bearing status on his left side lower extremity. You can leave a message on his confidential VM or you can Fax it as well- [phone]  Please call Romario back at [phone].    Thanks,  Maren  Call ctr  Please DO NOT send message and or reply back to sender. Call center Representatives DO NOT respond to Messages

## 2017-03-09 NOTE — PROGRESS NOTES
Social Work Services Progress Note    Hospital Day: 4    Collaborated with:  Radha at [facility] Linkage Line    Data:  Radha calls asking the location where pt discharged.    Intervention:  CHEVY reviewed the chart and informed Radha he was discharged to [facility].    Assessment:  [facility] Line follow up.    Plan:    Anticipated Disposition:  Facility:  [facility]    Barriers to d/c plan:  None.  Pt discharged.    Follow Up:  CHEVY available as needed.    ELLIE Lindsey covering for   [phone] phone  [phone] pager

## 2017-03-10 NOTE — ED AVS SNAPSHOT
[facility], Emergency Department    [address] [address]    Phone:  [phone]                                       Melvin Bhatia   MRN: [id number]    Department:  [facility], Emergency Department   Date of Visit:  3/10/2017           Patient Information     Date Of Birth          1940        Your diagnoses for this visit were:     Hypoglycemia        You were seen by Anna Carrizales MD.        Discharge Instructions       Please make an appointment to follow up with Your Primary Care Provider in 3 days if you have any concerns.    How to Check Your Blood Sugar  Keeping track of how much sugar (glucose) is in your blood is an important part of self-care when you have diabetes. This is also called self-monitoring of blood glucose (SMBG). To make sure your glucose and insulin are in balance, check your blood sugar as instructed by your healthcare provider. You may need to check your blood glucose levels at certain times every day. Or you may need to check them only a few times a week.  What you need    A small pricking needle (lancing device)    Test strips    A glucose meter    A notebook, chart, or log book and pen or pencil   Using a blood glucose meter    You can check your blood sugar at home, at work, and anywhere else. Your diabetes team will help you choose a blood glucose meter. A meter measures the amount of glucose in a tiny drop of blood. You ll use a device called a lancet to draw a drop of blood. Put the strip in the meter first. Then touch the test strip to the drop of blood. The meter then gives you a number (reading) that tells you the level of your blood sugar.  Aim for your target range  Your blood sugar should be in your target range--not too high and not too low. A target range is where your blood sugar level is healthiest. Staying in this range as much as possible will help lower your risk for health problems (complications). Your diabetes team will help  you figure out the best target range for you. That range depends on many things. They include your age, other health problems, how well your diabetes is controlled, and how long you have had diabetes. In general, target ranges are:    Control of blood glucose (hemoglobin A1c or A1c): 7.0%. You will have this test at the lab.    Before a meal (preprandial glucose): Between 80 and 130 mg/dL.    1 to 2 hours after a meal (postprandial glucose): Less than 180 mg/dL.  Track your readings  Use a notebook, chart, or log book to keep track of your readings. Write down the time and your blood sugar level numbers. This helps you see patterns, such as high blood sugar after eating certain foods. Take your log along when you see your healthcare provider. Your blood glucose levels will help your provider decide if he or she needs to make any changes to your management plan. To check your blood sugar, follow the steps below.  Step 1. Get ready    Wash your hands with soap and warm (not hot) water.    Follow all of the instructions that came with your glucometer.  Step 2. Draw a drop of blood    Prick the side of your finger with the lancet. Squeeze gently until you get a drop of blood. Squeezing too hard can cause an inaccurate reading.    Put the lancet in a special sharps container. Ask your healthcare team where you can buy one or what you can use to throw away any sharps.    If you are unable to get enough blood, hold your hand at your side and gently shake it.  Step 3. Place the drop on a strip    Wait for the meter to show a message or symbol that it is time to test.    Touch the test strip to the drop of blood.    Be sure to follow the instructions included with meter.  Step 4. Read and record your results    Wait for your meter to show the result.    If you see an error message, recheck using a fresh strip and a fresh drop of blood. Also recheck if the glucose numbers aren't what you expect--too low without symptoms, or  too high for no reason.    Write the results in your log book.    9261-3087 [employer]. [address] [place] [address], [place] [address]. All rights reserved. This information is not intended as a substitute for professional medical care. Always follow your healthcare professional's instructions.            Future Appointments        Provider Department Dept Phone Center    4/6/2017 1:30 PM [facility] IMAGING ORTHO XRAY ROOM 2 [facility] Orthopaedics XRay [phone] [facility]    4/6/2017 1:40 PM Jimmy Huntley MD [facility] Sports Medicine [phone] [facility]    5/2/2017 3:00 PM Casey Corey MD [facility] Neurology [phone] [facility]    5/23/2017 11:00 AM Oma Gagnon [facility] [phone]       24 Hour Appointment Hotline       To make an appointment at any [facility] call [phone] ([phone]). If you don't have a family doctor or clinic, we will help you find one. [place] clinics are conveniently located to serve the needs of you and your family.             Review of your medicines      Our records show that you are taking the medicines listed below. If these are incorrect, please call your family doctor or clinic.        Dose / Directions Last dose taken    * ACE/ARB NOT PRESCRIBED (INTENTIONAL)        ACE & ARB not prescribed due to Hyperkalemia (baseline K+ > 5.5), Worsening renal function on ACE/ARB therapy and CKD (Chronic Kidney Disease)   Refills:  0        acetaminophen 325 MG tablet   Commonly known as:  TYLENOL   Dose:  975 mg   Quantity:  100 tablet        Take 3 tablets (975 mg) by mouth every 6 hours as needed for mild pain   Refills:  0        aspirin 81 MG tablet   Dose:  81 mg   Quantity:  100 tablet        Take 1 tablet (81 mg) by mouth daily   Refills:  3        blood glucose monitoring test strip   Commonly known as:  no brand specified   Dose:  1 strip   Quantity:  3 Box        1 strip by In Vitro route 4 times daily   Refills:  11         budesonide-formoterol 160-4.5 MCG/ACT Inhaler   Commonly known as:  SYMBICORT   Dose:  2 puff   Quantity:  1 Inhaler        Inhale 2 puffs into the lungs 2 times daily   Refills:  11        bumetanide 0.5 MG tablet   Commonly known as:  BUMEX   Dose:  0.5 mg        Take 0.5 mg by mouth 2 times daily   Refills:  0        calcium citrate 950 MG tablet   Commonly known as:  CALCITRATE   Dose:  1 tablet        Take 1 tablet by mouth daily   Refills:  0        calcium citrate-vitamin D 315-200 MG-UNIT Tabs per tablet   Commonly known as:  CITRACAL   Dose:  2 tablet   Indication:  Low Amount of Calcium in the Blood        Take 2 tablets by mouth 2 times daily 2 tabs contain 500mg elemental calcium + 800 iu's vitamin D3 in them .   Refills:  0        Catheters Misc   Quantity:  200 each        As needed   Refills:  10        denosumab 60 MG/ML Soln injection   Commonly known as:  PROLIA   Dose:  60 mg   Indication:  Osteoporosis        Inject 60 mg Subcutaneous every 6 months   Refills:  0        exenatide 5 MCG/0.02ML Sopn injection   Commonly known as:  BYETTA 5 MCG PEN   Quantity:  1 Syringe        Inject 5mcg under the skin twice daily 60 minutes before meals.   Refills:  1        ferrous sulfate 325 (65 FE) MG tablet   Commonly known as:  IRON   Dose:  325 mg        Take 325 mg by mouth daily (with breakfast) 65mg elemental iron   Refills:  0        fexofenadine 180 MG tablet   Commonly known as:  ALLEGRA   Dose:  180 mg   Quantity:  90 tablet        Take 1 tablet (180 mg) by mouth daily   Refills:  0        fluocinolone 0.01 % solution   Commonly known as:  SYNALAR   Quantity:  60 mL        Apply 1 to 2 times daily to scaly areas of scalp as needed.Profile Rx: patient will contact pharmacy when needed   Refills:  11        fluticasone 50 MCG/ACT spray   Commonly known as:  FLONASE   Dose:  2 spray   Quantity:  48 g        Spray 2 sprays into both nostrils daily   Refills:  1        insulin glargine 100 UNIT/ML  injection   Commonly known as:  LANTUS SOLOSTAR   Dose:  37 Units   Quantity:  30 mL        Inject 37 Units Subcutaneous 2 times daily   Refills:  5        insulin lispro 100 UNIT/ML injection   Commonly known as:  HumaLOG KWIKpen   Quantity:  30 mL        Inject 30 units before breakfast and lunch and 5 units before dinner  as directed.   Refills:  5        insulin pen needle 31G X 8 MM   Commonly known as:  B-D U/F   Quantity:  300 each        Use 7 daily or as directed.   Refills:  5        insulin syringes (disposable) U-100 0.3 ML Misc   Dose:  1 each   Quantity:  100 each        1 each 4 times daily.   Refills:  prn        Ipratropium-Albuterol  MCG/ACT inhaler   Commonly known as:  COMBIVENT RESPIMAT   Dose:  1 puff   Quantity:  3 Inhaler        Inhale 1 puff into the lungs 2 times daily Not to exceed 6 doses per day.   Refills:  3        ketoconazole 2 % shampoo   Commonly known as:  NIZORAL   Quantity:  120 mL        Apply to the affected area and wash off after 5 minutes.   Refills:  2        MAG- MG tablet   Dose:  800 mg   Generic drug:  magnesium oxide        Take 2 tablets by mouth daily.   Refills:  0        metoprolol 50 MG 24 hr tablet   Commonly known as:  TOPROL-XL   Dose:  50 mg   Quantity:  90 tablet        Take 1 tablet (50 mg) by mouth At Bedtime   Refills:  1        multivitamin, therapeutic with minerals Tabs tablet   Dose:  1 tablet        Take 1 tablet by mouth daily   Refills:  0        niacin 500 MG CR tablet   Commonly known as:  NIASPAN   Dose:  500 mg   Quantity:  90 tablet        Take 1 tablet by mouth At Bedtime.   Refills:  2        nitrofurantoin (macrocrystal-monohydrate) 100 MG capsule   Commonly known as:  MACROBID   Dose:  100 mg   Indication:  Urinary Tract Infection   Quantity:  11 capsule        Take 1 capsule (100 mg) by mouth every 12 hours   Refills:  0        nitroglycerin 0.4 MG sublingual tablet   Commonly known as:  NITROSTAT   Dose:  0.4 mg   Quantity:   20 tablet        Place 1 tablet (0.4 mg) under the tongue every 5 minutes as needed up to 3 tablets per episode.   Refills:  11        ONE TOUCH LANCETS Misc   Quantity:  120 each        use as directed 4 x daily and prn   Refills:  3        * order for DME   Quantity:  1 each        Equipment being ordered: CPAP supplies   Refills:  0        order for DME   Quantity:  1 Device        Equipment being ordered: 4 wheeled rolling walker.   Refills:  0        polyethylene glycol Packet   Commonly known as:  MIRALAX/GLYCOLAX   Dose:  17 g   Indication:  Constipation   Quantity:  7 packet        Take 17 g by mouth daily as needed for constipation   Refills:  11        RANITIDINE HCL PO   Dose:  150 mg        Take 150 mg by mouth daily   Refills:  0        rosuvastatin 20 MG tablet   Commonly known as:  CRESTOR   Dose:  20 mg   Quantity:  90 tablet        Take 1 tablet (20 mg) by mouth daily Needs fasting labs before any further refills   Refills:  0        senna-docusate 8.6-50 MG per tablet   Commonly known as:  SENOKOT-S;PERICOLACE   Dose:  1-2 tablet   Quantity:  100 tablet        Take 1-2 tablets by mouth 2 times daily   Refills:  0        Vitamin B12 1000 MCG Tbcr   Dose:  1000 mcg   Quantity:  100 tablet        Take 1,000 mcg by mouth daily   Refills:  3        VITAMIN D (CHOLECALCIFEROL) PO   Dose:  2000 Units        Take 2,000 Units by mouth daily   Refills:  0        warfarin 3 MG tablet   Commonly known as:  COUMADIN   Dose:  3 mg   Quantity:  30 tablet        Take 1 tablet (3 mg) by mouth daily   Refills:  0        * Notice:  This list has 2 medication(s) that are the same as other medications prescribed for you. Read the directions carefully, and ask your doctor or other care provider to review them with you.            Procedures and tests performed during your visit     Procedure/Test Number of Times Performed    Basic metabolic panel 1    Glucose by meter 7    Glucose monitor nursing POCT 1    Peripheral IV  catheter 1      Orders Needing Specimen Collection     None      Pending Results     No orders found from 3/8/2017 to 3/11/2017.            Pending Culture Results     No orders found from 3/8/2017 to 3/11/2017.            Thank you for choosing [facility]       Thank you for choosing [facility] for your care. Our goal is always to provide you with excellent care. Hearing back from our patients is one way we can continue to improve our services. Please take a few minutes to complete the written survey that you may receive in the mail after you visit with us. Thank you!        [employer] Information     [employer] gives you secure access to your electronic health record. If you see a primary care provider, you can also send messages to your care team and make appointments. If you have questions, please call your primary care clinic.  If you do not have a primary care provider, please call [phone] and they will assist you.        Care EveryWhere ID     This is your Care EveryWhere ID. This could be used by other organizations to access your [facility] medical records  [id number]        After Visit Summary       This is your record. Keep this with you and show to your community pharmacist(s) and doctor(s) at your next visit.

## 2017-03-10 NOTE — ED AVS SNAPSHOT
[facility], [place], Emergency Department    [address] [place] [address]    Phone:  [phone]                                       Melvin Bhatia   MRN: [id number]    Department:  [facility], Emergency Department   Date of Visit:  3/10/2017           After Visit Summary Signature Page     I have received my discharge instructions, and my questions have been answered. I have discussed any challenges I see with this plan with the nurse or doctor.    ..........................................................................................................................................  Patient/Patient Representative Signature      ..........................................................................................................................................  Patient Representative Print Name and Relationship to Patient    ..................................................               ................................................  Date                                            Time    ..........................................................................................................................................  Reviewed by Signature/Title    ...................................................              ..............................................  Date                                                            Time

## 2017-03-11 NOTE — ED NOTES
Pt lives in nursing home and found to have a RBS of 40, pt was drowsy,  was given glucogon and bs 190 by EMS.

## 2017-03-11 NOTE — ED NOTES
Bed: ED06  Expected date:   Expected time:   Means of arrival:   Comments:  Marin 646:  76M coming from Nursing Home for low Blood Sugar.  EMS reports post glucagon BS is 190 en route.  Triage Yellow.

## 2017-03-11 NOTE — ED NOTES
Bladder emptied for 500cc dark alie urine with sediment noted, pt tolerated well. i an o cath as per order from nursing home for dx of neurogenic bladder

## 2017-03-11 NOTE — DISCHARGE INSTRUCTIONS
Please make an appointment to follow up with Your Primary Care Provider in 3 days if you have any concerns.    How to Check Your Blood Sugar  Keeping track of how much sugar (glucose) is in your blood is an important part of self-care when you have diabetes. This is also called self-monitoring of blood glucose (SMBG). To make sure your glucose and insulin are in balance, check your blood sugar as instructed by your healthcare provider. You may need to check your blood glucose levels at certain times every day. Or you may need to check them only a few times a week.  What you need    A small pricking needle (lancing device)    Test strips    A glucose meter    A notebook, chart, or log book and pen or pencil   Using a blood glucose meter    You can check your blood sugar at home, at work, and anywhere else. Your diabetes team will help you choose a blood glucose meter. A meter measures the amount of glucose in a tiny drop of blood. You ll use a device called a lancet to draw a drop of blood. Put the strip in the meter first. Then touch the test strip to the drop of blood. The meter then gives you a number (reading) that tells you the level of your blood sugar.  Aim for your target range  Your blood sugar should be in your target range--not too high and not too low. A target range is where your blood sugar level is healthiest. Staying in this range as much as possible will help lower your risk for health problems (complications). Your diabetes team will help you figure out the best target range for you. That range depends on many things. They include your age, other health problems, how well your diabetes is controlled, and how long you have had diabetes. In general, target ranges are:    Control of blood glucose (hemoglobin A1c or A1c): 7.0%. You will have this test at the lab.    Before a meal (preprandial glucose): Between 80 and 130 mg/dL.    1 to 2 hours after a meal (postprandial glucose): Less than 180  mg/dL.  Track your readings  Use a notebook, chart, or log book to keep track of your readings. Write down the time and your blood sugar level numbers. This helps you see patterns, such as high blood sugar after eating certain foods. Take your log along when you see your healthcare provider. Your blood glucose levels will help your provider decide if he or she needs to make any changes to your management plan. To check your blood sugar, follow the steps below.  Step 1. Get ready    Wash your hands with soap and warm (not hot) water.    Follow all of the instructions that came with your glucometer.  Step 2. Draw a drop of blood    Prick the side of your finger with the lancet. Squeeze gently until you get a drop of blood. Squeezing too hard can cause an inaccurate reading.    Put the lancet in a special sharps container. Ask your healthcare team where you can buy one or what you can use to throw away any sharps.    If you are unable to get enough blood, hold your hand at your side and gently shake it.  Step 3. Place the drop on a strip    Wait for the meter to show a message or symbol that it is time to test.    Touch the test strip to the drop of blood.    Be sure to follow the instructions included with meter.  Step 4. Read and record your results    Wait for your meter to show the result.    If you see an error message, recheck using a fresh strip and a fresh drop of blood. Also recheck if the glucose numbers aren't what you expect--too low without symptoms, or too high for no reason.    Write the results in your log book.    1433-0362 [employer]. [address] [place] [address], [place] [address]. All rights reserved. This information is not intended as a substitute for professional medical care. Always follow your healthcare professional's instructions.

## 2017-03-11 NOTE — ED PROVIDER NOTES
"  History     Chief Complaint   Patient presents with     Hypoglycemia     HPI  Melvin Bhatia is a 76 year old male with a complex medical history including type 2 diabetes mellitus, CKD, hypertension, CAD, CHF, and atrial fibrillation.    History obtained by EMS crew and wife since patient has dementia and is unable to provide history.    According to the wife and EMS patient was found sitting at the table when supper was served at his TCU and he seemed to be asleep. The wife and the staff had difficulty waking him up. Apparently his blood sugar was low when they checked it. He did receive a intramuscular injection glucagon by the staff and the wife was able to give him juice and toast. His mental status has rapidly improved and currently he is at baseline according to wife. She states that he is a diabetic and that he has had episodes of hypoglycemia in the past due to alteration of his insulin  regimen. According to the wife he is not aware of any recent insulin changes over the last week. No fevers. No pain to her knowledge.     I have reviewed the Medications, Allergies, Past Medical and Surgical History, and Social History in the Epic system and with the patient's wife.    Review of Systems   Unable to perform ROS: Dementia       Physical Exam   BP: 136/77  Pulse: 66  Temp: 97.5  F (36.4  C)  Resp: 16  Height: 162.6 cm (5' 4\")  Weight: 93 kg (205 lb)  SpO2: 99 %  Physical Exam   Constitutional: He appears well-developed and well-nourished. No distress.   HENT:   Head: Normocephalic and atraumatic.   Mouth/Throat: Oropharynx is clear and moist. No oropharyngeal exudate.   Eyes: Conjunctivae and EOM are normal. Pupils are equal, round, and reactive to light. No scleral icterus.   Neck: Normal range of motion. Neck supple.   Cardiovascular: Normal rate, normal heart sounds and intact distal pulses.    Pulmonary/Chest: Effort normal and breath sounds normal. No respiratory distress.   Abdominal: Soft. " Bowel sounds are normal. There is no tenderness.   Musculoskeletal: Normal range of motion. He exhibits no edema or tenderness.   Left leg in cast due to left ankle fracture. No tenderness to palpation of toes of left leg and no discoloration or skin color changes of the toes of left leg.   Neurological: He is alert. He has normal strength. He displays normal reflexes. No cranial nerve deficit or sensory deficit. He exhibits normal muscle tone. Coordination normal. GCS eye subscore is 4. GCS verbal subscore is 5. GCS motor subscore is 6.   Patient oriented to person (at baseline per wife).   Skin: Skin is warm. No rash noted. He is not diaphoretic.   Nursing note and vitals reviewed.      ED Course     ED Course     Procedures             Critical Care time:  none               Labs Ordered and Resulted from Time of ED Arrival Up to the Time of Departure from the ED   GLUCOSE BY METER - Abnormal; Notable for the following:        Result Value    Glucose 207 (*)     All other components within normal limits   BASIC METABOLIC PANEL - Abnormal; Notable for the following:     Glucose 192 (*)     Urea Nitrogen 35 (*)     Creatinine 1.72 (*)     GFR Estimate 39 (*)     GFR Estimate If Black 47 (*)     All other components within normal limits   GLUCOSE BY METER - Abnormal; Notable for the following:     Glucose 184 (*)     All other components within normal limits   GLUCOSE BY METER - Abnormal; Notable for the following:     Glucose 170 (*)     All other components within normal limits   GLUCOSE BY METER - Abnormal; Notable for the following:     Glucose 141 (*)     All other components within normal limits   GLUCOSE BY METER - Abnormal; Notable for the following:     Glucose 171 (*)     All other components within normal limits   GLUCOSE BY METER - Abnormal; Notable for the following:     Glucose 149 (*)     All other components within normal limits   GLUCOSE BY METER - Abnormal; Notable for the following:     Glucose  132 (*)     All other components within normal limits   GLUCOSE MONITOR NURSING POCT   PERIPHERAL IV CATHETER       Assessments & Plan (with Medical Decision Making)   76-year-old man presenting with altered mental status and low blood sugar. Differential diagnosis hypoglycemia, electrolyte abnormalities, unlikely stroke, potential accidental overdose.    After thorough history and physical exam patient appears to be in no acute distress. Currently his blood glucose level is 207. We will repeat this frequently throughout the emergency department stay and obtain a basic metabolic panel. Wife and patient agree with this plan.     Patient's laboratory studies returned with elevated glucose at 192. Creatinine is elevated at 1.72. Patient was monitored in the Emergency Department for more than 4 hours and never became hypoglycemic. His sugar was maintained above 100. At this point I do not seen any reasons for admission given that he his at his baseline mental status and he is not hypoglycemic. He will be discharged back to the TCU. He and his wife agree with the plan.          I have reviewed the nursing notes.    I have reviewed the findings, diagnosis, plan and need for follow up with the patient.    Discharge Medication List as of 3/10/2017 11:38 PM          Final diagnoses:   Hypoglycemia       3/10/2017   [facility], [place], EMERGENCY DEPARTMENT     Anna Carrizales MD  03/11/17 0054

## 2017-03-31 NOTE — IP AVS SNAPSHOT
Melvin Arriaga #[id number] (CSN: [id number])  (76 year old M)  (Adm: 17)     [id number]               [address] [facility]: [phone]            Patient Demographics     Patient Name Sex          Age SSN Address Phone    Melvin Arriaga Male 1940 (76 year old) [id number]   [facility] [id number] [phone] (Home) *Preferred*  [phone] (Work)  [phone] (Mobile)      Emergency Contact(s)     Name Relation Home Work Mobile    AKANKSHA ARRIAGA Spouse [phone]     NO SECONDARY CONTACT  NONE        Admission Information     Attending Provider Admitting Provider Admission Type Admission Date/Time    Nelson Santana MD Chaisson, Nicole, MD Emergency 17    Discharge Date Hospital Service Auth/Cert Status Service Area     [facility]    Unit Room/Bed Admission Status        U6B 6226/6226-01 Admission (Confirmed)       Admission     Complaint    SHERYL (acute kidney injury) (H), Melena/ Anemia, Melena, Hematochezia, Malnutrition       Hospital Account     Name Acct ID Class Status Primary Coverage    Melvin Arriaga [id number] Inpatient Open UCARE - [employer] FOR S[employer]IORS            Guarantor Account (for Hospital Account #[id number])     Name Relation to Pt Service Area Active? Acct Type    Melvin Arriaga  FCS Yes Personal/Family    Address Phone          [address]   [place], [place] [address] [phone](H)  none(O)              Coverage Information (for Hospital Account #[id number])     F/O Payor/Plan Precert #    UCARE/UCARE FOR SENIORS     Subscriber Subscriber #    Melvin Arriaga [id number]    Address Phone    [address]  [place] [address] [phone]                                                INTERAGENCY TRANSFER FORM - PHYSICIAN ORDERS   3/31/2017                       [address] [facility]: [phone]            Attending  "Provider: Nelson Santana MD     Allergies:  Penicillins, Albuterol, Albuterol Sulfate, Atorvastatin, Atorvastatin Calcium, Cipro [Ciprofloxacin], Ciprofloxacin, Simvastatin, Warfarin    Infection:  VRE-Contact Isolation   Service:  FAMILY MEDIC    Ht:  1.626 m (5' 4\")   Wt:  76.7 kg (169 lb 1.5 oz)   Admission Wt:  93 kg (205 lb)    BMI:  29.02 kg/m 2   BSA:  1.86 m 2            ED Clinical Impression     Diagnosis Description Comment Added By Time Added    Acute renal failure, unspecified acute renal failure type (H) [N17.9] Acute renal failure, unspecified acute renal failure type (H) [N17.9]  Nate Montana MD 3/31/2017 10:44 PM    Urinary tract infection associated with indwelling urethral catheter, initial encounter [T83.511A, N39.0] Urinary tract infection associated with indwelling urethral catheter, initial encounter [T83.511A, N39.0]  Nate Montana MD 3/31/2017 10:45 PM    Cognitive deficit, 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, 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]  Nate Montana MD 3/31/2017 10:45 PM      Hospital Problems as of 4/25/2017              Priority Class Noted POA    Gastrointestinal hemorrhage associated with duodenal ulcer Medium  4/8/2017 Yes    Anemia due to blood loss, acute Medium  4/8/2017 Yes    Acute cystitis Medium  4/8/2017 Yes    Hypernatremia Medium  4/8/2017 Yes    Delirium Medium  4/8/2017 Yes    Acute kidney injury superimposed on CKD Medium  4/8/2017 Yes    Closed fracture of ankle Medium  4/8/2017 Yes    Ischemic stroke diagnosed during current admission (H) Medium  4/22/2017 No    Protein-calorie malnutrition (H) Medium  4/22/2017 Yes      Non-Hospital Problems as of 4/25/2017              Priority Class Noted    Osteoporosis   4/19/2006    PAF (paroxysmal atrial fibrillation) (H)   11/6/2009    Lumbosacral plexopathy   Unknown    Testosterone deficiency   9/21/2010    Hypertension goal BP (blood pressure) < 140/90   12/6/2010    Atrial fibrillation " (H)   1/19/2011    CKD (chronic kidney disease) stage 3, GFR 30-59 ml/min   Unknown    Hyperlipidemia LDL goal <70   1/26/2011    Coronary artery disease   Unknown    Health Care Home   3/22/2011    Elevated PSA   11/9/2011    Subarachnoid hemorrhage (H)   1/1/2012    Anemia in chronic renal disease   2/1/2012    Urinary bladder neurogenic dysfunction   2/14/2012    Idiopathic normal pressure hydrocephalus (INPH) Medium Chronic 2/26/2012    Stroke (H)   5/11/2012    Cognitive deficit, post-stroke   6/13/2012    HL (hearing loss)   9/24/2012    Personal history of fall Medium Chronic 9/26/2012    GERD (gastroesophageal reflux disease)   11/26/2012    COPD exacerbation (H)   2/16/2013    SHIVAM (obstructive sleep apnea)   2/16/2013    LVH (left ventricular hypertrophy)   2/16/2013    Diastolic CHF, acute on chronic (H)   2/16/2013    Hypoxemia   2/16/2013    Tachyarrhythmia   2/18/2013    Systolic and diastolic CHF, chronic (H)   2/19/2013    Gait abnormality   5/20/2013    Iron deficiency anemia Medium  3/20/2014    Allergic rhinitis   4/7/2014    Altered mental status   5/15/2014    VRE (vancomycin-resistant Enterococci)   5/16/2014    Hypoglycemia   5/16/2014    Type 2 diabetes with stage 3 chronic kidney disease GFR 30-59 (H) Medium  10/14/2015    Chronic obstructive pulmonary disease, unspecified COPD type (H) Medium  11/20/2015    ACP (advance care planning)   1/7/2016    Ankle pain Medium  1/13/2017    Fx medial malleolus-closed Medium  2/19/2017      Code Status History     Date Active Date Inactive Code Status Order ID Comments User Context    4/22/2017 12:05 PM  DNR/DNI [id number]  Harper Bell MD Outpatient    4/1/2017 12:29 AM 4/22/2017 12:05 PM DNR/DNI [id number]  Gabriella Archer MD Inpatient    2/22/2017  2:26 PM 4/1/2017 12:29 AM DNR/DNI [id number]  Jerod Saleh MD Outpatient    2/19/2017  4:40 PM 2/22/2017  2:26 PM DNR/DNI [id number]  Laurel Fields APRN CNP Inpatient    5/19/2014  9:33 AM 2/19/2017   4:40 PM Full Code [id number]  Johnny Laura MD Outpatient    2/20/2013  7:56 AM 5/19/2014  9:33 AM DNR/DNI [id number]  Jailyn Lindsey MD Outpatient    2/17/2013  1:34 AM 2/20/2013  7:56 AM DNR/DNI [id number]  Kellie Pickett RN Inpatient    2/16/2013  3:17 AM 2/17/2013  1:34 AM DNR/DNI [id number]  Kirk Rick, KATARZYNA Inpatient    5/23/2012 10:21 AM 2/16/2013  3:17 AM DNR/DNI [id number]  Castillo Gibbs MD Outpatient    5/10/2012  4:46 PM 5/23/2012 10:21 AM DNR/DNI [id number]  Kenan Hernandez MD Inpatient    5/7/2012  4:22 AM 5/10/2012  4:46 PM DNR/DNI [id number]  Katie Dumas RN Inpatient    5/6/2012  5:07 AM 5/7/2012  3:59 AM DNR/DNI [id number]  Katie Dumas RN Inpatient    5/3/2012  9:45 AM 5/6/2012  5:07 AM Full Code [id number]  Antonio Molina MD Outpatient    5/1/2012  6:26 AM 5/3/2012  9:45 AM DNR/DNI [id number]  Maria Antonia Fried RN Inpatient    3/14/2012 10:45 AM 5/1/2012  6:26 AM DNR/DNI [id number]  Kenan Hernandez MD Outpatient    3/1/2012  5:56 PM 3/14/2012 10:45 AM DNR/DNI [id number]  Kenan Hernandez MD Inpatient    2/29/2012 12:27 PM 3/1/2012  5:56 PM Full Code [id number]  Mayra Rueda MD Outpatient      Current Code Status     Date Active Code Status Order ID Comments User Context       Prior      Summary of Visit     Reason for your hospital stay       You were admitted initially due to a change in mental status that was found to have UTI and was given antibiotics. Your hospital stay was complicated by upper and lower gastrointestinal bleeding from multiple duodenal ulcers  And rectal ulcers that was found on endoscopy. You were transfused with a total of 3 units of blood. Ulcer medications was started and bleeding seemed to have resolve with stabilization of your hemoglobin. Unfortunately you had an ischemic stroke. Aspirin 325 mg was started. Tube feeding was started as well to provide nutrition .                Medication Review      START taking         Dose / Directions Comments    * acetaminophen 32 mg/mL solution   Commonly known as:  TYLENOL   Used for:  Dysphagia, unspecified type   Replaces:  acetaminophen 325 MG tablet        Dose:  650 mg   20.3 mLs (650 mg) by Oral or Feeding Tube route every 6 hours as needed for fever (pain)   Quantity:  300 mL   Refills:  0        * acetaminophen 650 MG Suppository   Commonly known as:  TYLENOL   Used for:  Cognitive deficit, post-stroke        Dose:  650 mg   Place 1 suppository (650 mg) rectally every 6 hours as needed for mild pain or fever (temperature over 100? F )   Quantity:  60 suppository   Refills:  0        artificial saliva Soln solution   Used for:  Dry mouth        Swab mouth 2x a day then as  Needed   Quantity:  1 Bottle   Refills:  0        aspirin 10 mg/mL Susp   Used for:  Dysphagia, unspecified type   Replaces:  aspirin 81 MG tablet        Dose:  325 mg   32.5 mLs (325 mg) by Oral or NG Tube route daily   Refills:  0        dextrose 50 % injection   Used for:  Type 2 diabetes mellitus with stage 3 chronic kidney disease, with long-term current use of insulin (H)        Dose:  25-50 mL   Inject 25-50 mLs into the vein every 15 minutes as needed for low blood sugar   Refills:  0        ferrous sulfate 220 (44 FE) MG/5ML Elix   Used for:  Acute posthemorrhagic anemia   Replaces:  ferrous sulfate 325 (65 FE) MG tablet        Dose:  220 mg   5 mLs (220 mg) by Per G Tube route daily   Quantity:  30 mL   Refills:  0        glucagon 1 MG Solr injection   Used for:  Type 2 diabetes mellitus with stage 3 chronic kidney disease, with long-term current use of insulin (H)        Dose:  1 mg   Inject 1 mg Subcutaneous every 15 minutes as needed for low blood sugar (May repeat x 1 only)   Refills:  0        glucose 40 % Gel gel   Used for:  Type 2 diabetes mellitus with stage 3 chronic kidney disease, with long-term current use of insulin (H)        Dose:  15-30 g   Take 15-30 g by mouth every 15 minutes as  needed for low blood sugar   Refills:  0        hypromellose-dextran Soln ophthalmic solution   Used for:  Dry eyes        Dose:  1 drop   Place 1 drop into both eyes 4 times daily as needed for dry eyes   Quantity:  1 Bottle   Refills:  0        insulin aspart 100 UNIT/ML injection   Commonly known as:  NovoLOG PEN   Used for:  Type 2 diabetes mellitus with stage 3 chronic kidney disease, with long-term current use of insulin (H)        Dose:  1-12 Units   Inject 1-12 Units Subcutaneous every 4 hours   Quantity:  3 mL   Refills:  0        ipratropium 0.02 % neb solution   Commonly known as:  ATROVENT   Used for:  Chronic obstructive pulmonary disease, unspecified COPD type (H)        Dose:  0.5 mg   Take 2.5 mLs (0.5 mg) by nebulization 2 times daily   Quantity:  225 mL   Refills:  0        metoprolol 10 mg/mL Susp   Commonly known as:  LOPRESSOR   Used for:  Secondary hypertension        Dose:  25 mg   2.5 mLs (25 mg) by Per G Tube route 2 times daily   Quantity:  100 mL   Refills:  0        multivitamins with minerals Liqd liquid   Used for:  Malnutrition (H)   Replaces:  multivitamin, therapeutic with minerals Tabs tablet        Dose:  15 mL   15 mLs by Per Feeding Tube route daily   Refills:  0        nystatin [id number] UNIT/ML suspension   Commonly known as:  MYCOSTATIN   Used for:  Oral thrush        Swab mouth 4 x daily for 10 days   Quantity:  280 mL   Refills:  0        ondansetron 4 MG ODT tab   Commonly known as:  ZOFRAN-ODT   Used for:  Nausea        Dose:  4 mg   Take 1 tablet (4 mg) by mouth every 6 hours as needed for nausea   Quantity:  120 tablet   Refills:  0        pantoprazole Susp suspension   Commonly known as:  PROTONIX   Used for:  Gastroesophageal reflux disease without esophagitis        Dose:  40 mg   20 mLs (40 mg) by Per G Tube route 2 times daily   Quantity:  100 mL   Refills:  0        potassium & sodium phosphates 280-160-250 MG Packet   Commonly known as:  NEUTRA-PHOS   Used for:   Malnutrition (H)        Dose:  1 packet   Take 1 packet by mouth 4 times daily   Quantity:  120 each   Refills:  0        potassium chloride 10 MEQ tablet   Commonly known as:  K-TAB,KLOR-CON   Used for:  Diastolic CHF, acute on chronic (H)        Administer only when giving Bumex   Quantity:  30 tablet   Refills:  0        vitamin A-D & C drops 1500-400-35 drops   Used for:  Malnutrition (H)        Dose:  7 mL   7 mLs by Per G Tube route daily for 10 days   Quantity:  50 mL   Refills:  0        * Notice:  This list has 2 medication(s) that are the same as other medications prescribed for you. Read the directions carefully, and ask your doctor or other care provider to review them with you.      CONTINUE these medications which may have CHANGED, or have new prescriptions. If we are uncertain of the size of tablets/capsules you have at home, strength may be listed as something that might have changed.        Dose / Directions Comments    bumetanide 0.5 MG tablet   Commonly known as:  BUMEX   This may have changed:    - how much to take  - how to take this  - when to take this  - additional instructions   Used for:  Diastolic CHF, acute on chronic (H)        Please restart patient's home bumex dose (0.5 mg bid) if patient's weight increases by more than 2 pounds in 1 week   Quantity:  60 tablet   Refills:  0        insulin glargine 100 UNIT/ML injection   Commonly known as:  LANTUS   This may have changed:  how much to take   Used for:  Type 2 diabetes mellitus with stage 3 chronic kidney disease, with long-term current use of insulin (H)        Dose:  33 Units   Inject 33 Units Subcutaneous 2 times daily   Refills:  0        order for DME   This may have changed:  Another medication with the same name was removed. Continue taking this medication, and follow the directions you see here.   Used for:  SHIVAM (obstructive sleep apnea)        Equipment being ordered: CPAP supplies   Quantity:  1 each   Refills:  0         senna-docusate 8.6-50 MG per tablet   Commonly known as:  SENOKOT-S;PERICOLACE   This may have changed:    - how much to take  - how to take this  - when to take this   Used for:  Constipation, unspecified constipation type        Dose:  1 tablet   1 tablet by Oral or NG Tube route daily   Quantity:  100 tablet   Refills:  0          CONTINUE these medications which have NOT CHANGED        Dose / Directions Comments    blood glucose monitoring test strip   Commonly known as:  no brand specified   Used for:  Type 2 diabetes with stage 3 chronic kidney disease GFR 30-59 (H)        Dose:  1 strip   1 strip by In Vitro route 4 times daily   Quantity:  3 Box   Refills:  11    Ok to refill his formulary approved bs test strips.       Catheters Misc   Used for:  Neurogeneses, bladder        As needed   Quantity:  200 each   Refills:  10        insulin pen needle 31G X 8 MM   Commonly known as:  B-D U/F   Used for:  Type 2 diabetes mellitus with stage 3 chronic kidney disease, with long-term current use of insulin (H)        Use 7 daily or as directed.   Quantity:  300 each   Refills:  5        insulin syringes (disposable) U-100 0.3 ML Misc   Used for:  Type 2 diabetes, HbA1C goal < 8% (H)        Dose:  1 each   1 each 4 times daily.   Quantity:  100 each   Refills:  prn        Ipratropium-Albuterol  MCG/ACT inhaler   Commonly known as:  COMBIVENT RESPIMAT   Used for:  Chronic obstructive pulmonary disease, unspecified COPD type (H)        Dose:  1 puff   Inhale 1 puff into the lungs 2 times daily Not to exceed 6 doses per day.   Quantity:  3 Inhaler   Refills:  3        ONE TOUCH LANCETS Misc   Used for:  Type 2 diabetes with stage 3 chronic kidney disease GFR 30-59 (H)        use as directed 4 x daily and prn   Quantity:  120 each   Refills:  3        order for DME   Used for:  Generalized muscle weakness        Equipment being ordered: 4 wheeled rolling walker.   Quantity:  1 Device   Refills:  0         polyethylene glycol Packet   Commonly known as:  MIRALAX/GLYCOLAX   Indication:  Constipation   Used for:  Fall, initial encounter        Dose:  17 g   Take 17 g by mouth daily as needed for constipation   Quantity:  7 packet   Refills:  11    Hold if diarrhea.         STOP taking     acetaminophen 325 MG tablet   Commonly known as:  TYLENOL   Replaced by:  acetaminophen 32 mg/mL solution           aspirin 81 MG tablet   Replaced by:  aspirin 10 mg/mL Susp           budesonide-formoterol 160-4.5 MCG/ACT Inhaler   Commonly known as:  SYMBICORT           calcium citrate 950 MG tablet   Commonly known as:  CALCITRATE           denosumab 60 MG/ML Soln injection   Commonly known as:  PROLIA           exenatide 5 MCG/0.02ML Sopn injection   Commonly known as:  BYETTA 5 MCG PEN           ferrous sulfate 325 (65 FE) MG tablet   Commonly known as:  IRON   Replaced by:  ferrous sulfate 220 (44 FE) MG/5ML Elix           fexofenadine 180 MG tablet   Commonly known as:  ALLEGRA           fluocinolone 0.01 % solution   Commonly known as:  SYNALAR           fluticasone 50 MCG/ACT spray   Commonly known as:  FLONASE           insulin lispro 100 UNIT/ML injection   Commonly known as:  HumaLOG KWIKpen           ketoconazole 2 % shampoo   Commonly known as:  NIZORAL           MAG- MG tablet   Generic drug:  magnesium oxide           metoprolol 50 MG 24 hr tablet   Commonly known as:  TOPROL-XL           multivitamin, therapeutic with minerals Tabs tablet   Replaced by:  multivitamins with minerals Liqd liquid           niacin 500 MG CR tablet   Commonly known as:  NIASPAN           nitroglycerin 0.4 MG sublingual tablet   Commonly known as:  NITROSTAT           RANITIDINE HCL PO           rosuvastatin 20 MG tablet   Commonly known as:  CRESTOR           sulfamethoxazole-trimethoprim 800-160 MG per tablet   Commonly known as:  BACTRIM DS/SEPTRA DS           Vitamin B12 1000 MCG Tbcr           VITAMIN D (CHOLECALCIFEROL) PO            warfarin 3 MG tablet   Commonly known as:  COUMADIN                   After Care     Activity - Up ad jeanine           Additional Discharge Instructions       PLEASE CHECK BLOOD SUGARS EVERY 4 HOURS and use correction scale.    ACTIVITY:  Out of bed to chair 2-3 times daily  PT/OT/Speech Therapy     BIPAP SETTINGS:  Bipap settings: 10/5 (used at night)  Until stable off BIPAP, Oral meds should not be given until patient able to tolerate 2 hours off BIPAP. No lozenges or gum should be given while patient on BIPAP.  ~ Adjust inspiratory pressure to achieve TV greater than 8-10 mL/kg ideal body weight.  ~ Max inspiratory pressure: 25 cm H2O.  ~ Keep SaO2 at 90-95%  ~ RT to trial off BIPAP as tolerated.    FOLLOW UP:  Follow up with regular Orthopedic Surgeon for ankle problem    TUBE FEEDS AND FREE WATER FLUSHES:  1. Ordered Peptamen 45 ml/hr + 4 pkts Beneprotein daily to provide 1080 mL TF/day, 1720 kcals (27 kcal/kg), 97 g PRO (1.5 gm/kg), 832 ml free H2O, 60 g Fat (70% from MCTs), 203 g CHO and no Fiber daily.    2. Ordered 7 ml Trivitamin and 220 mg Zn sulfate daily x 10 days as indicated in RD note 4/18.     3. Free water 160 cc Q4 hours via tube     WOUND CARE:  Plan of care for wound located on Bl buttocks/perirectal area:   Cleanse the area with Vicky cleanse and protect, very gently with soft cloth.  Apply critic aid paste BID and PRN.  With repeat application, do not scrub the paste, only remove soiled paste and reapply.  If complete removal of paste is necessary use baby oil/mineral oil and soft wash cloth.  Leave the diaper to let the area dry as much as possible.     Left lower buttock wound every other day:    Cleanse all wound bed with Microklenz and pat dry.    Apply no sting barrier on zoe-wound skin.    Apply nickel thick amount of Medihoney (#[id number]) to open wound tissue.    Cover with Mepilex 4x4.    TREATMENT PLAN:  CALL SPOUSE, AKANKSHA, PRIOR TO TRANSFERRING PATIENT TO HOSPITAL FOR ANY REASON           Daily weights       Call Provider for weight gain of more than 2 pounds per day or 5 pounds per week.       Fall precautions           General info for SNF       3Length of Stay Estimate: Long Term Care  Condition at Discharge: Improving  Level of care:board and care  Rehabilitation Potential: Poor  Admission H&P remains valid and up-to-date: Yes  Recent Chemotherapy: N/A  Use Nursing Home Standing Orders: Yes       Intake and output       Every shift       Mantoux instructions       Give two-step Mantoux (PPD) Per Facility Policy Yes       Weight bearing status       - Protect weight bearing in AFO  - PRAFO in bed to prevent ulcers  - Follow up with original orthopedic surgeon for ankle symptoms       Wound care       Site:   Buttocks   Instructions:  Repositioning every 2 hours. Cleanse buttock wound with Microklenz and pat dry . Then apply no sting barrier cream to wound  as needed (after soiling with urine and stool)             Referrals     Nutrition Services Adult IP Consult       Reason:  To evaluate and make recommendations regarding Tube Feeding   For now use dc tube feeding orders  G-Tube Feeds:  1. Ordered Peptamen 1.5 @ goal of 45 ml/hr + 4 pkts Beneprotein daily to provide 1080 mL TF/day, 1720 kcals (27 kcal/kg), 97 g PRO (1.5 gm/kg), 832 ml free H2O, 60 g Fat (70% from MCTs), 203 g CHO and no Fiber daily.  -- Start @ 10 ml and advance by 10 ml Q 8 hrs as tolerated and as long as K+/Mg++ WNL and Phos >1.9 and GRV's <500 mL.   2. Ordered 7 ml Trivitamin and 220 mg Zn sulfate daily x 10 days as indicated in RD note 4/18.   3.  Free water flushes 160 cc every 4 hours.       Speech Language Path Adult Consult       Evaluate and treat as clinically indicated.    Reason:  Revaluate ability to swallow             Follow-Up Appointment Instructions     Follow Up and recommended labs and tests       Follow up with prison physician.  The following labs/tests are recommended: CBC ,CMP, magnesium,  "Phosphorous on a daily basis until stable without replacement (tube feeds started 4/30- patient at high risk for refeeding syndrome).  Consider adding ACE inhibitor if  BP stable  Adjust Bumex or other diuretics  as needed   Adjust Metoprolol for Afib  as needed             Your next 10 appointments already scheduled     May 11, 2017  1:00 PM CDT   Return Visit with Johnathan Walsh MD   [facility] ([facility])    [address]  [address]  [facility] [address]   [phone]            May 23, 2017 11:00 AM CDT   SHORT with Oma Gagnon [facility] ([facility])    [address]  [address]  [place] [address]   [phone]              Statement of Approval     Ordered          04/25/17 7981  I have reviewed and agree with all the recommendations and orders detailed in this document.  EFFECTIVE NOW     Approved and electronically signed by:  Toña Duron MD                                                 INTERAGENCY TRANSFER FORM - NURSING   3/31/2017                       [address] [facility]: [phone]            Attending Provider: Nelson Santana MD     Allergies:  Penicillins, Albuterol, Albuterol Sulfate, Atorvastatin, Atorvastatin Calcium, Cipro [Ciprofloxacin], Ciprofloxacin, Simvastatin, Warfarin    Infection:  VRE-Contact Isolation   Service:  FAMILY MEDIC    Ht:  1.626 m (5' 4\")   Wt:  76.7 kg (169 lb 1.5 oz)   Admission Wt:  93 kg (205 lb)    BMI:  29.02 kg/m 2   BSA:  1.86 m 2            Advance Directives        Does patient have a scanned Advance Directive/ACP document in EPIC?           Yes        Immunizations     Name Date      Influenza (High Dose) 3 valent vaccine 11/02/16     Influenza (High Dose) 3 valent vaccine 11/10/15     Influenza (High Dose) 3 valent vaccine 10/07/14     Influenza (High Dose) 3 valent vaccine 11/09/11     Influenza (IIV3) 09/12/12     " Influenza (IIV3) 09/21/10     Influenza (IIV3) 11/07/07     Influenza (IIV3) 10/18/06     Influenza (IIV3) 12/14/05     Influenza Vaccine IM 3yrs+ 4 Valent IIV4 10/22/13     Pneumococcal (PCV 13) 11/10/15     Pneumococcal 23 valent 04/30/08       ASSESSMENT     Discharge Profile Flowsheet     EXPECTED DISCHARGE     Resources List  Skilled Nursing Facility 04/03/17 0754    Expected Discharge Date  04/19/17 (LTC) 04/18/17 0801   [facility] [phone], Fax: [phone] 04/03/17 0754    DISCHARGE NEEDS ASSESSMENT     SKIN      Concerns Comments  -- (discharged from home care ) 06/11/14 1135   Inspection  Full 04/25/17 0950    Equipment Currently Used at Home  wheelchair (bed rail) 02/21/17 1245   Skin WDL  ex 04/25/17 0950    Transportation Available  family or friend will provide 02/21/17 1245   Skin Temperature  warm 04/25/17 0950    Equipment Used at Home  bath bench;grab bar;walker, rolling;wheelchair 05/16/14 1354   Skin Moisture  flaky;dry 04/25/17 0950    GASTROINTESTINAL (ADULT,PEDIATRIC,OB)     Skin Elasticity  slow return to original state 04/25/17 0015    GI WDL  ex 04/25/17 0936   Skin Integrity  bruise(s);drain/device(s);pressure ulcer(s) 04/25/17 0950    Abdominal Appearance  rounded 04/25/17 0936   Skin Color/Characteristics  bruised (ecchymotic);pale 04/25/17 0950    All Quadrants Bowel Sounds  audible and normoactive 04/25/17 0936   Skin areas NOT inspected  Hip, right;Hip, left;Buttock, left;Buttock, right;Sacrum;Coccyx 04/25/17 0015    Last Bowel Movement  04/24/17 04/25/17 0936   Additional Documentation  Wound (LDA) 04/25/17 0015    GI Signs/Symptoms  fecal incontinence 04/24/17 2128   SAFETY      Incontinence Containment Product  incontinence brief;in place 04/17/17 1704   Safety WDL  WDL 04/25/17 0950    COMMUNICATION ASSESSMENT     Safety Factors  bed in low position;wheels locked;call light in reach;upper side rails raised x 2;ID band on 04/25/17  "0950    Patient's communication style  spoken language (English or Bilingual) 03/31/17 2017   Aspiration Risk Screen  advanced age 04/25/17 0417    FINAL RESOURCES     Safety Equipment  manual resuscitator/mask/valve in room 04/12/17 2245                 Assessment WDL (Within Defined Limits) Definitions           Safety WDL     Effective: 09/28/15    Row Information: <b>WDL Definition:</b> Bed in low position, wheels locked; call light in reach; upper side rails up x 2; ID band on<br> <font color=\"gray\"><i>Item=AS safety wdl>>List=AS safety wdl>>Version=F14</i></font>      Skin WDL     Effective: 09/28/15    Row Information: <b>WDL Definition:</b> Warm; dry; intact; elastic; without discoloration; pressure points without redness<br> <font color=\"gray\"><i>Item=AS skin wdl>>List=AS skin wdl>>Version=F14</i></font>      Vitals     Vital Signs Flowsheet     COMMENTS     Response to Interventions  Absence of nonverbal indicators of pain 04/25/17 0403    Comments  returned to PCU 04/12/17 1908   CRITICAL-CARE PAIN OBSERVATION TOOL (CPOT)      VITAL SIGNS     Facial Expression  0 04/19/17 1452    Temp  98.5  F (36.9  C) 04/25/17 1503   Body Movements  0 04/19/17 1452    Temp src  Axillary 04/25/17 1503   Compliance w/ventilator (intubated patients)  Extubated 04/19/17 1452    Resp  22 04/25/17 1503   Vocalization (extubated patients)  0 04/19/17 1452    Pulse  80 04/24/17 2004   Muscle Tension  0 04/19/17 1452    Heart Rate  73 04/25/17 1503   Total  0 04/19/17 1452    Pulse/Heart Rate Source  Monitor 04/25/17 1503   ANALGESIA SIDE EFFECTS MONITORING      BP  (!)  121/99 04/25/17 1509   Side Effects Monitoring: Respiratory Quality  R 04/25/17 0403    BP Location  Right arm 04/25/17 1503   Side Effects Monitoring: Respiratory Depth  N 04/25/17 0403    Patient Position  Lying 04/19/17 1452   Side Effects Monitoring: Sedation Level  S 04/25/17 0403    LYING ORTHOSTATIC BP     HEIGHT AND WEIGHT      Lying Orthostatic BP  " "118/66 04/17/17 1140   Height  1.626 m (5' 4\") 03/31/17 2021    Lying Orthostatic Pulse  80 bpm 04/17/17 1140   Height Method  Estimated 03/31/17 2021    OXYGEN THERAPY     Weight  76.7 kg (169 lb 1.5 oz) (bed scale) 04/22/17 0010    SpO2  100 % 04/25/17 1503   BSA (Calculated - sq m)  2.05 03/31/17 2021    O2 Device  None (Room air) 04/25/17 1509   BMI (Calculated)  35.26 03/31/17 2021    FiO2 (%)  21 % 04/22/17 0340   POSITIONING      Oxygen Delivery  6 LPM 04/19/17 1427   Body Position  right, side-lying 04/25/17 1509    RESPIRATORY MONITORING     Head of Bed (HOB)  HOB at 30-45 degrees 04/25/17 0930    Respiratory Monitoring (EtCO2)  30 mmHg 04/07/17 1429   Positioning/Transfer Devices  pillows;in use 04/25/17 0403    PAIN/COMFORT     Chair  Recline and up in chair 04/23/17 1544    Patient Currently in Pain  no 04/25/17 1114   DAILY CARE      Preferred Pain Scale  Adult Non-Verbal ([facility] Only) 04/25/17 1114   Activity Type  up in chair 04/25/17 1114    Patient's Stated Pain Goal  Unable to Assess 04/25/17 0403   Activity Level of Assistance  assistance, 2 people 04/24/17 2342    Pain Location  Other (Comment) 04/25/17 0403   Activity Assistive Device  team lift 04/24/17 2342    Pain Orientation  Other (Comment) 04/25/17 0403   EKG MONITORING      Pain Descriptors  Other (comment) 04/25/17 0403   Cardiac Regularity  Regular 03/31/17 2158    Nonverbal Indicators Of Pain  other (see comments) 04/25/17 0403   Cardiac Rhythm  NSR 03/31/17 2158    Pain Management Interventions  pillow support provided;music therapy 04/25/17 0403   FANNIE COMA SCALE      Gardiner Scale Score  2-->patient cooperative, oriented and tranquil 04/24/17 2352   Best Eye Response  4-->(E4) spontaneous 04/19/17 0458    Pain Intervention(s)  Declines;Repositioned 04/24/17 2352   Best Motor Response  6-->(M6) obeys commands 04/19/17 0458    CLINICALLY ALIGNED PAIN ASSESSMENT (CAPA) ([facility], [facility] AND [facility] ADULTS ONLY)     Best Verbal Response  " 4-->(V4) confused 04/19/17 0458    Comfort  negligible pain 04/23/17 1134   Sofia Coma Scale Score  14 04/19/17 0458    Change in Pain  about the same 04/12/17 0450   ECG      Pain Control  partially effective 04/12/17 0450   ECG Rhythm  Atrial fibrillation 04/23/17 1652    Functioning  can do most things, but pain gets in the way of some 04/12/17 0450   Ectopy  None 04/23/17 2348    Sleep  awake with occasional pain 04/12/17 0450   Ectopy Frequency  Frequent;Bigeminal;Trigeminal 04/23/17 0053    PAIN ASSESSMENT/NONVERBAL ICU (ADULT)     Lead Monitored  Lead II;V 5 04/19/17 1452    Presence of Pain  No nonverbal indicators of pain present 04/25/17 1114   Equipment  electrodes changed 04/12/17 0052    Assessment Indicator  PRN assessment 04/25/17 0403   POINT OF CARE TESTING      Nonverbal Indicators of Pain  Moaning 04/12/17 1512   Puncture Site  fingertip 04/24/17 1943    Pain Management Interventions  Distraction;Music therapy 04/25/17 0403   Bedside Glucose (mg/dl )   330 mg/dl 04/24/17 1943            Patient Lines/Drains/Airways Status    Active LINES/DRAINS/AIRWAYS     Name: Placement date: Placement time: Site: Days: Last dressing change:    Gastrostomy/Enterostomy Percutaneous endoscopic gastrostomy (PEG) LUQ 1 20 fr 04/19/17   1348   LUQ   6     Urethral Catheter 03/31/17   2015      24     Wound 05/03/12 Left Hand 05/03/12      Hand   1818     Wound 05/15/14 Left;Lower leg, pinkish red site 05/15/14   1947      1075     Wound 04/01/17 Anterior;Right Leg Abrasion(s) Righ Nelson Abrasion 04/01/17   0119   Leg   24     Wound 04/01/17 Left;Lower Buttocks Excoriated/Open area 04/01/17   1750   Buttocks   23     Wound 04/04/17 Mid Coccyx nonblanchable 04/04/17   1000   Coccyx   21     Wound 04/18/17 Left;Outer Foot foot was covered with cast . MD romved the cast 04/18/17   1130   Foot   7     Wound 04/18/17 Left;Outer Foot mid foot , laterlly . foot was covered with hard cast . orth romved the cast today  04/18/17   1130   Foot   7     Wound 04/18/17 Right Heel 04/18/17   0800   Heel   7     Incision/Surgical Site Anterior;Lateral;Right Head             Incision/Surgical Site 02/28/12 Anterior;Lateral;Right Abdomen 02/28/12       1883     Incision/Surgical Site Right;Lower Abdomen                     Patient Lines/Drains/Airways Status    Active PICC/CVC     Name: Placement date: Placement time: Site: Days: Additional Info Last dressing change:    PICC Double Lumen 04/11/17 Left  04/11/17   1530      14 External Cath Length (cm): 2 cm   04/25/17 1100 (6.61 hrs)         Size (Fr): 5 Fr            Orientation: Left            Extremity Circumference (cm): 31 cm            Catheter Brand: [place] Midstream open ended            Dressing Intervention: Chlorhexidine patch;Transparent;Securing device            Description: Non - valved (open ended);Power PICC            Total Catheter Length (cm) Trimmed: 47 cm            Site Prep: Chlorhexidine            Local Anesthetic: Injectable            Inserted by: Juana Tyson RN            Insertion attempts with ultrasound: 1            Patient Tolerance: Tolerated well            Placement Verification: Blood Return;Xray            Difficulty with threading line: No            Tip location: SVC/RA Junction            Full barrier precautions done: Yes            Consent Signed: Yes            Time Out performed: Yes            Lot #: [id number]            Use for : Access. Over the wire exchange completed, new carheter threaded without difficulty into SVC. RN notified PICC was ready to use.               Intake/Output Detail Report     Date Intake             Output       Net    Shift P.O. I.V. NG/GT IV Piggyback Colloid Enteral Blood Components Total Urine Emesis/NG output Stool Blood Total       Molly 04/24/17 0700 - 04/24/17 1459 -- 250 440 -- -- 360 -- 1050 350 -- -- -- 350 700    Noc 04/24/17 1500 - 04/24/17 2359 -- 20 410 -- -- 405 -- 835 800 -- -- -- 800 35    Day 04/25/17 0000 -  04/25/17 0659 -- 20 -- -- -- 270 -- 290 550 -- -- -- 550 -260    Molly 04/25/17 0700 - 04/25/17 1459 0 -- 360 -- -- 360 -- 720 800 -- -- -- 800 -80    Noc 04/25/17 1500 - 04/25/17 2359 -- -- -- -- -- 45 -- 45 375 -- -- -- 375 -330      Last Void/BM       Most Recent Value    Urine Occurrence 0 at 04/08/2017 0600    Stool Occurrence 1 at 04/19/2017 0000      Case Management/Discharge Planning     Case Management/Discharge Planning Flowsheet     REFERRAL INFORMATION     Skilled Nursing Facility Phone Number  -- ([phone]) 05/10/12 1107    Arrived From  emergency department 05/01/12 0632   Equipment Used at Home  bath bench;grab bar;walker, rolling;wheelchair 05/16/14 1354    LIVING ENVIRONMENT     FINAL RESOURCES      Lives With  facility resident (was living at home in [facility] with wife prior to December) 04/13/17 1654   Equipment Currently Used at Home  wheelchair (bed rail) 02/21/17 1245    Living Arrangements  extended care facility 04/13/17 1654   Resources List  Skilled Nursing Facility 04/03/17 0754    COPING/STRESS     Skilled Nursing Facility  [facility] [phone], Fax: [phone] 04/03/17 0754    Major Change/Loss/Stressor  illness 04/01/17 0110   MH/ CAREGIVER      Patient Personal Strengths  expressive of emotions;expressive of needs 03/02/12 1313   Filed Complexity Screen Score  13 04/03/17 0755    EXPECTED DISCHARGE     ABUSE RISK SCREEN      Expected Discharge Date  04/19/17 (LTC) 04/18/17 0801   QUESTION TO PATIENT:  Has a member of your family or a partner(now or in the past) intimidated, hurt, manipulated, or controlled you in any way?  no 03/31/17 2028    ASSESSMENT/CONCERNS TO BE ADDRESSED     QUESTION TO PATIENT: Do you feel safe going back to the place where you are living?  yes 03/31/17 2028    Concerns Comments  -- (discharged from home care ) 06/11/14 1135   OBSERVATION: Is there reason to believe there has been maltreatment of a vulnerable adult (ie.  Physical/Sexual/Emotional abuse, self neglect, lack of adequate food, shelter, medical care, or financial exploitation)?  no 03/31/17 2028    DISCHARGE PLANNING     (R) MENTAL HEALTH SUICIDE RISK      Transportation Available  family or friend will provide 02/21/17 1245   Are you depressed or being treated for depression?  No 04/01/17 0110    Skilled Nursing Facility  -- ([facility]) 05/10/12 1107                       [address] [facility] BANK: [phone]            Medication Administration Report for Melvin Bhatia as of 04/25/17 1736   Legend:    Given Hold Not Given Due Canceled Entry Other Actions    Time Time (Time) Time  Time-Action       Inactive    Active    Linked        Medications 04/19/17 04/20/17 04/21/17 04/22/17 04/23/17 04/24/17 04/25/17    acetaminophen (TYLENOL) solution 650 mg  Dose: 650 mg Freq: EVERY 6 HOURS PRN Route: ORAL OR FEED  PRN Reason: fever  PRN Comment: pain  Start: 04/08/17 1916   Admin Instructions: Maximum acetaminophen dose from all sources= 75 mg/kg/day not to exceed 4 grams/day.     1143-Auto Hold       1505-Unhold                 acetaminophen (TYLENOL) Suppository 650 mg  Dose: 650 mg Freq: EVERY 6 HOURS PRN Route: RE  PRN Reasons: mild pain,fever  PRN Comment: temperature over 100  F   Start: 04/25/17 1546   Admin Instructions: Maximum acetaminophen dose from all sources = 75 mg/kg/day not to exceed 4 grams/day.               artificial saliva (BIOTENE MT) solution 2 spray  Dose: 2 spray Freq: EVERY 1 HOUR PRN Route: MT  PRN Reason: dry mouth  Start: 04/25/17 1546              aspirin suspension 325 mg  Dose: 325 mg Freq: DAILY Route: ORAL OR NG T  Start: 04/20/17 1745             0926 (325 mg)-Given        0918 (325 mg)-Given        0817 (325 mg)-Given        0810 (325 mg)-Given        0749 (325 mg)-Given           atropine 1 % ophthalmic solution 1-2 drop  Dose: 1-2 drop Freq: EVERY 1 HOUR PRN Route: SL  PRN Reason: other  PRN Comment: secretions  Start:  04/25/17 1546              dextrose 10 % 1,000 mL infusion  Freq: CONTINUOUS PRN Route: IV  PRN Comment: Hypoglycemia prevention  Last Dose: Stopped (04/17/17 1018)  Start: 04/09/17 0940   Admin Instructions: For Hypoglycemia Prevention for patients on long-acting subcutaneous basal insulin (Glargine, Detemir, NPH) or continuous insulin infusion. Whenever nutrition support is held or interrupted:   1) Infuse IV D10W at nutrition support rate  2) Notify provider for further instructions               ferrous sulfate elixir 220 mg  Dose: 220 mg Freq: DAILY Route: PO  Start: 04/20/17 1745   Admin Instructions: Absorbed best on an empty stomach. If stomach upset occurs, can take with meals.      1955 (220 mg)-Given        0925 (220 mg)-Given        0919 (220 mg)-Given        0816 (220 mg)-Given        0810 (220 mg)-Given        0749 (220 mg)-Given           glucose 40 % gel 15-30 g  Dose: 15-30 g Freq: EVERY 15 MIN PRN Route: PO  PRN Reason: low blood sugar  Start: 04/05/17 0844   Admin Instructions: Give 15 g for BG 51 to 69 mg/dL IF patient is conscious and able to swallow. Give 30 g for BG less than or equal to 50 mg/dL IF patient is conscious and able to swallow. Do NOT give glucose gel via enteral tube.  IF patient has enteral tube: give apple juice 120 mL (4 oz or 15 g of CHO) via enteral tube for BG 51 to 69 mg/dL.  Give apple juice 240 mL (8 oz or 30 g of CHO) via enteral tube for BG less than or equal to 50 mg/dL.     1143-Auto Hold       1505-Unhold                Or  dextrose 50 % injection 25-50 mL  Dose: 25-50 mL Freq: EVERY 15 MIN PRN Route: IV  PRN Reason: low blood sugar  Start: 04/05/17 0844   Admin Instructions: Use if have IV access, BG less than 70 mg/dL and meet dose criteria below:  Dose if conscious and alert (or disorientated) and NPO = 25 mL  Dose if unconscious / not alert = 50 mL  Vesicant.     1143-Auto Hold       1505-Unhold                Or  glucagon injection 1 mg  Dose: 1 mg Freq: EVERY  15 MIN PRN Route: SC  PRN Reason: low blood sugar  PRN Comment: May repeat x 1 only  Start: 04/05/17 0844   Admin Instructions: May give SQ or IM. IF BG less than or equal to 50 mg/dL and no IV access.  ONLY use glucagon IF patient has NO IV access AND is UNABLE to swallow.     1143-Auto Hold       1505-Unhold                 haloperidol lactate (HALDOL) injection 0.5-1 mg  Dose: 0.5-1 mg Freq: EVERY 1 HOUR PRN Route: IV  PRN Reason: agitation  Start: 04/25/17 1558   Admin Instructions: Call MD if agitation not relieved at these doses.  Contraindicated in Parkinsonism.               heparin lock flush 10 UNIT/ML injection 5-10 mL  Dose: 5-10 mL Freq: EVERY 1 HOUR PRN Route: IK  PRN Reason: other  PRN Comment: to lock each CVC - Open Ended (Tunneled and Non-Tunneled) dormant lumen.  Start: 04/11/17 1629   Admin Instructions: MAX: 5 mL per lumen.     0617 (5 mL)-Given       1143-Auto Hold       1505-Unhold        0712 (5 mL)-Given [C]        0559 (5 mL)-Given       1648 (5 mL)-Given        1642 (5 mL)-Given        0731 (5 mL)-Given        0558 (5 mL)-Given       1544 (5 mL)-Given            heparin lock flush 10 UNIT/ML injection 5-10 mL  Dose: 5-10 mL Freq: EVERY 24 HOURS Route: IK  Start: 04/11/17 1630   Admin Instructions: To lock each CVC - Open Ended (Tunneled and Non-Tunneled) dormant lumen. Check PRN heparin flush order to see when last dose of PRN heparin was given before administering.  MAX: 5 mL per lumen..            1143-Auto Hold       1505-Unhold        0643 (5 mL)-Given        0933 (5 mL)-Given       1852 (5 mL)-Given        0923 (5 mL)-Given        0708 (5 mL)-Given        0546 (10 mL)-Given        0548 (5 mL)-Given           hypromellose-dextran (ARTIFICAL TEARS) ophthalmic solution 1 drop  Dose: 1 drop Freq: EVERY 1 HOUR PRN Route: Both Eyes  PRN Reason: dry eyes  Start: 04/01/17 0526    1143-Auto Hold       1505-Unhold                 hypromellose-dextran (ARTIFICAL TEARS) ophthalmic solution 1-2  drop  Dose: 1-2 drop Freq: EVERY 8 HOURS PRN Route: Both Eyes  PRN Reason: dry eyes  Start: 04/25/17 1546   Admin Instructions: Offer every shift.               insulin aspart (NovoLOG) inj (RAPID ACTING)  Dose: 1-12 Units Freq: EVERY 4 HOURS Route: SC  Start: 04/25/17 1215   Admin Instructions: Correction Scale - HIGH INSULIN RESISTANCE DOSING     Do Not give Correction Insulin if BG less than 140  For  - 164 give 1 unit.  For  - 189 give 2 units.  For  - 214 give 3 units.  For  - 239 give 4 units.  For  - 264 give 5 units.  For  - 289 give 6 units.  For  - 314 give 7 units.  For  - 339 give 8 units  For  - 364 give 9 units  For  - 389 give 10 units  For  - 414 give 11 units  For BG greater than or equal to 415 give 12 units  Check blood glucose Q4H and administer based on blood glucose.  Notify provider if glucose greater than or equal to 350 mg/dL after administration of correction dose.  If given at mealtime, must be administered 5 min before meal or immediately after.                  1519 (8 Units)-Given [C]       [ ] 2015           ipratropium (ATROVENT) 0.02 % neb solution 0.5 mg  Dose: 0.5 mg Freq: 2 TIMES DAILY Route: NEBULIZATION  Start: 04/21/17 2000      2052 (0.5 mg)-Given        0818 (0.5 mg)-Given       (2250)-Not Given        0901 (0.5 mg)-Given       2127 (0.5 mg)-Given        0713 (0.5 mg)-Given       2011 (0.5 mg)-Given        0808 (0.5 mg)-Given       [ ] 2000           ipratropium (ATROVENT) 0.02 % neb solution 0.5 mg  Dose: 0.5 mg Freq: EVERY 4 HOURS PRN Route: NEBULIZATION  PRN Reason: wheezing  Start: 04/21/17 0815              lactobacillus rhamnosus (GG) (CULTURELL) capsule 1 capsule  Dose: 1 capsule Freq: 2 TIMES DAILY Route: ORAL OR NG T  Start: 04/08/17 2000   Admin Instructions: Therapeutic interchange for lactobacillus acidophilus  Administer at least 2 hours before or after oral antibiotics. Capsules may be opened.      "(0809)-Not Given       1143-Auto Hold       1505-Unhold       (2003)-Not Given        0838 (1 capsule)-Given       2002 (1 capsule)-Given        0925 (1 capsule)-Given       2111 (1 capsule)-Given        0919 (1 capsule)-Given       2127 (1 capsule)-Given        0816 (1 capsule)-Given       2028 (1 capsule)-Given        0813 (1 capsule)-Given       2001 (1 capsule)-Given        0757 (1 capsule)-Given       [ ] 2000           lidocaine (LMX4) kit  Freq: EVERY 1 HOUR PRN Route: Top  PRN Reason: pain  PRN Comment: with VAD insertion or accessing implanted port.  Start: 04/19/17 1149   Admin Instructions: Do NOT give if patient has a history of allergy to any local anesthetic or any \"kiko\" product.   Apply 30 minutes prior to VAD insertion or port access.  MAX Dose:  2.5 g (  of 5 g tube)               lidocaine 1 % 1 mL  Dose: 1 mL Freq: EVERY 1 HOUR PRN Route: OTHER  PRN Comment: mild pain with VAD insertion or accessing implanted port  Start: 04/19/17 1149   Admin Instructions: Do NOT give if patient has a history of allergy to any local anesthetic or any \"kiko\" product. MAX dose 1 mL subcutaneous OR intradermal in divided doses.     1404 (2.5 mL)-Given                 LORazepam (ATIVAN) injection 0.5-1 mg  Dose: 0.5-1 mg Freq: EVERY 3 HOURS PRN Route: IV  PRN Reason: other  PRN Comment: anxiety, seizures, nausea, dyspnea   Start: 04/25/17 1558   Admin Instructions: Avoid if delirium present.  For IV PUSH: Dilute with equal volume of NS.              Or  LORazepam (ATIVAN) tablet 0.5-1 mg  Dose: 0.5-1 mg Freq: EVERY 3 HOURS PRN Route: PO  PRN Reason: other  PRN Comment: anxiety, seizures, nausea, dyspnea   Start: 04/25/17 1558   Admin Instructions: Avoid if delirium present.                Or  LORazepam (ATIVAN) tablet 0.5-1 mg  Dose: 0.5-1 mg Freq: EVERY 3 HOURS PRN Route: SL  PRN Reason: other  PRN Comment: anxiety, seizures, nausea, dyspnea   Start: 04/25/17 1558   Admin Instructions: Avoid if delirium present.  "              magnesium sulfate 4 g in 100 mL sterile water (premade)  Dose: 4 g Freq: EVERY 4 HOURS PRN Route: IV  PRN Reason: magnesium supplementation  Start: 04/05/17 1015   Admin Instructions: For serum Mg++ less than 1.6 mg/dL  Give 4 g and recheck magnesium level 2 hours after dose, and next AM.     1143-Auto Hold       1505-Unhold                 metoclopramide (REGLAN) tablet 5 mg  Dose: 5 mg Freq: EVERY 6 HOURS PRN Route: PO  PRN Comment: nausea and vomiting  Start: 04/25/17 1546   Admin Instructions: This is Step 3 of nausea and vomiting management.  Give if nausea not resolved 15 minutes after giving prochlorperazine (COMPAZINE).  If nausea not resolved in 15-30 minutes, Notify provider.              Or  metoclopramide (REGLAN) injection 5 mg  Dose: 5 mg Freq: EVERY 6 HOURS PRN Route: IV  PRN Comment: nausea and vomiting  Start: 04/25/17 1546   Admin Instructions: This is Step 3 of nausea and vomiting management.  Give if nausea not resolved 15 minutes after giving prochlorperazine (COMPAZINE).  If nausea not resolved in 15-30 minutes, Notify provider.  Irritant.               metoprolol (LOPRESSOR) suspension 25 mg  Dose: 25 mg Freq: 2 TIMES DAILY Route: PO  Start: 04/20/17 2000   Admin Instructions: Shake well.      1955 (25 mg)-Given        0926 (25 mg)-Given       2116 (25 mg)-Given        0923 (25 mg)-Given       2127 (25 mg)-Given        0816 (25 mg)-Given       2028 (25 mg)-Given        0810 (25 mg)-Given       2001 (25 mg)-Given        0749 (25 mg)-Given       [ ] 2000           mineral oil-hydrophilic petrolatum (AQUAPHOR)  Freq: EVERY 8 HOURS PRN Route: Top  PRN Reason: dry skin  Start: 04/25/17 1546   Admin Instructions: Apply to lips as directed, for dryness.   Offer every shift.               mineral oil-hydrophilic petrolatum (AQUAPHOR)  Freq: EVERY 1 HOUR PRN Route: Top  PRN Comment: dry skin  Start: 04/17/17 1128   Admin Instructions: Apply to left foot for dry skin     1143-Auto Hold        1505-Unhold                 morphine (PF) injection 1-2 mg  Dose: 1-2 mg Freq: EVERY 1 HOUR PRN Route: IV  PRN Reason: other  PRN Comment: pain or dyspnea  Start: 04/25/17 1546              morphine solution 5-10 mg  Dose: 5-10 mg Freq: EVERY 2 HOURS PRN Route: PO  PRN Reason: other  PRN Comment: for pain or dyspnea  Start: 04/25/17 1546             Or  morphine sulfate HIGH CONCENTRATE (ROXANOL *CONCENTRATED*) 20 mg/mL (HIGH CONC) solution 5-10 mg  Dose: 5-10 mg Freq: EVERY 2 HOURS PRN Route: SL  PRN Reason: other  PRN Comment: for pain or dyspnea  Start: 04/25/17 1546   Admin Instructions: Caution: Solution is 10 times more concentrated than standard solution. Verify dose volume.               multivitamins with minerals (CERTAVITE/CEROVITE) liquid 15 mL  Dose: 15 mL Freq: DAILY Route: PER FEEDING   Start: 04/08/17 1830    (0809)-Not Given       1143-Auto Hold       1505-Unhold        0838 (15 mL)-Given        0934 (15 mL)-Given        0918 (15 mL)-Given        0816 (15 mL)-Given        0814 (15 mL)-Given        0749 (15 mL)-Given           naloxone (NARCAN) injection 0.1-0.4 mg  Dose: 0.1-0.4 mg Freq: EVERY 2 MIN PRN Route: IV  PRN Reason: opioid reversal  Start: 04/01/17 0029   Admin Instructions: For respiratory rate LESS than or EQUAL to 8.  Partial reversal dose:  0.1 mg titrated q 2 minutes for Analgesia Side Effects Monitoring Sedation Level of 3 (frequently drowsy, arousable, drifts to sleep during conversation).Full reversal dose:  0.4 mg bolus for Analgesia Side Effects Monitoring Sedation Level of 4 (somnolent, minimal or no response to stimulation).     1143-Auto Hold       1505-Unhold                 No lozenges or gum should be given while patient on BIPAP  Freq: CONTINUOUS PRN Route: XX  Start: 04/01/17 0526              nystatin (MYCOSTATIN) suspension 500,000 Units  Dose: 500,000 Units Freq: 4 TIMES DAILY Route: MT  Start: 04/10/17 1200   Admin Instructions: Shake Well.     1003 (500,000  Units)-Given       1129 (500,000 Units)-Given       1143-Auto Hold       1505-Unhold       1621 (500,000 Units)-Given       2013 (500,000 Units)-Given        0838 (500,000 Units)-Given       1137 (500,000 Units)-Given       1536 (500,000 Units)-Given       1955 (500,000 Units)-Given        0929 (500,000 Units)-Given       1221 (500,000 Units)-Given       1649 (500,000 Units)-Given       2112 (500,000 Units)-Given        0910 (500,000 Units)-Given       1203 (500,000 Units)-Given       (1630)-Not Given       2126 (500,000 Units)-Given        0817 (500,000 Units)-Given       1140 (500,000 Units)-Given       1601 (500,000 Units)-Given       2028 (500,000 Units)-Given        0813 (500,000 Units)-Given       1122 (500,000 Units)-Given       1553 (500,000 Units)-Given       2001 (500,000 Units)-Given        0759 (500,000 Units)-Given       1128 (500,000 Units)-Given       1517 (500,000 Units)-Given       [ ] 2000           ondansetron (ZOFRAN-ODT) ODT tab 4 mg  Dose: 4 mg Freq: EVERY 6 HOURS PRN Route: PO  PRN Reason: nausea  Start: 04/25/17 1546   Admin Instructions: This is Step 1 of nausea and vomiting management.  If nausea not resolved in 15 minutes, go to Step 2 prochlorperazine (COMPAZINE). Do not push through foil backing. Peel back foil and gently remove. Place on tongue immediately. Administration with liquid unnecessary              Or  ondansetron (ZOFRAN) injection 4 mg  Dose: 4 mg Freq: EVERY 6 HOURS PRN Route: IV  PRN Reasons: nausea,vomiting  Start: 04/25/17 1546   Admin Instructions: This is Step 1 of nausea and vomiting management.  If nausea not resolved in 15 minutes, go to Step 2 prochlorperazine (COMPAZINE).  Irritant.               ondansetron (ZOFRAN-ODT) ODT tab 4 mg  Dose: 4 mg Freq: EVERY 6 HOURS PRN Route: PO  PRN Reason: nausea  Start: 04/01/17 0029   Admin Instructions: This is Step 1 of nausea and vomiting management.  If nausea not resolved in 15 minutes, go to Step 2 prochlorperazine  (COMPAZINE). Do not push through foil backing. Peel back foil and gently remove. Place on tongue immediately. Administration with liquid unnecessary     1143-Auto Hold       1505-Unhold                Or  ondansetron (ZOFRAN) injection 4 mg  Dose: 4 mg Freq: EVERY 6 HOURS PRN Route: IV  PRN Reasons: nausea,vomiting  Start: 04/01/17 0029   Admin Instructions: This is Step 1 of nausea and vomiting management.  If nausea not resolved in 15 minutes, go to Step 2 prochlorperazine (COMPAZINE).  Irritant.     1143-Auto Hold       1505-Unhold                 pantoprazole (PROTONIX) suspension 40 mg  Dose: 40 mg Freq: 2 TIMES DAILY Route: PO  Start: 04/20/17 2000     1954 (40 mg)-Given        0927 (40 mg)-Given       2111 (40 mg)-Given        0918 (40 mg)-Given       2126 (40 mg)-Given        0816 (40 mg)-Given       2027 (40 mg)-Given        0812 (40 mg)-Given       2001 (40 mg)-Given        0752 (40 mg)-Given       [ ] 2000           Patient is already receiving anticoagulation with heparin, enoxaparin (LOVENOX), warfarin (COUMADIN)  or other anticoagulant medication  Freq: CONTINUOUS PRN Route: XX  Start: 04/01/17 0143              polyethylene glycol (MIRALAX/GLYCOLAX) Packet 17 g  Dose: 17 g Freq: DAILY PRN Route: ORAL OR NG T  PRN Reason: constipation  Start: 04/08/17 1839   Admin Instructions: 2nd step of constipation protocol  1 Packet = 17 grams. Mixed prescribed dose in 8 ounces of water. Follow with 8 oz. of water.     1143-Auto Hold       1505-Unhold                 potassium & sodium phosphates (NEUTRA-PHOS) Packet 1 packet  Dose: 1 packet Freq: 4 TIMES DAILY Route: PO  Start: 04/23/17 1215        1342 (1 packet)-Given       1601 (1 packet)-Given       2028 (1 packet)-Given        0813 (1 packet)-Given       1118 (1 packet)-Given       1553 (1 packet)-Given       2001 (1 packet)-Given        0757 (1 packet)-Given       1127 (1 packet)-Given       1513 (1 packet)-Given       [ ] 2000           potassium phosphate  15 mmol in D5W 250 mL intermittent infusion  Dose: 15 mmol Freq: DAILY PRN Route: IV  PRN Reason: phosphorous supplementation  Start: 04/05/17 1015   Admin Instructions: For serum phosphorus level 2-2.4  Do not infuse Phosphorus in the same line as TPN.   Give 15 mmol and recheck phosphorus level next AM.     1044 (15 mmol)-New Bag       1143-Auto Hold              1505-Unhold         2337 (15 mmol)-New Bag               potassium phosphate 20 mmol in D5W 250 mL intermittent infusion  Dose: 20 mmol Freq: EVERY 6 HOURS PRN Route: IV  PRN Reason: phosphorous supplementation  Start: 04/05/17 1015   Admin Instructions: For serum phosphorus level 1.1-1.9  For CENTRAL Line ONLY  Do not infuse Phosphorus in the same line as TPN.   Give 20 mmol and recheck phosphorus level 2 hours after last dose and next AM.     1143-Auto Hold       1505-Unhold            0939 (20 mmol)-New Bag            potassium phosphate 20 mmol in D5W 500 mL intermittent infusion  Dose: 20 mmol Freq: EVERY 6 HOURS PRN Route: IV  PRN Reason: phosphorous supplementation  Start: 04/05/17 1015   Admin Instructions: For serum phosphorus level 1.1-1.9  For Peripheral Line  Do not infuse Phosphorus in the same line as TPN.   Give 20 mmol and recheck phosphorus level 2 hours after last dose and next AM.     1143-Auto Hold       1505-Unhold         1221 (20 mmol)-New Bag               potassium phosphate 25 mmol in D5W 500 mL intermittent infusion  Dose: 25 mmol Freq: EVERY 8 HOURS PRN Route: IV  PRN Reason: phosphorous supplementation  Start: 04/05/17 1015   Admin Instructions: For serum phosphorus level less than 1.1  Do not infuse Phosphorus in the same line as TPN.   Give 25 mmol and recheck phosphorus level 2 hours after last dose and next AM.     1143-Auto Hold       1505-Unhold                 prochlorperazine (COMPAZINE) injection 5 mg  Dose: 5 mg Freq: EVERY 6 HOURS PRN Route: IV  PRN Reasons: nausea,vomiting  Start: 04/25/17 1546   Admin  Instructions: This is Step 2 of nausea and vomiting management.   If nausea not resolved in 15 minutes, give metoclopramide (REGLAN) if ordered (step 3 of nausea and vomiting management)              Or  prochlorperazine (COMPAZINE) tablet 5 mg  Dose: 5 mg Freq: EVERY 6 HOURS PRN Route: PO  PRN Reason: vomiting  Start: 04/25/17 1546   Admin Instructions: This is Step 2 of nausea and vomiting management.   If nausea not resolved in 15 minutes, give metoclopramide (REGLAN) if ordered (step 3 of nausea and vomiting management)              Or  prochlorperazine (COMPAZINE) Suppository 12.5 mg  Dose: 12.5 mg Freq: EVERY 12 HOURS PRN Route: RE  PRN Reasons: nausea,vomiting  Start: 04/25/17 1546   Admin Instructions: This is Step 2 of nausea and vomiting management.   If nausea not resolved in 15 minutes, give metoclopramide (REGLAN) if ordered (step 3 of nausea and vomiting management)               prochlorperazine (COMPAZINE) injection 5 mg  Dose: 5 mg Freq: EVERY 6 HOURS PRN Route: IV  PRN Reasons: nausea,vomiting  Start: 04/01/17 0029   Admin Instructions: This is Step 2 of nausea and vomiting management.   If nausea not resolved in 15 minutes, give metoclopramide (REGLAN) if ordered (step 3 of nausea and vomiting management)     1143-Auto Hold       1505-Unhold                Or  prochlorperazine (COMPAZINE) tablet 5 mg  Dose: 5 mg Freq: EVERY 6 HOURS PRN Route: PO  PRN Reason: vomiting  Start: 04/01/17 0029   Admin Instructions: This is Step 2 of nausea and vomiting management.   If nausea not resolved in 15 minutes, give metoclopramide (REGLAN) if ordered (step 3 of nausea and vomiting management)     1143-Auto Hold       1505-Unhold                Or  prochlorperazine (COMPAZINE) Suppository 12.5 mg  Dose: 12.5 mg Freq: EVERY 12 HOURS PRN Route: RE  PRN Reasons: nausea,vomiting  Start: 04/01/17 0029   Admin Instructions: This is Step 2 of nausea and vomiting management.   If nausea not resolved in 15 minutes, give  metoclopramide (REGLAN) if ordered (step 3 of nausea and vomiting management)     1143-Auto Hold       1505-Unhold                 Reason ACE/ARB order not selected  Freq: CONTINUOUS PRN Route: OTHER  PRN Reason: other  Start: 04/03/17 1130              rosuvastatin (CRESTOR) tablet 20 mg  Dose: 20 mg Freq: AT BEDTIME Route: PO  Start: 04/16/17 2200    1143-Auto Hold       1505-Unhold       (2140)-Not Given        2201 (20 mg)-Given        2111 (20 mg)-Given        2126 (20 mg)-Given        2153 (20 mg)-Given        2112 (20 mg)-Given        [ ] 2200           senna-docusate (SENOKOT-S;PERICOLACE) 8.6-50 MG per tablet 1-2 tablet  Dose: 1-2 tablet Freq: AT BEDTIME PRN Route: ORAL OR NG T  PRN Reason: constipation  Start: 04/08/17 1840   Admin Instructions: Offer first     1143-Auto Hold       1505-Unhold                 sodium chloride (PF) 0.9% PF flush 10 mL  Dose: 10 mL Freq: EVERY 8 HOURS Route: IK  Start: 04/03/17 1915   Admin Instructions: And Q1H PRN, to lock peripheral midline IV catheter dormant lumen. Recommended to flush Q8H each lumen even during infusions     (0105)-Not Given       0917 (10 mL)-Given       1143-Auto Hold       1505-Unhold       1621 (10 mL)-Given        0012 (10 mL)-Given       0741 (10 mL)-Given       1536 (10 mL)-Given        0004 (10 mL)-Given       0934 (10 mL)-Given       (1600)-Not Given       2347 (10 mL)-Given        (0800)-Not Given       (1651)-Not Given        0021 (10 mL)-Given       (0834)-Not Given       1602 (10 mL)-Given        0027 (10 mL)-Given       0822 (10 mL)-Given       1552 (10 mL)-Given        0017 (10 mL)-Given       0759 (10 mL)-Given       1516 (10 mL)-Given           sodium chloride (PF) 0.9% PF flush 10-20 mL  Dose: 10-20 mL Freq: EVERY 1 HOUR PRN Route: IK  PRN Reasons: line flush,post meds or blood draw  PRN Comment: to flush each peripheral midline IV catheter lumen  Start: 04/03/17 0886   Admin Instructions: 10 mL post IV meds;  20 mL post blood draw      1143-Auto Hold       1505-Unhold          1642 (20 mL)-Given        0729 (20 mL)-Given        0559 (20 mL)-Given       1544 (20 mL)-Given        0548 (20 mL)-Given           sodium chloride (PF) 0.9% PF flush 3 mL  Dose: 3 mL Freq: EVERY 8 HOURS Route: IK  Start: 04/19/17 1200   Admin Instructions: And Q1H PRN, to lock peripheral IV dormant line.     (1246)-Not Given       2013 (3 mL)-Given        0418 (3 mL)-Given       (1138)-Not Given       2003 (3 mL)-Given        0357 (3 mL)-Given       1200 (3 mL)-Given       2115 (3 mL)-Given        (0336)-Not Given       (1143)-Not Given       2130 (3 mL)-Given        0428 (3 mL)-Given       1142 (3 mL)-Given       2033 (3 mL)-Given               1122 (3 mL)-Given       2002 (3 mL)-Given               1130 (3 mL)-Given       [ ] 2000           sodium chloride (PF) 0.9% PF flush 3 mL  Dose: 3 mL Freq: EVERY 1 HOUR PRN Route: IK  PRN Reason: line flush  PRN Comment: for peripheral IV flush post IV meds  Start: 04/19/17 1149              vitamin A-D & C drops (TRI-VITAMIN) drops SOLN 7 mL  Dose: 7 mL Freq: DAILY Route: PER G TUBE  Start: 04/20/17 1200   End: 04/30/17 0759     1346 (7 mL)-Given        0930 (7 mL)-Given        0921 (7 mL)-Given        0816 (7 mL)-Given        0810 (7 mL)-Given        0749 (7 mL)-Given           zinc sulfate (20 mg [place]. Zn/mL) solution 220 mg  Dose: 220 mg Freq: DAILY Route: PER G TUBE  Start: 04/20/17 1000   End: 04/30/17 0759     1137 (220 mg)-Given        0930 (220 mg)-Given        0919 (220 mg)-Given        0816 (220 mg)-Given        0810 (220 mg)-Given        0749 (220 mg)-Given          Future Medications  Medications 04/19/17 04/20/17 04/21/17 04/22/17 04/23/17 04/24/17 04/25/17       bisacodyl (DULCOLAX) Suppository 10 mg  Dose: 10 mg Freq: ONCE PRN Route: RE  PRN Reason: other  PRN Comment: for no bowel movement for 72 hours  Start: 04/28/17 0000   Admin Instructions: Give only after rectal check for impacted stool.               insulin  glargine (LANTUS) injection 33 Units  Dose: 33 Units Freq: 2 TIMES DAILY Route: SC  Start: 04/25/17 2000          [ ] 2000          Completed Medications  Medications 04/19/17 04/20/17 04/21/17 04/22/17 04/23/17 04/24/17 04/25/17         Dose: 10 Units Freq: ONCE Route: SC  Start: 04/25/17 0045   End: 04/25/17 0055   Admin Instructions: If given at mealtime, must be administered 5 min before meal or immediately after.           0055 (10 Units)-Given             Dose: 5 Units Freq: ONCE Route: SC  Start: 04/24/17 2130   End: 04/24/17 2129   Admin Instructions: If given at mealtime, must be administered 5 min before meal or immediately after.          2129 (5 Units)-Given              Dose: 8 Units Freq: ONCE Route: SC  Start: 04/24/17 1415   End: 04/24/17 1432   Admin Instructions: If given at mealtime, must be administered 5 min before meal or immediately after.          1432 (8 Units)-Given              Dose: 5 Units Freq: ONCE Route: SC  Start: 04/24/17 0515   End: 04/24/17 0545   Admin Instructions: If given at mealtime, must be administered 5 min before meal or immediately after.          0545 (5 Units)-Given              Dose: 5 Units Freq: ONCE Route: SC  Start: 04/24/17 0130   End: 04/24/17 0138   Admin Instructions: If given at mealtime, must be administered 5 min before meal or immediately after.          0138 (5 Units)-Given              Dose: 5 Units Freq: ONCE Route: SC  Start: 04/24/17 0015   End: 04/24/17 0029   Admin Instructions: If given at mealtime, must be administered 5 min before meal or immediately after.          0029 (5 Units)-Given              Dose: 5 Units Freq: ONCE Route: SC  Start: 04/23/17 0615   End: 04/23/17 0608   Admin Instructions: If given at mealtime, must be administered 5 min before meal or immediately after.         0608 (5 Units)-Given               Dose: 3 Units Freq: ONCE Route: SC  Start: 04/23/17 0515   End: 04/23/17 0516   Admin Instructions: If given at mealtime, must  be administered 5 min before meal or immediately after.         0516 (3 Units)-Given            Discontinued Medications  Medications 04/19/17 04/20/17 04/21/17 04/22/17 04/23/17 04/24/17 04/25/17         Dose: 0.5 mg Freq: 2 TIMES DAILY. Route: PO  Start: 04/22/17 0800   End: 04/23/17 1158       0919 (0.5 mg)-Given       1630 (0.5 mg)-Given        0816 (0.5 mg)-Given       1158-Med Discontinued           Dose: 2 spray Freq: DAILY Route: BOTH NOSTRIL  Start: 04/01/17 0800   End: 04/25/17 1546    0915 (2 spray)-Given       1143-Auto Hold       1505-Unhold        0838 (2 spray)-Given        0929 (2 spray)-Given        0922 (2 spray)-Given        0817 (2 spray)-Given        0815 (2 spray)-Given        0753 (2 spray)-Given       1546-Med Discontinued         Dose: 15-30 g Freq: EVERY 15 MIN PRN Route: PO  PRN Reason: low blood sugar  Start: 04/25/17 1201   End: 04/25/17 1235   Admin Instructions: Give 15 g for BG 51 to 69 mg/dL IF patient is conscious and able to swallow. Give 30 g for BG less than or equal to 50 mg/dL IF patient is conscious and able to swallow. Do NOT give glucose gel via enteral tube.  IF patient has enteral tube: give apple juice 120 mL (4 oz or 15 g of CHO) via enteral tube for BG 51 to 69 mg/dL.  Give apple juice 240 mL (8 oz or 30 g of CHO) via enteral tube for BG less than or equal to 50 mg/dL.           1235-Med Discontinued      Or    Dose: 25-50 mL Freq: EVERY 15 MIN PRN Route: IV  PRN Reason: low blood sugar  Start: 04/25/17 1201   End: 04/25/17 1235   Admin Instructions: Use if have IV access, BG less than 70 mg/dL and meet dose criteria below:  Dose if conscious and alert (or disorientated) and NPO = 25 mL  Dose if unconscious / not alert = 50 mL  Vesicant.           1235-Med Discontinued      Or    Dose: 1 mg Freq: EVERY 15 MIN PRN Route: SC  PRN Reason: low blood sugar  PRN Comment: May repeat x 1 only  Start: 04/25/17 1201   End: 04/25/17 1235   Admin Instructions: May give SQ or IM. IF  BG less than or equal to 50 mg/dL and no IV access.  ONLY use glucagon IF patient has NO IV access AND is UNABLE to swallow.           1235-Med Discontinued         Dose: 1-7 Units Freq: AT BEDTIME Route: SC  Start: 04/22/17 2200   End: 04/25/17 1234   Admin Instructions: HIGH INSULIN RESISTANCE DOSING    Do Not give Bedtime Correction Insulin if BG less than 200.   For  - 224 give 1 units.   For  - 249 give 2 units.   For  - 274 give 3 units.   For  - 299 give 4 units.   For  - 324 give 5 units.   For  - 349 give 6 units.   For BG greater than or equal to 350 give 7 units.   Notify provider if glucose greater than or equal to 350 mg/dL after administration of correction dose.  If given at mealtime, must be administered 5 min before meal or immediately after.        2129 (4 Units)-Given [C]        2154 (5 Units)-Given        2109 (7 Units)-Given [C]        1234-Med Discontinued         Dose: 1-10 Units Freq: 3 TIMES DAILY BEFORE MEALS Route: SC  Start: 04/22/17 1315   End: 04/25/17 1234   Admin Instructions: Correction Scale - HIGH INSULIN RESISTANCE DOSING     Do Not give Correction Insulin if Pre-Meal BG less than 140.   For Pre-Meal  - 164 give 1 unit.   For Pre-Meal  - 189 give 2 units.   For Pre-Meal  - 214 give 3 units.   For Pre-Meal  - 239 give 4 units.   For Pre-Meal  - 264 give 5 units.   For Pre-Meal  - 289 give 6 units.   For Pre-Meal  - 314 give 7 units.   For Pre-Meal  - 339 give 8 units.   For Pre-Meal  - 364 give 9 units.   For Pre-Meal BG greater than or equal to 365 give 10 units  To be given with prandial insulin, and based on pre-meal blood glucose.   Notify provider if glucose greater than or equal to 350 mg/dL after administration of correction dose.  If given at mealtime, must be administered 5 min before meal or immediately after.               1631 (7 Units)-Given        0831 (9 Units)-Given       1139  (10 Units)-Given       1714 (8 Units)-Given        0818 (6 Units)-Given [C]       1120 (8 Units)-Given [C]       1705 (8 Units)-Given [C]        0754 (9 Units)-Given [C]       1111 (10 Units)-Given [C]       1234-Med Discontinued         Freq: WITH SNACKS OR SUPPLEMENTS Route: SC  PRN Reason: high blood sugar  Start: 04/01/17 0732   End: 04/25/17 1234   Admin Instructions: DOSE:  1 units per 15 grams of carbohydrate. Only chart total amount of units given.  Do not give if pre-prandial glucose is less than 60 mg/dL.  If given at mealtime, must be administered 5 min before meal or immediately after.     1143-Auto Hold       1505-Unhold             1234-Med Discontinued         Dose: 15 Units Freq: 2 TIMES DAILY Route: SC  Start: 04/22/17 2000   End: 04/23/17 0633       2127 (15 Units)-Given        0633-Med Discontinued           Dose: 18 Units Freq: 2 TIMES DAILY Route: SC  Start: 04/23/17 0800   End: 04/23/17 1740        0832 (18 Units)-Given       1740-Med Discontinued           Dose: 20 Units Freq: 2 TIMES DAILY Route: SC  Start: 04/23/17 2000   End: 04/24/17 0608        2033 (20 Units)-Given        0608-Med Discontinued          Dose: 23 Units Freq: 2 TIMES DAILY Route: SC  Start: 04/24/17 0800   End: 04/24/17 1550         0819 (23 Units)-Given       1550-Med Discontinued          Dose: 27 Units Freq: 2 TIMES DAILY Route: SC  Start: 04/24/17 2000   End: 04/25/17 0936         2000 (27 Units)-Given        0755 (27 Units)-Given       0936-Med Discontinued    Medications 04/19/17 04/20/17 04/21/17 04/22/17 04/23/17 04/24/17 04/25/17               INTERAGENCY TRANSFER FORM - NOTES (H&P, Discharge Summary, Consults, Procedures, Therapies)   3/31/2017                       [address] [facility]: [phone]               History & Physicals      H&P by Cha Waite MD at 4/1/2017 12:15 AM     Author:  Cha Waite MD Service:  Family Medicine Author Type:  Resident    Filed:  4/1/2017  4:28 PM Date of  Service:  4/1/2017 12:15 AM Note Created:  4/1/2017 12:15 AM    Status:  Attested :  Cha Waite MD (Resident)    Cosigner:  Ayo Liriano MD at 4/19/2017  9:44 AM        Attestation signed by Ayo Liriano MD at 4/19/2017  9:44 AM (Updated)        Attestation:  This patient has been seen and evaluated by me, Ayo Liriano on 4/1/17.  I saw and discussed the case with the resident Dr Waite and the care team. I agree with the findings and plan in this note. I have reviewed today's vital signs, medications, laboratory results and imaging results.   Code for today's visit :19960 Inpatient admission High complexity/severity.  The patient's condition meets inpatient criteria because of the following diagnoses and severity indicators Acute on chronic kidney disease. .  Ayo Liriano MD  [facility]                                     [YT1.1]                                                 Please see day team addendum at the bottom[KC1.1]    [facility]  Inpatient History and Physical    Melvin Bhatia MRN# [id number]   Age: 76 year old YOB: [birth year] 12:16 AM    Primary care provider: Ash Bowman            Chief Concern:[YT1.1]   Kidney injury.       History is obtained from the patient's wife, and chart.[YT1.2]          History of Present Illness (Resident / Clinician):   Melvin Bhatia is a 76 year old male[YT1.1] with a significant past medical history of cerebrovascular disease, normal pressure hydrocephalus, status post ventriculoperitoneal shunting, progressive cognitive dysfunction, multiple falls, HTN, Type 2 DM, hyperlipidemia, CHF, CKD, and COPD[YT1.2] who presents with[YT1.1] 3 days Hx of poor oral intake, and found to have acute kidney injury.    Pt moved to a new nursing home 5 days ago. He was at his baseline until 3 days ago when he started refusing to eat, including fluids or ice chips. Due to his poor oral  intake he also had been very sleepy, and tired.  At his nursing home he was requiring regular intermittent catheterization, until today when pt got a Orta placed.   His wife denies any fevers, complains of pain, bloody urine or stool.[YT1.2]       Old records were reviewed, and any additions to pertinent history are noted above.          Assessment and Plan:   Assessment:   Melvin Bhatia is a 76 year old[YT1.1]  with Hx of cerebrovascular disease, and dementia[YT1.2] who presents with[YT1.1] several days of poor intake and found to have a UTI, and SHERYL.[YT1.2]  Patient Active Problem List   Diagnosis     Osteoporosis     PAF (paroxysmal atrial fibrillation) (H)     Lumbosacral plexopathy     Testosterone deficiency     Hypertension goal BP (blood pressure) < 140/90     Atrial fibrillation (H)     CKD (chronic kidney disease) stage 3, GFR 30-59 ml/min     Hyperlipidemia LDL goal <70     Coronary artery disease     Health Care Home     ACP (advance care planning)     Elevated PSA     Anemia in chronic renal disease     Subarachnoid hemorrhage (H)     Idiopathic normal pressure hydrocephalus (INPH)     Urinary bladder neurogenic dysfunction     Stroke (H)     Cognitive deficit, post-stroke     HL (hearing loss)     Personal history of fall     GERD (gastroesophageal reflux disease)     COPD exacerbation (H)     SHIVAM (obstructive sleep apnea)     LVH (left ventricular hypertrophy)     Diastolic CHF, acute on chronic (H)     Hypoxemia     Tachyarrhythmia     Systolic and diastolic CHF, chronic (H)     Gait abnormality     Iron deficiency anemia     Allergic rhinitis     Altered mental status     VRE (vancomycin-resistant Enterococci)     Hypoglycemia     Lower urinary tract infectious disease     Type 2 diabetes with stage 3 chronic kidney disease GFR 30-59 (H)     Chronic obstructive pulmonary disease, unspecified COPD type (H)     Ankle pain     Fx medial malleolus-closed[YT1.3]         Plan:[YT1.1]   ## Acute  on chronic kidney disease. (baseline Cr 1.5-2)  ## Hyperkalemia  Cr up to 5.39, GFR 10, K 5.6[YT1.2], n[YT1.4]o acute changes on EKG[YT1.2].[YT1.4]  Likely multifactorial:  Pre-renal: Dehydration due to poor oral intake  Renal: Given current UTI w/ Hx of recurrent UTIs  Post-renal: Obstruction secondary to stercoral proctitis found on abd CT on this admission.  -[YT1.2] On[YT1.5] fluid resuscitation  ml/hr[YT1.2]. Will consider decreasing rate after morning BMP results[YT1.5]  - Follow up BMP q 2 hrs[YT1.2]  - Orta catheter[YT1.4]  - Tele    ## UTI  - Started on IV Vancomycin and Ceftriaxone given previous urine cultures results Enterococcus faecalis- Sensitive to Vanco (2/19/17)  Klebsiella pneumoniae- Sensitive to Ceftriaxone (2/24/16, 5/13/16)  - Follow up culture results    ## Stercoral proctitis  Seen in abd. CT[YT1.2]. Pt denied abdominal pain, stable VS.[YT1.4]  - Colorectal surgery consult[YT1.2] placed[YT1.4].[YT1.2]    ##  Elevated troponin (0.116< 0.126)  Likely due to low CrCl (11.8), unlikely coronary ischemia given no chest pain, n[YT1.4]o acute changes on EKG[YT1.2], stable VS[YT1.4].[YT1.2]  - Will monitor.  - Tele[YT1.4]    ## Type 2 DM[YT1.2], last A1C 6.1 on 02/20/17  Home regimen:  Basal insulin: 37 units lantus twice daily  Prandial: 30 units before breakfast, lunch, 5 units before dinner    Hospital regimen:  Basal insulin: 15 units lantus twice daily  Prandial: 1 unit per 15 gm CHO  Correction: MSSI[KC1.1]    Other chronic conditions, stable- continue home medications.  ## HTN- metoprolol  ## Hyperlipidemia- rosuvastatin, niacin.[YT1.2]   ## A Fib- holding warfarin[YT1.4]  ## COPD- Breo Ellipta, Ipratropium-albuterol (Respimat)  ## Constipation- Senokot, Magnesium citrate, miralax  ## Cerebrovascular disease w/ Hx stroke, normal pressure hydrocephalus (2012), status post ventriculoperitoneal shunting, subarachnoid hemorrhage(2012)  ## Progressive cognitive dysfunction[YT1.2]  (dementia)  ## Multiple bone fracture on left foot/ankle- cast in place. Hx of fractures after a fall on 9/16, and 2/2017.[YT1.4]    Daily cares -   F:[YT1.1]  ml/hr[YT1.2]  E:[YT1.1]Elevated K[YT1.2]  N:[YT1.1]Regular diet as tolerate[YT1.2]  Lines:[YT1.1]PIV[YT1.2]  Activity:[YT1.1] Bed rest[YT1.2]- pt unable to bear weight due to ankle fracture[YT1.4]   CODE:[YT1.1]DNR / DNI[YT1.2]  Prophylaxis:[YT1.1] none[YT1.4]  PCP communication:  - Ash Bowman    Dispo: Expected discharge date[YT1.1] pending to clinical course.     Discussed with faculty but not examined by faculty.[YT1.2]           Past Medical History:[YT1.1]     Past Medical History:   Diagnosis Date     ARDS (adult respiratory distress syndrome) (H)      Atrial fibrillation (H)     first diagnosed 11-08     Bell's palsy 1/2010     Cauda equina syndrome with neurogenic bladder (H)     self catheter     Cerebellar infarct (H) 4/2012    left cerebellar infarct.  felt embolic hx afib     CHF (congestive heart failure) (H)      Chronic airway obstruction, not elsewhere classified      Chronic infection     VRE     CKD (chronic kidney disease) stage 4, GFR 15-29 ml/min (H)      Congestive heart failure, unspecified 5/06    EF 48%     Coronary artery disease     abnl mps with ef 46% and small-med area of ischemia '08 [facility]     CVA (cerebral infarction)     old lacunar infarct in the posterior left     Diabetes      Essential hypertension, benign      Gastro-oesophageal reflux disease      Heart attack (H)      Hemorrhage of gastrointestinal tract, unspecified 2/2003    UGI Bleed with transfusion     Hydrocephalus     plan  Shunt     Hypersomnia with sleep apnea, unspecified     Bpap     Intraventricular hemorrhage 1/2012     Legionella pneumonia (H) 8/09     Lumbago      Lumbosacral plexopathy 10/09    L diabetic myotrophy     Mixed hyperlipidemia      Other osteoporosis 3/2006     Other specified anemias 2002    Nl colonoscopy/EGD; Nl iron  studies 4/06     Other specified disorders of rotator cuff syndrome of shoulder and allied disorders      Other testicular hypofunction     on Androgel     Palpitations     HOLTER     Sleep apnea     uses cpap     Subarachnoid hemorrhage (H) 1/2012     Tibial plateau fracture 2/2011    left     Type II or unspecified type diabetes mellitus without mention of complication, not stated as uncontrolled      Unspecified disorder resulting from impaired renal function     Stage 3 Chronic Renal Dz[YT1.3]             Past Surgical History:[YT1.1]     Past Surgical History:   Procedure Laterality Date     C DEXA, BONE DENSITY, AXIAL SKEL  3/2006    Osteoporosis     CARDIAC NUC CONY STRESS TEST NL  5/06    No ischemia, EF 44 %     CARDIAC NUC CONY STRESS TEST NL  6/2008, 3/09    sm to mod fixed apical defect EF 46%     HC COLONOSCOPY THRU STOMA, DIAGNOSTIC  2/2003    Normal, repeat 10 yr     HC DOPPLER ECHO PULSED, COMPLETE      EF 50-55%     IMPLANT SHUNT VENTRICULOPERITONEAL  2/28/2012    Procedure:IMPLANT SHUNT VENTRICULOPERITONEAL; Ventriculoperitoneal Shunt Placement; Surgeon:ALESHA VELAZQUEZ; Location:[facility] OR     OPEN REDUCTION INTERNAL FIXATION TIBIAL PLATEAU  3/2011    left     SURGICAL HISTORY OF -   2001    R wrist surg fx/ steel plate     SURGICAL HISTORY OF -   age 20    Inguinal hernia     SURGICAL HISTORY OF -   2003    Bladder stim surg, not sucessful     SURGICAL HISTORY OF -       Lymphedema treatment of legs     TONSILLECTOMY  child     UPPER GI ENDOSCOPY  2/2003    esophageal ulcers, gastritis, duodenitis     UPPER GI ENDOSCOPY  4/2003    erosive gastropathy from ASA[YT1.3]             Social History:[YT1.1]   I have reviewed this patient's social history[YT1.4]   Social History     Social History     Marital status:      Spouse name: N/A     Number of children: N/A     Years of education: N/A     Occupational History     Not on file.     Social History Main Topics     Smoking status: Former  Smoker     Quit date: 6/26/1997     Smokeless tobacco: Never Used      Comment: quit 1997       2-3 ppd for 45 yrs      Alcohol use No      Comment: recovering since 1982       Drug use: No     Sexual activity: Not on file     Other Topics Concern     Parent/Sibling W/ Cabg, Mi Or Angioplasty Before 65f 55m? Yes     Social History Narrative    Dairy/d 1 servings/d.     Caffeine 0 servings/d    Exercise walks 7 x week    Sunscreen used - No    Seatbelts used - No    Working smoke/CO detectors in the home - Yes    Guns stored in the home - No    Self Breast Exams - NA    Self Testicular Exam - No    Eye Exam up to date - Yes    Dental Exam up to date - Yes    Pap Smear up to date - NA    Mammogram up to date - NA    PSA up to date - Yes    Dexa Scan up to date - Yes 3/06    Flex Sig / Colonoscopy up to date - Yes    Immunizations up to date - No  Declines with possible allergic rxn as child    Abuse: Current or Past(Physical, Sexual or Emotional)- No    Do you feel safe in your environment - Yes    Consuelo Alfonso RN    4/30/08[YT1.6]             Family History:[YT1.1]   I have reviewed this patient's family history[YT1.4]   Family History   Problem Relation Age of Onset     C.A.D. Father      DIABETES Maternal Grandmother[YT1.6]             Immunizations:[YT1.1]      Immunization History   Administered Date(s) Administered     Influenza (High Dose) 3 valent vaccine 11/09/2011, 10/07/2014, 11/10/2015, 11/02/2016     Influenza (IIV3) 12/14/2005, 10/18/2006, 11/07/2007, 09/21/2010, 09/12/2012     Influenza Vaccine IM 3yrs+ 4 Valent IIV4 10/22/2013     Pneumococcal (PCV 13) 11/10/2015     Pneumococcal 23 valent 04/30/2008[YT1.6]            Allergies:[YT1.1]   Penicillins; Albuterol; Albuterol sulfate; Atorvastatin; Atorvastatin calcium; Cipro [ciprofloxacin]; Ciprofloxacin; Simvastatin; and Warfarin[YT1.3]          Medications:[YT1.1]     No current facility-administered medications on file prior to encounter.    Current Outpatient Prescriptions on File Prior to Encounter:  insulin glargine (LANTUS SOLOSTAR) 100 UNIT/ML injection Inject 37 Units Subcutaneous 2 times daily   insulin lispro (HUMALOG KWIKPEN) 100 UNIT/ML injection Inject 30 units before breakfast and lunch and 5 units before dinner  as directed.   aspirin 81 MG tablet Take 1 tablet (81 mg) by mouth daily   acetaminophen (TYLENOL) 325 MG tablet Take 3 tablets (975 mg) by mouth every 6 hours as needed for mild pain   warfarin (COUMADIN) 3 MG tablet Take 1 tablet (3 mg) by mouth daily   polyethylene glycol (MIRALAX/GLYCOLAX) Packet Take 17 g by mouth daily as needed for constipation   senna-docusate (SENOKOT-S;PERICOLACE) 8.6-50 MG per tablet Take 1-2 tablets by mouth 2 times daily   fexofenadine (ALLEGRA) 180 MG tablet Take 1 tablet (180 mg) by mouth daily   budesonide-formoterol (SYMBICORT) 160-4.5 MCG/ACT Inhaler Inhale 2 puffs into the lungs 2 times daily   Ipratropium-Albuterol (COMBIVENT RESPIMAT)  MCG/ACT inhaler Inhale 1 puff into the lungs 2 times daily Not to exceed 6 doses per day.   rosuvastatin (CRESTOR) 20 MG tablet Take 1 tablet (20 mg) by mouth daily Needs fasting labs before any further refills   RANITIDINE HCL PO Take 150 mg by mouth daily   calcium citrate (CALCITRATE) 950 MG tablet Take 1 tablet by mouth daily   Cyanocobalamin (VITAMIN B12) 1000 MCG TBCR Take 1,000 mcg by mouth daily   calcium citrate-vitamin D (CITRACAL) 315-200 MG-UNIT TABS Take 2 tablets by mouth 2 times daily 2 tabs contain 500mg elemental calcium + 800 iu's vitamin D3 in them .   denosumab (PROLIA) 60 MG/ML SOLN Inject 60 mg Subcutaneous every 6 months   exenatide (BYETTA) 5 mcg/0.02mL per dose (60 doses per 1.2 mL prefilled pen) Inject 5mcg under the skin twice daily 60 minutes before meals.   metoprolol (TOPROL-XL) 50 MG 24 hr tablet Take 1 tablet (50 mg) by mouth At Bedtime   fluticasone (FLONASE) 50 MCG/ACT nasal spray Spray 2 sprays into both nostrils daily    insulin pen needle (B-D U/F) 31G X 8 MM Use 7 daily or as directed.   blood glucose monitoring (NO BRAND SPECIFIED) test strip 1 strip by In Vitro route 4 times daily   ONE TOUCH LANCETS MISC use as directed 4 x daily and prn   nitroglycerin (NITROSTAT) 0.4 MG SL tablet Place 1 tablet (0.4 mg) under the tongue every 5 minutes as needed up to 3 tablets per episode.   fluocinolone (SYNALAR) 0.01 % external solution Apply 1 to 2 times daily to scaly areas of scalp as needed.Profile Rx: patient will contact pharmacy when needed   order for DME Equipment being ordered: 4 wheeled rolling walker.   ACE/ARB NOT PRESCRIBED, INTENTIONAL, ACE & ARB not prescribed due to Hyperkalemia (baseline K+ > 5.5), Worsening renal function on ACE/ARB therapy and CKD (Chronic Kidney Disease)   multivitamin, therapeutic with minerals (THERA-VIT-M) TABS Take 1 tablet by mouth daily   ferrous sulfate 325 (65 FE) MG tablet Take 325 mg by mouth daily (with breakfast) 65mg elemental iron   VITAMIN D, CHOLECALCIFEROL, PO Take 2,000 Units by mouth daily   bumetanide (BUMEX) 0.5 MG tablet Take 0.5 mg by mouth 2 times daily   insulin syringes, disposable, U-100 0.3 ML MISC 1 each 4 times daily.   ORDER FOR DME Equipment being ordered: CPAP supplies   Catheters MISC As needed   magnesium oxide (MAG-OX) 400 MG tablet Take 2 tablets by mouth daily.   niacin (NIASPAN) 500 MG CR tablet Take 1 tablet by mouth At Bedtime.   ketoconazole (NIZORAL) 2 % shampoo Apply to the affected area and wash off after 5 minutes.[YT1.3]          Review of Systems:[YT1.1]   CONSTITUTIONAL: positive for fatigue, no unexpected change in weight  RESP: no significant cough, no shortness of breath  CV: no chest pain, no new or worsening peripheral edema  GI: no nausea, no vomiting, chronic constipation, no diarrhea  :  no hematuria, positive for difficulty urinating  NEURO: More tired, and sleepy than usual for the last 2-3 days, but close to his baseline.    ROS collected  from pt's wife. Pt nonverbal.[YT1.4]            Physical Exam:[YT1.1]   Vitals were reviewed[YT1.4]  Temp: 96.5  F (35.8  C) Temp src: Axillary BP: 126/57 Pulse: 70 Heart Rate: 57 Resp: 12 SpO2: 100 % O2 Device: BiPAP/CPAP[YT1.7]    Constitutional: He appears well-developed and well-nourished. No distress.   HENT:   Head: Normocephalic and atraumatic.   Eyes: EOM are normal. Pupils are equal, round, and reactive to light. No scleral icterus.   Neck: Normal range of motion. No JVD appreciated.   Cardiovascular: Normal rate, regular rhythm and normal heart sounds. Exam reveals no friction rub. No murmur heard.  Pulmonary/Chest: Effort normal. He has no wheezes. Positive for rhonchi, and scattered rales on right base.   Abdominal: Soft. Bowel sounds are normal. There is no tenderness.   Genitourinary: Orta   Musculoskeletal: He exhibits no edema or tenderness. LLE in cast   Neurological: He is sleepy, but respond to stimulus.   Skin: warm and dry.     Nursing note were reviewed.[YT1.4]         Data:[YT1.1]     Results for orders placed or performed during the hospital encounter of 03/31/17 (from the past 24 hour(s))   Comprehensive metabolic panel   Result Value Ref Range    Sodium 138 133 - 144 mmol/L    Potassium 5.6 (H) 3.4 - 5.3 mmol/L    Chloride 103 94 - 109 mmol/L    Carbon Dioxide 22 20 - 32 mmol/L    Anion Gap 13 3 - 14 mmol/L    Glucose 252 (H) 70 - 99 mg/dL    Urea Nitrogen 115 (H) 7 - 30 mg/dL    Creatinine 5.39 (H) 0.66 - 1.25 mg/dL    GFR Estimate 10 (L) >60 mL/min/1.7m2    GFR Estimate If Black 13 (L) >60 mL/min/1.7m2    Calcium 8.9 8.5 - 10.1 mg/dL    Bilirubin Total 0.3 0.2 - 1.3 mg/dL    Albumin 2.0 (L) 3.4 - 5.0 g/dL    Protein Total 6.8 6.8 - 8.8 g/dL    Alkaline Phosphatase 100 40 - 150 U/L     (H) 0 - 70 U/L    AST Unsatisfactory specimen - hemolyzed 0 - 45 U/L   CBC with platelets differential   Result Value Ref Range    WBC 12.9 (H) 4.0 - 11.0 10e9/L    RBC Count 4.31 (L) 4.4 - 5.9  10e12/L    Hemoglobin 12.1 (L) 13.3 - 17.7 g/dL    Hematocrit 39.2 (L) 40.0 - 53.0 %    MCV 91 78 - 100 fl    MCH 28.1 26.5 - 33.0 pg    MCHC 30.9 (L) 31.5 - 36.5 g/dL    RDW 17.1 (H) 10.0 - 15.0 %    Platelet Count 206 150 - 450 10e9/L    Diff Method Automated Method     % Neutrophils 81.9 %    % Lymphocytes 8.3 %    % Monocytes 9.4 %    % Eosinophils 0.1 %    % Basophils 0.1 %    % Immature Granulocytes 0.2 %    Nucleated RBCs 0 0 /100    Absolute Neutrophil 10.5 (H) 1.6 - 8.3 10e9/L    Absolute Lymphocytes 1.1 0.8 - 5.3 10e9/L    Absolute Monocytes 1.2 0.0 - 1.3 10e9/L    Absolute Eosinophils 0.0 0.0 - 0.7 10e9/L    Absolute Basophils 0.0 0.0 - 0.2 10e9/L    Abs Immature Granulocytes 0.0 0 - 0.4 10e9/L    Absolute Nucleated RBC 0.0    CRP inflammation   Result Value Ref Range    CRP Inflammation 190.0 (H) 0.0 - 8.0 mg/L   INR   Result Value Ref Range    INR 2.10 (H) 0.86 - 1.14   Lactic acid whole blood   Result Value Ref Range    Lactic Acid 2.1 0.7 - 2.1 mmol/L   Troponin I   Result Value Ref Range    Troponin I ES 0.126 (HH) 0.000 - 0.045 ug/L   ISTAT gases lactate екатерина POCT   Result Value Ref Range    Ph Venous 7.47 (H) 7.32 - 7.43 pH    PCO2 Venous 34 (L) 40 - 50 mm Hg    PO2 Venous 34 25 - 47 mm Hg    Bicarbonate Venous 24 21 - 28 mmol/L    O2 Sat Venous 71 %    Lactic Acid 1.3 0.7 - 2.1 mmol/L   Chest  XR, 1 view portable    Narrative    Exam: XR CHEST PORT 1 VW, 3/31/2017 9:07 PM    Indication: Fever, elevated WBC    Comparison: 5/15/2014    Findings:   Cardiac silhouette is prominent and unchanged.  shunt catheter is  partly visualized. No kinking or discontinuity is seen in the  visualized catheter. Cardiac silhouette is mildly prominent. No  significant pleural effusion or pneumothorax. No focal airspace  opacity.      Impression    Impression: No new acute airspace disease.   Blood culture   Result Value Ref Range    Specimen Description Blood Right Hand     Culture Micro Pending     Micro Report  Status Pending    Blood culture   Result Value Ref Range    Specimen Description Blood Right Arm     Culture Micro Pending     Micro Report Status Pending    UA with Microscopic   Result Value Ref Range    Color Urine Yellow     Appearance Urine Slightly Cloudy     Glucose Urine Negative NEG mg/dL    Bilirubin Urine Negative NEG    Ketones Urine Negative NEG mg/dL    Specific Gravity Urine 1.012 1.003 - 1.035    Blood Urine Moderate (A) NEG    pH Urine 6.0 5.0 - 7.0 pH    Protein Albumin Urine 30 (A) NEG mg/dL    Urobilinogen mg/dL Normal 0.0 - 2.0 mg/dL    Nitrite Urine Negative NEG    Leukocyte Esterase Urine Large (A) NEG    Source Catheterized Urine     WBC Urine >182 (H) 0 - 2 /HPF    RBC Urine 30 (H) 0 - 2 /HPF    Bacteria Urine Many (A) NEG /HPF    Calcium Oxalate Few (A) NEG /HPF   Urine Culture   Result Value Ref Range    Specimen Description Unspecified Urine     Special Requests Specimen received in preservative     Culture Micro Pending     Micro Report Status Pending    Abd/pelvis CT no contrast - Stone Protocol   Result Value Ref Range    Radiologist flags Possible stercoral proctitis (Urgent)     Narrative    Resident preliminary report:    1. Atrophic native kidneys, no renal calculi. Mild left urinary tract  dilatation appears chronic, as also noted on CT 9/7/2009.   2. Moderate rectal stool burden with circumferential rectal wall  thickening with stranding of presacral fat, this may be secondary to  stercoral proctitis.  3. Segment 2 and 3 intrahepatic artery ductal dilatation, increased  since 2009 and may be secondary to a benign or malignant stricture.  4. 7 mm speculated left lung base nodule, recommend 12 month follow-up  CT in view of suspicious morphology.  5. Mild subcutaneous fat stranding in the right gluteal region is  likely secondary to injection sites, however cannot exclude a mass  lesion at this location. Please correlate clinically.   6. Cholelithiasis and diverticulosis with no  inflammation.  7. L5 bilateral spondylolysis with grade I anterolisthesis L5/S1.     [Urgent Result: Possible stercoral proctitis]    Finding was identified on 3/31/2017 11:35 PM.     Dr. Montana was contacted by Dr. Mati Johnson at 3/31/2017 11:51  PM and verbalized understanding of the urgent finding.      *Note: Due to a large number of results and/or encounters for the requested time period, some results have not been displayed. A complete set of results can be found in Results Review.[YT1.3]      All cardiac studies reviewed   All imaging studies reviewed[YT1.4]      Admitting Physician:[YT1.1]Leah Liriano MD[YT1.3]      Day team addendum[KC1.1]  April 1, 2017[KC1.2]    Assessment and plan:[KC1.1]  Melvin Bhatia is a 76 year old male[YT1.1] with a significant past medical history of cerebrovascular disease, normal pressure hydrocephalus, status post ventriculoperitoneal shunting, progressive cognitive dysfunction, multiple falls, HTN, Type 2 DM, hyperlipidemia, CHF, CKD, and COPD[YT1.2] who present[YT1.1]ed[KC1.1] with[YT1.1] 3 days Hx of poor oral intake, and found to have acute kidney injury[YT1.2] and possible UTI. Being managed for pre-renal SHERYL and possible UTI.[KC1.1]      ##[YT1.2] SHERYL on CKD3b[KC1.1] (baseline Cr 1.5-2[YT1.2])[KC1.1]  Cr up to 5.39, GFR 1[YT1.2]0 on admission. BUN Cr ratio ~22. Likely pre-renal due to decreased PO intake. Bedside US today showed a collapsible IVC. Will plan to rehydrate patient gently. Reassess in the evening and decrease fluids as per output.[KC1.1]   -[YT1.2] On[YT1.5] fluid resuscitation  ml/hr[YT1.2]. Will consider decreasing rate[YT1.5] in the evening[KC1.1]  - Follow up BMP q[YT1.2]6[KC1.1]h[YT1.2]  -[YT1.4] Strict I/O   - Daily weights  - Pharmacy consult placed to assess for nephrotoxic medications on his list and to renally adjust medicaitons[KC1.1]  - Tele[YT1.2]  - Echocardiogram pending (rule out cardiac dysfunction)[KC1.1]    ##  UTI[YT1.2]  Patient has a diagnosis of atonic bladder and requires intermittent self catheterizations about 5 times a day. UA positive for large leukocyte esterase, blood. Will continue empiric treatment based on past culture susceptibilities, will narrow based on current culture results.   - Continue[KC1.1] on IV Vancomycin and Ceftriaxone given previous urine cultures results Enterococcus faecalis- Sensitive to Vanco (2/19/17)  Klebsiella pneumoniae- Sensitive to Ceftriaxone (2/24/16, 5/13/16)  - Follow up culture results[YT1.2]. Narrow based on susceptibilities.[KC1.1]     ## Stercoral proctitis  Seen in abd. CT[YT1.2]. Pt denied abdominal pain, stable VS.[YT1.4]  - Colorectal surgery consult[YT1.2] placed[YT1.4].[YT1.2] Recommend bowel regimen. Avoid per rectum intervention.   - Lactulose 1 time ordered   - Miralax BID and Senna BID scheduled.[KC1.1]   - HOLD PTA ferrous sulfate while being treated for constipation[KC1.3]    Cha Waite MD MPH  PGY2- [facility], Family Medicine  Pager- [phone][KC1.1]       Revision History        User Key Date/Time User Provider Type Action    > KC1.3 4/1/2017  4:28 PM Cha Waite MD Resident Sign     KC1.2 4/1/2017  4:05 PM Cha Waite MD Resident Sign     KC1.1 4/1/2017  3:20 PM Cha Waite MD Resident      YT1.5 4/1/2017  7:04 AM Leah Liriano MD Resident Sign     YT1.7 4/1/2017  4:46 AM Leah Liriano MD Resident      YT1.6 4/1/2017  4:44 AM Leah Liriano MD Resident      YT1.4 4/1/2017  4:20 AM Leah Liriano MD Resident      YT1.2 4/1/2017  1:05 AM Leah Liriano MD Resident      YT1.3 4/1/2017 12:16 AM Leah Liriano MD Resident      YT1.1 4/1/2017 12:15 AM Leah Liriano MD Resident                      Discharge Summaries      Discharge Summaries by Toña Duron MD at 4/20/2017  7:21 PM     Author:  Toña Duron MD Service:  Family Medicine Author Type:  Resident    Filed:  4/25/2017  4:24 PM Date  of Service:  4/20/2017  7:21 PM Note Created:  4/20/2017  7:05 PM    Status:  Cosign Needed :  Toña Duron MD (Resident)    Cosign Required:  Yes                                                            [facility]  Discharge Summary    Melvin Bhatia MRN# [id number]   Age: 76 year old YOB: 1940     Date of Admission:  3/31/2017  Date of Discharge:[JB1.1]  4/2[AR1.1]5[JB1.2]/2017[AR1.1]  Admitting Physician:  Amy Bonner MD  Discharge Physician:[JB1.1]  Romario Keller[JB1.2], MD[AR1.1]  Contact: [phone]  Discharging Service:  [facility]     Primary Provider:   Ash Bowman  [address] [address]  [phone]          Discharge Disposition:   Discharged to long-term care facility     Upon discharge patient is stable after the recent hospitalization, however he is still requiring ongoing care for strengthening and/or medication assistance.          Condition on Discharge:   Discharge condition: Fair   Code status on discharge: DNR / DNI          Admission Diagnoses:   Cognitive deficit, 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]  Acute renal failure, unspecified acute renal failure type (H) [N17.9]  Urinary tract infection associated with indwelling urethral catheter, initial encounter [T83.511A, N39.0]          Principle Discharge Problem:   Right MCA ischemic stroke  Duodenal and Rectal ulcers  Afib with RVR[JB1.1]  Hypertension[AR1.2]   DM2  History of Subarachnoid hemorrhage  Normal Pressure Hydrocephalus  Ankle fracture[JB1.1]  Severe protein and calorie Malnutrition[AR1.2]          Additional Discharge Problems:[JB1.1]     Patient Active Problem List   Diagnosis     Osteoporosis     PAF (paroxysmal atrial fibrillation) (H)     Lumbosacral plexopathy     Testosterone deficiency     Hypertension goal BP (blood pressure) < 140/90     Atrial fibrillation (H)     CKD (chronic kidney disease) stage 3, GFR 30-59 ml/min      Hyperlipidemia LDL goal <70     Coronary artery disease     Health Care Home     ACP (advance care planning)     Elevated PSA     Anemia in chronic renal disease     Subarachnoid hemorrhage (H)     Idiopathic normal pressure hydrocephalus (INPH)     Urinary bladder neurogenic dysfunction     Stroke (H)     Cognitive deficit, post-stroke     HL (hearing loss)     Personal history of fall     GERD (gastroesophageal reflux disease)     COPD exacerbation (H)     SHIVAM (obstructive sleep apnea)     LVH (left ventricular hypertrophy)     Diastolic CHF, acute on chronic (H)     Hypoxemia     Tachyarrhythmia     Systolic and diastolic CHF, chronic (H)     Gait abnormality     Iron deficiency anemia     Allergic rhinitis     Altered mental status     VRE (vancomycin-resistant Enterococci)     Hypoglycemia     Type 2 diabetes with stage 3 chronic kidney disease GFR 30-59 (H)     Chronic obstructive pulmonary disease, unspecified COPD type (H)     Ankle pain     Fx medial malleolus-closed     Gastrointestinal hemorrhage associated with duodenal ulcer     Anemia due to blood loss, acute     Acute cystitis     Hypernatremia     Delirium     Acute kidney injury superimposed on CKD     Closed fracture of ankle     Ischemic stroke diagnosed during current admission (H)     Protein-calorie malnutrition (H)[JB1.3]            Medications Prior to Admission:[JB1.1]       No current facility-administered medications on file prior to encounter.   Current Outpatient Prescriptions on File Prior to Encounter:  acetaminophen (TYLENOL) 325 MG tablet Take 3 tablets (975 mg) by mouth every 6 hours as needed for mild pain   Cyanocobalamin (VITAMIN B12) 1000 MCG TBCR Take 1,000 mcg by mouth daily   VITAMIN D, CHOLECALCIFEROL, PO Take 2,000 Units by mouth daily   polyethylene glycol (MIRALAX/GLYCOLAX) Packet Take 17 g by mouth daily as needed for constipation   Ipratropium-Albuterol (COMBIVENT RESPIMAT)  MCG/ACT inhaler Inhale 1 puff into the  lungs 2 times daily Not to exceed 6 doses per day.   insulin pen needle (B-D U/F) 31G X 8 MM Use 7 daily or as directed.   blood glucose monitoring (NO BRAND SPECIFIED) test strip 1 strip by In Vitro route 4 times daily   ONE TOUCH LANCETS MISC use as directed 4 x daily and prn   nitroglycerin (NITROSTAT) 0.4 MG SL tablet Place 1 tablet (0.4 mg) under the tongue every 5 minutes as needed up to 3 tablets per episode.   order for DME Equipment being ordered: 4 wheeled rolling walker.   insulin syringes, disposable, U-100 0.3 ML MISC 1 each 4 times daily.   ORDER FOR DME Equipment being ordered: CPAP supplies   Catheters MISC As needed[JB1.3]            Discharge Medications:[JB1.1]        Review of your medicines      START taking       Dose / Directions    aspirin 10 mg/mL Susp   Used for:  Dysphagia, unspecified type   Replaces:  aspirin 81 MG tablet        Dose:  325 mg   32.5 mLs (325 mg) by Oral or NG Tube route daily   Refills:  0       dextrose 50 % injection   Used for:  Type 2 diabetes mellitus with stage 3 chronic kidney disease, with long-term current use of insulin (H)        Dose:  25-50 mL   Inject 25-50 mLs into the vein every 15 minutes as needed for low blood sugar   Refills:  0       ferrous sulfate 220 (44 FE) MG/5ML Elix   Used for:  Acute posthemorrhagic anemia   Replaces:  ferrous sulfate 325 (65 FE) MG tablet        Dose:  220 mg   Take 5 mLs (220 mg) by mouth daily   Quantity:  30 mL   Refills:  0       glucagon 1 MG Solr injection   Used for:  Type 2 diabetes mellitus with stage 3 chronic kidney disease, with long-term current use of insulin (H)        Dose:  1 mg   Inject 1 mg Subcutaneous every 15 minutes as needed for low blood sugar (May repeat x 1 only)   Refills:  0       glucose 40 % Gel gel   Used for:  Type 2 diabetes mellitus with stage 3 chronic kidney disease, with long-term current use of insulin (H)        Dose:  15-30 g   Take 15-30 g by mouth every 15 minutes as needed for low  blood sugar   Refills:  0       hypromellose-dextran Soln ophthalmic solution   Used for:  Dry eyes        Dose:  1 drop   Place 1 drop into both eyes 4 times daily as needed for dry eyes   Quantity:  1 Bottle   Refills:  0       insulin aspart 100 UNIT/ML injection   Commonly known as:  NovoLOG PEN   Used for:  Type 2 diabetes mellitus with stage 3 chronic kidney disease, with long-term current use of insulin (H)        Dose:  1-12 Units   Inject 1-12 Units Subcutaneous every 4 hours   Quantity:  3 mL   Refills:  0       ipratropium 0.02 % neb solution   Commonly known as:  ATROVENT   Used for:  Chronic obstructive pulmonary disease, unspecified COPD type (H)        Dose:  0.5 mg   Take 2.5 mLs (0.5 mg) by nebulization 2 times daily   Quantity:  225 mL   Refills:  0       metoprolol 10 mg/mL Susp   Commonly known as:  LOPRESSOR   Used for:  Secondary hypertension        Dose:  25 mg   Take 2.5 mLs (25 mg) by mouth 2 times daily   Quantity:  100 mL   Refills:  0       multivitamins with minerals Liqd liquid   Used for:  Malnutrition (H)   Replaces:  multivitamin, therapeutic with minerals Tabs tablet        Dose:  15 mL   15 mLs by Per Feeding Tube route daily   Refills:  0       ondansetron 4 MG ODT tab   Commonly known as:  ZOFRAN-ODT   Used for:  Nausea        Dose:  4 mg   Take 1 tablet (4 mg) by mouth every 6 hours as needed for nausea   Quantity:  120 tablet   Refills:  0       pantoprazole Susp suspension   Commonly known as:  PROTONIX   Used for:  Gastroesophageal reflux disease without esophagitis        Dose:  40 mg   Take 20 mLs (40 mg) by mouth 2 times daily   Quantity:  100 mL   Refills:  0       potassium & sodium phosphates 280-160-250 MG Packet   Commonly known as:  NEUTRA-PHOS   Used for:  Malnutrition (H)        Dose:  1 packet   Take 1 packet by mouth 4 times daily   Quantity:  120 each   Refills:  0       potassium chloride 10 MEQ tablet   Commonly known as:  K-TAB,KLOR-CON   Used for:  Diastolic  CHF, acute on chronic (H)        Administer only when giving Bumex   Quantity:  30 tablet   Refills:  0       vitamin A-D & C drops 1500-400-35 drops   Used for:  Malnutrition (H)        Dose:  7 mL   7 mLs by Per G Tube route daily for 10 days   Quantity:  50 mL   Refills:  0         CONTINUE these medicines which may have CHANGED, or have new prescriptions. If we are uncertain of the size of tablets/capsules you have at home, strength may be listed as something that might have changed.       Dose / Directions    bumetanide 0.5 MG tablet   Commonly known as:  BUMEX   This may have changed:    - how much to take  - how to take this  - when to take this  - additional instructions   Used for:  Diastolic CHF, acute on chronic (H)        Please restart patient's home bumex dose (0.5 mg bid) if patient's weight increases by more than 2 pounds in 1 week   Quantity:  60 tablet   Refills:  0       insulin glargine 100 UNIT/ML injection   Commonly known as:  LANTUS   This may have changed:  how much to take   Used for:  Type 2 diabetes mellitus with stage 3 chronic kidney disease, with long-term current use of insulin (H)        Dose:  33 Units   Inject 33 Units Subcutaneous 2 times daily   Refills:  0       order for DME   This may have changed:  Another medication with the same name was removed. Continue taking this medication, and follow the directions you see here.   Used for:  SHIVAM (obstructive sleep apnea)        Equipment being ordered: CPAP supplies   Quantity:  1 each   Refills:  0       senna-docusate 8.6-50 MG per tablet   Commonly known as:  SENOKOT-S;PERICOLACE   This may have changed:    - how much to take  - how to take this  - when to take this   Used for:  Constipation, unspecified constipation type        Dose:  1 tablet   1 tablet by Oral or NG Tube route daily   Quantity:  100 tablet   Refills:  0         CONTINUE these medicines which have NOT CHANGED       Dose / Directions    acetaminophen 325 MG tablet    Commonly known as:  TYLENOL   Used for:  Fall, initial encounter        Dose:  975 mg   Take 3 tablets (975 mg) by mouth every 6 hours as needed for mild pain   Quantity:  100 tablet   Refills:  0       blood glucose monitoring test strip   Commonly known as:  no brand specified   Used for:  Type 2 diabetes with stage 3 chronic kidney disease GFR 30-59 (H)        Dose:  1 strip   1 strip by In Vitro route 4 times daily   Quantity:  3 Box   Refills:  11       Catheters Misc   Used for:  Neurogeneses, bladder        As needed   Quantity:  200 each   Refills:  10       insulin pen needle 31G X 8 MM   Commonly known as:  B-D U/F   Used for:  Type 2 diabetes mellitus with stage 3 chronic kidney disease, with long-term current use of insulin (H)        Use 7 daily or as directed.   Quantity:  300 each   Refills:  5       insulin syringes (disposable) U-100 0.3 ML Misc   Used for:  Type 2 diabetes, HbA1C goal < 8% (H)        Dose:  1 each   1 each 4 times daily.   Quantity:  100 each   Refills:  prn       Ipratropium-Albuterol  MCG/ACT inhaler   Commonly known as:  COMBIVENT RESPIMAT   Used for:  Chronic obstructive pulmonary disease, unspecified COPD type (H)        Dose:  1 puff   Inhale 1 puff into the lungs 2 times daily Not to exceed 6 doses per day.   Quantity:  3 Inhaler   Refills:  3       nitroglycerin 0.4 MG sublingual tablet   Commonly known as:  NITROSTAT   Used for:  Chronic obstructive pulmonary disease, unspecified COPD type (H)        Dose:  0.4 mg   Place 1 tablet (0.4 mg) under the tongue every 5 minutes as needed up to 3 tablets per episode.   Quantity:  20 tablet   Refills:  11       ONE TOUCH LANCETS Misc   Used for:  Type 2 diabetes with stage 3 chronic kidney disease GFR 30-59 (H)        use as directed 4 x daily and prn   Quantity:  120 each   Refills:  3       order for DME   Used for:  Generalized muscle weakness        Equipment being ordered: 4 wheeled rolling walker.   Quantity:  1  Device   Refills:  0       polyethylene glycol Packet   Commonly known as:  MIRALAX/GLYCOLAX   Indication:  Constipation   Used for:  Fall, initial encounter        Dose:  17 g   Take 17 g by mouth daily as needed for constipation   Quantity:  7 packet   Refills:  11       Vitamin B12 1000 MCG Tbcr        Dose:  1000 mcg   Take 1,000 mcg by mouth daily   Quantity:  100 tablet   Refills:  3       VITAMIN D (CHOLECALCIFEROL) PO        Dose:  2000 Units   Take 2,000 Units by mouth daily   Refills:  0         STOP taking          aspirin 81 MG tablet   Replaced by:  aspirin 10 mg/mL Susp           budesonide-formoterol 160-4.5 MCG/ACT Inhaler   Commonly known as:  SYMBICORT           calcium citrate 950 MG tablet   Commonly known as:  CALCITRATE           denosumab 60 MG/ML Soln injection   Commonly known as:  PROLIA           exenatide 5 MCG/0.02ML Sopn injection   Commonly known as:  BYETTA 5 MCG PEN           ferrous sulfate 325 (65 FE) MG tablet   Commonly known as:  IRON   Replaced by:  ferrous sulfate 220 (44 FE) MG/5ML Elix           fexofenadine 180 MG tablet   Commonly known as:  ALLEGRA           fluocinolone 0.01 % solution   Commonly known as:  SYNALAR           fluticasone 50 MCG/ACT spray   Commonly known as:  FLONASE           insulin lispro 100 UNIT/ML injection   Commonly known as:  HumaLOG KWIKpen           ketoconazole 2 % shampoo   Commonly known as:  NIZORAL           MAG- MG tablet   Generic drug:  magnesium oxide           metoprolol 50 MG 24 hr tablet   Commonly known as:  TOPROL-XL           multivitamin, therapeutic with minerals Tabs tablet   Replaced by:  multivitamins with minerals Liqd liquid           niacin 500 MG CR tablet   Commonly known as:  NIASPAN           RANITIDINE HCL PO           rosuvastatin 20 MG tablet   Commonly known as:  CRESTOR           sulfamethoxazole-trimethoprim 800-160 MG per tablet   Commonly known as:  BACTRIM DS/SEPTRA DS           warfarin 3 MG tablet    Commonly known as:  COUMADIN                Where to get your medicines      Some of these will need a paper prescription and others can be bought over the counter. Ask your nurse if you have questions.     You don't need a prescription for these medications      aspirin 10 mg/mL Susp     bumetanide 0.5 MG tablet     dextrose 50 % injection     ferrous sulfate 220 (44 FE) MG/5ML Elix     glucagon 1 MG Solr injection     glucose 40 % Gel gel     hypromellose-dextran Soln ophthalmic solution     insulin aspart 100 UNIT/ML injection     insulin glargine 100 UNIT/ML injection     ipratropium 0.02 % neb solution     metoprolol 10 mg/mL Susp     multivitamins with minerals Liqd liquid     ondansetron 4 MG ODT tab     pantoprazole Susp suspension     potassium & sodium phosphates 280-160-250 MG Packet     potassium chloride 10 MEQ tablet     senna-docusate 8.6-50 MG per tablet     vitamin A-D & C drops 1500-400-35 drops[JB1.3]                  Discharge Instructions and Follow-Up:   Discharge diet: G-Tube Feeds:  1. Ordered Peptamen 1.5 @ goal of 45 ml/hr + 4 pkts Beneprotein daily to provide 1080 mL TF/day, 1720 kcals (27 kcal/kg), 97 g PRO (1.5 gm/kg), 832 ml free H2O, 60 g Fat (70% from MCTs), 203 g CHO and no Fiber daily.  -- Start @ 10 ml and advance by 10 ml Q 8 hrs as tolerated and as long as K+/Mg++ WNL and Phos >1.9 and GRV's <500 mL.   2. Ordered 7 ml Trivitamin and 220 mg Zn sulfate daily x 10 days as indicated in RD note 4/18.       Discharge activity: Out of bed to chair 2-3 times daily[JB1.1]  PT/OT/Speech Therapy[JB1.4]    Bipap settings: 10/5 (used at night)  Until stable off BIPAP, Oral meds should not be given until patient able to tolerate 2 hours off BIPAP. No lozenges or gum should be given while patient on BIPAP.  ~ Adjust inspiratory pressure to achieve TV greater than 8-10 mL/kg ideal body weight.  ~ Max inspiratory pressure:  25 cm H2O.  ~ Keep SaO2  at 90-95%  ~ RT to trial off BIPAP as  tolerated.[JB1.5]     Discharge follow-up: Follow up with NH physician within 2-3 days after admission[JB1.1]  Patient will need electrolytes potassium, magnesium, phosphorus checked daily until electrolytes stabilize.[JB1.6]    Patient was not restarted on home bumex.[JB1.2]  This will need to be restarted if patient shows signs of volume overload or if weight increases more than 2 pounds in 1 week.[JB1.7]   Lines and drains: G-tube    Wound care:[JB1.1] Plan of care for wound located on Bl buttocks/perirectal area:   Cleanse the area with Vicky cleanse and protect, very gently with soft cloth.  Apply critic aid paste BID and PRN.  With repeat application, do not scrub the paste, only remove soiled paste and reapply.  If complete removal of paste is necessary use baby oil/mineral oil and soft wash cloth.  Leave the diaper to let the area dry as much as possible.    Left lower buttock wound every other day:    Cleanse all wound bed with Microklenz and pat dry.    Apply no sting barrier on zoe-wound skin.    Apply nickel thick amount of Medihoney (#[id number]) to open wound tissue.    Cover with Mepilex 4x4.[JB1.4]          Brief Admission History and Evaluation:   Melvin Bhatia is a 76 year old male with a significant past medical history of cerebrovascular disease, normal pressure hydrocephalus (status post ventriculoperitoneal shunting), progressive cognitive dysfunction, multiple falls, HTN, Type 2 DM, hyperlipidemia, CHF, CKD, and COPD who presented with worsening mental status          Hospital Course/Discharge Plan by Problem:   ## New Right MCA Ischemic  Stroke 4/12 , stable   Patient's mental status fluctuated during his hospitalization.  At times he was minimally responsive.  On 4/12, he was noted to have a new left facial droop.  A stroke code was called and CT showed a large right MCA stroke that most likely occurred 1-2 days prior.   Stroke most likely caused by ischemia during episode of hypotension  with GI bleed vs embolic from Afib.  Neurology was consulted and followed patient for first 2-3 days after stroked.  Patient remained stable without any new complications prior to discharge.  Patient is not a candidate for TPA because of his recent GI bleed, so after discussion with Staff and Spouse, we decided not to pursue further workup if patient's neurological status changed again.  After patient's stroke, we gave a daily aspirin and kept BP < 140/90.      ## Malnutrition:  During his hospitalization, patient had poor oral intake.  After he suffered a stroke, he was unable to swallow.  A NG tube was placed, but removed by patient.  A G-tube was placed 4/19.  Tube feeds were started 4/20, which patient tolerated well.[JB1.1]  For the first 4 days, he he required electrolyte replacement, but his electrolytes stabilized by 4/25, so there was less concern for refeeding syndrome.[JB1.7]        ##A Fib with RVR: stable  Patient has had intermittent episodes of RVR (lasts < 2 minutes) but was always HD stable.  After discussion with Spouse, we chose not to consult Cardiology if patient were to become HD unstable.  Unfortunately, he is not a candidate for an invasive cardiac procedure if he were to develop ACS.   Patient was continued on IV or oral metoprolol (50 mg bid) to maintain rate control.       ##HFrEF, euvolemic:[JB1.1]  Patient has a history of HF.  FUAD performed 4/2 showed EF 46%.  Patient takes bumex 0.5 mg bid at home, but this was held during his admission.  His weight on admission was 80 kg[JB1.8] (4/1), peaked at 87 kg(4/10) and[JB1.1] was[JB1.8] 7[JB1.1]6[JB1.8] kg (4/2[JB1.1]1[JB1.8])[JB1.1] on discharge.  He was given gentle IV fluids for hydration/maintenace while he was NPO to avoid fluid overload.  He was given IV lasix intermittently (recieved 20 mg IV approximately 3-4 times during hospitalization).  Weights and I/Os were monitored on a daily basis.[JB1.8]  His bumex will need to be restarted  once he shows clinical symptoms of volume overload.  It was not restarted prior to discharge because his weight was down, nutrition had just been restarted and clinically he was euvolemic.     [JB1.7] [JB1.1]  ## COPD: stable    Patient received Q 4 hour Atrovent nebs, oral care and suctioning prn[JB1.8]     ## Upper GI Bleed secondary to Duodenal Ulcer: stable   ## Lower GI Bleed secondary to rectal ulcer : stable  ##Acute anemia secondary to above: Trending down[JB1.1]  On 4/4, patient noted to have black tarry stools.   His hemoglobin dropped, INR was supratherapeutic so he received vitamin K and 2 units PRBCs.  GI was consulted and endoscopy was performed which showed duodenal ulcerations.  Patient's hemoglobin remained stable until 4/11.  At that time, he developed large amounts of bright red blood in stool acutely.  His hemoglobin dropped again and he received another 3 units PRBCs.  A flexible sigmoidoscopy was performed which showed a large ulcer in the rectum.   After the procedure, he no longer had melana or bright red blood per rectum, but his hemoglobin did slowly trend down.  Patient received 1 unit PRBC 4/19.  He was not restarted on his warfarin because of the GI bleed.  We gave Pantoprazole 40 mg bid for GI protection.  We transfused for a hemoglobin < 8.0 (cardiac history).[JB1.8]     ## Multiple bone fracture on left foot/ankle[JB1.1]  Patient presented with a left leg cast on admission.  He had suffered[JB1.6] fractures after a fall on 9/16, and 2/2017. Cast[JB1.1] was[JB1.6] removed 4/18 by Ortho. X-rays obtained- break not completely healed. Ortho rec[JB1.1]ommended a PRAFO in bed to prevent ulcers and protect ankle with a brace with any weight bearing activity.[JB1.6]     ##Hypernatremia: resolved  Patient had episodes of hypernatremia,[JB1.1] which resolved with hypotonic fluids.[JB1.6]      ## Upper Extremity Swelling: Improving[JB1.1]  Patient had left upper extremity swelling of arm distal  to elbow noted approximately 1 week into his hospitalization.  An ultrasound was negative for DVT.  The swelling was m[JB1.6]ost likely secondary to superficial thrombophlebitis vs PICC infiltration[JB1.1].[JB1.6]      ## Acute liver injury: Resolved[JB1.1]  Patient had an episode of acute liver injury the first week of his hospitalization in which his AST and ALT increased to approximately 800-900.[JB1.6]  Etiology of acute liver injury unknown. Work up for Viral hepatitis, toxin, drugs and BUD Chiari[JB1.1] were[JB1.6] negative[JB1.1] and he had  n[JB1.6]o episode of severe hypotension. Enzymes reverted back to normal without intervention.  GI recommend[JB1.1]ed[JB1.6] MRCP to evaluate for biliary tract obstruction (depending on ultimate goals of care)[JB1.1] after discharge.  Hepatotoxic medications were avoided.[JB1.6]       ## UTI vs Colonization: Resolved[JB1.1]  Urine culture on admission showed enterococcus which was initially treated with antibiotics.[JB1.6]   After review of prior cultures, antibiotics were stopped as culture was thought to be asymptomatic bacteriuria vs colonization rather than a UTI.[JB1.4]        ## SHERYL on CKD3 (baseline Cr 1.5-2): improved[JB1.1]   Patient presented with an elevated Cr on admission, t[JB1.4]hought to be prerenal from decrease PO intake[JB1.1].  Cr improved to baseline with IVFs.[JB1.4]     ## Type 2 DM, last A1C 6.1 on 02/20/17   Blood sugars frequently fluctuate.[JB1.1]   Prior to admission, patient was taking Lantus 37 units bid in addition to prandial and correction insulin.  We changed his Lantus dose often to prevent hypoglycemia.  On discharge, he was receiving[JB1.4] 33[JB1.7] units Lantus bid in addition to[JB1.4] high[JB1.7] sliding scale.  His blood sugar was checked every 4 hours (continuous tube feeds).[JB1.4]       ## Stercoral proctitis    [JB1.1]CT scan on admission showed proctitis.  Colorectal Surgery was consulted and thought the proctitis was  secondary to stool stasis.   He was having BMs, so a bowel regimen was recommended to prevent constipation.   It was recommend that he take Miralax daily prn and senna qhs prn.   Ferrous sulfate should be held while patient is being treated for constipation.  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  Patient's neurologic issues were stable prior to stroke.         ##High grade right ICA stenosis, diagnosed on 01/2017 MRA, stable   Patient's LAKE-VASC score is 8, not on anticoagulation due to recent GI bleed[JB1.1].   Patient initially presented on warfarin but after GI bleed, his warfarin will need to be held indefinitely.[JB1.4]        ## Physical Deconditioning[JB1.1]  Patient received PT/OT and Speech therapy during admission.[JB1.4]         Other chronic conditions, stable- continue home medications.  ## HTN- Metoprolol  ## Hyperlipidemia- rosuvastatin  ## COPD- To continue Breo and Atrovent inhaler   ##SHIVAM- Bipap  ##GERD - PPI   ## Constipation- Not an issue right now, will resume bowel regimen as needed   ## Progressive cognitive dysfunction (dementia)         Final Day of Progress before Discharge:         Interval History:[JB1.1]  General:  less responsive   Vitals: vitals signs have been stable and no fever   Intake/Output: Weight on admission 80 kg, weight on discharge 76 kg   Nutrition: Dependent on gastric tube feedings   Mental status: less alert and less responsive   Respiratory: respiratory effort about the same and oxigenation levels about the same   Cardiovascular blood pressure  stable and heart rate stable   Renal: improved renal function and urinary output   Neurologic: Left sided paralysis, dysphagic, dysarthric speech  Cognition and responsiveness decreased   Medications: See medication list   Interventions: G-tube placement   Consults: Consultation received from gastroenterology, colorectal surgery, general surgery, nutrition           Physical Exam:  Vitals were reviewed[JB1.4]  Constitutional: He appears well-developed. No distress.   Sitting in bed, arousable   Eyes: Pupils are equal, round, and reactive to light. No scleral icterus.   Right and left pupils reactive to light. Right pupil sluggish and less reactive compared to left pupil.   Neck: Normal range of motion. Neck supple. No JVD present. No tracheal deviation present.   Cardiovascular: Normal rate.   Murmur (2/6 systolic murmur at RSB) heard.  Pulmonary/Chest: Effort normal and breath sounds normal. No respiratory distress. He has no wheezes. He has no rales.   Decreased breath sounds throughout.    Abdominal: Soft. There is no tenderness. There is no rebound and no guarding.   Genitourinary:   Genitourinary Comments: Orta in place, Diaper in place   Musculoskeletal: He exhibits no edema (Swelling of left upper extremity, distal to elbow (including hand, which has 1+ edema)) or tenderness.   Supportive boots on both lower extremities. On removal of boots, left foot noted to be significantly discolored (brawny). Warm to touch, toes not discolored, no swelling or edema   Neurological: He is alert.   Makes eye contact, unable to answer questions. Patient will squeeze right hand on command, not closing eyes on command   Skin: Skin is warm and dry. No erythema. No pallor.   Vitals reviewed.[JB1.7]         Data:  Lab Results   Component Value Date    WBC 7.0 04/25/2017    HGB 8.9 (L) 04/25/2017    HCT 29.8 (L) 04/25/2017    MCV 91 04/25/2017     04/25/2017     Lab Results   Component Value Date     04/25/2017     POTASSIUM 3.7 04/25/2017    CHLORIDE 102 04/25/2017    CO2 28 04/25/2017     (H) 04/25/2017     All cardiac studies reviewed by me.  All imaging studies reviewed by me.[JB1.3]    CT Head 4/12/2017  Impression:  1. New subacute large right MCA territory infarct involving the right  frontal and parietal regions.  2. High right frontal ventriculostomy catheter crosses the midline  with tip in the left thalamus, unchanged. Stable mild  ventriculomegaly.  3. Chronic appearing infarcts in the cerebrum and cerebellum, as  described above.[JB1.4]    TTE 4/2/2017  Interpretation Summary  Technically difficult study.  The Ejection Fraction is estimated at 40-45% (calculated, 46%.)  Global right ventricular function is normal.  Mild aortic stenosis and insufficiency is present.  The inferior vena cava is normal. Estimated mean right atrial pressure is <3  mmHg.  No pericardial effusion is present.[JB1.8]         Pending Results:[JB1.1]   None[JB1.4]      It was a pleasure to participate in the care of Melvin Bhatia.  If you have questions about[JB1.1] his[JB1.4] care, please do not hesitate to contact the [facility] team via the hospital [facility] on which we are stationed.      [facility]  [facility], [address] - [phone][JB1.1]       Revision History        User Key Date/Time User Provider Type Action    > JB1.3 4/25/2017  4:24 PM Toña Duron MD Resident Sign     JB1.7 4/25/2017 12:39 PM Toña Duron MD Resident Share     JB1.2 4/25/2017  9:49 AM Toña Duron MD Resident Share     AR1.1 4/22/2017 12:31 PM Harper Bell MD Physician Share     AR1.2 4/22/2017 12:26 PM Harper Bell MD Physician      JB1.5 4/21/2017  5:46 PM Toña Duron MD Resident Share     JB1.4 4/21/2017  5:44 PM Toña Duron MD Resident Share     JB1.6 4/21/2017  4:58 PM Toña Duron MD  Resident      JB1.8 4/21/2017  8:11 AM Toña Duron MD Resident      JB1.1 4/20/2017  7:05 PM Toña Duron MD Resident                      Consult Notes      Consults by Renée Segura APRN CNS at 4/25/2017  9:16 AM     Author:  Renée Segura APRN CNS Service:  Palliative Author Type:  Clinical Nurse Specialist    Filed:  4/25/2017  5:08 PM Date of Service:  4/25/2017  9:16 AM Note Created:  4/25/2017  9:14 AM    Status:  Signed :  Renée Segura APRN CNS (Clinical Nurse Specialist)     Consult Orders:    1. Palliative Care ADULT: Patient to be seen: Routine within 24 hrs; Call back #: Pager [phone] or [phone]; 75 yo M with recent large MCA stroke, Afib (no anticoagulation due to recent GI bleed) going to NH 4/25 at 2 pm, please meet with wife to discuss go... [[id number]] ordered by Toña Duron MD at 04/24/17 1225                [facility]  Palliative Care Consultation         Melvin Bhatia MRN# [id number]   Age: 76 year old YOB: 1940   Date of Admission: 3/31/2017    Reason for consult:[JB1.1] Goals of care  Decisional support  Patient and family support[JB1.2]       Requesting physician/service:[JB1.1] Toña Duron MD[JB1.2]       Recommendations[JB1.1]        Goals of Care  -DNR/DNI  -Goals are focused on comfort only  -Would recommend discontinuing non-comfort measures  -Would in initiate comfort measures  -Would diabetes care to avoid symptomatic hyperglycemia (blood glucose over 350 usually)  -Agree with care facility  continuing to support Fredrick and wife in this plan and creating care plan to reflect the goals of care    Discussion  Visited with wife today. Patient has significantly changed and now is unable to communicate. Wife seems to know pretty well how sick Fredrick is and wishes for Fredrick to be cared for at his care facility however does not think that coming back to the hospital  "would be of much benefit and would rather allow for a natural death if he were to continue to decline. Wife very much agreed with the hospice philosophy and did not want any further treatments such as antibiotics or other medical interventions that would prolong his life. Discussed code status and wife continues to want DNR/DNI. Given the financial impact, hospice is not of added benefit at this time for wife and would recommend care facility team continue to discuss utility of a \"do not re hospitalize\" order for Fredrick at the care facility.[JB1.3]     POLST[JB1.1] -currently has one, would recommend care facility adjusting after discussion with wife for what makes sense in their facility[JB1.3]    WHITNEY Thomas CNS  Palliative Care Consult Team  Pager: [phone][JB1.2]    70[JB1.3] minutes spent with patient, with >50% counseling and in care coordination. [JB1.2]       Disease Process/es & Symptoms[JB1.1]     Worsening mental status  SHERYL  UTI  Ischemic stroke of the right MCA  Cerebrovascular disease  Normal pressure hydrocephalus  Progressive cognitive dysfunction  Multiple falls  HTN  DMII  Hyperlipidemia  CHF  CKD  COPD[JB1.2]       Support/Coping   (We typically ask each of our patients the following questions:)    Are you Pent[employer]alism? Spiritual? Not so much?[JB1.1] Was previously[JB1.3]  Who are your \"go-to\" people when you need support?[JB1.1] Wife is a big support for patient, and is his health care agent[JB1.3]    Plan for psychosocial/spiritual support/follow-up from palliative team: Will discuss case during palliative interdisciplinary team rounds and involve LICSW and  as needed.       Decision-Making & Goals of Care     Discussion/counseling today about prognosis/goals of care/decisions:[JB1.1]   See above[JB1.3]  Patient has a completed health care directive available in the chart (Y/N):[JB1.1] Y[JB1.3]  Health care agent (only if patient has an available, complete HCD):[JB1.1] Dian ROSARIO" Jannette (spouse)  Physician orders for life-sustaining treatment (POLST) form indicates no aggressive treatment[JB1.3]  Code Status:[JB1.1] Do not resusitate / Do not intubate[JB1.3]   Patient has decision-making capacity (Y/N):[JB1.1] No[JB1.3]    Coordination of Care     Findings & plan of care discussed with: [facility]   Follow-up plan from palliative team:[JB1.1] patient is discharging today, but if not discharged will continue to follow  T[JB1.2]robert you for involving us in the patient's care.           Chief Complaint[JB1.1]     G[JB1.3]oals of care[JB1.2]         History of Present Illness     History obtained from: chart review[JB1.1]    Melvin Bhatia is a 76 year old male with a complex PMH of ecephalopathy, idiopathic normal pressure hydrocephalus with  shunt, dementia, multiple CVAs, CKD stage 3 COPD, anemia. DMII, CAD, CHF, and neurogenic bladder who presented from his care facility with poor PO intake, and found to have SHERYL. In February he was also hospitalized for a left medial malleolus fracture which remains an issue.  While hospitalized he had a right MCA stroke and now is unable to communicate.[JB1.3]     Palliative care team consulted 4/24 for goals of care          Past Medical History:   I have reviewed this patient's past medical history  This patient[JB1.1]  has a past medical history of ARDS (adult respiratory distress syndrome) (H); Atrial fibrillation (H); Bell's palsy (1/2010); Cauda equina syndrome with neurogenic bladder (H); Cerebellar infarct (H) (4/2012); CHF (congestive heart failure) (H); Chronic airway obstruction, not elsewhere classified; Chronic infection; CKD (chronic kidney disease) stage 4, GFR 15-29 ml/min (H); Congestive heart failure, unspecified (5/06); Coronary artery disease; CVA (cerebral infarction); Diabetes; Essential hypertension, benign; Gastro-oesophageal reflux disease; Heart attack (H); Hemorrhage of gastrointestinal tract, unspecified  (2/2003); Hydrocephalus; Hypersomnia with sleep apnea, unspecified; Intraventricular hemorrhage (1/2012); Legionella pneumonia (H) (8/09); Lumbago; Lumbosacral plexopathy (10/09); Mixed hyperlipidemia; Other osteoporosis (3/2006); Other specified anemias (2002); Other specified disorders of rotator cuff syndrome of shoulder and allied disorders; Other testicular hypofunction; Palpitations; Sleep apnea; Subarachnoid hemorrhage (H) (1/2012); Tibial plateau fracture (2/2011); Type II or unspecified type diabetes mellitus without mention of complication, not stated as uncontrolled; and Unspecified disorder resulting from impaired renal function.[JB1.4]           Past Surgical History:   I have reviewed this patient's past surgical history  This patient[JB1.1]  has a past surgical history that includes surgical history of - (2001); surgical history of - (age 20); DEXA, BONE DENSITY, AXIAL SKEL (3/2006); cardiac nuc wilman stress test nl (5/06); COLONOSCOPY THRU STOMA, DIAGNOSTIC (2/2003); upper gi endoscopy (2/2003); upper gi endoscopy (4/2003); surgical history of - (2003); tonsillectomy (child); cardiac nuc wilman stress test nl (6/2008, 3/09); DOPPLER ECHO PULSED, COMPLETE; surgical history of -; Open reduction internal fixation tibial plateau (3/2011); Implant shunt ventriculoperitoneal (2/28/2012); Esophagoscopy, gastroscopy, duodenoscopy (EGD), combined (N/A, 4/7/2017); and Sigmoidoscopy flexible (N/A, 4/11/2017).[JB1.4]            Social/Spiritual History:     Living situation:[JB1.1] lives in SNF[JB1.2]  Family system:[JB1.1] wife- Dian[JB1.2]  Functional status (needs help with ADLs or IADLs):[JB1.1] wheelchair bound[JB1.2]  Employment/education:[JB1.1] Worked as an [JB1.2]  Activities/interests:[JB1.1] 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 in his father; DIABETES in his maternal grandmother.[JB1.5]              Allergies:   All allergies reviewed and addressed  This patient is allergic to[JB1.1] is allergic to penicillins; albuterol; albuterol sulfate; atorvastatin; atorvastatin calcium; cipro [ciprofloxacin]; ciprofloxacin; simvastatin; and warfarin.[JB1.5]           Medications:   I have reviewed this patient's medication profile and medications during this hospitalization           Review of Systems:   The comprehensive review of systems is negative other than noted here and in the HPI.    Palliative Symptom Review (0=no symptom/no concern, 1=mild, 2=moderate, 3=severe):[JB1.1]      CELESTINA given patients AMS[JB1.3]            Physical Exam:   Vitals were reviewed  Temp: 97.6  F (36.4  C) Temp src: Axillary BP: 122/79 Pulse: 80 Heart Rate: 72 Resp: 22 SpO2: 98 % O2 Device: None (Room air)    Constitutional: Awake,[JB1.2] gazing around room[JB1.3], cooperative, no apparent distress  Lungs: No increased work of breathing, good air exchange, clear to auscultation bilaterally  Neurologic:[JB1.2] unable to communicate verbally[JB1.3]  Neuropsychiatric:[JB1.2] CELESTINA[JB1.3]           Data R[JB1.2]eviewed:     ROUTINE ICU LABS (Last four results)  CMP[JB1.1]  Recent Labs  Lab 04/25/17  0551 04/24/17  1548 04/24/17  0553 04/23/17  0732 04/22/17  0442     --  137 137 138   POTASSIUM 3.7  --  3.6 3.6 3.8   CHLORIDE 102  --  101 102 102   CO2 28  --  28 28 30   ANIONGAP 8  --  8 7 7   *  --  256* 344* 244*   BUN 22  --  19 15 10   CR 0.97  --  0.95 0.91 0.96   GFRESTIMATED 75  --  77 80 76   GFRESTBLACK >90African American GFR Calc  --  >90African American GFR Calc >90African American GFR Calc >90African American GFR Calc   LORENA 8.3*  --  8.2* 8.3* 8.2*   MAG 2.2  --  2.1 1.9 1.8   PHOS 2.7 3.4 1.9* 1.7* 2.9[JB1.5]     [facility][JB1.1]  Recent Labs  Lab 04/25/17  0551  04/24/17  0553 04/23/17  0732 04/22/17  0442   WBC 7.0 6.0 5.3 4.9   RBC 3.27* 3.17* 3.20* 3.11*   HGB 8.9* 8.8* 8.9* 8.8*   HCT 29.8* 28.7* 29.1* 28.2*   MCV 91 91 91 91   MCH 27.2 27.8 27.8 28.3   MCHC 29.9* 30.7* 30.6* 31.2*   RDW 17.3* 17.5* 17.6* 17.9*    253 210 205[JB1.5]     INR[JB1.1]No lab results found in last 7 days.[JB1.5]  Arterial Blood Gas[JB1.1]No lab results found in last 7 days.[JB1.5]             Revision History        User Key Date/Time User Provider Type Action    > JB1.3 4/25/2017  5:08 PM Renée Segura APRN CNS Clinical Nurse Specialist Sign     JB1.2 4/25/2017 12:20 PM Renée Segura APRN CNS Clinical Nurse Specialist      JB1.4 4/25/2017  9:16 AM Renée Segura APRN CNS Clinical Nurse Specialist      JB1.5 4/25/2017  9:15 AM Renée Segura APRN CNS Clinical Nurse Specialist      JB1.1 4/25/2017  9:14 AM Renée Segura APRN CNS Clinical Nurse Specialist             Consults by Ludmila Palacios MD at 4/17/2017  4:59 PM     Author:  Ludmila Palacios MD Service:  Surgery Author Type:  Resident    Filed:  4/17/2017  5:19 PM Date of Service:  4/17/2017  4:59 PM Note Created:  4/17/2017  4:59 PM    Status:  Attested :  Ludmila Palacios MD (Resident)    Cosigner:  Akosua Tyson MD at 4/18/2017  8:39 AM         Consult Orders:    1. Surgery General Adult IP Consult: Patient to be seen: Routine within 24 hrs; Call back #: [phone]/pager [phone] or [phone]; 75 yo M s/p large R MCA stroke (4/11) needs G tube placed.; Consultant may enter orders: Yes [[id number]] ordered by Toña Duron MD at 04/17/17 1612           Attestation signed by Akosua Tyson MD at 4/18/2017  8:39 AM        Attestation:  Physician Attestation   I, Akosua Tyson, saw this patient with the resident and agree with the resident s findings and plan of care as documented in the resident s note.      I personally reviewed  vital signs, medications, labs and imaging.    Key findings: dysphagia in need of long term enteral access for nutrition.  Plan for PEG this week.    Akosua Tyson  Date of Service (when I saw the patient): 4/17/17                                 GENERAL SURGERY CONSULT NOTE  4/17/2017    PRIMARY TEAM: Family Medicine    REASON FOR CONSULT: gastrostomy tube placement  LEVEL OF CONSULT: Consult, follow and place orders      ASSESSMENT: Melvin Bhatia is a 76 year old male with h/o NPH s/p  shunt placement, DMII, Afib with RVR, CHF (Ef %), HTN, CKD, COPD who was admitted with new R MCA ischemic stroke 4/12 and GI bleed 2/2 duodenal and rectal ulcer. We were consulted for enteral access for feeding     RECOMMENDATIONS:   -Will try to do a PEG on Wednesday  -discussed risks and benefits with his wife who would like to proceed with a  PEG    Patient seen, findings and plan discussed with staff Dr. Tyson.    Ludmila Palacios MD  PGY-2 General Surgery  [phone]    HISTORY PRESENTING ILLNESS: This is a Melvin Bhatia is a 76 year old male with h/o NPH s/p  shunt placement, DMII, Afib with RVR, CHF (Ef %), HTN, CKD, COPD who was admitted with new R MCA ischemic stroke 4/12 and GI bleed 2/2 duodenal and rectal ulcer. We were consulted for enteral access for feeding    Per primary team had dysphagia on admission. Has pulled two NG tubes.     Review of systems: 10 point ROS neg other than the symptoms noted above in the HPI.     PAST MEDICAL HISTORY:  Past Medical History:   Diagnosis Date     ARDS (adult respiratory distress syndrome) (H)      Atrial fibrillation (H)     first diagnosed 11-08     Bell's palsy 1/2010     Cauda equina syndrome with neurogenic bladder (H)     self catheter     Cerebellar infarct (H) 4/2012    left cerebellar infarct.  felt embolic hx afib     CHF (congestive heart failure) (H)      Chronic airway obstruction, not elsewhere classified      Chronic infection     VRE      CKD (chronic kidney disease) stage 4, GFR 15-29 ml/min (H)      Congestive heart failure, unspecified 5/06    EF 48%     Coronary artery disease     abnl mps with ef 46% and small-med area of ischemia '08 [facility]     CVA (cerebral infarction)     old lacunar infarct in the posterior left     Diabetes      Essential hypertension, benign      Gastro-oesophageal reflux disease      Heart attack (H)      Hemorrhage of gastrointestinal tract, unspecified 2/2003    UGI Bleed with transfusion     Hydrocephalus     plan  Shunt     Hypersomnia with sleep apnea, unspecified     Bpap     Intraventricular hemorrhage 1/2012     Legionella pneumonia (H) 8/09     Lumbago      Lumbosacral plexopathy 10/09    L diabetic myotrophy     Mixed hyperlipidemia      Other osteoporosis 3/2006     Other specified anemias 2002    Nl colonoscopy/EGD; Nl iron studies 4/06     Other specified disorders of rotator cuff syndrome of shoulder and allied disorders      Other testicular hypofunction     on Androgel     Palpitations     HOLTER     Sleep apnea     uses cpap     Subarachnoid hemorrhage (H) 1/2012     Tibial plateau fracture 2/2011    left     Type II or unspecified type diabetes mellitus without mention of complication, not stated as uncontrolled      Unspecified disorder resulting from impaired renal function     Stage 3 Chronic Renal Dz       PAST SURGICAL HISTORY:  Past Surgical History:   Procedure Laterality Date     C DEXA, BONE DENSITY, AXIAL SKEL  3/2006    Osteoporosis     CARDIAC NUC CONY STRESS TEST NL  5/06    No ischemia, EF 44 %     CARDIAC NUC CONY STRESS TEST NL  6/2008, 3/09    sm to mod fixed apical defect EF 46%     ESOPHAGOSCOPY, GASTROSCOPY, DUODENOSCOPY (EGD), COMBINED N/A 4/7/2017    Procedure: COMBINED ESOPHAGOSCOPY, GASTROSCOPY, DUODENOSCOPY (EGD);  Surgeon: Hussein Lopez MD;  Location: [facility]     HC COLONOSCOPY THRU STOMA, DIAGNOSTIC  2/2003    Normal, repeat 10 yr     HC DOPPLER ECHO PULSED,  COMPLETE      EF 50-55%     IMPLANT SHUNT VENTRICULOPERITONEAL  2/28/2012    Procedure:IMPLANT SHUNT VENTRICULOPERITONEAL; Ventriculoperitoneal Shunt Placement; Surgeon:ALESHA VELAZQUEZ; Location:[facility]     OPEN REDUCTION INTERNAL FIXATION TIBIAL PLATEAU  3/2011    left     SIGMOIDOSCOPY FLEXIBLE N/A 4/11/2017    Procedure: SIGMOIDOSCOPY FLEXIBLE;  Surgeon: Hussein Lopez MD;  Location: [facility]     SURGICAL HISTORY OF -   2001    R wrist surg fx/ steel plate     SURGICAL HISTORY OF -   age 20    Inguinal hernia     SURGICAL HISTORY OF -   2003    Bladder stim surg, not sucessful     SURGICAL HISTORY OF -       Lymphedema treatment of legs     TONSILLECTOMY  child     UPPER GI ENDOSCOPY  2/2003    esophageal ulcers, gastritis, duodenitis     UPPER GI ENDOSCOPY  4/2003    erosive gastropathy from ASA   Inguinal hernia when he was in his 20s    FAMILY HISTORY:  Unable to assess 2/2 mental status    SOCIAL HISTORY:  Unable to assess 2/2 mental status    ALLERGIES:  Unable to confirm 2/2 mental status  Allergies   Allergen Reactions     Penicillins Hives     Tolerated unasyn during 3/31/2017 admission      Albuterol Other (See Comments)     Albuterol Sulfate Other (See Comments)     SVT/tachycardia with albuterol     Atorvastatin Other (See Comments)     Description: Lipitor, Description: Lipitor     Atorvastatin Calcium      Cipro [Ciprofloxacin] Diarrhea     Ciprofloxacin Other (See Comments)     Description: Cipro, Description: Cipro     Simvastatin Other (See Comments)     Description: Zocor, Description: Zocor  C/o weak muscles and severe abdominal pain.     Warfarin Unknown and Other (See Comments)     MEDICATIONS:    No current facility-administered medications on file prior to encounter.   Current Outpatient Prescriptions on File Prior to Encounter:  insulin glargine (LANTUS SOLOSTAR) 100 UNIT/ML injection Inject 37 Units Subcutaneous 2 times daily (Patient taking differently: Inject 35 Units  Subcutaneous 2 times daily )   aspirin 81 MG tablet Take 1 tablet (81 mg) by mouth daily   acetaminophen (TYLENOL) 325 MG tablet Take 3 tablets (975 mg) by mouth every 6 hours as needed for mild pain   warfarin (COUMADIN) 3 MG tablet Take 1 tablet (3 mg) by mouth daily (Patient taking differently: Take 2-3 mg by mouth daily )   senna-docusate (SENOKOT-S;PERICOLACE) 8.6-50 MG per tablet Take 1-2 tablets by mouth 2 times daily   fexofenadine (ALLEGRA) 180 MG tablet Take 1 tablet (180 mg) by mouth daily   budesonide-formoterol (SYMBICORT) 160-4.5 MCG/ACT Inhaler Inhale 2 puffs into the lungs 2 times daily   rosuvastatin (CRESTOR) 20 MG tablet Take 1 tablet (20 mg) by mouth daily Needs fasting labs before any further refills   RANITIDINE HCL PO Take 150 mg by mouth daily   calcium citrate (CALCITRATE) 950 MG tablet Take 1 tablet by mouth daily   Cyanocobalamin (VITAMIN B12) 1000 MCG TBCR Take 1,000 mcg by mouth daily   exenatide (BYETTA) 5 mcg/0.02mL per dose (60 doses per 1.2 mL prefilled pen) Inject 5mcg under the skin twice daily 60 minutes before meals.   metoprolol (TOPROL-XL) 50 MG 24 hr tablet Take 1 tablet (50 mg) by mouth At Bedtime   fluticasone (FLONASE) 50 MCG/ACT nasal spray Spray 2 sprays into both nostrils daily   multivitamin, therapeutic with minerals (THERA-VIT-M) TABS Take 1 tablet by mouth daily   ferrous sulfate 325 (65 FE) MG tablet Take 325 mg by mouth daily (with breakfast) 65mg elemental iron   VITAMIN D, CHOLECALCIFEROL, PO Take 2,000 Units by mouth daily   bumetanide (BUMEX) 0.5 MG tablet Take 0.5 mg by mouth 2 times daily   magnesium oxide (MAG-OX) 400 MG tablet Take 2 tablets by mouth daily.   niacin (NIASPAN) 500 MG CR tablet Take 1 tablet by mouth At Bedtime.   insulin lispro (HUMALOG KWIKPEN) 100 UNIT/ML injection Inject 30 units before breakfast and lunch and 5 units before dinner  as directed. (Patient taking differently: Inject 20 units at 1000 and 10 units at 1600 as directed.)    polyethylene glycol (MIRALAX/GLYCOLAX) Packet Take 17 g by mouth daily as needed for constipation   Ipratropium-Albuterol (COMBIVENT RESPIMAT)  MCG/ACT inhaler Inhale 1 puff into the lungs 2 times daily Not to exceed 6 doses per day.   denosumab (PROLIA) 60 MG/ML SOLN Inject 60 mg Subcutaneous every 6 months   insulin pen needle (B-D U/F) 31G X 8 MM Use 7 daily or as directed.   blood glucose monitoring (NO BRAND SPECIFIED) test strip 1 strip by In Vitro route 4 times daily   ONE TOUCH LANCETS MISC use as directed 4 x daily and prn   ketoconazole (NIZORAL) 2 % shampoo Apply to the affected area and wash off after 5 minutes.   nitroglycerin (NITROSTAT) 0.4 MG SL tablet Place 1 tablet (0.4 mg) under the tongue every 5 minutes as needed up to 3 tablets per episode.   fluocinolone (SYNALAR) 0.01 % external solution Apply 1 to 2 times daily to scaly areas of scalp as needed.Profile Rx: patient will contact pharmacy when needed   order for DME Equipment being ordered: 4 wheeled rolling walker.   ACE/ARB NOT PRESCRIBED, INTENTIONAL, ACE & ARB not prescribed due to Hyperkalemia (baseline K+ > 5.5), Worsening renal function on ACE/ARB therapy and CKD (Chronic Kidney Disease)   insulin syringes, disposable, U-100 0.3 ML MISC 1 each 4 times daily.   ORDER FOR DME Equipment being ordered: CPAP supplies   Catheters MISC As needed       PHYSICAL EXAMINATION:  Temp:  [98.7  F (37.1  C)-99.1  F (37.3  C)] 98.9  F (37.2  C)  Heart Rate:  [] 76  Resp:  [18-20] 18  BP: (106-141)/(64-87) 139/66  SpO2:  [100 %] 100 %  General: no acute distress  Neuro: does not make eye contact, communicate or follow commands, moving and eyes open spontaneously  CV: RRR  Pulm: Non labored breathing on RA  Abd: soft, non-tender, non-distended  Ex: wwp    LABS:  LABS: Reviewed.   Arterial Blood Gases   No lab results found in last 7 days.  Complete Blood Count     Recent Labs  Lab 04/17/17  0825 04/16/17  0716 04/15/17  2141 04/15/17  1552  04/15/17  0945  04/15/17  0515   WBC 4.8 5.1  --   --  4.7  --  5.1   HGB 8.3* 8.0* 8.0* 8.5* 8.1*  < > 7.9*    138*  --   --  143*  --  139*   < > = values in this interval not displayed.  Basic Metabolic Panel    Recent Labs  Lab 04/17/17  0825 04/16/17  0716 04/15/17  0945 04/15/17  0515    138 142 142   POTASSIUM 3.7 3.9 4.3 3.5   CHLORIDE 106 106 108 106   CO2 27 26 25 28   BUN 12 11 9 10   CR 1.05 1.13 1.29* 1.26*   * 266* 199* 167*     Liver Function Tests    Recent Labs  Lab 04/16/17  0716 04/15/17  0945 04/15/17  0515 04/14/17  0535  04/12/17  1847  04/11/17  0510   AST 21 17 14 7  < >  --   < > 21   ALT 26 31 29 36  < >  --   < > 80*   ALKPHOS 80 71 69 70  < >  --   < > 83   BILITOTAL 0.4 0.4 0.4 0.6  < >  --   < > 0.2   ALBUMIN 1.6* 1.5* 1.6* 1.6*  < >  --   < > 1.4*   INR  --   --   --   --   --  1.27*  --  1.17*   < > = values in this interval not displayed.  Pancreatic Enzymes  No lab results found in last 7 days.  Coagulation Profile    Recent Labs  Lab 04/12/17 1847 04/11/17  0510   INR 1.27* 1.17*   PTT  --  29     Lactate  Invalid input(s): LACTATE    IMAGING:  No results found for this or any previous visit (from the past 24 hour(s)).[AC1.1]             Revision History        User Key Date/Time User Provider Type Action    > AC1.1 4/17/2017  5:19 PM Ludmila Palacios MD Resident Sign            Consults by Klisch, Christine M, RN at 4/17/2017  8:52 AM     Author:  Klisch, Christine M, RN Service:  Patient Learning Author Type:  Registered Nurse    Filed:  4/17/2017  8:52 AM Date of Service:  4/17/2017  8:52 AM Note Created:  4/17/2017  8:51 AM    Status:  Signed :  Klisch, Christine M, RN (Registered Nurse)     Consult Orders:    1. Patient [facility] IP Consult [[id number]] ordered by Jeremi Fatima MD at 04/15/17 2225                Had class 4/14/17, this order cancelled.[CK1.1]     Revision History        User Key Date/Time User Provider Type Action     > CK1.1 2017  8:52 AM Klisch, Christine M, RN Registered Nurse Sign            Consults by Richa Kim DO at 2017  5:20 PM     Author:  Richa Kim DO Service:  Neuro ICU Author Type:  Resident    Filed:  4/15/2017 12:36 PM Date of Service:  2017  5:20 PM Note Created:  2017  5:20 PM    Status:  Attested :  Richa Kim DO (Resident)    Cosigner:  Kiah Olivarez MD at 2017 12:01 AM         Consult Orders:    1. Neurology Stroke Adult IP Consult: Patient to be seen: Routine within 24 hrs; Call back #: [phone]; stroke assessment; Consultant may enter orders: Yes [[id number]] ordered by Harper Bell MD at 17 1708           Attestation signed by Kiah Olivarez MD at 2017 12:01 AM        Attending Attestation:                                                   Date Patient seen: 2017    Left facial droop with large subacute stroke in RMCA territory.     - workup in progress.       Total time spent in direct patient care at the bedside was 55. Over 50% of the time was spend in coordination of the care.     Patient was discussed during our bedside rounds.   I reviewed patients labs, Xrays, Scans.   I examined the patient with the team and plan of care, as documented above, was developed on our Bedside rounds.  Patients family was updated.   Collaborating teams were updated.    Cindy Olivarez MD  Neuro Critical Care  [phone]/15/2017                                   [facility]      Neurology Stroke Consult    Patient Name: Melvin Bhatia  : 1940 MRN#: [id number]    STROKE DATA    Stroke Code:  Time called:[LM1.1]  2017[LM1.2] 1702  Time patient seen:[LM1.1]  2017[LM1.2] 1705  Onset of symptoms:[LM1.1]  2017[LM1.2] 1500  Last known normal (pt's baseline):[LM1.1]  2017[LM1.2] 1500  Head CT read by[LM1.1] Male[AB1.1] at:[LM1.1]   04/12/2017[AB1.2] 17[AB1.1]1[AB1.3]5[AB1.1]    TPA treatment:  Not given due to GI bleed in past 21 days.    National Institutes of Health Stroke Scale (at presentation)  NIHSS done at:  time patient seen      Score    Level of consciousness:  (0)   Alert, keenly responsive     LOC questions:  (0)   Answers both questions correctly    LOC commands:  (1)   Performs one task correctly    Best gaze:  (0)   Normal    Visual:  (0)   No visual loss    Facial palsy:  (2)   Partial paralysis (total/near total of lower face)    Motor arm (left):  (3)   No effort against gravity    Motor arm (right):  (2)   Some effort against gravity    Motor leg (left):  (3)   No effort against gravity    Motor leg (right):  (3)   No effort against gravity    Limb ataxia:  (0)   Absent    Sensory:  (0)   Normal- no sensory loss    Best language:  (1)   Mild to moderate aphasia    Dysarthria:  (1)   Mild to moderate dysarthria    Extinction and inattention:  (0)   No abnormality        NIHSS Total Score:  16        Dysphagia Screen  Time of screening:[LM1.1] 04/12/2017[LM1.3] 1720  Screening results: Failed screening - strict NPO pending SLP evaluation     ASSESSMENT & RECOMMENDATIONS   The patient was seen for[LM1.1] acute worsening of left facial droop[AB1.1].  The differential diagnosis includes[LM1.1] stroke, TIA and locus minorus[AB1.1].[LM1.1] Noncontrast head CT concerning for new subacute[AB1.1] large[AB1.4] right MCA distribution ischemic stroke. However, the patient is not a candidate for tPA due to recent history of GI bleed this hospitalization[AB1.1].[AB1.5] As patient has decompensated heart failure and recent SHERYL, elected[AB1.1] for[AB1.5] MRI and MRA[AB1.1] to further evaluate.[AB1.5]      Impression:[LM1.1]   Subacute R MCA ischemic stroke[AB1.5]    Recommendations:[LM1.1]  Acute Ischemic Stroke (without tPA) Plan  - Neurochecks[AB1.5] q1h[AB1.6]  - Permissive HTN; labetalol PRN for SBP > 220  - Euthermia, Euglycemia  -  MRI/MRA Stroke Protocol[AB1.5]  - rectal aspirin 300 mg daily[AB1.6]  - Telemetry, EKG  - Bedside Glucose Monitoring  - A1c, Lipid Panel, Troponin x 3  - Recently had echo but no bubble study, pt with known a fib, will discuss TTE with bubble   - PT/OT/SLP  - PM&R  - Stroke Education  - Depression Screen  - Apnea Screen[AB1.5]     This evening, e[AB1.7]levate HOB to 30 degrees[AB1.5] and m[AB1.7]onitor for acute mental status changes[AB1.5].[AB1.7] S[AB1.4]trongly[AB1.1] RECOMMEND AGAINST HYPOTONIC FLUIDS[AB1.6] as this can worsen cerebral edema, especially with an infarct of this size.[AB1.1] Will reassess patient in the morning.[AB1.7]    Prophylaxis[LM1.1]          For VTE Prevention:  - None, per primary team[AB1.1]     The patient[LM1.1] will be managed by the [facility] team and  followed by the Stroke Consult service[AB1.1].    HPI[LM1.1]  Melvin Bhatia is a 76 year old male with a significant past medical history of cerebrovascular disease,SAH in 2/16 with subsequent normal pressure hydrocephalus and post ventriculoperitoneal shunting, progressive cognitive dysfunction, multiple falls, HTN, Type 2 DM, hyperlipidemia, CHF, CKD, and COPD with previous R MCA stroke with left hemiplegia who was lying in bed at 3 pm and got up to a chair. At that time, nurse noted worsening of baseline left facial droop. At 5 PM this was confirmed with his spouse and stroke code was called.[AB1.1]     Pertinent Past Medical/Surgical History  Past Medical History:   Diagnosis Date     ARDS (adult respiratory distress syndrome) (H)      Atrial fibrillation (H)     first diagnosed 11-08     Bell's palsy 1/2010     Cauda equina syndrome with neurogenic bladder (H)     self catheter     Cerebellar infarct (H) 4/2012    left cerebellar infarct.  felt embolic hx afib     CHF (congestive heart failure) (H)      Chronic airway obstruction, not elsewhere classified      Chronic infection     VRE     CKD (chronic kidney disease) stage  4, GFR 15-29 ml/min (H)      Congestive heart failure, unspecified 5/06    EF 48%     Coronary artery disease     abnl mps with ef 46% and small-med area of ischemia '08 [facility]     CVA (cerebral infarction)     old lacunar infarct in the posterior left     Diabetes      Essential hypertension, benign      Gastro-oesophageal reflux disease      Heart attack (H)      Hemorrhage of gastrointestinal tract, unspecified 2/2003    UGI Bleed with transfusion     Hydrocephalus     plan  Shunt     Hypersomnia with sleep apnea, unspecified     Bpap     Intraventricular hemorrhage 1/2012     Legionella pneumonia (H) 8/09     Lumbago      Lumbosacral plexopathy 10/09    L diabetic myotrophy     Mixed hyperlipidemia      Other osteoporosis 3/2006     Other specified anemias 2002    Nl colonoscopy/EGD; Nl iron studies 4/06     Other specified disorders of rotator cuff syndrome of shoulder and allied disorders      Other testicular hypofunction     on Androgel     Palpitations     HOLTER     Sleep apnea     uses cpap     Subarachnoid hemorrhage (H) 1/2012     Tibial plateau fracture 2/2011    left     Type II or unspecified type diabetes mellitus without mention of complication, not stated as uncontrolled      Unspecified disorder resulting from impaired renal function     Stage 3 Chronic Renal Dz       Past Surgical History:   Procedure Laterality Date     C DEXA, BONE DENSITY, AXIAL SKEL  3/2006    Osteoporosis     CARDIAC NUC CONY STRESS TEST NL  5/06    No ischemia, EF 44 %     CARDIAC NUC CONY STRESS TEST NL  6/2008, 3/09    sm to mod fixed apical defect EF 46%     ESOPHAGOSCOPY, GASTROSCOPY, DUODENOSCOPY (EGD), COMBINED N/A 4/7/2017    Procedure: COMBINED ESOPHAGOSCOPY, GASTROSCOPY, DUODENOSCOPY (EGD);  Surgeon: Hussein Lopez MD;  Location: [facility]     HC COLONOSCOPY THRU STOMA, DIAGNOSTIC  2/2003    Normal, repeat 10 yr     HC DOPPLER ECHO PULSED, COMPLETE      EF 50-55%     IMPLANT SHUNT VENTRICULOPERITONEAL   2/28/2012    Procedure:IMPLANT SHUNT VENTRICULOPERITONEAL; Ventriculoperitoneal Shunt Placement; Surgeon:ALESHA VELAZQUEZ; Location:[facility] OR     OPEN REDUCTION INTERNAL FIXATION TIBIAL PLATEAU  3/2011    left     SIGMOIDOSCOPY FLEXIBLE N/A 4/11/2017    Procedure: SIGMOIDOSCOPY FLEXIBLE;  Surgeon: Hussein Lopez MD;  Location: [facility]     SURGICAL HISTORY OF -   2001    R wrist surg fx/ steel plate     SURGICAL HISTORY OF -   age 20    Inguinal hernia     SURGICAL HISTORY OF -   2003    Bladder stim surg, not sucessful     SURGICAL HISTORY OF -       Lymphedema treatment of legs     TONSILLECTOMY  child     UPPER GI ENDOSCOPY  2/2003    esophageal ulcers, gastritis, duodenitis     UPPER GI ENDOSCOPY  4/2003    erosive gastropathy from ASA       Medications:[LM1.1] I have reviewed this patient's current medications[AB1.1].    Allergies:[LM1.1] All allergies reviewed and addressed[AB1.1].    Family History:[LM1.1] This patient has no significant family history[AB1.1].    Social History:[LM1.1] I have reviewed this patient's social history[AB1.1].    Tobacco use:[LM1.1] Former smoker, last smoked 1997[AB1.1]    ROS:[LM1.1]  The 10 point Review of Systems is negative other than noted in the HPI or here.[AB1.1]     PHYSICAL EXAMINATION  Vital Signs:  B/P: 104/59,  T: 98.9,  P: 107,  R: 20    General:[LM1.1]  patient lying in bed without any acute distress[AB1.1]    HEENT:[LM1.1]  normocephalic/atraumatic[AB1.1]  Cardio:[LM1.1]  RRR[AB1.1]  Pulmonary:[LM1.1]  no respiratory distress[AB1.1]  Abdomen:[LM1.1]  soft[AB1.1]  Extremities:[LM1.1]  no edema[AB1.1]  Skin:[LM1.1]  intact[AB1.1]     Neurologic  Mental Status:[LM1.1]  Opens eyes to command[AB1.1]  Cranial Nerves:[LM1.1] EOMI. Unable to test visual fields. Left lower facial droop, otherwise symmetric. Hearing intact to conversation. Tongue midline.[AB1.1]   Motor:[LM1.1]  no abnormal movements, normal tone throughout. RUE with 4/5 strength, LUE 2/5.  RLE 2+/5, RLE 2/5.[AB1.1]   Reflexes:[LM1.1]  Reduced throughout. No clonus. Right toe up going.[AB1.1]  Sensory:[LM1.1]  Withdraws to pain in all four extremities[AB1.1]  Coordination:[LM1.1]  Unable to assess[AB1.1]  Station/Gait:[LM1.1]  unable to test[AB1.1]    Labs  Labs and Imaging reviewed and used in developing the plan; pertinent results included.     Lab Results   Component Value Date    GLC 50 (LL) 04/12/2017       The patient was discussed with the Fellow, [LM1.1] Male[AB1.1].  The staff is [LM1.1] Sher[AB1.1].[LM1.1]    Richa Kim[AB1.1]   Pager:[LM1.1] [phone][AB1.1]     Revision History        User Key Date/Time User Provider Type Action    > AB1.3 4/15/2017 12:36 PM Brushobhading, Richa Cabrera, DO Resident Sign     AB1.6 4/12/2017  6:31 PM Bruhnding, Richa Oma Cabrera, DO Resident Sign     AB1.7 4/12/2017  6:24 PM Brushobhading, Richa Cabrera, DO Resident Share     AB1.5 4/12/2017  6:15 PM Bruhnding, Richa Cabrera, DO Resident Share     AB1.4 4/12/2017  6:00 PM Bruhnding, Richa Oma Cabrera, DO Resident Share     AB1.2 4/12/2017  5:52 PM Bruhnding, Richa Omarahul Cabrera, DO Resident Share     AB1.1 4/12/2017  5:37 PM Brushobhading, Richa Cabrera, DO Resident      LM1.3 4/12/2017  5:26 PM Jeremi Fatima MD Resident Share     LM1.2 4/12/2017  5:21 PM Jeremi Fatima MD Resident      LM1.1 4/12/2017  5:20 PM Jeremi Fatima MD Resident             Consults by Stu Barrios MD at 4/5/2017  7:19 AM     Author:  Stu Barrios MD Service:  Gastroenterology Author Type:  Physician    Filed:  4/5/2017  6:24 PM Date of Service:  4/5/2017  7:19 AM Note Created:  4/5/2017  7:17 AM    Status:  Attested :  Stu Barrios MD (Physician)    Cosigner:  José Ramirez MD at 4/5/2017  8:23 PM         Consult Orders:    1. GI LUMINAL ADULT IP CONSULT: Patient to be seen: Routine within 24 hrs; Call back #: [phone]; 75 yo M, concern for a GI bleed (hemoglobin  drop) with dark stool; Consultant may enter orders: Yes [[id number]] ordered by Toña Duron MD at 04/04/17 1611           Attestation signed by José Ramirez MD at 4/5/2017  8:23 PM        I performed a history and physical examination of the above patient and discussed the management with Dr. Barrios on 4/5/2017. I reviewed the note and there are no changes to the past medical, family or social history.  A complete 10 point review of systems was obtained. Please see the HPI for pertinent positives and negatives. All other systems were reviewed and were found to be negative. I agree with the documented findings and plan of care as outlined.    José Ramirez MD  GI Attending  Pager: [phone]                                     GASTROENTEROLOGY CONSULTATION      Date of Admission:  3/31/2017          ASSESSMENT AND RECOMMENDATIONS:   Assessment:[JR1.1]  76 year old male with a history of CVA, NPH s/p  shunt, HTN, CKD, DMII and proctitis initially presented with an acute on chronic kidney injury, UTI and hypernatremia and has now developed anemia with melena, requiring transfusions of pRBCs. Patient has also developed elevated transaminases. Currently hemodynamically stable[JR1.2] but melena continues. Had supratherapeutic INR on admission.[JR1.3]      #melena  #upper GI bleed[JR1.2]  Black tarry stools started 4/4[JR1.3] .[JR1.2] Continues but patient hemodynamically stable. INR closer to normal with vitamin K IV which is reassuring given the acute liver injury as well.[JR1.3]  Hgb has dropped from baseline ~11 to 7.0. With this pattern of bleeding, likely from an upper GI source[JR1.2] such as[JR1.3] peptic ulcer disease, AVMs, gastritis,malignancy (due to patient's overall clinical picture with elevated transaminases and hypernatremia),[JR1.2] portal hypertensive gastropathy/varices (less likely)[JR1.3].   An EGD would need to performed to find the source of bleeding, however, with patient's decline in  mental status and increased somnolence with hemoglobin drop, need to assess family's wishes of overall care before taking the risk of undergoing this procedure. Will wait and observe at this time.[JR1.2]     # Proctitis on CT. Given his history of constipation, most likely stercoral ulceration/fecal stasis rather than infectious or IBD related.[JR1.3]      #[JR1.2]Acute liver injury[JR1.3]  ,  one day ago, decreased to 582 and 593. This is a new episode of elevation. Prior records reveal normal liver chemistries. No history of cirrhosis, portal hypertension. With this elevation of transaminases, likely due to hepatocellular injury. CT abdomen/pelvis showed focal dilatation of intrahepatic ducts, which was also recognized on US abdomen with doppler.[JR1.2] Viral hepatitis (HBV, HCV) neg. Could consider less common viral infection such as CMV, HSV adenovirus, EBV.[JR1.3] With the dilatation seen on CT and US, can also be a malignant process.     Recommendations   -Continue excellent patient cares.[JR1.2] Monitor serial hemoglobin, try to maintain two peripheral IVs[JR1.3]  -[JR1.2]BID IV PPI[JR1.3]  -Due to patient's clinical status, will observe and wait to perform and upper endoscopy until obtain more information from the wife and make sure patient's goals of cares are being fulfilled.   -[JR1.2]CMV PCR, HSV IgG and IgM, EBV IgG/M  -if he can comply an MRCP would be a better test to evaluate the biliary tree  - follow liver enzymes  - work on correcting sodium/electrolyte imbalances[JR1.3]     Thank you for involving us in this patient's care. Please do not hesitate to contact the GI service with any questions or concerns.      Pt care plan discussed with Dr. Ramirez, GI staff physician.[JR1.2]    Gastroenterology follow up recommendations:[JR1.1] TBD[JR1.2]    Stu Barrios  -------------------------------------------------------------------------------------------------------------------           Chief Complaint:   We were asked by[JR1.1] ARON[JR1.2]kayla Liriano of Family Medicine[JR1.3] to evaluate this patient with[JR1.1] melena and anemia[JR1.3]    History is obtained from the patient and the medical record.          History of Present Illness:   Melvin Bhatia is a 76 year old male with a history of[JR1.1]  CVA, NPH w/ multi-infarct dementia s/p ventriculoperitoneal shunting, progressive cognitive dysfunction, multiple falls, HTN, DM II, CKD and COPD initially presented on 3/31/17 with 3 days of decreased PO intake with increased altered mental status and was found to have an acute on chronic kidney injury[JR1.2] and enterococcus UTI[JR1.3]. GI was consulted[JR1.2] for new anemia and melena[JR1.3]. Patient recently moved to a new nursing home 5 days ago. Per his wife, he was at his baseline mental status until 3 days ago when he started to refuse to eat. He became more somnolent. At baseline, patient is unable to leave his bed, lives at a nursing home and requires regular intermittent domingo catheterization and frequent enemas. His wife at time of admission, said patient denies any fevers, pain, bloody urine, chills or other systemic symptoms.[JR1.2]  No vomiting/hematemesis.[JR1.3]      Throughout his hospital stay, patient was found to be hypernatremic with a sodium elevation to 156. He also was found to have enterococcal UTI with urosepsis. He is currently being treated with vancomycin and ceftriaxone. One day ago, per nurse, after an enema was given, patient had a black, tarry stool. Hgb was checked at the time and decreased to 7.0 from 8.8 the day prior (baseline ~11). Patient was given 3U of pRBCs due to drops in Hgb and Hgb has elevated to 9.0, currently. Patient currently has stable vital signs. Abdominal CT shows evidence of proctitis. Also has dilatation of intrahepatic ducts on CT. Fecal occult blood test was positive. Has not had episodes of diarrhea.[JR1.2]            Past Medical History:    Reviewed and edited as appropriate[JR1.1]  Past Medical History:   Diagnosis Date     ARDS (adult respiratory distress syndrome) (H)      Atrial fibrillation (H)     first diagnosed 11-08     Bell's palsy 1/2010     Cauda equina syndrome with neurogenic bladder (H)     self catheter     Cerebellar infarct (H) 4/2012    left cerebellar infarct.  felt embolic hx afib     CHF (congestive heart failure) (H)      Chronic airway obstruction, not elsewhere classified      Chronic infection     VRE     CKD (chronic kidney disease) stage 4, GFR 15-29 ml/min (H)      Congestive heart failure, unspecified 5/06    EF 48%     Coronary artery disease     abnl mps with ef 46% and small-med area of ischemia '08 [facility]     CVA (cerebral infarction)     old lacunar infarct in the posterior left     Diabetes      Essential hypertension, benign      Gastro-oesophageal reflux disease      Heart attack (H)      Hemorrhage of gastrointestinal tract, unspecified 2/2003    UGI Bleed with transfusion     Hydrocephalus     plan  Shunt     Hypersomnia with sleep apnea, unspecified     Bpap     Intraventricular hemorrhage 1/2012     Legionella pneumonia (H) 8/09     Lumbago      Lumbosacral plexopathy 10/09    L diabetic myotrophy     Mixed hyperlipidemia      Other osteoporosis 3/2006     Other specified anemias 2002    Nl colonoscopy/EGD; Nl iron studies 4/06     Other specified disorders of rotator cuff syndrome of shoulder and allied disorders      Other testicular hypofunction     on Androgel     Palpitations     HOLTER     Sleep apnea     uses cpap     Subarachnoid hemorrhage (H) 1/2012     Tibial plateau fracture 2/2011    left     Type II or unspecified type diabetes mellitus without mention of complication, not stated as uncontrolled      Unspecified disorder resulting from impaired renal function     Stage 3 Chronic Renal Dz[JR1.4]            Past Surgical History:   Reviewed and edited as appropriate[JR1.1]   Past Surgical  History:   Procedure Laterality Date     C DEXA, BONE DENSITY, AXIAL SKEL  3/2006    Osteoporosis     CARDIAC NUC CONY STRESS TEST NL  5/06    No ischemia, EF 44 %     CARDIAC NUC CONY STRESS TEST NL  6/2008, 3/09    sm to mod fixed apical defect EF 46%     HC COLONOSCOPY THRU STOMA, DIAGNOSTIC  2/2003    Normal, repeat 10 yr     HC DOPPLER ECHO PULSED, COMPLETE      EF 50-55%     IMPLANT SHUNT VENTRICULOPERITONEAL  2/28/2012    Procedure:IMPLANT SHUNT VENTRICULOPERITONEAL; Ventriculoperitoneal Shunt Placement; Surgeon:ALESHA VELAZQUEZ; Location:[facility]     OPEN REDUCTION INTERNAL FIXATION TIBIAL PLATEAU  3/2011    left     SURGICAL HISTORY OF -   2001    R wrist surg fx/ steel plate     SURGICAL HISTORY OF -   age 20    Inguinal hernia     SURGICAL HISTORY OF -   2003    Bladder stim surg, not sucessful     SURGICAL HISTORY OF -       Lymphedema treatment of legs     TONSILLECTOMY  child     UPPER GI ENDOSCOPY  2/2003    esophageal ulcers, gastritis, duodenitis     UPPER GI ENDOSCOPY  4/2003    erosive gastropathy from ASA[JR1.4]            Previous Endoscopy:[JR1.1]   No results found. However, due to the size of the patient record, not all encounters were searched. Please check Results Review for a complete set of results.[JR1.4]         Social History:   Reviewed and edited as appropriate[JR1.1]  Social History     Social History     Marital status:      Spouse name: N/A     Number of children: N/A     Years of education: N/A     Occupational History     Not on file.     Social History Main Topics     Smoking status: Former Smoker     Quit date: 6/26/1997     Smokeless tobacco: Never Used      Comment: quit 1997       2-3 ppd for 45 yrs      Alcohol use No      Comment: recovering since 1982       Drug use: No     Sexual activity: Not on file     Other Topics Concern     Parent/Sibling W/ Cabg, Mi Or Angioplasty Before 65f 55m? Yes     Social History Narrative    Dairy/d 1 servings/d.     Caffeine  0 servings/d    Exercise walks 7 x week    Sunscreen used - No    Seatbelts used - No    Working smoke/CO detectors in the home - Yes    Guns stored in the home - No    Self Breast Exams - NA    Self Testicular Exam - No    Eye Exam up to date - Yes    Dental Exam up to date - Yes    Pap Smear up to date - NA    Mammogram up to date - NA    PSA up to date - Yes    Dexa Scan up to date - Yes 3/06    Flex Sig / Colonoscopy up to date - Yes    Immunizations up to date - No  Declines with possible allergic rxn as child    Abuse: Current or Past(Physical, Sexual or Emotional)- No    Do you feel safe in your environment - Yes    Consuelo Alfonso RN    4/30/08[JR1.4]            Family History:   Reviewed and edited as appropriate[JR1.1]  Unable to obtain from patient[JR1.3]       Allergies:   Reviewed and edited as appropriate[JR1.1]     Allergies   Allergen Reactions     Penicillins Hives     Albuterol Other (See Comments)     Albuterol Sulfate Other (See Comments)     SVT/tachycardia with albuterol     Atorvastatin Other (See Comments)     Description: Lipitor, Description: Lipitor     Atorvastatin Calcium      Cipro [Ciprofloxacin] Diarrhea     Ciprofloxacin Other (See Comments)     Description: Cipro, Description: Cipro     Simvastatin Other (See Comments)     Description: Zocor, Description: Zocor  C/o weak muscles and severe abdominal pain.     Warfarin Unknown and Other (See Comments)[JR1.4]            Medications:[JR1.1]     Prescriptions Prior to Admission   Medication Sig Dispense Refill Last Dose     sulfamethoxazole-trimethoprim (BACTRIM DS/SEPTRA DS) 800-160 MG per tablet Take 1 tablet by mouth daily   3/31/2017 at 1041     insulin glargine (LANTUS SOLOSTAR) 100 UNIT/ML injection Inject 37 Units Subcutaneous 2 times daily (Patient taking differently: Inject 35 Units Subcutaneous 2 times daily ) 30 mL 5 3/31/2017 at 0800     aspirin 81 MG tablet Take 1 tablet (81 mg) by mouth daily 100 tablet 3 3/31/2017 at  1232     acetaminophen (TYLENOL) 325 MG tablet Take 3 tablets (975 mg) by mouth every 6 hours as needed for mild pain 100 tablet  Past Week     warfarin (COUMADIN) 3 MG tablet Take 1 tablet (3 mg) by mouth daily (Patient taking differently: Take 2-3 mg by mouth daily ) 30 tablet  3/30/2017 at PM     senna-docusate (SENOKOT-S;PERICOLACE) 8.6-50 MG per tablet Take 1-2 tablets by mouth 2 times daily 100 tablet  3/31/2017 at 1041     fexofenadine (ALLEGRA) 180 MG tablet Take 1 tablet (180 mg) by mouth daily 90 tablet 0 3/31/2017 at 1041     budesonide-formoterol (SYMBICORT) 160-4.5 MCG/ACT Inhaler Inhale 2 puffs into the lungs 2 times daily 1 Inhaler 11 3/31/2017 at 0800     rosuvastatin (CRESTOR) 20 MG tablet Take 1 tablet (20 mg) by mouth daily Needs fasting labs before any further refills 90 tablet 0 3/31/2017 at 1041     RANITIDINE HCL PO Take 150 mg by mouth daily   3/31/2017 at 1041     calcium citrate (CALCITRATE) 950 MG tablet Take 1 tablet by mouth daily   3/31/2017 at 1041     Cyanocobalamin (VITAMIN B12) 1000 MCG TBCR Take 1,000 mcg by mouth daily 100 tablet 3 3/31/2017 at 1041     exenatide (BYETTA) 5 mcg/0.02mL per dose (60 doses per 1.2 mL prefilled pen) Inject 5mcg under the skin twice daily 60 minutes before meals. 1 Syringe 1 3/31/2017 at 1530     metoprolol (TOPROL-XL) 50 MG 24 hr tablet Take 1 tablet (50 mg) by mouth At Bedtime 90 tablet 1 3/30/2017 at PM     fluticasone (FLONASE) 50 MCG/ACT nasal spray Spray 2 sprays into both nostrils daily 48 g 1 3/31/2017 at 1041     multivitamin, therapeutic with minerals (THERA-VIT-M) TABS Take 1 tablet by mouth daily   3/31/2017 at 1041     ferrous sulfate 325 (65 FE) MG tablet Take 325 mg by mouth daily (with breakfast) 65mg elemental iron   3/31/2017 at 1041     VITAMIN D, CHOLECALCIFEROL, PO Take 2,000 Units by mouth daily   3/31/2017 at 1041     bumetanide (BUMEX) 0.5 MG tablet Take 0.5 mg by mouth 2 times daily   3/31/2017 at 1041     magnesium oxide  (MAG-OX) 400 MG tablet Take 2 tablets by mouth daily.   3/31/2017 at 1041     niacin (NIASPAN) 500 MG CR tablet Take 1 tablet by mouth At Bedtime. 90 tablet 2 3/31/2017 at 1041     insulin lispro (HUMALOG KWIKPEN) 100 UNIT/ML injection Inject 30 units before breakfast and lunch and 5 units before dinner  as directed. (Patient taking differently: Inject 20 units at 1000 and 10 units at 1600 as directed.) 30 mL 5 3/31/2017     polyethylene glycol (MIRALAX/GLYCOLAX) Packet Take 17 g by mouth daily as needed for constipation 7 packet 11 Unknown     Ipratropium-Albuterol (COMBIVENT RESPIMAT)  MCG/ACT inhaler Inhale 1 puff into the lungs 2 times daily Not to exceed 6 doses per day. 3 Inhaler 3 Unknown     denosumab (PROLIA) 60 MG/ML SOLN Inject 60 mg Subcutaneous every 6 months   Unknown     insulin pen needle (B-D U/F) 31G X 8 MM Use 7 daily or as directed. 300 each 5 Unknown     blood glucose monitoring (NO BRAND SPECIFIED) test strip 1 strip by In Vitro route 4 times daily 3 Box 11 Unknown     ONE TOUCH LANCETS MISC use as directed 4 x daily and prn 120 each 3 Unknown     ketoconazole (NIZORAL) 2 % shampoo Apply to the affected area and wash off after 5 minutes. 120 mL 2 Unknown     nitroglycerin (NITROSTAT) 0.4 MG SL tablet Place 1 tablet (0.4 mg) under the tongue every 5 minutes as needed up to 3 tablets per episode. 20 tablet 11 Unknown     fluocinolone (SYNALAR) 0.01 % external solution Apply 1 to 2 times daily to scaly areas of scalp as needed.Profile Rx: patient will contact pharmacy when needed 60 mL 11 Unknown     order for DME Equipment being ordered: 4 wheeled rolling walker. 1 Device 0 Unknown     ACE/ARB NOT PRESCRIBED, INTENTIONAL, ACE & ARB not prescribed due to Hyperkalemia (baseline K+ > 5.5), Worsening renal function on ACE/ARB therapy and CKD (Chronic Kidney Disease)   Unknown     insulin syringes, disposable, U-100 0.3 ML MISC 1 each 4 times daily. 100 each prn Unknown     ORDER FOR DME  "Equipment being ordered: CPAP supplies 1 each 0 Unknown     Catheters MISC As needed 200 each 10 Unknown[JR1.3]             Review of Systems:   A complete review of systems was performed and is negative except as noted in the HPI           Physical Exam:[JR1.1]   /71 (BP Location: Right arm)  Pulse 71  Temp 96.2  F (35.7  C) (Axillary)  Resp 20  Ht 1.626 m (5' 4\")  Wt 81.6 kg (180 lb)  SpO2 100%  BMI 30.9 kg/m2[JR1.4]  Wt:[JR1.1]   Wt Readings from Last 2 Encounters:   04/03/17 81.6 kg (180 lb)   03/10/17 93 kg (205 lb)[JR1.4]      Constitutional:[JR1.1] wdwn M laying in bed, intermittently interactive but seems minimally purposeful[JR1.3]  Eyes: Sclera anicteric/injected  Ears/nose/mouth/throat: Normal oropharynx without ulcers or exudate, mucus membranes moist, hearing intact  CV:[JR1.1] RRR[JR1.3]  Respiratory: Unlabored breathing  Lymph: No supraclavicular lymphadenopathy  Abd: Nondistended, +bs, no[JR1.1] grimace with palpation[JR1.3], no peritoneal signs[JR1.1]  : rectal examination with melena both surrounding anus and in the rectal vault. No red blood[JR1.3]  Skin: warm, perfused, no jaundice  Neuro:[JR1.1] occasionally opens eyes and responds verbally though unintelligible.[JR1.3]   Psych: Normal affect  MSK:[JR1.1] no gross deformities[JR1.3]         Data:   Labs and imaging below were independently reviewed and interpreted    BMP[JR1.1]  Recent Labs  Lab 04/04/17  2355 04/04/17  2042 04/04/17  1511 04/04/17  0831   * 149* 151* 153*   POTASSIUM 3.2* 3.4 3.6 3.5   CHLORIDE 120* 120* 124* 126*   LORENA 7.2* 7.0* 7.2* 7.3*   CO2 21 23 21 22   BUN 55* 59* 68* 73*   CR 1.80* 1.85* 1.94* 2.08*   * 282* 285* 233*[JR1.4]     CBC[JR1.1]  Recent Labs  Lab 04/04/17  2355  04/04/17  0831 04/03/17  0707 04/02/17  0651 03/31/17 2053   WBC  --   --  9.4 9.3 9.0 12.9*   RBC  --   --  2.59* 3.23* 3.82* 4.31*   HGB 7.7*  < > 7.0* 8.8* 10.3* 12.1*   HCT  --   --  24.3* 30.2* 36.1* 39.2*   MCV  --   " --  94 94 95 91   MCH  --   --  27.0 27.2 27.0 28.1   MCHC  --   --  28.8* 29.1* 28.5* 30.9*   RDW  --   --  17.6* 17.5* 17.3* 17.1*   PLT  --   --  140* 165 166 206   < > = values in this interval not displayed.[JR1.4]  INR[JR1.1]  Recent Labs  Lab 04/04/17  2355 04/04/17  1641 04/04/17  0831 04/03/17  0707   INR 1.49* 1.93* 6.26* 3.58*[JR1.4]     LFTs[JR1.1]  Recent Labs  Lab 04/04/17  2042 04/04/17  0831 03/31/17  2053   ALKPHOS 112 118 100   * 814* Unsatisfactory specimen - hemolyzed   * 633* 233*   BILITOTAL 0.4 0.8 0.3   PROTTOTAL 4.9* 4.8* 6.8   ALBUMIN 1.5* 1.5* 2.0*[JR1.4]      PANC[JR1.1]  Recent Labs  Lab 04/04/17  2042   LIPASE 203[JR1.4]       Imaging:[JR1.1]  CT abd personally reviewed and interpreted. No e/o subcapsular bleed in the CT or US yesterday. Gallstone in the gallbladder. Proctitis.[JR1.3]      Revision History        User Key Date/Time User Provider Type Action    > JR1.3 4/5/2017  6:24 PM Stu Barrios MD Physician Sign     JR1.2 4/5/2017  4:42 PM Stu Barrios MD Physician      JR1.4 4/5/2017  7:18 AM Stu Barrios MD Physician      JR1.1 4/5/2017  7:17 AM Stu Barrios MD Physician             Consults by Julee Santo MD at 4/1/2017  5:13 AM     Author:  Julee Santo MD Service:  Colorectal Surgery Author Type:  Physician    Filed:  4/1/2017  8:33 PM Date of Service:  4/1/2017  5:13 AM Note Created:  4/1/2017  5:11 AM    Status:  Signed :  Julee Santo MD (Physician)         Colorectal Surgery Consult Note     Melvin Bhatia MRN# [id number]   YOB: 1940 Age: 76 year old      Date of Admission:  3/31/2017    Reason for Consult: Proctitis  Consulting Service: Family medicine  Consulting Physician: Dr. Santana    Assessment: Melvin Bhatia is a(n) 76 year old year old male with PMH NPH s/p  shunt, cognitive dysfunction, CHF, CKD, COPD, p/w decreased PO intake, SHERYL, UTI, and  proctitis.    Plan:  - Recommend stool softeners as you are, would hold on any rectal treatments for now  - If proctitis persists, consider GI consult for flexible sigmoidoscopy    Discussed with fellow, Dr. Jackson.  -------------------    HPI:     Melvin Bhatia is a(n) 76 year old year old male with PMH NPH s/p  shunt, cognitive dysfunction, CHF, CKD, COPD, p/w decreased PO intake, SHERYL, UTI, and proctitis.  Per chart review, refusing oral intake at his current nursing home.  No fevers, blood in stool.  He did nod confirming no abdominal pain.    Past Medical History:[KW1.1]  Past Medical History:   Diagnosis Date     ARDS (adult respiratory distress syndrome) (H)      Atrial fibrillation (H)     first diagnosed 11-08     Bell's palsy 1/2010     Cauda equina syndrome with neurogenic bladder (H)     self catheter     Cerebellar infarct (H) 4/2012    left cerebellar infarct.  felt embolic hx afib     CHF (congestive heart failure) (H)      Chronic airway obstruction, not elsewhere classified      Chronic infection     VRE     CKD (chronic kidney disease) stage 4, GFR 15-29 ml/min (H)      Congestive heart failure, unspecified 5/06    EF 48%     Coronary artery disease     abnl mps with ef 46% and small-med area of ischemia '08 [facility]     CVA (cerebral infarction)     old lacunar infarct in the posterior left     Diabetes      Essential hypertension, benign      Gastro-oesophageal reflux disease      Heart attack (H)      Hemorrhage of gastrointestinal tract, unspecified 2/2003    UGI Bleed with transfusion     Hydrocephalus     plan  Shunt     Hypersomnia with sleep apnea, unspecified     Bpap     Intraventricular hemorrhage 1/2012     Legionella pneumonia (H) 8/09     Lumbago      Lumbosacral plexopathy 10/09    L diabetic myotrophy     Mixed hyperlipidemia      Other osteoporosis 3/2006     Other specified anemias 2002    Nl colonoscopy/EGD; Nl iron studies 4/06     Other specified disorders of rotator cuff  syndrome of shoulder and allied disorders      Other testicular hypofunction     on Androgel     Palpitations     HOLTER     Sleep apnea     uses cpap     Subarachnoid hemorrhage (H) 1/2012     Tibial plateau fracture 2/2011    left     Type II or unspecified type diabetes mellitus without mention of complication, not stated as uncontrolled      Unspecified disorder resulting from impaired renal function     Stage 3 Chronic Renal Dz[KW1.2]       Past Surgical History:[KW1.1]  Past Surgical History:   Procedure Laterality Date     C DEXA, BONE DENSITY, AXIAL SKEL  3/2006    Osteoporosis     CARDIAC NUC CONY STRESS TEST NL  5/06    No ischemia, EF 44 %     CARDIAC NUC CONY STRESS TEST NL  6/2008, 3/09    sm to mod fixed apical defect EF 46%     HC COLONOSCOPY THRU STOMA, DIAGNOSTIC  2/2003    Normal, repeat 10 yr     HC DOPPLER ECHO PULSED, COMPLETE      EF 50-55%     IMPLANT SHUNT VENTRICULOPERITONEAL  2/28/2012    Procedure:IMPLANT SHUNT VENTRICULOPERITONEAL; Ventriculoperitoneal Shunt Placement; Surgeon:ALESHA VELAZQUEZ; Location:[facility] OR     OPEN REDUCTION INTERNAL FIXATION TIBIAL PLATEAU  3/2011    left     SURGICAL HISTORY OF -   2001    R wrist surg fx/ steel plate     SURGICAL HISTORY OF -   age 20    Inguinal hernia     SURGICAL HISTORY OF -   2003    Bladder stim surg, not sucessful     SURGICAL HISTORY OF -       Lymphedema treatment of legs     TONSILLECTOMY  child     UPPER GI ENDOSCOPY  2/2003    esophageal ulcers, gastritis, duodenitis     UPPER GI ENDOSCOPY  4/2003    erosive gastropathy from ASA[KW1.2]       Allergies:[KW1.1]     Allergies   Allergen Reactions     Penicillins Hives     Albuterol Other (See Comments)     Albuterol Sulfate Other (See Comments)     SVT/tachycardia with albuterol     Atorvastatin Other (See Comments)     Description: Lipitor, Description: Lipitor     Atorvastatin Calcium      Cipro [Ciprofloxacin] Diarrhea     Ciprofloxacin Other (See Comments)     Description:  Cipro, Description: Cipro     Simvastatin Other (See Comments)     Description: Zocor, Description: Zocor  C/o weak muscles and severe abdominal pain.     Warfarin Unknown and Other (See Comments)[KW1.2]       Medications:[KW1.1]    No current facility-administered medications on file prior to encounter.   Current Outpatient Prescriptions on File Prior to Encounter:  insulin glargine (LANTUS SOLOSTAR) 100 UNIT/ML injection Inject 37 Units Subcutaneous 2 times daily   insulin lispro (HUMALOG KWIKPEN) 100 UNIT/ML injection Inject 30 units before breakfast and lunch and 5 units before dinner  as directed.   aspirin 81 MG tablet Take 1 tablet (81 mg) by mouth daily   acetaminophen (TYLENOL) 325 MG tablet Take 3 tablets (975 mg) by mouth every 6 hours as needed for mild pain   warfarin (COUMADIN) 3 MG tablet Take 1 tablet (3 mg) by mouth daily   polyethylene glycol (MIRALAX/GLYCOLAX) Packet Take 17 g by mouth daily as needed for constipation   senna-docusate (SENOKOT-S;PERICOLACE) 8.6-50 MG per tablet Take 1-2 tablets by mouth 2 times daily   fexofenadine (ALLEGRA) 180 MG tablet Take 1 tablet (180 mg) by mouth daily   budesonide-formoterol (SYMBICORT) 160-4.5 MCG/ACT Inhaler Inhale 2 puffs into the lungs 2 times daily   Ipratropium-Albuterol (COMBIVENT RESPIMAT)  MCG/ACT inhaler Inhale 1 puff into the lungs 2 times daily Not to exceed 6 doses per day.   rosuvastatin (CRESTOR) 20 MG tablet Take 1 tablet (20 mg) by mouth daily Needs fasting labs before any further refills   RANITIDINE HCL PO Take 150 mg by mouth daily   calcium citrate (CALCITRATE) 950 MG tablet Take 1 tablet by mouth daily   Cyanocobalamin (VITAMIN B12) 1000 MCG TBCR Take 1,000 mcg by mouth daily   calcium citrate-vitamin D (CITRACAL) 315-200 MG-UNIT TABS Take 2 tablets by mouth 2 times daily 2 tabs contain 500mg elemental calcium + 800 iu's vitamin D3 in them .   denosumab (PROLIA) 60 MG/ML SOLN Inject 60 mg Subcutaneous every 6 months   exenatide  (BYETTA) 5 mcg/0.02mL per dose (60 doses per 1.2 mL prefilled pen) Inject 5mcg under the skin twice daily 60 minutes before meals.   metoprolol (TOPROL-XL) 50 MG 24 hr tablet Take 1 tablet (50 mg) by mouth At Bedtime   fluticasone (FLONASE) 50 MCG/ACT nasal spray Spray 2 sprays into both nostrils daily   insulin pen needle (B-D U/F) 31G X 8 MM Use 7 daily or as directed.   blood glucose monitoring (NO BRAND SPECIFIED) test strip 1 strip by In Vitro route 4 times daily   ONE TOUCH LANCETS MISC use as directed 4 x daily and prn   nitroglycerin (NITROSTAT) 0.4 MG SL tablet Place 1 tablet (0.4 mg) under the tongue every 5 minutes as needed up to 3 tablets per episode.   fluocinolone (SYNALAR) 0.01 % external solution Apply 1 to 2 times daily to scaly areas of scalp as needed.Profile Rx: patient will contact pharmacy when needed   order for DME Equipment being ordered: 4 wheeled rolling walker.   ACE/ARB NOT PRESCRIBED, INTENTIONAL, ACE & ARB not prescribed due to Hyperkalemia (baseline K+ > 5.5), Worsening renal function on ACE/ARB therapy and CKD (Chronic Kidney Disease)   multivitamin, therapeutic with minerals (THERA-VIT-M) TABS Take 1 tablet by mouth daily   ferrous sulfate 325 (65 FE) MG tablet Take 325 mg by mouth daily (with breakfast) 65mg elemental iron   VITAMIN D, CHOLECALCIFEROL, PO Take 2,000 Units by mouth daily   bumetanide (BUMEX) 0.5 MG tablet Take 0.5 mg by mouth 2 times daily   insulin syringes, disposable, U-100 0.3 ML MISC 1 each 4 times daily.   ORDER FOR DME Equipment being ordered: CPAP supplies   Catheters MISC As needed   magnesium oxide (MAG-OX) 400 MG tablet Take 2 tablets by mouth daily.   niacin (NIASPAN) 500 MG CR tablet Take 1 tablet by mouth At Bedtime.   ketoconazole (NIZORAL) 2 % shampoo Apply to the affected area and wash off after 5 minutes.[KW1.2]       Social History:[KW1.1]  Social History     Social History     Marital status:      Spouse name: N/A     Number of children:  "N/A     Years of education: N/A     Occupational History     Not on file.     Social History Main Topics     Smoking status: Former Smoker     Quit date: 6/26/1997     Smokeless tobacco: Never Used      Comment: quit 1997       2-3 ppd for 45 yrs      Alcohol use No      Comment: recovering since 1982       Drug use: No     Sexual activity: Not on file     Other Topics Concern     Parent/Sibling W/ Cabg, Mi Or Angioplasty Before 65f 55m? Yes     Social History Narrative    Dairy/d 1 servings/d.     Caffeine 0 servings/d    Exercise walks 7 x week    Sunscreen used - No    Seatbelts used - No    Working smoke/CO detectors in the home - Yes    Guns stored in the home - No    Self Breast Exams - NA    Self Testicular Exam - No    Eye Exam up to date - Yes    Dental Exam up to date - Yes    Pap Smear up to date - NA    Mammogram up to date - NA    PSA up to date - Yes    Dexa Scan up to date - Yes 3/06    Flex Sig / Colonoscopy up to date - Yes    Immunizations up to date - No  Declines with possible allergic rxn as child    Abuse: Current or Past(Physical, Sexual or Emotional)- No    Do you feel safe in your environment - Yes    Consuelo Alfonso RN    4/30/08[KW1.2]       Family History:[KW1.1]  Family History   Problem Relation Age of Onset     C.A.D. Father      DIABETES Maternal Grandmother[KW1.2]        ROS:  As noted above. No headache, dizziness. No fever, chills. No chest pain or shortness of breath. No abdominal pain, nausea, vomiting. No diarrhea or constipation. No urinary difficulties. No muscle or body aches.    Exam:[KW1.1]  /57  Pulse 70  Temp 96.5  F (35.8  C) (Axillary)  Resp 12  Ht 1.626 m (5' 4\")  Wt 79.8 kg (175 lb 14.4 oz)  SpO2 100%  BMI 30.19 kg/m2[KW1.2]  General: Alert, nods to questions  Resp: NLB on cpap  Cardiac: Regular rate; extremities warm;   Abdomen: Soft, nontender, nondistended.   Rectal: Stool already on undergarment when examined, evacuated some soft stool from distal " rectum, did not probe deeply however no hard stool encountered, no blood noted, did have mild pain with examination  Extremities: No LE edema or obvious joint abnormalities  Skin: Warm and dry, no jaundice or rash    Labs:  BMP notable for K 5.6, Cr 5.39  WBC 12.9   WBC    Imaging:     CTAP:    Resident preliminary report:   1. Atrophic native kidneys, no renal calculi. Mild left urinary tract  dilatation appears chronic, as also noted on CT 9/7/2009.   2. Moderate rectal stool burden with circumferential rectal wall  thickening with stranding of presacral fat, this may be secondary to  stercoral proctitis.  3. Segment 2 and 3 intrahepatic artery ductal dilatation, increased  since 2009 and may be secondary to a benign or malignant stricture.  4. 7 mm speculated left lung base nodule, recommend 12 month follow-up  CT in view of suspicious morphology.  5. Mild subcutaneous fat stranding in the right gluteal region is  likely secondary to injection sites, however cannot exclude a mass  lesion at this location. Please correlate clinically.   6. Cholelithiasis and diverticulosis with no inflammation.  7. L5 bilateral spondylolysis with grade I anterolisthesis L5/S1.     --    [facility] Surgery, PGY-3  pgr: [phone]    5:13 AM, 4/1/2017[KW1.1]      Personally examined and discussed with the resident. In brief this is a 76 year old male admitted for YTI and urosepsis. CT scan concerning for proctitis. Had a large bowel movement after admission. Today he denies any abdominal pain and no grimace on palpation of abdomen. It is soft. Would recommend tap water enemas if he does not have any further bowel movements. Thickening of rectum may be due to stasis of stool which should resolve as his general medical condition improves. Recommend consultation of GI as an OUTPATIENT for flex sig. If he has bloody bowl movements would consider it as an inpatient.   Discussed with Dr. Tejas Sarah,  MD  Colon And Rectal Surgery Fellow  [phone]    4/1/2017  10:16 AM[PB1.1]        Staff Addendum:  Agree with the consultation H&P as documented by the housestaff. I was personally involved with the recommendations made by our service for this patient.  Julee Santo MD  Colon and Rectal Surgery Staff  [facility][GM1.1]       Revision History        User Key Date/Time User Provider Type Action    > GM1.1 4/1/2017  8:33 PM Julee Santo MD Physician Sign     PB1.1 4/1/2017 10:17 AM Wanda Sarah MD Resident Sign     KW1.2 4/1/2017  5:38 AM Dale Ch MD Resident Sign     KW1.1 4/1/2017  5:11 AM Dale Ch MD Resident                      Progress Notes - Physician (Notes for yesterday and today)      Progress Notes by Toña Duron MD at 4/24/2017  6:16 AM     Author:  Toña Duron MD Service:  Family Medicine Author Type:  Resident    Filed:  4/24/2017 12:27 PM Date of Service:  4/24/2017  6:16 AM Note Created:  4/24/2017  6:16 AM    Status:  Attested :  Toña Duron MD (Resident)    Cosigner:  Romario Keller MD at 4/24/2017  7:49 PM        Attestation signed by Romario Keller MD at 4/24/2017  7:49 PM        Attestation:  This patient has been seen and evaluated by meRomario on 4/24/2017.  I saw and discussed the case with the resident and the care team. I agree with the findings and plan in this note. I have reviewed today's vital signs, medications, laboratory results.  Code for today's visit: 95967  Inpatient Subsequent Moderate complexity/severity  Romario Keller MD  \[facility] [place] [facility]\"" Family Medicine                                     [facility] - Inpatient daily progress note    Date of admission: 3/31/2017  Date of service: 4/24/2017.           Assessment and Plan:      Melvin Bhatia is a 76 year old male with a significant past medical history of cerebrovascular disease, normal  pressure hydrocephalus (status post ventriculoperitoneal shunting), progressive cognitive dysfunction, multiple falls, HTN, Type 2 DM, hyperlipidemia, CHF, CKD, and COPD who presented with  worsening mental status. He was found to have an SHERYL and UTI. He developed an episode of melena, anemia and significant elevation for liver enzymes.  He suffered an ischemic stroke of the right MCA diagnosed 4/12.    Patient Active Problem List   Diagnosis     Osteoporosis     PAF (paroxysmal atrial fibrillation) (H)     Lumbosacral plexopathy     Testosterone deficiency     Hypertension goal BP (blood pressure) < 140/90     Atrial fibrillation (H)     CKD (chronic kidney disease) stage 3, GFR 30-59 ml/min     Hyperlipidemia LDL goal <70     Coronary artery disease     Health Care Home     ACP (advance care planning)     Elevated PSA     Anemia in chronic renal disease     Subarachnoid hemorrhage (H)     Idiopathic normal pressure hydrocephalus (INPH)     Urinary bladder neurogenic dysfunction     Stroke (H)     Cognitive deficit, post-stroke     HL (hearing loss)     Personal history of fall     GERD (gastroesophageal reflux disease)     COPD exacerbation (H)     SHIVAM (obstructive sleep apnea)     LVH (left ventricular hypertrophy)     Diastolic CHF, acute on chronic (H)     Hypoxemia     Tachyarrhythmia     Systolic and diastolic CHF, chronic (H)     Gait abnormality     Iron deficiency anemia     Allergic rhinitis     Altered mental status     VRE (vancomycin-resistant Enterococci)     Hypoglycemia     Type 2 diabetes with stage 3 chronic kidney disease GFR 30-59 (H)     Chronic obstructive pulmonary disease, unspecified COPD type (H)     Ankle pain     Fx medial malleolus-closed     Gastrointestinal hemorrhage associated with duodenal ulcer     Anemia due to blood loss, acute     Acute cystitis     Hypernatremia     Delirium     Acute kidney injury superimposed on CKD     Closed fracture of ankle     Ischemic stroke diagnosed  during current admission (H)     Protein-calorie malnutrition (H)      Plan:   ## Type 2 DM, last A1C 6.1 on 02/20/17  Blood sugars poorly controlled with the start of tube feedings.  Will wait to transfer patient to LTAC until blood sugars are better controlled.  Plan:   -Will titrate Insulin   - Q 4 hour glucose checks  - Lantus[JB1.1] 20-->23[JB1.2] units  units bid  - Correction: HiInsulin Sliding scale    ##A Fib was controlled now with sporadic RVR but otherwise hemodynamically stable   Patient has had episodes of RVR (lasts < 2 minutes) over the last week, but has been HD stable  Plan:   -Would not get troponins with episodes of RVR unless patient becomes hemodynamically unstable.  Due to his condition his options are limited if he develops ACS and he is not a candidate for invasive cardiac procedure, discussed with Spouse  -Will continue to monitor   - Continue Metoprolol 25 mg suspension bid, if having more frequent HR> 140 and not due to infection or other reversible causes will increase Metoprolol to 50 mg BID   - Do not consult Cardiology for intervention (per spouse's wishes)    ## Severe malnutrition from acute illness and may have poor intake for quite sometime now as well due to dementia  Tolerating tube feedings; however, phosphorus[JB1.1] low yesterday and today despite starting Neutrophos[JB1.2].  Patient is at high risk for refeeding syndrome.    Plan:  -[JB1.1] Continue[JB1.2] Neutrophos 3 packets tid  - Continue tube feeds at goal rate of 45 cc/hr  - Free water flushes of 160 cc every 4 hours  - Multivitamins as recommended b[JB1.1]y[JB1.2] Nutritionist     ## New Right MCA Ischemic Stroke 4/12, stable   Stroke most likely caused by ischemia during episode of hypotension with GI bleed vs embolic from Afib. Patient not a candidate for TPA on Aspirin . No new neurologic change.   Plan:   - Continue secondary stoke prevention, keep blood pressure with goal of <140/90   - ASA      ##HFrEF,  euvolemic:  Negative JVD distention, weight stabilizing (admission weight- 80 kg (4/1), peaked at 87 kg(4/10) and 76 kg (4/22).  Will wait on starting patient's home diuresis regimen given multiple changes in fluids/tube feeds/free water flushes over the last few days.     Plan:  - IV lasix prn  - Hold Bumetanide 0.5 mg BID   - Daily weights  - Input and Output   - Will monitor electrolytes     ## COPD: stable    Plan:   - Q 4 hour Atrovent nebs  - Oral care and suctioning prn    ## Upper GI Bleed secondary to Duodenal Ulcer: stable   ## Lower GI Bleed secondary to rectal ulcer : stable  ##Acute anemia secondary to above: stable   Current hemoglobin 8.[JB1.1]8[JB1.2]    Plan:   - Continue high dose Pantoprazole 40 mg IV bid  - Avoid NSAIDs  - transfuse of HGb is <8 and if with active bleeding     ## Multiple bone fracture on left foot/ankle  Hx of fractures after a fall on 9/16, and 2/2017.  Cast removed 4/18 by Ortho.  X-rays obtained- break not completely healed.  Ortho recs:    Plan:  - Protect weight bearing in AFO  - PRAFO in bed to prevent ulcers  - Follow up with original orthopedic surgeon for ankle symptoms    ##Hypernatremia:  resolved  Patient has had episodes of hypernatremia, most likely secondary to decreased oral intake.  Since the stroke, hypotonic fluids will need to be avoided to avoid cerebral edema.       Plan:   -Will monitor     ## Upper Extremity Swelling: Improving  Most likely secondary to superficial thrombophlebitis vs PICC infiltration vs third spacing .  Patient has a history of blood clots and INR is currently < 2.  LUE ultrasound was negative for DVT.     Plan:   - Will monitor     ## Acute liver injury:  Resolved  Etiology of acute liver injury unknown. Work up for viral hepatitis, toxin, drugs and BUD Chiari has all been negative. No episode of severe hypotension.  Enzymes have reverted back to normal without intervention.  GI recommending MRCP to evaluate for biliary tract  obstruction (depending on ultimate goals of care).     Plan:   - No hepatotoxic medications (tylenol, statins, etc)  - MRCP recommended but most likely will not pursue this     ## UTI vs Colonization: Resolved  Received antibiotics initially but was stopped as this is deemed more of an asymptomatic bacteriuria vs colonization rather than a UTI      ## SHERYL on CKD3 (baseline Cr 1.5-2): improved   Thought to be prerenal from decrease PO intake     Plan:   - Follow up BMP daily  - Strict I/O   - Daily weights  - Continue telemetry        ## Stercoral proctitis     Plan:    - Miralax daily PRN and Senna at bedtime PRN.   - HOLD PTA ferrous sulfate while being treated for constipation    #Normal Pressure Hydrocephalus sequelae of subarachnoid hemorrhage   #Multi infarct Vascular Dementia  W/ Hx stroke, normal pressure hydrocephalus (2012), status post ventriculoperitoneal shunting, subarachnoid hemorrhage(2012).  Patient's neurologic issues were stable prior to stroke.  Will need to follow closely for cerebral edema caused by stroke that could potentially exacerbate patient's hydrocephalus.  Plan :   -Will observe but no aggressive interventions      ##High grade right ICA stenosis, diagnosed on 01/2017 MRA, stable   Patient's LAKE-VASC score is 8, not on anticoagulation due to recent GI bleed  Plan:   -Will continue to hold warfarin for now due to high risk of bleeding      ## Physical Deconditioning  Plan:    - PT/OT  - Speech therapy    ## Dispo:  Discharge planned for tomorrow, 4/2[JB1.1]5[JB1.3].  New POLST filled out with Spouse 4/20.[JB1.1]  Consider palliative care consult to further discuss goals of care with Spouse.[JB1.3]      Other chronic conditions, stable- continue home medications.  ## HTN-  At goal , Metoprolol  ## Hyperlipidemia-   rosuvastatin  ## COPD- To continue Breo and Atrovent inhaler   ##SHIVAM- Bipap  ##GERD - PPI   ## Constipation- Not an issue right now, will resume bowel regimen as needed   ##  Progressive cognitive dysfunction (dementia)     Fluids: none  Electrolytes: stable  Nutrition: G-tube feeds, free water flushes  Lines:PIV, PICC  Activity: Up with assist   CODE: DNR / DNI  Prophylaxis: Coumadin held  Dispo: Expected discharge date- tomorrow pending stable electrolytes                 Interval History:   Melvin Bhatia has had no significant events overnight.[JB1.1]   His blood sugars have continued to be elevated.  Phosphorous continues to be low despite starting Neutrophos yesterday.[JB1.2]  Nursing notes reviewed.            Review of Systems:   Review of Systems   Constitutional:        ROS limited secondary to patient's condition   Gastrointestinal: Negative for blood in stool.            Physical Exam (Resident / Clinician):   Vitals were reviewed  Temp: 97.7  F (36.5  C) Temp src: Axillary BP: 101/79   Heart Rate: 95 Resp: 18 SpO2: 99 % O2 Device: None (Room air)      Physical Exam   Constitutional: He appears well-developed. No distress.   Sitting in bed, arousable   Eyes: Pupils are equal, round, and reactive to light. No scleral icterus.   Right and left pupils reactive to light.  Right pupil sluggish and less reactive compared to left pupil.   Neck: Normal range of motion. Neck supple. No JVD present. No tracheal deviation present.   Cardiovascular: Normal rate.    Murmur (2/6 systolic murmur at RSB) heard.  Pulmonary/Chest: Effort normal and breath sounds normal. No respiratory distress. He has no wheezes. He has no rales.   Decreased breath sounds throughout.     Abdominal: Soft. There is no tenderness. There is no rebound and no guarding.   Genitourinary:   Genitourinary Comments: Orta in place, Diaper in place   Musculoskeletal: He exhibits no edema (Swelling of left upper extremity, distal to elbow (including hand, which has 1-2+ edema)) or tenderness.   Supportive boots on both lower extremities   Neurological: He is alert.   Makes eye contact, unable to answer questions.   Patient will squeeze right hand on command, not closing eyes on command   Skin: Skin is warm and dry. No erythema. No pallor.   Vitals reviewed.          Data:   ROUTINE LABS (Last four results)  CMP[JB1.1]    Recent Labs  Lab 04/24/17  0553 04/23/17  0732 04/22/17  0442 04/21/17  1853 04/21/17  0603    137 138  --  139   POTASSIUM 3.6 3.6 3.8  --  3.4   CHLORIDE 101 102 102  --  104   CO2 28 28 30  --  28   ANIONGAP 8 7 7  --  7   * 344* 244*  --  230*   BUN 19 15 10  --  12   CR 0.95 0.91 0.96  --  0.97   GFRESTIMATED 77 80 76  --  75   GFRESTBLACK >90African American GFR Calc >90African American GFR Calc >90African American GFR Calc  --  >90African American GFR Calc   LORENA 8.2* 8.3* 8.2*  --  8.5   MAG 2.1 1.9 1.8  --  2.0   PHOS 1.9* 1.7* 2.9 2.4* 1.7*[JB1.4]     CBC[JB1.1]    Recent Labs  Lab 04/24/17  0553 04/23/17  0732 04/22/17  0442 04/21/17  0603   WBC 6.0 5.3 4.9 4.4   RBC 3.17* 3.20* 3.11* 2.98*   HGB 8.8* 8.9* 8.8* 8.3*   HCT 28.7* 29.1* 28.2* 27.3*   MCV 91 91 91 92   MCH 27.8 27.8 28.3 27.9   MCHC 30.7* 30.6* 31.2* 30.4*   RDW 17.5* 17.6* 17.9* 18.2*    210 205 189[JB1.4]       Intake/Output Summary (Last 24 hours) at 04/2[JB1.1]3[JB1.2]/17    Gross per 24 hour   Intake[JB1.1]             1700[JB1.2] ml   Output[JB1.1]             2200[JB1.2] ml   Net             -[JB1.1]500[JB1.2]ml       Medications:     Current Facility-Administered Medications   Medication     insulin glargine (LANTUS) injection 23 Units     potassium & sodium phosphates (NEUTRA-PHOS) Packet 1 packet     insulin aspart (NovoLOG) inj (RAPID ACTING)     insulin aspart (NovoLOG) inj (RAPID ACTING)     ipratropium (ATROVENT) 0.02 % neb solution 0.5 mg     ipratropium (ATROVENT) 0.02 % neb solution 0.5 mg     zinc sulfate (20 mg Solomon. Zn/mL) solution 220 mg     vitamin A-D & C drops (TRI-VITAMIN) drops SOLN 7 mL     aspirin suspension 325 mg     metoprolol (LOPRESSOR) suspension 25 mg     pantoprazole (PROTONIX)  suspension 40 mg     ferrous sulfate elixir 220 mg     lidocaine 1 % 1 mL     lidocaine (LMX4) kit     sodium chloride (PF) 0.9% PF flush 3 mL     sodium chloride (PF) 0.9% PF flush 3 mL     mineral oil-hydrophilic petrolatum (AQUAPHOR)     rosuvastatin (CRESTOR) tablet 20 mg     heparin lock flush 10 UNIT/ML injection 5-10 mL     heparin lock flush 10 UNIT/ML injection 5-10 mL     nystatin (MYCOSTATIN) suspension 500,000 Units     dextrose 10 % 1,000 mL infusion     multivitamins with minerals (CERTAVITE/CEROVITE) liquid 15 mL     lactobacillus rhamnosus (GG) (CULTURELL) capsule 1 capsule     polyethylene glycol (MIRALAX/GLYCOLAX) Packet 17 g     senna-docusate (SENOKOT-S;PERICOLACE) 8.6-50 MG per tablet 1-2 tablet     acetaminophen (TYLENOL) solution 650 mg     glucose 40 % gel 15-30 g    Or     dextrose 50 % injection 25-50 mL    Or     glucagon injection 1 mg     magnesium sulfate 4 g in 100 mL sterile water (premade)     potassium phosphate 15 mmol in D5W 250 mL intermittent infusion     potassium phosphate 20 mmol in D5W 500 mL intermittent infusion     potassium phosphate 20 mmol in D5W 250 mL intermittent infusion     potassium phosphate 25 mmol in D5W 500 mL intermittent infusion     Reason ACE/ARB order not selected     sodium chloride (PF) 0.9% PF flush 10-20 mL     sodium chloride (PF) 0.9% PF flush 10 mL     fluticasone (FLONASE) 50 MCG/ACT spray 2 spray     naloxone (NARCAN) injection 0.1-0.4 mg     ondansetron (ZOFRAN-ODT) ODT tab 4 mg    Or     ondansetron (ZOFRAN) injection 4 mg     prochlorperazine (COMPAZINE) injection 5 mg    Or     prochlorperazine (COMPAZINE) tablet 5 mg    Or     prochlorperazine (COMPAZINE) Suppository 12.5 mg     Patient is already receiving anticoagulation with heparin, enoxaparin (LOVENOX), warfarin (COUMADIN)  or other anticoagulant medication     No lozenges or gum should be given while patient on BIPAP     hypromellose-dextran (ARTIFICAL TEARS) ophthalmic solution 1  drop     insulin aspart (NovoLOG) inj (RAPID ACTING)       Caring Physician: Toña Duron MD  [facility] [place]  Pager Contact: see Physician sticky note[JB1.1]       Revision History        User Key Date/Time User Provider Type Action    > JB1.3 4/24/2017 12:27 PM Toña Duron MD Resident Sign     JB1.4 4/24/2017  9:13 AM Toña Duron MD Resident      JB1.2 4/24/2017  8:06 AM Toña Duron MD Resident      JB1.1 4/24/2017  6:16 AM Toña Duron MD Resident             Progress Notes by Toña Duron MD at 4/23/2017  7:01 AM     Author:  Toña Duron MD Service:  Family Medicine Author Type:  Resident    Filed:  4/23/2017 12:07 PM Date of Service:  4/23/2017  7:01 AM Note Created:  4/23/2017  7:01 AM    Status:  Attested :  Toña Duron MD (Resident)    Cosigner:  Ayo Liriano MD at 4/24/2017  7:20 AM        Attestation signed by Ayo Liriano MD at 4/24/2017  7:20 AM        Attestation:  This patient has been seen and evaluated by Ayo molina on 4/23/17.  I saw and discussed the case with the resident Dr Duron and the care team. I agree with the findings and plan in this note. I have reviewed today's vital signs, medications, laboratory results.   Code for today's visit :48865  Inpatient Subsequent Moderate complexity/severity  Ayo Liriano MD  [facility] - Inpatient daily progress note    Date of admission: 3/31/2017  Date of service: 4/23/2017.           Assessment and Plan:      Melvin Bhatia is a 76 year old male with a significant past medical history of cerebrovascular disease, normal pressure hydrocephalus (status post ventriculoperitoneal shunting), progressive cognitive dysfunction, multiple falls, HTN, Type 2 DM, hyperlipidemia, CHF, CKD, and COPD who presented with  worsening mental status. He was  found to have an SHERYL and UTI. He developed an episode of melena, anemia and significant elevation for liver enzymes.  He suffered an ischemic stroke of the right MCA diagnosed 4/12.    Patient Active Problem List   Diagnosis     Osteoporosis     PAF (paroxysmal atrial fibrillation) (H)     Lumbosacral plexopathy     Testosterone deficiency     Hypertension goal BP (blood pressure) < 140/90     Atrial fibrillation (H)     CKD (chronic kidney disease) stage 3, GFR 30-59 ml/min     Hyperlipidemia LDL goal <70     Coronary artery disease     Health Care Home     ACP (advance care planning)     Elevated PSA     Anemia in chronic renal disease     Subarachnoid hemorrhage (H)     Idiopathic normal pressure hydrocephalus (INPH)     Urinary bladder neurogenic dysfunction     Stroke (H)     Cognitive deficit, post-stroke     HL (hearing loss)     Personal history of fall     GERD (gastroesophageal reflux disease)     COPD exacerbation (H)     SHIVAM (obstructive sleep apnea)     LVH (left ventricular hypertrophy)     Diastolic CHF, acute on chronic (H)     Hypoxemia     Tachyarrhythmia     Systolic and diastolic CHF, chronic (H)     Gait abnormality     Iron deficiency anemia     Allergic rhinitis     Altered mental status     VRE (vancomycin-resistant Enterococci)     Hypoglycemia     Type 2 diabetes with stage 3 chronic kidney disease GFR 30-59 (H)     Chronic obstructive pulmonary disease, unspecified COPD type (H)     Ankle pain     Fx medial malleolus-closed     Gastrointestinal hemorrhage associated with duodenal ulcer     Anemia due to blood loss, acute     Acute cystitis     Hypernatremia     Delirium     Acute kidney injury superimposed on CKD     Closed fracture of ankle     Ischemic stroke diagnosed during current admission (H)     Protein-calorie malnutrition (H)      Plan:   ## Type 2 DM, last A1C 6.1 on 02/20/17[JB1.1]  Blood sugars poorly controlled w[JB1.2]ith the start of tube feedings[JB1.1].  Will wait to  transfer patient to LTAC until blood sugars are better controlled.[JB1.2]  Plan:   -Will titrate Insulin   - Q 4 hour glucose checks  - Lantus 1[JB1.1]5[JB1.3]-->1[JB1.1]8[JB1.3] units  units bid  - Correction: HiInsulin Sliding scale    ##A Fib was controlled now with sporadic RVR but otherwise hemodynamically stable   Patient has had episodes of RVR (lasts < 2 minutes) over the last week, but has been HD stable  Plan:   -Would not get troponins with episodes of RVR unless patient becomes hemodynamically unstable.  Due to his condition his options are limited if he develops ACS and he is not a candidate for invasive cardiac procedure, discussed with Spouse  -Will continue to monitor   - Continue Metoprolol 25 mg suspension bid, if having more frequent HR> 140 and not due to infection or other reversible causes will increase Metoprolol to 50 mg BID   - Do not consult Cardiology for intervention (per spouse's wishes)    ## Severe malnutrition from acute illness and may have poor intake for quite sometime now as well due to dementia  Tolerating tube feedings[JB1.1]; however, phosphorus is low this morning.  Patient is at high risk for refeeding syndrome.[JB1.2]  Plan:  -[JB1.1] Start[JB1.2] Neutrophos 3 packets tid  - Continue tube feeds at goal rate of 45 cc/hr  - Free water flushes of 160 cc every 4 hours  - Multivitamins as recommended bu Nutritionist     ## New Right MCA Ischemic Stroke 4/12, stable   Stroke most likely caused by ischemia during episode of hypotension with GI bleed vs embolic from Afib. Patient not a candidate for TPA on Aspirin . No new neurologic change.   Plan:   - Continue secondary stoke prevention, keep blood pressure with goal of <140/90   - ASA      ##HFrEF, euvolemic:  Negative JVD distention, weight stabilizing (admission weight- 80 kg (4/1), peaked at 87 kg(4/10) and 76 kg (4/2[JB1.1]2[JB1.3])[JB1.1].  Will wait on starting patient's home diuresis regimen given multiple changes in  fluids/tube feeds/free water flushes over the last few days.[JB1.2]     Plan:  - IV lasix prn[JB1.1]  - Hold[JB1.2] Bumetanide 0.5 mg BID   - Daily weights  - Input and Output   - Will monitor electrolytes     ## COPD: stable    Plan:   - Q 4 hour Atrovent nebs  - Oral care and suctioning prn    ## Upper GI Bleed secondary to Duodenal Ulcer: stable   ## Lower GI Bleed secondary to rectal ulcer : stable  ##Acute anemia secondary to above: stable   Current hemoglobin 8.[JB1.1]9[JB1.2]     Plan:   - Continue high dose Pantoprazole 40 mg IV bid  - Avoid NSAIDs  - transfuse of HGb is <8 and if with active bleeding     ## Multiple bone fracture on left foot/ankle  Hx of fractures after a fall on 9/16, and 2/2017.  Cast removed 4/18 by Ortho.  X-rays obtained- break not completely healed.  Ortho recs:    Plan:  - Protect weight bearing in AFO  - PRAFO in bed to prevent ulcers  - Follow up with original orthopedic surgeon for ankle symptoms    ##Hypernatremia:  resolved  Patient has had episodes of hypernatremia, most likely secondary to decreased oral intake.  Since the stroke, hypotonic fluids will need to be avoided to avoid cerebral edema.       Plan:   -Will monitor     ## Upper Extremity Swelling: Improving  Most likely secondary to superficial thrombophlebitis vs PICC infiltration vs third spacing .  Patient has a history of blood clots and INR is currently < 2.  LUE ultrasound was negative for DVT.     Plan:   - Will monitor     ## Acute liver injury:  Resolved  Etiology of acute liver injury unknown. Work up for[JB1.1] v[JB1.2]iral hepatitis, toxin, drugs and BUD Chiari has all been negative. No episode of severe hypotension.  Enzymes have reverted back to normal without intervention.  GI recommending MRCP to evaluate for biliary tract obstruction (depending on ultimate goals of care).     Plan:   - No hepatotoxic medications (tylenol, statins, etc)  - MRCP recommended but most likely will not pursue this     ##  UTI vs Colonization: Resolved  Received antibiotics initially but was stopped as this is deemed more of an asymptomatic bacteriuria vs colonization rather than a UTI      ## SHERYL on CKD3 (baseline Cr 1.5-2): improved   Thought to be prerenal from decrease PO intake     Plan:   - Follow up BMP daily  - Strict I/O   - Daily weights  - Continue telemetry        ## Stercoral proctitis     Plan:    - Miralax daily PRN and Senna at bedtime PRN.   - HOLD PTA ferrous sulfate while being treated for constipation    #Normal Pressure Hydrocephalus sequelae of subarachnoid hemorrhage   #Multi infarct Vascular Dementia  W/ Hx stroke, normal pressure hydrocephalus (2012), status post ventriculoperitoneal shunting, subarachnoid hemorrhage(2012).  Patient's neurologic issues were stable prior to stroke.  Will need to follow closely for cerebral edema caused by stroke that could potentially exacerbate patient's hydrocephalus.  Plan :   -Will observe but no aggressive interventions      ##High grade right ICA stenosis, diagnosed on 01/2017 MRA, stable   Patient's LAKE-VASC score is 8, not on anticoagulation due to recent GI bleed  Plan:   -Will continue to hold warfarin for now due to high risk of bleeding      ## Physical Deconditioning  Plan:    - PT/OT  - Speech therapy    ## Dispo:  Discharge planned for tomorrow, 4/2[JB1.1]4[JB1.3].  New POLST filled out with Spouse 4/20.      Other chronic conditions, stable- continue home medications.  ## HTN-  At goal , Metoprolol  ## Hyperlipidemia-   rosuvastatin  ## COPD- To continue Breo and Atrovent inhaler   ##SHIVAM- Bipap  ##GERD - PPI   ## Constipation- Not an issue right now, will resume bowel regimen as needed   ## Progressive cognitive dysfunction (dementia)     Fluids: none  Electrolytes: stable  Nutrition: G-tube feeds, free water flushes  Lines:PIV, PICC  Activity: Up with assist   CODE: DNR / DNI  Prophylaxis: Coumadin held  Dispo: Expected discharge date- tomorrow pending stable  electrolytes                 Interval History:   Melvin Bhatia has had no significant events overnight.[JB1.1]   His blood sugars have been elevated since starting tube feeds.  Patient is receiving additional Novolog and Lantus is being titrated up.  Nursing notes reviewed.[JB1.3]            Review of Systems:   Review of Systems   Constitutional:        ROS limited secondary to patient's condition   Gastrointestinal: Negative for blood in stool.            Physical Exam (Resident / Clinician):   Vitals were reviewed  Temp: 99  F (37.2  C) Temp src: Axillary BP: 120/85   Heart Rate: 88 Resp: 20 SpO2: 98 % O2 Device: None (Room air)      Physical Exam   Constitutional: He appears well-developed. No distress.   Sitting in bed, arousable   Eyes: Pupils are equal, round, and reactive to light. No scleral icterus.   Right and left pupils reactive to light.  Right pupil sluggish and less reactive compared to left pupil.   Neck: Normal range of motion. Neck supple. No JVD present. No tracheal deviation present.   Cardiovascular: Normal rate.    Murmur (2/6 systolic murmur at RSB) heard.  Pulmonary/Chest: Effort normal and breath sounds normal. No respiratory distress. He has no wheezes. He has no rales.   Decreased breath sounds throughout.     Abdominal: Soft. There is no tenderness. There is no rebound and no guarding.   Genitourinary:   Genitourinary Comments: Orta in place, Diaper in place   Musculoskeletal: He exhibits no edema (Swelling of left upper extremity, distal to elbow (including hand, which has 1-2+ edema)) or tenderness.   Supportive boots on both lower extremities   Neurological: He is alert.   Makes eye contact, unable to answer questions.  Patient will squeeze right hand on command, not closing eyes on command   Skin: Skin is warm and dry. No erythema. No pallor.   Vitals reviewed.          Data:   ROUTINE LABS (Last four results)  CMP[JB1.1]    Recent Labs  Lab 04/23/17  0732 04/22/17  2594  04/21/17  1853 04/21/17  0603 04/20/17  0712    138  --  139 139   POTASSIUM 3.6 3.8  --  3.4 3.8   CHLORIDE 102 102  --  104 105   CO2 28 30  --  28 26   ANIONGAP 7 7  --  7 8   * 244*  --  230* 142*   BUN 15 10  --  12 15   CR 0.91 0.96  --  0.97 1.06   GFRESTIMATED 80 76  --  75 68   GFRESTBLACK >90African American GFR Calc >90African American GFR Calc  --  >90African American GFR Calc 82   LORENA 8.3* 8.2*  --  8.5 8.4*   MAG 1.9 1.8  --  2.0 2.0   PHOS 1.7* 2.9 2.4* 1.7* 2.9[JB1.4]     CBC[JB1.1]    Recent Labs  Lab 04/23/17  0732 04/22/17  0442 04/21/17  0603 04/20/17 2035 04/20/17  0712   WBC 5.3 4.9 4.4  --  4.7   RBC 3.20* 3.11* 2.98*  --  2.97*   HGB 8.9* 8.8* 8.3* 8.1* 8.1*   HCT 29.1* 28.2* 27.3*  --  27.5*   MCV 91 91 92  --  93   MCH 27.8 28.3 27.9  --  27.3   MCHC 30.6* 31.2* 30.4*  --  29.5*   RDW 17.6* 17.9* 18.2*  --  18.5*    205 189  --  178[JB1.4]       Intake/Output Summary (Last 24 hours) at 04/22/17 1424  Last data filed at 04/22/17 1200   Gross per 24 hour   Intake             2225 ml   Output             3100 ml   Net             -875 ml       Medications:     Current Facility-Administered Medications   Medication     insulin glargine (LANTUS) injection 18 Units     bumetanide (BUMEX) tablet 0.5 mg     insulin aspart (NovoLOG) inj (RAPID ACTING)     insulin aspart (NovoLOG) inj (RAPID ACTING)     ipratropium (ATROVENT) 0.02 % neb solution 0.5 mg     ipratropium (ATROVENT) 0.02 % neb solution 0.5 mg     zinc sulfate (20 mg [place]. Zn/mL) solution 220 mg     vitamin A-D & C drops (TRI-VITAMIN) drops SOLN 7 mL     aspirin suspension 325 mg     metoprolol (LOPRESSOR) suspension 25 mg     pantoprazole (PROTONIX) suspension 40 mg     ferrous sulfate elixir 220 mg     lidocaine 1 % 1 mL     lidocaine (LMX4) kit     sodium chloride (PF) 0.9% PF flush 3 mL     sodium chloride (PF) 0.9% PF flush 3 mL     mineral oil-hydrophilic petrolatum (AQUAPHOR)     rosuvastatin (CRESTOR) tablet  20 mg     heparin lock flush 10 UNIT/ML injection 5-10 mL     heparin lock flush 10 UNIT/ML injection 5-10 mL     nystatin (MYCOSTATIN) suspension 500,000 Units     dextrose 10 % 1,000 mL infusion     multivitamins with minerals (CERTAVITE/CEROVITE) liquid 15 mL     lactobacillus rhamnosus (GG) (CULTURELL) capsule 1 capsule     polyethylene glycol (MIRALAX/GLYCOLAX) Packet 17 g     senna-docusate (SENOKOT-S;PERICOLACE) 8.6-50 MG per tablet 1-2 tablet     acetaminophen (TYLENOL) solution 650 mg     glucose 40 % gel 15-30 g    Or     dextrose 50 % injection 25-50 mL    Or     glucagon injection 1 mg     magnesium sulfate 4 g in 100 mL sterile water (premade)     potassium phosphate 15 mmol in D5W 250 mL intermittent infusion     potassium phosphate 20 mmol in D5W 500 mL intermittent infusion     potassium phosphate 20 mmol in D5W 250 mL intermittent infusion     potassium phosphate 25 mmol in D5W 500 mL intermittent infusion     Reason ACE/ARB order not selected     sodium chloride (PF) 0.9% PF flush 10-20 mL     sodium chloride (PF) 0.9% PF flush 10 mL     fluticasone (FLONASE) 50 MCG/ACT spray 2 spray     naloxone (NARCAN) injection 0.1-0.4 mg     ondansetron (ZOFRAN-ODT) ODT tab 4 mg    Or     ondansetron (ZOFRAN) injection 4 mg     prochlorperazine (COMPAZINE) injection 5 mg    Or     prochlorperazine (COMPAZINE) tablet 5 mg    Or     prochlorperazine (COMPAZINE) Suppository 12.5 mg     Patient is already receiving anticoagulation with heparin, enoxaparin (LOVENOX), warfarin (COUMADIN)  or other anticoagulant medication     No lozenges or gum should be given while patient on BIPAP     hypromellose-dextran (ARTIFICAL TEARS) ophthalmic solution 1 drop     insulin aspart (NovoLOG) inj (RAPID ACTING)       Caring Physician: Toña Duron MD  [facility] [place]  Pager Contact: see Physician sticky note[JB1.1]       Revision History        User Key Date/Time User Provider Type Action    > JB1.4  4/23/2017 12:07 PM Toña Duron MD Resident Sign     JB1.2 4/23/2017 10:45 AM Toña Duron MD Resident      JB1.3 4/23/2017  8:07 AM Toña Duron MD Resident      JB1.1 4/23/2017  7:01 AM Toña Duron MD Resident                      Procedure Notes      Procedures by Stu Barrios MD at 4/11/2017 11:59 AM     Author:  Stu Barrios MD Service:  Gastroenterology Author Type:  Physician    Filed:  4/11/2017 12:04 PM Date of Service:  4/11/2017 11:59 AM Note Created:  4/11/2017 11:59 AM    Status:  Signed :  Stu Barrios MD (Physician)             Gastroenterology [facility] Brief Operative Note    Procedure:  Flexible sigmoidoscopy   Post-operative diagnosis:  Hematochezia, anorectal ulceration   Staff Physician:  Dr. Hussein Lopez   Fellow/Assistant(s):  Stu Barrios    Specimens:  Please see final procedure note for further details.   Findings:  Ulceration from the perianal area to approximately 5cm upstream from the anal verge. Adherent clot at the dentate line, aggressively washed, no bleeding seen.    Complications:  None.   Condition:  Stable   Recommendations  Diet:  Clear liquid diet  PPI:  PPI po BID  Anti-coagulants/platelets:  Hold anticoagulation  Octreotide:  N/A  Discharge Planning:   Pending clinical improvement. If rebleeds suggest Colorectal surgery investigation given the proximity to the anal verge.[JR1.1]         Revision History        User Key Date/Time User Provider Type Action    > JR1.1 4/11/2017 12:04 PM Stu Barrios MD Physician Sign                  Progress Notes - Therapies (Notes from 04/22/17 through 04/25/17)     No notes of this type exist for this encounter.                                          INTERAGENCY TRANSFER FORM - LAB / IMAGING / EKG / EMG RESULTS   3/31/2017                       [address] [facility]: [phone]            Unresulted Labs (24h ago through future)    Start       Ordered     "Unscheduled  Potassium  (Potassium Replacement - \"Standard\" - For K levels less than 3.4 mmol/L - UU,UR,UA,RH,SH,PH,WY )  CONDITIONAL (SPECIFY),   Routine     Comments:  Obtain Potassium Level for these conditions:  *IF no potassium result within 24 hours before initiation of order set, draw potassium level with next lab collect.    *2 HOURS AFTER last IV potassium replacement dose and 4 hours after an oral replacement dose.  *Next morning after potassium dose.     Repeat Potassium Replacement if necessary.    04/05/17 0104    Unscheduled  Magnesium  (Magnesium Replacement -  Adult - \"Standard\" - Replacement for all levels less than 1.6 mg/dL )  CONDITIONAL (SPECIFY),   Routine     Comments:  Obtain Magnesium Level for these conditions:  *IF no magnesium result within 24 hrs before initiation of order set, draw magnesium level with next lab collect.    *2 HOURS AFTER last magnesium replacement dose when magnesium replacement given for level less than 1.6   *Next morning after magnesium dose.     Repeat Magnesium Replacement if necessary.    04/05/17 1015         Lab Results - 3 Days      Glucose by meter [[id number]] (Abnormal)  Resulted: 04/25/17 1515, Result status: Final result    Ordering provider: Amy Bonner MD  04/25/17 1509 Resulting lab: POINT OF CARE TEST, GLUCOSE    Specimen Information    Type Source Collected On     04/25/17 1509          Components       Value Reference Range Flag Lab   Glucose 322 70 - 99 mg/dL H 170            Glucose by meter [[id number]] (Abnormal)  Resulted: 04/25/17 1115, Result status: Final result    Ordering provider: Amy Bonner MD  04/25/17 1110 Resulting lab: POINT OF CARE TEST, GLUCOSE    Specimen Information    Type Source Collected On     04/25/17 1110          Components       Value Reference Range Flag Lab   Glucose 376 70 - 99 mg/dL H 170            Glucose by meter [[id number]] (Abnormal)  Resulted: 04/25/17 0741, Result status: Final result    Ordering " provider: Amy Bonner MD  04/25/17 0738 Resulting lab: POINT OF CARE TEST, GLUCOSE    Specimen Information    Type Source Collected On     04/25/17 0738          Components       Value Reference Range Flag Lab   Glucose 342 70 - 99 mg/dL H 170            Magnesium [[id number]]  Resulted: 04/25/17 0732, Result status: Final result    Ordering provider: Toña Duron MD  04/24/17 2330 Resulting lab: [facility]    Specimen Information    Type Source Collected On   Blood  04/25/17 0551          Components       Value Reference Range Flag Lab   Magnesium 2.2 1.6 - 2.3 mg/dL  51            Phosphorus [[id number]]  Resulted: 04/25/17 0732, Result status: Final result    Ordering provider: Toña Duron MD  04/24/17 2330 Resulting lab: [facility]    Specimen Information    Type Source Collected On   Blood  04/25/17 0551          Components       Value Reference Range Flag Lab   Phosphorus 2.7 2.5 - 4.5 mg/dL  51            Basic metabolic panel [[id number]] (Abnormal)  Resulted: 04/25/17 0732, Result status: Final result    Ordering provider: Toña Duron MD  04/24/17 2330 Resulting lab: [facility]    Specimen Information    Type Source Collected On   Blood  04/25/17 0551          Components       Value Reference Range Flag Lab   Sodium 137 133 - 144 mmol/L  51   Potassium 3.7 3.4 - 5.3 mmol/L  51   Chloride 102 94 - 109 mmol/L  51   Carbon Dioxide 28 20 - 32 mmol/L  51   Anion Gap 8 3 - 14 mmol/L  51   Glucose 299 70 - 99 mg/dL H 51   Urea Nitrogen 22 7 - 30 mg/dL  51   Creatinine 0.97 0.66 - 1.25 mg/dL  51   GFR Estimate 75 >60 mL/min/1.7m2  51   Comment:  Non  GFR Calc   GFR Estimate If Black -- >60 mL/min/1.7m2  51   Result:         >90   GFR Calc     Calcium 8.3 8.5 - 10.1 mg/dL L 51   Result:              Blood culture [[id number]]  Resulted: 04/25/17 0700,  Result status: Preliminary result    Ordering provider: Harper Bell MD  04/22/17 1243 Resulting lab: [facility]    Specimen Information    Type Source Collected On   Blood  04/22/17 1308          Components       Value Reference Range Flag Lab   Specimen Description Blood picc   51   Culture Micro No growth after 3 days   225   Micro Report Status Pending   225            Blood culture [[id number]]  Resulted: 04/25/17 0700, Result status: Preliminary result    Ordering provider: Harper Bell MD  04/22/17 1243 Resulting lab: [facility]    Specimen Information    Type Source Collected On   Blood  04/22/17 1313          Components       Value Reference Range Flag Lab   Specimen Description Blood Right Hand   75   Culture Micro No growth after 3 days   225   Micro Report Status Pending   225            Glucose by meter [[id number]] (Abnormal)  Resulted: 04/25/17 0655, Result status: Final result    Ordering provider: Amy Bonner MD  04/25/17 0649 Resulting lab: POINT OF CARE TEST, GLUCOSE    Specimen Information    Type Source Collected On     04/25/17 0649          Components       Value Reference Range Flag Lab   Glucose 304 70 - 99 mg/dL H 170            CBC with platelets differential [[id number]] (Abnormal)  Resulted: 04/25/17 0631, Result status: Final result    Ordering provider: Toña Duron MD  04/24/17 2330 Resulting lab: [facility]    Specimen Information    Type Source Collected On   Blood  04/25/17 0551          Components       Value Reference Range Flag Lab   WBC 7.0 4.0 - 11.0 10e9/L  51   RBC Count 3.27 4.4 - 5.9 10e12/L L 51   Hemoglobin 8.9 13.3 - 17.7 g/dL L 51   Hematocrit 29.8 40.0 - 53.0 % L 51   MCV 91 78 - 100 fl  51   MCH 27.2 26.5 - 33.0 pg  51   MCHC 29.9 31.5 - 36.5 g/dL L 51   RDW 17.3 10.0 - 15.0 % H 51   Platelet Count 278 150 - 450 10e9/L  51   Diff Method Automated Method   51    % Neutrophils 60.7 %  51   % Lymphocytes 23.6 %  51   % Monocytes 10.8 %  51   % Eosinophils 4.0 %  51   % Basophils 0.6 %  51   % Immature Granulocytes 0.3 %  51   Nucleated RBCs 0 0 /100  51   Absolute Neutrophil 4.3 1.6 - 8.3 10e9/L  51   Absolute Lymphocytes 1.7 0.8 - 5.3 10e9/L  51   Absolute Monocytes 0.8 0.0 - 1.3 10e9/L  51   Absolute Eosinophils 0.3 0.0 - 0.7 10e9/L  51   Absolute Basophils 0.0 0.0 - 0.2 10e9/L  51   Abs Immature Granulocytes 0.0 0 - 0.4 10e9/L  51   Absolute Nucleated RBC 0.0   51            Glucose by meter [[id number]] (Abnormal)  Resulted: 04/25/17 0430, Result status: Final result    Ordering provider: Amy Bonner MD  04/25/17 0424 Resulting lab: POINT OF CARE TEST, GLUCOSE    Specimen Information    Type Source Collected On     04/25/17 0424          Components       Value Reference Range Flag Lab   Glucose 283 70 - 99 mg/dL H 170            Glucose by meter [[id number]] (Abnormal)  Resulted: 04/25/17 0026, Result status: Final result    Ordering provider: Amy Bonner MD  04/25/17 0020 Resulting lab: POINT OF CARE TEST, GLUCOSE    Specimen Information    Type Source Collected On     04/25/17 0020          Components       Value Reference Range Flag Lab   Glucose 304 70 - 99 mg/dL H 170            Glucose by meter [[id number]] (Abnormal)  Resulted: 04/24/17 2231, Result status: Final result    Ordering provider: Amy Bonner MD  04/24/17 2110 Resulting lab: POINT OF CARE TEST, GLUCOSE    Specimen Information    Type Source Collected On     04/24/17 2110          Components       Value Reference Range Flag Lab   Glucose 364 70 - 99 mg/dL H 170            Glucose by meter [[id number]] (Abnormal)  Resulted: 04/24/17 1945, Result status: Final result    Ordering provider: Amy Bonner MD  04/24/17 1941 Resulting lab: POINT OF CARE TEST, GLUCOSE    Specimen Information    Type Source Collected On     04/24/17 1941          Components       Value Reference Range Flag Lab    Glucose 330 70 - 99 mg/dL H 170            Glucose by meter [[id number]] (Abnormal)  Resulted: 04/24/17 1706, Result status: Final result    Ordering provider: Amy Bonner MD  04/24/17 1703 Resulting lab: POINT OF CARE TEST, GLUCOSE    Specimen Information    Type Source Collected On     04/24/17 1703          Components       Value Reference Range Flag Lab   Glucose 347 70 - 99 mg/dL H 170            Phosphorus [[id number]]  Resulted: 04/24/17 1628, Result status: Final result    Ordering provider: Toña Roque PA-C  04/24/17 0953 Resulting lab: [facility]    Specimen Information    Type Source Collected On   Blood  04/24/17 1548          Components       Value Reference Range Flag Lab   Phosphorus 3.4 2.5 - 4.5 mg/dL  51            Glucose by meter [[id number]] (Abnormal)  Resulted: 04/24/17 1545, Result status: Final result    Ordering provider: Amy Bonner MD  04/24/17 1540 Resulting lab: POINT OF CARE TEST, GLUCOSE    Specimen Information    Type Source Collected On     04/24/17 1540          Components       Value Reference Range Flag Lab   Glucose 333 70 - 99 mg/dL H 170            Glucose by meter [[id number]] (Abnormal)  Resulted: 04/24/17 1411, Result status: Final result    Ordering provider: Amy Bonner MD  04/24/17 1409 Resulting lab: POINT OF CARE TEST, GLUCOSE    Specimen Information    Type Source Collected On     04/24/17 1409          Components       Value Reference Range Flag Lab   Glucose 351 70 - 99 mg/dL H 170            Glucose by meter [[id number]] (Abnormal)  Resulted: 04/24/17 1121, Result status: Final result    Ordering provider: Amy Bonner MD  04/24/17 1115 Resulting lab: POINT OF CARE TEST, GLUCOSE    Specimen Information    Type Source Collected On     04/24/17 1115          Components       Value Reference Range Flag Lab   Glucose 331 70 - 99 mg/dL H 170            Glucose by meter [[id number]] (Abnormal)   Resulted: 04/24/17 1115, Result status: Final result    Ordering provider: Amy Bonner MD  04/24/17 0950 Resulting lab: POINT OF CARE TEST, GLUCOSE    Specimen Information    Type Source Collected On     04/24/17 0950          Components       Value Reference Range Flag Lab   Glucose 292 70 - 99 mg/dL H 170            Glucose by meter [[id number]] (Abnormal)  Resulted: 04/24/17 0801, Result status: Final result    Ordering provider: Amy Bonner MD  04/24/17 0758 Resulting lab: POINT OF CARE TEST, GLUCOSE    Specimen Information    Type Source Collected On     04/24/17 0758          Components       Value Reference Range Flag Lab   Glucose 265 70 - 99 mg/dL H 170            Basic metabolic panel [[id number]] (Abnormal)  Resulted: 04/24/17 0752, Result status: Final result    Ordering provider: Toña Duron MD  04/23/17 4370 Resulting lab: [facility]    Specimen Information    Type Source Collected On   Blood  04/24/17 0553          Components       Value Reference Range Flag Lab   Sodium 137 133 - 144 mmol/L  51   Potassium 3.6 3.4 - 5.3 mmol/L  51   Chloride 101 94 - 109 mmol/L  51   Carbon Dioxide 28 20 - 32 mmol/L  51   Anion Gap 8 3 - 14 mmol/L  51   Glucose 256 70 - 99 mg/dL H 51   Urea Nitrogen 19 7 - 30 mg/dL  51   Creatinine 0.95 0.66 - 1.25 mg/dL  51   GFR Estimate 77 >60 mL/min/1.7m2  51   Comment:  Non  GFR Calc   GFR Estimate If Black -- >60 mL/min/1.7m2  51   Result:         >90   GFR Calc     Calcium 8.2 8.5 - 10.1 mg/dL L 51   Result:              Magnesium [[id number]]  Resulted: 04/24/17 0752, Result status: Final result    Ordering provider: Toña Duron MD  04/23/17 8481 Resulting lab: [facility]    Specimen Information    Type Source Collected On   Blood  04/24/17 0553          Components       Value Reference Range Flag Lab   Magnesium 2.1 1.6 - 2.3 mg/dL  51             Phosphorus [[id number]] (Abnormal)  Resulted: 04/24/17 0752, Result status: Final result    Ordering provider: Toña Duron MD  04/23/17 2330 Resulting lab: [facility]    Specimen Information    Type Source Collected On   Blood  04/24/17 0553          Components       Value Reference Range Flag Lab   Phosphorus 1.9 2.5 - 4.5 mg/dL L 51            CBC with platelets differential [[id number]] (Abnormal)  Resulted: 04/24/17 0731, Result status: Final result    Ordering provider: Toña Duron MD  04/23/17 2330 Resulting lab: [facility]    Specimen Information    Type Source Collected On   Blood  04/24/17 0553          Components       Value Reference Range Flag Lab   WBC 6.0 4.0 - 11.0 10e9/L  51   RBC Count 3.17 4.4 - 5.9 10e12/L L 51   Hemoglobin 8.8 13.3 - 17.7 g/dL L 51   Hematocrit 28.7 40.0 - 53.0 % L 51   MCV 91 78 - 100 fl  51   MCH 27.8 26.5 - 33.0 pg  51   MCHC 30.7 31.5 - 36.5 g/dL L 51   RDW 17.5 10.0 - 15.0 % H 51   Platelet Count 253 150 - 450 10e9/L  51   Diff Method Automated Method   51   % Neutrophils 56.2 %  51   % Lymphocytes 28.0 %  51   % Monocytes 11.3 %  51   % Eosinophils 3.6 %  51   % Basophils 0.7 %  51   % Immature Granulocytes 0.2 %  51   Nucleated RBCs 0 0 /100  51   Absolute Neutrophil 3.4 1.6 - 8.3 10e9/L  51   Absolute Lymphocytes 1.7 0.8 - 5.3 10e9/L  51   Absolute Monocytes 0.7 0.0 - 1.3 10e9/L  51   Absolute Eosinophils 0.2 0.0 - 0.7 10e9/L  51   Absolute Basophils 0.0 0.0 - 0.2 10e9/L  51   Abs Immature Granulocytes 0.0 0 - 0.4 10e9/L  51   Absolute Nucleated RBC 0.0   51            Glucose by meter [[id number]] (Abnormal)  Resulted: 04/24/17 0640, Result status: Final result    Ordering provider: Amy Bonner MD  04/24/17 0635 Resulting lab: POINT OF CARE TEST, GLUCOSE    Specimen Information    Type Source Collected On     04/24/17 0635          Components       Value Reference Range Flag Lab    Glucose 241 70 - 99 mg/dL H 170            Glucose by meter [[id number]] (Abnormal)  Resulted: 04/24/17 0515, Result status: Final result    Ordering provider: Amy Bonner MD  04/24/17 0510 Resulting lab: POINT OF CARE TEST, GLUCOSE    Specimen Information    Type Source Collected On     04/24/17 0510          Components       Value Reference Range Flag Lab   Glucose 286 70 - 99 mg/dL H 170            Glucose by meter [[id number]] (Abnormal)  Resulted: 04/24/17 0250, Result status: Final result    Ordering provider: Amy Bonner MD  04/24/17 0248 Resulting lab: POINT OF CARE TEST, GLUCOSE    Specimen Information    Type Source Collected On     04/24/17 0248          Components       Value Reference Range Flag Lab   Glucose 264 70 - 99 mg/dL H 170            Glucose by meter [[id number]] (Abnormal)  Resulted: 04/24/17 0250, Result status: Final result    Ordering provider: Amy Bonner MD  04/24/17 0110 Resulting lab: POINT OF CARE TEST, GLUCOSE    Specimen Information    Type Source Collected On     04/24/17 0110          Components       Value Reference Range Flag Lab   Glucose 324 70 - 99 mg/dL H 170            Glucose by meter [[id number]] (Abnormal)  Resulted: 04/24/17 0001, Result status: Final result    Ordering provider: Amy Bonner MD  04/23/17 2356 Resulting lab: POINT OF CARE TEST, GLUCOSE    Specimen Information    Type Source Collected On     04/23/17 2356          Components       Value Reference Range Flag Lab   Glucose 315 70 - 99 mg/dL H 170            Urine Culture Aerobic Bacterial [[id number]]  Resulted: 04/23/17 2155, Result status: Final result    Ordering provider: Harper Bell MD  04/22/17 1259 Resulting lab: [facility]    Specimen Information    Type Source Collected On   Urine Urine catheter 04/22/17 1415          Components       Value Reference Range Flag Lab   Specimen Description Catheterized Urine   51   Special Requests Specimen  received in preservative   75   Culture Micro No growth   225   Micro Report Status FINAL 04/23/2017   225            Glucose by meter [[id number]] (Abnormal)  Resulted: 04/23/17 2125, Result status: Final result    Ordering provider: Amy Bonner MD  04/23/17 2119 Resulting lab: POINT OF CARE TEST, GLUCOSE    Specimen Information    Type Source Collected On     04/23/17 2119          Components       Value Reference Range Flag Lab   Glucose 310 70 - 99 mg/dL H 170            Glucose by meter [[id number]] (Abnormal)  Resulted: 04/23/17 1716, Result status: Final result    Ordering provider: Amy Bonner MD  04/23/17 1710 Resulting lab: POINT OF CARE TEST, GLUCOSE    Specimen Information    Type Source Collected On     04/23/17 1710          Components       Value Reference Range Flag Lab   Glucose 318 70 - 99 mg/dL H 170            Glucose by meter [[id number]] (Abnormal)  Resulted: 04/23/17 1355, Result status: Final result    Ordering provider: Amy Bonner MD  04/23/17 1350 Resulting lab: POINT OF CARE TEST, GLUCOSE    Specimen Information    Type Source Collected On     04/23/17 1350          Components       Value Reference Range Flag Lab   Glucose 354 70 - 99 mg/dL H 170            Glucose by meter [[id number]] (Abnormal)  Resulted: 04/23/17 1321, Result status: Final result    Ordering provider: Amy Bonner MD  04/23/17 1316 Resulting lab: POINT OF CARE TEST, GLUCOSE    Specimen Information    Type Source Collected On     04/23/17 1316          Components       Value Reference Range Flag Lab   Glucose 366 70 - 99 mg/dL H 170            Glucose by meter [[id number]] (Abnormal)  Resulted: 04/23/17 1141, Result status: Final result    Ordering provider: Amy Bonner MD  04/23/17 1137 Resulting lab: POINT OF CARE TEST, GLUCOSE    Specimen Information    Type Source Collected On     04/23/17 1137          Components       Value Reference Range Flag Lab   Glucose 385 70 - 99 mg/dL H 170             Basic metabolic panel [[id number]] (Abnormal)  Resulted: 04/23/17 0819, Result status: Final result    Ordering provider: Toña Duron MD  04/23/17 0626 Resulting lab: [facility]    Specimen Information    Type Source Collected On   Blood  04/23/17 0732          Components       Value Reference Range Flag Lab   Sodium 137 133 - 144 mmol/L  51   Potassium 3.6 3.4 - 5.3 mmol/L  51   Chloride 102 94 - 109 mmol/L  51   Carbon Dioxide 28 20 - 32 mmol/L  51   Anion Gap 7 3 - 14 mmol/L  51   Glucose 344 70 - 99 mg/dL H 51   Urea Nitrogen 15 7 - 30 mg/dL  51   Creatinine 0.91 0.66 - 1.25 mg/dL  51   GFR Estimate 80 >60 mL/min/1.7m2  51   Comment:  Non  GFR Calc   GFR Estimate If Black -- >60 mL/min/1.7m2  51   Result:         >90   GFR Calc     Calcium 8.3 8.5 - 10.1 mg/dL L 51   Result:              Magnesium [[id number]]  Resulted: 04/23/17 0819, Result status: Final result    Ordering provider: Toña Duron MD  04/23/17 0626 Resulting lab: [facility]    Specimen Information    Type Source Collected On   Blood  04/23/17 0732          Components       Value Reference Range Flag Lab   Magnesium 1.9 1.6 - 2.3 mg/dL  51            Phosphorus [[id number]] (Abnormal)  Resulted: 04/23/17 0819, Result status: Final result    Ordering provider: Toña Duron MD  04/23/17 0626 Resulting lab: [facility]    Specimen Information    Type Source Collected On   Blood  04/23/17 0732          Components       Value Reference Range Flag Lab   Phosphorus 1.7 2.5 - 4.5 mg/dL L 51            CBC with platelets differential [[id number]] (Abnormal)  Resulted: 04/23/17 0758, Result status: Final result    Ordering provider: Toña Duron MD  04/23/17 0626 Resulting lab: [facility]    Specimen Information    Type Source Collected On   Blood   04/23/17 0732          Components       Value Reference Range Flag Lab   WBC 5.3 4.0 - 11.0 10e9/L  51   RBC Count 3.20 4.4 - 5.9 10e12/L L 51   Hemoglobin 8.9 13.3 - 17.7 g/dL L 51   Hematocrit 29.1 40.0 - 53.0 % L 51   MCV 91 78 - 100 fl  51   MCH 27.8 26.5 - 33.0 pg  51   MCHC 30.6 31.5 - 36.5 g/dL L 51   RDW 17.6 10.0 - 15.0 % H 51   Platelet Count 210 150 - 450 10e9/L  51   Diff Method Automated Method   51   % Neutrophils 56.8 %  51   % Lymphocytes 28.8 %  51   % Monocytes 9.6 %  51   % Eosinophils 4.0 %  51   % Basophils 0.6 %  51   % Immature Granulocytes 0.2 %  51   Nucleated RBCs 0 0 /100  51   Absolute Neutrophil 3.0 1.6 - 8.3 10e9/L  51   Absolute Lymphocytes 1.5 0.8 - 5.3 10e9/L  51   Absolute Monocytes 0.5 0.0 - 1.3 10e9/L  51   Absolute Eosinophils 0.2 0.0 - 0.7 10e9/L  51   Absolute Basophils 0.0 0.0 - 0.2 10e9/L  51   Abs Immature Granulocytes 0.0 0 - 0.4 10e9/L  51   Absolute Nucleated RBC 0.0   51            Glucose by meter [[id number]] (Abnormal)  Resulted: 04/23/17 0720, Result status: Final result    Ordering provider: Amy Bonner MD  04/23/17 0715 Resulting lab: POINT OF CARE TEST, GLUCOSE    Specimen Information    Type Source Collected On     04/23/17 0715          Components       Value Reference Range Flag Lab   Glucose 317 70 - 99 mg/dL H 170            Glucose by meter [[id number]] (Abnormal)  Resulted: 04/23/17 0555, Result status: Final result    Ordering provider: Amy Bonner MD  04/23/17 0548 Resulting lab: POINT OF CARE TEST, GLUCOSE    Specimen Information    Type Source Collected On     04/23/17 0548          Components       Value Reference Range Flag Lab   Glucose 353 70 - 99 mg/dL H 170            Glucose by meter [[id number]] (Abnormal)  Resulted: 04/23/17 0431, Result status: Final result    Ordering provider: Amy Bonner MD  04/23/17 0425 Resulting lab: POINT OF CARE TEST, GLUCOSE    Specimen Information    Type Source Collected On     04/23/17 0425           Components       Value Reference Range Flag Lab   Glucose 341 70 - 99 mg/dL H 170            Glucose by meter [[id number]] (Abnormal)  Resulted: 04/23/17 0026, Result status: Final result    Ordering provider: Amy Bonner MD  04/23/17 0020 Resulting lab: POINT OF CARE TEST, GLUCOSE    Specimen Information    Type Source Collected On     04/23/17 0020          Components       Value Reference Range Flag Lab   Glucose 294 70 - 99 mg/dL H 170            Glucose by meter [[id number]] (Abnormal)  Resulted: 04/22/17 2130, Result status: Final result    Ordering provider: Amy Bonner MD  04/22/17 2123 Resulting lab: POINT OF CARE TEST, GLUCOSE    Specimen Information    Type Source Collected On     04/22/17 2123          Components       Value Reference Range Flag Lab   Glucose 279 70 - 99 mg/dL H 170            Glucose by meter [[id number]] (Abnormal)  Resulted: 04/22/17 2130, Result status: Final result    Ordering provider: Amy Bonner MD  04/22/17 1953 Resulting lab: POINT OF CARE TEST, GLUCOSE    Specimen Information    Type Source Collected On     04/22/17 1953          Components       Value Reference Range Flag Lab   Glucose 285 70 - 99 mg/dL H 170            Lactic acid level STAT [[id number]]  Resulted: 04/22/17 1731, Result status: Final result    Ordering provider: Nelson Santana MD  04/22/17 1602 Resulting lab: [facility]    Specimen Information    Type Source Collected On   Blood  04/22/17 1646          Components       Value Reference Range Flag Lab   Lactic Acid 1.2 0.7 - 2.1 mmol/L  51            Glucose by meter [[id number]] (Abnormal)  Resulted: 04/22/17 1640, Result status: Final result    Ordering provider: Amy Bonner MD  04/22/17 1636 Resulting lab: POINT OF CARE TEST, GLUCOSE    Specimen Information    Type Source Collected On     04/22/17 1636          Components       Value Reference Range Flag Lab   Glucose 290 70 - 99 mg/dL H 170            UA  with Microscopic [[id number]] (Abnormal)  Resulted: 04/22/17 1640, Result status: Final result    Ordering provider: Harper Bell MD  04/22/17 1243 Resulting lab: [facility]    Specimen Information    Type Source Collected On   Urine Urine catheter 04/22/17 1415          Components       Value Reference Range Flag Lab   Color Urine Light Yellow   51   Appearance Urine Clear   51   Glucose Urine >1000 NEG mg/dL A 51   Bilirubin Urine Negative NEG  51   Ketones Urine Negative NEG mg/dL  51   Specific Gravity Urine 1.005 1.003 - 1.035  51   Blood Urine Negative NEG  51   pH Urine 7.5 5.0 - 7.0 pH H 51   Protein Albumin Urine 10 NEG mg/dL A 51   Urobilinogen mg/dL Normal 0.0 - 2.0 mg/dL  51   Nitrite Urine Negative NEG  51   Leukocyte Esterase Urine Moderate NEG A 51   Source Catheterized Urine   51   WBC Urine 20 0 - 2 /HPF H 51   RBC Urine 1 0 - 2 /HPF  51            Glucose by meter [[id number]] (Abnormal)  Resulted: 04/22/17 1541, Result status: Final result    Ordering provider: Amy Bonner MD  04/22/17 1538 Resulting lab: POINT OF CARE TEST, GLUCOSE    Specimen Information    Type Source Collected On     04/22/17 1538          Components       Value Reference Range Flag Lab   Glucose 282 70 - 99 mg/dL H 170            Procalcitonin [[id number]]  Resulted: 04/22/17 1454, Result status: Final result    Ordering provider: Harper Bell MD  04/22/17 1300 Resulting lab: [facility]    Specimen Information    Type Source Collected On   Blood  04/22/17 1340          Components       Value Reference Range Flag Lab   Procalcitonin 0.07 ng/ml  51   Comment:         0.05-0.24 ng/ml Low risk of systemic bacterial infection. Local bacterial   infection possible.  Recommendation: Assess other clinical features of   infection. Discourage antibiotics unless strong clinical suspicion for   serious   infection.              Glucose by meter [[id number]]  (Abnormal)  Resulted: 04/22/17 1241, Result status: Final result    Ordering provider: Amy Bonner MD  04/22/17 1237 Resulting lab: POINT OF CARE TEST, GLUCOSE    Specimen Information    Type Source Collected On     04/22/17 1237          Components       Value Reference Range Flag Lab   Glucose 358 70 - 99 mg/dL H 170            Glucose by meter [[id number]] (Abnormal)  Resulted: 04/22/17 1136, Result status: Final result    Ordering provider: Amy Bonner MD  04/22/17 1129 Resulting lab: POINT OF CARE TEST, GLUCOSE    Specimen Information    Type Source Collected On     04/22/17 1129          Components       Value Reference Range Flag Lab   Glucose 347 70 - 99 mg/dL H 170            Glucose by meter [[id number]] (Abnormal)  Resulted: 04/22/17 0736, Result status: Final result    Ordering provider: Amy Bonner MD  04/22/17 0732 Resulting lab: POINT OF CARE TEST, GLUCOSE    Specimen Information    Type Source Collected On     04/22/17 0732          Components       Value Reference Range Flag Lab   Glucose 235 70 - 99 mg/dL H 170            Basic metabolic panel [[id number]] (Abnormal)  Resulted: 04/22/17 0606, Result status: Final result    Ordering provider: Toña Duron MD  04/21/17 2330 Resulting lab: [facility]    Specimen Information    Type Source Collected On   Blood  04/22/17 0442          Components       Value Reference Range Flag Lab   Sodium 138 133 - 144 mmol/L  51   Potassium 3.8 3.4 - 5.3 mmol/L  51   Chloride 102 94 - 109 mmol/L  51   Carbon Dioxide 30 20 - 32 mmol/L  51   Anion Gap 7 3 - 14 mmol/L  51   Glucose 244 70 - 99 mg/dL H 51   Urea Nitrogen 10 7 - 30 mg/dL  51   Creatinine 0.96 0.66 - 1.25 mg/dL  51   GFR Estimate 76 >60 mL/min/1.7m2  51   Comment:  Non  GFR Calc   GFR Estimate If Black -- >60 mL/min/1.7m2  51   Result:         >90   GFR Calc     Calcium 8.2 8.5 - 10.1 mg/dL L 51   Result:               Magnesium [[id number]]  Resulted: 04/22/17 0606, Result status: Final result    Ordering provider: Toña Duron MD  04/21/17 2330 Resulting lab: [facility]    Specimen Information    Type Source Collected On   Blood  04/22/17 0442          Components       Value Reference Range Flag Lab   Magnesium 1.8 1.6 - 2.3 mg/dL  51            Phosphorus [[id number]]  Resulted: 04/22/17 0606, Result status: Final result    Ordering provider: Toña Duron MD  04/21/17 2330 Resulting lab: [facility]    Specimen Information    Type Source Collected On   Blood  04/22/17 0442          Components       Value Reference Range Flag Lab   Phosphorus 2.9 2.5 - 4.5 mg/dL  51            CBC with platelets differential [[id number]] (Abnormal)  Resulted: 04/22/17 0539, Result status: Final result    Ordering provider: Toña Duron MD  04/21/17 2330 Resulting lab: [facility]    Specimen Information    Type Source Collected On   Blood  04/22/17 0442          Components       Value Reference Range Flag Lab   WBC 4.9 4.0 - 11.0 10e9/L  51   RBC Count 3.11 4.4 - 5.9 10e12/L L 51   Hemoglobin 8.8 13.3 - 17.7 g/dL L 51   Hematocrit 28.2 40.0 - 53.0 % L 51   MCV 91 78 - 100 fl  51   MCH 28.3 26.5 - 33.0 pg  51   MCHC 31.2 31.5 - 36.5 g/dL L 51   RDW 17.9 10.0 - 15.0 % H 51   Platelet Count 205 150 - 450 10e9/L  51   Diff Method Automated Method   51   % Neutrophils 56.0 %  51   % Lymphocytes 27.3 %  51   % Monocytes 11.8 %  51   % Eosinophils 4.3 %  51   % Basophils 0.4 %  51   % Immature Granulocytes 0.2 %  51   Nucleated RBCs 0 0 /100  51   Absolute Neutrophil 2.8 1.6 - 8.3 10e9/L  51   Absolute Lymphocytes 1.3 0.8 - 5.3 10e9/L  51   Absolute Monocytes 0.6 0.0 - 1.3 10e9/L  51   Absolute Eosinophils 0.2 0.0 - 0.7 10e9/L  51   Absolute Basophils 0.0 0.0 - 0.2 10e9/L  51   Abs Immature Granulocytes 0.0 0 - 0.4 10e9/L  51    Absolute Nucleated RBC 0.0   51            Testing Performed By     Lab - Abbreviation Name Director Address Valid Date Range    51 - Unknown [facility] [place] Unknown [address] [facility] [address] 12/31/14 1010 - Present    75 - Unknown [facility] Unknown [address] [facility] [address] 01/15/15 1019 - Present    170 - Unknown POINT OF CARE TEST, GLUCOSE Unknown Unknown 10/31/11 1114 - Present    225 - Unknown [facility] Unknown [address] [facility] [address] 12/19/14 0954 - Present               Imaging Results - 3 Days      XR Chest Port 1 View [[id number]]  Resulted: 04/23/17 0924, Result status: Final result    Ordering provider: Harper Bell MD  04/22/17 1243 Resulted by: Jcarlos Maya MD Dianat, Seyed Saeid, MD    Performed: 04/22/17 1435 - 04/22/17 1534 Resulting lab: RADIOLOGY RESULTS    Narrative:       XR CHEST PORT 1 VW  4/22/2017 3:34 PM      HISTORY: low grade fever    COMPARISON: 4/14/2017    FINDINGS: Stable left arm PICC with tip projecting over the right  atrium, unchanged. Unchanged cardiomegaly. Unchanged pulmonary venous  congestion. Ill-defined bilateral airspace opacities. No pleural  effusion or pneumothorax.      Impression:       IMPRESSION:   1. No significant change in mixed interstitial and alveolar opacities.  This may represent pulmonary interstitial edema and/or atypical  infection.  2. Pulmonary venous congestion, unchanged.    I have personally reviewed the examination and initial interpretation  and I agree with the findings.    JCARLOS MAYA MD      Testing Performed By     Lab - Abbreviation Name Director Address Valid Date Range    104 - Rad Rslts RADIOLOGY RESULTS Unknown Unknown 02/16/05 1553 - Present               ECG/EMG Results      ECHO COMPLETE WITH OPTISON [[id number]]  Resulted: 04/02/17 1124, Result status: Edited Result - FINAL     Ordering provider: Christine Canales MD  17 0902 Resulted by: Luis Kennedy MD    Performed: 17 1133 - 17 1149 Resulting lab: RADIOLOGY RESULTS    Narrative:       [id number]  [id number]  [id number]  [id number]^ALLY^CHRISTINE^O           [facility]  Echocardiography Laboratory  [address] [place] [id number]     Name: VALERIE ARRIAGA  MRN: [id number]  : 1940  Study Date: 2017 11:24 AM  Age: 76 yrs  Gender: Male  Patient [facility]: [facility]  Reason For Study: Bradycardia - Sinus  Ordering Physician: CHRISTINE CANALES  Performed By: Jerald Gilliland RDCS     BSA: 1.8 m2  Height: 64 in  Weight: 175 lb  _____________________________________________________________________________  __        Procedure  Echocardiogram with two-dimensional, color and spectral Doppler performed.  Contrast Optison. Technically difficult study. Optison (NDC #6612-8000-52)  given intravenously. Patient was given 3 ml mixture of 3 ml Optison and 6 ml  saline. 6 ml wasted.  _____________________________________________________________________________  __        Interpretation Summary  Technically difficult study.  The Ejection Fraction is estimated at 40-45% (calculated, 46%.)  Global right ventricular function is normal.  Mild aortic stenosis and insufficiency is present.  The inferior vena cava is normal. Estimated mean right atrial pressure is <3  mmHg.  No pericardial effusion is present.  _____________________________________________________________________________  __        Left Ventricle  Moderate concentric wall thickening consistent with left ventricular  hypertrophy is present. The Ejection Fraction is estimated at 40-45%. Regional  wall motion cannot be assessed. The LV diastolic function cannot be evaluated.     Right Ventricle  The right ventricle is normal size. Global right ventricular function is  normal.     Atria  The right atria appears  normal. Mild left atrial enlargement is present.        Mitral Valve  Moderate mitral annular calcification is present.     Aortic Valve  Mild aortic insufficiency is present. Mild aortic stenosis is present.     Tricuspid Valve  The peak velocity of the tricuspid regurgitant jet is not obtainable.  Pulmonary artery systolic pressure cannot be assessed.     Pulmonic Valve  The pulmonic valve is normal.     Vessels  The inferior vena cava is normal. Estimated mean right atrial pressure is <3  mmHg.        Pericardium  No pericardial effusion is present.     Compared to Previous Study  This study was compared with the study from 2016 . There has been no  change.  _____________________________________________________________________________  __           Doppler Measurements & Calculations  Ao V2 max: 283.0 cm/sec  Ao max P.0 mmHg  Ao V2 mean: 209.0 cm/sec  Ao mean P.0 mmHg  Ao V2 VTI: 60.2 cm  LV V1 max P.7 mmHg  LV V1 max: 108.0 cm/sec  LV V1 VTI: 22.9 cm           _____________________________________________________________________________  __           Report approved by: Alexia Lawrence 2017 12:49 PM       1    Type Source Collected On     17 1124          View Image (below)        Echocardiogram Complete [[id number]]  Resulted: 17 1136, Result status: In process    Ordering provider: Christine Quintana MD  17 Performed: 17 1133 - 17 1133    Resulting lab: [facility]             ECHO LIMITED WITH OPTISON [[id number]]  Resulted: 17 1136, Result status: In process    Ordering provider: Christine Quintana MD  17 Performed: 17 1133 - 17 1149    Resulting lab: [facility]                   Encounter-Level Documents:     There are no encounter-level documents.      Order-Level Documents:     There are no order-level documents.

## 2017-03-31 NOTE — LETTER
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]  Acute renal failure, unspecified acute renal failure type (H) [N17.9]  Urinary tract infection associated with indwelling urethral catheter, initial encounter [T83.511A, N39.0]  Admit Date/Time: 3/31/2017  8:13 PM  Discharge Date: Sat 4/22/17  Payor Source: Payor: [employer] / Plan: [employer] / Product Type: HMO /     Readmission Assessment Measure (GABRIEL) Risk Score/category: elevated         Reason for Communication Hand-off Referral: Other LTC placement    Discharge Plan:  [facility] long term care unit  [address] [place] [address]  (Main Ph: [phone], Weekend RN Supervisor Ph: [phone], Fax: [phone]).     Concern for non-adherence with plan of care:   Y/N NO    Already enrolled in Tele-monitoring program and name of program:  NO  Follow-up specialty is recommended: No    Follow-up plan:  Future Appointments  Date Time Provider Department Center   4/21/2017 7:00 PM [facility]   4/24/2017 6:00 AM [facility] OT OVERFLOW [facility] O   5/11/2017 1:00 PM Johnathan Walsh MD [facility]   5/23/2017 11:00 AM [facility]       Any outstanding tests or procedures:              Key Recommendations:      Raiza Moore    AVS/Discharge Summary is the source of truth; this is a helpful guide for improved communication of patient story

## 2017-03-31 NOTE — IP AVS SNAPSHOT
"    [address] [facility]: [phone]                                              INTERAGENCY TRANSFER FORM - PHYSICIAN ORDERS   3/31/2017                    Hospital Admission Date: 3/31/2017  VALERIE ARRIAGA   : 1940  Sex: Male        Attending Provider: (none)    Allergies:  Penicillins, Albuterol, Albuterol Sulfate, Atorvastatin, Atorvastatin Calcium, Cipro [Ciprofloxacin], Ciprofloxacin, Simvastatin, Warfarin    Infection:  VRE-Contact Isolation   Service:  FAMILY MEDIC    Ht:  1.626 m (5' 4\")   Wt:  76.7 kg (169 lb 1.5 oz)   Admission Wt:  93 kg (205 lb)    BMI:  29.02 kg/m 2   BSA:  1.86 m 2            Patient PCP Information     Provider PCP Type    Ash Bowman MD General      ED Clinical Impression     Diagnosis Description Comment Added By Time Added    Acute renal failure, unspecified acute renal failure type (H) [N17.9] Acute renal failure, unspecified acute renal failure type (H) [N17.9]  N[place] MD 3/31/2017 10:44 PM    Urinary tract infection associated with indwelling urethral catheter, initial encounter [T83.511A, N39.0] Urinary tract infection associated with indwelling urethral catheter, initial encounter [T83.511A, N39.0]  N[place] MD 3/31/2017 10:45 PM    Cognitive deficit, 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, 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]  Nate Montana MD 3/31/2017 10:45 PM      Hospital Problems as of 2017              Priority Class Noted POA    Gastrointestinal hemorrhage associated with duodenal ulcer Medium  2017 Yes    Anemia due to blood loss, acute Medium  2017 Yes    Acute cystitis Medium  2017 Yes    Hypernatremia Medium  2017 Yes    Delirium Medium  2017 Yes    Acute kidney injury superimposed on CKD Medium  2017 Yes    Closed fracture of ankle Medium  2017 Yes    Ischemic stroke diagnosed during current admission (H) Medium  2017 No    Protein-calorie malnutrition (H) Medium  " 4/22/2017 Yes      Non-Hospital Problems as of 4/25/2017              Priority Class Noted    Osteoporosis   4/19/2006    PAF (paroxysmal atrial fibrillation) (H)   11/6/2009    Lumbosacral plexopathy   Unknown    Testosterone deficiency   9/21/2010    Hypertension goal BP (blood pressure) < 140/90   12/6/2010    Atrial fibrillation (H)   1/19/2011    CKD (chronic kidney disease) stage 3, GFR 30-59 ml/min   Unknown    Hyperlipidemia LDL goal <70   1/26/2011    Coronary artery disease   Unknown    Health Care Home   3/22/2011    Elevated PSA   11/9/2011    Subarachnoid hemorrhage (H)   1/1/2012    Anemia in chronic renal disease   2/1/2012    Urinary bladder neurogenic dysfunction   2/14/2012    Idiopathic normal pressure hydrocephalus (INPH) Medium Chronic 2/26/2012    Stroke (H)   5/11/2012    Cognitive deficit, post-stroke   6/13/2012    HL (hearing loss)   9/24/2012    Personal history of fall Medium Chronic 9/26/2012    GERD (gastroesophageal reflux disease)   11/26/2012    COPD exacerbation (H)   2/16/2013    SHIVAM (obstructive sleep apnea)   2/16/2013    LVH (left ventricular hypertrophy)   2/16/2013    Diastolic CHF, acute on chronic (H)   2/16/2013    Hypoxemia   2/16/2013    Tachyarrhythmia   2/18/2013    Systolic and diastolic CHF, chronic (H)   2/19/2013    Gait abnormality   5/20/2013    Iron deficiency anemia Medium  3/20/2014    Allergic rhinitis   4/7/2014    Altered mental status   5/15/2014    VRE (vancomycin-resistant Enterococci)   5/16/2014    Hypoglycemia   5/16/2014    Type 2 diabetes with stage 3 chronic kidney disease GFR 30-59 (H) Medium  10/14/2015    Chronic obstructive pulmonary disease, unspecified COPD type (H) Medium  11/20/2015    ACP (advance care planning)   1/7/2016    Ankle pain Medium  1/13/2017    Fx medial malleolus-closed Medium  2/19/2017      Code Status History     Date Active Date Inactive Code Status Order ID Comments User Context    4/22/2017 12:05 PM  DNR/DNI [id number]   Harper Bell MD Outpatient    4/1/2017 12:29 AM 4/22/2017 12:05 PM DNR/DNI [id number]  Gabriella Archer MD Inpatient    2/22/2017  2:26 PM 4/1/2017 12:29 AM DNR/DNI [id number]  Jerod Saleh MD Outpatient    2/19/2017  4:40 PM 2/22/2017  2:26 PM DNR/DNI [id number]  Laurel Fields, APRN CNP Inpatient    5/19/2014  9:33 AM 2/19/2017  4:40 PM Full Code [id number]  Johnny Laura MD Outpatient    2/20/2013  7:56 AM 5/19/2014  9:33 AM DNR/DNI [id number]  Jailyn Lindsey MD Outpatient    2/17/2013  1:34 AM 2/20/2013  7:56 AM DNR/DNI [id number]  Kellie Pickett RN Inpatient    2/16/2013  3:17 AM 2/17/2013  1:34 AM DNR/DNI [id number]  Kirk Rick NP Inpatient    5/23/2012 10:21 AM 2/16/2013  3:17 AM DNR/DNI [id number]  Castillo Gibbs MD Outpatient    5/10/2012  4:46 PM 5/23/2012 10:21 AM DNR/DNI [id number]  Kenan Hernandez MD Inpatient    5/7/2012  4:22 AM 5/10/2012  4:46 PM DNR/DNI [id number]  Katie Dumas RN Inpatient    5/6/2012  5:07 AM 5/7/2012  3:59 AM DNR/DNI [id number]  Katie Dumas RN Inpatient    5/3/2012  9:45 AM 5/6/2012  5:07 AM Full Code [id number]  Antonio Molina MD Outpatient    5/1/2012  6:26 AM 5/3/2012  9:45 AM DNR/DNI [id number]  Maria Antonia Fried RN Inpatient    3/14/2012 10:45 AM 5/1/2012  6:26 AM DNR/DNI [id number]  Kenan Hernandez MD Outpatient    3/1/2012  5:56 PM 3/14/2012 10:45 AM DNR/DNI [id number]  Kenan Hernandez MD Inpatient    2/29/2012 12:27 PM 3/1/2012  5:56 PM Full Code [id number]  Mayra Rueda MD Outpatient         Medication Review      START taking        Dose / Directions Comments    * acetaminophen 32 mg/mL solution   Commonly known as:  TYLENOL   Used for:  Dysphagia, unspecified type   Replaces:  acetaminophen 325 MG tablet        Dose:  650 mg   20.3 mLs (650 mg) by Oral or Feeding Tube route every 6 hours as needed for fever (pain)   Quantity:  300 mL   Refills:  0        * acetaminophen 650 MG Suppository   Commonly known  as:  TYLENOL   Used for:  Cognitive deficit, post-stroke        Dose:  650 mg   Place 1 suppository (650 mg) rectally every 6 hours as needed for mild pain or fever (temperature over 100? F )   Quantity:  60 suppository   Refills:  0        artificial saliva Soln solution   Used for:  Dry mouth        Swab mouth 2x a day then as  Needed   Quantity:  1 Bottle   Refills:  0        aspirin 10 mg/mL Susp   Used for:  Dysphagia, unspecified type   Replaces:  aspirin 81 MG tablet        Dose:  325 mg   32.5 mLs (325 mg) by Oral or NG Tube route daily   Refills:  0        dextrose 50 % injection   Used for:  Type 2 diabetes mellitus with stage 3 chronic kidney disease, with long-term current use of insulin (H)        Dose:  25-50 mL   Inject 25-50 mLs into the vein every 15 minutes as needed for low blood sugar   Refills:  0        ferrous sulfate 220 (44 FE) MG/5ML Elix   Used for:  Acute posthemorrhagic anemia   Replaces:  ferrous sulfate 325 (65 FE) MG tablet        Dose:  220 mg   5 mLs (220 mg) by Per G Tube route daily   Quantity:  30 mL   Refills:  0        glucagon 1 MG Solr injection   Used for:  Type 2 diabetes mellitus with stage 3 chronic kidney disease, with long-term current use of insulin (H)        Dose:  1 mg   Inject 1 mg Subcutaneous every 15 minutes as needed for low blood sugar (May repeat x 1 only)   Refills:  0        glucose 40 % Gel gel   Used for:  Type 2 diabetes mellitus with stage 3 chronic kidney disease, with long-term current use of insulin (H)        Dose:  15-30 g   Take 15-30 g by mouth every 15 minutes as needed for low blood sugar   Refills:  0        hypromellose-dextran Soln ophthalmic solution   Used for:  Dry eyes        Dose:  1 drop   Place 1 drop into both eyes 4 times daily as needed for dry eyes   Quantity:  1 Bottle   Refills:  0        insulin aspart 100 UNIT/ML injection   Commonly known as:  NovoLOG PEN   Used for:  Type 2 diabetes mellitus with stage 3 chronic kidney  disease, with long-term current use of insulin (H)        Dose:  1-12 Units   Inject 1-12 Units Subcutaneous every 4 hours   Quantity:  3 mL   Refills:  0        ipratropium 0.02 % neb solution   Commonly known as:  ATROVENT   Used for:  Chronic obstructive pulmonary disease, unspecified COPD type (H)        Dose:  0.5 mg   Take 2.5 mLs (0.5 mg) by nebulization 2 times daily   Quantity:  225 mL   Refills:  0        metoprolol 10 mg/mL Susp   Commonly known as:  LOPRESSOR   Used for:  Secondary hypertension        Dose:  25 mg   2.5 mLs (25 mg) by Per G Tube route 2 times daily   Quantity:  100 mL   Refills:  0        multivitamins with minerals Liqd liquid   Used for:  Malnutrition (H)   Replaces:  multivitamin, therapeutic with minerals Tabs tablet        Dose:  15 mL   15 mLs by Per Feeding Tube route daily   Refills:  0        nystatin 600278 UNIT/ML suspension   Commonly known as:  MYCOSTATIN   Used for:  Oral thrush        Swab mouth 4 x daily for 10 days   Quantity:  280 mL   Refills:  0        ondansetron 4 MG ODT tab   Commonly known as:  ZOFRAN-ODT   Used for:  Nausea        Dose:  4 mg   Take 1 tablet (4 mg) by mouth every 6 hours as needed for nausea   Quantity:  120 tablet   Refills:  0        pantoprazole Susp suspension   Commonly known as:  PROTONIX   Used for:  Gastroesophageal reflux disease without esophagitis        Dose:  40 mg   20 mLs (40 mg) by Per G Tube route 2 times daily   Quantity:  100 mL   Refills:  0        potassium & sodium phosphates 280-160-250 MG Packet   Commonly known as:  NEUTRA-PHOS   Used for:  Malnutrition (H)        Dose:  1 packet   Take 1 packet by mouth 4 times daily   Quantity:  120 each   Refills:  0        potassium chloride 10 MEQ tablet   Commonly known as:  K-TAB,KLOR-CON   Used for:  Diastolic CHF, acute on chronic (H)        Administer only when giving Bumex   Quantity:  30 tablet   Refills:  0        vitamin A-D & C drops 1500-400-35 drops   Used for:   Malnutrition (H)        Dose:  7 mL   7 mLs by Per G Tube route daily for 10 days   Quantity:  50 mL   Refills:  0        * Notice:  This list has 2 medication(s) that are the same as other medications prescribed for you. Read the directions carefully, and ask your doctor or other care provider to review them with you.      CONTINUE these medications which may have CHANGED, or have new prescriptions. If we are uncertain of the size of tablets/capsules you have at home, strength may be listed as something that might have changed.        Dose / Directions Comments    bumetanide 0.5 MG tablet   Commonly known as:  BUMEX   This may have changed:    - how much to take  - how to take this  - when to take this  - additional instructions   Used for:  Diastolic CHF, acute on chronic (H)        Please restart patient's home bumex dose (0.5 mg bid) if patient's weight increases by more than 2 pounds in 1 week   Quantity:  60 tablet   Refills:  0        insulin glargine 100 UNIT/ML injection   Commonly known as:  LANTUS   This may have changed:  how much to take   Used for:  Type 2 diabetes mellitus with stage 3 chronic kidney disease, with long-term current use of insulin (H)        Dose:  33 Units   Inject 33 Units Subcutaneous 2 times daily   Refills:  0        order for DME   This may have changed:  Another medication with the same name was removed. Continue taking this medication, and follow the directions you see here.   Used for:  SHIVAM (obstructive sleep apnea)        Equipment being ordered: CPAP supplies   Quantity:  1 each   Refills:  0        senna-docusate 8.6-50 MG per tablet   Commonly known as:  SENOKOT-S;PERICOLACE   This may have changed:    - how much to take  - how to take this  - when to take this   Used for:  Constipation, unspecified constipation type        Dose:  1 tablet   1 tablet by Oral or NG Tube route daily   Quantity:  100 tablet   Refills:  0          CONTINUE these medications which have NOT  CHANGED        Dose / Directions Comments    blood glucose monitoring test strip   Commonly known as:  no brand specified   Used for:  Type 2 diabetes with stage 3 chronic kidney disease GFR 30-59 (H)        Dose:  1 strip   1 strip by In Vitro route 4 times daily   Quantity:  3 Box   Refills:  11    Ok to refill his formulary approved bs test strips.       Catheters Misc   Used for:  Neurogeneses, bladder        As needed   Quantity:  200 each   Refills:  10        insulin pen needle 31G X 8 MM   Commonly known as:  B-D U/F   Used for:  Type 2 diabetes mellitus with stage 3 chronic kidney disease, with long-term current use of insulin (H)        Use 7 daily or as directed.   Quantity:  300 each   Refills:  5        insulin syringes (disposable) U-100 0.3 ML Misc   Used for:  Type 2 diabetes, HbA1C goal < 8% (H)        Dose:  1 each   1 each 4 times daily.   Quantity:  100 each   Refills:  prn        Ipratropium-Albuterol  MCG/ACT inhaler   Commonly known as:  COMBIVENT RESPIMAT   Used for:  Chronic obstructive pulmonary disease, unspecified COPD type (H)        Dose:  1 puff   Inhale 1 puff into the lungs 2 times daily Not to exceed 6 doses per day.   Quantity:  3 Inhaler   Refills:  3        ONE TOUCH LANCETS Misc   Used for:  Type 2 diabetes with stage 3 chronic kidney disease GFR 30-59 (H)        use as directed 4 x daily and prn   Quantity:  120 each   Refills:  3        order for DME   Used for:  Generalized muscle weakness        Equipment being ordered: 4 wheeled rolling walker.   Quantity:  1 Device   Refills:  0        polyethylene glycol Packet   Commonly known as:  MIRALAX/GLYCOLAX   Indication:  Constipation   Used for:  Fall, initial encounter        Dose:  17 g   Take 17 g by mouth daily as needed for constipation   Quantity:  7 packet   Refills:  11    Hold if diarrhea.         STOP taking     acetaminophen 325 MG tablet   Commonly known as:  TYLENOL   Replaced by:  acetaminophen 32 mg/mL  solution           aspirin 81 MG tablet   Replaced by:  aspirin 10 mg/mL Susp           budesonide-formoterol 160-4.5 MCG/ACT Inhaler   Commonly known as:  SYMBICORT           calcium citrate 950 MG tablet   Commonly known as:  CALCITRATE           denosumab 60 MG/ML Soln injection   Commonly known as:  PROLIA           exenatide 5 MCG/0.02ML Sopn injection   Commonly known as:  BYETTA 5 MCG PEN           ferrous sulfate 325 (65 FE) MG tablet   Commonly known as:  IRON   Replaced by:  ferrous sulfate 220 (44 FE) MG/5ML Elix           fexofenadine 180 MG tablet   Commonly known as:  ALLEGRA           fluocinolone 0.01 % solution   Commonly known as:  SYNALAR           fluticasone 50 MCG/ACT spray   Commonly known as:  FLONASE           insulin lispro 100 UNIT/ML injection   Commonly known as:  HumaLOG KWIKpen           ketoconazole 2 % shampoo   Commonly known as:  NIZORAL           MAG- MG tablet   Generic drug:  magnesium oxide           metoprolol 50 MG 24 hr tablet   Commonly known as:  TOPROL-XL           multivitamin, therapeutic with minerals Tabs tablet   Replaced by:  multivitamins with minerals Liqd liquid           niacin 500 MG CR tablet   Commonly known as:  NIASPAN           nitroglycerin 0.4 MG sublingual tablet   Commonly known as:  NITROSTAT           RANITIDINE HCL PO           rosuvastatin 20 MG tablet   Commonly known as:  CRESTOR           sulfamethoxazole-trimethoprim 800-160 MG per tablet   Commonly known as:  BACTRIM DS/SEPTRA DS           Vitamin B12 1000 MCG Tbcr           VITAMIN D (CHOLECALCIFEROL) PO           warfarin 3 MG tablet   Commonly known as:  COUMADIN                   Summary of Visit     Reason for your hospital stay       You were admitted initially due to a change in mental status that was found to have UTI and was given antibiotics. Your hospital stay was complicated by upper and lower gastrointestinal bleeding from multiple duodenal ulcers  And rectal ulcers that was  found on endoscopy. You were transfused with a total of 3 units of blood. Ulcer medications was started and bleeding seemed to have resolve with stabilization of your hemoglobin. Unfortunately you had an ischemic stroke. Aspirin 325 mg was started. Tube feeding was started as well to provide nutrition .             After Care     Activity - Up ad jeanine           Additional Discharge Instructions       PLEASE CHECK BLOOD SUGARS EVERY 4 HOURS and use correction scale.    ACTIVITY:  Out of bed to chair 2-3 times daily  PT/OT/Speech Therapy     BIPAP SETTINGS:  Bipap settings: 10/5 (used at night)  Until stable off BIPAP, Oral meds should not be given until patient able to tolerate 2 hours off BIPAP. No lozenges or gum should be given while patient on BIPAP.  ~ Adjust inspiratory pressure to achieve TV greater than 8-10 mL/kg ideal body weight.  ~ Max inspiratory pressure: 25 cm H2O.  ~ Keep SaO2 at 90-95%  ~ RT to trial off BIPAP as tolerated.    FOLLOW UP:  Follow up with regular Orthopedic Surgeon for ankle problem    TUBE FEEDS AND FREE WATER FLUSHES:  1. Ordered Peptamen 45 ml/hr + 4 pkts Beneprotein daily to provide 1080 mL TF/day, 1720 kcals (27 kcal/kg), 97 g PRO (1.5 gm/kg), 832 ml free H2O, 60 g Fat (70% from MCTs), 203 g CHO and no Fiber daily.    2. Ordered 7 ml Trivitamin and 220 mg Zn sulfate daily x 10 days as indicated in RD note 4/18.     3. Free water 160 cc Q4 hours via tube     WOUND CARE:  Plan of care for wound located on Bl buttocks/perirectal area:   Cleanse the area with Vicky cleanse and protect, very gently with soft cloth.  Apply critic aid paste BID and PRN.  With repeat application, do not scrub the paste, only remove soiled paste and reapply.  If complete removal of paste is necessary use baby oil/mineral oil and soft wash cloth.  Leave the diaper to let the area dry as much as possible.     Left lower buttock wound every other day:    Cleanse all wound bed with Microklenz and pat dry.     Apply no sting barrier on zoe-wound skin.    Apply nickel thick amount of Medihoney (#[id number]) to open wound tissue.    Cover with Mepilex 4x4.    TREATMENT PLAN:  CALL SPOUSE, AKANKSHA, PRIOR TO TRANSFERRING PATIENT TO HOSPITAL FOR ANY REASON          Daily weights       Call Provider for weight gain of more than 2 pounds per day or 5 pounds per week.       Fall precautions           General info for SNF       3Length of Stay Estimate: Long Term Care  Condition at Discharge: Improving  Level of care:board and care  Rehabilitation Potential: Poor  Admission H&P remains valid and up-to-date: Yes  Recent Chemotherapy: N/A  Use Nursing Home Standing Orders: Yes       Intake and output       Every shift       Mantoux instructions       Give two-step Mantoux (PPD) Per Facility Policy Yes       Weight bearing status       - Protect weight bearing in AFO  - PRAFO in bed to prevent ulcers  - Follow up with original orthopedic surgeon for ankle symptoms       Wound care       Site:   Buttocks   Instructions:  Repositioning every 2 hours. Cleanse buttock wound with Microklenz and pat dry . Then apply no sting barrier cream to wound  as needed (after soiling with urine and stool)             Referrals     Nutrition Services Adult IP Consult       Reason:  To evaluate and make recommendations regarding Tube Feeding   For now use dc tube feeding orders  G-Tube Feeds:  1. Ordered Peptamen 1.5 @ goal of 45 ml/hr + 4 pkts Beneprotein daily to provide 1080 mL TF/day, 1720 kcals (27 kcal/kg), 97 g PRO (1.5 gm/kg), 832 ml free H2O, 60 g Fat (70% from MCTs), 203 g CHO and no Fiber daily.  -- Start @ 10 ml and advance by 10 ml Q 8 hrs as tolerated and as long as K+/Mg++ WNL and Phos >1.9 and GRV's <500 mL.   2. Ordered 7 ml Trivitamin and 220 mg Zn sulfate daily x 10 days as indicated in RD note 4/18.   3.  Free water flushes 160 cc every 4 hours.       Speech Language Path Adult Consult       Evaluate and treat as clinically  indicated.    Reason:  Revaluate ability to swallow             Your next 10 appointments already scheduled     May 11, 2017  1:00 PM CDT   Return Visit with Johnathan Walsh MD   [facility] [place] ([facility])    [address]  [address]  [place] [address]   [phone]            May 23, 2017 11:00 AM CDT   SHORT with Oma Gagnon RPH   [facility] ([facility])    [address]  [place] [address]   [phone]              Follow-Up Appointment Instructions     Future Labs/Procedures    Follow Up and recommended labs and tests     Comments:    Follow up with intermediate physician.  The following labs/tests are recommended: CBC ,CMP, magnesium, Phosphorous on a daily basis until stable without replacement (tube feeds started 4/30- patient at high risk for refeeding syndrome).  Consider adding ACE inhibitor if  BP stable  Adjust Bumex or other diuretics  as needed   Adjust Metoprolol for Afib  as needed      Follow-Up Appointment Instructions     Follow Up and recommended labs and tests       Follow up with intermediate physician.  The following labs/tests are recommended: CBC ,CMP, magnesium, Phosphorous on a daily basis until stable without replacement (tube feeds started 4/30- patient at high risk for refeeding syndrome).  Consider adding ACE inhibitor if  BP stable  Adjust Bumex or other diuretics  as needed   Adjust Metoprolol for Afib  as needed             Statement of Approval     Ordered          04/25/17 9349  I have reviewed and agree with all the recommendations and orders detailed in this document.  EFFECTIVE NOW     Approved and electronically signed by:  Toña Duron MD

## 2017-03-31 NOTE — IP AVS SNAPSHOT
MRN:[id number]                      After Visit Summary   3/31/2017    Melvin Bhatia    MRN: [id number]           Thank you!     Thank you for choosing [facility] for your care. Our goal is always to provide you with excellent care. Hearing back from our patients is one way we can continue to improve our services. Please take a few minutes to complete the written survey that you may receive in the mail after you visit with us. Thank you!        Patient Information     Date Of Birth          1940        Designated Caregiver       Most Recent Value    Caregiver    Will someone help with your care after discharge? no [Facility resident]      About your hospital stay     You were admitted on:  April 1, 2017 You last received care in the:  [address] [facility]    You were discharged on:  April 25, 2017        Reason for your hospital stay       You were admitted initially due to a change in mental status that was found to have UTI and was given antibiotics. Your hospital stay was complicated by upper and lower gastrointestinal bleeding from multiple duodenal ulcers  And rectal ulcers that was found on endoscopy. You were transfused with a total of 3 units of blood. Ulcer medications was started and bleeding seemed to have resolve with stabilization of your hemoglobin. Unfortunately you had an ischemic stroke. Aspirin 325 mg was started. Tube feeding was started as well to provide nutrition .                  Who to Call     For medical emergencies, please call [phone].  For non-urgent questions about your medical care, please call your primary care provider or clinic, [phone]  For questions related to your surgery, please call your surgery clinic        Attending Provider     Provider Specialty    Nate Montana MD Emergency Medicine    Amy Bonner MD Family Practice    Ayo Liriano MD Family Practice    Nelson Santana MD Family Practice       Primary Care Provider  Office Phone # Fax #    Ash Johnathan Bowman [phone]       [place] [address]  [facility] [address]        After Care Instructions     Activity - Up ad jeanine           Additional Discharge Instructions       PLEASE CHECK BLOOD SUGARS EVERY 4 HOURS and use correction scale.    ACTIVITY:  Out of bed to chair 2-3 times daily  PT/OT/Speech Therapy     BIPAP SETTINGS:  Bipap settings: 10/5 (used at night)  Until stable off BIPAP, Oral meds should not be given until patient able to tolerate 2 hours off BIPAP. No lozenges or gum should be given while patient on BIPAP.  ~ Adjust inspiratory pressure to achieve TV greater than 8-10 mL/kg ideal body weight.  ~ Max inspiratory pressure: 25 cm H2O.  ~ Keep SaO2 at 90-95%  ~ RT to trial off BIPAP as tolerated.    FOLLOW UP:  Follow up with regular Orthopedic Surgeon for ankle problem    TUBE FEEDS AND FREE WATER FLUSHES:  1. Ordered Peptamen 45 ml/hr + 4 pkts Beneprotein daily to provide 1080 mL TF/day, 1720 kcals (27 kcal/kg), 97 g PRO (1.5 gm/kg), 832 ml free H2O, 60 g Fat (70% from MCTs), 203 g CHO and no Fiber daily.    2. Ordered 7 ml Trivitamin and 220 mg Zn sulfate daily x 10 days as indicated in RD note 4/18.     3. Free water 160 cc Q4 hours via tube     WOUND CARE:  Plan of care for wound located on Bl buttocks/perirectal area:   Cleanse the area with Vicky cleanse and protect, very gently with soft cloth.  Apply critic aid paste BID and PRN.  With repeat application, do not scrub the paste, only remove soiled paste and reapply.  If complete removal of paste is necessary use baby oil/mineral oil and soft wash cloth.  Leave the diaper to let the area dry as much as possible.     Left lower buttock wound every other day:    Cleanse all wound bed with Microklenz and pat dry.    Apply no sting barrier on zoe-wound skin.    Apply nickel thick amount of Medihoney (#[id number]) to open wound tissue.    Cover with Mepilex 4x4.    TREATMENT PLAN:  CALL  SPOUSE, AKANKSHA, PRIOR TO TRANSFERRING PATIENT TO HOSPITAL FOR ANY REASON               Daily weights       Call Provider for weight gain of more than 2 pounds per day or 5 pounds per week.            Fall precautions           General info for SNF       3Length of Stay Estimate: Long Term Care  Condition at Discharge: Improving  Level of care:board and care  Rehabilitation Potential: Poor  Admission H&P remains valid and up-to-date: Yes  Recent Chemotherapy: N/A  Use Nursing Home Standing Orders: Yes            Intake and output       Every shift            Mantoux instructions       Give two-step Mantoux (PPD) Per Facility Policy Yes            Weight bearing status       - Protect weight bearing in AFO  - PRAFO in bed to prevent ulcers  - Follow up with original orthopedic surgeon for ankle symptoms            Wound care       Site:   Buttocks   Instructions:  Repositioning every 2 hours. Cleanse buttock wound with Microklenz and pat dry . Then apply no sting barrier cream to wound  as needed (after soiling with urine and stool)                  Follow-up Appointments     Follow Up and recommended labs and tests       Follow up with alf physician.  The following labs/tests are recommended: CBC ,CMP, magnesium, Phosphorous on a daily basis until stable without replacement (tube feeds started 4/30- patient at high risk for refeeding syndrome).  Consider adding ACE inhibitor if  BP stable  Adjust Bumex or other diuretics  as needed   Adjust Metoprolol for Afib  as needed                  Your next 10 appointments already scheduled     May 11, 2017  1:00 PM CDT   Return Visit with Johnathan Walsh MD   [facility] ([facility])    [address]  [address]  [facility] [address]   [phone]            May 23, 2017 11:00 AM CDT   SHORT with Oma Gagnon RPH   [facility] [place] [facility]    [address]  [address]  [place] [id number] [phone]              Additional Services     Nutrition Services Adult IP Consult       Reason:  To evaluate and make recommendations regarding Tube Feeding   For now use dc tube feeding orders  G-Tube Feeds:  1. Ordered Peptamen 1.5 @ goal of 45 ml/hr + 4 pkts Beneprotein daily to provide 1080 mL TF/day, 1720 kcals (27 kcal/kg), 97 g PRO (1.5 gm/kg), 832 ml free H2O, 60 g Fat (70% from MCTs), 203 g CHO and no Fiber daily.  -- Start @ 10 ml and advance by 10 ml Q 8 hrs as tolerated and as long as K+/Mg++ WNL and Phos >1.9 and GRV's <500 mL.   2. Ordered 7 ml Trivitamin and 220 mg Zn sulfate daily x 10 days as indicated in RD note 4/18.   3.  Free water flushes 160 cc every 4 hours.            Speech Language Path Adult Consult       Evaluate and treat as clinically indicated.    Reason:  Revaluate ability to swallow                  Pending Results     Date and Time Order Name Status Description    4/22/2017 1243 Blood culture Preliminary     4/22/2017 1243 Blood culture Preliminary             Statement of Approval     Ordered          04/25/17 1641  I have reviewed and agree with all the recommendations and orders detailed in this document.  EFFECTIVE NOW     Approved and electronically signed by:  Toña Duron MD             Admission Information     Date & Time Provider Department Dept. Phone    3/31/2017 Nelson Santana MD [address] [facility] [phone]      Your Vitals Were     Blood Pressure Pulse Temperature Respirations Height Weight    121/99 80 98.5  F (36.9  C) (Axillary) 22 1.626 m (5' 4\") 76.7 kg (169 lb 1.5 oz)    Pulse Oximetry BMI (Body Mass Index)                100% 29.02 kg/m2          MyChart Information     [facility] gives you secure access to your electronic health record. If you see a primary care provider, you can also send messages to your care team and make appointments. If you have questions, please call your primary care " clinic.  If you do not have a primary care provider, please call [phone] and they will assist you.        Care EveryWhere ID     This is your Care EveryWhere ID. This could be used by other organizations to access your [facility] medical records  [id number]           Review of your medicines      START taking        Dose / Directions    * acetaminophen 32 mg/mL solution   Commonly known as:  TYLENOL   Used for:  Dysphagia, unspecified type   Replaces:  acetaminophen 325 MG tablet        Dose:  650 mg   20.3 mLs (650 mg) by Oral or Feeding Tube route every 6 hours as needed for fever (pain)   Quantity:  300 mL   Refills:  0       * acetaminophen 650 MG Suppository   Commonly known as:  TYLENOL   Used for:  Cognitive deficit, post-stroke        Dose:  650 mg   Place 1 suppository (650 mg) rectally every 6 hours as needed for mild pain or fever (temperature over 100? F )   Quantity:  60 suppository   Refills:  0       artificial saliva Soln solution   Used for:  Dry mouth        Swab mouth 2x a day then as  Needed   Quantity:  1 Bottle   Refills:  0       aspirin 10 mg/mL Susp   Used for:  Dysphagia, unspecified type   Replaces:  aspirin 81 MG tablet        Dose:  325 mg   32.5 mLs (325 mg) by Oral or NG Tube route daily   Refills:  0       dextrose 50 % injection   Used for:  Type 2 diabetes mellitus with stage 3 chronic kidney disease, with long-term current use of insulin (H)        Dose:  25-50 mL   Inject 25-50 mLs into the vein every 15 minutes as needed for low blood sugar   Refills:  0       ferrous sulfate 220 (44 FE) MG/5ML Elix   Used for:  Acute posthemorrhagic anemia   Replaces:  ferrous sulfate 325 (65 FE) MG tablet        Dose:  220 mg   5 mLs (220 mg) by Per G Tube route daily   Quantity:  30 mL   Refills:  0       glucagon 1 MG Solr injection   Used for:  Type 2 diabetes mellitus with stage 3 chronic kidney disease, with long-term current use of insulin (H)        Dose:  1 mg   Inject 1 mg  Subcutaneous every 15 minutes as needed for low blood sugar (May repeat x 1 only)   Refills:  0       glucose 40 % Gel gel   Used for:  Type 2 diabetes mellitus with stage 3 chronic kidney disease, with long-term current use of insulin (H)        Dose:  15-30 g   Take 15-30 g by mouth every 15 minutes as needed for low blood sugar   Refills:  0       hypromellose-dextran Soln ophthalmic solution   Used for:  Dry eyes        Dose:  1 drop   Place 1 drop into both eyes 4 times daily as needed for dry eyes   Quantity:  1 Bottle   Refills:  0       insulin aspart 100 UNIT/ML injection   Commonly known as:  NovoLOG PEN   Used for:  Type 2 diabetes mellitus with stage 3 chronic kidney disease, with long-term current use of insulin (H)        Dose:  1-12 Units   Inject 1-12 Units Subcutaneous every 4 hours   Quantity:  3 mL   Refills:  0       ipratropium 0.02 % neb solution   Commonly known as:  ATROVENT   Used for:  Chronic obstructive pulmonary disease, unspecified COPD type (H)        Dose:  0.5 mg   Take 2.5 mLs (0.5 mg) by nebulization 2 times daily   Quantity:  225 mL   Refills:  0       metoprolol 10 mg/mL Susp   Commonly known as:  LOPRESSOR   Used for:  Secondary hypertension        Dose:  25 mg   2.5 mLs (25 mg) by Per G Tube route 2 times daily   Quantity:  100 mL   Refills:  0       multivitamins with minerals Liqd liquid   Used for:  Malnutrition (H)   Replaces:  multivitamin, therapeutic with minerals Tabs tablet        Dose:  15 mL   15 mLs by Per Feeding Tube route daily   Refills:  0       nystatin 054753 UNIT/ML suspension   Commonly known as:  MYCOSTATIN   Used for:  Oral thrush        Swab mouth 4 x daily for 10 days   Quantity:  280 mL   Refills:  0       ondansetron 4 MG ODT tab   Commonly known as:  ZOFRAN-ODT   Used for:  Nausea        Dose:  4 mg   Take 1 tablet (4 mg) by mouth every 6 hours as needed for nausea   Quantity:  120 tablet   Refills:  0       pantoprazole Susp suspension   Commonly  known as:  PROTONIX   Used for:  Gastroesophageal reflux disease without esophagitis        Dose:  40 mg   20 mLs (40 mg) by Per G Tube route 2 times daily   Quantity:  100 mL   Refills:  0       potassium & sodium phosphates 280-160-250 MG Packet   Commonly known as:  NEUTRA-PHOS   Used for:  Malnutrition (H)        Dose:  1 packet   Take 1 packet by mouth 4 times daily   Quantity:  120 each   Refills:  0       potassium chloride 10 MEQ tablet   Commonly known as:  K-TAB,KLOR-CON   Used for:  Diastolic CHF, acute on chronic (H)        Administer only when giving Bumex   Quantity:  30 tablet   Refills:  0       vitamin A-D & C drops 1500-400-35 drops   Used for:  Malnutrition (H)        Dose:  7 mL   7 mLs by Per G Tube route daily for 10 days   Quantity:  50 mL   Refills:  0       * Notice:  This list has 2 medication(s) that are the same as other medications prescribed for you. Read the directions carefully, and ask your doctor or other care provider to review them with you.      CONTINUE these medicines which may have CHANGED, or have new prescriptions. If we are uncertain of the size of tablets/capsules you have at home, strength may be listed as something that might have changed.        Dose / Directions    bumetanide 0.5 MG tablet   Commonly known as:  BUMEX   This may have changed:    - how much to take  - how to take this  - when to take this  - additional instructions   Used for:  Diastolic CHF, acute on chronic (H)        Please restart patient's home bumex dose (0.5 mg bid) if patient's weight increases by more than 2 pounds in 1 week   Quantity:  60 tablet   Refills:  0       insulin glargine 100 UNIT/ML injection   Commonly known as:  LANTUS   This may have changed:  how much to take   Used for:  Type 2 diabetes mellitus with stage 3 chronic kidney disease, with long-term current use of insulin (H)        Dose:  33 Units   Inject 33 Units Subcutaneous 2 times daily   Refills:  0       order for DME    This may have changed:  Another medication with the same name was removed. Continue taking this medication, and follow the directions you see here.   Used for:  SHIVAM (obstructive sleep apnea)        Equipment being ordered: CPAP supplies   Quantity:  1 each   Refills:  0       senna-docusate 8.6-50 MG per tablet   Commonly known as:  SENOKOT-S;PERICOLACE   This may have changed:    - how much to take  - how to take this  - when to take this   Used for:  Constipation, unspecified constipation type        Dose:  1 tablet   1 tablet by Oral or NG Tube route daily   Quantity:  100 tablet   Refills:  0         CONTINUE these medicines which have NOT CHANGED        Dose / Directions    blood glucose monitoring test strip   Commonly known as:  no brand specified   Used for:  Type 2 diabetes with stage 3 chronic kidney disease GFR 30-59 (H)        Dose:  1 strip   1 strip by In Vitro route 4 times daily   Quantity:  3 Box   Refills:  11       Catheters Misc   Used for:  Neurogeneses, bladder        As needed   Quantity:  200 each   Refills:  10       insulin pen needle 31G X 8 MM   Commonly known as:  B-D U/F   Used for:  Type 2 diabetes mellitus with stage 3 chronic kidney disease, with long-term current use of insulin (H)        Use 7 daily or as directed.   Quantity:  300 each   Refills:  5       insulin syringes (disposable) U-100 0.3 ML Misc   Used for:  Type 2 diabetes, HbA1C goal < 8% (H)        Dose:  1 each   1 each 4 times daily.   Quantity:  100 each   Refills:  prn       Ipratropium-Albuterol  MCG/ACT inhaler   Commonly known as:  COMBIVENT RESPIMAT   Used for:  Chronic obstructive pulmonary disease, unspecified COPD type (H)        Dose:  1 puff   Inhale 1 puff into the lungs 2 times daily Not to exceed 6 doses per day.   Quantity:  3 Inhaler   Refills:  3       ONE TOUCH LANCETS Misc   Used for:  Type 2 diabetes with stage 3 chronic kidney disease GFR 30-59 (H)        use as directed 4 x daily and prn    Quantity:  120 each   Refills:  3       order for DME   Used for:  Generalized muscle weakness        Equipment being ordered: 4 wheeled rolling walker.   Quantity:  1 Device   Refills:  0       polyethylene glycol Packet   Commonly known as:  MIRALAX/GLYCOLAX   Indication:  Constipation   Used for:  Fall, initial encounter        Dose:  17 g   Take 17 g by mouth daily as needed for constipation   Quantity:  7 packet   Refills:  11         STOP taking     acetaminophen 325 MG tablet   Commonly known as:  TYLENOL   Replaced by:  acetaminophen 32 mg/mL solution           aspirin 81 MG tablet   Replaced by:  aspirin 10 mg/mL Susp           budesonide-formoterol 160-4.5 MCG/ACT Inhaler   Commonly known as:  SYMBICORT           calcium citrate 950 MG tablet   Commonly known as:  CALCITRATE           denosumab 60 MG/ML Soln injection   Commonly known as:  PROLIA           exenatide 5 MCG/0.02ML Sopn injection   Commonly known as:  BYETTA 5 MCG PEN           ferrous sulfate 325 (65 FE) MG tablet   Commonly known as:  IRON   Replaced by:  ferrous sulfate 220 (44 FE) MG/5ML Elix           fexofenadine 180 MG tablet   Commonly known as:  ALLEGRA           fluocinolone 0.01 % solution   Commonly known as:  SYNALAR           fluticasone 50 MCG/ACT spray   Commonly known as:  FLONASE           insulin lispro 100 UNIT/ML injection   Commonly known as:  HumaLOG KWIKpen           ketoconazole 2 % shampoo   Commonly known as:  NIZORAL           MAG- MG tablet   Generic drug:  magnesium oxide           metoprolol 50 MG 24 hr tablet   Commonly known as:  TOPROL-XL           multivitamin, therapeutic with minerals Tabs tablet   Replaced by:  multivitamins with minerals Liqd liquid           niacin 500 MG CR tablet   Commonly known as:  NIASPAN           nitroglycerin 0.4 MG sublingual tablet   Commonly known as:  NITROSTAT           RANITIDINE HCL PO           rosuvastatin 20 MG tablet   Commonly known as:  CRESTOR            sulfamethoxazole-trimethoprim 800-160 MG per tablet   Commonly known as:  BACTRIM DS/SEPTRA DS           Vitamin B12 1000 MCG Tbcr           VITAMIN D (CHOLECALCIFEROL) PO           warfarin 3 MG tablet   Commonly known as:  COUMADIN                Where to get your medicines      Some of these will need a paper prescription and others can be bought over the counter. Ask your nurse if you have questions.     You don't need a prescription for these medications     acetaminophen 32 mg/mL solution    acetaminophen 650 MG Suppository    artificial saliva Soln solution    aspirin 10 mg/mL Susp    bumetanide 0.5 MG tablet    dextrose 50 % injection    ferrous sulfate 220 (44 FE) MG/5ML Elix    glucagon 1 MG Solr injection    glucose 40 % Gel gel    hypromellose-dextran Soln ophthalmic solution    insulin aspart 100 UNIT/ML injection    insulin glargine 100 UNIT/ML injection    ipratropium 0.02 % neb solution    metoprolol 10 mg/mL Susp    multivitamins with minerals Liqd liquid    nystatin 194988 UNIT/ML suspension    ondansetron 4 MG ODT tab    pantoprazole Susp suspension    potassium & sodium phosphates 280-160-250 MG Packet    potassium chloride 10 MEQ tablet    senna-docusate 8.6-50 MG per tablet    vitamin A-D & C drops 1500-400-35 drops                Protect others around you: Learn how to safely use, store and throw away your medicines at www.disposemymeds.org.             Medication List: This is a list of all your medications and when to take them. Check marks below indicate your daily home schedule. Keep this list as a reference.      Medications           Morning Afternoon Evening Bedtime As Needed    * acetaminophen 32 mg/mL solution   Commonly known as:  TYLENOL   20.3 mLs (650 mg) by Oral or Feeding Tube route every 6 hours as needed for fever (pain)   Last time this was given:  650 mg on 4/9/2017  6:14 PM                                * acetaminophen 650 MG Suppository   Commonly known as:  TYLENOL    Place 1 suppository (650 mg) rectally every 6 hours as needed for mild pain or fever (temperature over 100? F )                                artificial saliva Soln solution   Swab mouth 2x a day then as  Needed                                aspirin 10 mg/mL Susp   32.5 mLs (325 mg) by Oral or NG Tube route daily   Last time this was given:  325 mg on 4/25/2017  7:49 AM                                blood glucose monitoring test strip   Commonly known as:  no brand specified   1 strip by In Vitro route 4 times daily                                bumetanide 0.5 MG tablet   Commonly known as:  BUMEX   Please restart patient's home bumex dose (0.5 mg bid) if patient's weight increases by more than 2 pounds in 1 week   Last time this was given:  0.5 mg on 4/23/2017  8:16 AM                                Catheters Misc   As needed                                dextrose 50 % injection   Inject 25-50 mLs into the vein every 15 minutes as needed for low blood sugar   Last time this was given:  50 mLs on 4/12/2017  6:21 AM                                ferrous sulfate 220 (44 FE) MG/5ML Elix   5 mLs (220 mg) by Per G Tube route daily   Last time this was given:  220 mg on 4/25/2017  7:49 AM                                glucagon 1 MG Solr injection   Inject 1 mg Subcutaneous every 15 minutes as needed for low blood sugar (May repeat x 1 only)                                glucose 40 % Gel gel   Take 15-30 g by mouth every 15 minutes as needed for low blood sugar                                hypromellose-dextran Soln ophthalmic solution   Place 1 drop into both eyes 4 times daily as needed for dry eyes                                insulin aspart 100 UNIT/ML injection   Commonly known as:  NovoLOG PEN   Inject 1-12 Units Subcutaneous every 4 hours   Last time this was given:  8 Units on 4/25/2017  3:19 PM                                insulin glargine 100 UNIT/ML injection   Commonly known as:  LANTUS   Inject  33 Units Subcutaneous 2 times daily   Last time this was given:  27 Units on 4/25/2017  7:55 AM                                insulin pen needle 31G X 8 MM   Commonly known as:  B-D U/F   Use 7 daily or as directed.                                insulin syringes (disposable) U-100 0.3 ML Misc   1 each 4 times daily.                                ipratropium 0.02 % neb solution   Commonly known as:  ATROVENT   Take 2.5 mLs (0.5 mg) by nebulization 2 times daily   Last time this was given:  0.5 mg on 4/25/2017  8:08 AM                                Ipratropium-Albuterol  MCG/ACT inhaler   Commonly known as:  COMBIVENT RESPIMAT   Inhale 1 puff into the lungs 2 times daily Not to exceed 6 doses per day.                                metoprolol 10 mg/mL Susp   Commonly known as:  LOPRESSOR   2.5 mLs (25 mg) by Per G Tube route 2 times daily   Last time this was given:  25 mg on 4/25/2017  7:49 AM                                multivitamins with minerals Liqd liquid   15 mLs by Per Feeding Tube route daily   Last time this was given:  15 mLs on 4/25/2017  7:49 AM                                nystatin 140073 UNIT/ML suspension   Commonly known as:  MYCOSTATIN   Swab mouth 4 x daily for 10 days   Last time this was given:  500,000 Units on 4/25/2017  3:17 PM                                ondansetron 4 MG ODT tab   Commonly known as:  ZOFRAN-ODT   Take 1 tablet (4 mg) by mouth every 6 hours as needed for nausea                                ONE TOUCH LANCETS Misc   use as directed 4 x daily and prn                                order for DME   Equipment being ordered: CPAP supplies                                order for DME   Equipment being ordered: 4 wheeled rolling walker.                                pantoprazole Susp suspension   Commonly known as:  PROTONIX   20 mLs (40 mg) by Per G Tube route 2 times daily   Last time this was given:  40 mg on 4/25/2017  7:52 AM                                 polyethylene glycol Packet   Commonly known as:  MIRALAX/GLYCOLAX   Take 17 g by mouth daily as needed for constipation                                potassium & sodium phosphates 280-160-250 MG Packet   Commonly known as:  NEUTRA-PHOS   Take 1 packet by mouth 4 times daily   Last time this was given:  1 packet on 4/25/2017  3:13 PM                                potassium chloride 10 MEQ tablet   Commonly known as:  K-TAB,KLOR-CON   Administer only when giving Bumex                                senna-docusate 8.6-50 MG per tablet   Commonly known as:  SENOKOT-S;PERICOLACE   1 tablet by Oral or NG Tube route daily   Last time this was given:  2 tablets on 4/3/2017  9:21 AM                                vitamin A-D & C drops 1500-400-35 drops   7 mLs by Per G Tube route daily for 10 days   Last time this was given:  7 mLs on 4/25/2017  7:49 AM                                * Notice:  This list has 2 medication(s) that are the same as other medications prescribed for you. Read the directions carefully, and ask your doctor or other care provider to review them with you.

## 2017-03-31 NOTE — IP AVS SNAPSHOT
[address] [address]    Phone:  [phone]                                       After Visit Summary   3/31/2017    Melvin Bhatia    MRN: [id number]           After Visit Summary Signature Page     I have received my discharge instructions, and my questions have been answered. I have discussed any challenges I see with this plan with the nurse or doctor.    ..........................................................................................................................................  Patient/Patient Representative Signature      ..........................................................................................................................................  Patient Representative Print Name and Relationship to Patient    ..................................................               ................................................  Date                                            Time    ..........................................................................................................................................  Reviewed by Signature/Title    ...................................................              ..............................................  Date                                                            Time

## 2017-04-01 PROBLEM — N17.9 AKI (ACUTE KIDNEY INJURY) (H): Status: ACTIVE | Noted: 2017-01-01

## 2017-04-01 NOTE — PHARMACY-CONSULT NOTE
75 y/o male admitted with SHERYL, with consult placed to evaluate for nephrotoxic meds and renal dosing of medications.    Vancomycin is the only medication on patients current profile that is nephrotoxic. It is being renally dosed. Pharmacist will continue to monitor and dose medications renally when ordered.      Thanks for the consult,  Leon Rose, Pharm.D, BCPS

## 2017-04-01 NOTE — PROGRESS NOTES
Patient arrived from ED on a cart at 1230 midnight. Appear lethargic. Abdomen is distended and bruised in the mid abdomen and bilateral upper extremities. Left lower extremities with a blue cast and right lower extremities very dry with tiny abrasions. Small abrasion noted on the right wiley. Green loose stool on arrival. Coccyx area very red and excoriated/barrier cream applied. Wife by the bedside. Reviewed plan of care with patient and wife. Blood sugar monitoring initiated/ 218. Patient reporting of lower extremities pain. Notifying the MD. Continue with plan of care.

## 2017-04-01 NOTE — PLAN OF CARE
Problem: Goal Outcome Summary  Goal: Goal Outcome Summary  Clinical swallow evaluation completed per MD order. Pt presents with moderate oral-pharyngeal dysphagia in the setting of confusion and weakness. Pt demonstrates reduced oral awareness, poor bolus formation/control, and elevated aspiration risk. Overt s/sx of aspiration observed with thin liquids. No s/sx of aspiraiton observed with additional presented textures, however, Pt with inadequate mastication/bolus manipulation of soft solid textures. Of note, Pt is edentulous for exam-- does reportedly wear dentures at baseline. Recommend downgrade diet to dysphagia level 1 with nectar-thick liquids with supervision/assist. Pt to sit upright for all PO intake, take small bites/sips and alternate consistencies. ST to follow per POC.

## 2017-04-01 NOTE — ED NOTES
Bed: ED17  Expected date:   Expected time:   Means of arrival:   Comments:  Melvin Bhatia 76M from [facility], UTI, elevated WBC,

## 2017-04-01 NOTE — PLAN OF CARE
Problem: Goal Outcome Summary  Goal: Goal Outcome Summary  Patient admitted with AMS. Gave report to oncoming RN @ 1100. Bradycardic on RA. BiPAP due to apenic epispodes @ night. Left PIV infusing @ 200 mL/hr. Difficult to swallow pill this AM. Crushed meds and gave with pudding. Held Aspirin, cannot be crushed. Speech consult ordered. On telemetry, a fib. Left leg cast, clean, dry and intact. Incontinent x1. Reposition q 2 hrs. Continue to monitor and follow the POC.

## 2017-04-01 NOTE — ED NOTES
77 yo male arrives via [facility] from nursing facility ([facility]) after having labs completed that came back abnormal. Pt WBC 14.6, Creatnine 5.55, and . Pt has a significant medical history. Pt unable to speak as his tongue and mouth are very dry. Per his wife she has been giving him sips of water occasionally, but the patient has not had any solid nutrition since Tuesday. Pt also has a wet cough, per wife this is not new.

## 2017-04-01 NOTE — H&P
Please see day team addendum at the bottom    [facility]  Inpatient History and Physical    Melvin Bhatia MRN# [id number]   Age: 76 year old YOB: 1940/2017 12:16 AM    Primary care provider: Ash Bowman            Chief Concern:   Kidney injury.       History is obtained from the patient's wife, and chart.          History of Present Illness (Resident / Clinician):   Melvin Bhatia is a 76 year old male with a significant past medical history of cerebrovascular disease, normal pressure hydrocephalus, status post ventriculoperitoneal shunting, progressive cognitive dysfunction, multiple falls, HTN, Type 2 DM, hyperlipidemia, CHF, CKD, and COPD who presents with 3 days Hx of poor oral intake, and found to have acute kidney injury.    Pt moved to a new nursing home 5 days ago. He was at his baseline until 3 days ago when he started refusing to eat, including fluids or ice chips. Due to his poor oral intake he also had been very sleepy, and tired.  At his nursing home he was requiring regular intermittent catheterization, until today when pt got a Orta placed.   His wife denies any fevers, complains of pain, bloody urine or stool.       Old records were reviewed, and any additions to pertinent history are noted above.          Assessment and Plan:   Assessment:   Melvin Bhatia is a 76 year old  with Hx of cerebrovascular disease, and dementia who presents with several days of poor intake and found to have a UTI, and SHERYL.  Patient Active Problem List   Diagnosis     Osteoporosis     PAF (paroxysmal atrial fibrillation) (H)     Lumbosacral plexopathy     Testosterone deficiency     Hypertension goal BP (blood pressure) < 140/90     Atrial fibrillation (H)     CKD (chronic kidney disease) stage 3, GFR 30-59 ml/min     Hyperlipidemia LDL goal <70     Coronary artery disease     Health Care Home     ACP (advance  care planning)     Elevated PSA     Anemia in chronic renal disease     Subarachnoid hemorrhage (H)     Idiopathic normal pressure hydrocephalus (INPH)     Urinary bladder neurogenic dysfunction     Stroke (H)     Cognitive deficit, post-stroke     HL (hearing loss)     Personal history of fall     GERD (gastroesophageal reflux disease)     COPD exacerbation (H)     SHIVAM (obstructive sleep apnea)     LVH (left ventricular hypertrophy)     Diastolic CHF, acute on chronic (H)     Hypoxemia     Tachyarrhythmia     Systolic and diastolic CHF, chronic (H)     Gait abnormality     Iron deficiency anemia     Allergic rhinitis     Altered mental status     VRE (vancomycin-resistant Enterococci)     Hypoglycemia     Lower urinary tract infectious disease     Type 2 diabetes with stage 3 chronic kidney disease GFR 30-59 (H)     Chronic obstructive pulmonary disease, unspecified COPD type (H)     Ankle pain     Fx medial malleolus-closed         Plan:   ## Acute on chronic kidney disease. (baseline Cr 1.5-2)  ## Hyperkalemia  Cr up to 5.39, GFR 10, K 5.6, no acute changes on EKG.  Likely multifactorial:  Pre-renal: Dehydration due to poor oral intake  Renal: Given current UTI w/ Hx of recurrent UTIs  Post-renal: Obstruction secondary to stercoral proctitis found on abd CT on this admission.  - On fluid resuscitation  ml/hr. Will consider decreasing rate after morning BMP results  - Follow up BMP q 2 hrs  - Orta catheter  - Tele    ## UTI  - Started on IV Vancomycin and Ceftriaxone given previous urine cultures results Enterococcus faecalis- Sensitive to Vanco (2/19/17)  Klebsiella pneumoniae- Sensitive to Ceftriaxone (2/24/16, 5/13/16)  - Follow up culture results    ## Stercoral proctitis  Seen in abd. CT. Pt denied abdominal pain, stable VS.  - Colorectal surgery consult placed.    ##  Elevated troponin (0.116< 0.126)  Likely due to low CrCl (11.8), unlikely coronary ischemia given no chest pain, no acute changes on  EKG, stable VS.  - Will monitor.  - Tele    ## Type 2 DM, last A1C 6.1 on 02/20/17  Home regimen:  Basal insulin: 37 units lantus twice daily  Prandial: 30 units before breakfast, lunch, 5 units before dinner    Hospital regimen:  Basal insulin: 15 units lantus twice daily  Prandial: 1 unit per 15 gm CHO  Correction: MSSI    Other chronic conditions, stable- continue home medications.  ## HTN- metoprolol  ## Hyperlipidemia- rosuvastatin, niacin.   ## A Fib- holding warfarin  ## COPD- Breo Ellipta, Ipratropium-albuterol (Respimat)  ## Constipation- Senokot, Magnesium citrate, miralax  ## Cerebrovascular disease w/ Hx stroke, normal pressure hydrocephalus (2012), status post ventriculoperitoneal shunting, subarachnoid hemorrhage(2012)  ## Progressive cognitive dysfunction (dementia)  ## Multiple bone fracture on left foot/ankle- cast in place. Hx of fractures after a fall on 9/16, and 2/2017.    Daily cares -   F:  ml/hr  E:Elevated K  N:Regular diet as tolerate  Lines:PIV  Activity: Bed rest- pt unable to bear weight due to ankle fracture   CODE:DNR / DNI  Prophylaxis: none  PCP communication:  - Ash Bowman    Dispo: Expected discharge date pending to clinical course.     Discussed with faculty but not examined by faculty.           Past Medical History:     Past Medical History:   Diagnosis Date     ARDS (adult respiratory distress syndrome) (H)      Atrial fibrillation (H)     first diagnosed 11-08     Bell's palsy 1/2010     Cauda equina syndrome with neurogenic bladder (H)     self catheter     Cerebellar infarct (H) 4/2012    left cerebellar infarct.  felt embolic hx afib     CHF (congestive heart failure) (H)      Chronic airway obstruction, not elsewhere classified      Chronic infection     VRE     CKD (chronic kidney disease) stage 4, GFR 15-29 ml/min (H)      Congestive heart failure, unspecified 5/06    EF 48%     Coronary artery disease     abnl mps with ef 46% and small-med area of ischemia  '08 [facility]     CVA (cerebral infarction)     old lacunar infarct in the posterior left     Diabetes      Essential hypertension, benign      Gastro-oesophageal reflux disease      Heart attack (H)      Hemorrhage of gastrointestinal tract, unspecified 2/2003    UGI Bleed with transfusion     Hydrocephalus     plan  Shunt     Hypersomnia with sleep apnea, unspecified     Bpap     Intraventricular hemorrhage 1/2012     Legionella pneumonia (H) 8/09     Lumbago      Lumbosacral plexopathy 10/09    L diabetic myotrophy     Mixed hyperlipidemia      Other osteoporosis 3/2006     Other specified anemias 2002    Nl colonoscopy/EGD; Nl iron studies 4/06     Other specified disorders of rotator cuff syndrome of shoulder and allied disorders      Other testicular hypofunction     on Androgel     Palpitations     HOLTER     Sleep apnea     uses cpap     Subarachnoid hemorrhage (H) 1/2012     Tibial plateau fracture 2/2011    left     Type II or unspecified type diabetes mellitus without mention of complication, not stated as uncontrolled      Unspecified disorder resulting from impaired renal function     Stage 3 Chronic Renal Dz             Past Surgical History:     Past Surgical History:   Procedure Laterality Date     C DEXA, BONE DENSITY, AXIAL SKEL  3/2006    Osteoporosis     CARDIAC NUC CONY STRESS TEST NL  5/06    No ischemia, EF 44 %     CARDIAC NUC CONY STRESS TEST NL  6/2008, 3/09    sm to mod fixed apical defect EF 46%     HC COLONOSCOPY THRU STOMA, DIAGNOSTIC  2/2003    Normal, repeat 10 yr     HC DOPPLER ECHO PULSED, COMPLETE      EF 50-55%     IMPLANT SHUNT VENTRICULOPERITONEAL  2/28/2012    Procedure:IMPLANT SHUNT VENTRICULOPERITONEAL; Ventriculoperitoneal Shunt Placement; Surgeon:ALESHA VELAZQUEZ; Location:[facility] OR     OPEN REDUCTION INTERNAL FIXATION TIBIAL PLATEAU  3/2011    left     SURGICAL HISTORY OF -   2001    R wrist surg fx/ steel plate     SURGICAL HISTORY OF -   age 20    Inguinal hernia      SURGICAL HISTORY OF -   2003    Bladder stim surg, not sucessful     SURGICAL HISTORY OF -       Lymphedema treatment of legs     TONSILLECTOMY  child     UPPER GI ENDOSCOPY  2/2003    esophageal ulcers, gastritis, duodenitis     UPPER GI ENDOSCOPY  4/2003    erosive gastropathy from ASA             Social History:   I have reviewed this patient's social history   Social History     Social History     Marital status:      Spouse name: N/A     Number of children: N/A     Years of education: N/A     Occupational History     Not on file.     Social History Main Topics     Smoking status: Former Smoker     Quit date: 6/26/1997     Smokeless tobacco: Never Used      Comment: quit 1997       2-3 ppd for 45 yrs      Alcohol use No      Comment: recovering since 1982       Drug use: No     Sexual activity: Not on file     Other Topics Concern     Parent/Sibling W/ Cabg, Mi Or Angioplasty Before 65f 55m? Yes     Social History Narrative    Dairy/d 1 servings/d.     Caffeine 0 servings/d    Exercise walks 7 x week    Sunscreen used - No    Seatbelts used - No    Working smoke/CO detectors in the home - Yes    Guns stored in the home - No    Self Breast Exams - NA    Self Testicular Exam - No    Eye Exam up to date - Yes    Dental Exam up to date - Yes    Pap Smear up to date - NA    Mammogram up to date - NA    PSA up to date - Yes    Dexa Scan up to date - Yes 3/06    Flex Sig / Colonoscopy up to date - Yes    Immunizations up to date - No  Declines with possible allergic rxn as child    Abuse: Current or Past(Physical, Sexual or Emotional)- No    Do you feel safe in your environment - Yes    Consuelo Alfonso RN    4/30/08             Family History:   I have reviewed this patient's family history   Family History   Problem Relation Age of Onset     C.A.D. Father      DIABETES Maternal Grandmother             Immunizations:      Immunization History   Administered Date(s) Administered     Influenza (High Dose) 3  valent vaccine 11/09/2011, 10/07/2014, 11/10/2015, 11/02/2016     Influenza (IIV3) 12/14/2005, 10/18/2006, 11/07/2007, 09/21/2010, 09/12/2012     Influenza Vaccine IM 3yrs+ 4 Valent IIV4 10/22/2013     Pneumococcal (PCV 13) 11/10/2015     Pneumococcal 23 valent 04/30/2008            Allergies:   Penicillins; Albuterol; Albuterol sulfate; Atorvastatin; Atorvastatin calcium; Cipro [ciprofloxacin]; Ciprofloxacin; Simvastatin; and Warfarin          Medications:     No current facility-administered medications on file prior to encounter.   Current Outpatient Prescriptions on File Prior to Encounter:  insulin glargine (LANTUS SOLOSTAR) 100 UNIT/ML injection Inject 37 Units Subcutaneous 2 times daily   insulin lispro (HUMALOG KWIKPEN) 100 UNIT/ML injection Inject 30 units before breakfast and lunch and 5 units before dinner  as directed.   aspirin 81 MG tablet Take 1 tablet (81 mg) by mouth daily   acetaminophen (TYLENOL) 325 MG tablet Take 3 tablets (975 mg) by mouth every 6 hours as needed for mild pain   warfarin (COUMADIN) 3 MG tablet Take 1 tablet (3 mg) by mouth daily   polyethylene glycol (MIRALAX/GLYCOLAX) Packet Take 17 g by mouth daily as needed for constipation   senna-docusate (SENOKOT-S;PERICOLACE) 8.6-50 MG per tablet Take 1-2 tablets by mouth 2 times daily   fexofenadine (ALLEGRA) 180 MG tablet Take 1 tablet (180 mg) by mouth daily   budesonide-formoterol (SYMBICORT) 160-4.5 MCG/ACT Inhaler Inhale 2 puffs into the lungs 2 times daily   Ipratropium-Albuterol (COMBIVENT RESPIMAT)  MCG/ACT inhaler Inhale 1 puff into the lungs 2 times daily Not to exceed 6 doses per day.   rosuvastatin (CRESTOR) 20 MG tablet Take 1 tablet (20 mg) by mouth daily Needs fasting labs before any further refills   RANITIDINE HCL PO Take 150 mg by mouth daily   calcium citrate (CALCITRATE) 950 MG tablet Take 1 tablet by mouth daily   Cyanocobalamin (VITAMIN B12) 1000 MCG TBCR Take 1,000 mcg by mouth daily   calcium  citrate-vitamin D (CITRACAL) 315-200 MG-UNIT TABS Take 2 tablets by mouth 2 times daily 2 tabs contain 500mg elemental calcium + 800 iu's vitamin D3 in them .   denosumab (PROLIA) 60 MG/ML SOLN Inject 60 mg Subcutaneous every 6 months   exenatide (BYETTA) 5 mcg/0.02mL per dose (60 doses per 1.2 mL prefilled pen) Inject 5mcg under the skin twice daily 60 minutes before meals.   metoprolol (TOPROL-XL) 50 MG 24 hr tablet Take 1 tablet (50 mg) by mouth At Bedtime   fluticasone (FLONASE) 50 MCG/ACT nasal spray Spray 2 sprays into both nostrils daily   insulin pen needle (B-D U/F) 31G X 8 MM Use 7 daily or as directed.   blood glucose monitoring (NO BRAND SPECIFIED) test strip 1 strip by In Vitro route 4 times daily   ONE TOUCH LANCETS MISC use as directed 4 x daily and prn   nitroglycerin (NITROSTAT) 0.4 MG SL tablet Place 1 tablet (0.4 mg) under the tongue every 5 minutes as needed up to 3 tablets per episode.   fluocinolone (SYNALAR) 0.01 % external solution Apply 1 to 2 times daily to scaly areas of scalp as needed.Profile Rx: patient will contact pharmacy when needed   order for DME Equipment being ordered: 4 wheeled rolling walker.   ACE/ARB NOT PRESCRIBED, INTENTIONAL, ACE & ARB not prescribed due to Hyperkalemia (baseline K+ > 5.5), Worsening renal function on ACE/ARB therapy and CKD (Chronic Kidney Disease)   multivitamin, therapeutic with minerals (THERA-VIT-M) TABS Take 1 tablet by mouth daily   ferrous sulfate 325 (65 FE) MG tablet Take 325 mg by mouth daily (with breakfast) 65mg elemental iron   VITAMIN D, CHOLECALCIFEROL, PO Take 2,000 Units by mouth daily   bumetanide (BUMEX) 0.5 MG tablet Take 0.5 mg by mouth 2 times daily   insulin syringes, disposable, U-100 0.3 ML MISC 1 each 4 times daily.   ORDER FOR DME Equipment being ordered: CPAP supplies   Catheters MISC As needed   magnesium oxide (MAG-OX) 400 MG tablet Take 2 tablets by mouth daily.   niacin (NIASPAN) 500 MG CR tablet Take 1 tablet by mouth At  Bedtime.   ketoconazole (NIZORAL) 2 % shampoo Apply to the affected area and wash off after 5 minutes.          Review of Systems:   CONSTITUTIONAL: positive for fatigue, no unexpected change in weight  RESP: no significant cough, no shortness of breath  CV: no chest pain, no new or worsening peripheral edema  GI: no nausea, no vomiting, chronic constipation, no diarrhea  :  no hematuria, positive for difficulty urinating  NEURO: More tired, and sleepy than usual for the last 2-3 days, but close to his baseline.    ROS collected from pt's wife. Pt nonverbal.            Physical Exam:   Vitals were reviewed  Temp: 96.5  F (35.8  C) Temp src: Axillary BP: 126/57 Pulse: 70 Heart Rate: 57 Resp: 12 SpO2: 100 % O2 Device: BiPAP/CPAP    Constitutional: He appears well-developed and well-nourished. No distress.   HENT:   Head: Normocephalic and atraumatic.   Eyes: EOM are normal. Pupils are equal, round, and reactive to light. No scleral icterus.   Neck: Normal range of motion. No JVD appreciated.   Cardiovascular: Normal rate, regular rhythm and normal heart sounds. Exam reveals no friction rub. No murmur heard.  Pulmonary/Chest: Effort normal. He has no wheezes. Positive for rhonchi, and scattered rales on right base.   Abdominal: Soft. Bowel sounds are normal. There is no tenderness.   Genitourinary: Orta   Musculoskeletal: He exhibits no edema or tenderness. LLE in cast   Neurological: He is sleepy, but respond to stimulus.   Skin: warm and dry.     Nursing note were reviewed.         Data:     Results for orders placed or performed during the hospital encounter of 03/31/17 (from the past 24 hour(s))   Comprehensive metabolic panel   Result Value Ref Range    Sodium 138 133 - 144 mmol/L    Potassium 5.6 (H) 3.4 - 5.3 mmol/L    Chloride 103 94 - 109 mmol/L    Carbon Dioxide 22 20 - 32 mmol/L    Anion Gap 13 3 - 14 mmol/L    Glucose 252 (H) 70 - 99 mg/dL    Urea Nitrogen 115 (H) 7 - 30 mg/dL    Creatinine 5.39 (H) 0.66  - 1.25 mg/dL    GFR Estimate 10 (L) >60 mL/min/1.7m2    GFR Estimate If Black 13 (L) >60 mL/min/1.7m2    Calcium 8.9 8.5 - 10.1 mg/dL    Bilirubin Total 0.3 0.2 - 1.3 mg/dL    Albumin 2.0 (L) 3.4 - 5.0 g/dL    Protein Total 6.8 6.8 - 8.8 g/dL    Alkaline Phosphatase 100 40 - 150 U/L     (H) 0 - 70 U/L    AST Unsatisfactory specimen - hemolyzed 0 - 45 U/L   CBC with platelets differential   Result Value Ref Range    WBC 12.9 (H) 4.0 - 11.0 10e9/L    RBC Count 4.31 (L) 4.4 - 5.9 10e12/L    Hemoglobin 12.1 (L) 13.3 - 17.7 g/dL    Hematocrit 39.2 (L) 40.0 - 53.0 %    MCV 91 78 - 100 fl    MCH 28.1 26.5 - 33.0 pg    MCHC 30.9 (L) 31.5 - 36.5 g/dL    RDW 17.1 (H) 10.0 - 15.0 %    Platelet Count 206 150 - 450 10e9/L    Diff Method Automated Method     % Neutrophils 81.9 %    % Lymphocytes 8.3 %    % Monocytes 9.4 %    % Eosinophils 0.1 %    % Basophils 0.1 %    % Immature Granulocytes 0.2 %    Nucleated RBCs 0 0 /100    Absolute Neutrophil 10.5 (H) 1.6 - 8.3 10e9/L    Absolute Lymphocytes 1.1 0.8 - 5.3 10e9/L    Absolute Monocytes 1.2 0.0 - 1.3 10e9/L    Absolute Eosinophils 0.0 0.0 - 0.7 10e9/L    Absolute Basophils 0.0 0.0 - 0.2 10e9/L    Abs Immature Granulocytes 0.0 0 - 0.4 10e9/L    Absolute Nucleated RBC 0.0    CRP inflammation   Result Value Ref Range    CRP Inflammation 190.0 (H) 0.0 - 8.0 mg/L   INR   Result Value Ref Range    INR 2.10 (H) 0.86 - 1.14   Lactic acid whole blood   Result Value Ref Range    Lactic Acid 2.1 0.7 - 2.1 mmol/L   Troponin I   Result Value Ref Range    Troponin I ES 0.126 (HH) 0.000 - 0.045 ug/L   ISTAT gases lactate екатерина POCT   Result Value Ref Range    Ph Venous 7.47 (H) 7.32 - 7.43 pH    PCO2 Venous 34 (L) 40 - 50 mm Hg    PO2 Venous 34 25 - 47 mm Hg    Bicarbonate Venous 24 21 - 28 mmol/L    O2 Sat Venous 71 %    Lactic Acid 1.3 0.7 - 2.1 mmol/L   Chest  XR, 1 view portable    Narrative    Exam: XR CHEST PORT 1 VW, 3/31/2017 9:07 PM    Indication: Fever, elevated  WBC    Comparison: 5/15/2014    Findings:   Cardiac silhouette is prominent and unchanged.  shunt catheter is  partly visualized. No kinking or discontinuity is seen in the  visualized catheter. Cardiac silhouette is mildly prominent. No  significant pleural effusion or pneumothorax. No focal airspace  opacity.      Impression    Impression: No new acute airspace disease.   Blood culture   Result Value Ref Range    Specimen Description Blood Right Hand     Culture Micro Pending     Micro Report Status Pending    Blood culture   Result Value Ref Range    Specimen Description Blood Right Arm     Culture Micro Pending     Micro Report Status Pending    UA with Microscopic   Result Value Ref Range    Color Urine Yellow     Appearance Urine Slightly Cloudy     Glucose Urine Negative NEG mg/dL    Bilirubin Urine Negative NEG    Ketones Urine Negative NEG mg/dL    Specific Gravity Urine 1.012 1.003 - 1.035    Blood Urine Moderate (A) NEG    pH Urine 6.0 5.0 - 7.0 pH    Protein Albumin Urine 30 (A) NEG mg/dL    Urobilinogen mg/dL Normal 0.0 - 2.0 mg/dL    Nitrite Urine Negative NEG    Leukocyte Esterase Urine Large (A) NEG    Source Catheterized Urine     WBC Urine >182 (H) 0 - 2 /HPF    RBC Urine 30 (H) 0 - 2 /HPF    Bacteria Urine Many (A) NEG /HPF    Calcium Oxalate Few (A) NEG /HPF   Urine Culture   Result Value Ref Range    Specimen Description Unspecified Urine     Special Requests Specimen received in preservative     Culture Micro Pending     Micro Report Status Pending    Abd/pelvis CT no contrast - Stone Protocol   Result Value Ref Range    Radiologist flags Possible stercoral proctitis (Urgent)     Narrative    Resident preliminary report:    1. Atrophic native kidneys, no renal calculi. Mild left urinary tract  dilatation appears chronic, as also noted on CT 9/7/2009.   2. Moderate rectal stool burden with circumferential rectal wall  thickening with stranding of presacral fat, this may be secondary  to  stercoral proctitis.  3. Segment 2 and 3 intrahepatic artery ductal dilatation, increased  since 2009 and may be secondary to a benign or malignant stricture.  4. 7 mm speculated left lung base nodule, recommend 12 month follow-up  CT in view of suspicious morphology.  5. Mild subcutaneous fat stranding in the right gluteal region is  likely secondary to injection sites, however cannot exclude a mass  lesion at this location. Please correlate clinically.   6. Cholelithiasis and diverticulosis with no inflammation.  7. L5 bilateral spondylolysis with grade I anterolisthesis L5/S1.     [Urgent Result: Possible stercoral proctitis]    Finding was identified on 3/31/2017 11:35 PM.     Dr. Montana was contacted by Dr. Mati Johnson at 3/31/2017 11:51  PM and verbalized understanding of the urgent finding.      *Note: Due to a large number of results and/or encounters for the requested time period, some results have not been displayed. A complete set of results can be found in Results Review.      All cardiac studies reviewed   All imaging studies reviewed      Admitting Physician:Leah Liriano MD      Day team addendum  April 1, 2017    Assessment and plan:  Melvin Bhatia is a 76 year old male with a significant past medical history of cerebrovascular disease, normal pressure hydrocephalus, status post ventriculoperitoneal shunting, progressive cognitive dysfunction, multiple falls, HTN, Type 2 DM, hyperlipidemia, CHF, CKD, and COPD who presented with 3 days Hx of poor oral intake, and found to have acute kidney injury and possible UTI. Being managed for pre-renal SHERYL and possible UTI.      ## SHERYL on CKD3b (baseline Cr 1.5-2)  Cr up to 5.39, GFR 10 on admission. BUN Cr ratio ~22. Likely pre-renal due to decreased PO intake. Bedside US today showed a collapsible IVC. Will plan to rehydrate patient gently. Reassess in the evening and decrease fluids as per output.   - On fluid resuscitation   ml/hr. Will consider decreasing rate in the evening  - Follow up BMP q6h  - Strict I/O   - Daily weights  - Pharmacy consult placed to assess for nephrotoxic medications on his list and to renally adjust medicaitons  - Tele  - Echocardiogram pending (rule out cardiac dysfunction)    ## UTI  Patient has a diagnosis of atonic bladder and requires intermittent self catheterizations about 5 times a day. UA positive for large leukocyte esterase, blood. Will continue empiric treatment based on past culture susceptibilities, will narrow based on current culture results.   - Continue on IV Vancomycin and Ceftriaxone given previous urine cultures results Enterococcus faecalis- Sensitive to Vanco (2/19/17)  Klebsiella pneumoniae- Sensitive to Ceftriaxone (2/24/16, 5/13/16)  - Follow up culture results. Narrow based on susceptibilities.     ## Stercoral proctitis  Seen in abd. CT. Pt denied abdominal pain, stable VS.  - Colorectal surgery consult placed. Recommend bowel regimen. Avoid per rectum intervention.   - Lactulose 1 time ordered   - Miralax BID and Senna BID scheduled.   - HOLD PTA ferrous sulfate while being treated for constipation    Cha Waite MD MPH  PGY2- [facility], Family Medicine  Pager- [phone]

## 2017-04-01 NOTE — PROGRESS NOTES
04/01/17 1206   General Information   Onset Date 03/31/17   Start of Care Date 04/01/17   Referring Physician Cha Waite MD   Patient Profile Review/OT: Additional Occupational Profile Info See Profile for full history and prior level of function   Patient/Family Goals Statement Pt unable to provide personal goal   Swallowing Evaluation Bedside swallow evaluation   Behaviorial Observations Confused  (baseline)   Mode of current nutrition Oral diet   Type of oral diet Regular;Thin liquid   Comments Orders received for swallow evaluation. Pt with a significant past medical history of cerebrovascular disease, normal pressure hydrocephalus, status post ventriculoperitoneal shunting, progressive cognitive dysfunction, multiple falls, HTN, Type 2 DM, hyperlipidemia, CHF, CKD, and COPD who presents with 3 days Hx of poor oral intake, and found to have acute kidney injury. Pt known to this service from prior admissions, but has not been seen within [facility] for several years. Last discharged on dysphagia diet level 3 with thin liquids in 2012.   Clinical Swallow Evaluation   Oral Musculature unable to assess due to poor participation/comprehension  (generalized weakness)   Structural Abnormalities none present   Dentition edentulous, does not have dentures  (dentures are reportedly at home )   Mucosal Quality dry   Laryngeal Function Swallow;Voicing initiated;Cough;Throat clear  (Pt not following commands to assess volitional abilities)   Additional Documentation Yes   Swallow Eval   Feeding Assistance dependent   Clinical Swallow Eval: Thin Liquid Texture Trial   Mode of Presentation, Thin Liquids straw;fed by clinician   Volume of Liquid or Food Presented sips x2   Oral Phase of Swallow (reduced oral awareness; poor bolus formation/control)   Pharyngeal Phase of Swallow impaired;coughing/choking  (overt s/sx of aspiration)   Diagnostic Statement suspect high aspiration risk with thin liquids   Clinical Swallow  Eval: Nectar Thick Liquid Texture Trial   Mode of Presentation, Nectar straw;fed by clinician   Volume of Nectar Presented 3oz   Oral Phase, Nectar (reduced oral awareness; adequate)   Pharyngeal Phase, Nectar intact  (no overt s/sx of aspiration observed)   Diagnostic Statement swallow appears functional for small sips of nectar-thick liquids   Clinical Swallow Eval: Puree Solid Texture Trial   Mode of Presentation, Puree spoon;fed by clinician   Volume of Puree Presented tsp bites x6   Oral Phase, Puree WFL   Pharyngeal Phase, Puree intact  (no overt s/sx of aspiration observed)   Diagnostic Statement swallow appears functional for puree textures   Clinical Swallow Eval: Semisolid Texture Trial   Mode of Presentation, Semisolid fed by clinician;spoon   Volume of Semisolid Food Presented small bite x1   Oral Phase, Semisolid (poor oral awareness; inability to mastication soft solid)   Pharyngeal Phase, Semisolid (no overt s/sx of aspiration observed)   Diagnostic Statement Pt eventually swallowed small pieces of soft solid with liquid wash   Swallow Compensations   Swallow Compensations Pacing;Reduce amounts;Alternate viscosity of consistencies  (implemented by clinician)   Esophageal Phase of Swallow   Patient reports or presents with symptoms of esophageal dysphagia Yes  (frequent eructation)   General Therapy Interventions   Planned Therapy Interventions Dysphagia Treatment   Dysphagia treatment Instruction of safe swallow strategies;Modified diet education   Swallow Eval: Clinical Impressions   Skilled Criteria for Therapy Intervention Skilled criteria met.  Treatment indicated.   Functional Assessment Scale (FAS) 3   Treatment Diagnosis moderate dysphagia   Diet texture recommendations Dysphagia diet level 1;Nectar thick liquids   Recommended Feeding/Eating Techniques alternate between small bites and sips of food/liquid;maintain upright posture during/after eating for 30 mins;small sips/bites   Demonstrates  Need for Referral to Another Service occupational therapy;physical therapy;dietitian;respiratory therapy   Therapy Frequency 5 times/wk   Predicted Duration of Therapy Intervention (days/wks) 2 weeks   Anticipated Discharge Disposition extended care facility   Risks and Benefits of Treatment have been explained. Yes   Patient, family and/or staff in agreement with Plan of Care Yes   Clinical Impression Comments Clinical swallow evaluation completed per MD order. Pt presents with moderate oral-pharyngeal dysphagia in the setting of confusion and weakness. Pt demonstrates reduced oral awareness, poor bolus formation/control, and elevated aspiration risk. Overt s/sx of aspiration observed with thin liquids. No s/sx of aspiraiton observed with additional presented textures, however, Pt with inadequate mastication/bolus manipulation of soft solid textures. Of note, Pt is edentulous for exam-- does reportedly wear dentures at baseline. Recommend downgrade diet to dysphagia level 1 with nectar-thick liquids with supervision/assist. Pt to sit upright for all PO intake, take small bites/sips and alternate consistencies. ST to follow per POC.   Total Evaluation Time   Total Evaluation Time (Minutes) 16

## 2017-04-01 NOTE — ED NOTES
Writer attempted to call report. RN not assigned at this time. RN will call to get report after patient is assigned.

## 2017-04-01 NOTE — ED PROVIDER NOTES
History     Chief Complaint   Patient presents with     Abnormal Labs     WBC 14.6. . Creatnine 5.5     HPI  Melvin Bhatia is a 76 year old male who presents with elevated WBC and acute increase in creatinine. He has a complex history of type 2 DM, PAT and atrial fibrillation, HTN, COPD, normal pressure hydrocephalus and multi ifarct dementia. He moved to a new nursing home at the beginning of this week and hasn't been doing well over the past week. He has been more lethargic, weak, and has not been eating or drinking fluids for at least 72 hours. He has had no fever. He has a chronic intermittent cough. Urine (chronic indwelling Orta) has been cloudy without blood. He has no nausea or vomiting, but has been refusing food. He has not complained of pain, but his wife states he rarely does even when there is a problem.     PAST MEDICAL HISTORY:   Past Medical History:   Diagnosis Date     ARDS (adult respiratory distress syndrome) (H)      Atrial fibrillation (H)     first diagnosed 11-08     Bell's palsy 1/2010     Cauda equina syndrome with neurogenic bladder (H)     self catheter     Cerebellar infarct (H) 4/2012    left cerebellar infarct.  felt embolic hx afib     CHF (congestive heart failure) (H)      Chronic airway obstruction, not elsewhere classified      Chronic infection     VRE     CKD (chronic kidney disease) stage 4, GFR 15-29 ml/min (H)      Congestive heart failure, unspecified 5/06    EF 48%     Coronary artery disease     abnl mps with ef 46% and small-med area of ischemia '08 [facility]     CVA (cerebral infarction)     old lacunar infarct in the posterior left     Diabetes      Essential hypertension, benign      Gastro-oesophageal reflux disease      Heart attack (H)      Hemorrhage of gastrointestinal tract, unspecified 2/2003    UGI Bleed with transfusion     Hydrocephalus     plan  Shunt     Hypersomnia with sleep apnea, unspecified     Bpap     Intraventricular hemorrhage  1/2012     Legionella pneumonia (H) 8/09     Lumbago      Lumbosacral plexopathy 10/09    L diabetic myotrophy     Mixed hyperlipidemia      Other osteoporosis 3/2006     Other specified anemias 2002    Nl colonoscopy/EGD; Nl iron studies 4/06     Other specified disorders of rotator cuff syndrome of shoulder and allied disorders      Other testicular hypofunction     on Androgel     Palpitations     HOLTER     Sleep apnea     uses cpap     Subarachnoid hemorrhage (H) 1/2012     Tibial plateau fracture 2/2011    left     Type II or unspecified type diabetes mellitus without mention of complication, not stated as uncontrolled      Unspecified disorder resulting from impaired renal function     Stage 3 Chronic Renal Dz       PAST SURGICAL HISTORY:   Past Surgical History:   Procedure Laterality Date     C DEXA, BONE DENSITY, AXIAL SKEL  3/2006    Osteoporosis     CARDIAC NUC CONY STRESS TEST NL  5/06    No ischemia, EF 44 %     CARDIAC NUC CONY STRESS TEST NL  6/2008, 3/09    sm to mod fixed apical defect EF 46%     HC COLONOSCOPY THRU STOMA, DIAGNOSTIC  2/2003    Normal, repeat 10 yr     HC DOPPLER ECHO PULSED, COMPLETE      EF 50-55%     IMPLANT SHUNT VENTRICULOPERITONEAL  2/28/2012    Procedure:IMPLANT SHUNT VENTRICULOPERITONEAL; Ventriculoperitoneal Shunt Placement; Surgeon:ALESHA VELAZQUEZ; Location:[facility] OR     OPEN REDUCTION INTERNAL FIXATION TIBIAL PLATEAU  3/2011    left     SURGICAL HISTORY OF -   2001    R wrist surg fx/ steel plate     SURGICAL HISTORY OF -   age 20    Inguinal hernia     SURGICAL HISTORY OF -   2003    Bladder stim surg, not sucessful     SURGICAL HISTORY OF -       Lymphedema treatment of legs     TONSILLECTOMY  child     UPPER GI ENDOSCOPY  2/2003    esophageal ulcers, gastritis, duodenitis     UPPER GI ENDOSCOPY  4/2003    erosive gastropathy from ASA       FAMILY HISTORY:   Family History   Problem Relation Age of Onset     C.A.D. Father      DIABETES Maternal Grandmother   "      SOCIAL HISTORY:   Social History   Substance Use Topics     Smoking status: Former Smoker     Quit date: 6/26/1997     Smokeless tobacco: Never Used      Comment: quit 1997       2-3 ppd for 45 yrs      Alcohol use No      Comment: recovering since 1982           I have reviewed the Medications, Allergies, Past Medical and Surgical History, and Social History in the Epic system.    Review of Systems   Unable to perform ROS: Patient nonverbal   Constitutional: Negative for chills and fever.   HENT: Negative for drooling.    Respiratory: Positive for cough. Negative for shortness of breath.    Cardiovascular: Negative for chest pain.   Gastrointestinal: Negative for abdominal pain, nausea and vomiting.   Genitourinary: Positive for difficulty urinating. Negative for hematuria.   Musculoskeletal: Negative for back pain and neck pain.   Skin: Negative for rash.   Neurological: Negative for headaches.   Hematological: Negative for adenopathy.   Psychiatric/Behavioral: Positive for confusion and decreased concentration.       Physical Exam   BP: 97/66  Pulse: 101  Heart Rate: 95  Temp: 97.8  F (36.6  C)  Resp: 22  Height: 162.6 cm (5' 4\")  Weight: 93 kg (205 lb)  SpO2: 98 %  Physical Exam   Constitutional: He appears well-developed and well-nourished. No distress.   HENT:   Head: Normocephalic and atraumatic.   Left Ear: External ear normal.   Nose: Nose normal.   Mouth/Throat: Oropharynx is clear and moist. No oropharyngeal exudate.   Eyes: EOM are normal. Pupils are equal, round, and reactive to light. No scleral icterus.   Neck: Normal range of motion. Neck supple. No JVD present.   Cardiovascular: Normal rate, regular rhythm and normal heart sounds.  Exam reveals no friction rub.    No murmur heard.  Pulmonary/Chest: Effort normal. He has no wheezes. He has rhonchi. He has rales in the right lower field.   Abdominal: Soft. Bowel sounds are normal. There is no tenderness.   Genitourinary:   Genitourinary " Comments: Orta   Musculoskeletal: He exhibits no edema or tenderness.   Left ankle in cast   Lymphadenopathy:     He has no cervical adenopathy.   Neurological: He is alert. No cranial nerve deficit. GCS eye subscore is 4. GCS verbal subscore is 4. GCS motor subscore is 4.   Skin: Skin is warm and dry.   Psychiatric: He has a normal mood and affect.   Nursing note and vitals reviewed.      ED Course     ED Course     Procedures             EKG Interpretation:      Interpreted by JULIO CÉSAR REGALADO  Time reviewed: 2317  Symptoms at time of EKG: None   Rhythm: normal sinus   Rate: Normal  Axis: Left Axis Deviation  Ectopy: none  Conduction: nonspecific interventricular conduction block  ST Segments/ T Waves: T wave inversion I and aVL  Q Waves: nonspecific  Comparison to prior: Unchanged from 02/20/13    Clinical Impression: left ventricular hypertrophy, IVCD      Labs/Imaging    Results for orders placed or performed during the hospital encounter of 03/31/17 (from the past 24 hour(s))   Comprehensive metabolic panel   Result Value Ref Range    Sodium 138 133 - 144 mmol/L    Potassium 5.6 (H) 3.4 - 5.3 mmol/L    Chloride 103 94 - 109 mmol/L    Carbon Dioxide 22 20 - 32 mmol/L    Anion Gap 13 3 - 14 mmol/L    Glucose 252 (H) 70 - 99 mg/dL    Urea Nitrogen 115 (H) 7 - 30 mg/dL    Creatinine 5.39 (H) 0.66 - 1.25 mg/dL    GFR Estimate 10 (L) >60 mL/min/1.7m2    GFR Estimate If Black 13 (L) >60 mL/min/1.7m2    Calcium 8.9 8.5 - 10.1 mg/dL    Bilirubin Total 0.3 0.2 - 1.3 mg/dL    Albumin 2.0 (L) 3.4 - 5.0 g/dL    Protein Total 6.8 6.8 - 8.8 g/dL    Alkaline Phosphatase 100 40 - 150 U/L     (H) 0 - 70 U/L    AST Unsatisfactory specimen - hemolyzed 0 - 45 U/L   CBC with platelets differential   Result Value Ref Range    WBC 12.9 (H) 4.0 - 11.0 10e9/L    RBC Count 4.31 (L) 4.4 - 5.9 10e12/L    Hemoglobin 12.1 (L) 13.3 - 17.7 g/dL    Hematocrit 39.2 (L) 40.0 - 53.0 %    MCV 91 78 - 100 fl    MCH 28.1 26.5 - 33.0 pg     MCHC 30.9 (L) 31.5 - 36.5 g/dL    RDW 17.1 (H) 10.0 - 15.0 %    Platelet Count 206 150 - 450 10e9/L    Diff Method Automated Method     % Neutrophils 81.9 %    % Lymphocytes 8.3 %    % Monocytes 9.4 %    % Eosinophils 0.1 %    % Basophils 0.1 %    % Immature Granulocytes 0.2 %    Nucleated RBCs 0 0 /100    Absolute Neutrophil 10.5 (H) 1.6 - 8.3 10e9/L    Absolute Lymphocytes 1.1 0.8 - 5.3 10e9/L    Absolute Monocytes 1.2 0.0 - 1.3 10e9/L    Absolute Eosinophils 0.0 0.0 - 0.7 10e9/L    Absolute Basophils 0.0 0.0 - 0.2 10e9/L    Abs Immature Granulocytes 0.0 0 - 0.4 10e9/L    Absolute Nucleated RBC 0.0    CRP inflammation   Result Value Ref Range    CRP Inflammation 190.0 (H) 0.0 - 8.0 mg/L   INR   Result Value Ref Range    INR 2.10 (H) 0.86 - 1.14   Lactic acid whole blood   Result Value Ref Range    Lactic Acid 2.1 0.7 - 2.1 mmol/L   Troponin I   Result Value Ref Range    Troponin I ES 0.126 (HH) 0.000 - 0.045 ug/L   ISTAT gases lactate екатерина POCT   Result Value Ref Range    Ph Venous 7.47 (H) 7.32 - 7.43 pH    PCO2 Venous 34 (L) 40 - 50 mm Hg    PO2 Venous 34 25 - 47 mm Hg    Bicarbonate Venous 24 21 - 28 mmol/L    O2 Sat Venous 71 %    Lactic Acid 1.3 0.7 - 2.1 mmol/L   Chest  XR, 1 view portable    Narrative    Exam: XR CHEST PORT 1 VW, 3/31/2017 9:07 PM    Indication: Fever, elevated WBC    Comparison: 5/15/2014    Findings:   Cardiac silhouette is prominent and unchanged.  shunt catheter is  partly visualized. No kinking or discontinuity is seen in the  visualized catheter. Cardiac silhouette is mildly prominent. No  significant pleural effusion or pneumothorax. No focal airspace  opacity.      Impression    Impression: No new acute airspace disease.   Blood culture   Result Value Ref Range    Specimen Description Blood Right Hand     Culture Micro Pending     Micro Report Status Pending    Blood culture   Result Value Ref Range    Specimen Description Blood Right Arm     Culture Micro Pending     Micro Report  Status Pending    UA with Microscopic   Result Value Ref Range    Color Urine Yellow     Appearance Urine Slightly Cloudy     Glucose Urine Negative NEG mg/dL    Bilirubin Urine Negative NEG    Ketones Urine Negative NEG mg/dL    Specific Gravity Urine 1.012 1.003 - 1.035    Blood Urine Moderate (A) NEG    pH Urine 6.0 5.0 - 7.0 pH    Protein Albumin Urine 30 (A) NEG mg/dL    Urobilinogen mg/dL Normal 0.0 - 2.0 mg/dL    Nitrite Urine Negative NEG    Leukocyte Esterase Urine Large (A) NEG    Source Catheterized Urine     WBC Urine >182 (H) 0 - 2 /HPF    RBC Urine 30 (H) 0 - 2 /HPF    Bacteria Urine Many (A) NEG /HPF    Calcium Oxalate Few (A) NEG /HPF   Urine Culture   Result Value Ref Range    Specimen Description Unspecified Urine     Special Requests Specimen received in preservative     Culture Micro Pending     Micro Report Status Pending      *Note: Due to a large number of results and/or encounters for the requested time period, some results have not been displayed. A complete set of results can be found in Results Review.       Abd CT (JACQUELINE prelim): Atrophic right kidney. Mild L hydroureter with transition at the left UPJ at site of a crossing vessel. Cholilithiasis. Mild left intrahepatic biliary dilation.    Assessments & Plan (with Medical Decision Making)   Impression:  1. Acute renal failure. He has baseline creatinine of 1.8-2 range. Last tested 2 weeks ago. He has complete right kidney atrophy on CT with mild left hydronephrosis with transition at a crossing vessel. He has had no intake of food or fluids for the last 3 days per his wife's report. Dehydration is likely contributing. He has cloudy urine in his catheter. Partial distal obstruction of the left kidney cannot be excluded. He is mildly hyperkalemic.   2. Chronic indwelling Orta with recurrent UTI. He has no fever, has normal pulse and BP. He has likely current UTI. In the past he has grown resistant e coli, enterococcus, and klebsiella. He  has allergy to PCN but has tolerated cephalosporins in the past. Culture pending. In the setting of increased WBC will treat empirically with ceftriaxone and vancomycin to start.  3. Chronic neurologic and cognitive deficits related to stroke and NPH. Unable to participate in history and ROS. Needs observation for other sites of infection.  4. Mild troponin elevation. No acute changes on EKG, but chronic changes limit interpretation for ischemia. Will trend troponin. Suspect ARF is contributing to the elevation.    He will be admitted to the [facility] service.    I have reviewed the nursing notes.    I have reviewed the findings, diagnosis, plan and need for follow up with the patient.    New Prescriptions    No medications on file       Final diagnoses:   Acute renal failure, unspecified acute renal failure type (H)   Urinary tract infection associated with indwelling urethral catheter, initial encounter   Cognitive deficit, post-stroke       3/31/2017   [facility], [place], EMERGENCY DEPARTMENT     Nate Montana MD  03/31/17 7426

## 2017-04-01 NOTE — PHARMACY-VANCOMYCIN DOSING SERVICE
Pharmacy Vancomycin Initial Note  Date of Service 2017  Patient's  1940  76 year old, male    Indication: Urinary Tract Infection    Current estimated CrCl = Estimated Creatinine Clearance: 11.8 mL/min (based on Cr of 5.39).    Creatinine for last 3 days  3/31/2017:  8:53 PM Creatinine 5.39 mg/dL    Recent Vancomycin Level(s) for last 3 days  No results found for requested labs within last 72 hours.      Vancomycin IV Administrations (past 72 hours)      No vancomycin orders with administrations in past 72 hours.                Nephrotoxins and other renal medications (Future)    Start     Dose/Rate Route Frequency Ordered Stop    17 0000  vancomycin (VANCOCIN) 1,500 mg in NaCl 0.9 % 250 mL intermittent infusion      1,500 mg Intravenous ONCE 178      17 232  vancomycin place anaya - receiving intermittent dosing      1 each Does not apply SEE ADMIN INSTRUCTIONS 17            Contrast Orders - past 72 hours     None                Plan:  1.  Start vancomycin  1500 mg IV x 1 dose then intermittent based on levels   2.  Goal Trough Level: 10-15 mg/L   3.  Pharmacy will check trough levels as appropriate in 1-3 Days.    4. Serum creatinine levels will be ordered daily for the first week of therapy and at least twice weekly for subsequent weeks.    5. Belcourt method utilized to dose vancomycin therapy: Method 2    Ansley Kinsey, pharmD, BCPS

## 2017-04-01 NOTE — PROGRESS NOTES
Reported off about patient general and current status to 5A RN. Patient was transferred from the  to 5A at 0344 by 2 RN.

## 2017-04-01 NOTE — CONSULTS
Colorectal Surgery Consult Note     Melvin Bhatia MRN# [id number]   YOB: 1940 Age: 76 year old      Date of Admission:  3/31/2017    Reason for Consult: Proctitis  Consulting Service: Family medicine  Consulting Physician: Dr. Santana    Assessment: Melvin Bhatia is a(n) 76 year old year old male with PMH NPH s/p  shunt, cognitive dysfunction, CHF, CKD, COPD, p/w decreased PO intake, SHERYL, UTI, and proctitis.    Plan:  - Recommend stool softeners as you are, would hold on any rectal treatments for now  - If proctitis persists, consider GI consult for flexible sigmoidoscopy    Discussed with fellow, Dr. Jackson.  -------------------    HPI:     Melvin Bhatia is a(n) 76 year old year old male with PMH NPH s/p  shunt, cognitive dysfunction, CHF, CKD, COPD, p/w decreased PO intake, SHERYL, UTI, and proctitis.  Per chart review, refusing oral intake at his current nursing home.  No fevers, blood in stool.  He did nod confirming no abdominal pain.    Past Medical History:  Past Medical History:   Diagnosis Date     ARDS (adult respiratory distress syndrome) (H)      Atrial fibrillation (H)     first diagnosed 11-08     Bell's palsy 1/2010     Cauda equina syndrome with neurogenic bladder (H)     self catheter     Cerebellar infarct (H) 4/2012    left cerebellar infarct.  felt embolic hx afib     CHF (congestive heart failure) (H)      Chronic airway obstruction, not elsewhere classified      Chronic infection     VRE     CKD (chronic kidney disease) stage 4, GFR 15-29 ml/min (H)      Congestive heart failure, unspecified 5/06    EF 48%     Coronary artery disease     abnl mps with ef 46% and small-med area of ischemia '08 [facility]     CVA (cerebral infarction)     old lacunar infarct in the posterior left     Diabetes      Essential hypertension, benign      Gastro-oesophageal reflux disease      Heart attack (H)      Hemorrhage of gastrointestinal tract, unspecified 2/2003    UGI Bleed with  transfusion     Hydrocephalus     plan  Shunt     Hypersomnia with sleep apnea, unspecified     Bpap     Intraventricular hemorrhage 1/2012     Legionella pneumonia (H) 8/09     Lumbago      Lumbosacral plexopathy 10/09    L diabetic myotrophy     Mixed hyperlipidemia      Other osteoporosis 3/2006     Other specified anemias 2002    Nl colonoscopy/EGD; Nl iron studies 4/06     Other specified disorders of rotator cuff syndrome of shoulder and allied disorders      Other testicular hypofunction     on Androgel     Palpitations     HOLTER     Sleep apnea     uses cpap     Subarachnoid hemorrhage (H) 1/2012     Tibial plateau fracture 2/2011    left     Type II or unspecified type diabetes mellitus without mention of complication, not stated as uncontrolled      Unspecified disorder resulting from impaired renal function     Stage 3 Chronic Renal Dz       Past Surgical History:  Past Surgical History:   Procedure Laterality Date     C DEXA, BONE DENSITY, AXIAL SKEL  3/2006    Osteoporosis     CARDIAC NUC CONY STRESS TEST NL  5/06    No ischemia, EF 44 %     CARDIAC NUC CONY STRESS TEST NL  6/2008, 3/09    sm to mod fixed apical defect EF 46%     HC COLONOSCOPY THRU STOMA, DIAGNOSTIC  2/2003    Normal, repeat 10 yr     HC DOPPLER ECHO PULSED, COMPLETE      EF 50-55%     IMPLANT SHUNT VENTRICULOPERITONEAL  2/28/2012    Procedure:IMPLANT SHUNT VENTRICULOPERITONEAL; Ventriculoperitoneal Shunt Placement; Surgeon:ALESHA VELAZQUEZ; Location:[facility] OR     OPEN REDUCTION INTERNAL FIXATION TIBIAL PLATEAU  3/2011    left     SURGICAL HISTORY OF -   2001    R wrist surg fx/ steel plate     SURGICAL HISTORY OF -   age 20    Inguinal hernia     SURGICAL HISTORY OF -   2003    Bladder stim surg, not sucessful     SURGICAL HISTORY OF -       Lymphedema treatment of legs     TONSILLECTOMY  child     UPPER GI ENDOSCOPY  2/2003    esophageal ulcers, gastritis, duodenitis     UPPER GI ENDOSCOPY  4/2003    erosive gastropathy from  ASA       Allergies:     Allergies   Allergen Reactions     Penicillins Hives     Albuterol Other (See Comments)     Albuterol Sulfate Other (See Comments)     SVT/tachycardia with albuterol     Atorvastatin Other (See Comments)     Description: Lipitor, Description: Lipitor     Atorvastatin Calcium      Cipro [Ciprofloxacin] Diarrhea     Ciprofloxacin Other (See Comments)     Description: Cipro, Description: Cipro     Simvastatin Other (See Comments)     Description: Zocor, Description: Zocor  C/o weak muscles and severe abdominal pain.     Warfarin Unknown and Other (See Comments)       Medications:    No current facility-administered medications on file prior to encounter.   Current Outpatient Prescriptions on File Prior to Encounter:  insulin glargine (LANTUS SOLOSTAR) 100 UNIT/ML injection Inject 37 Units Subcutaneous 2 times daily   insulin lispro (HUMALOG KWIKPEN) 100 UNIT/ML injection Inject 30 units before breakfast and lunch and 5 units before dinner  as directed.   aspirin 81 MG tablet Take 1 tablet (81 mg) by mouth daily   acetaminophen (TYLENOL) 325 MG tablet Take 3 tablets (975 mg) by mouth every 6 hours as needed for mild pain   warfarin (COUMADIN) 3 MG tablet Take 1 tablet (3 mg) by mouth daily   polyethylene glycol (MIRALAX/GLYCOLAX) Packet Take 17 g by mouth daily as needed for constipation   senna-docusate (SENOKOT-S;PERICOLACE) 8.6-50 MG per tablet Take 1-2 tablets by mouth 2 times daily   fexofenadine (ALLEGRA) 180 MG tablet Take 1 tablet (180 mg) by mouth daily   budesonide-formoterol (SYMBICORT) 160-4.5 MCG/ACT Inhaler Inhale 2 puffs into the lungs 2 times daily   Ipratropium-Albuterol (COMBIVENT RESPIMAT)  MCG/ACT inhaler Inhale 1 puff into the lungs 2 times daily Not to exceed 6 doses per day.   rosuvastatin (CRESTOR) 20 MG tablet Take 1 tablet (20 mg) by mouth daily Needs fasting labs before any further refills   RANITIDINE HCL PO Take 150 mg by mouth daily   calcium citrate  (CALCITRATE) 950 MG tablet Take 1 tablet by mouth daily   Cyanocobalamin (VITAMIN B12) 1000 MCG TBCR Take 1,000 mcg by mouth daily   calcium citrate-vitamin D (CITRACAL) 315-200 MG-UNIT TABS Take 2 tablets by mouth 2 times daily 2 tabs contain 500mg elemental calcium + 800 iu's vitamin D3 in them .   denosumab (PROLIA) 60 MG/ML SOLN Inject 60 mg Subcutaneous every 6 months   exenatide (BYETTA) 5 mcg/0.02mL per dose (60 doses per 1.2 mL prefilled pen) Inject 5mcg under the skin twice daily 60 minutes before meals.   metoprolol (TOPROL-XL) 50 MG 24 hr tablet Take 1 tablet (50 mg) by mouth At Bedtime   fluticasone (FLONASE) 50 MCG/ACT nasal spray Spray 2 sprays into both nostrils daily   insulin pen needle (B-D U/F) 31G X 8 MM Use 7 daily or as directed.   blood glucose monitoring (NO BRAND SPECIFIED) test strip 1 strip by In Vitro route 4 times daily   ONE TOUCH LANCETS MISC use as directed 4 x daily and prn   nitroglycerin (NITROSTAT) 0.4 MG SL tablet Place 1 tablet (0.4 mg) under the tongue every 5 minutes as needed up to 3 tablets per episode.   fluocinolone (SYNALAR) 0.01 % external solution Apply 1 to 2 times daily to scaly areas of scalp as needed.Profile Rx: patient will contact pharmacy when needed   order for DME Equipment being ordered: 4 wheeled rolling walker.   ACE/ARB NOT PRESCRIBED, INTENTIONAL, ACE & ARB not prescribed due to Hyperkalemia (baseline K+ > 5.5), Worsening renal function on ACE/ARB therapy and CKD (Chronic Kidney Disease)   multivitamin, therapeutic with minerals (THERA-VIT-M) TABS Take 1 tablet by mouth daily   ferrous sulfate 325 (65 FE) MG tablet Take 325 mg by mouth daily (with breakfast) 65mg elemental iron   VITAMIN D, CHOLECALCIFEROL, PO Take 2,000 Units by mouth daily   bumetanide (BUMEX) 0.5 MG tablet Take 0.5 mg by mouth 2 times daily   insulin syringes, disposable, U-100 0.3 ML MISC 1 each 4 times daily.   ORDER FOR DME Equipment being ordered: CPAP supplies   Catheters MISC As  needed   magnesium oxide (MAG-OX) 400 MG tablet Take 2 tablets by mouth daily.   niacin (NIASPAN) 500 MG CR tablet Take 1 tablet by mouth At Bedtime.   ketoconazole (NIZORAL) 2 % shampoo Apply to the affected area and wash off after 5 minutes.       Social History:  Social History     Social History     Marital status:      Spouse name: N/A     Number of children: N/A     Years of education: N/A     Occupational History     Not on file.     Social History Main Topics     Smoking status: Former Smoker     Quit date: 6/26/1997     Smokeless tobacco: Never Used      Comment: quit 1997       2-3 ppd for 45 yrs      Alcohol use No      Comment: recovering since 1982       Drug use: No     Sexual activity: Not on file     Other Topics Concern     Parent/Sibling W/ Cabg, Mi Or Angioplasty Before 65f 55m? Yes     Social History Narrative    Dairy/d 1 servings/d.     Caffeine 0 servings/d    Exercise walks 7 x week    Sunscreen used - No    Seatbelts used - No    Working smoke/CO detectors in the home - Yes    Guns stored in the home - No    Self Breast Exams - NA    Self Testicular Exam - No    Eye Exam up to date - Yes    Dental Exam up to date - Yes    Pap Smear up to date - NA    Mammogram up to date - NA    PSA up to date - Yes    Dexa Scan up to date - Yes 3/06    Flex Sig / Colonoscopy up to date - Yes    Immunizations up to date - No  Declines with possible allergic rxn as child    Abuse: Current or Past(Physical, Sexual or Emotional)- No    Do you feel safe in your environment - Yes    Consuelo Alfonso RN    4/30/08       Family History:  Family History   Problem Relation Age of Onset     C.A.D. Father      DIABETES Maternal Grandmother        ROS:  As noted above. No headache, dizziness. No fever, chills. No chest pain or shortness of breath. No abdominal pain, nausea, vomiting. No diarrhea or constipation. No urinary difficulties. No muscle or body aches.    Exam:  /57  Pulse 70  Temp 96.5  F (35.8  " C) (Axillary)  Resp 12  Ht 1.626 m (5' 4\")  Wt 79.8 kg (175 lb 14.4 oz)  SpO2 100%  BMI 30.19 kg/m2  General: Alert, nods to questions  Resp: NLB on cpap  Cardiac: Regular rate; extremities warm;   Abdomen: Soft, nontender, nondistended.   Rectal: Stool already on undergarment when examined, evacuated some soft stool from distal rectum, did not probe deeply however no hard stool encountered, no blood noted, did have mild pain with examination  Extremities: No LE edema or obvious joint abnormalities  Skin: Warm and dry, no jaundice or rash    Labs:  BMP notable for K 5.6, Cr 5.39  WBC 12.9   WBC    Imaging:     CTAP:    Resident preliminary report:   1. Atrophic native kidneys, no renal calculi. Mild left urinary tract  dilatation appears chronic, as also noted on CT 9/7/2009.   2. Moderate rectal stool burden with circumferential rectal wall  thickening with stranding of presacral fat, this may be secondary to  stercoral proctitis.  3. Segment 2 and 3 intrahepatic artery ductal dilatation, increased  since 2009 and may be secondary to a benign or malignant stricture.  4. 7 mm speculated left lung base nodule, recommend 12 month follow-up  CT in view of suspicious morphology.  5. Mild subcutaneous fat stranding in the right gluteal region is  likely secondary to injection sites, however cannot exclude a mass  lesion at this location. Please correlate clinically.   6. Cholelithiasis and diverticulosis with no inflammation.  7. L5 bilateral spondylolysis with grade I anterolisthesis L5/S1.     --    [facility] Surgery, PGY-3  pgr: [phone]    5:13 AM, 4/1/2017      Personally examined and discussed with the resident. In brief this is a 76 year old male admitted for YTI and urosepsis. CT scan concerning for proctitis. Had a large bowel movement after admission. Today he denies any abdominal pain and no grimace on palpation of abdomen. It is soft. Would recommend tap water enemas if he does not " have any further bowel movements. Thickening of rectum may be due to stasis of stool which should resolve as his general medical condition improves. Recommend consultation of GI as an OUTPATIENT for flex sig. If he has bloody bowl movements would consider it as an inpatient.   Discussed with Dr. Tejas Sarah MD  Colon And Rectal Surgery Fellow  [phone]    4/1/2017  10:16 AM        Staff Addendum:  Agree with the consultation H&P as documented by the housestaff. I was personally involved with the recommendations made by our service for this patient.  Julee Santo MD  Colon and Rectal Surgery Staff  [facility]

## 2017-04-01 NOTE — PHARMACY-ANTICOAGULATION SERVICE
Clinical Pharmacy - Warfarin Dosing Consult     Pharmacy has been consulted to manage this patient s warfarin therapy.  Indication: Atrial Fibrillation  Therapy Goal: INR 2-2.5  Warfarin Prior to Admission: Yes  Warfarin PTA Regimen: 2-3 mg daily  Significant drug interactions: Rosuvastatin  Recent documented change in oral intake/nutrition: Unknown  Dose Comments: 3.5 mg due to missed dose 3/31    INR   Date Value Ref Range Status   03/31/2017 2.10 (H) 0.86 - 1.14 Final   02/22/2017 1.12 0.86 - 1.14 Final       Recommend warfarin 3.5 mg today.  Pharmacy will monitor Melvin Bhatia daily and order warfarin doses to achieve specified goal.      Please contact pharmacy as soon as possible if the warfarin needs to be held for a procedure or if the warfarin goals change.      ADDENDUM:    On 4/3, goal INR changed to 2-3 per new MD consult.  Will dose accordingly.   Patricia Campbell, PharmD

## 2017-04-01 NOTE — PLAN OF CARE
Problem: Goal Outcome Summary  Goal: Goal Outcome Summary  Outcome: No Change  Arrived as transfer to  from 7b at 0415. VSS on bipap. Per RT pt requiring bipap vs cpap d/t apnea during sleep. Pt intermittently alert/lethargic. Oriented to self only. Able to communicate basic needs, has hoarse/garbled speech. Has not used the call light, bed alarm on for safety, frequent rounding done. Orta in place, patent with alie output. Soft, dark green tarry stool x 1, witnessed by colorectal MD during rectal exam. MIVF at 200 ml/hr. IV vanco infusing on arrival. PIVs x 2.

## 2017-04-01 NOTE — PROGRESS NOTES
Concerning swallow. Speech recommendations: DD1 with nectar thick liquids and crushing medications. Still coughing intermittently; suction set up at bedside. Following commands but only orientated to self. LLE in cast and chronic left shoulder pain make it difficult to turn. Lactulose x 1 d/t stool burden. Per abdullahi's, would like UOP measured every 4 hours. SB-SR, 50-60's. Echo ordered for Keshav HR. Continue to monitor and notify MD of changes.

## 2017-04-01 NOTE — PROGRESS NOTES
SPIRITUAL HEALTH SERVICES  SPIRITUAL ASSESSMENT Progress Note  [facility] ([place] 4C  ON-CALL VISIT    Fredrick has difficulty hearing. Says he feels a little better today. He has been on many floors of the hospital over the years. Visitor, wife, is thankful for warmer weather. No urgent concerns. Introduced SHS. She also described being thankful for each and every day. Unit  to follow up.      Regulo Ragland  Chaplain Resident  Pager [phone]

## 2017-04-02 NOTE — PLAN OF CARE
Problem: Goal Outcome Summary  Goal: Goal Outcome Summary  ST 5A: Recommend NPO. Pt with decline in swallow function from yesterday-- overt s/sx of aspiration across textures. Also noted to have increased difficulty communicating and expressive aphasia concerning for change in neuro status. RN made aware who will contact team.  ST to follow up as appropriate.

## 2017-04-02 NOTE — PROGRESS NOTES
COLON & RECTAL SURGERY PROGRESS NOTE    April 2, 2017    SUBJECTIVE:    Feeling ok this am  No c/o abdominal pain  No NV  Passed ++ stools since admission  No fever/chills  Developed new tachycardia overnight, -120 this am    OBJECTIVE:  Temp:  [95.8  F (35.4  C)-98.5  F (36.9  C)] 97.3  F (36.3  C)  Pulse:  [105] 105  Heart Rate:  [] 118  Resp:  [12-20] 18  BP: (109-141)/(50-66) 141/66  SpO2:  [96 %-100 %] 98 %    Intake/Output Summary (Last 24 hours) at 04/02/17 0934  Last data filed at 04/02/17 0337   Gross per 24 hour   Intake          3541.67 ml   Output             1975 ml   Net          1566.67 ml       GENERAL:  Awake, alert, no acute distress.  ABDOMEN:  Soft, NT, non-distended    LABS:  Lab Results   Component Value Date    WBC 9.0 04/02/2017     Lab Results   Component Value Date    HGB 10.3 04/02/2017     Lab Results   Component Value Date    HCT 36.1 04/02/2017     Lab Results   Component Value Date     04/02/2017     Last Basic Metabolic Panel:  Lab Results   Component Value Date     04/02/2017      Lab Results   Component Value Date    POTASSIUM 3.6 04/02/2017     Lab Results   Component Value Date    CHLORIDE 121 04/02/2017     Lab Results   Component Value Date    LORENA 7.7 04/02/2017     Lab Results   Component Value Date    CO2 21 04/02/2017     Lab Results   Component Value Date    BUN 80 04/02/2017     Lab Results   Component Value Date    CR 2.92 04/02/2017     Lab Results   Component Value Date     04/02/2017       ASSESSMENT/PLAN:  76M admitted for SHERYL & urosepsis, noted to have proctitis on CT scan with fecal loading, ?secondary to stool stasis. Now having ++ BMs since admission. abdo soft/benign on exam. Ok to eat from CRS perspective. Continue with bowel regimen to prevent constipation.  Needs GI consultation as outpatient for flex sig to assess proctitis (vs inpatient if necessary).    Reviewed with attending staff Dr. Gal Jackson MD  Colon &  Rectal Surgery Fellow  Pager [phone]    Attestation:  Discussed with the house staff team or resident(s) and agree with the findings and plan in this note. While some tachycardiac, he is passing stool and a flexible sigmoidoscopy or colonoscopy would be indicated after this hospitalization on an elective basis.   Julee Santo MD  Colon and Rectal Surgery Attending  [phone]

## 2017-04-02 NOTE — PLAN OF CARE
Problem: Goal Outcome Summary  Goal: Goal Outcome Summary  PT/5A: Initial evaluation orders received. Per RN pt tachycardic this AM and not appropriate to initiate evaluation at this time. Will check back as schedule allows

## 2017-04-02 NOTE — PLAN OF CARE
Problem: Goal Outcome Summary  Goal: Goal Outcome Summary  Outcome: No Change  Patient admitted with SHERYL. Tachycardic in 110's up to 130's this AM. Gave IV Metoprolol and solution. MD aware. Left PIV infusing @ NS/.45% 50 mL/hr. On Vanco. NPO except of meds (speech recommendation) Gave meds crushed in apple sauce. Encouraged swallowing. No signs of coughing. Left cast, c/d/i. Repo q 2 hrs. Frequent loose BMs. Black and tarry. Telemetry is tachycardic a fib with AVR. Elevated INR 2.68. Creat trending down, 2.68. Head CT done today, awaiting results. Chest xray done, showed mild pulmonary edema. ECHO done today. Continue to monitor and follow the POC.

## 2017-04-02 NOTE — PHARMACY-VANCOMYCIN DOSING SERVICE
Pharmacy Vancomycin Note  Date of Service 2017  Patient's  1940   76 year old, male    Indication: Urinary Tract Infection  Goal Trough Level: 10-15 mg/L  Day of Therapy: 3  Current Vancomycin regimen:  Intermittent dosing based on levels   Current estimated CrCl = Estimated Creatinine Clearance: 20.2 mL/min (based on Cr of 2.92).    Creatinine for last 3 days  3/31/2017:  8:53 PM Creatinine 5.39 mg/dL  2017: 12:54 AM Creatinine 4.98 mg/dL;  7:12 PM Creatinine 3.57 mg/dL  2017: 12:03 AM Creatinine 3.14 mg/dL;  6:51 AM Creatinine 2.92 mg/dL    Recent Vancomycin Levels (past 3 days)  2017:  6:51 AM Vancomycin Level 12.8 mg/L    Vancomycin IV Administrations (past 72 hours)                   vancomycin (VANCOCIN) 1,250 mg in NaCl 0.9 % 250 mL intermittent infusion (mg) 1,250 mg New Bag 17 1005    vancomycin (VANCOCIN) 1,500 mg in NaCl 0.9 % 250 mL intermittent infusion (mg) 1,500 mg New Bag 17 0129                Nephrotoxins and other renal medications (Future)    Start     Dose/Rate Route Frequency Ordered Stop    17 2327  vancomycin place anaya - receiving intermittent dosing      1 each Does not apply SEE ADMIN INSTRUCTIONS 17 2328               Contrast Orders - past 72 hours     None          Interpretation of levels and current regimen:  Trough level is  Therapeutic    Has serum creatinine changed > 50% in last 72 hours: No    Urine output:  good urine output    Renal Function: Improving    Plan:  1.  Give one time 1250 mg dose. Will get another level in 24 hrs and plan to schedule as able  2.  Pharmacy will check trough levels as appropriate in 1-3 Days.    3. Serum creatinine levels will be ordered a minimum of twice weekly.      Thanks for the consult  Tru Rose, Pharm.D, BCPS        .

## 2017-04-02 NOTE — PROGRESS NOTES
[facility] - Inpatient daily progress note    Date of admission: 3/31/2017  Date of service: 4/2/2017.           Assessment and Plan:   Assessment:   Melvin Bhatia is a 76 year old male with a significant past medical history of cerebrovascular disease, normal pressure hydrocephalus, status post ventriculoperitoneal shunting, progressive cognitive dysfunction, multiple falls, HTN, Type 2 DM, hyperlipidemia, CHF, CKD, and COPD who presented with 3 days Hx of poor oral intake, and found to have acute kidney injury and possible UTI. Being managed for pre-renal SHERYL and possible UTI.     Patient Active Problem List   Diagnosis     Osteoporosis     PAF (paroxysmal atrial fibrillation) (H)     Lumbosacral plexopathy     Testosterone deficiency     Hypertension goal BP (blood pressure) < 140/90     Atrial fibrillation (H)     CKD (chronic kidney disease) stage 3, GFR 30-59 ml/min     Hyperlipidemia LDL goal <70     Coronary artery disease     Health Care Home     ACP (advance care planning)     Elevated PSA     Anemia in chronic renal disease     Subarachnoid hemorrhage (H)     Idiopathic normal pressure hydrocephalus (INPH)     Urinary bladder neurogenic dysfunction     Stroke (H)     Cognitive deficit, post-stroke     HL (hearing loss)     Personal history of fall     GERD (gastroesophageal reflux disease)     COPD exacerbation (H)     SHIVAM (obstructive sleep apnea)     LVH (left ventricular hypertrophy)     Diastolic CHF, acute on chronic (H)     Hypoxemia     Tachyarrhythmia     Systolic and diastolic CHF, chronic (H)     Gait abnormality     Iron deficiency anemia     Allergic rhinitis     Altered mental status     VRE (vancomycin-resistant Enterococci)     Hypoglycemia     Lower urinary tract infectious disease     Type 2 diabetes with stage 3 chronic kidney disease GFR 30-59 (H)     Chronic obstructive pulmonary disease, unspecified COPD type (H)     Ankle  pain     Fx medial malleolus-closed     SHERYL (acute kidney injury) (H)      Plan:   ## SHERYL on CKD3b (baseline Cr 1.5-2): Improving   Cr was at 5.39, GFR 10 on admission. Improved to 2.92 today with IVF hydration. BUN Cr ratio >20 consistent with pre-renal, likely due to decreased PO intake. Bedside US today showed a collapsible IVC. Will plan to rehydrate patient gently as there is also concern for fluid overload given his cardiac history. Patient continues to have good urine output. He has mild hyperchloremia and hypernatremia other electrolytes stable. No evidence of acid-base abnormalities on labs.   - Gentle IVF resc with 1/2NS at 50 cc/hr  - Follow up BMP daily  - Strict I/O   - Daily weights  - Pharmacy consult placed to assess for nephrotoxic medications on his list and to renally adjust medicaitons  - Tele  - Echocardiogram pending (rule out cardiac dysfunction)     ## UTI  Patient has a diagnosis of atonic bladder and requires intermittent self catheterizations about 5 times a day. Urine culture growing gram positive cocci concerning for enterococcus species. . Will continue empiric treatment based on past culture susceptibilities, will narrow based on current culture results.   - Continue on IV Vancomycin and Ceftriaxone given previous urine cultures results Enterococcus faecalis- Sensitive to Vanco (2/19/17)  Klebsiella pneumoniae- Sensitive to Ceftriaxone (2/24/16, 5/13/16)  - Follow up culture results. Narrow based on susceptibilities.     #Acute on chronic decline in mentation:   #Normal Pressure Hydrocephalus  #Multi infarct Vascular Dementia  #metabolic encephalopathy  Likely multifactorial. Last CT head from previous admission (02/2017) showed progression of cerebral ischemic disease, consistent step-wise decline of vascular dementia. His UTI and SHERYL (Uremia) could also be contributing to metabolic encephalopathy.  -  CT head ordered today for further evaluation   - OT for cognitive eval   - Will  discuss with family to obtain baseline cognitive function     ## Hyponatremia: Mild at 148  - Calculated free water deficit 2.3 L  - IVF hydration with 1/2NS, may consider switching to D5W if sodium increases     ## Stercoral proctitis  Seen on abdominal CT. Pt denied abdominal pain, stable VS.  - Colorectal surgery consult placed. Patient had good results with bowel regimen. Avoid per rectum intervention. GI consult as outpatient for flex sig   - Miralax BID and Senna BID scheduled.   - HOLD PTA ferrous sulfate while being treated for constipation    ## Type 2 DM, last A1C 6.1 on 02/20/17  Home regimen:  Basal insulin: 37 units lantus twice daily  Prandial: 30 units before breakfast, lunch, 5 units before dinner     Hospital regimen:  Basal insulin: 20 units lantus twice daily  Prandial: 1.5 unit per 15 gm CHO  Correction: MSSI     Other chronic conditions, stable- continue home medications.  ## HTN- metoprolol XR 50mg daily   ## Hyperlipidemia- rosuvastatin, niacin.   ## A Fib- pharmacy to dose warfarin  ## COPD- Breo Ellipta, Ipratropium-albuterol (Respimat)  ## Constipation- Senokot, Magnesium citrate, miralax  ## Cerebrovascular disease w/ Hx stroke, normal pressure hydrocephalus (2012), status post ventriculoperitoneal shunting, subarachnoid hemorrhage(2012)  ## Progressive cognitive dysfunction (dementia)  ## Multiple bone fracture on left foot/ankle- cast in place. Hx of fractures after a fall on 9/16, and 2/2017.  - Recheck XR hip to evaluate for hip fracture   Fluids:1/2 NS at 50 cc/hr   Electrolytes:Hypernatremia   Nutrition:NPO, pending speech eval   Lines:PIV   Activity: Up with assist   CODE: DNR / DNI  Prophylaxis: Coumadin for A fib   Dispo: Expected discharge date 2-3 days pending clinical improvement             Interval History:   Melvin Bhatia denies any chest pain, abdominal pain, shortness of breath, or difficulty breathing. He was tachycardic overnight, but having difficulty taking  meds. Per nursing report he spits pills back out. Attempting to see if he will take suspension form. He failed repeat speech eval today. He has history of CVA, no new focal neurologic deficits noted on exam today.             Review of Systems:   CONSTITUTIONAL: no fatigue, no unexpected change in weight  RESP: no significant cough, no shortness of breath  CV: no chest pain, no palpitations, no new or worsening peripheral edema  GI: no nausea, no vomiting, no constipation, no diarrhea  : no frequency, no dysuria, no hematuria  NEURO: no weakness, no dizziness, no headaches       Physical Exam (Resident / Clinician):   Vitals were reviewed  Temp: 98.2  F (36.8  C) Temp src: Axillary BP: 97/44 Pulse: 105 Heart Rate: 78 Resp: 20 SpO2: 99 % O2 Device: None (Room air)    Constitutional:   awake, alert, cooperative, no apparent distress     Eyes:   Lids and lashes normal, pupils equal, round and reactive to light, extra ocular muscles intact, sclera clear, conjunctiva normal     Lungs:   No increased work of breathing, good air exchange, clear to auscultation bilaterally, no crackles or wheezing     Cardiovascular:   Tachycardic. Regular rate and rhythm, normal S1 and S2 and no murmur noted     Abdomen:   Normal bowel sounds, soft, non-distended, non-tender, no masses palpated     Musculoskeletal:   no lower extremity pitting edema present   Left sided lower extremity reflexes deferred 2/2 leg in cast.      Neurologic:   Awake, alert, oriented to name only. PERRLA, EOMI.    No abnormal or involuntary movements observed. Moves upper extremity voluntarily.  strength: 3+ right, 4 left. R hip flexion 4/5, L hip flexion 3/5.         Intake/Output Summary (Last 24 hours) at 04/02/17 1127  Last data filed at 04/02/17 1021   Gross per 24 hour   Intake          3541.67 ml   Output             2250 ml   Net          1291.67 ml           Data:   ROUTINE LABS (Last four results)  [facility]  Recent Labs  Lab 04/02/17  0607  04/02/17  0003 04/01/17  1912 04/01/17  0054 03/31/17 2053   * 148* 146* 140 138   POTASSIUM 3.6 3.6 4.0 4.5 5.6*   CHLORIDE 121* 118* 115* 105 103   CO2 21 23 21 22 22   ANIONGAP 7 8 11 14 13   * 270* 267* 222* 252*   BUN 80* 84* 93* 113* 115*   CR 2.92* 3.14* 3.57* 4.98* 5.39*   GFRESTIMATED 21* 19* 17* 11* 10*   GFRESTBLACK 25* 23* 20* 14* 13*   LORENA 7.7* 7.6* 7.6* 8.4* 8.9   PROTTOTAL  --   --   --   --  6.8   ALBUMIN  --   --   --   --  2.0*   BILITOTAL  --   --   --   --  0.3   ALKPHOS  --   --   --   --  100   AST  --   --   --   --  Unsatisfactory specimen - hemolyzed   ALT  --   --   --   --  233*     CBC  Recent Labs  Lab 04/02/17 0651 03/31/17 2053   WBC 9.0 12.9*   RBC 3.82* 4.31*   HGB 10.3* 12.1*   HCT 36.1* 39.2*   MCV 95 91   MCH 27.0 28.1   MCHC 28.5* 30.9*   RDW 17.3* 17.1*    206     INR  Recent Labs  Lab 04/02/17 0651 03/31/17 2053   INR 2.68* 2.10*     CRP  Recent Labs  Lab 04/02/17 0651 03/31/17 2053   .0* 190.0*       Blood culture:  Invalid input(s): BC   Urine culture:  No results for input(s): URC in the last 168 hours.  All cultures:    Recent Labs  Lab 03/31/17 2132 03/31/17 2119   CULT 50,000 to 100,000 colonies/mL Gram positive cocciCulture in progress* No growth after 2 days  No growth after 2 days         Medications:     Current Facility-Administered Medications   Medication     metoprolol (LOPRESSOR) suspension 50 mg     warfarin (COUMADIN) tablet 1 mg     0.45% sodium chloride infusion     [START ON 4/3/2017] insulin glargine (LANTUS) injection 20 Units     insulin glargine (LANTUS) injection 20 Units     Reason ACE/ARB order not selected     acetaminophen (TYLENOL) tablet 975 mg     fluticasone-vilanterol (BREO ELLIPTA) 200-25 MCG/INH oral inhaler 1 puff     fexofenadine (ALLEGRA) tablet 180 mg     fluticasone (FLONASE) 50 MCG/ACT spray 2 spray     ipratropium (ATROVENT HFA) Inhaler 1 puff     niacin (NIASPAN) CR tablet 500 mg     rosuvastatin  (CRESTOR) tablet 20 mg     senna-docusate (SENOKOT-S;PERICOLACE) 8.6-50 MG per tablet 1-2 tablet     naloxone (NARCAN) injection 0.1-0.4 mg     lidocaine 1 % 1 mL     lidocaine (LMX4) kit     sodium chloride (PF) 0.9% PF flush 3 mL     sodium chloride (PF) 0.9% PF flush 3 mL     magnesium citrate solution 148 mL     ondansetron (ZOFRAN-ODT) ODT tab 4 mg    Or     ondansetron (ZOFRAN) injection 4 mg     prochlorperazine (COMPAZINE) injection 5 mg    Or     prochlorperazine (COMPAZINE) tablet 5 mg    Or     prochlorperazine (COMPAZINE) Suppository 12.5 mg     Patient is already receiving anticoagulation with heparin, enoxaparin (LOVENOX), warfarin (COUMADIN)  or other anticoagulant medication     polyethylene glycol (MIRALAX/GLYCOLAX) Packet 17 g     No lozenges or gum should be given while patient on BIPAP     hypromellose-dextran (ARTIFICAL TEARS) ophthalmic solution 1 drop     HOLD: All Oral Medications     Warfarin Therapy Reminder (Check START DATE - warfarin may be starting in the FUTURE)     glucose 40 % gel 15-30 g    Or     dextrose 50 % injection 25-50 mL    Or     glucagon injection 1 mg     insulin aspart (NovoLOG) inj (RAPID ACTING)     insulin aspart (NovoLOG) inj (RAPID ACTING)     insulin aspart (NovoLOG) inj (RAPID ACTING)     insulin aspart (NovoLOG) inj (RAPID ACTING)     insulin aspart (NovoLOG) inj (RAPID ACTING)     insulin aspart (NovoLOG) inj (RAPID ACTING)     aspirin EC EC tablet 81 mg     ferrous sulfate (IRON) tablet 325 mg     cefTRIAXone (ROCEPHIN) 1 g vial to attach to  mL bag for ADULTS or NS 50 mL bag for PEDS     vancomycin place anaya - receiving intermittent dosing       Caring Physician: Christine Quintana MD  [facility]  Pager Contact: see Physician sticky note

## 2017-04-02 NOTE — PLAN OF CARE
Problem: Goal Outcome Summary  Goal: Goal Outcome Summary  OT: Per RN, patient with tachycardia and not appropriate for OT this am. Will attempt again either later today or tomorrow.

## 2017-04-02 NOTE — PLAN OF CARE
Problem: Goal Outcome Summary  Goal: Goal Outcome Summary  Outcome: No Change  Patient was admitted for acute kidney injury. His creatinine today was 4.98 recheck BMP done at 7pm, and creatinine is improving. Creatinine is 3.57 NS decreased to 125ml/hr. He was also started on IV rocephin. He is more alert per wife this evening. He is oriented to himself. His vital signs are stable, but HR fluctuates. He can be bradycardic and tachy. Urine output has been adequate. He has 400-450ml q4hrs. He had multiple BMs this evening. They are black in color from chronic iron use, but did have streak of red from open buttocks. His buttocks is excoriated and sore. Barrier cream was applied. Still has decreased appetite. He did not eat dinner. BS were 287 and 270.  He is wearing a BiPaP for sleep apnea. Will continue with plan of care.     Update: Patient triggered for sepsis. Lactic acid ordered per protocol. Will continue to monitor.

## 2017-04-03 NOTE — PROGRESS NOTES
Social Work Services Progress Note    Hospital Day: 4  Date of Initial Social Work Evaluation:  4/3/2017  Collaborated with:  [facility], Chart Review, Medical team    Data:  Patient with elevated WBC, creatinine, lethargy, weakness, and not eating or drinking. MH of type 2 DM, PAT and atrial fibril, HTN, COPD, and normal pressure hydrocephalus and multi infarct dementia. Patient has resided in LTC, but moved to a new LTC facility, [facility], on 3/28/2017. Since this move, pt has been more weak and lethargic, not eating.    Intervention: CHEVY spoke with [facility], Diann admissions SW @ [phone]. She states currently spouse is paying for a private bed hold at the facility and pt can return upon DC    Assessment:  DC is anticipated for W/TH, can return to LTC    Plan:    Anticipated Disposition:  Facility:  [facility]    Barriers to d/c plan:  Medical readiness    Follow Up:  SW will follow for DC needs.    Bozena ERNANDEZ, GERMANIA  5B  (Medical/Surgical)  Phone: [phone]  Pager: [phone]

## 2017-04-03 NOTE — PLAN OF CARE
Problem: Goal Outcome Summary  Goal: Goal Outcome Summary  PT-5A: PT Cancel - Per chart review and discussion with OT and MD - Pt living at nursing home and dependent for all mobility and cares at baseline and will be returning this facility.  PT orders will be completed.

## 2017-04-03 NOTE — PLAN OF CARE
Problem: Goal Outcome Summary  Goal: Goal Outcome Summary  OT/5A: Cancel- OT to defer, per chart review and discussion with care team, pt living in nursing home with full cares and dementia. Pt not appropriate for therapy as baseline dependent and not able to complete new learning. Will complete orders

## 2017-04-03 NOTE — PLAN OF CARE
Problem: Goal Outcome Summary  Goal: Goal Outcome Summary  Outcome: No Change  Pt started on D5 @ 50 cc/hr. Sodium is 155. Pt taking meds with applesauce. Has not had any difficulty swallowing. Has not been coughing either. Hip xray done which showed no hip fracture. Blood glucoses 204, 226. Lantus increased to 25 units tonight. Pt remains in afib. Heart rate 110-120. Continue to monitor.

## 2017-04-03 NOTE — PLAN OF CARE
Problem: Goal Outcome Summary  Goal: Goal Outcome Summary     SLP: Pt seen for dysphagia tx this AM. Recommend advance to dysphagia diet level 1 (pureed) and nectar thick liquids when alert. Hold PO if pt unable to remain alert. May require/benefit assist. Pt should be upright, take small bites/sips and alternate consistencies. SLP to follow

## 2017-04-03 NOTE — PHARMACY-VANCOMYCIN DOSING SERVICE
Pharmacy Vancomycin Note  Date of Service April 3, 2017  Patient's  1940   76 year old, male    Indication: Urinary Tract Infection  Goal Trough Level: 10-15 mg/L  Day of Therapy: 3 (started AM 17)  Current Vancomycin regimen:  1250 mg IV x 1 dose on  @1000, 1500 mg IV x 1 dose on  @0129 (intermittent dosing based on levels)    Current estimated CrCl = Estimated Creatinine Clearance: 26.5 mL/min (based on Cr of 2.25).    Creatinine for last 3 days  3/31/2017:  8:53 PM Creatinine 5.39 mg/dL  2017: 12:54 AM Creatinine 4.98 mg/dL;  7:12 PM Creatinine 3.57 mg/dL  2017: 12:03 AM Creatinine 3.14 mg/dL;  6:51 AM Creatinine 2.92 mg/dL; 12:37 PM Creatinine 2.68 mg/dL;  5:52 PM Creatinine 2.53 mg/dL  4/3/2017: 12:13 AM Creatinine 2.41 mg/dL;  7:07 AM Creatinine 2.25 mg/dL    Recent Vancomycin Levels (past 3 days)  2017:  6:51 AM Vancomycin Level 12.8 mg/L  4/3/2017:  7:07 AM Vancomycin Level 22.3 mg/L    Vancomycin IV Administrations (past 72 hours)                   vancomycin (VANCOCIN) 1,250 mg in NaCl 0.9 % 250 mL intermittent infusion (mg) 1,250 mg New Bag 17 1005                Nephrotoxins and other renal medications     None             Contrast Orders - past 72 hours (72h ago through future)    Start     Dose/Rate Route Frequency Ordered Stop    17 1145  perflutren diluted 1mL to 2mL with saline (OPTISON) diluted injection 4 mL      4 mL Intravenous ONCE 17 1134 17 1145          Interpretation of levels and current regimen:  Trough level is  Supratherapeutic    Has serum creatinine changed > 50% in last 72 hours: Yes    Urine output:  good urine output    Renal Function: Improving    Plan:  1. 1250 mg IV q48h starting  @ 1000. Patient's renal function is improving to near baseline (SCr of 1.5-2 mg/dL) - will continue to monitor renal function.  2. Pharmacy will check trough levels as appropriate in 1-3 Days.    3. Serum creatinine levels will be ordered daily  for the first week of therapy and at least twice weekly for subsequent weeks.      Tigre Oneal, PharmD Student  April 3, 2017

## 2017-04-03 NOTE — PROGRESS NOTES
[facility] - Inpatient daily progress note    Date of admission: 3/31/2017  Date of service: 4/3/2017.         Assessment and Plan:   Assessment:   Melvin Bhatia is a 76 year old male with a significant past medical history of cerebrovascular disease, normal pressure hydrocephalus, status post ventriculoperitoneal shunting, progressive cognitive dysfunction, multiple falls, HTN, Type 2 DM, hyperlipidemia, CHF, CKD, and COPD who presented with 3 days Hx of poor oral intake, and found to have acute kidney injury and possible UTI. Being managed for pre-renal SHERYL, UTI and hypernatremia.    Patient Active Problem List   Diagnosis     Osteoporosis     PAF (paroxysmal atrial fibrillation) (H)     Lumbosacral plexopathy     Testosterone deficiency     Hypertension goal BP (blood pressure) < 140/90     Atrial fibrillation (H)     CKD (chronic kidney disease) stage 3, GFR 30-59 ml/min     Hyperlipidemia LDL goal <70     Coronary artery disease     Health Care Home     ACP (advance care planning)     Elevated PSA     Anemia in chronic renal disease     Subarachnoid hemorrhage (H)     Idiopathic normal pressure hydrocephalus (INPH)     Urinary bladder neurogenic dysfunction     Stroke (H)     Cognitive deficit, post-stroke     HL (hearing loss)     Personal history of fall     GERD (gastroesophageal reflux disease)     COPD exacerbation (H)     SHIVAM (obstructive sleep apnea)     LVH (left ventricular hypertrophy)     Diastolic CHF, acute on chronic (H)     Hypoxemia     Tachyarrhythmia     Systolic and diastolic CHF, chronic (H)     Gait abnormality     Iron deficiency anemia     Allergic rhinitis     Altered mental status     VRE (vancomycin-resistant Enterococci)     Hypoglycemia     Lower urinary tract infectious disease     Type 2 diabetes with stage 3 chronic kidney disease GFR 30-59 (H)     Chronic obstructive pulmonary disease, unspecified COPD type (H)      Ankle pain     Fx medial malleolus-closed     SHERYL (acute kidney injury) (H)      Plan:   ## UTI  Patient has a diagnosis of atonic bladder and requires intermittent self catheterizations about 5 times a day. Urine culture growing gram positive cocci concerning for enterococcus species susceptible to vancomycin. Discontinue ceftriaxone today.   - Continue IV Vancomycin(started 04/01)  - Discontinue Ceftriaxone (03/31 to 04/02)    ## SHERYL on CKD3b (baseline Cr 1.5-2): Improving   Cr was at 5.39, GFR 10 on admission. Improved to 2.92 today with IVF hydration. BUN Cr ratio >20 consistent with pre-renal, likely due to decreased PO intake.  - Continue MIVF  - Follow up BMP daily  - Strict I/O   - Daily weights  - Tele    ##Hypernatremia  Likely iatrogenic due to IV fluids. Free water deficit today is ~ 4lt. Plan to give him a 500 ml D5 bolus and then recheck BMP at 4 pm. Goal to decrease by 6 meq in 24 hrs.   -BMP at 4 pm   -BMP Q6H    #Acute on chronic decline in mentation:   #Normal Pressure Hydrocephalus  #Multi infarct Vascular Dementia  #Metabolic encephalopathy  Likely multifactorial. Last CT head from previous admission (02/2017) showed progression of cerebral ischemic disease, consistent step-wise decline of vascular dementia. His UTI and SHERYL (Uremia) could also be contributing to metabolic encephalopathy. CT head done on 04/02 was normal.   - OT for cognitive eval   - Will discuss with family to obtain baseline cognitive function   - Consider shunt evaluation by neurosurgery if mental status does not improve with treating UTI and electrolyte abnormalities  - Reorient patient as per delirium protocol     ## Stercoral proctitis  Seen on abdominal CT. Pt denied abdominal pain, stable VS. Patient now having regular bowel movements. Switched to PRN bowel regimen from scheduled.   - Colorectal surgery consult placed. Patient had good results with bowel regimen. Avoid per rectum intervention. GI consult as outpatient for  flex sig   - Miralax daily PRN and Senna at bedtime PRN.   - HOLD PTA ferrous sulfate while being treated for constipation    ## Type 2 DM, last A1C 6.1 on 02/20/17  Home regimen:  Basal insulin: 37 units lantus twice daily  Prandial: 30 units before breakfast, lunch, 5 units before dinner     Hospital regimen:  Basal insulin: 25 units lantus twice daily  Prandial: 1.5 unit per 15 gm CHO  Correction: MSSI    ##Paroxysmal A Fib, rate controlled  Continue metoprolol 50 mg BID (Patient takes 50 mg ER once at bedtime, but unable to swallow pills so will switch him to immediate release 50 mg BID).   Plan to discontinue ASA when INR therapeutic. Discontinue on discharge.    ## Cerebrovascular disease w/ Hx stroke, normal pressure hydrocephalus (2012), status post ventriculoperitoneal shunting, subarachnoid hemorrhage(2012)  -Follow up with neurology outpatient. Patient lost to follow up    ##High grade right ICA stenosis, diagnosed on 01/2017 MRA  Patient's LAKE-VASC score is 8. He was taken off of coumadin in 2012 due to multiple falls and development of subarachnoid hemorrhage. However, during this last hospitalization, anticoagulation was re-addressed in the setting of multiple embolic events since stopping the coumadin and the decision was made to continue with coumadin (goal INR 2-3).   -Pharmacy to dose warfarin  -Follow up with endovascular outpatient. Patient lost to follow up    ##HFrEF, EF 46% on 03/31/17  -Holding diuresis while treating pre-renal SHERYL  -Continue metoprolol     Other chronic conditions, stable- continue home medications.  ## HTN- metoprolol XR 50mg daily   ## Hyperlipidemia- rosuvastatin, niacin.   ## COPD- Breo Ellipta, Ipratropium-albuterol (Respimat)  ## Constipation- Senokot, Magnesium citrate, miralax  ## Progressive cognitive dysfunction (dementia)  ## Multiple bone fracture on left foot/ankle- cast in place. Hx of fractures after a fall on 9/16, and 2/2017.  - Recheck XR hip to evaluate  for hip fracture     Fluids: D5 bolus 500 cc within 1 hour   Electrolytes:Hypernatremia   Nutrition: DD1 with nectar thick liquids while patient awake and alert   Lines:PIV   Activity: Up with assist   CODE: DNR / DNI  Prophylaxis: Coumadin   Dispo: Expected discharge date 2-3 days pending clinical improvement             Interval History:   Melvin Bhatia denies any chest pain, abdominal pain, shortness of breath, or difficulty breathing. He was tachycardic overnight, but having difficulty taking meds. Patient's mental status is waxing and waning. Sometimes answers questions appropriately, sometimes does not. Attention span is limited. He is also very hard of hearing.              Review of Systems:   Unable to do ROS         Physical Exam (Resident / Clinician):   Vitals were reviewed  Temp: 97.5  F (36.4  C) Temp src: Oral BP: 103/76 Pulse: 121 Heart Rate: 61 Resp: 16 SpO2: 98 % O2 Device: BiPAP/CPAP    Constitutional:   awake, on BiPAP, not answering questions appropriately, withdrawn     Eyes:   Lids and lashes normal, pupils equal, round and reactive to light, extra ocular muscles intact, sclera clear, left eye palpebral edema     Lungs:   No increased work of breathing, good air exchange, clear to auscultation bilaterally, no crackles or wheezing     Cardiovascular:   Regular rate and rhythm, normal S1 and S2 and no murmur noted     Abdomen:   Normal bowel sounds, soft, non-distended, non-tender, no masses palpated     Musculoskeletal:   no lower extremity pitting edema present   Left sided lower extremity reflexes deferred 2/2 leg in cast.      Neurologic:   Awake, somnolent, oriented to name only. PERRLA, EOMI.    No abnormal or involuntary movements observed. Moves upper extremity voluntarily.  strength: 3+ right, 4 left. R hip flexion 4/5, L hip flexion 3/5.     Intake/Output Summary (Last 24 hours) at 04/03/17 1661  Last data filed at 04/03/17 06   Gross per 24 hour   Intake           779.17  ml   Output             2000 ml   Net         -1220.83 ml           Data:   ROUTINE LABS (Last four results)  CMP  Recent Labs  Lab 04/03/17  0013 04/02/17  1752 04/02/17  1237 04/02/17 0651  03/31/17 2053   * 155* 155* 148*  < > 138   POTASSIUM 3.5 3.6 4.0 3.6  < > 5.6*   CHLORIDE 126* 126* 126* 121*  < > 103   CO2 21 19* 20 21  < > 22   ANIONGAP 8 10 9 7  < > 13   * 222* 254* 253*  < > 252*   BUN 76* 77* 77* 80*  < > 115*   CR 2.41* 2.53* 2.68* 2.92*  < > 5.39*   GFRESTIMATED 26* 25* 23* 21*  < > 10*   GFRESTBLACK 32* 30* 28* 25*  < > 13*   LORENA 7.5* 7.3* 7.0* 7.7*  < > 8.9   PROTTOTAL  --   --   --   --   --  6.8   ALBUMIN  --   --   --   --   --  2.0*   BILITOTAL  --   --   --   --   --  0.3   ALKPHOS  --   --   --   --   --  100   AST  --   --   --   --   --  Unsatisfactory specimen - hemolyzed   ALT  --   --   --   --   --  233*   < > = values in this interval not displayed.  CBC    Recent Labs  Lab 04/03/17  0707 04/02/17 0651 03/31/17 2053   WBC 9.3 9.0 12.9*   RBC 3.23* 3.82* 4.31*   HGB 8.8* 10.3* 12.1*   HCT 30.2* 36.1* 39.2*   MCV 94 95 91   MCH 27.2 27.0 28.1   MCHC 29.1* 28.5* 30.9*   RDW 17.5* 17.3* 17.1*    166 206     INR    Recent Labs  Lab 04/02/17 0651 03/31/17 2053   INR 2.68* 2.10*     CRP    Recent Labs  Lab 04/02/17 0651 03/31/17 2053   .0* 190.0*       Blood culture:  Invalid input(s): BC   Urine culture:  No results for input(s): URC in the last 168 hours.  All cultures:    Recent Labs  Lab 03/31/17 2132 03/31/17 2119   CULT 50,000 to 100,000 colonies/mL Enterococcus faecalisampicillin and penicillin MICs in progress 4/3/17* No growth after 3 days  No growth after 3 days         Medications:     Current Facility-Administered Medications   Medication     metoprolol (LOPRESSOR) suspension 50 mg     dextrose 5% infusion     insulin glargine (LANTUS) injection 25 Units     insulin glargine (LANTUS) injection 25 Units     Reason ACE/ARB order not selected      acetaminophen (TYLENOL) tablet 975 mg     fluticasone-vilanterol (BREO ELLIPTA) 200-25 MCG/INH oral inhaler 1 puff     fexofenadine (ALLEGRA) tablet 180 mg     fluticasone (FLONASE) 50 MCG/ACT spray 2 spray     ipratropium (ATROVENT HFA) Inhaler 1 puff     niacin (NIASPAN) CR tablet 500 mg     rosuvastatin (CRESTOR) tablet 20 mg     senna-docusate (SENOKOT-S;PERICOLACE) 8.6-50 MG per tablet 1-2 tablet     naloxone (NARCAN) injection 0.1-0.4 mg     lidocaine 1 % 1 mL     lidocaine (LMX4) kit     sodium chloride (PF) 0.9% PF flush 3 mL     sodium chloride (PF) 0.9% PF flush 3 mL     magnesium citrate solution 148 mL     ondansetron (ZOFRAN-ODT) ODT tab 4 mg    Or     ondansetron (ZOFRAN) injection 4 mg     prochlorperazine (COMPAZINE) injection 5 mg    Or     prochlorperazine (COMPAZINE) tablet 5 mg    Or     prochlorperazine (COMPAZINE) Suppository 12.5 mg     Patient is already receiving anticoagulation with heparin, enoxaparin (LOVENOX), warfarin (COUMADIN)  or other anticoagulant medication     polyethylene glycol (MIRALAX/GLYCOLAX) Packet 17 g     No lozenges or gum should be given while patient on BIPAP     hypromellose-dextran (ARTIFICAL TEARS) ophthalmic solution 1 drop     HOLD: All Oral Medications     Warfarin Therapy Reminder (Check START DATE - warfarin may be starting in the FUTURE)     glucose 40 % gel 15-30 g    Or     dextrose 50 % injection 25-50 mL    Or     glucagon injection 1 mg     insulin aspart (NovoLOG) inj (RAPID ACTING)     insulin aspart (NovoLOG) inj (RAPID ACTING)     insulin aspart (NovoLOG) inj (RAPID ACTING)     insulin aspart (NovoLOG) inj (RAPID ACTING)     insulin aspart (NovoLOG) inj (RAPID ACTING)     insulin aspart (NovoLOG) inj (RAPID ACTING)     aspirin EC EC tablet 81 mg     ferrous sulfate (IRON) tablet 325 mg     cefTRIAXone (ROCEPHIN) 1 g vial to attach to  mL bag for ADULTS or NS 50 mL bag for PEDS     vancomycin place anaya - receiving intermittent dosing        Caring Physician: Cha Waite MD  [facility], [facility]  Pager Contact: see Physician sticky note

## 2017-04-03 NOTE — PROGRESS NOTES
"CLINICAL NUTRITION SERVICES - ASSESSMENT NOTE     Nutrition Prescription    RECOMMENDATIONS FOR MDs/PROVIDERS TO ORDER:  -Continue SLP for diet recommendations  -Recommend MVI w/minerals  -Continue hydration per team    Malnutrition Status:    -Unable to assess    Recommendations already ordered by Registered Dietitian (RD):  -Ordered ensure between meals BID (thickened)    Future/Additional Recommendations:  -Monitor PO intake, if patient consistently eating less than 50% of meals recommend starting calorie counts to assess need for further nutritional interventions.      REASON FOR ASSESSMENT  Melvin Bhatia is a/an 76 year old male assessed by the dietitian for Admission Nutrition Risk Screen for reduced oral intake over the last month    NUTRITION HISTORY  Per chart review only. Patient busy with SLP at time of visit.     Patient admitted with kidney injury.Presented form NH after 3 day history of poor oral intake. PMH: cerebrovascular disease, progressive cognitive dysfunction, multiple falls, HTN, Type 2 DM, hyperlipidemia, CHF, CKD, and COPD     CURRENT NUTRITION ORDERS  Diet: Dysphagia level 1 diet with nectar thick liquids.     LABS  Sodium-154mg/dL  BS>200mg/dL    MEDICATIONS  Novolog  Lantus  IVF D5% @ 50mL/hr    ANTHROPOMETRICS  Height: 162.6 cm (5' 4\")  Most Recent Weight: 79.8 kg (175 lb 14.4 oz) (bed weight; bed zeroed prior to putting pt on bed)    IBW: 59.1 kg  BMI: Obesity Grade I BMI 30-34.9  Weight History:   Wt Readings from Last 15 Encounters:   04/01/17 79.8 kg (175 lb 14.4 oz)   03/10/17 93 kg (205 lb)   03/02/17 88.5 kg (195 lb)   02/20/17 87.3 kg (192 lb 8 oz)   02/02/17 90.3 kg (199 lb)   01/19/17 86.2 kg (190 lb)   12/16/16 86.2 kg (190 lb)   12/05/16 86.2 kg (190 lb)   11/03/16 93 kg (205 lb)   09/29/16 93 kg (205 lb)   06/13/16 93 kg (205 lb)   05/25/16 90.8 kg (200 lb 3.2 oz)   03/22/16 87.5 kg (193 lb)   02/24/16 88.5 kg (195 lb)   11/10/15 88 kg (194 lb)   ~10% weight loss in " 1 month, 15% in 5 months-likely combination of decreased PO and dehydration  Dosing Weight: 64kg    ASSESSED NUTRITION NEEDS  Estimated Energy Needs: 6009-8379 kcals/day (20 - 25 kcals/kg)  Justification: Obese  Estimated Protein Needs: 64-77 grams protein/day (1 - 1.2 grams of pro/kg)  Justification: Hypercatabolism with acute illness  Estimated Fluid Needs: 1 mL/kcal  Justification: Maintenance  Estimated Fluid Needs: 1 mL/kcal  Justification: Maintenance    PHYSICAL FINDINGS  WOC nurse consult pending for exfoliated buttocks with open area.     MALNUTRITION  % Intake: Decreased intake does not meet criteria  % Weight Loss: ~10% weight loss in 1 month, 15% in 5 months-likely combination of decreased PO and dehydration  Subcutaneous Fat Loss: None observed  Muscle Loss: None observed  Fluid Accumulation/Edema: None noted  Malnutrition Diagnosis: Unable to determine due to unable to interview patient or do physical assessment    NUTRITION DIAGNOSIS  Unintended weight loss related to likely fluid status and decreased PO as evidenced by 3 days poor PO PTA and likely significant dehydration with sodium of 154 and ~10% weight loss in 1 month.    INTERVENTIONS  Implementation  Nutrition Education: Unable to complete due to patient busy at time of visit. Will re-attempt at later time as able.      Goals  Patient to consume at least 50% of nutritionally adequate meal trays TID, or the equivalent with supplements/snacks.     Monitoring/Evaluation  Progress toward goals will be monitored and evaluated per protocol.    Faye Myers RD, LD  Unit pager: [phone]

## 2017-04-03 NOTE — PLAN OF CARE
Problem: Goal Outcome Summary  Goal: Goal Outcome Summary  Outcome: No Change  Pt alert and oriented to self only. VSS on bipap, continued tachycardia to 110's, up to 140's during cares. Denies pain/nausea. Repositioned q2h. Does not call, rounded on frequently. D5 to left PIV at 50 ml/hr. NPO except meds. BMP q6h, creatinine downtrending. WOC consult pending for excoriated buttocks. Soft tarry green stool x 2. Orta patent with yellow output. Tele a-fib, tachy.

## 2017-04-03 NOTE — PHARMACY-ADMISSION MEDICATION HISTORY
Admission medication history interview status for the 3/31/2017 admission is complete. See Epic admission navigator for allergy information, pharmacy, prior to admission medications and immunization status.     Medication history interview sources:  [facility] on [facility] MAR (located in the patient's paper chart)    Changes made to PTA medication list (reason)  Added:   1. Bactrim DS 1 tablet by mouth once daily - added from MAR. Patient is taking for history of UTIs  Deleted:   1. Citracal 315-200 mg - 2 tablets by mouth BID    Changed:   1. Insulin glargine from 37 units subQ BID to 35 units subQ BID - per nursing home MAR.  2. Insulin lispro from 30 units subQ before breakfast and 5 units subQ before dinner to 20 units subQ @ 1000 and 10 units subQ @1600 - per nursing home MAR  3. Warfarin was 3 mg daily previously; most recently patient received 2 mg on 3/29, then order changed back to 3 mg daily on 3/30 (and pt admitted here on 3/31).      Additional medication history information (including reliability of information, actions taken by pharmacist):  1. MAR from [facility] on [facility] was very detailed - the patient's PTA medication list should be considered accurate.  MAR showed doses 3/29-3/31.  2. Verified with [facility] that the patient received his flu shot on 11/2/2016.      Prior to Admission medications    Medication Sig Last Dose Taking? Auth Provider   sulfamethoxazole-trimethoprim (BACTRIM DS/SEPTRA DS) 800-160 MG per tablet Take 1 tablet by mouth daily 3/31/2017 at 1041 Yes Unknown, Entered By History   insulin glargine (LANTUS SOLOSTAR) 100 UNIT/ML injection Inject 37 Units Subcutaneous 2 times daily  Patient taking differently: Inject 35 Units Subcutaneous 2 times daily  3/31/2017 at 0800 Yes Ash Bowman MD   aspirin 81 MG tablet Take 1 tablet (81 mg) by mouth daily 3/31/2017 at 1232 Yes Karen Vázuqez MD   acetaminophen (TYLENOL) 325 MG tablet Take 3 tablets (975 mg) by mouth  every 6 hours as needed for mild pain Past Week Yes Karen Vázquez MD   warfarin (COUMADIN) 3 MG tablet Take 1 tablet (3 mg) by mouth daily  Patient taking differently: Take 2-3 mg by mouth daily  3/30/2017 at PM Yes Karen Vázquez MD   senna-docusate (SENOKOT-S;PERICOLACE) 8.6-50 MG per tablet Take 1-2 tablets by mouth 2 times daily 3/31/2017 at 1041 Yes Karen Vázquez MD   fexofenadine (ALLEGRA) 180 MG tablet Take 1 tablet (180 mg) by mouth daily 3/31/2017 at 1041 Yes Ash Bowman MD   budesonide-formoterol (SYMBICORT) 160-4.5 MCG/ACT Inhaler Inhale 2 puffs into the lungs 2 times daily 3/31/2017 at 0800 Yes Ash Bowman MD   rosuvastatin (CRESTOR) 20 MG tablet Take 1 tablet (20 mg) by mouth daily Needs fasting labs before any further refills 3/31/2017 at 1041 Yes Ash Bowman MD   RANITIDINE HCL PO Take 150 mg by mouth daily 3/31/2017 at 1041 Yes Reported, Patient   calcium citrate (CALCITRATE) 950 MG tablet Take 1 tablet by mouth daily 3/31/2017 at 1041 Yes Reported, Patient   Cyanocobalamin (VITAMIN B12) 1000 MCG TBCR Take 1,000 mcg by mouth daily 3/31/2017 at 1041 Yes    exenatide (BYETTA) 5 mcg/0.02mL per dose (60 doses per 1.2 mL prefilled pen) Inject 5mcg under the skin twice daily 60 minutes before meals. 3/31/2017 at 1530 Yes Ash Bowman MD   metoprolol (TOPROL-XL) 50 MG 24 hr tablet Take 1 tablet (50 mg) by mouth At Bedtime 3/30/2017 at PM Yes Ash Bowman MD   fluticasone (FLONASE) 50 MCG/ACT nasal spray Spray 2 sprays into both nostrils daily 3/31/2017 at 1041 Yes Ash Bowman MD   multivitamin, therapeutic with minerals (THERA-VIT-M) TABS Take 1 tablet by mouth daily 3/31/2017 at 1041 Yes Unknown, Entered By History   ferrous sulfate 325 (65 FE) MG tablet Take 325 mg by mouth daily (with breakfast) 65mg elemental iron 3/31/2017 at 1041 Yes Unknown, Entered By History   VITAMIN D, CHOLECALCIFEROL, PO Take 2,000 Units by mouth daily  3/31/2017 at 1041 Yes Unknown, Entered By History   bumetanide (BUMEX) 0.5 MG tablet Take 0.5 mg by mouth 2 times daily 3/31/2017 at 1041 Yes Reported, Patient   magnesium oxide (MAG-OX) 400 MG tablet Take 2 tablets by mouth daily. 3/31/2017 at 1041 Yes Raj Phillips MD   niacin (NIASPAN) 500 MG CR tablet Take 1 tablet by mouth At Bedtime. 3/31/2017 at 1041 Yes Amena Flower MD   insulin lispro (HUMALOG KWIKPEN) 100 UNIT/ML injection Inject 30 units before breakfast and lunch and 5 units before dinner  as directed.  Patient taking differently: Inject 20 units at 1000 and 10 units at 1600 as directed. 3/31/2017  Ash Bowman MD   polyethylene glycol (MIRALAX/GLYCOLAX) Packet Take 17 g by mouth daily as needed for constipation Unknown  Karen Vázquez MD   Ipratropium-Albuterol (COMBIVENT RESPIMAT)  MCG/ACT inhaler Inhale 1 puff into the lungs 2 times daily Not to exceed 6 doses per day. Unknown  Ash Bowman MD   denosumab (PROLIA) 60 MG/ML SOLN Inject 60 mg Subcutaneous every 6 months Unknown  Reported, Patient   insulin pen needle (B-D U/F) 31G X 8 MM Use 7 daily or as directed. Unknown  Ash Bowman MD   blood glucose monitoring (NO BRAND SPECIFIED) test strip 1 strip by In Vitro route 4 times daily Unknown  Ash Bowman MD   ONE TOUCH LANCETS MISC use as directed 4 x daily and prn Unknown  Ash Bowman MD   ketoconazole (NIZORAL) 2 % shampoo Apply to the affected area and wash off after 5 minutes. Unknown  Ash Bowman MD   nitroglycerin (NITROSTAT) 0.4 MG SL tablet Place 1 tablet (0.4 mg) under the tongue every 5 minutes as needed up to 3 tablets per episode. Unknown  Ash Bowman MD   fluocinolone (SYNALAR) 0.01 % external solution Apply 1 to 2 times daily to scaly areas of scalp as needed.Profile Rx: patient will contact pharmacy when needed Unknown  Ash Bowman MD   order for DME Equipment being ordered: 4 wheeled rolling walker. Unknown   Ash Bowman MD   ACE/ARB NOT PRESCRIBED, INTENTIONAL, ACE & ARB not prescribed due to Hyperkalemia (baseline K+ > 5.5), Worsening renal function on ACE/ARB therapy and CKD (Chronic Kidney Disease) Unknown     insulin syringes, disposable, U-100 0.3 ML MISC 1 each 4 times daily. Unknown  Amena Flower MD   ORDER FOR DME Equipment being ordered: CPAP supplies Unknown  Amena Flower MD   Catheters MISC As needed Unknown  Amena Flower MD       Medication history completed by:  Tigre Oneal, PharmD Student  April 3, 2017    Co-signed by:  Patricia Campbell, KimberD

## 2017-04-03 NOTE — PLAN OF CARE
Problem: Goal Outcome Summary  Goal: Goal Outcome Summary  Outcome: No Change  PT is A&O to person, bradycardic and on 1 L O2 via NC. Pt uses BIPAP while sleeping at night. He is on bedrest with assist +2 for movement. Denies pain/nausea. Repositioned every 2 hours. 500 mL D5 Bolus given to correct Na+ levels. D5 infusing at 50 mL/hour. Pt refused to eat lunch so carb coverage insulin was held. Diet was advanced to thickened liquid dysphagia diet. Open sore on bottom that was covered with barrier cream per WOC. Pt was determined to be at baseline per nursing home for mobility so PT/OT deferred. Pt does not call appropriately. BMP q6h and vitals q4h. Pt is incontinent and having black tarry stools. Orta patent with alie/straw colored urine. Continue to monitor and POC.

## 2017-04-04 NOTE — PLAN OF CARE
Problem: Goal Outcome Summary  Goal: Goal Outcome Summary  Outcome: No Change  Oriented to self only, on room air during the day, garbled speech, denies pain, turned and repositioned Q2hrs, BG's monitored, HR in 120's this evening, provider was notified, Plan to place a Midline tonight, will continue to monitor.

## 2017-04-04 NOTE — PROGRESS NOTES
Some confusion overnight. Smear of BM this AM that was very dark. Some low grade temps    Temp: 98.3  F (36.8  C) Temp  Min: 96  F (35.6  C)  Max: 100.2  F (37.9  C)  Resp: 20 Resp  Min: 16  Max: 20  SpO2: 100 % SpO2  Min: 97 %  Max: 100 %  Pulse: 85 Pulse  Min: 74  Max: 85  Heart Rate: 76 Heart Rate  Min: 74  Max: 86  BP: 110/53 Systolic (24hrs), Av , Min:88 , Max:128   Diastolic (24hrs), Av, Min:42, Max:65    Awake, does not answer questions  Unlabored breathing   RRR  Abd soft, obese, non tender    Labs  Na 153 <- 157  Cr 2.08 <-2.14  ALT//814  Hgb 7.0 <- 8.8  WBC 9.4 <- 9.3    Imaging  CT A/P Impression:   1. Increased rectal circumferential wall thickening and perirectal fat stranding since  which may correspond to proctitis. Stool is reduced. This could be the source of gastrointestinal hemorrhage. There are no other sites in the GI tract suspicious for hemorrhage, however examination is limited by lack of intravenous contrast.  2. Unchanged focal dilatation of hepatic segment 2 bile ducts, which again may be due to benign or malignant stricture. No hepatic abscess. MRCP would provide further anatomic detail.  3. 2 cm gallstone without signs of cholecystitis.  4. Numerous chronic incidental findings as above.      A/P: 76M admitted for SHERYL & urosepsis, noted to have proctitis on CT scan with fecal loading, ?secondary to stool stasis. Dark stools overnight with a hgb drop.     - Unclear cause for proctitis. Recommend GI consult for flex sig (as well as upper endoscopy to r/o UGI bleed)  - Hold anticoagulation/iron, reverse INR  - Trend LFTs     Seen w/ fellow, Dr Burgess Bill Varma MD  Surgery PGY1  [phone]

## 2017-04-04 NOTE — PROGRESS NOTES
[facility] - Inpatient daily progress note    Date of admission: 3/31/2017  Date of admission: 3/31/2017  Date of service: 4/4/2017.    Updates:  - Give 1 U PRBCs  - Q 6 hour BMPs and hemoglobins  - FU fecal occult  - FU ultrasound, CT abdomen, hepatitis labs  - Increase Lantus  - Continue D5          Assessment and Plan:   Assessment:   Melvin Bhatia is a 76 year old male with a significant past medical history of cerebrovascular disease, normal pressure hydrocephalus (status post ventriculoperitoneal shunting), progressive cognitive dysfunction, multiple falls, HTN, Type 2 DM, hyperlipidemia, CHF, CKD, and COPD who presented with a 3 day history of poor oral intake.  On admission, he was found to have an SHERYL and UTI. He is currently being managed for pre-renal SHERYL, UTI and hypernatremia.  Patient Active Problem List   Diagnosis     Osteoporosis     PAF (paroxysmal atrial fibrillation) (H)     Lumbosacral plexopathy     Testosterone deficiency     Hypertension goal BP (blood pressure) < 140/90     Atrial fibrillation (H)     CKD (chronic kidney disease) stage 3, GFR 30-59 ml/min     Hyperlipidemia LDL goal <70     Coronary artery disease     Health Care Home     ACP (advance care planning)     Elevated PSA     Anemia in chronic renal disease     Subarachnoid hemorrhage (H)     Idiopathic normal pressure hydrocephalus (INPH)     Urinary bladder neurogenic dysfunction     Stroke (H)     Cognitive deficit, post-stroke     HL (hearing loss)     Personal history of fall     GERD (gastroesophageal reflux disease)     COPD exacerbation (H)     SHIVAM (obstructive sleep apnea)     LVH (left ventricular hypertrophy)     Diastolic CHF, acute on chronic (H)     Hypoxemia     Tachyarrhythmia     Systolic and diastolic CHF, chronic (H)     Gait abnormality     Iron deficiency anemia     Allergic rhinitis     Altered mental status     VRE (vancomycin-resistant Enterococci)     Hypoglycemia     Lower  urinary tract infectious disease     Type 2 diabetes with stage 3 chronic kidney disease GFR 30-59 (H)     Chronic obstructive pulmonary disease, unspecified COPD type (H)     Ankle pain     Fx medial malleolus-closed     SHERYL (acute kidney injury) (H)      Plan:   ## Anemia  ## Supratherapeutic INR:    Hemoglobin 7.0 today from 8.8 yesterday.  Per nursing notes, patient noted to have black tarry stools.  Etiology of bleed unknown at this time, but suspect GI source.  On POC ultrasound, patient appeared to have a subcapsular bleed of his liver.  - Check fecal occult blood  - Transfuse 1 U PRBCs  - Q 6 hour H/H  - Give 5 mg IV Vit K now  - Hold warfarin  - Transfer to     ## Elevated transaminases:  , , increased from normal baseline 2/2017.  Elevated LFTs fit hepatocellular pattern of injury.  Differential includes viral hepatitis, hepatotoxicity from drugs/toxins, alcoholic liver disease, hepatic ischemia, malignant infiltration, Fahad's disease, Budd-Chiari syndrome, toxin exposure, and sepsis. High suspicion for sepsis with altered mental status and UTI.  - Hepatitis panel  - Abdominal ultrasound  - CT abdomen  - No hepatotoxic medications (tylenol, statins, etc)    ## Confusion:  Patient has been confused today and last night.  Patient has had a progressive cognitive decline (history of NPH, multi-infarct dementia), but the delirium is new (per wife).  Confusion most likely from the UTI and/or hypernatremia.  Also, would also consider CNS infection (with history of  shunt).  - Normalize hypernatremia  - Plan for LP if confusion does not resolve with a normal sodium level  - NPO (aspiration precautions)  - Plan to pan-culture if patient spikes a fever    ##Hypernatremia:  Improving  Likely iatrogenic due to IV fluids. Free water deficit today 2.2. Water deficit overnight was 5.9L (based on sodium of 157 last night).  Patient was started on D5W overnight, currently at 100 cc/hr.   Goal to  decrease by 6 meq in 24 hrs.   -BMP Q6H    ## UTI: Stable  Patient has a diagnosis of atonic bladder and has required intermittent self catheterizations about 5 times a day. Urine culture growing gram positive enterococcus species susceptible to vancomycin.    - Continue to follow urine cultures    - Continue IV Vancomycin (started 04/01-current)  - S/p Ceftriaxone (03/31 to 04/02)     ## SHERYL on CKD3 (baseline Cr 1.5-2): Improving   Cr was at 5.39, GFR 10 on admission. Improved to 2.08 today with IVF hydration. BUN:Cr ratio >20 consistent with pre-renal, likely due to decreased PO intake.  - Continue MIVF  - Follow up BMP daily  - Strict I/O   - Daily weights  - Continue telemetry      #Acute on chronic decline in mentation:   #Normal Pressure Hydrocephalus  #Multi infarct Vascular Dementia  #Metabolic encephalopathy  Likely multifactorial. Last CT head from previous admission (02/2017) showed progression of cerebral ischemic disease, consistent step-wise decline of vascular dementia. His UTI and SHERYL (Uremia) could also be contributing to metabolic encephalopathy. CT head done on 04/02 was normal.   - Will discuss with family to obtain baseline cognitive function   - Consider shunt evaluation by neurosurgery if mental status does not improve with treating UTI and electrolyte abnormalities  - Reorient patient as per delirium protocol      ## Stercoral proctitis  Seen on abdominal CT. Pt denied abdominal pain, stable VS. Patient now having regular bowel movements. Switched to PRN bowel regimen from scheduled.   - Colorectal surgery consult placed. Patient had good results with bowel regimen. Avoid per rectum intervention. GI consult as outpatient for flex sig   - Miralax daily PRN and Senna at bedtime PRN.   - HOLD PTA ferrous sulfate while being treated for constipation     ## Type 2 DM, last A1C 6.1 on 02/20/17  Blood sugars 190s-290s last 24 hours.  Will increase Lantus to 30 units bid (from 25 units bid).   Increase prandial to 2.0 units per 15 gm CHO.    Home regimen:  Basal insulin: 37 units lantus twice daily  Prandial: 30 units before breakfast, lunch, 5 units before dinner      Hospital regimen:  Basal insulin: 30 units lantus twice daily  Prandial: 2.0 unit per 15 gm CHO  Correction: MSSI     ##Paroxysmal A Fib, rate controlled  Continue metoprolol 50 mg BID (Patient takes 50 mg ER once at bedtime, but unable to swallow pills so will switch him to immediate release 50 mg BID).  ASA discontinued (INR therapeutic).  Patient NPO today so metoprolol changed to IV.  - Hold IV metoprolol (for soft blood pressures)     ## Cerebrovascular disease w/ Hx stroke, normal pressure hydrocephalus (2012), status post ventriculoperitoneal shunting, subarachnoid hemorrhage(2012)  -Follow up with neurology outpatient. Patient lost to follow up     ##High grade right ICA stenosis, diagnosed on 01/2017 MRA  Patient's LAKE-VASC score is 8. He was taken off of coumadin in 2012 due to multiple falls and development of subarachnoid hemorrhage. However, during this last hospitalization, anticoagulation was re-addressed in the setting of multiple embolic events since stopping the coumadin and the decision was made to continue with coumadin (goal INR 2-3).   -Pharmacy to dose warfarin  -Follow up with endovascular outpatient. Patient lost to follow up     ##HFrEF, EF 46% on 03/31/17  -Holding diuresis while treating pre-renal SHERYL  -Continue metoprolol      Other chronic conditions, stable- continue home medications.  ## HTN- metoprolol XR 50mg daily   ## Hyperlipidemia- rosuvastatin, niacin.   ## COPD- Breo Ellipta, Ipratropium-albuterol (Respimat)  ## Constipation- Senokot, Magnesium citrate, miralax  ## Progressive cognitive dysfunction (dementia)  ## Multiple bone fracture on left foot/ankle- cast in place. Hx of fractures after a fall on 9/16, and 2/2017.  - Recheck XR hip to evaluate for hip fracture      Fluids: D5 100 cc within 1  hour   Electrolytes:Hypernatremia   Nutrition: NPO   Lines:PIV   Activity: Up with assist   CODE: DNR / DNI  Prophylaxis: Coumadin held  Dispo: Expected discharge date 2-3 days pending clinical improvement                      Interval History:   Melvin Bhatia has been confused overnight.  He was refusing medications last night and again this morning.  He denies pain.  On exam this morning, patient is not coherent.    Patient was started on D5W at 100 cc/hr overnight for elevated sodium of 157 (from 9 pm 4/3) increased from 154 (at 7 am 4/3)            Review of Systems:   Review of Systems   Unable to perform ROS: Patient nonverbal            Physical Exam (Resident / Clinician):   Vitals were reviewed  Temp: 100.2  F (37.9  C) Temp src: Oral BP: 103/61 Pulse: 85 Heart Rate: 74 Resp: 20 SpO2: 99 % O2 Device: None (Room air) Oxygen Delivery: 1 LPM    Physical Exam   Constitutional: He appears well-developed and well-nourished. No distress.   Confused, NAD   HENT:   Head: Normocephalic.   Mouth/Throat: Oropharynx is clear and moist. No oropharyngeal exudate.   Eyes: Conjunctivae are normal.   Neck: Normal range of motion. Neck supple.   Cardiovascular: Normal rate.    Murmur (2/6 systolic murmur at RSB) heard.  Pulmonary/Chest: Effort normal and breath sounds normal. No respiratory distress. He has no wheezes. He has no rales.   Abdominal: Soft. There is tenderness (mild tenderness to palpation in epigastric area). There is no rebound and no guarding.   Genitourinary:   Genitourinary Comments: Orta in place, Diaper in place   Musculoskeletal: He exhibits no edema or tenderness.   Neurological: He is alert.   Confused, nonverbal   Skin: Skin is warm and dry. No rash noted. No erythema. No pallor.   Vitals reviewed.    Intake/Output Summary (Last 24 hours) at 04/04/17 0610  Last data filed at 04/04/17 0549   Gross per 24 hour   Intake           389.17 ml   Output             1875 ml   Net         -1485.83 ml            Data:   ROUTINE LABS (Last four results)  CMP  Recent Labs  Lab 04/04/17  0831 04/03/17  2130 04/03/17  0707 04/03/17  0013  03/31/17 2053   * 157* 154* 155*  < > 138   POTASSIUM 3.5 3.4 3.4 3.5  < > 5.6*   CHLORIDE 126* 127* 127* 126*  < > 103   CO2 22 22 19* 21  < > 22   ANIONGAP 6 8 8 8  < > 13   * 252* 226* 216*  < > 252*   BUN 73* 75* 76* 76*  < > 115*   CR 2.08* 2.14* 2.25* 2.41*  < > 5.39*   GFRESTIMATED 31* 30* 28* 26*  < > 10*   GFRESTBLACK 38* 36* 34* 32*  < > 13*   LORENA 7.3* 7.4* 7.6* 7.5*  < > 8.9   PROTTOTAL 4.8*  --   --   --   --  6.8   ALBUMIN 1.5*  --   --   --   --  2.0*   BILITOTAL 0.8  --   --   --   --  0.3   ALKPHOS 118  --   --   --   --  100   *  --   --   --   --  Unsatisfactory specimen - hemolyzed   *  --   --   --   --  233*   < > = values in this interval not displayed.  CBC    Recent Labs  Lab 04/04/17 0831 04/03/17 0707 04/02/17 0651 03/31/17 2053   WBC 9.4 9.3 9.0 12.9*   RBC 2.59* 3.23* 3.82* 4.31*   HGB 7.0* 8.8* 10.3* 12.1*   HCT 24.3* 30.2* 36.1* 39.2*   MCV 94 94 95 91   MCH 27.0 27.2 27.0 28.1   MCHC 28.8* 29.1* 28.5* 30.9*   RDW 17.6* 17.5* 17.3* 17.1*   * 165 166 206     INR    Recent Labs  Lab 04/04/17  0831 04/03/17 0707 04/02/17 0651 03/31/17 2053   INR 6.26* 3.58* 2.68* 2.10*     CRP    Recent Labs  Lab 04/02/17  0651 03/31/17 2053   .0* 190.0*         Blood culture:  Invalid input(s): BC   Urine culture:  No results for input(s): URC in the last 168 hours.  All cultures:    Recent Labs  Lab 03/31/17 2132 03/31/17 2119   CULT 50,000 to 100,000 colonies/mL Enterococcus faecalis* No growth after 4 days  No growth after 4 days           Medications:     Current Facility-Administered Medications   Medication     senna-docusate (SENOKOT-S;PERICOLACE) 8.6-50 MG per tablet 1-2 tablet     vancomycin (VANCOCIN) 1,250 mg in NaCl 0.9 % 250 mL intermittent infusion     Reason ACE/ARB order not selected     polyethylene  glycol (MIRALAX/GLYCOLAX) Packet 17 g     lidocaine 1 % 0.5-5 mL     lidocaine (LMX4) kit     sodium chloride (PF) 0.9% PF flush 10-20 mL     sodium chloride (PF) 0.9% PF flush 10 mL     dextrose 5% infusion     metoprolol (LOPRESSOR) suspension 50 mg     insulin glargine (LANTUS) injection 25 Units     insulin glargine (LANTUS) injection 25 Units     acetaminophen (TYLENOL) tablet 975 mg     fluticasone-vilanterol (BREO ELLIPTA) 200-25 MCG/INH oral inhaler 1 puff     fexofenadine (ALLEGRA) tablet 180 mg     fluticasone (FLONASE) 50 MCG/ACT spray 2 spray     ipratropium (ATROVENT HFA) Inhaler 1 puff     niacin (NIASPAN) CR tablet 500 mg     rosuvastatin (CRESTOR) tablet 20 mg     naloxone (NARCAN) injection 0.1-0.4 mg     lidocaine 1 % 1 mL     lidocaine (LMX4) kit     sodium chloride (PF) 0.9% PF flush 3 mL     sodium chloride (PF) 0.9% PF flush 3 mL     magnesium citrate solution 148 mL     ondansetron (ZOFRAN-ODT) ODT tab 4 mg    Or     ondansetron (ZOFRAN) injection 4 mg     prochlorperazine (COMPAZINE) injection 5 mg    Or     prochlorperazine (COMPAZINE) tablet 5 mg    Or     prochlorperazine (COMPAZINE) Suppository 12.5 mg     Patient is already receiving anticoagulation with heparin, enoxaparin (LOVENOX), warfarin (COUMADIN)  or other anticoagulant medication     No lozenges or gum should be given while patient on BIPAP     hypromellose-dextran (ARTIFICAL TEARS) ophthalmic solution 1 drop     HOLD: All Oral Medications     Warfarin Therapy Reminder (Check START DATE - warfarin may be starting in the FUTURE)     glucose 40 % gel 15-30 g    Or     dextrose 50 % injection 25-50 mL    Or     glucagon injection 1 mg     insulin aspart (NovoLOG) inj (RAPID ACTING)     insulin aspart (NovoLOG) inj (RAPID ACTING)     insulin aspart (NovoLOG) inj (RAPID ACTING)     insulin aspart (NovoLOG) inj (RAPID ACTING)     insulin aspart (NovoLOG) inj (RAPID ACTING)     insulin aspart (NovoLOG) inj (RAPID ACTING)     aspirin EC  EC tablet 81 mg     ferrous sulfate (IRON) tablet 325 mg       Caring Physician: Toña Duron MD  [facility], [facility]  Pager Contact: see Physician sticky note

## 2017-04-04 NOTE — PROGRESS NOTES
[facility] Nurse Inpatient Wound Assessment     Initial Assessment of wound(s) on pt's:   Bilateral buttocks and left upper thigh/lower buttock        Data:   Patient History:      per MD note(s): Melvin Bhatia is a 76 year old male with a significant past medical history of cerebrovascular disease, normal pressure hydrocephalus (status post ventriculoperitoneal shunting), progressive cognitive dysfunction, multiple falls, HTN, Type 2 DM, hyperlipidemia, CHF, CKD, and COPD who presented with a 3 day history of poor oral intake.      Moisture Management:  Incontinence Protocol and Diaper    Current Diet / Nutrition:           Active Diet Order      NPO                Aldair Assessment and sub scores:   Aldair Score  Av.2  Min: 11  Max: 15     Labs:         Recent Labs   Lab Test  17   1511  17   0831   17   0651   ALBUMIN   --   1.5*   --    --    HGB  8.6*  7.0*   < >  10.3*   RBC   --   2.59*   < >  3.82*   WBC   --   9.4   < >  9.0   PLT   --   140*   < >  166   INR   --   6.26*   < >  2.68*   A1C   --    --    --   6.4*   CRP   --    --    --   120.0*    < > = values in this interval not displayed.          Wound Assessment (location #1):   Bilateral buttocks and left upper thigh/lower buttock  Wound History:  Having incontinent loose stools. High INR, noted multiple bruises on arms        Specific Dimensions (length x width x depth, in cm) :   L) upper buttock/closer to coccyx- Area of dark brown/red 0.5x2x0.0cm with 0.5x0.5x0.1cm exposed dermis tissue    left upper thigh/lower buttock- 03x0.5x0.0cm light purple epidermis with somewhat moist     Tunneling:  N/A    Undermining: N/A    Palpation of the wound bed:  normal    Slough appearance:  none    Eschar appearance:  none    Periwound Skin: maceration and irritated that extends to perirectal area    Color: red    Temperature  normal     Drainage:  Amount: small . Color: bloody     Odor: none    Pain:  Severe,  had hard time cleaning and assessing          Intervention:     Patient's chart evaluated.      Wound(s) was assessed    Wound Care: was done:  Per POC mentioned below    Orders  Written    Supplies  Reviewed    Discussed plan of care with Patient and Nurse          Assessment:          Severe moisture associated skin  Damage. Left upper thigh/lower buttock wound suspect from brief being  tight around the area however injury appears to be vertical opposite direction of brief lining, ? Possible bruise, due to INR being so high. Will monitor closely.        Plan:     Nursing to notify the Provider(s) and re-consult the WO Nurse if wound(s) deteriorate(s).  Plan of care for wound located on Bl buttocks/perirectal area:   Cleanse the area with Vicky cleanse and protect, very gently with soft cloth.  Apply critic aid paste BID and PRN.  With repeat application, do not scrub the paste, only remove soiled paste and reapply.  If complete removal of paste is necessary use baby oil/mineral oil and soft wash cloth.    Leave the diaper to let the area dry as much as possible.    WO Nurse will return: Friday     Face to face time: 20 mins

## 2017-04-04 NOTE — PHARMACY-ANTICOAGULATION SERVICE
Warfarin Therapy Hold Note  This patient is currently receiving warfarin for Atrial fibrillation.    Goal INR:  2-3.    Anticoagulation Dose History     Recent Dosing and Labs Latest Ref Rng & Units 2/20/2017 2/21/2017 2/22/2017 3/31/2017 4/2/2017 4/3/2017 4/4/2017    Warfarin 0.5 mg - - - - - - 0.5 mg -    Warfarin 1 mg - - - - - 1 mg - -    Warfarin 3 mg - - 3 mg - - - - -    INR 0.86 - 1.14 1.16(H) 1.18(H) 1.12 2.10(H) 2.68(H)  3.58(H) 6.26(HH)    INR 0.86 - 1.14 - - - - - - -    INR Point of Care 0.86 - 1.14 - - - - - - -            Bleeding Signs/Symptoms:  Drop in Hgb from 4/2 10.2--> 4/3 8.8 --> 4/4 7.0, small tarry stool mentioned by nursing overnight    Assessment:  Current INR is supratherapeutic. This is most likely due to: hepatic insult (AST/ALT elevated at 814/633), nutrition changes (documented poor PO intake since admission) and stress from acute illness (?infectious process)    Plan:  1) HOLD today s warfarin dose.   An order has been placed in EPIC for  Warfarin- No Dose Today    2) Recommend vitamin K or FFP at this time.  Recommend: Vitamin K 5mg IV x 1 dose  3) Recheck next INR this evening.    The primary team has been contacted about the above plan.  Discussed plan of care with [facility] team and staff (Dr. Liriano).    Siria Peralta, PharmD, BCPS

## 2017-04-04 NOTE — PROVIDER NOTIFICATION
Provider notified regarding patient's temperature of 100.2 oral with morning vital signs. Will monitor and continue POC.

## 2017-04-04 NOTE — PLAN OF CARE
"Problem: Goal Outcome Summary  Goal: Goal Outcome Summary     SLP: Session cancelled. Attempted to see pt this AM for dysphagia tx with breakfast tray present. Pt unable to maintain alertness without significant verbal/tactile cues. Consumed x1 sip of nectar thick liquids with cough response following swallow. Pt refused any further PO trials, stated \"no\" when presenting spoon or cup. Pt does not appear safe for PO intake this date due to inability to maintain alertness. Pt at elevated aspiration risk at this time. Recommend NPO for today, except for pertinent medications. Recommend medications crushed in puree. Ensure swallowed or swab out of mouth. SLP to follow.       "

## 2017-04-04 NOTE — PLAN OF CARE
Problem: Goal Outcome Summary  Goal: Goal Outcome Summary  Outcome: No Change    Temp: 100.2  F (37.9  C) Temp src: Oral BP: 103/61 Pulse: 85 Resp: 20 SpO2: 99 % O2 Device: None (Room air)     Febrile with morning labs; provider notified and no new orders placed. Telemetry remains in place. Patient becoming increasingly agitated when BiPAP mask put on him at HS; [facility] provider Gabriella Archer gave RN a verbal okay at bedside to not use BiPAP overnight. Alert, opening eyes spontaneously; disoriented x3. Speech garbled and illogical at times. Patient agitated intermittently (i.e. swearing at staff) and not following commands (i.e. swallowing pills). See physician note on 4/3/17 at 2327. Turned and repositioned Q2hrs in bed with assist of 2. Dysphagia diet pureed with nectar thick liquids ordered; patient refusing to eat and/or drink. Blood sugars 248, 219, 216. No signs/sypmtoms of nausea, no emesis. Dextrose 5% infusing at 100 mL/hr to left midline. IV Vanco continued. Orta remains in place for neurogenic bladder. Incontinent of one smear and one small tarry stool overnight. Coccyx excoriated; barrier cream applied. Non-verbal indicators of pain present (i.e. Muscle tension, moaning); patient refused to swallow PRN Tylenol when offered. Creatinine 2.14, Na+ 157.

## 2017-04-04 NOTE — PROVIDER NOTIFICATION
Provider notified regarding patient's confusion, unwillingness to swallow pills for RN and inability to follow commands. Provider informed RN she would assess patient at bedside. Will monitor and continue POC.

## 2017-04-04 NOTE — PROGRESS NOTES
"Cross cover note   S: I was paged by RN around 11 pm that patient seems confused, refusing to take medications, not following directions and cursing. He denies any pain.     O : Vitals :     Vital signs:  Temp: 98.5  F (36.9  C) Temp src: Axillary BP: 107/54 Pulse: 80 Heart Rate: 74 Resp: 16 SpO2: 97 % O2 Device: None (Room air) Oxygen Delivery: 1 LPM Height: 162.6 cm (5' 4\") Weight: 81.6 kg (180 lb) (bed scale)  Estimated body mass index is 30.9 kg/(m^2) as calculated from the following:    Height as of this encounter: 1.626 m (5' 4\").    Weight as of this encounter: 81.6 kg (180 lb).    Exam :  Gen: Awake, non-apparent distress   Lung: No increased working of breath   Neuro: Awake, disoriented x 3 and able to squeeze my fingers    A/P:  Melvin Bhatia is a 76 year old male with a significant past medical history of cerebrovascular disease, normal pressure hydrocephalus, status post ventriculoperitoneal shunting, progressive cognitive dysfunction, multiple falls, HTN, Type 2 DM, hyperlipidemia, CHF, CKD, and COPD who is admitted for UTI, SHERYL and  found to have hypernatremia. He now seems confused and delirious.    Acute delirium: likely multifactorial   DDx: Infections vs hypernatremia vs uremia in setting of SHERYL vs medications vs worsening dementia   -Will use reorientation techniques with the patient as per delirium protocol   -Will add sitter if patient's delirium worsens   -Consider using hearing enhancement since patient is hard of hearing   -Will avoid physical restraints and the use of drugs as chemical restraints to minimize the severity of delirium.    Gabriella Archer MD  PGY 3 [facility]  [phone](pg)    "

## 2017-04-04 NOTE — PLAN OF CARE
Problem: Goal Outcome Summary  Goal: Goal Outcome Summary  Outcome: Declining  PT is disoriented x 4, VSS, and on room air. D5 infusing in midline and increased from 100 to 160 mL/hour. Pt refused morning meds. MD changed oral meds to IV. Critical value of INR- 6.2, AST- 814, and ALT- 633. Pt had ultrasound and CT scan for possible bleeding. Blood hung and transfused for HGB of 7.0, pt tolerated well. Stat ammonia drawn and result was 36. Stat BMP and Hepatitis B/C ordered and pending. Nonblanchable area noted on bottom, WOC ordered. Continue to monitor and POC.

## 2017-04-05 NOTE — PROGRESS NOTES
[facility] - Inpatient daily progress note    Date of admission: 3/31/2017  Date of admission: 3/31/2017  Date of service: 4/5/2017.    Updates:  - Continue Q 6 hour BMP/Hemoglobins  - FU C.Diff         Assessment and Plan:      Melvin Bhatia is a 76 year old male with a significant past medical history of cerebrovascular disease, normal pressure hydrocephalus (status post ventriculoperitoneal shunting), progressive cognitive dysfunction, multiple falls, HTN, Type 2 DM, hyperlipidemia, CHF, CKD, and COPD who presented with a 3 day history of poor oral intake and worsening mental status . He was found  to have an pre renal SHERYL and UTI. He developed an episode of melena , anemia and significant elevation for liver enzymes.     Patient Active Problem List   Diagnosis     Osteoporosis     PAF (paroxysmal atrial fibrillation) (H)     Lumbosacral plexopathy     Testosterone deficiency     Hypertension goal BP (blood pressure) < 140/90     Atrial fibrillation (H)     CKD (chronic kidney disease) stage 3, GFR 30-59 ml/min     Hyperlipidemia LDL goal <70     Coronary artery disease     Health Care Home     ACP (advance care planning)     Elevated PSA     Anemia in chronic renal disease     Subarachnoid hemorrhage (H)     Idiopathic normal pressure hydrocephalus (INPH)     Urinary bladder neurogenic dysfunction     Stroke (H)     Cognitive deficit, post-stroke     HL (hearing loss)     Personal history of fall     GERD (gastroesophageal reflux disease)     COPD exacerbation (H)     SHIVAM (obstructive sleep apnea)     LVH (left ventricular hypertrophy)     Diastolic CHF, acute on chronic (H)     Hypoxemia     Tachyarrhythmia     Systolic and diastolic CHF, chronic (H)     Gait abnormality     Iron deficiency anemia     Allergic rhinitis     Altered mental status     VRE (vancomycin-resistant Enterococci)     Hypoglycemia     Lower urinary tract infectious disease     Type 2 diabetes with stage 3  chronic kidney disease GFR 30-59 (H)     Chronic obstructive pulmonary disease, unspecified COPD type (H)     Ankle pain     Fx medial malleolus-closed     SHERYL (acute kidney injury) (H)      Plan:   ## Upper GI Bleed:   ##Melena   Ddx: Include but not limited to PUD vs varices, gastritis vs malignancy  vs AVM malformation . Patient was transfused with 2 units of  PRBC, last  one was last night. He is hemodynamically stable. Current hgb is 9.3 and he has a reassuring abdominal examination. Per GI's assessment unlikely due to proctitis.     Plan:   - To perform  endoscopy/flex sig to determine etiology , tomorrow   - Q 6 hour H/H, will transfuse as needed , if  Hgb <8  -Started Pantoprazole 40 mg IV BID     ## Acute liver injury  With unclear etiology at the moment.   Ddx: Viral hepatitis, hepatotoxicity from drugs/toxins, alcoholic liver disease, hepatic ischemia, malignant infiltration, Fahad's disease, Budd-Chiari syndrome, toxin exposure, and sepsis.  New elevation of liver enzymes, ,  yesterday, decreased to 505 and 408 today, but still increased from normal baseline 2/2017.  In addition, INR increased yesterday and is normal today (received 5 U Vit K). No risk factor for drug induced or toxin induced liver failure. Negative Hep B and C. US and abdominal CT showed focal dilatation of the of the intrahepatic duct that is stable, no mass seen or clot.     Plan:   -GI consult placed and will follow recommendation   -Will order CMV, EBV  - Hold warfarin  - No hepatotoxic medications (tylenol, statins, etc)    ## Stercoral proctitis  Seen on abdominal CT. Pt denied abdominal pain, stable VS. Patient now having regular bowel movements. Switched to PRN bowel regimen from scheduled.   Plan:   - Bowel regimen in place   - Miralax daily PRN and Senna at bedtime PRN.   - HOLD PTA ferrous sulfate while being treated for constipation    ## Coarse breath sounds/Coughing  Ddx: COPD exacerbation vs viral URI vs   pneumonia vs pulmonary edema vs PE.Possible mild COPD exacerbation, he has not been able to take his COPD controllers due to inability to follow command. Patient definitely at risk for PE as we have reversed his anticoagulation however he is not tachycardic , not tachypneac and non hypoxic. His repeat xray does not show infiltrates and previous vascular congestion has actually improved.     Plan:   -Pro calcitonin, will start empiric antibiotics for HCAP if patient develop fever , purulent sputum   -Will start Duoneb every 6 hours   -Will consider doing chest CT if patient respiratory status worsens      ##Acute worsening  Confusion:   Ddx:  Toxic metabolic encephalopathy ( infection, hypoxia, medication, uremia, ammonemia) vs worsening dementia   It is hard to tell what it the patient's baseline , however wife states he is more verbal and conversant before. Cranial imaging is reassuring against mass, stroke, bleed. He has a  shunt but no red flags to a shunt infection at the moment.  His hypernatremia has improved without improvement of mental status so most likely not the cause.     Plan:   - Plan for LP if confusion does not resolve with a normal sodium level  - NPO (aspiration precautions)  - Plan to pan-culture if patient spikes a fever  - Consider shunt evaluation by neurosurgery if mental status does not improve with treating UTI and electrolyte abnormalities  - Reorient patient as per delirium protocol    ##Hypernatremia:  Improving  Likely iatrogenic due to IV fluids. Patient was started on D5W 4/3, currently at 160 cc/hr.   Goal to decrease by 6 meq in 24 hrs.   -BMP Q6H    ## UTI: Stable  Patient has a diagnosis of atonic bladder and has required intermittent self catheterizations about 5 times a day. Urine culture growing gram positive enterococcus species susceptible to vancomycin and unasyn. Was initially on Ceftriaxone (03/31 to 04/02) then vancomycin started ( 04/01-4/4), was switched to Unasyn  today.   Plan:   -Will continue Unasyn      ## SHERYL on CKD3 (baseline Cr 1.5-2): Improving   Cr was at 5.39, GFR 10 on admission. Improved to 1.80 today with IVF hydration. BUN:Cr ratio >20 consistent with pre-renal, likely due to decreased PO intake.  - Continue MIVF  - Follow up BMP daily  - Strict I/O   - Daily weights  - Continue telemetry      #Normal Pressure Hydrocephalus  #Multi infarct Vascular Dementia   Last CT head from previous admission (02/2017) showed progression of cerebral ischemic disease, consistent step-wise decline of vascular dementia- Will discuss with family to obtain baseline cognitive function       ## Type 2 DM, last A1C 6.1 on 02/20/17, uncontrolled   Blood sugars 230s-280s last 24 hours- most likely from receiving D5 (for hypernatremia)   Home regimen:  Basal insulin: 37 units lantus twice daily  Prandial: 30 units before breakfast, lunch, 5 units before dinner       Plan:   -Will titrate Insulin   - Will increase Basal insulin to 33 units lantus twice daily  Prandial: 2.0 unit per 15 gm CHO  Correction: MSSI     ##Paroxysmal A Fib, rate controlled  Patient takes 50 mg ER once at bedtime, but unable to swallow pills so will switch him to immediate release 50 mg BID.  ASA discontinued (INR therapeutic).  Patient NPO today so metoprolol changed to IV.  Metoprolol held yesterday. BP improving, will restart metoprolol today.  Plan:   - To contintinue IV  metoprolol     ## Cerebrovascular disease, stable    w/ Hx stroke, normal pressure hydrocephalus (2012), status post ventriculoperitoneal shunting, subarachnoid hemorrhage(2012)   Plan: Will observe      ##High grade right ICA stenosis, diagnosed on 01/2017 MRA, stable   Patient's LAKE-VASC score is 8. He was taken off of coumadin in 2012 due to multiple falls and development of subarachnoid hemorrhage. However, during this last hospitalization, anticoagulation was re-addressed in the setting of multiple embolic events since stopping the  coumadin and the decision was made to continue with coumadin (goal INR 2-3).     Plan:   -Will continue to hold warfarin for now due to high risk of bleeding      ##HFrEF, EF 46% on 03/31/17, appears euvolemic    Plan:   -daily weights  -I and O   -Holding diuresis while treating pre-renal SHERYL  -Continue metoprolol      Other chronic conditions, stable- continue home medications.  ## HTN-  Not on any meds , BP controlled   ## Hyperlipidemia-  Held rosuvastatin, niacin, due to liver injury   ## COPD- Duoneb Q6   ## Constipation- Not an issue right now, will resume bowel regimen as needed   ## Progressive cognitive dysfunction (dementia)  ## Multiple bone fracture on left foot/ankle- cast in place. Hx of fractures after a fall on 9/16, and 2/2017.  - Recheck XR hip to evaluate for hip fracture      Fluids: As needed   Electrolytes:Hypernatremia (improving)  Nutrition: NPO   Lines:PIV   Activity: Up with assist   CODE: DNR / DNI  Prophylaxis: Coumadin held  Dispo: Expected discharge date 2-3 days pending clinical improvement                      Interval History:   Melvin Bhatia received 1 unit PRBCs yesterday and 1 unit overnight for blood in his stool.  His INR has been reversed and is currently 1.5.  His sodium is normalizing.      He has continued to be confused and nonverbal this morning.            Review of Systems:   Review of Systems   Unable to perform ROS: Patient nonverbal            Physical Exam (Resident / Clinician):   Vitals were reviewed  Temp: 97  F (36.1  C) Temp src: Axillary BP: 116/67   Heart Rate: 75 Resp: 18 SpO2: 100 % O2 Device: None (Room air)      Physical Exam   Constitutional: He appears well-developed and well-nourished. No distress.   Confused, NAD   HENT:   Head: Normocephalic.   Mouth/Throat: Oropharynx is clear and moist. No oropharyngeal exudate.   Eyes: Conjunctivae are normal.   Neck: Normal range of motion. Neck supple.   Cardiovascular: Normal rate.    Murmur (2/6  systolic murmur at RSB) heard.  Pulmonary/Chest: Effort normal and breath sounds normal. No respiratory distress. He has no wheezes. He has no rales.   Coarse breath sounds diffusely, patient coughing   Abdominal: Soft. There is tenderness (mild tenderness to palpation in epigastric area). There is no rebound and no guarding.   Genitourinary:   Genitourinary Comments: Orta in place, Diaper in place   Musculoskeletal: He exhibits no edema or tenderness.   Neurological: He is alert.   Confused, nonverbal   Skin: Skin is warm and dry. No rash noted. No erythema. No pallor.   Vitals reviewed.      Intake/Output Summary (Last 24 hours) at 04/05/17 0545  Last data filed at 04/05/17 0330   Gross per 24 hour   Intake             2925 ml   Output             1650 ml   Net             1275 ml             Data:   ROUTINE LABS (Last four results)  CMP  Recent Labs  Lab 04/05/17  0902 04/04/17  2355 04/04/17  2042 04/04/17  1511 04/04/17  0831  03/31/17  2053   * 148* 149* 151* 153*  < > 138   POTASSIUM 3.8 3.2* 3.4 3.6 3.5  < > 5.6*   CHLORIDE 122* 120* 120* 124* 126*  < > 103   CO2 20 21 23 21 22  < > 22   ANIONGAP 7 7 6 7 6  < > 13   * 286* 282* 285* 233*  < > 252*   BUN 47* 55* 59* 68* 73*  < > 115*   CR 1.72* 1.80* 1.85* 1.94* 2.08*  < > 5.39*   GFRESTIMATED 39* 37* 36* 34* 31*  < > 10*   GFRESTBLACK 47* 44* 43* 41* 38*  < > 13*   LORENA 7.0* 7.2* 7.0* 7.2* 7.3*  < > 8.9   MAG 2.7*  --   --   --   --   --   --    PHOS 1.5*  --   --   --   --   --   --    PROTTOTAL 5.1*  --  4.9*  --  4.8*  --  6.8   ALBUMIN 1.5*  --  1.5*  --  1.5*  --  2.0*   BILITOTAL 0.4  --  0.4  --  0.8  --  0.3   ALKPHOS 110  --  112  --  118  --  100   *  --  590*  --  814*  --  Unsatisfactory specimen - hemolyzed   *  --  582*  --  633*  --  233*   < > = values in this interval not displayed.  CBC    Recent Labs  Lab 04/05/17  0902 04/04/17  2355 04/04/17  2042 04/04/17  1511 04/04/17  0831 04/03/17  0707 04/02/17  0651    WBC 11.4*  --   --   --  9.4 9.3 9.0   RBC 3.35*  --   --   --  2.59* 3.23* 3.82*   HGB 9.0* 7.7* 7.8* 8.6* 7.0* 8.8* 10.3*   HCT 30.4*  --   --   --  24.3* 30.2* 36.1*   MCV 91  --   --   --  94 94 95   MCH 26.9  --   --   --  27.0 27.2 27.0   MCHC 29.6*  --   --   --  28.8* 29.1* 28.5*   RDW 17.4*  --   --   --  17.6* 17.5* 17.3*   *  --   --   --  140* 165 166     INR    Recent Labs  Lab 04/05/17  0902 04/04/17  2355 04/04/17  1641 04/04/17  0831   INR 1.40* 1.49* 1.93* 6.26*     CRP    Recent Labs  Lab 04/05/17  0902 04/02/17  0651 03/31/17  2053   CRP 18.0* 120.0* 190.0*           Recent Labs  Lab 03/31/17  2132 03/31/17  2119   CULT 50,000 to 100,000 colonies/mL Enterococcus faecalis* No growth after 5 days  No growth after 5 days           Medications:     Current Facility-Administered Medications   Medication     potassium chloride SA (K-DUR/KLOR-CON M) CR tablet 20-40 mEq     potassium chloride (KLOR-CON) Packet 20-40 mEq     potassium chloride 10 mEq in 100 mL intermittent infusion     potassium chloride 10 mEq in 100 mL intermittent infusion with 10 mg lidocaine     potassium chloride 20 mEq in 50 mL intermittent infusion     insulin glargine (LANTUS) injection 30 Units     insulin glargine (LANTUS) injection 30 Units     lactobacillus rhamnosus (GG) (CULTURELL) capsule 1 capsule     vancomycin (VANCOCIN) 1,250mg in D5W 250mL     [Rx hold ] metoprolol (LOPRESSOR) suspension 50 mg     [Rx hold ] acetaminophen (TYLENOL) tablet 975 mg     [Rx hold ] rosuvastatin (CRESTOR) tablet 20 mg     HOLD MEDICATION     [Rx hold ] Warfarin Therapy Reminder (Check START DATE - warfarin may be starting in the FUTURE)     [Rx hold ] metoprolol (LOPRESSOR) injection 5 mg     pantoprazole (PROTONIX) 40 mg IV push injection     prothrombin 4 factor complex concentrate (KCENTRA) infusion 4,080 Units     insulin aspart (NovoLOG) inj (RAPID ACTING)     senna-docusate (SENOKOT-S;PERICOLACE) 8.6-50 MG per tablet 1-2  tablet     Reason ACE/ARB order not selected     polyethylene glycol (MIRALAX/GLYCOLAX) Packet 17 g     lidocaine 1 % 0.5-5 mL     lidocaine (LMX4) kit     sodium chloride (PF) 0.9% PF flush 10-20 mL     sodium chloride (PF) 0.9% PF flush 10 mL     dextrose 5% infusion     fluticasone-vilanterol (BREO ELLIPTA) 200-25 MCG/INH oral inhaler 1 puff     fexofenadine (ALLEGRA) tablet 180 mg     fluticasone (FLONASE) 50 MCG/ACT spray 2 spray     ipratropium (ATROVENT HFA) Inhaler 1 puff     niacin (NIASPAN) CR tablet 500 mg     naloxone (NARCAN) injection 0.1-0.4 mg     lidocaine 1 % 1 mL     lidocaine (LMX4) kit     sodium chloride (PF) 0.9% PF flush 3 mL     sodium chloride (PF) 0.9% PF flush 3 mL     magnesium citrate solution 148 mL     ondansetron (ZOFRAN-ODT) ODT tab 4 mg    Or     ondansetron (ZOFRAN) injection 4 mg     prochlorperazine (COMPAZINE) injection 5 mg    Or     prochlorperazine (COMPAZINE) tablet 5 mg    Or     prochlorperazine (COMPAZINE) Suppository 12.5 mg     Patient is already receiving anticoagulation with heparin, enoxaparin (LOVENOX), warfarin (COUMADIN)  or other anticoagulant medication     No lozenges or gum should be given while patient on BIPAP     hypromellose-dextran (ARTIFICAL TEARS) ophthalmic solution 1 drop     HOLD: All Oral Medications     glucose 40 % gel 15-30 g    Or     dextrose 50 % injection 25-50 mL    Or     glucagon injection 1 mg     insulin aspart (NovoLOG) inj (RAPID ACTING)     insulin aspart (NovoLOG) inj (RAPID ACTING)     insulin aspart (NovoLOG) inj (RAPID ACTING)     insulin aspart (NovoLOG) inj (RAPID ACTING)     insulin aspart (NovoLOG) inj (RAPID ACTING)     insulin aspart (NovoLOG) inj (RAPID ACTING)     [Rx hold ] aspirin EC EC tablet 81 mg     ferrous sulfate (IRON) tablet 325 mg       Caring Physician: Toña Duron MD  [facility]  Pager Contact: see Physician sticky note

## 2017-04-05 NOTE — PLAN OF CARE
Problem: Goal Outcome Summary  Goal: Goal Outcome Summary     SLP: Session cancelled. Pt NPO 2/2 continued aspiration risk, but also for upper/lower GI procedures per RN. Will reschedule for tomorrow.

## 2017-04-05 NOTE — PLAN OF CARE
Pt is arousable to voice, answers who he is correctly but otherwise unable to answer questions appropriately, does converse with staff and at times makes sense, gave 2 tap water enemas this morning after verified with medical team that this should be done given active bleed, ok to give, had large amount water mixed with small amount loose black stool, pt repositioned q 2 hours, started on IV unasyn today with PCN allergy, VSS and no hives/itching noted, repositioned q 2 hours, NPO for endoscopy today, domingo patent draining cloudy yellow urine, pt having small amounts of loose black stool this afternoon, able to collect cdiff sample and send to lab to rule out, dextrose 5% discontinued at 1400 per md order

## 2017-04-05 NOTE — CONSULTS
GASTROENTEROLOGY CONSULTATION      Date of Admission:  3/31/2017          ASSESSMENT AND RECOMMENDATIONS:   Assessment:  76 year old male with a history of CVA, NPH s/p  shunt, HTN, CKD, DMII and proctitis initially presented with an acute on chronic kidney injury, UTI and hypernatremia and has now developed anemia with melena, requiring transfusions of pRBCs. Patient has also developed elevated transaminases. Currently hemodynamically stable but melena continues. Had supratherapeutic INR on admission.      #melena  #upper GI bleed  Black tarry stools started 4/4 . Continues but patient hemodynamically stable. INR closer to normal with vitamin K IV which is reassuring given the acute liver injury as well.  Hgb has dropped from baseline ~11 to 7.0. With this pattern of bleeding, likely from an upper GI source such as peptic ulcer disease, AVMs, gastritis,malignancy (due to patient's overall clinical picture with elevated transaminases and hypernatremia), portal hypertensive gastropathy/varices (less likely).   An EGD would need to performed to find the source of bleeding, however, with patient's decline in mental status and increased somnolence with hemoglobin drop, need to assess family's wishes of overall care before taking the risk of undergoing this procedure. Will wait and observe at this time.     # Proctitis on CT. Given his history of constipation, most likely stercoral ulceration/fecal stasis rather than infectious or IBD related.      #Acute liver injury  ,  one day ago, decreased to 582 and 593. This is a new episode of elevation. Prior records reveal normal liver chemistries. No history of cirrhosis, portal hypertension. With this elevation of transaminases, likely due to hepatocellular injury. CT abdomen/pelvis showed focal dilatation of intrahepatic ducts, which was also recognized on US abdomen with doppler. Viral hepatitis (HBV, HCV) neg. Could consider less common viral infection  such as CMV, HSV adenovirus, EBV. With the dilatation seen on CT and US, can also be a malignant process.     Recommendations   -Continue excellent patient cares. Monitor serial hemoglobin, try to maintain two peripheral IVs  -BID IV PPI  -Due to patient's clinical status, will observe and wait to perform and upper endoscopy until obtain more information from the wife and make sure patient's goals of cares are being fulfilled.   -CMV PCR, HSV IgG and IgM, EBV IgG/M  -if he can comply an MRCP would be a better test to evaluate the biliary tree  - follow liver enzymes  - work on correcting sodium/electrolyte imbalances     Thank you for involving us in this patient's care. Please do not hesitate to contact the GI service with any questions or concerns.      Pt care plan discussed with Dr. Ramirez, GI staff physician.    Gastroenterology follow up recommendations: BENJAMIN Barrios  -------------------------------------------------------------------------------------------------------------------          Chief Complaint:   We were asked by Dr. Liriano of Family Medicine to evaluate this patient with melena and anemia    History is obtained from the patient and the medical record.          History of Present Illness:   Melvin Bhatia is a 76 year old male with a history of  CVA, NPH w/ multi-infarct dementia s/p ventriculoperitoneal shunting, progressive cognitive dysfunction, multiple falls, HTN, DM II, CKD and COPD initially presented on 3/31/17 with 3 days of decreased PO intake with increased altered mental status and was found to have an acute on chronic kidney injury and enterococcus UTI. GI was consulted for new anemia and melena. Patient recently moved to a new nursing home 5 days ago. Per his wife, he was at his baseline mental status until 3 days ago when he started to refuse to eat. He became more somnolent. At baseline, patient is unable to leave his bed, lives at a nursing home and requires  regular intermittent domingo catheterization and frequent enemas. His wife at time of admission, said patient denies any fevers, pain, bloody urine, chills or other systemic symptoms.  No vomiting/hematemesis.      Throughout his hospital stay, patient was found to be hypernatremic with a sodium elevation to 156. He also was found to have enterococcal UTI with urosepsis. He is currently being treated with vancomycin and ceftriaxone. One day ago, per nurse, after an enema was given, patient had a black, tarry stool. Hgb was checked at the time and decreased to 7.0 from 8.8 the day prior (baseline ~11). Patient was given 3U of pRBCs due to drops in Hgb and Hgb has elevated to 9.0, currently. Patient currently has stable vital signs. Abdominal CT shows evidence of proctitis. Also has dilatation of intrahepatic ducts on CT. Fecal occult blood test was positive. Has not had episodes of diarrhea.            Past Medical History:   Reviewed and edited as appropriate  Past Medical History:   Diagnosis Date     ARDS (adult respiratory distress syndrome) (H)      Atrial fibrillation (H)     first diagnosed 11-08     Bell's palsy 1/2010     Cauda equina syndrome with neurogenic bladder (H)     self catheter     Cerebellar infarct (H) 4/2012    left cerebellar infarct.  felt embolic hx afib     CHF (congestive heart failure) (H)      Chronic airway obstruction, not elsewhere classified      Chronic infection     VRE     CKD (chronic kidney disease) stage 4, GFR 15-29 ml/min (H)      Congestive heart failure, unspecified 5/06    EF 48%     Coronary artery disease     abnl mps with ef 46% and small-med area of ischemia '08 [facility]     CVA (cerebral infarction)     old lacunar infarct in the posterior left     Diabetes      Essential hypertension, benign      Gastro-oesophageal reflux disease      Heart attack (H)      Hemorrhage of gastrointestinal tract, unspecified 2/2003    UGI Bleed with transfusion     Hydrocephalus     plan   Shunt     Hypersomnia with sleep apnea, unspecified     Bpap     Intraventricular hemorrhage 1/2012     Legionella pneumonia (H) 8/09     Lumbago      Lumbosacral plexopathy 10/09    L diabetic myotrophy     Mixed hyperlipidemia      Other osteoporosis 3/2006     Other specified anemias 2002    Nl colonoscopy/EGD; Nl iron studies 4/06     Other specified disorders of rotator cuff syndrome of shoulder and allied disorders      Other testicular hypofunction     on Androgel     Palpitations     HOLTER     Sleep apnea     uses cpap     Subarachnoid hemorrhage (H) 1/2012     Tibial plateau fracture 2/2011    left     Type II or unspecified type diabetes mellitus without mention of complication, not stated as uncontrolled      Unspecified disorder resulting from impaired renal function     Stage 3 Chronic Renal Dz            Past Surgical History:   Reviewed and edited as appropriate   Past Surgical History:   Procedure Laterality Date     C DEXA, BONE DENSITY, AXIAL SKEL  3/2006    Osteoporosis     CARDIAC NUC CONY STRESS TEST NL  5/06    No ischemia, EF 44 %     CARDIAC NUC CONY STRESS TEST NL  6/2008, 3/09    sm to mod fixed apical defect EF 46%     HC COLONOSCOPY THRU STOMA, DIAGNOSTIC  2/2003    Normal, repeat 10 yr     HC DOPPLER ECHO PULSED, COMPLETE      EF 50-55%     IMPLANT SHUNT VENTRICULOPERITONEAL  2/28/2012    Procedure:IMPLANT SHUNT VENTRICULOPERITONEAL; Ventriculoperitoneal Shunt Placement; Surgeon:ALESHA VELAZQUEZ; Location:[facility] OR     OPEN REDUCTION INTERNAL FIXATION TIBIAL PLATEAU  3/2011    left     SURGICAL HISTORY OF -   2001    R wrist surg fx/ steel plate     SURGICAL HISTORY OF -   age 20    Inguinal hernia     SURGICAL HISTORY OF -   2003    Bladder stim surg, not sucessful     SURGICAL HISTORY OF -       Lymphedema treatment of legs     TONSILLECTOMY  child     UPPER GI ENDOSCOPY  2/2003    esophageal ulcers, gastritis, duodenitis     UPPER GI ENDOSCOPY  4/2003    erosive gastropathy  from ASA            Previous Endoscopy:   No results found. However, due to the size of the patient record, not all encounters were searched. Please check Results Review for a complete set of results.         Social History:   Reviewed and edited as appropriate  Social History     Social History     Marital status:      Spouse name: N/A     Number of children: N/A     Years of education: N/A     Occupational History     Not on file.     Social History Main Topics     Smoking status: Former Smoker     Quit date: 6/26/1997     Smokeless tobacco: Never Used      Comment: quit 1997       2-3 ppd for 45 yrs      Alcohol use No      Comment: recovering since 1982       Drug use: No     Sexual activity: Not on file     Other Topics Concern     Parent/Sibling W/ Cabg, Mi Or Angioplasty Before 65f 55m? Yes     Social History Narrative    Dairy/d 1 servings/d.     Caffeine 0 servings/d    Exercise walks 7 x week    Sunscreen used - No    Seatbelts used - No    Working smoke/CO detectors in the home - Yes    Guns stored in the home - No    Self Breast Exams - NA    Self Testicular Exam - No    Eye Exam up to date - Yes    Dental Exam up to date - Yes    Pap Smear up to date - NA    Mammogram up to date - NA    PSA up to date - Yes    Dexa Scan up to date - Yes 3/06    Flex Sig / Colonoscopy up to date - Yes    Immunizations up to date - No  Declines with possible allergic rxn as child    Abuse: Current or Past(Physical, Sexual or Emotional)- No    Do you feel safe in your environment - Yes    Consuelo Alfonso RN    4/30/08            Family History:   Reviewed and edited as appropriate  Unable to obtain from patient       Allergies:   Reviewed and edited as appropriate     Allergies   Allergen Reactions     Penicillins Hives     Albuterol Other (See Comments)     Albuterol Sulfate Other (See Comments)     SVT/tachycardia with albuterol     Atorvastatin Other (See Comments)     Description: Lipitor, Description: Lipitor      Atorvastatin Calcium      Cipro [Ciprofloxacin] Diarrhea     Ciprofloxacin Other (See Comments)     Description: Cipro, Description: Cipro     Simvastatin Other (See Comments)     Description: Zocor, Description: Zocor  C/o weak muscles and severe abdominal pain.     Warfarin Unknown and Other (See Comments)            Medications:     Prescriptions Prior to Admission   Medication Sig Dispense Refill Last Dose     sulfamethoxazole-trimethoprim (BACTRIM DS/SEPTRA DS) 800-160 MG per tablet Take 1 tablet by mouth daily   3/31/2017 at 1041     insulin glargine (LANTUS SOLOSTAR) 100 UNIT/ML injection Inject 37 Units Subcutaneous 2 times daily (Patient taking differently: Inject 35 Units Subcutaneous 2 times daily ) 30 mL 5 3/31/2017 at 0800     aspirin 81 MG tablet Take 1 tablet (81 mg) by mouth daily 100 tablet 3 3/31/2017 at 1232     acetaminophen (TYLENOL) 325 MG tablet Take 3 tablets (975 mg) by mouth every 6 hours as needed for mild pain 100 tablet  Past Week     warfarin (COUMADIN) 3 MG tablet Take 1 tablet (3 mg) by mouth daily (Patient taking differently: Take 2-3 mg by mouth daily ) 30 tablet  3/30/2017 at PM     senna-docusate (SENOKOT-S;PERICOLACE) 8.6-50 MG per tablet Take 1-2 tablets by mouth 2 times daily 100 tablet  3/31/2017 at 1041     fexofenadine (ALLEGRA) 180 MG tablet Take 1 tablet (180 mg) by mouth daily 90 tablet 0 3/31/2017 at 1041     budesonide-formoterol (SYMBICORT) 160-4.5 MCG/ACT Inhaler Inhale 2 puffs into the lungs 2 times daily 1 Inhaler 11 3/31/2017 at 0800     rosuvastatin (CRESTOR) 20 MG tablet Take 1 tablet (20 mg) by mouth daily Needs fasting labs before any further refills 90 tablet 0 3/31/2017 at 1041     RANITIDINE HCL PO Take 150 mg by mouth daily   3/31/2017 at 1041     calcium citrate (CALCITRATE) 950 MG tablet Take 1 tablet by mouth daily   3/31/2017 at 1041     Cyanocobalamin (VITAMIN B12) 1000 MCG TBCR Take 1,000 mcg by mouth daily 100 tablet 3 3/31/2017 at 1041      exenatide (BYETTA) 5 mcg/0.02mL per dose (60 doses per 1.2 mL prefilled pen) Inject 5mcg under the skin twice daily 60 minutes before meals. 1 Syringe 1 3/31/2017 at 1530     metoprolol (TOPROL-XL) 50 MG 24 hr tablet Take 1 tablet (50 mg) by mouth At Bedtime 90 tablet 1 3/30/2017 at PM     fluticasone (FLONASE) 50 MCG/ACT nasal spray Spray 2 sprays into both nostrils daily 48 g 1 3/31/2017 at 1041     multivitamin, therapeutic with minerals (THERA-VIT-M) TABS Take 1 tablet by mouth daily   3/31/2017 at 1041     ferrous sulfate 325 (65 FE) MG tablet Take 325 mg by mouth daily (with breakfast) 65mg elemental iron   3/31/2017 at 1041     VITAMIN D, CHOLECALCIFEROL, PO Take 2,000 Units by mouth daily   3/31/2017 at 1041     bumetanide (BUMEX) 0.5 MG tablet Take 0.5 mg by mouth 2 times daily   3/31/2017 at 1041     magnesium oxide (MAG-OX) 400 MG tablet Take 2 tablets by mouth daily.   3/31/2017 at 1041     niacin (NIASPAN) 500 MG CR tablet Take 1 tablet by mouth At Bedtime. 90 tablet 2 3/31/2017 at 1041     insulin lispro (HUMALOG KWIKPEN) 100 UNIT/ML injection Inject 30 units before breakfast and lunch and 5 units before dinner  as directed. (Patient taking differently: Inject 20 units at 1000 and 10 units at 1600 as directed.) 30 mL 5 3/31/2017     polyethylene glycol (MIRALAX/GLYCOLAX) Packet Take 17 g by mouth daily as needed for constipation 7 packet 11 Unknown     Ipratropium-Albuterol (COMBIVENT RESPIMAT)  MCG/ACT inhaler Inhale 1 puff into the lungs 2 times daily Not to exceed 6 doses per day. 3 Inhaler 3 Unknown     denosumab (PROLIA) 60 MG/ML SOLN Inject 60 mg Subcutaneous every 6 months   Unknown     insulin pen needle (B-D U/F) 31G X 8 MM Use 7 daily or as directed. 300 each 5 Unknown     blood glucose monitoring (NO BRAND SPECIFIED) test strip 1 strip by In Vitro route 4 times daily 3 Box 11 Unknown     ONE TOUCH LANCETS MISC use as directed 4 x daily and prn 120 each 3 Unknown     ketoconazole  "(NIZORAL) 2 % shampoo Apply to the affected area and wash off after 5 minutes. 120 mL 2 Unknown     nitroglycerin (NITROSTAT) 0.4 MG SL tablet Place 1 tablet (0.4 mg) under the tongue every 5 minutes as needed up to 3 tablets per episode. 20 tablet 11 Unknown     fluocinolone (SYNALAR) 0.01 % external solution Apply 1 to 2 times daily to scaly areas of scalp as needed.Profile Rx: patient will contact pharmacy when needed 60 mL 11 Unknown     order for DME Equipment being ordered: 4 wheeled rolling walker. 1 Device 0 Unknown     ACE/ARB NOT PRESCRIBED, INTENTIONAL, ACE & ARB not prescribed due to Hyperkalemia (baseline K+ > 5.5), Worsening renal function on ACE/ARB therapy and CKD (Chronic Kidney Disease)   Unknown     insulin syringes, disposable, U-100 0.3 ML MISC 1 each 4 times daily. 100 each prn Unknown     ORDER FOR DME Equipment being ordered: CPAP supplies 1 each 0 Unknown     Catheters MISC As needed 200 each 10 Unknown             Review of Systems:   A complete review of systems was performed and is negative except as noted in the HPI           Physical Exam:   /71 (BP Location: Right arm)  Pulse 71  Temp 96.2  F (35.7  C) (Axillary)  Resp 20  Ht 1.626 m (5' 4\")  Wt 81.6 kg (180 lb)  SpO2 100%  BMI 30.9 kg/m2  Wt:   Wt Readings from Last 2 Encounters:   04/03/17 81.6 kg (180 lb)   03/10/17 93 kg (205 lb)      Constitutional: wdwn M laying in bed, intermittently interactive but seems minimally purposeful  Eyes: Sclera anicteric/injected  Ears/nose/mouth/throat: Normal oropharynx without ulcers or exudate, mucus membranes moist, hearing intact  CV: RRR  Respiratory: Unlabored breathing  Lymph: No supraclavicular lymphadenopathy  Abd: Nondistended, +bs, no grimace with palpation, no peritoneal signs  : rectal examination with melena both surrounding anus and in the rectal vault. No red blood  Skin: warm, perfused, no jaundice  Neuro: occasionally opens eyes and responds verbally though " unintelligible.   Psych: Normal affect  MSK: no gross deformities         Data:   Labs and imaging below were independently reviewed and interpreted    BMP  Recent Labs  Lab 04/04/17 2355 04/04/17 2042 04/04/17  1511 04/04/17  0831   * 149* 151* 153*   POTASSIUM 3.2* 3.4 3.6 3.5   CHLORIDE 120* 120* 124* 126*   LORENA 7.2* 7.0* 7.2* 7.3*   CO2 21 23 21 22   BUN 55* 59* 68* 73*   CR 1.80* 1.85* 1.94* 2.08*   * 282* 285* 233*     CBC  Recent Labs  Lab 04/04/17 2355 04/04/17 0831 04/03/17  0707 04/02/17  0651 03/31/17 2053   WBC  --   --  9.4 9.3 9.0 12.9*   RBC  --   --  2.59* 3.23* 3.82* 4.31*   HGB 7.7*  < > 7.0* 8.8* 10.3* 12.1*   HCT  --   --  24.3* 30.2* 36.1* 39.2*   MCV  --   --  94 94 95 91   MCH  --   --  27.0 27.2 27.0 28.1   MCHC  --   --  28.8* 29.1* 28.5* 30.9*   RDW  --   --  17.6* 17.5* 17.3* 17.1*   PLT  --   --  140* 165 166 206   < > = values in this interval not displayed.  INR  Recent Labs  Lab 04/04/17 2355 04/04/17  1641 04/04/17  0831 04/03/17  0707   INR 1.49* 1.93* 6.26* 3.58*     LFTs  Recent Labs  Lab 04/04/17 2042 04/04/17  0831 03/31/17 2053   ALKPHOS 112 118 100   * 814* Unsatisfactory specimen - hemolyzed   * 633* 233*   BILITOTAL 0.4 0.8 0.3   PROTTOTAL 4.9* 4.8* 6.8   ALBUMIN 1.5* 1.5* 2.0*      PANC  Recent Labs  Lab 04/04/17  2042   LIPASE 203       Imaging:  CT abd personally reviewed and interpreted. No e/o subcapsular bleed in the CT or US yesterday. Gallstone in the gallbladder. Proctitis.

## 2017-04-05 NOTE — PLAN OF CARE
Problem: Goal Outcome Summary  Goal: Goal Outcome Summary  Outcome: No Change  [id number]: VSS on room air, continuous pulse ox monitoring on, disoriented x4. PERRLA intact, opens eye spontaneously. No non-verbal indicators of pain present. Turned/repositioned q2h. Cast to RLE C/D/I. D5 running at 160mL/hr, last Na+ 148. BG checked q4h, sliding scale insulin given per MAR. L double lumen midline intact, L PIV saline locked. Strict NPO, PO meds held. K+ 3.2 at 0000, replacement protocol initiated. 40mEq KCl given IV, recheck with AM labs. Hgb 7.7 at 2100, 7.2 at 0000, 1 units RBCs transfused. No s/sx of rxn noted. Hgb check this AM. Orta intact with cloudy output, 1 episode of fecal incontinence with loose black BM. Tap water enema ordered for this AM for possible flex sig-patient care order placed to hold at this time. Discussed with cross cover d/t Medicine note of no rectal interventions/along with current potential GI bleed. Pt may need closer monitoring if enema is necessary. Continue to monitor and follow POC.

## 2017-04-05 NOTE — PLAN OF CARE
Problem: Goal Outcome Summary  Goal: Goal Outcome Summary  Outcome: No Change  VSS, pt continues to be confused, wife at bedside, Hgb and INR improved this evening, WOC, RN assessed buttocks, wound care orders placed, Per MD it is okay to keep the domingo in. Plan for Flex. Sig. tomorrow, tap water enema ordered for prep, and Prothrombin 4 Factor infusion ordered to be given in am. Continue with the POC.

## 2017-04-05 NOTE — PROGRESS NOTES
Social Work Services Progress Note    Hospital Day: 6  Date of Initial Social Work Evaluation:  4/3/2017  Collaborated with:  Medical team, [facility]- Diann: [phone]    Data:  Patient is 76 yr old male admitted from [facility] for elevated WBC/creatnine, lethargy, weakness, and no nutrition. Dementia at baseline. SW facilitating return to facility. Patient expected to remain here until next week, anticipated DC T/W. Wife is currently paying privately for a bed hold at [facility].     Intervention:  DARLENE LVM for Diann in admissions at [facility] stating patient will not be dc'd from hospital until Tuesday or Wednesday next week. SW requested she follow up with spouse regarding the bed hold at her facility and discuss options. Requested to call SW back if any concerns or changes to the bed hold.     Plan:    Anticipated Disposition:  Facility:  [facility]    Barriers to d/c plan:  Medical readiness    Follow Up:  darlene following through DC.    GERMANIA Schreiber  5B  (Medical/Surgical)  Phone: [phone]  Pager: [phone]       GERMANIA Schreiber  5B  (Medical/Surgical)  Phone: [phone]  Pager: [phone]

## 2017-04-06 NOTE — PLAN OF CARE
Problem: Goal Outcome Summary  Goal: Goal Outcome Summary  Outcome: No Change  Temp: 97.8  F (36.6  C) Temp src: Axillary BP: 127/55 Heart Rate: 76 Resp: 20 SpO2: 100 % O2 Device: BiPAP/CPAP      Contact isolation. Telemetry remains in place (A-Fib). Alert, opening eyes spontaneously; disoriented x3. Pupils reactive, but sluggish. Speech garbled and illogical at times. Patient agitated intermittently (i.e. swearing at staff). Turned and repositioned Q2hrs. NPO. No signs/symptoms of nausea, no emesis. Blood sugars 129, 75, 73, 66, 119, 109; episode of hypoglycemia treated with IV Dextrose 25 mL's. Dextrose 5% infusing at 50 mL/hr to midline. IV antibiotics continued. Orta remains in place; urine cloudy with sediment. Incontinent of black stool overnight. No non-verbal indicators of pain present. Plan for flex sig today; tap water enema given per patient care order.

## 2017-04-06 NOTE — PROGRESS NOTES
CLINICAL NUTRITION SERVICES - REASSESSMENT NOTE/Nutrition Consult: Registered Dietitian to Assess and Order TF per Medical Nutrition Therapy Protocol *RD spoke with team. Team wondering overall nutritional status and if TF should be started. At this time RD is not to order TF.      Nutrition Prescription    RECOMMENDATIONS FOR MDs/PROVIDERS TO ORDER:  -Patient w/ less than adequate PO x 8+ days. Would be appropriate to start nutrition support given unclear when patients diet will advance (SLP rec NPO at this time) and patient will take in adequate PO. GI reports possible upper GI bleed. If suspect GI bleed would recommend TPN, otherwise if GI tract functioning could place feeding tube and start enteral nutrition. If TPN started will need to monitor LFTs (ALT/AST currently elevated).  -Consider additional fluids estimated needs 1280-1600mL/day.    Malnutrition Status:    -Severe    Recommendations already ordered by Registered Dietitian (RD):  -DC'd supplements in setting of NPO    Future/Additional Recommendations:  -If  TPN warranted recommend goal: 1200mL, 200g dextrose, 100g amino acids + 250mL 20% lipids 5x/week. Start @ 150g dextrose and advance by 25-50g per day pending lytes.   -If able to use GIT recommend goal: Peptamen 1.5 @ goal 45 ml/hr (1080 ml/day) to provide 1620 kcals, 73 g PRO, 832 ml free H2O, 60 g Fat (70% from MCTs), 203 g CHO and no Fiber daily.         EVALUATION OF THE PROGRESS TOWARD GOALS   Diet: NPO- Per SLP as of 4/4. Prior to this patient was on a DD1 diet wit nectar thick liquids  Intake: Per nursing documentation patient was eating 0-25% since admit (prior to NPO)     NEW FINDINGS   -Current weight: 85kg- weight fluctuations likely related to fluids and recent decrease in PO.  Wt Readings from Last 5 Encounters:   04/05/17 85 kg (187 lb 6.4 oz)   03/10/17 93 kg (205 lb)   03/02/17 88.5 kg (195 lb)   02/20/17 87.3 kg (192 lb 8 oz)   02/02/17 90.3 kg (199 lb)   ~9% weight loss in <1  month-likely fluids and decreased PO contributing    Medications:   -IVF D5% @ 50mL/hr- started after episode of hypoglycemia  -Mg++/K+/phos lyte replacement protocol    Labs:  -C. Diff negative  -Ammonia-WNL  -ALT:378  -AST: 263  -Na: 148    -WOC nurse evaluated patient on 4/4: pt with severe moisture associated skin damage. L. upper buttock/closer to coccyx- Area of dark brown/red 0.5x2x0.0cm with 0.5x0.5x0.1cm exposed dermis tissue  left upper thigh/lower buttock- 03x0.5x0.0cm light purple epidermis with somewhat moist     -Per GI note potential upper GI bleed.   -RN noting black stools.     -Pt with confusion    -Updated protein needs with wounds (per 64kg adjusted weight)  Estimated Protein Needs: 77- 116 grams protein/day (1 - 1.2 grams of pro/kg)  Justification: Hypercatabolism with acute illness    MALNUTRITION  % Intake: < 75% for > 7 days (non-severe)  % Weight Loss: ~9% weight loss in <1 month-likely fluids and decreased PO contributing  Subcutaneous Fat Loss: Unable to assess  Muscle Loss: Unable to assess  Fluid Accumulation/Edema: Moderate  Malnutrition Diagnosis: Severe malnutrition in the context of acute illness    Previous Goals   Patient to consume at least 50% of nutritionally adequate meal trays TID, or the equivalent with supplements/snacks.  Evaluation: Not met    Previous Nutrition Diagnosis  Unintended weight loss related to likely fluid status and decreased PO as evidenced by 3 days poor PO PTA and likely significant dehydration with sodium of 154 and ~10% weight loss in 1 month.  Evaluation: ongoing/updated    CURRENT NUTRITION DIAGNOSIS  Inadequate oral intake related to decreased appetite with confusion and limited diet as evidenced by report of poor PO 3 days PTA, poor PO since admission and limited diet with current NPO recs per SLP    INTERVENTIONS  Implementation  -Attempted to visit with patient but he was not in room  -Collaboration with other providers-paged team  w/recs    Goals  Nutrition support within the next 1-2 days    Monitoring/Evaluation  Progress toward goals will be monitored and evaluated per protocol.    Faye Myers RD, LD  Pager: [phone]

## 2017-04-06 NOTE — PROGRESS NOTES
Brief GI Note    Chart reviewed, discussed with FM team. Mental status slightly improved today, still with melena but hemoglobin more stable. Vitals normal.     Discussed the case with patient's wife this afternoon. She would like for him to get EGD done to remove this as a potential confounding factor in his hospital course.     Will plan on EGD Friday 4/7 as long as electrolytes are same/improved, mental status is stable. Keep keep NPO at midnight.     Stu Barrios MD  Gastroenterology Fellow

## 2017-04-06 NOTE — PLAN OF CARE
Problem: Goal Outcome Summary  Goal: Goal Outcome Summary  Outcome: Improving  VSS, pt appeared more alert today, occasionally answers yes and no questions, more logical, still NPO, was seen by speech this evening and okay to advance him to full liquid diet, order not placed yet, domingo patent, good urine output, no BM today, needs a stool sample collection, Mid line infusing, BG stable this afternoon, D5% infusion increased to 75 mls/hr, Possible EGD tomorrow, NPO at midnight. Continue with the POC.

## 2017-04-06 NOTE — PROVIDER NOTIFICATION
Provider notified via web based text page regarding patient's episode of hypoglycemia (66). RN gave PRN IV Dextrose 25 mL's. Will recheck BG in 15 minutes. Will monitor and continue POC.

## 2017-04-06 NOTE — PROGRESS NOTES
Cross cover note  [facility]  April 5, 2017 2317     Called by nurse regarding patient's glucose of 73. D5 infusion was stopped earlier today and dose of lantus was increased (but not yet given). Patient has not received sliding scale coverage since ~4pm. Lantus dose is currently due. I changed dose of lantus from 33units BID to 18units BID (~1/2 home dose) as patient is NPO. Additionally I ordered D5W infusion at 50mL/hr with plan to recheck blood glucose in 1 hour and stop infusion when relative hypoglycemia has resolved. Will continue to closely monitor blood glucose and adjust insulin/dextrose as necessary.     Johnathan Mak MD

## 2017-04-06 NOTE — PROGRESS NOTES
[facility] - Inpatient daily progress note    Date of admission: 3/31/2017  Date of admission: 3/31/2017  Date of service: 4/6/2017.    Updates:  - Continue Q 12 hour Hemoglobins  - Continue Q 8 hour BMPs           Assessment and Plan:      Melvin Bhatia is a 76 year old male with a significant past medical history of cerebrovascular disease, normal pressure hydrocephalus (status post ventriculoperitoneal shunting), progressive cognitive dysfunction, multiple falls, HTN, Type 2 DM, hyperlipidemia, CHF, CKD, and COPD who presented with a 3 day history of poor oral intake and worsening mental status . He was found  to have an pre renal SHERYL and UTI. He developed an episode of melena , anemia and significant elevation for liver enzymes.     Patient Active Problem List   Diagnosis     Osteoporosis     PAF (paroxysmal atrial fibrillation) (H)     Lumbosacral plexopathy     Testosterone deficiency     Hypertension goal BP (blood pressure) < 140/90     Atrial fibrillation (H)     CKD (chronic kidney disease) stage 3, GFR 30-59 ml/min     Hyperlipidemia LDL goal <70     Coronary artery disease     Health Care Home     ACP (advance care planning)     Elevated PSA     Anemia in chronic renal disease     Subarachnoid hemorrhage (H)     Idiopathic normal pressure hydrocephalus (INPH)     Urinary bladder neurogenic dysfunction     Stroke (H)     Cognitive deficit, post-stroke     HL (hearing loss)     Personal history of fall     GERD (gastroesophageal reflux disease)     COPD exacerbation (H)     SHIVAM (obstructive sleep apnea)     LVH (left ventricular hypertrophy)     Diastolic CHF, acute on chronic (H)     Hypoxemia     Tachyarrhythmia     Systolic and diastolic CHF, chronic (H)     Gait abnormality     Iron deficiency anemia     Allergic rhinitis     Altered mental status     VRE (vancomycin-resistant Enterococci)     Hypoglycemia     Lower urinary tract infectious disease     Type 2 diabetes with  stage 3 chronic kidney disease GFR 30-59 (H)     Chronic obstructive pulmonary disease, unspecified COPD type (H)     Ankle pain     Fx medial malleolus-closed     SHERYL (acute kidney injury) (H)      Plan:   ## Upper GI Bleed:   ## Melena   Ddx: Include but not limited to PUD vs varices, gastritis vs malignancy  vs AVM malformation . Patient was transfused with 2 units of  PRBC, last  one was 4/4.  Since then, his hemoglobin has stabilized and is currently 8.8.  Per GI's assessment unlikely due to proctitis.     Plan:   - GI following, appreciate recs  - To perform  endoscopy/flex sig to determine etiology  - Q 12 hour H/H, will transfuse as needed , if  Hgb <8  - Started Pantoprazole 40 mg IV BID     ## Acute liver injury  With unclear etiology at the moment.   Ddx: Viral hepatitis, hepatotoxicity from drugs/toxins, alcoholic liver disease, hepatic ischemia, malignant infiltration, Fahad's disease, Budd-Chiari syndrome, toxin exposure, and sepsis.  New elevation of liver enzymes, ,  4/4, decreased to 378 and 263 today, but still increased from normal baseline 2/2017.  In addition, INR increased 4/4 and is normal today (received 5 U Vit K). No risk factor for drug induced or toxin induced liver failure. Negative Hep B and C. US and abdominal CT showed focal dilatation of the of the intrahepatic duct that is stable, no mass seen or clot.  US of liver with dopplers showed no evidence of a clot    Plan:   - GI following, appreciate recs  - CMV, EBV, H.pylori, HSV pending  - Hold warfarin   - No hepatotoxic medications (tylenol, statins, etc)    ## Malnutrition:  Patient has been NPO for several days now (for confusion and possible intervention per GI).  Will plan to either start TPN (if patient has a GI bleed) or tube feeds tomorrow, 4/7, after endoscopy/flex sig    Plan:  - Nutrition following, appreciate recs  - Start TPN or tube feeds tomorrow    ## Stercoral proctitis  Seen on abdominal CT. Pt denied  "abdominal pain, stable VS. Patient now having regular bowel movements. Switched to PRN bowel regimen from scheduled.     Plan:    - Miralax daily PRN and Senna at bedtime PRN.   - HOLD PTA ferrous sulfate while being treated for constipation    ## Coarse breath sounds/Coughing  Ddx: COPD exacerbation vs viral URI vs  pneumonia vs pulmonary edema vs PE. Possible mild COPD exacerbation, he has not been able to take his COPD controllers due to inability to follow command. Patient definitely at risk for PE as we have reversed his anticoagulation however he is not tachycardic, not tachypneac and non hypoxic. His repeat xray does not show infiltrates and previous vascular congestion has actually improved.  Procalcitonin is 0.32, indicating \"possible\" infection.  WBC and temperature have been normal, but will follow closely since WBC and temp are not always reliable in elderly patients.    Plan:   -start empiric antibiotics for HCAP if patient develop fever, purulent sputum   -Will start Duoneb every 6 hours   -Will consider doing chest CT if patient respiratory status worsens      ##Acute worsening  Confusion:   Ddx:  Toxic metabolic encephalopathy ( infection, hypoxia, medication, uremia, ammonemia) vs worsening dementia   It is hard to tell what it the patient's baseline , however wife states he is more verbal and conversant before. Cranial imaging is reassuring against mass, stroke, bleed. He has a  shunt but no red flags to a shunt infection at the moment.  His hypernatremia has improved without improvement of mental status so most likely not the cause.     Plan:   - Plan for LP if confusion does not resolve with a normal sodium level  - NPO (aspiration precautions)  - Plan to pan-culture if patient spikes a fever  - Consider shunt evaluation by neurosurgery if mental status does not improve with treating UTI and electrolyte abnormalities  - Reorient patient as per delirium protocol    ##Hypernatremia:  " Improving  Likely iatrogenic due to IV fluids. Patient was started on D5W 4/3, currently at  75 cc/hr.   Goal to decrease by 6 meq in 24 hrs.   -BMP Q8 hours  - Continue D5 at 75 cc/hr    ## UTI: Stable  Patient has a diagnosis of atonic bladder and has required intermittent self catheterizations about 5 times a day. Urine culture growing gram positive enterococcus species susceptible to vancomycin and unasyn. Was initially on Ceftriaxone (03/31 to 04/02) then vancomycin started (04/01-4/4), was switched to Unasyn 4/5.    Plan:   -Will continue Unasyn      ## SHERYL on CKD3 (baseline Cr 1.5-2): Improving   Cr was at 5.39, GFR 10 on admission. Improved to 1.48 today with IVF hydration. BUN:Cr ratio >20 consistent with pre-renal, likely due to decreased PO intake.  - Continue MIVF  - Follow up BMP daily  - Strict I/O   - Daily weights  - Continue telemetry      #Normal Pressure Hydrocephalus  #Multi infarct Vascular Dementia   Last CT head from previous admission (02/2017) showed progression of cerebral ischemic disease, consistent step-wise decline of vascular dementia- Will discuss with family to obtain baseline cognitive function       ## Type 2 DM, last A1C 6.1 on 02/20/17, uncontrolled   Blood sugars 66-250s last 24 hours.  Patient's blood sugar dropped several hours after D5 had been stopped.  D5 restarted for low blood sugar.  Home regimen:  Basal insulin: 37 units lantus twice daily  Prandial: 30 units before breakfast, lunch, 5 units before dinner     Hospital regimen:  - Lantus 18 units bid (patient currently NPO)  - Prandial: 2.0 unit per 15 gm CHO  - Correction: MSSI     ##Paroxysmal A Fib, rate controlled  Patient takes 50 mg ER once at bedtime, but unable to swallow pills so will switch him to immediate release 50 mg BID.  ASA discontinued (INR therapeutic).  Patient NPO today so metoprolol changed to IV.  Metoprolol held yesterday. BP improving, will restart metoprolol today.  Plan:   - To contintinue IV   metoprolol     ## Cerebrovascular disease, stable    w/ Hx stroke, normal pressure hydrocephalus (2012), status post ventriculoperitoneal shunting, subarachnoid hemorrhage(2012)   Plan: Will observe      ##High grade right ICA stenosis, diagnosed on 01/2017 MRA, stable   Patient's LAKE-VASC score is 8. He was taken off of coumadin in 2012 due to multiple falls and development of subarachnoid hemorrhage. However, during this last hospitalization, anticoagulation was re-addressed in the setting of multiple embolic events since stopping the coumadin and the decision was made to continue with coumadin (goal INR 2-3).     Plan:   -Will continue to hold warfarin for now due to high risk of bleeding      ##HFrEF, EF 46% on 03/31/17, appears euvolemic    Plan:   -daily weights  -I and O   -Holding diuresis while treating pre-renal SHERYL  -Continue metoprolol      Other chronic conditions, stable- continue home medications.  ## HTN-  Not on any meds , BP controlled   ## Hyperlipidemia-  Held rosuvastatin, niacin, due to liver injury   ## COPD- Duoneb Q6   ## Constipation- Not an issue right now, will resume bowel regimen as needed   ## Progressive cognitive dysfunction (dementia)  ## Multiple bone fracture on left foot/ankle- cast in place. Hx of fractures after a fall on 9/16, and 2/2017.  - Recheck XR hip to evaluate for hip fracture      Fluids: As needed   Electrolytes:Hypernatremia (improving)  Nutrition: NPO   Lines:PIV   Activity: Up with assist   CODE: DNR / DNI  Prophylaxis: Coumadin held  Dispo: Expected discharge date 2-3 days pending clinical improvement                      Interval History:   Melvin Bhatia did not receive endoscopy or flex sig yesterday.  This morning he appears more alert (made eye contact, responded to his name).                Review of Systems:   Review of Systems   Unable to perform ROS: Patient nonverbal            Physical Exam (Resident / Clinician):   Vitals were reviewed  Temp:  97.8  F (36.6  C) Temp src: Axillary BP: 127/55 Pulse: 65 Heart Rate: 76 Resp: 20 SpO2: 100 % O2 Device: BiPAP/CPAP      Physical Exam   Constitutional: He appears well-developed and well-nourished. No distress.   Confused, NAD   HENT:   Head: Normocephalic.   Mouth/Throat: Oropharynx is clear and moist. No oropharyngeal exudate.   Eyes: Conjunctivae are normal.   Neck: Normal range of motion. Neck supple.   Cardiovascular: Normal rate.    Murmur (2/6 systolic murmur at RSB) heard.  Pulmonary/Chest: Effort normal and breath sounds normal. No respiratory distress. He has no wheezes. He has no rales.   Coarse breath sounds diffusely   Abdominal: Soft. There is no tenderness. There is no rebound and no guarding.   Genitourinary:   Genitourinary Comments: Orta in place, Diaper in place   Musculoskeletal: He exhibits no edema or tenderness.   Neurological: He is alert.   Confused, nonverbal   Skin: Skin is warm and dry. No rash noted. No erythema. No pallor.   Vitals reviewed.          Data:   ROUTINE LABS (Last four results)  CMP  Recent Labs  Lab 04/06/17  0755 04/05/17  1941 04/05/17  1221 04/05/17  0902  04/04/17  2042  04/04/17  0831   * 147* 147* 148*  < > 149*  < > 153*   POTASSIUM 3.4 3.9 3.6 3.8  < > 3.4  < > 3.5   CHLORIDE 121* 119* 119* 122*  < > 120*  < > 126*   CO2 20 21 21 20  < > 23  < > 22   ANIONGAP 8 7 6 7  < > 6  < > 6   GLC 80 141* 229* 221*  < > 282*  < > 233*   BUN 28 37* 42* 47*  < > 59*  < > 73*   CR 1.51* 1.55* 1.65* 1.72*  < > 1.85*  < > 2.08*   GFRESTIMATED 45* 44* 41* 39*  < > 36*  < > 31*   GFRESTBLACK 54* 53* 49* 47*  < > 43*  < > 38*   LORENA 7.2* 7.0* 7.1* 7.0*  < > 7.0*  < > 7.3*   MAG 2.6*  --   --  2.7*  --   --   --   --    PHOS 2.3* 3.1  --  1.5*  --   --   --   --    PROTTOTAL 5.2*  --   --  5.1*  --  4.9*  --  4.8*   ALBUMIN 1.6*  --   --  1.5*  --  1.5*  --  1.5*   BILITOTAL 1.2  --   --  0.4  --  0.4  --  0.8   ALKPHOS 108  --   --  110  --  112  --  118   *  --   --   408*  --  590*  --  814*   *  --   --  505*  --  582*  --  633*   < > = values in this interval not displayed.  CBC    Recent Labs  Lab 04/06/17  0755 04/05/17  1941 04/05/17  1221 04/05/17  0902  04/04/17  0831 04/03/17  0707   WBC 11.0  --   --  11.4*  --  9.4 9.3   RBC 3.31*  --   --  3.35*  --  2.59* 3.23*   HGB 8.8* 9.1* 9.3* 9.0*  < > 7.0* 8.8*   HCT 29.7*  --   --  30.4*  --  24.3* 30.2*   MCV 90  --   --  91  --  94 94   MCH 26.6  --   --  26.9  --  27.0 27.2   MCHC 29.6*  --   --  29.6*  --  28.8* 29.1*   RDW 17.7*  --   --  17.4*  --  17.6* 17.5*   *  --   --  130*  --  140* 165   < > = values in this interval not displayed.  INR    Recent Labs  Lab 04/06/17  0755 04/05/17  0902 04/04/17  2355 04/04/17  1641   INR 1.33* 1.40* 1.49* 1.93*     CRP    Recent Labs  Lab 04/05/17  0902 04/02/17  0651 03/31/17  2053   CRP 18.0* 120.0* 190.0*           Recent Labs  Lab 03/31/17  2132 03/31/17  2119   CULT 50,000 to 100,000 colonies/mL Enterococcus faecalis* No growth  No growth           Medications:     Current Facility-Administered Medications   Medication     glucose 40 % gel 15-30 g    Or     dextrose 50 % injection 25-50 mL    Or     glucagon injection 1 mg     insulin aspart (NovoLOG) inj (RAPID ACTING)     metoprolol (LOPRESSOR) injection 2.5 mg     ampicillin-sulbactam (UNASYN) 3 g vial to attach to  mL bag     potassium chloride SA (K-DUR/KLOR-CON M) CR tablet 20-40 mEq     potassium chloride (KLOR-CON) Packet 20-40 mEq     potassium chloride 10 mEq in 100 mL intermittent infusion     potassium chloride 10 mEq in 100 mL intermittent infusion with 10 mg lidocaine     potassium chloride 20 mEq in 50 mL intermittent infusion     magnesium sulfate 4 g in 100 mL sterile water (premade)     potassium phosphate 15 mmol in D5W 250 mL intermittent infusion     potassium phosphate 20 mmol in D5W 500 mL intermittent infusion     potassium phosphate 20 mmol in D5W 250 mL intermittent infusion      potassium phosphate 25 mmol in D5W 500 mL intermittent infusion     pantoprazole (PROTONIX) 40 mg IV push injection     insulin glargine (LANTUS) injection 18 Units     dextrose 5% infusion     insulin glargine (LANTUS) injection 18 Units     lactobacillus rhamnosus (GG) (CULTURELL) capsule 1 capsule     [Rx hold ] metoprolol (LOPRESSOR) suspension 50 mg     [Rx hold ] acetaminophen (TYLENOL) tablet 975 mg     [Rx hold ] rosuvastatin (CRESTOR) tablet 20 mg     HOLD MEDICATION     [Rx hold ] Warfarin Therapy Reminder (Check START DATE - warfarin may be starting in the FUTURE)     senna-docusate (SENOKOT-S;PERICOLACE) 8.6-50 MG per tablet 1-2 tablet     Reason ACE/ARB order not selected     polyethylene glycol (MIRALAX/GLYCOLAX) Packet 17 g     lidocaine 1 % 0.5-5 mL     lidocaine (LMX4) kit     sodium chloride (PF) 0.9% PF flush 10-20 mL     sodium chloride (PF) 0.9% PF flush 10 mL     fluticasone-vilanterol (BREO ELLIPTA) 200-25 MCG/INH oral inhaler 1 puff     fexofenadine (ALLEGRA) tablet 180 mg     fluticasone (FLONASE) 50 MCG/ACT spray 2 spray     ipratropium (ATROVENT HFA) Inhaler 1 puff     niacin (NIASPAN) CR tablet 500 mg     naloxone (NARCAN) injection 0.1-0.4 mg     lidocaine 1 % 1 mL     lidocaine (LMX4) kit     sodium chloride (PF) 0.9% PF flush 3 mL     sodium chloride (PF) 0.9% PF flush 3 mL     magnesium citrate solution 148 mL     ondansetron (ZOFRAN-ODT) ODT tab 4 mg    Or     ondansetron (ZOFRAN) injection 4 mg     prochlorperazine (COMPAZINE) injection 5 mg    Or     prochlorperazine (COMPAZINE) tablet 5 mg    Or     prochlorperazine (COMPAZINE) Suppository 12.5 mg     Patient is already receiving anticoagulation with heparin, enoxaparin (LOVENOX), warfarin (COUMADIN)  or other anticoagulant medication     No lozenges or gum should be given while patient on BIPAP     hypromellose-dextran (ARTIFICAL TEARS) ophthalmic solution 1 drop     HOLD: All Oral Medications     insulin aspart (NovoLOG) inj  (RAPID ACTING)     insulin aspart (NovoLOG) inj (RAPID ACTING)     insulin aspart (NovoLOG) inj (RAPID ACTING)     insulin aspart (NovoLOG) inj (RAPID ACTING)     [Rx hold ] aspirin EC EC tablet 81 mg     ferrous sulfate (IRON) tablet 325 mg       Caring Physician: Toña Duron MD  [facility]  Pager Contact: see Physician sticky note

## 2017-04-07 NOTE — PLAN OF CARE
Problem: Goal Outcome Summary  Goal: Goal Outcome Summary  Outcome: No Change  Patient was drowsy this AM. Once awake was oriented to himself and place. Neuros all intact.  VSS on room air. BG this am was 91 on D5. Went for EGD around 1030 and returned around 3pm. The EGD showed ulcers. Will continue with plan of care.

## 2017-04-07 NOTE — PROGRESS NOTES
Gastroenterology Endoscopy [facility] Brief Operative Note    Procedure:  Upper endoscopy   Post-operative diagnosis:  Duodenal ulcerations and duodenitis   Staff Physician:  Dr. Hussein Lopez   Fellow/Assistant(s):  N/A    Specimens:  Please see final procedure note for further details.   Findings:  Multiple large ulcers in duodenum. Clean based.  Severe duodenitis   Complications:  None.   Condition:  Stable   Recommendations  Diet:  Return to previous diet  PPI:  PPI po BID (if cannot tolerate PO then IV)  Anti-coagulants/platelets:  Hold anticoagulation  Octreotide:  N/A  Discharge Planning:   Per primary team

## 2017-04-07 NOTE — PROGRESS NOTES
[facility] - Inpatient daily progress note    Date of admission: 3/31/2017  Date of admission: 3/31/2017  Date of service: 4/7/2017.    Updates:  - Continue Q 12 hour Hemoglobins  - Continue Q 8 hour BMPs           Assessment and Plan:      Melvin Bhatia is a 76 year old male with a significant past medical history of cerebrovascular disease, normal pressure hydrocephalus (status post ventriculoperitoneal shunting), progressive cognitive dysfunction, multiple falls, HTN, Type 2 DM, hyperlipidemia, CHF, CKD, and COPD who presented with a 3 day history of poor oral intake and worsening mental status. He was found  to have an pre renal SHERYL and UTI. He developed an episode of melena, anemia and significant elevation for liver enzymes.     Patient Active Problem List   Diagnosis     Osteoporosis     PAF (paroxysmal atrial fibrillation) (H)     Lumbosacral plexopathy     Testosterone deficiency     Hypertension goal BP (blood pressure) < 140/90     Atrial fibrillation (H)     CKD (chronic kidney disease) stage 3, GFR 30-59 ml/min     Hyperlipidemia LDL goal <70     Coronary artery disease     Health Care Home     ACP (advance care planning)     Elevated PSA     Anemia in chronic renal disease     Subarachnoid hemorrhage (H)     Idiopathic normal pressure hydrocephalus (INPH)     Urinary bladder neurogenic dysfunction     Stroke (H)     Cognitive deficit, post-stroke     HL (hearing loss)     Personal history of fall     GERD (gastroesophageal reflux disease)     COPD exacerbation (H)     SHIVAM (obstructive sleep apnea)     LVH (left ventricular hypertrophy)     Diastolic CHF, acute on chronic (H)     Hypoxemia     Tachyarrhythmia     Systolic and diastolic CHF, chronic (H)     Gait abnormality     Iron deficiency anemia     Allergic rhinitis     Altered mental status     VRE (vancomycin-resistant Enterococci)     Hypoglycemia     Lower urinary tract infectious disease     Type 2 diabetes with  stage 3 chronic kidney disease GFR 30-59 (H)     Chronic obstructive pulmonary disease, unspecified COPD type (H)     Ankle pain     Fx medial malleolus-closed     SHERYL (acute kidney injury) (H)      Plan:   ## Upper GI Bleed:   ## Melena   Ddx: Include but not limited to PUD vs varices, gastritis vs malignancy  vs AVM malformation . Patient was transfused with 2 units of  PRBC, last one was 4/4.  Since then, his hemoglobin has stabilized and is currently 8.5.  Per GI's assessment unlikely due to proctitis.     Plan:   - GI following, appreciate recs  - Endoscopy to be performed today  - Q 12 hour H/H, will transfuse as needed , if  Hgb <8  - Started Pantoprazole 40 mg IV BID     ## Acute liver injury:  Improving  Etiology of acute liver injury unknown, but differential includes viral hepatitis, hepatotoxicity from drugs/toxins, alcoholic liver disease, hepatic ischemia, malignant infiltration, Fahad's disease, Budd-Chiari syndrome, toxin exposure, and sepsis.  Elevated liver enzymes, ,  4/4 have decreased to 254 and 122 today, but still increased from normal baseline 2/2017.  In addition, INR increased 4/4 and is normal today (received 5 U Vit K). No risk factor for drug induced or toxin induced liver failure. Negative Hep B and C. US and abdominal CT showed focal dilatation of the of the intrahepatic duct that is stable, no mass seen or clot.  US of liver with dopplers showed no evidence of a clot, making Budd-Chiari syndrome less likely.    Plan:   - GI following, appreciate recs  - CMV, EBV, H.pylori, HSV pending  - Hold warfarin   - No hepatotoxic medications (tylenol, statins, etc)    ## Malnutrition:  Patient has been NPO for several days now (for confusion and possible intervention per GI).  Will plan to either start TPN (if patient has a GI bleed) or tube feeds today after endoscopy.  Speech recommends a full liquid diet, but patient may still need supplemental nutrition (such as tube  feeds)    Plan:  - Nutrition following, appreciate recs  - Start TPN or tube feeds today    ##Hypernatremia:  Improving  Likely iatrogenic due to IV fluids. Patient was started on D5W 4/3, currently at  75 cc/hr.   Goal to decrease by 6 meq in 24 hrs.   - BMP Q8 hours  - Continue D5 at 75 cc/hr    ## UTI: Stable  Patient has a diagnosis of atonic bladder and has required intermittent self catheterizations about 5 times a day. Urine culture growing gram positive enterococcus species susceptible to vancomycin and unasyn. Was initially on Ceftriaxone (03/31 to 04/02) then vancomycin started (04/01-4/4), was switched to Unasyn 4/5.    Plan:   -Will continue Unasyn     ## Coarse breath sounds/Coughing:  Improving  Ddx: COPD exacerbation vs viral URI vs  pneumonia vs pulmonary edema vs PE. Possible mild COPD exacerbation, he has not been able to take his COPD controllers due to inability to follow command. Patient definitely at risk for PE as we have reversed his anticoagulation however he is not tachycardic, not tachypneac and non hypoxic.     Plan:   -start empiric antibiotics for HCAP if patient develop fever, purulent sputum   -Will start Duoneb every 6 hours   -Will consider doing chest CT if patient respiratory status worsens      ## Altered Mental Status:   Ddx:  Toxic metabolic encephalopathy ( infection, hypoxia, medication, uremia, ammonemia) vs worsening dementia   It is hard to tell what it the patient's baseline, however wife states he is more verbal and conversant before. Cranial imaging is reassuring against mass, stroke, bleed. He has a  shunt but no red flags to a shunt infection at the moment.  His hypernatremia has improved without improvement of mental status so most likely not the cause.     Plan:   - Plan for LP if confusion does not resolve with a normal sodium level  - NPO (aspiration precautions)  - Plan to pan-culture if patient spikes a fever  - Consider shunt evaluation by neurosurgery if  mental status does not improve with treating UTI and electrolyte abnormalities  - Reorient patient as per delirium protocol      ## SHERYL on CKD3 (baseline Cr 1.5-2): Improving   Cr was at 5.39, GFR 10 on admission. Improved to 1.49 today with IVF hydration. BUN:Cr ratio >20 consistent with pre-renal, likely due to decreased PO intake.  - Continue MIVF  - Follow up BMP daily  - Strict I/O   - Daily weights  - Continue telemetry      ## Stercoral proctitis  Seen on abdominal CT. Pt denied abdominal pain, stable VS. Patient now having regular bowel movements. Switched to PRN bowel regimen from scheduled.     Plan:    - Miralax daily PRN and Senna at bedtime PRN.   - HOLD PTA ferrous sulfate while being treated for constipation    #Normal Pressure Hydrocephalus  #Multi infarct Vascular Dementia   Last CT head from previous admission (02/2017) showed progression of cerebral ischemic disease, consistent step-wise decline of vascular dementia- Will discuss with family to obtain baseline cognitive function     ## Type 2 DM, last A1C 6.1 on 02/20/17, uncontrolled   Blood sugars 60s-180s over last 24 hours. Patient received D50.  Will decrease lantus from 18 to 15 units bid.  Home regimen:  Basal insulin: 37 units lantus twice daily  Prandial: 30 units before breakfast, lunch, 5 units before dinner     Hospital regimen:  - Lantus 14 units bid (patient currently NPO)  - Prandial: 2.0 unit per 15 gm CHO  - Correction: MSSI     ##Paroxysmal A Fib, rate controlled  Patient takes 50 mg ER once at bedtime, but unable to swallow pills so will switch him to immediate release 50 mg BID.  ASA discontinued (INR therapeutic).  Patient NPO today so metoprolol changed to IV.  Metoprolol held yesterday. BP improving, will restart metoprolol today.  Plan:   - To contintinue IV  metoprolol     ## Cerebrovascular disease, stable    w/ Hx stroke, normal pressure hydrocephalus (2012), status post ventriculoperitoneal shunting, subarachnoid  hemorrhage(2012)   Plan: Will observe      ##High grade right ICA stenosis, diagnosed on 01/2017 MRA, stable   Patient's LAKE-VASC score is 8. He was taken off of coumadin in 2012 due to multiple falls and development of subarachnoid hemorrhage. However, during this last hospitalization, anticoagulation was re-addressed in the setting of multiple embolic events since stopping the coumadin and the decision was made to continue with coumadin (goal INR 2-3).     Plan:   -Will continue to hold warfarin for now due to high risk of bleeding      ##HFrEF, EF 46% on 03/31/17, appears euvolemic    Plan:   -daily weights  -I and O   -Holding diuresis while treating pre-renal SHERYL  -Continue metoprolol      Other chronic conditions, stable- continue home medications.  ## HTN-  Not on any meds , BP controlled   ## Hyperlipidemia-  Held rosuvastatin, niacin, due to liver injury   ## COPD- Duoneb Q6   ## Constipation- Not an issue right now, will resume bowel regimen as needed   ## Progressive cognitive dysfunction (dementia)  ## Multiple bone fracture on left foot/ankle- cast in place. Hx of fractures after a fall on 9/16, and 2/2017.  - Recheck XR hip to evaluate for hip fracture      Fluids: D5 at 75 cc/hr  Electrolytes:Hypernatremia (improving)  Nutrition: NPO   Lines:PIV   Activity: Up with assist   CODE: DNR / DNI  Prophylaxis: Coumadin held  Dispo: Expected discharge date 2-3 days pending clinical improvement                      Interval History:   Melvin Bhatia's blood glucose dropped to 69 overnight, D50 was given.  No other significant events were noted.  Patient was sleeping when seen this morning, difficult to arouse.                Review of Systems:   Review of Systems   Unable to perform ROS: Patient nonverbal            Physical Exam (Resident / Clinician):   Vitals were reviewed  Temp: 98.1  F (36.7  C) Temp src: Axillary BP: 133/64 Pulse: 74 Heart Rate: 111 Resp: 16 SpO2: 100 % O2 Device: BiPAP/CPAP       Physical Exam   Constitutional: He appears well-developed and well-nourished. No distress.   Sleeping, difficult to arouse   HENT:   Head: Normocephalic.   Mouth/Throat: Oropharynx is clear and moist. No oropharyngeal exudate.   Eyes: Conjunctivae are normal.   Neck: Normal range of motion. Neck supple.   Cardiovascular: Normal rate.    Murmur (2/6 systolic murmur at RSB) heard.  Pulmonary/Chest: Effort normal and breath sounds normal. No respiratory distress. He has no wheezes. He has no rales.   Coarse breath sounds diffusely   Abdominal: Soft. There is no tenderness. There is no rebound and no guarding.   Genitourinary:   Genitourinary Comments: Orta in place, Diaper in place   Musculoskeletal: He exhibits no edema or tenderness.   Neurological: He is alert.   Sleeping, nonverbal when aroused    Skin: Skin is warm and dry. No rash noted. No erythema. No pallor.   Vitals reviewed.          Data:   ROUTINE LABS (Last four results)  CMP  Recent Labs  Lab 04/07/17  0803 04/06/17  1351 04/06/17  0755 04/05/17  1941  04/05/17  0902  04/04/17  2042   * 151* 148* 147*  < > 148*  < > 149*   POTASSIUM 3.1* 3.4 3.4 3.9  < > 3.8  < > 3.4   CHLORIDE 115* 121* 121* 119*  < > 122*  < > 120*   CO2 21 21 20 21  < > 20  < > 23   ANIONGAP 11 9 8 7  < > 7  < > 6   GLC 95 148* 80 141*  < > 221*  < > 282*   BUN 20 25 28 37*  < > 47*  < > 59*   CR 1.49* 1.48* 1.51* 1.55*  < > 1.72*  < > 1.85*   GFRESTIMATED 46* 46* 45* 44*  < > 39*  < > 36*   GFRESTBLACK 55* 56* 54* 53*  < > 47*  < > 43*   LORENA 6.8* 6.8* 7.2* 7.0*  < > 7.0*  < > 7.0*   MAG  --   --  2.6*  --   --  2.7*  --   --    PHOS 3.2  --  2.3* 3.1  --  1.5*  --   --    PROTTOTAL 4.9*  --  5.2*  --   --  5.1*  --  4.9*   ALBUMIN 1.5*  --  1.6*  --   --  1.5*  --  1.5*   BILITOTAL 2.3*  --  1.2  --   --  0.4  --  0.4   ALKPHOS 96  --  108  --   --  110  --  112   *  --  263*  --   --  408*  --  590*   *  --  378*  --   --  505*  --  582*   < > = values in  this interval not displayed.  CBC    Recent Labs  Lab 04/07/17  0803 04/06/17 2024 04/06/17  0755 04/05/17  1941  04/05/17  0902  04/04/17  0831   WBC 9.1  --  11.0  --   --  11.4*  --  9.4   RBC 3.10*  --  3.31*  --   --  3.35*  --  2.59*   HGB 8.5* 8.6* 8.8* 9.1*  < > 9.0*  < > 7.0*   HCT 27.6*  --  29.7*  --   --  30.4*  --  24.3*   MCV 89  --  90  --   --  91  --  94   MCH 27.4  --  26.6  --   --  26.9  --  27.0   MCHC 30.8*  --  29.6*  --   --  29.6*  --  28.8*   RDW 17.8*  --  17.7*  --   --  17.4*  --  17.6*   *  --  125*  --   --  130*  --  140*   < > = values in this interval not displayed.  INR    Recent Labs  Lab 04/07/17  0803 04/06/17  0755 04/05/17  0902 04/04/17  2355   INR 1.36* 1.33* 1.40* 1.49*     CRP    Recent Labs  Lab 04/05/17  0902 04/02/17  0651 03/31/17  2053   CRP 18.0* 120.0* 190.0*           Recent Labs  Lab 03/31/17  2132 03/31/17  2119   CULT 50,000 to 100,000 colonies/mL Enterococcus faecalis* No growth  No growth           Medications:     Current Facility-Administered Medications   Medication     glucose 40 % gel 15-30 g    Or     dextrose 50 % injection 25-50 mL    Or     glucagon injection 1 mg     insulin aspart (NovoLOG) inj (RAPID ACTING)     metoprolol (LOPRESSOR) injection 2.5 mg     ampicillin-sulbactam (UNASYN) 3 g vial to attach to  mL bag     potassium chloride SA (K-DUR/KLOR-CON M) CR tablet 20-40 mEq     potassium chloride (KLOR-CON) Packet 20-40 mEq     potassium chloride 10 mEq in 100 mL intermittent infusion     potassium chloride 10 mEq in 100 mL intermittent infusion with 10 mg lidocaine     potassium chloride 20 mEq in 50 mL intermittent infusion     magnesium sulfate 4 g in 100 mL sterile water (premade)     potassium phosphate 15 mmol in D5W 250 mL intermittent infusion     potassium phosphate 20 mmol in D5W 500 mL intermittent infusion     potassium phosphate 20 mmol in D5W 250 mL intermittent infusion     potassium phosphate 25 mmol in D5W 500 mL  intermittent infusion     pantoprazole (PROTONIX) 40 mg IV push injection     insulin glargine (LANTUS) injection 18 Units     dextrose 5% infusion     insulin glargine (LANTUS) injection 18 Units     lactobacillus rhamnosus (GG) (CULTURELL) capsule 1 capsule     [Rx hold ] metoprolol (LOPRESSOR) suspension 50 mg     [Rx hold ] acetaminophen (TYLENOL) tablet 975 mg     [Rx hold ] rosuvastatin (CRESTOR) tablet 20 mg     HOLD MEDICATION     [Rx hold ] Warfarin Therapy Reminder (Check START DATE - warfarin may be starting in the FUTURE)     senna-docusate (SENOKOT-S;PERICOLACE) 8.6-50 MG per tablet 1-2 tablet     Reason ACE/ARB order not selected     polyethylene glycol (MIRALAX/GLYCOLAX) Packet 17 g     lidocaine 1 % 0.5-5 mL     lidocaine (LMX4) kit     sodium chloride (PF) 0.9% PF flush 10-20 mL     sodium chloride (PF) 0.9% PF flush 10 mL     fluticasone-vilanterol (BREO ELLIPTA) 200-25 MCG/INH oral inhaler 1 puff     fexofenadine (ALLEGRA) tablet 180 mg     fluticasone (FLONASE) 50 MCG/ACT spray 2 spray     ipratropium (ATROVENT HFA) Inhaler 1 puff     niacin (NIASPAN) CR tablet 500 mg     naloxone (NARCAN) injection 0.1-0.4 mg     lidocaine 1 % 1 mL     lidocaine (LMX4) kit     sodium chloride (PF) 0.9% PF flush 3 mL     sodium chloride (PF) 0.9% PF flush 3 mL     magnesium citrate solution 148 mL     ondansetron (ZOFRAN-ODT) ODT tab 4 mg    Or     ondansetron (ZOFRAN) injection 4 mg     prochlorperazine (COMPAZINE) injection 5 mg    Or     prochlorperazine (COMPAZINE) tablet 5 mg    Or     prochlorperazine (COMPAZINE) Suppository 12.5 mg     Patient is already receiving anticoagulation with heparin, enoxaparin (LOVENOX), warfarin (COUMADIN)  or other anticoagulant medication     No lozenges or gum should be given while patient on BIPAP     hypromellose-dextran (ARTIFICAL TEARS) ophthalmic solution 1 drop     HOLD: All Oral Medications     insulin aspart (NovoLOG) inj (RAPID ACTING)     insulin aspart (NovoLOG)  inj (RAPID ACTING)     insulin aspart (NovoLOG) inj (RAPID ACTING)     insulin aspart (NovoLOG) inj (RAPID ACTING)     [Rx hold ] aspirin EC EC tablet 81 mg     ferrous sulfate (IRON) tablet 325 mg       Caring Physician: Toña Duron MD  [facility]  Pager Contact: see Physician sticky note

## 2017-04-07 NOTE — PLAN OF CARE
"Problem: Goal Outcome Summary  Goal: Goal Outcome Summary  Outcome: No Change  Contact isolation. VSS, used BiPAP overnight. Telemetry remains in place (A-Fib). Alert, opening eyes spontaneously; disoriented x2. Pupils reactive, but sluggish. Speech garbled. Patient agitated intermittently (i.e. swearing at staff). Turned and repositioned Q2hrs. Bed alarm on for safety. NPO. No signs/symptoms of nausea, no emesis. Blood sugars stable. Dextrose 5% infusing at 75 mL/hr to midline. IV antibiotics continued. Orta remains in place; urine cloudy with sediment. Incontinent of black stool overnight. Complaints of pain to coccyx and rectum; barrier cream applied. Per GI note, \"will plan on EGD Friday 4/7 as long as electrolytes are same/improved, mental status is stable.\"          "

## 2017-04-07 NOTE — PLAN OF CARE
Problem: Goal Outcome Summary  Goal: Goal Outcome Summary  Session canceled. Pt NPO for EGD this date. Will reschedule for tomorrow as appropriate. SLP to follow per POC.

## 2017-04-07 NOTE — PROGRESS NOTES
Colorectal surgery progress note    Went down for EGD today, which revealed duodenal ulcerations and duodenitits.     Temp: 97  F (36.1  C) Temp src: Axillary BP: 124/52 Pulse: 74 Heart Rate: 66 Resp: 16 SpO2: 100 % O2 Device: None (Room air)      Awake, NAD  Unlabored breathing  Abd soft, non tender, non distended    I&Os  UOP 1750 today    Labs  Reviewed    A/P: 76M admitted for SHERYL & urosepsis, noted to have proctitis on CT scan with fecal loading, ?secondary to stool stasis. Had an episosde of GI bleeding and found to have duodenal ulcers.     - Like upper GI cause of rectal bleeding  - Unclear cause for proctitis.   - Follow up with GI for flex sig as outpatient  - CRS will sign off     Discussed w/ fellow, Dr Burgess Bill Vamra MD  Surgery PGY1  [phone]

## 2017-04-08 PROBLEM — E87.0 HYPERNATREMIA: Status: ACTIVE | Noted: 2017-01-01

## 2017-04-08 PROBLEM — K26.4 GASTROINTESTINAL HEMORRHAGE ASSOCIATED WITH DUODENAL ULCER: Status: ACTIVE | Noted: 2017-01-01

## 2017-04-08 PROBLEM — N18.9 ACUTE-ON-CHRONIC KIDNEY INJURY (H): Status: ACTIVE | Noted: 2017-01-01

## 2017-04-08 PROBLEM — S82.899A CLOSED FRACTURE OF ANKLE: Status: ACTIVE | Noted: 2017-01-01

## 2017-04-08 PROBLEM — D62 ANEMIA DUE TO BLOOD LOSS, ACUTE: Status: ACTIVE | Noted: 2017-01-01

## 2017-04-08 PROBLEM — N17.9 ACUTE KIDNEY INJURY SUPERIMPOSED ON CKD (H): Status: ACTIVE | Noted: 2017-01-01

## 2017-04-08 PROBLEM — R41.0 DELIRIUM: Status: ACTIVE | Noted: 2017-01-01

## 2017-04-08 PROBLEM — N30.00 ACUTE CYSTITIS: Status: ACTIVE | Noted: 2017-01-01

## 2017-04-08 PROBLEM — N18.9 ACUTE KIDNEY INJURY SUPERIMPOSED ON CKD (H): Status: ACTIVE | Noted: 2017-01-01

## 2017-04-08 NOTE — PLAN OF CARE
Problem: Goal Outcome Summary  Goal: Goal Outcome Summary  Outcome: No Change  Pt back from EGD with VSS. Lethargic. Wife visited. Placed on full liquid diet but did not eat anything. Wife tried to feed pt but he spit it out. Potassium replaced with total of 40meq via IV. BGs stable.

## 2017-04-08 NOTE — PROGRESS NOTES
Patient off unit via litter per transport to [facility] for feeding tube placement, wife accompanying patient.   Patient A&O to self, disoriented to date, time, location and situation. BS active x4.Tolerating Full Liquid diet, eating bites with encouragement, decreased appetite. BS 87 at breakfast, 1 unit Novolog for carb coverage.  at lunch, Novolog 4 units (2 units for carbs, 2 units for ss). Orta in place with good output, cares done. BM x 1 this shift, buttock and coccyx red and blanchable, barrier cream applied. Midline DL intact and infusing D5 @ 50/hr, and TCO to other lumen. Recliner ordered for patient, increased stimuli and interaction with patient provided throughout shift. Continue POC.

## 2017-04-08 NOTE — PLAN OF CARE
Problem: Goal Outcome Summary  Goal: Goal Outcome Summary  ST 5A: Cx- Pt receiving RN cares on initial attempt and out of room at time of return. Will reschedule.

## 2017-04-08 NOTE — PROGRESS NOTES
Brief GI Note    One additional recommendation from previous. Patient's liver chemistries improved (AST/ALT) but bili increased which can often lag behind. Given the bile duct dilation recommend MRCP to evaluate the biliary tree for obstruction.     Call if questions.     Stu Barrios MD  Gastroenterology Fellow  #[phone]

## 2017-04-08 NOTE — PLAN OF CARE
Problem: Goal Outcome Summary  Goal: Goal Outcome Summary  Outcome: No Change  DNR/DNI. Pt disoriented to time, situation and place overnight. Up w/ lift, turn q2. BIPAP on overnight. Pt fall risk, fractured left ankle.knee in feb 2017. Coccyx denuded, barrier cream PRN. Incontinent of stool, no BM overnight. Orta in place, output appears cloudy and odorous per NST. UC sent yesterday, awaiting full results. L midline double lumen, infusing D5 at 50/hr. R PIV TKO, infused unasyn overnight. Hgb 8.3, transfuse if <8. EGD yesterday showed multiple duodenal ulcers. GI rec MRCP to ascertain elevated bili level. Possible NJ placement eventual d/t malnutrition issues. Pt full liquid diet but refusing to eat food. Will continue to monitor and follow POC.

## 2017-04-08 NOTE — PHARMACY-CONSULT NOTE
Pharmacy Tube Feeding Consult    Medication reviewed for administration by feeding tube and for potential food/drug interactions.    Recommendation: Recommend changing the following medications to an immediate release dosage form: Niacin CR.      Pharmacy will continue to follow as new medications are ordered.    Kimber KiserD

## 2017-04-08 NOTE — PROGRESS NOTES
[facility] - Inpatient daily progress note    Date of admission: 3/31/2017  Date of service: 4/8/2017.         Assessment and Plan:      Melvin Bhatia is a 76 year old male with a significant past medical history of cerebrovascular disease, normal pressure hydrocephalus (status post ventriculoperitoneal shunting), progressive cognitive dysfunction, multiple falls, HTN, Type 2 DM, hyperlipidemia, CHF, CKD, and COPD who presented with  worsening mental status. He was found  to have an pre renal SHERYL and UTI. He developed an episode of melena, anemia and significant elevation for liver enzymes.     Patient Active Problem List   Diagnosis     Osteoporosis     PAF (paroxysmal atrial fibrillation) (H)     Lumbosacral plexopathy     Testosterone deficiency     Hypertension goal BP (blood pressure) < 140/90     Atrial fibrillation (H)     CKD (chronic kidney disease) stage 3, GFR 30-59 ml/min     Hyperlipidemia LDL goal <70     Coronary artery disease     Health Care Home     ACP (advance care planning)     Elevated PSA     Anemia in chronic renal disease     Subarachnoid hemorrhage (H)     Idiopathic normal pressure hydrocephalus (INPH)     Urinary bladder neurogenic dysfunction     Stroke (H)     Cognitive deficit, post-stroke     HL (hearing loss)     Personal history of fall     GERD (gastroesophageal reflux disease)     COPD exacerbation (H)     SHIVAM (obstructive sleep apnea)     LVH (left ventricular hypertrophy)     Diastolic CHF, acute on chronic (H)     Hypoxemia     Tachyarrhythmia     Systolic and diastolic CHF, chronic (H)     Gait abnormality     Iron deficiency anemia     Allergic rhinitis     Altered mental status     VRE (vancomycin-resistant Enterococci)     Hypoglycemia     Lower urinary tract infectious disease     Type 2 diabetes with stage 3 chronic kidney disease GFR 30-59 (H)     Chronic obstructive pulmonary disease, unspecified COPD type (H)     Ankle pain     Fx medial  malleolus-closed     Acute-on-chronic kidney injury (H)     Gastrointestinal hemorrhage associated with duodenal ulcer     Anemia due to blood loss, acute     Acute cystitis      Plan:   ## Upper GI Bleed secondary to Duodenal Ulcer   ##Acute anemia secondary to above   -Multiple duodenal ulcer seen on EGD but negative active bleeding   -Negative H Pylori  -No gross bleeding, soft abdomen and stable hgb     Plan:   -To continue high dose Pantoprazole 40 mg IV BID , will convert to PO once NJ is placed   -Avoid NSAIDs  -Will decrease hemoglobin check to daily   -transfuse of HGb is <8 and if with active bleeding     ## Acute liver injury:  Improving  Etiology of acute liver injury unknown. Work up for Viral hepatitis, toxin, drugs and BUD Chiari has all been negative. No episode of severe hypotension.  Enzymes has reverted back to normal without intervention.  GI recommending MRCP to evaluate for biliary tract obstruction.     Plan:   - No hepatotoxic medications (tylenol, statins, etc)  -Will observe and discuss possible MRCP with wife     ## Malnutrition:  Patient has been NPO for several days now , low albumin. Patient with poor oral intake today.     Plan:  - Nutrition following, appreciate recs  - Will place NJ today     ##Hypernatremia:  Improving  Likely iatrogenic due to IV fluids ns malignancy    Plan:   - Will encourage PO fluids   -Will decrease BMP check to daily   -DC IVF  -Urine Osmolality     ## UTI: patient afebrile with negative infection markers.   Patient has a diagnosis of atonic bladder and has required intermittent self catheterizations about 5 times a day. Urine culture growing gram positive enterococcus species susceptible to vancomycin and unasyn. Was initially on Ceftriaxone (03/31 to 04/02) then vancomycin started (04/01-4/4), was switched to Unasyn 4/5.Repeat UA showed unimproved UTI however I question the specimen collection as the urine may have been sitting in the back for  sometime.    Plan:   -Will continue Unasyn , will dc tomorrow if patient remains stable     ## Coarse breath sounds/Coughing: Resolved   Ddx: COPD exacerbation vs viral URI     Plan:   -start empiric antibiotics for HCAP if patient develop fever, purulent sputum   -To continue  start Duoneb every 6 hours   -Will consider doing chest CT if patient respiratory status worsens . Patient has AFib not on anticoagulation due to recent UGIB     ## Altered Mental Status, at baseline . Most likely due to infection (UTI) in context of vulnerable brain ( dementia)  Patient awake and interactive today.  Has negative CNS work  (bleed , mass or stroke ) or metabolic work up except for hypernatremia which  I have low suspicion that this is the etiology due to non improvement despite correction.     Plan:   - To observe  -Infectious work up (LP) as well head head CT if with acute worsening mental status with focal Neurologic finding       ## SHERYL on CKD3 (baseline Cr 1.5-2): Thought to be pre renal from decrease PO intake , Improving   Plan:   - Improve PO intake   - Follow up BMP daily  - Strict I/O   - Daily weights  - Continue telemetry      ## Type 2 DM, last A1C 6.1 on 02/20/17, uncontrolled   Blood sugar at goal   Plan:   - Lantus 14 units bid (patient currently NPO)  - Prandial: 2.0 unit per 15 gm CHO  - Correction: MSSI     ##Paroxysmal A Fib, rate controlled  ##HFrEF, euvolemic   Plan:   - To contintinue IV  Metoprolol, will switch to PO once with NJ  -Daily weights  -Input and Output   -Lasix as needed      ## Stercoral proctitis, no acute issue at the moment   Plan:    - Miralax daily PRN and Senna at bedtime PRN.   - HOLD PTA ferrous sulfate while being treated for constipation  -Flex sig as out patient     #Normal Pressure Hydrocephalus  #Multi infarct Vascular Dementia  Plan:   -no acute issue   Will observe     ## Cerebrovascular disease, stable    w/ Hx stroke, normal pressure hydrocephalus (2012), status post  ventriculoperitoneal shunting, subarachnoid hemorrhage(2012)   Plan: Will observe      ##High grade right ICA stenosis, diagnosed on 01/2017 MRA, stable   Patient's LAKE-VASC score is 8. He was taken off of coumadin in 2012 due to multiple falls and development of subarachnoid hemorrhage. However, during this last hospitalization, anticoagulation was re-addressed in the setting of multiple embolic events since stopping the coumadin and the decision was made to continue with coumadin (goal INR 2-3).     Plan:   -Will continue to hold warfarin for now due to high risk of bleeding      ##HFrEF, EF 46% on 03/31/17, appears euvolemic    Plan:   -daily weights  -I and O   -Holding diuresis while treating pre-renal SHERYL  -Continue metoprolol      Other chronic conditions, stable- continue home medications.  ## HTN-  Not on any meds , BP controlled   ## Hyperlipidemia-  Held rosuvastatin, niacin, due to liver injury   ## COPD- To continue Breo and Atrovent inhaler   ##SHIVAM- Bipap  ##GERD - PPI   ##Allergic Rhinitis - Flonase   ## Constipation- Not an issue right now, will resume bowel regimen as needed   ## Progressive cognitive dysfunction (dementia)  ## Multiple bone fracture on left foot/ankle- cast in place. Hx of fractures after a fall on 9/16, and 2/2017.  - Recheck XR hip to evaluate for hip fracture      Fluids: PO  Electrolytes:Hypernatremia (improving)  Nutrition: Regular   Lines:PIV   Activity: Up with assist   CODE: DNR / DNI  Prophylaxis: Coumadin held  Dispo: Expected discharge date 2-3 days pending clinical improvement                      Interval History:   Melvin Bhatia's had no acute event overnight. His VS has been stable. Nurses noted reviewed.                   Review of Systems:   Review of Systems   Unable to perform ROS: Patient nonverbal            Physical Exam (Resident / Clinician):   Vitals were reviewed  Temp: 97.7  F (36.5  C) Temp src: Axillary BP: 143/69 Pulse: 68 Heart Rate: 75 Resp:  18 SpO2: 100 % O2 Device: None (Room air)      Physical Exam   Constitutional: He appears well-developed and well-nourished. No distress.   Sleeping, difficult to arouse   HENT:   Mouth/Throat: No oropharyngeal exudate.   Eyes: Pupils are equal, round, and reactive to light.   Neck: Normal range of motion. Neck supple. No tracheal deviation present.   Cardiovascular: Normal rate.    Murmur (2/6 systolic murmur at RSB) heard.  Pulmonary/Chest: Effort normal and breath sounds normal. No respiratory distress. He has no wheezes. He has no rales.   Coarse breath sounds diffusely   Abdominal: Soft. There is no tenderness. There is no rebound and no guarding.   Genitourinary:   Genitourinary Comments: Orta in place, Diaper in place   Musculoskeletal: He exhibits no edema or tenderness.   Neurological: He is alert.   Sleeping, nonverbal when aroused    Skin: Skin is warm and dry. He is not diaphoretic. No erythema. No pallor.   Vitals reviewed.          Data:   ROUTINE LABS (Last four results)  CMP  Recent Labs  Lab 04/08/17  0654 04/07/17  1728 04/07/17  0803 04/06/17  1351 04/06/17  0755 04/05/17  1941  04/05/17  0902   * 149* 146* 151* 148* 147*  < > 148*   POTASSIUM 3.7 3.6 3.1* 3.4 3.4 3.9  < > 3.8   CHLORIDE 117* 116* 115* 121* 121* 119*  < > 122*   CO2 20 21 21 21 20 21  < > 20   ANIONGAP 10 11 11 9 8 7  < > 7   GLC 87 85 95 148* 80 141*  < > 221*   BUN 13 17 20 25 28 37*  < > 47*   CR 1.42* 1.52* 1.49* 1.48* 1.51* 1.55*  < > 1.72*   GFRESTIMATED 48* 45* 46* 46* 45* 44*  < > 39*   GFRESTBLACK 58* 54* 55* 56* 54* 53*  < > 47*   LORENA 6.9* 6.9* 6.8* 6.8* 7.2* 7.0*  < > 7.0*   MAG  --   --   --   --  2.6*  --   --  2.7*   PHOS  --   --  3.2  --  2.3* 3.1  --  1.5*   PROTTOTAL 5.1*  --  4.9*  --  5.2*  --   --  5.1*   ALBUMIN 1.4*  --  1.5*  --  1.6*  --   --  1.5*   BILITOTAL 0.4  --  2.3*  --  1.2  --   --  0.4   ALKPHOS 91  --  96  --  108  --   --  110   AST 78*  --  122*  --  263*  --   --  408*   *  --   254*  --  378*  --   --  505*   < > = values in this interval not displayed.  CBC    Recent Labs  Lab 04/08/17  0654 04/07/17  1943 04/07/17  0803 04/06/17  2024 04/06/17  0755  04/05/17  0902   WBC 8.1  --  9.1  --  11.0  --  11.4*   RBC 3.07*  --  3.10*  --  3.31*  --  3.35*   HGB 8.3* 8.3* 8.5* 8.6* 8.8*  < > 9.0*   HCT 28.2* 28.0* 27.6*  --  29.7*  --  30.4*   MCV 92  --  89  --  90  --  91   MCH 27.0  --  27.4  --  26.6  --  26.9   MCHC 29.4*  --  30.8*  --  29.6*  --  29.6*   RDW 17.9*  --  17.8*  --  17.7*  --  17.4*   *  --  107*  --  125*  --  130*   < > = values in this interval not displayed.  INR    Recent Labs  Lab 04/08/17  0654 04/07/17  0803 04/06/17  0755 04/05/17  0902   INR 1.25* 1.36* 1.33* 1.40*     CRP    Recent Labs  Lab 04/08/17  0654 04/05/17  0902 04/02/17  0651   CRP 21.0* 18.0* 120.0*           Recent Labs  Lab 04/07/17  1550   CULT Culture negative < 24 hours, reincubate           Medications:     Current Facility-Administered Medications   Medication     insulin glargine (LANTUS) injection 14 Units     insulin glargine (LANTUS) injection 14 Units     glucose 40 % gel 15-30 g    Or     dextrose 50 % injection 25-50 mL    Or     glucagon injection 1 mg     insulin aspart (NovoLOG) inj (RAPID ACTING)     metoprolol (LOPRESSOR) injection 2.5 mg     ampicillin-sulbactam (UNASYN) 3 g vial to attach to  mL bag     potassium chloride SA (K-DUR/KLOR-CON M) CR tablet 20-40 mEq     potassium chloride (KLOR-CON) Packet 20-40 mEq     potassium chloride 10 mEq in 100 mL intermittent infusion     potassium chloride 10 mEq in 100 mL intermittent infusion with 10 mg lidocaine     potassium chloride 20 mEq in 50 mL intermittent infusion     magnesium sulfate 4 g in 100 mL sterile water (premade)     potassium phosphate 15 mmol in D5W 250 mL intermittent infusion     potassium phosphate 20 mmol in D5W 500 mL intermittent infusion     potassium phosphate 20 mmol in D5W 250 mL intermittent  infusion     potassium phosphate 25 mmol in D5W 500 mL intermittent infusion     pantoprazole (PROTONIX) 40 mg IV push injection     dextrose 5% infusion     lactobacillus rhamnosus (GG) (CULTURELL) capsule 1 capsule     [Rx hold ] metoprolol (LOPRESSOR) suspension 50 mg     [Rx hold ] acetaminophen (TYLENOL) tablet 975 mg     [Rx hold ] rosuvastatin (CRESTOR) tablet 20 mg     HOLD MEDICATION     [Rx hold ] Warfarin Therapy Reminder (Check START DATE - warfarin may be starting in the FUTURE)     senna-docusate (SENOKOT-S;PERICOLACE) 8.6-50 MG per tablet 1-2 tablet     Reason ACE/ARB order not selected     polyethylene glycol (MIRALAX/GLYCOLAX) Packet 17 g     lidocaine 1 % 0.5-5 mL     lidocaine (LMX4) kit     sodium chloride (PF) 0.9% PF flush 10-20 mL     sodium chloride (PF) 0.9% PF flush 10 mL     fluticasone-vilanterol (BREO ELLIPTA) 200-25 MCG/INH oral inhaler 1 puff     fexofenadine (ALLEGRA) tablet 180 mg     fluticasone (FLONASE) 50 MCG/ACT spray 2 spray     ipratropium (ATROVENT HFA) Inhaler 1 puff     niacin (NIASPAN) CR tablet 500 mg     naloxone (NARCAN) injection 0.1-0.4 mg     lidocaine 1 % 1 mL     lidocaine (LMX4) kit     sodium chloride (PF) 0.9% PF flush 3 mL     sodium chloride (PF) 0.9% PF flush 3 mL     magnesium citrate solution 148 mL     ondansetron (ZOFRAN-ODT) ODT tab 4 mg    Or     ondansetron (ZOFRAN) injection 4 mg     prochlorperazine (COMPAZINE) injection 5 mg    Or     prochlorperazine (COMPAZINE) tablet 5 mg    Or     prochlorperazine (COMPAZINE) Suppository 12.5 mg     Patient is already receiving anticoagulation with heparin, enoxaparin (LOVENOX), warfarin (COUMADIN)  or other anticoagulant medication     No lozenges or gum should be given while patient on BIPAP     hypromellose-dextran (ARTIFICAL TEARS) ophthalmic solution 1 drop     HOLD: All Oral Medications     insulin aspart (NovoLOG) inj (RAPID ACTING)     insulin aspart (NovoLOG) inj (RAPID ACTING)     insulin aspart  (NovoLOG) inj (RAPID ACTING)     insulin aspart (NovoLOG) inj (RAPID ACTING)     [Rx hold ] aspirin EC EC tablet 81 mg     ferrous sulfate (IRON) tablet 325 mg       Caring Physician: Harper Bell MD  [facility]  Pager Contact: see Physician sticky note

## 2017-04-09 NOTE — PROGRESS NOTES
CLINICAL NUTRITION SERVICES     Nutrition Prescription    Recommendations already ordered by Registered Dietitian (RD):  TFs, free water flushes, and labs    Future/Additional Recommendations:  For all recommendations, see prior nutrition notes     Received consult for Registered Dietitian to Assess and Order TF per Medical Nutrition Therapy Protocol    Note, feeding tube (FT) placed in stomach by radiology 4/8 and radiograph recommended follow-up to determine if FT advanced into duodenum     INTERVENTIONS:  Implementation:  1. Enteral Nutrition, Feeding tube flush: Placed orders for Peptamen 1.5 to be initiated (once team appropriate TF start) at 10 mL/hr, adv by 10 mL Q 8 hrs to goal rate of 45 mL/hr which provides 1080 mL TF/day, 1620 kcals, 73 g PRO, 832 ml free H2O, 60 g Fat (70% from MCTs), 203 g CHO and no Fiber daily. Placed order for free water flushes of 60 mL Q 4 hrs.   2. Collaboration with other providers: Paged team. Discussed with RN. Placed TF orders for use once desire to start TFs.  3. Labs (BMP x 1 day and Phos/Mg++ labs x 3 days) to monitor for refeeding risk.    Follow up/Monitoring:    Aury Fernandez, MS, RD, LD, CNSC   Saturday/Sunday Pgr:  [phone]       Nutrition will continue to follow. 5A/7C RD Pgr:  [phone]

## 2017-04-09 NOTE — PLAN OF CARE
Problem: Goal Outcome Summary  Goal: Goal Outcome Summary  Outcome: No Change  Alert and oriented to self only. VSS. Pt on BIPAP overnight. NG tube placed yesterday evening, clamped. BGs 165 and 168, 1 unit insulin given each time. Orta patent, voiding adequately. 1BM this shift, pt c/o pain with perineal cares, excoriated bottom with bleeding open area, barrier cream applied. L midline infusing D5 at 100ml/hr and intermittent abx. Lopressor given as scheduled. Will continue to monitor and follow POC.

## 2017-04-09 NOTE — PLAN OF CARE
"Problem: Goal Outcome Summary  Goal: Goal Outcome Summary  Outcome: No Change  /47 (BP Location: Right arm)  Pulse 70  Temp 97  F (36.1  C) (Oral)  Resp 18  Ht 1.626 m (5' 4\")  Wt 85.9 kg (189 lb 6.4 oz)  SpO2 100%  BMI 32.51 kg/m2     VSS. Alert, oriented to self; disoriented to place, time and situation. Denies pain/nausea, he does have pain when performing zoe care r/t excoriation to buttock/anus. Barrier cream applied to buttock after cleansing, turn Q2hr. Melena stools x2 this shift one medium and one small, MD is aware. Hgb redraw scheduled for this afternoon. K+ and Phos replacement today, K+ given via NG 40 meq & 20 meq and 15mmol of phos IV over 4 hrs. Midline to LUE infusing maintenance D5 @100. NG to right nare, infusing Peptamen 1.5 at 10 ml/hr started at 1100, increase by 10 ml at 1900. Cast to LLE from a previous fall. Metoprolol held for /47 HR 72. Hold warfarin tonight. Koi. Dysphagia diet 1, not tolerating oral intake. Aspiration precautions. Requires assistance with eating. Ax2, lift for transfers. Cooperative with care.       "

## 2017-04-09 NOTE — PLAN OF CARE
Problem: Goal Outcome Summary  Goal: Goal Outcome Summary     SLP: Pt seen for dysphagia tx. Note baseline cough. Pt demonstrated tolerance of puree textures, unable to effectively masticate semi-solids given edentulous. Recommend advance to dysphagia diet level 1 and thin liquids with 1:1 assist. Pt should be upright, take small bites/sips and alternate consistencies. SLP to follow per POC.

## 2017-04-09 NOTE — PLAN OF CARE
"Problem: Goal Outcome Summary  Goal: Goal Outcome Summary  Outcome: No Change  /76 (BP Location: Right arm)  Pulse 68  Temp 97.4  F (36.3  C) (Axillary)  Resp 18  Ht 1.626 m (5' 4\")  Wt 85.9 kg (189 lb 6.4 oz)  SpO2 100%  BMI 32.51 kg/m2  7325-0226:  Pt returned at approximately 1500 from feeding tube placement. Feeding tube placed with xray and report states it is believed to be in the stomach. Meds changed to NG route if possible by MD. JEFFRIES on room air. Moans and grimaces with repositioning and states his leg hurts. Order obtained for Tylenol; seemed to have relief with Tylenol. Stool incontinence x2. Following wound care orders from 4/4/17; barrier cream applied but pt still has excoriated bottom with bleeding open area. L arm double lumen Midline catheter infusing D5 at 50 ml/hr. Second lumen TKO'd for IV Unasyn. Wife Dian order full liquid diet tray but pt ate only a couple bites of pudding and declined the rest.  and 173; covered each with 1 unit of Novolog. Domingo intact and patent with good urine output. Clamped domingo for 1 hour to obtain urine osmolarity specimen. Continue to monitor and follow POC.           "

## 2017-04-09 NOTE — PROGRESS NOTES
[facility] - Inpatient daily progress note    Date of admission: 3/31/2017  Date of service: 4/9/2017.         Assessment and Plan:      Melvin Bhatia is a 76 year old male with a significant past medical history of cerebrovascular disease, normal pressure hydrocephalus (status post ventriculoperitoneal shunting), progressive cognitive dysfunction, multiple falls, HTN, Type 2 DM, hyperlipidemia, CHF, CKD, and COPD who presented with  worsening mental status. He was found to have an pre renal SHERYL and UTI. He developed an episode of melena, anemia and significant elevation for liver enzymes.     Patient Active Problem List   Diagnosis     Osteoporosis     PAF (paroxysmal atrial fibrillation) (H)     Lumbosacral plexopathy     Testosterone deficiency     Hypertension goal BP (blood pressure) < 140/90     Atrial fibrillation (H)     CKD (chronic kidney disease) stage 3, GFR 30-59 ml/min     Hyperlipidemia LDL goal <70     Coronary artery disease     Health Care Home     ACP (advance care planning)     Elevated PSA     Anemia in chronic renal disease     Subarachnoid hemorrhage (H)     Idiopathic normal pressure hydrocephalus (INPH)     Urinary bladder neurogenic dysfunction     Stroke (H)     Cognitive deficit, post-stroke     HL (hearing loss)     Personal history of fall     GERD (gastroesophageal reflux disease)     COPD exacerbation (H)     SHIVAM (obstructive sleep apnea)     LVH (left ventricular hypertrophy)     Diastolic CHF, acute on chronic (H)     Hypoxemia     Tachyarrhythmia     Systolic and diastolic CHF, chronic (H)     Gait abnormality     Iron deficiency anemia     Allergic rhinitis     Altered mental status     VRE (vancomycin-resistant Enterococci)     Hypoglycemia     Type 2 diabetes with stage 3 chronic kidney disease GFR 30-59 (H)     Chronic obstructive pulmonary disease, unspecified COPD type (H)     Ankle pain     Fx medial malleolus-closed     Gastrointestinal  hemorrhage associated with duodenal ulcer     Anemia due to blood loss, acute     Acute cystitis     Hypernatremia     Delirium     Acute kidney injury superimposed on CKD     Closed fracture of ankle      Plan:   ## Upper GI Bleed secondary to Duodenal Ulcer   ##Acute anemia secondary to above   -Multiple duodenal ulcer seen on EGD but negative active bleeding   -Negative H Pylori  -No gross bleeding, soft abdomen and stable hgb   -Patient continues to have tarry stools    Plan:   -To continue high dose Pantoprazole 40 mg IV BID , will convert to PO once patient is not having tarry stools  -Avoid NSAIDs  -Will decrease hemoglobin check to daily   -transfuse of HGb is <8 and if with active bleeding     ## Acute liver injury:  Improving  Etiology of acute liver injury unknown. Work up for Viral hepatitis, toxin, drugs and BUD Chiari has all been negative. No episode of severe hypotension.  Enzymes has reverted back to normal without intervention.  GI recommending MRCP to evaluate for biliary tract obstruction.     Plan:   - No hepatotoxic medications (tylenol, statins, etc)  -Will observe and discuss possible MRCP with wife     ## Malnutrition:  Patient has been NPO for several days now , low albumin. Patient with poor oral intake    Plan:  - Nutrition following, appreciate recs  - Advance diet to dysphagia 1 with thin liquids per nutrition recs  - Will contact nutrition today to place feeding tube orders    ##Hypernatremia:  Improving  Likely iatrogenic due to IV fluids vs malignancy.  IVFs discontinued yesterday.  Urine osmolality 262 (WNL)    Plan:   - Will encourage PO fluids   -Will decrease BMP check to daily     ## UTI: patient afebrile with negative infection markers.   Patient has a diagnosis of atonic bladder and has required intermittent self catheterizations about 5 times a day. Urine culture from 3/31 grew gram positive enterococcus species susceptible to vancomycin and unasyn. Was initially on  Ceftriaxone (03/31 to 04/02) then vancomycin started (04/01-4/4), was switched to Unasyn 4/5.  Repeat UA 4/7 showed significant bacteria and WBC; however, culture has been negative, so bacteria most likely from colonization.      Plan:   - DC Unasyn    ## Coarse breath sounds/Coughing: Resolved   Ddx: COPD exacerbation vs viral URI     Plan:   -start empiric antibiotics for HCAP if patient develop fever, purulent sputum   -To continue  start Duoneb every 6 hours   -Will consider doing chest CT if patient respiratory status worsens . Patient has AFib not on anticoagulation due to recent UGIB     ## Altered Mental Status, at baseline . Most likely due to infection (UTI) in context of vulnerable brain ( dementia)  Patient awake and interactive yesterday and today.  Had negative CNS work  (bleed , mass or stroke ) or metabolic work up except for hypernatremia which  I have low suspicion that this is the etiology due to non improvement despite correction.     Plan:   - To observe  -Infectious work up (LP) as well head head CT if with acute worsening mental status with focal Neurologic finding       ## SHERYL on CKD3 (baseline Cr 1.5-2): Thought to be pre renal from decrease PO intake , Improving   Plan:   - Improve PO intake   - Follow up BMP daily  - Strict I/O   - Daily weights  - Continue telemetry      ## Type 2 DM, last A1C 6.1 on 02/20/17   Blood sugar at goal   Plan:   - Lantus 14 units bid  - Prandial: 2.0 unit per 15 gm CHO  - Correction: MSSI     ##Paroxysmal A Fib, rate controlled  ##HFrEF, euvolemic   Plan:   -Contintinue IV Metoprolol, will switch to PO tomorrow  -Daily weights  -Input and Output   -Lasix as needed      ## Stercoral proctitis, no acute issue at the moment   Plan:    - Miralax daily PRN and Senna at bedtime PRN.   - HOLD PTA ferrous sulfate while being treated for constipation  -Flex sig as out patient     #Normal Pressure Hydrocephalus  #Multi infarct Vascular Dementia  Plan:   -no acute issue    Will observe     ## Cerebrovascular disease, stable    w/ Hx stroke, normal pressure hydrocephalus (2012), status post ventriculoperitoneal shunting, subarachnoid hemorrhage(2012)   Plan: Will observe      ##High grade right ICA stenosis, diagnosed on 01/2017 MRA, stable   Patient's LAKE-VASC score is 8. He was taken off of coumadin in 2012 due to multiple falls and development of subarachnoid hemorrhage. However, during this last hospitalization, anticoagulation was re-addressed in the setting of multiple embolic events since stopping the coumadin and the decision was made to continue with coumadin (goal INR 2-3).   INR is currently sub-therapeutic at 1.25.    Plan:   -Will continue to hold warfarin for now due to high risk of bleeding      ##HFrEF, EF 46% on 03/31/17, appears euvolemic    Plan:   -daily weights  -I and O   -Holding diuresis while treating pre-renal SHERYL  -Continue metoprolol    ## Physical Deconditioning    Plan:    - PT/OT      Other chronic conditions, stable- continue home medications.  ## HTN-  Not on any meds , BP controlled   ## Hyperlipidemia-  Held rosuvastatin, niacin, due to liver injury   ## COPD- To continue Breo and Atrovent inhaler   ##SHIVAM- Bipap  ##GERD - PPI   ##Allergic Rhinitis - Flonase   ## Constipation- Not an issue right now, will resume bowel regimen as needed   ## Progressive cognitive dysfunction (dementia)  ## Multiple bone fracture on left foot/ankle- cast in place. Hx of fractures after a fall on 9/16, and 2/2017.  - Recheck XR hip to evaluate for hip fracture      Fluids: PO  Electrolytes:Hypernatremia (improving)  Nutrition: Regular   Lines:PIV   Activity: Up with assist   CODE: DNR / DNI  Prophylaxis: Coumadin held  Dispo: Expected discharge date 2-3 days pending clinical improvement                      Interval History:   Melvin Bhatia received a NG tube yesterday.  He had no acute event overnight. His VS have been stable.  Nurses noted reviewed.  RN  called service this morning- patient noted to have moderate size tarry BM.    Patient more alert and responsive to some questions today.  No complaints.              Review of Systems:   Review of Systems   Constitutional:        ROS limited secondary to patient's condition   Respiratory: Positive for cough. Negative for shortness of breath.    Cardiovascular: Negative for chest pain.   Gastrointestinal: Negative for abdominal pain.            Physical Exam (Resident / Clinician):   Vitals were reviewed  Temp: 97.6  F (36.4  C) Temp src: Axillary BP: 121/52 Pulse: 70 Heart Rate: 62 Resp: 18 SpO2: 100 % O2 Device: BiPAP/CPAP Oxygen Delivery: 1 LPM    Physical Exam   Constitutional: He appears well-developed and well-nourished. No distress.   NG tube in place   HENT:   Mouth/Throat: No oropharyngeal exudate.   Eyes: Pupils are equal, round, and reactive to light.   Neck: Normal range of motion. Neck supple. No tracheal deviation present.   Cardiovascular: Normal rate.    Murmur (2/6 systolic murmur at RSB) heard.  Pulmonary/Chest: Effort normal and breath sounds normal. No respiratory distress. He has no wheezes. He has no rales.   Coarse breath sounds diffusely, crackles at bases   Abdominal: Soft. There is no tenderness. There is no rebound and no guarding.   Genitourinary:   Genitourinary Comments: Orta in place, Diaper in place   Musculoskeletal: He exhibits no edema or tenderness.   Left leg wrapped in cast   Neurological: He is alert.   Oriented to self, not place or year   Skin: Skin is warm and dry. He is not diaphoretic. No erythema. No pallor.   Vitals reviewed.          Data:   ROUTINE LABS (Last four results)  CMP  Recent Labs  Lab 04/09/17  0948 04/08/17  0654 04/07/17  1728 04/07/17  0803  04/06/17  0755 04/05/17  1941  04/05/17  0902    148* 149* 146*  < > 148* 147*  < > 148*   POTASSIUM 3.3* 3.7 3.6 3.1*  < > 3.4 3.9  < > 3.8   CHLORIDE 113* 117* 116* 115*  < > 121* 119*  < > 122*   CO2 20 20 21  21  < > 20 21  < > 20   ANIONGAP 9 10 11 11  < > 8 7  < > 7   * 87 85 95  < > 80 141*  < > 221*   BUN 12 13 17 20  < > 28 37*  < > 47*   CR 1.38* 1.42* 1.52* 1.49*  < > 1.51* 1.55*  < > 1.72*   GFRESTIMATED 50* 48* 45* 46*  < > 45* 44*  < > 39*   GFRESTBLACK 60* 58* 54* 55*  < > 54* 53*  < > 47*   LORENA 7.3* 6.9* 6.9* 6.8*  < > 7.2* 7.0*  < > 7.0*   MAG 1.7  --   --   --   --  2.6*  --   --  2.7*   PHOS 2.0*  --   --  3.2  --  2.3* 3.1  --  1.5*   PROTTOTAL 4.8* 5.1*  --  4.9*  --  5.2*  --   --  5.1*   ALBUMIN 1.4* 1.4*  --  1.5*  --  1.6*  --   --  1.5*   BILITOTAL 0.4 0.4  --  2.3*  --  1.2  --   --  0.4   ALKPHOS 86 91  --  96  --  108  --   --  110   AST 39 78*  --  122*  --  263*  --   --  408*   * 184*  --  254*  --  378*  --   --  505*   < > = values in this interval not displayed.  CBC    Recent Labs  Lab 04/09/17  1405 04/09/17  0948 04/08/17  0654 04/07/17  1943 04/07/17  0803  04/06/17  0755   WBC  --  7.2 8.1  --  9.1  --  11.0   RBC  --  3.08* 3.07*  --  3.10*  --  3.31*   HGB 8.3* 8.2* 8.3* 8.3* 8.5*  < > 8.8*   HCT  --  27.9* 28.2* 28.0* 27.6*  --  29.7*   MCV  --  91 92  --  89  --  90   MCH  --  26.6 27.0  --  27.4  --  26.6   MCHC  --  29.4* 29.4*  --  30.8*  --  29.6*   RDW  --  17.6* 17.9*  --  17.8*  --  17.7*   PLT  --  124* 115*  --  107*  --  125*   < > = values in this interval not displayed.  INR    Recent Labs  Lab 04/09/17  0948 04/08/17  0654 04/07/17  0803 04/06/17  0755   INR 1.25* 1.25* 1.36* 1.33*     CRP    Recent Labs  Lab 04/08/17  0654 04/05/17  0902   CRP 21.0* 18.0*           Recent Labs  Lab 04/07/17  1550   CULT No growth           Medications:     Current Facility-Administered Medications   Medication     multivitamins with minerals (CERTAVITE/CEROVITE) liquid 15 mL     fexofenadine (ALLEGRA) tablet 180 mg     niacin tablet 500 mg     lactobacillus rhamnosus (GG) (CULTURELL) capsule 1 capsule     polyethylene glycol (MIRALAX/GLYCOLAX) Packet 17 g      senna-docusate (SENOKOT-S;PERICOLACE) 8.6-50 MG per tablet 1-2 tablet     acetaminophen (TYLENOL) solution 650 mg     dextrose 5% infusion     nystatin (MYCOSTATIN) suspension 500,000 Units     insulin glargine (LANTUS) injection 14 Units     insulin glargine (LANTUS) injection 14 Units     glucose 40 % gel 15-30 g    Or     dextrose 50 % injection 25-50 mL    Or     glucagon injection 1 mg     insulin aspart (NovoLOG) inj (RAPID ACTING)     metoprolol (LOPRESSOR) injection 2.5 mg     ampicillin-sulbactam (UNASYN) 3 g vial to attach to  mL bag     potassium chloride SA (K-DUR/KLOR-CON M) CR tablet 20-40 mEq     potassium chloride (KLOR-CON) Packet 20-40 mEq     potassium chloride 10 mEq in 100 mL intermittent infusion     potassium chloride 10 mEq in 100 mL intermittent infusion with 10 mg lidocaine     potassium chloride 20 mEq in 50 mL intermittent infusion     magnesium sulfate 4 g in 100 mL sterile water (premade)     potassium phosphate 15 mmol in D5W 250 mL intermittent infusion     potassium phosphate 20 mmol in D5W 500 mL intermittent infusion     potassium phosphate 20 mmol in D5W 250 mL intermittent infusion     potassium phosphate 25 mmol in D5W 500 mL intermittent infusion     pantoprazole (PROTONIX) 40 mg IV push injection     [Rx hold ] metoprolol (LOPRESSOR) suspension 50 mg     [Rx hold ] rosuvastatin (CRESTOR) tablet 20 mg     HOLD MEDICATION     [Rx hold ] Warfarin Therapy Reminder (Check START DATE - warfarin may be starting in the FUTURE)     Reason ACE/ARB order not selected     lidocaine 1 % 0.5-5 mL     lidocaine (LMX4) kit     sodium chloride (PF) 0.9% PF flush 10-20 mL     sodium chloride (PF) 0.9% PF flush 10 mL     fluticasone-vilanterol (BREO ELLIPTA) 200-25 MCG/INH oral inhaler 1 puff     fluticasone (FLONASE) 50 MCG/ACT spray 2 spray     ipratropium (ATROVENT HFA) Inhaler 1 puff     naloxone (NARCAN) injection 0.1-0.4 mg     lidocaine 1 % 1 mL     lidocaine (LMX4) kit     sodium  chloride (PF) 0.9% PF flush 3 mL     sodium chloride (PF) 0.9% PF flush 3 mL     magnesium citrate solution 148 mL     ondansetron (ZOFRAN-ODT) ODT tab 4 mg    Or     ondansetron (ZOFRAN) injection 4 mg     prochlorperazine (COMPAZINE) injection 5 mg    Or     prochlorperazine (COMPAZINE) tablet 5 mg    Or     prochlorperazine (COMPAZINE) Suppository 12.5 mg     Patient is already receiving anticoagulation with heparin, enoxaparin (LOVENOX), warfarin (COUMADIN)  or other anticoagulant medication     No lozenges or gum should be given while patient on BIPAP     hypromellose-dextran (ARTIFICAL TEARS) ophthalmic solution 1 drop     insulin aspart (NovoLOG) inj (RAPID ACTING)     insulin aspart (NovoLOG) inj (RAPID ACTING)     insulin aspart (NovoLOG) inj (RAPID ACTING)     insulin aspart (NovoLOG) inj (RAPID ACTING)     [Rx hold ] aspirin EC EC tablet 81 mg     ferrous sulfate (IRON) tablet 325 mg       Caring Physician: Toña Duron MD  [facility]  Pager Contact: see Physician sticky note

## 2017-04-10 NOTE — PLAN OF CARE
Problem: Goal Outcome Summary  Goal: Goal Outcome Summary  Outcome: No Change  Disoriented x3, oriented to self only. VSS on RA. O2 sats intermittently dropping to low 80s while asleep, recovers quickly when woken up. Pt refusing oxygen. Bipap not placed overnight due to aspiration risk. Denies nausea and pain. Pt combative and cursing at staff this shift with any cares. BGs 187 and 172, covered with SSI. Repositioned q2h. Orta intact. Incontinent of stool x1. Bottom remains excoriated, barrier cream applied. MIVF infusing into PICC- D51/2NS + K at 125ml/hr. Will continue to monitor and follow POC.     Pt had bright red bloody large stool this morning in brief. [facility] team paged and talked to day MD, stat labs ordered and drawn.

## 2017-04-10 NOTE — PLAN OF CARE
Problem: Goal Outcome Summary  Goal: Goal Outcome Summary  PT-5A: PT orders received and acknowledged.  Per chart review and discussion with pt, currently living in [facility] facility where he is dependent for transfers and ADLs.  PT deferring and orders will be completed.

## 2017-04-10 NOTE — PLAN OF CARE
"Problem: Goal Outcome Summary  Goal: Goal Outcome Summary  Outcome: No Change  /59  Pulse 72  Temp 98.8  F (37.1  C) (Axillary)  Resp 16  Ht 1.626 m (5' 4\")  Wt 85.9 kg (189 lb 6.4 oz)  SpO2 100%  BMI 32.51 kg/m2     AVSS. Tylenol once for fractured left leg discomfort. Left leg in cast. Repositioned and back rub for low back pain. Disoriented x3. Was pleasant and cooperative until about 2030 when he started cursing at staff. At approximately 2215 pt pulled his NG feeding tube out. Danna[facility] Dr. Johnathan Mak notified; Plan to restart IVF. MD ordered D5+1/2 NS+20mEq KCl 125 ml/hr. (Otherwise Peptamen 1.5 TF had been advanced to 20 ml/hr at 2000.) BGs 241& 223; covered with Sliding Scale Novolog.  at HS before Lantus. Finished K replacement 20 mEq down NG this afternoon. K Recheck 4.4. Phos 15 mmol infusion completed; recheck in am. Dysphagia Diet 1, not tolerating oral intake. Aspiration precautions maintained. Ax2 to change and reposition every 2 hours. Incontinent of black melena stool; [facility] aware. Last Hgb stable at 8.3. Domingo intact with 550 cc for shift; domingo cares done. Continue to monitor and follow POC. Midline dressing change due tomorrow.       "

## 2017-04-10 NOTE — PROGRESS NOTES
[facility] daily [facility]    Date of admission: 3/31/2017  Date of service: 4/10/2017.    Updates:  - Left upper extremity ultrasound  - Discuss with GI need for flex sig with new BRBR  - Discuss with GI if patient still needs MRCP with improvement in transaminases  - Discuss with wife goals of cares         Assessment and Plan:      Melvin Bhatia is a 76 year old male with a significant past medical history of cerebrovascular disease, normal pressure hydrocephalus (status post ventriculoperitoneal shunting), progressive cognitive dysfunction, multiple falls, HTN, Type 2 DM, hyperlipidemia, CHF, CKD, and COPD who presented with  worsening mental status. He was found to have an pre renal SHERYL and UTI. He developed an episode of melena, anemia and significant elevation for liver enzymes.     Patient Active Problem List   Diagnosis     Osteoporosis     PAF (paroxysmal atrial fibrillation) (H)     Lumbosacral plexopathy     Testosterone deficiency     Hypertension goal BP (blood pressure) < 140/90     Atrial fibrillation (H)     CKD (chronic kidney disease) stage 3, GFR 30-59 ml/min     Hyperlipidemia LDL goal <70     Coronary artery disease     Health Care Home     ACP (advance care planning)     Elevated PSA     Anemia in chronic renal disease     Subarachnoid hemorrhage (H)     Idiopathic normal pressure hydrocephalus (INPH)     Urinary bladder neurogenic dysfunction     Stroke (H)     Cognitive deficit, post-stroke     HL (hearing loss)     Personal history of fall     GERD (gastroesophageal reflux disease)     COPD exacerbation (H)     SHIVAM (obstructive sleep apnea)     LVH (left ventricular hypertrophy)     Diastolic CHF, acute on chronic (H)     Hypoxemia     Tachyarrhythmia     Systolic and diastolic CHF, chronic (H)     Gait abnormality     Iron deficiency anemia     Allergic rhinitis     Altered mental status     VRE (vancomycin-resistant Enterococci)     Hypoglycemia      Type 2 diabetes with stage 3 chronic kidney disease GFR 30-59 (H)     Chronic obstructive pulmonary disease, unspecified COPD type (H)     Ankle pain     Fx medial malleolus-closed     Gastrointestinal hemorrhage associated with duodenal ulcer     Anemia due to blood loss, acute     Acute cystitis     Hypernatremia     Delirium     Acute kidney injury superimposed on CKD     Closed fracture of ankle      Plan:   ## Upper GI Bleed secondary to Duodenal Ulcer   ##Acute anemia secondary to above   EGD performed that showed multiple duodenal ulcers, but patient was not actively bleeding at the time.  H. Pylori was negative.  Patient continues to have intermittent tarry stools with bright red blood today, hemoglobin is stable.  Bright red blood most likely secondary to hemorrhoids or proctitis.  Discussed with GI- no intervention if patient's hemoglobin is stable.  Goals of care need to be discussed with family as this will influence further evaluation and intervention.    Plan:   -To continue high dose Pantoprazole 40 mg IV BID , will convert to PO once patient is not having tarry stools  - Discuss with wife goals of care  -Avoid NSAIDs  -Will continue to check hemoglobin bid while patient is having melanotic stools  -transfuse of HGb is <8 and if with active bleeding     ## Malnutrition:  Patient was NPO for several days now, low albumin. Patient with poor oral intake.  NGT was placed, but not tolerated by patient.    Plan:  - Encourage po intake  - Nutrition following, appreciate recs  - Advance diet to dysphagia 1 with thin liquids per nutrition recs  - Consider tube feeds again if patient does not tolerate po intake over next few days    ## Upper Extremity Swelling:  Differential diagnosis includes PICC infiltration, superficial thrombophlebitis and DVT.  Patient has a history of blood clots and INR is currently < 2.    Plan:  - LUE ultrasound    ## Acute liver injury:  Improving  Etiology of acute liver injury  unknown. Work up for Viral hepatitis, toxin, drugs and BUD Chiari has all been negative. No episode of severe hypotension.  Enzymes has reverted back to normal without intervention.  GI recommending MRCP to evaluate for biliary tract obstruction (depending on ultimate goals of care).     Plan:   - No hepatotoxic medications (tylenol, statins, etc)  -Will observe and discuss possible MRCP/goals of care with wife     ##Hypernatremia:  Resolved  Likely iatrogenic due to IV fluids vs malignancy.       Plan:   - Continue maintenance fluids due to low po intake     ## UTI: patient afebrile with negative infection markers.   Patient has a diagnosis of atonic bladder and has required intermittent self catheterizations about 5 times a day. Urine culture from 3/31 grew gram positive enterococcus species susceptible to vancomycin and unasyn. Was initially on Ceftriaxone (03/31 to 04/02) then vancomycin started (04/01-4/4), was switched to Unasyn 4/5.  Repeat UA 4/7 showed significant bacteria and WBC; however, culture has been negative, so bacteria most likely from colonization.  Antibiotics discontinued 4/9.    ## Coarse breath sounds/Coughing: Improving   Ddx: COPD exacerbation vs viral URI     Plan:   -start empiric antibiotics for HCAP if patient develop fever, purulent sputum   -To continue  start Duoneb every 6 hours   -Will consider doing chest CT if patient respiratory status worsens . Patient has AFib not on anticoagulation due to recent UGIB     ## Altered Mental Status:  Improving  Most likely due to infection (UTI) in context of vulnerable brain ( dementia).  Patient awake and interactive today.  Had negative CNS work  (bleed , mass or stroke ) or metabolic work up.  Hypernatremia may have been contributing to altered mental status since it has resolved and mental status is improving.    Plan:   - To observe  -Infectious work up (LP) as well head head CT if with acute worsening mental status with focal Neurologic  finding       ## SHERYL on CKD3 (baseline Cr 1.5-2): Thought to be prerenal from decrease PO intake , Improving   Plan:   - Improve PO intake   - Follow up BMP daily  - Strict I/O   - Daily weights  - Continue telemetry      ## Type 2 DM, last A1C 6.1 on 02/20/17   Blood sugar at goal   Plan:   - Lantus 14 units bid  - Prandial: 2.0 unit per 15 gm CHO  - Correction: MSSI     ##Paroxysmal A Fib, rate controlled  ##HFrEF, euvolemic   Plan:   -Contintinue IV Metoprolol, will switch to PO tomorrow  -Daily weights  -Input and Output   -Lasix as needed      ## Stercoral proctitis, no acute issue at the moment   Plan:    - Miralax daily PRN and Senna at bedtime PRN.   - HOLD PTA ferrous sulfate while being treated for constipation  -Flex sig as out patient     #Normal Pressure Hydrocephalus  #Multi infarct Vascular Dementia  Plan:   -no acute issue   Will observe     ## Cerebrovascular disease, stable    w/ Hx stroke, normal pressure hydrocephalus (2012), status post ventriculoperitoneal shunting, subarachnoid hemorrhage(2012)   Plan: Will observe      ##High grade right ICA stenosis, diagnosed on 01/2017 MRA, stable   Patient's LAKE-VASC score is 8. He was taken off of coumadin in 2012 due to multiple falls and development of subarachnoid hemorrhage. However, during this last hospitalization, anticoagulation was re-addressed in the setting of multiple embolic events since stopping the coumadin and the decision was made to continue with coumadin (goal INR 2-3).   INR is currently sub-therapeutic at 1.25.    Plan:   -Will continue to hold warfarin for now due to high risk of bleeding      ##HFrEF, EF 46% on 03/31/17, appears euvolemic    Plan:   -daily weights  -I and O   -Holding diuresis while treating pre-renal SHERYL  -Continue metoprolol    ## Physical Deconditioning    Plan:    - PT/OT      Other chronic conditions, stable- continue home medications.  ## HTN-  Not on any meds , BP controlled   ## Hyperlipidemia-  Held  rosuvastatin, niacin, due to liver injury   ## COPD- To continue Breo and Atrovent inhaler   ##SHIVAM- Bipap  ##GERD - PPI   ##Allergic Rhinitis - Flonase   ## Constipation- Not an issue right now, will resume bowel regimen as needed   ## Progressive cognitive dysfunction (dementia)  ## Multiple bone fracture on left foot/ankle- cast in place. Hx of fractures after a fall on 9/16, and 2/2017.  - Recheck XR hip to evaluate for hip fracture      Fluids: D5 0.45NS at 125 cc/hr  Electrolytes:WNL  Nutrition: dysphagia 1 with thin liquids   Lines:PIV   Activity: Up with assist   CODE: DNR / DNI  Prophylaxis: Coumadin held  Dispo: Expected discharge date 2-3 days pending clinical improvement                      Interval History:   Melvin Bhatia pulled out his NG tube last night.  He continues to be confused most of the time, oriented only to self.  He continues to be incontinent of melanotic stools.    Patient sleeping this morning, arousable, but not coherent.  On rounds with staff, patient was alert and responsive to questions.  No complaints.  No chest pain, SOB, abdominal pain.  No pain in left upper extremity.            Review of Systems:   Review of Systems   Constitutional:        ROS limited secondary to patient's condition   Respiratory: Positive for cough. Negative for shortness of breath.    Cardiovascular: Negative for chest pain.   Gastrointestinal: Positive for blood in stool. Negative for abdominal pain.            Physical Exam (Resident / Clinician):   Vitals were reviewed  Temp: 98  F (36.7  C) Temp src: Axillary BP: 174/54 Pulse: 64 Heart Rate: 75 Resp: 18 SpO2: 100 % O2 Device: Nasal cannula Oxygen Delivery: 1 LPM    Physical Exam   Constitutional: He appears well-developed and well-nourished. No distress.   HENT:   Mouth/Throat: No oropharyngeal exudate.   Eyes: Pupils are equal, round, and reactive to light.   Neck: Normal range of motion. Neck supple. No tracheal deviation present.    Cardiovascular: Normal rate.    Murmur (2/6 systolic murmur at RSB) heard.  Pulmonary/Chest: Effort normal and breath sounds normal. No respiratory distress. He has no wheezes. He has no rales.   Clear to auscultation (anteriorly) with crackles at bases   Abdominal: Soft. There is no tenderness. There is no rebound and no guarding.   Genitourinary:   Genitourinary Comments: Orta in place, Diaper in place   Musculoskeletal: He exhibits no edema (Swelling of left upper extremity, distal to elbow (including hand, which has 2+ pitting edema).  No erythema.) or tenderness.   Left leg wrapped in cast   Neurological: He is alert.   Oriented to self, not place or year   Skin: Skin is warm and dry. He is not diaphoretic. No erythema. No pallor.   Vitals reviewed.          Data:   ROUTINE LABS (Last four results)  CMP  Recent Labs  Lab 04/10/17  0639 04/09/17  1925 04/09/17  0948 04/08/17  0654 04/07/17  1728 04/07/17  0803  04/06/17  0755  04/05/17  0902     --  142 148* 149* 146*  < > 148*  < > 148*   POTASSIUM 4.1 4.4 3.3* 3.7 3.6 3.1*  < > 3.4  < > 3.8   CHLORIDE 115*  --  113* 117* 116* 115*  < > 121*  < > 122*   CO2 20  --  20 20 21 21  < > 20  < > 20   ANIONGAP 8  --  9 10 11 11  < > 8  < > 7   *  --  192* 87 85 95  < > 80  < > 221*   BUN 9  --  12 13 17 20  < > 28  < > 47*   CR 1.41*  --  1.38* 1.42* 1.52* 1.49*  < > 1.51*  < > 1.72*   GFRESTIMATED 49*  --  50* 48* 45* 46*  < > 45*  < > 39*   GFRESTBLACK 59*  --  60* 58* 54* 55*  < > 54*  < > 47*   LORENA 7.6*  --  7.3* 6.9* 6.9* 6.8*  < > 7.2*  < > 7.0*   MAG 1.7  --  1.7  --   --   --   --  2.6*  --  2.7*   PHOS 2.5  --  2.0*  --   --  3.2  --  2.3*  < > 1.5*   PROTTOTAL 4.9*  --  4.8* 5.1*  --  4.9*  --  5.2*  --  5.1*   ALBUMIN 1.6*  --  1.4* 1.4*  --  1.5*  --  1.6*  --  1.5*   BILITOTAL 0.4  --  0.4 0.4  --  2.3*  --  1.2  --  0.4   ALKPHOS 83  --  86 91  --  96  --  108  --  110   AST 27  --  39 78*  --  122*  --  263*  --  408*   *  --   128* 184*  --  254*  --  378*  --  505*   < > = values in this interval not displayed.  CBC    Recent Labs  Lab 04/10/17  0639 04/09/17  1405 04/09/17  0948 04/08/17  0654 04/07/17  1943 04/07/17  0803   WBC 6.1  --  7.2 8.1  --  9.1   RBC 3.08*  --  3.08* 3.07*  --  3.10*   HGB 8.2* 8.3* 8.2* 8.3* 8.3* 8.5*   HCT 27.9*  --  27.9* 28.2* 28.0* 27.6*   MCV 91  --  91 92  --  89   MCH 26.6  --  26.6 27.0  --  27.4   MCHC 29.4*  --  29.4* 29.4*  --  30.8*   RDW 17.7*  --  17.6* 17.9*  --  17.8*   *  --  124* 115*  --  107*     INR    Recent Labs  Lab 04/10/17  0639 04/09/17  0948 04/08/17  0654 04/07/17  0803   INR 1.23* 1.25* 1.25* 1.36*     CRP    Recent Labs  Lab 04/08/17  0654 04/05/17  0902   CRP 21.0* 18.0*           Recent Labs  Lab 04/07/17  1550   CULT No growth           Medications:     Current Facility-Administered Medications   Medication     dextrose 10 % 1,000 mL infusion     dextrose 5% and 0.45% NaCl + KCl 20 mEq/L infusion     multivitamins with minerals (CERTAVITE/CEROVITE) liquid 15 mL     fexofenadine (ALLEGRA) tablet 180 mg     niacin tablet 500 mg     lactobacillus rhamnosus (GG) (CULTURELL) capsule 1 capsule     polyethylene glycol (MIRALAX/GLYCOLAX) Packet 17 g     senna-docusate (SENOKOT-S;PERICOLACE) 8.6-50 MG per tablet 1-2 tablet     acetaminophen (TYLENOL) solution 650 mg     nystatin (MYCOSTATIN) suspension 500,000 Units     insulin glargine (LANTUS) injection 14 Units     insulin glargine (LANTUS) injection 14 Units     glucose 40 % gel 15-30 g    Or     dextrose 50 % injection 25-50 mL    Or     glucagon injection 1 mg     insulin aspart (NovoLOG) inj (RAPID ACTING)     metoprolol (LOPRESSOR) injection 2.5 mg     potassium chloride SA (K-DUR/KLOR-CON M) CR tablet 20-40 mEq     potassium chloride (KLOR-CON) Packet 20-40 mEq     potassium chloride 10 mEq in 100 mL intermittent infusion     potassium chloride 10 mEq in 100 mL intermittent infusion with 10 mg lidocaine     potassium  chloride 20 mEq in 50 mL intermittent infusion     magnesium sulfate 4 g in 100 mL sterile water (premade)     potassium phosphate 15 mmol in D5W 250 mL intermittent infusion     potassium phosphate 20 mmol in D5W 500 mL intermittent infusion     potassium phosphate 20 mmol in D5W 250 mL intermittent infusion     potassium phosphate 25 mmol in D5W 500 mL intermittent infusion     pantoprazole (PROTONIX) 40 mg IV push injection     [Rx hold ] metoprolol (LOPRESSOR) suspension 50 mg     [Rx hold ] rosuvastatin (CRESTOR) tablet 20 mg     HOLD MEDICATION     [Rx hold ] Warfarin Therapy Reminder (Check START DATE - warfarin may be starting in the FUTURE)     Reason ACE/ARB order not selected     lidocaine 1 % 0.5-5 mL     lidocaine (LMX4) kit     sodium chloride (PF) 0.9% PF flush 10-20 mL     sodium chloride (PF) 0.9% PF flush 10 mL     fluticasone-vilanterol (BREO ELLIPTA) 200-25 MCG/INH oral inhaler 1 puff     fluticasone (FLONASE) 50 MCG/ACT spray 2 spray     ipratropium (ATROVENT HFA) Inhaler 1 puff     naloxone (NARCAN) injection 0.1-0.4 mg     lidocaine 1 % 1 mL     lidocaine (LMX4) kit     sodium chloride (PF) 0.9% PF flush 3 mL     sodium chloride (PF) 0.9% PF flush 3 mL     magnesium citrate solution 148 mL     ondansetron (ZOFRAN-ODT) ODT tab 4 mg    Or     ondansetron (ZOFRAN) injection 4 mg     prochlorperazine (COMPAZINE) injection 5 mg    Or     prochlorperazine (COMPAZINE) tablet 5 mg    Or     prochlorperazine (COMPAZINE) Suppository 12.5 mg     Patient is already receiving anticoagulation with heparin, enoxaparin (LOVENOX), warfarin (COUMADIN)  or other anticoagulant medication     No lozenges or gum should be given while patient on BIPAP     hypromellose-dextran (ARTIFICAL TEARS) ophthalmic solution 1 drop     insulin aspart (NovoLOG) inj (RAPID ACTING)     insulin aspart (NovoLOG) inj (RAPID ACTING)     insulin aspart (NovoLOG) inj (RAPID ACTING)     insulin aspart (NovoLOG) inj (RAPID ACTING)     [Rx  hold ] aspirin EC EC tablet 81 mg     ferrous sulfate (IRON) tablet 325 mg       Caring Physician: Toña Duron MD  [facility]  Pager Contact: see Physician sticky note

## 2017-04-10 NOTE — PLAN OF CARE
Problem: Goal Outcome Summary  Goal: Goal Outcome Summary     SLP: Pt seen for dysphagia tx this AM. Pt with minimal acceptance of PO, but consumed medications whole with thin liquid during session. Pt with baseline cough, which appeared following swallows during medication administration. However, difficult to discern if related to PO given quality unchanged from baseline. Pt proceeded to spit out any PO trials this date. Concern for nutritional intake. Continue to cautiously recommend dysphagia diet level 1 and thin liquids. Pt should be upright. Take small sips/bites and alternate consistencies.  SLP to follow.

## 2017-04-10 NOTE — PROGRESS NOTES
GASTROENTEROLOGY LUMINAL PROGRESS NOTE      ASSESSMENT:  76 year old male with a history of CVA, NPH s/p  shunt, HTN, CKD, DMII and proctitis initially presented with an acute on chronic kidney injury, UTI and hypernatremia and then developed anemia with melena, requiring transfusions of pRBCs, s/p EGD which showed multiple duodenal ulcers, now with continued melena with stable vitals and hgb.    # Melena  # Upper GI bleed  Black tarry stools started 4/4 in the setting of supratherapeutic INR (now closer to normal s/p vitamin K).  Hgb dropped from baseline ~11 to 7.0, now stable over past several days at ~8.  EGD 4/7 showed multiple duodenal ulcers with a clean ulcer base, has remained on IV PPI as unable to take PO. Melena continues but patient hemodynamically stable. Unclear what pt's baseline functional status is, however in the hospital has remained altered and agitated.   -recommend family conference to further define goals of care given AMS as above    -if pt becomes hemodynamically unstable with dropping hgb, could consider repeat EGD vs colonoscopy if aligns with pt's goals       # Proctitis on CT  Given his history of constipation, most likely stercoral ulceration/fecal stasis rather than infectious or IBD related.   -could consider flex sig if aligns with pt's goals as above    # Acute liver injury, resolving   ALT, AST and t bili elevated on admission. Prior records reveal normal liver chemistries. No history of cirrhosis, portal hypertension. With this elevation of transaminases, likely due to hepatocellular injury. CT abdomen/pelvis showed focal dilatation of intrahepatic ducts, which was also recognized on US abdomen with doppler. Viral hepatitis (HBV, HCV) neg. With the dilatation seen on CT and US, can also be a malignant process. Given resolution, can hold off on MRCP for now.     RECOMMENDATIONS: See above     The patient care plan was discussed with Dr. Lopez, GI Luminal staff physician.    Rashawn  MD Ervin  PGY-1  [phone]  _______________________________________________________________  S: Per RN notes, pt had multiple melanotic stools yesterday and one large stool this AM with bright red blood. Pt is confused and agitated (at baseline) and unable to provide any additional history.     O:  Temp:  [97  F (36.1  C)-98.8  F (37.1  C)] 97.4  F (36.3  C)  Pulse:  [64-72] 69  Heart Rate:  [59-75] 59  Resp:  [16-18] 18  BP: (113-174)/(47-61) 122/58  SpO2:  [99 %-100 %] 100 %    Physical Exam  Gen: NAD.   CV: RRR.   Resp: CTAB.   Abdominal: Soft, non-distended, no TTP, no HSM, +BS.   Rectal: Unable to complete as pt was incontinent of melanotic stool.   Neuro: A&Ox1 (self), moves all four extremities.     LABS:  BMP  Recent Labs  Lab 04/10/17  0639 04/09/17 1925 04/09/17  0948 04/08/17  0654 04/07/17  1728     --  142 148* 149*   POTASSIUM 4.1 4.4 3.3* 3.7 3.6   CHLORIDE 115*  --  113* 117* 116*   LORENA 7.6*  --  7.3* 6.9* 6.9*   CO2 20  --  20 20 21   BUN 9  --  12 13 17   CR 1.41*  --  1.38* 1.42* 1.52*   *  --  192* 87 85     CBC  Recent Labs  Lab 04/10/17  0639  04/09/17  0948 04/08/17  0654 04/07/17  1943 04/07/17  0803   WBC 6.1  --  7.2 8.1  --  9.1   RBC 3.08*  --  3.08* 3.07*  --  3.10*   HGB 8.2*  < > 8.2* 8.3* 8.3* 8.5*   HCT 27.9*  --  27.9* 28.2* 28.0* 27.6*   MCV 91  --  91 92  --  89   MCH 26.6  --  26.6 27.0  --  27.4   MCHC 29.4*  --  29.4* 29.4*  --  30.8*   RDW 17.7*  --  17.6* 17.9*  --  17.8*   *  --  124* 115*  --  107*   < > = values in this interval not displayed.  INR  Recent Labs  Lab 04/10/17  0639 04/09/17  0948 04/08/17  0654 04/07/17  0803   INR 1.23* 1.25* 1.25* 1.36*     LFTs  Recent Labs  Lab 04/10/17  0639 04/09/17  0948 04/08/17  0654 04/07/17  0803   ALKPHOS 83 86 91 96   AST 27 39 78* 122*   * 128* 184* 254*   BILITOTAL 0.4 0.4 0.4 2.3*   PROTTOTAL 4.9* 4.8* 5.1* 4.9*   ALBUMIN 1.6* 1.4* 1.4* 1.5*      PANC  Recent Labs  Lab 04/04/17 2042   LIPASE  203       .

## 2017-04-11 NOTE — SIGNIFICANT EVENT
"SPIRITUAL HEALTH SERVICES Significant Event  [facility] Sacrament of ANOINTING  [facility] 4C    I anointed patient per request of wife Dian Alcala is alert but not able to focus or direct attention at time of my routine visit to unit. His wife Dian is with him and reports that Fredrick has been in-patient for a few days but his transfer to ICU was the result in a dramatic change. She welcomes prayer and says Fredrick has had a difficult 8 years or so, with a number of bone breakage after falls. He was to rehab after most recent break (leg) and had \"poor care.\"  She subsequently was able to transfer him to [facility] and is hoping he will be able to return there.    We joined in prayer at bedside as I anointed him.     Father Johnathan Ibanez       "

## 2017-04-11 NOTE — PROGRESS NOTES
[facility] - Inpatient daily progress note    Date of admission: 3/31/2017  Date of service: 4/11/2017.    Updates:  - Hemoglobins q 4 hours         Assessment and Plan:      Melvin Bhatia is a 76 year old male with a significant past medical history of cerebrovascular disease, normal pressure hydrocephalus (status post ventriculoperitoneal shunting), progressive cognitive dysfunction, multiple falls, HTN, Type 2 DM, hyperlipidemia, CHF, CKD, and COPD who presented with  worsening mental status. He was found to have an pre renal SHERYL and UTI. He developed an episode of melena, anemia and significant elevation for liver enzymes.     Patient Active Problem List   Diagnosis     Osteoporosis     PAF (paroxysmal atrial fibrillation) (H)     Lumbosacral plexopathy     Testosterone deficiency     Hypertension goal BP (blood pressure) < 140/90     Atrial fibrillation (H)     CKD (chronic kidney disease) stage 3, GFR 30-59 ml/min     Hyperlipidemia LDL goal <70     Coronary artery disease     Health Care Home     ACP (advance care planning)     Elevated PSA     Anemia in chronic renal disease     Subarachnoid hemorrhage (H)     Idiopathic normal pressure hydrocephalus (INPH)     Urinary bladder neurogenic dysfunction     Stroke (H)     Cognitive deficit, post-stroke     HL (hearing loss)     Personal history of fall     GERD (gastroesophageal reflux disease)     COPD exacerbation (H)     SHIVAM (obstructive sleep apnea)     LVH (left ventricular hypertrophy)     Diastolic CHF, acute on chronic (H)     Hypoxemia     Tachyarrhythmia     Systolic and diastolic CHF, chronic (H)     Gait abnormality     Iron deficiency anemia     Allergic rhinitis     Altered mental status     VRE (vancomycin-resistant Enterococci)     Hypoglycemia     Type 2 diabetes with stage 3 chronic kidney disease GFR 30-59 (H)     Chronic obstructive pulmonary disease, unspecified COPD type (H)     Ankle pain     Fx medial  malleolus-closed     Gastrointestinal hemorrhage associated with duodenal ulcer     Anemia due to blood loss, acute     Acute cystitis     Hypernatremia     Delirium     Acute kidney injury superimposed on CKD     Closed fracture of ankle      Plan:   ## Upper GI Bleed secondary to Duodenal Ulcer   ## Lower GI Bleed  ##Acute anemia secondary to above   EGD performed last week that showed multiple duodenal ulcers, but patient was not actively bleeding at the time.  H. Pylori was negative.  Patient developed large amounts of melanotic stools with bright red blood overnight.  GI performed bedside flex sig this morning that showed multiple ulcers in rectum.    Plan:   - Q 4 hour hemoglobins  -Continue high dose Pantoprazole 40 mg IV BID  - Discuss with wife goals of care  -Avoid NSAIDs  -transfuse of HGb is <8 and if with active bleeding     ## Malnutrition:  Patient was NPO for several days now, low albumin. Patient with poor oral intake.  NGT was placed, but not tolerated by patient.    Plan:  - Encourage po intake  - Nutrition following, appreciate recs  - Continue dysphagia 1 with thin liquids per nutrition recs  - Continue calorie counts  - Consider tube feeds again if patient does not tolerate po intake over next few days    ## Coarse breath sounds/Coughing:   Patient noted to have a cough this morning.  Differential includes pulmonary edema secondary to volume overload, bronchitis, URI, pneumonia.  CXR showed a left pleural effusion and possible pulmonary edema.      Plan:  - 40 mg IV lasix    ## Altered Mental Status:  Fluctuating  Patient awake and interactive yesterday, but non-responsive today.  Most likely due to delirium in context of dementia.  Had negative CNS work  (bleed , mass or stroke ) or metabolic work up.  Hypernatremia may have been contributing to altered mental status since it has resolved and mental status mildly improved.    Plan:   - To observe  -Infectious work up (LP) as well head head CT  if with acute worsening mental status with focal Neurologic finding     ## Upper Extremity Swelling:  Improving  Most likely secondary to superficial thrombophlebitis or PICC infiltration.  Patient has a history of blood clots and INR is currently < 2.  LUE ultrasound was negative for DVT.    ## Acute liver injury:  Improving  Etiology of acute liver injury unknown. Work up for Viral hepatitis, toxin, drugs and BUD Chiari has all been negative. No episode of severe hypotension.  Enzymes have reverted back to normal without intervention.  GI recommending MRCP to evaluate for biliary tract obstruction (depending on ultimate goals of care).     Plan:   - No hepatotoxic medications (tylenol, statins, etc)  - Will observe and discuss possible MRCP/goals of care with wife     ##Hypernatremia:  Resolved  Likely iatrogenic due to IV fluids vs malignancy.       Plan:   - Continue maintenance fluids due to low po intake     ## UTI: patient afebrile with negative infection markers.   Patient has a diagnosis of atonic bladder and has required intermittent self catheterizations about 5 times a day. Urine culture from 3/31 grew gram positive enterococcus species susceptible to vancomycin and unasyn. Was initially on Ceftriaxone (03/31 to 04/02) then vancomycin started (04/01-4/4), was switched to Unasyn 4/5.  Repeat UA 4/7 showed significant bacteria and WBC; however, culture has been negative, so bacteria most likely from colonization.  Antibiotics discontinued 4/9.      ## SHERYL on CKD3 (baseline Cr 1.5-2): Thought to be prerenal from decrease PO intake , Improving   Plan:   - Improve PO intake   - Follow up BMP daily  - Strict I/O   - Daily weights  - Continue telemetry      ## Type 2 DM, last A1C 6.1 on 02/20/17   Blood sugars continue to fluctuate (100s-200s).    Plan:   - Lantus 14 units bid  - Prandial: 2.0 unit per 15 gm CHO  - Correction: MSSI     ##Paroxysmal A Fib, rate controlled  ##HFrEF, hypervolemic  Patient's weight  on admission (3/31) was 80 kg.  4/11 it was 88 kg.  Patient is requiring maintenance fluids since he is not eating, but is unable to mobilize fluids.  Albumin 1.5.  Once patient starts eating, will plan to decrease IVFs and potentially diurese excess fluid off patient.      Plan:   -Contintinue IV Metoprolol  -Daily weights  -Input and Output   -Lasix as needed      ## Stercoral proctitis   Plan:    - Miralax daily PRN and Senna at bedtime PRN.   - HOLD PTA ferrous sulfate while being treated for constipation    #Normal Pressure Hydrocephalus  #Multi infarct Vascular Dementia  Plan:   -no acute issue   Will observe     ## Cerebrovascular disease, stable    w/ Hx stroke, normal pressure hydrocephalus (2012), status post ventriculoperitoneal shunting, subarachnoid hemorrhage(2012)   Plan: Will observe      ##High grade right ICA stenosis, diagnosed on 01/2017 MRA, stable   Patient's LAKE-VASC score is 8. He was taken off of coumadin in 2012 due to multiple falls and development of subarachnoid hemorrhage. However, during this last hospitalization, anticoagulation was re-addressed in the setting of multiple embolic events since stopping the coumadin and the decision was made to continue with coumadin (goal INR 2-3).   INR is currently sub-therapeutic at 1.25.    Plan:   -Will continue to hold warfarin for now due to high risk of bleeding      ##HFrEF, EF 46% on 03/31/17, appears euvolemic    Plan:   -daily weights  -I and O   -Holding diuresis while treating pre-renal SHERYL  -Continue metoprolol    ## Physical Deconditioning    Plan:    - PT/OT      Other chronic conditions, stable- continue home medications.  ## HTN-  Not on any meds , BP controlled   ## Hyperlipidemia-  Held rosuvastatin, niacin, due to liver injury   ## COPD- To continue Breo and Atrovent inhaler   ##SHIVAM- Bipap  ##GERD - PPI   ##Allergic Rhinitis - Flonase   ## Constipation- Not an issue right now, will resume bowel regimen as needed   ## Progressive  cognitive dysfunction (dementia)  ## Multiple bone fracture on left foot/ankle- cast in place. Hx of fractures after a fall on 9/16, and 2/2017.     Fluids: D5 0.45NS at 125 cc/hr  Electrolytes:WNL  Nutrition: dysphagia 1 with thin liquids   Lines:PIV   Activity: Up with assist   CODE: DNR / DNI  Prophylaxis: Coumadin held  Dispo: Expected discharge date 4-5 days pending clinical improvement                      Interval History:   Melvin Bhatia had large amounts of bloody stools overnight.  Hemoglobin dropped to 7.7.  He received IVFs and 3 units PRBCs.  GI was called and performed a flex sig this morning which showed multiple large ulcers in the rectum.    This morning, patient is unresponsive; however, will make eye contact at sound of voice.            Review of Systems:   Review of Systems   Constitutional:        ROS limited secondary to patient's condition   Respiratory: Positive for cough. Negative for shortness of breath.    Cardiovascular: Negative for chest pain.   Gastrointestinal: Positive for blood in stool. Negative for abdominal pain.            Physical Exam (Resident / Clinician):   Vitals were reviewed  Temp: 97.5  F (36.4  C) Temp src: Axillary BP: 124/60 Pulse: 72 Heart Rate: 74 Resp: 20 SpO2: 100 % O2 Device: None (Room air) Oxygen Delivery: 1 LPM    Physical Exam   Constitutional: He appears well-developed and well-nourished.   HENT:   Mouth/Throat: No oropharyngeal exudate.   Eyes: Pupils are equal, round, and reactive to light.   Neck: Normal range of motion. Neck supple. No tracheal deviation present.   Cardiovascular: Normal rate.    Murmur (2/6 systolic murmur at RSB) heard.  Pulmonary/Chest: Effort normal and breath sounds normal. No respiratory distress. He has no wheezes. He has no rales.   Coarse breath sounds, crackles at bases   Abdominal: Soft. There is no tenderness. There is no rebound and no guarding.   Genitourinary:   Genitourinary Comments: Orta in place, Diaper in  place   Musculoskeletal: He exhibits no edema (Swelling of left upper extremity, distal to elbow (including hand, which has 2+ pitting edema).  No erythema.) or tenderness.   Left leg wrapped in cast   Neurological: He is alert.   Makes eye contact, unable to answer questions   Skin: Skin is warm and dry. No erythema. No pallor.   Vitals reviewed.          Data:   ROUTINE LABS (Last four results)  CMP  Recent Labs  Lab 04/11/17  0510 04/10/17  1741 04/10/17  0639 04/09/17  1925 04/09/17  0948 04/08/17  0654  04/07/17  0803  04/06/17  0755    142 143  --  142 148*  < > 146*  < > 148*   POTASSIUM 4.3  --  4.1 4.4 3.3* 3.7  < > 3.1*  < > 3.4   CHLORIDE 114*  --  115*  --  113* 117*  < > 115*  < > 121*   CO2 20  --  20  --  20 20  < > 21  < > 20   ANIONGAP 8  --  8  --  9 10  < > 11  < > 8   *  --  174*  --  192* 87  < > 95  < > 80   BUN 7  --  9  --  12 13  < > 20  < > 28   CR 1.23  --  1.41*  --  1.38* 1.42*  < > 1.49*  < > 1.51*   GFRESTIMATED 57*  --  49*  --  50* 48*  < > 46*  < > 45*   GFRESTBLACK 69  --  59*  --  60* 58*  < > 55*  < > 54*   LORENA 7.4*  --  7.6*  --  7.3* 6.9*  < > 6.8*  < > 7.2*   MAG 1.6  --  1.7  --  1.7  --   --   --   --  2.6*   PHOS 2.2*  --  2.5  --  2.0*  --   --  3.2  --  2.3*   PROTTOTAL 5.0*  --  4.9*  --  4.8* 5.1*  --  4.9*  --  5.2*   ALBUMIN 1.4*  --  1.6*  --  1.4* 1.4*  --  1.5*  --  1.6*   BILITOTAL 0.2  --  0.4  --  0.4 0.4  --  2.3*  --  1.2   ALKPHOS 83  --  83  --  86 91  --  96  --  108   AST 21  --  27  --  39 78*  --  122*  --  263*   ALT 80*  --  102*  --  128* 184*  --  254*  --  378*   < > = values in this interval not displayed.  CBC    Recent Labs  Lab 04/11/17  0510 04/10/17  1741 04/10/17  0639 04/09/17  1405 04/09/17  0948 04/08/17  0654   WBC 6.6  --  6.1  --  7.2 8.1   RBC 2.80*  --  3.08*  --  3.08* 3.07*   HGB 7.7* 8.9* 8.2* 8.3* 8.2* 8.3*   HCT 25.6*  --  27.9*  --  27.9* 28.2*   MCV 91  --  91  --  91 92   MCH 27.5  --  26.6  --  26.6 27.0   MCHC  30.1*  --  29.4*  --  29.4* 29.4*   RDW 17.5*  --  17.7*  --  17.6* 17.9*   *  --  134*  --  124* 115*     INR    Recent Labs  Lab 04/11/17  0510 04/10/17  0639 04/09/17  0948 04/08/17  0654   INR 1.17* 1.23* 1.25* 1.25*     CRP    Recent Labs  Lab 04/08/17  0654 04/05/17  0902   CRP 21.0* 18.0*           Recent Labs  Lab 04/07/17  1550   CULT No growth           Medications:     Current Facility-Administered Medications   Medication     lactated ringers BOLUS 1,000 mL     0.9% sodium chloride + KCl 20 mEq/L infusion     nystatin (MYCOSTATIN) suspension 500,000 Units     dextrose 10 % 1,000 mL infusion     multivitamins with minerals (CERTAVITE/CEROVITE) liquid 15 mL     lactobacillus rhamnosus (GG) (CULTURELL) capsule 1 capsule     polyethylene glycol (MIRALAX/GLYCOLAX) Packet 17 g     senna-docusate (SENOKOT-S;PERICOLACE) 8.6-50 MG per tablet 1-2 tablet     acetaminophen (TYLENOL) solution 650 mg     insulin glargine (LANTUS) injection 14 Units     insulin glargine (LANTUS) injection 14 Units     glucose 40 % gel 15-30 g    Or     dextrose 50 % injection 25-50 mL    Or     glucagon injection 1 mg     insulin aspart (NovoLOG) inj (RAPID ACTING)     metoprolol (LOPRESSOR) injection 2.5 mg     potassium chloride SA (K-DUR/KLOR-CON M) CR tablet 20-40 mEq     potassium chloride (KLOR-CON) Packet 20-40 mEq     potassium chloride 10 mEq in 100 mL intermittent infusion     potassium chloride 10 mEq in 100 mL intermittent infusion with 10 mg lidocaine     potassium chloride 20 mEq in 50 mL intermittent infusion     magnesium sulfate 4 g in 100 mL sterile water (premade)     potassium phosphate 15 mmol in D5W 250 mL intermittent infusion     potassium phosphate 20 mmol in D5W 500 mL intermittent infusion     potassium phosphate 20 mmol in D5W 250 mL intermittent infusion     potassium phosphate 25 mmol in D5W 500 mL intermittent infusion     pantoprazole (PROTONIX) 40 mg IV push injection     [Rx hold ] metoprolol  (LOPRESSOR) suspension 50 mg     [Rx hold ] rosuvastatin (CRESTOR) tablet 20 mg     [Rx hold ] Warfarin Therapy Reminder (Check START DATE - warfarin may be starting in the FUTURE)     Reason ACE/ARB order not selected     lidocaine 1 % 0.5-5 mL     lidocaine (LMX4) kit     sodium chloride (PF) 0.9% PF flush 10-20 mL     sodium chloride (PF) 0.9% PF flush 10 mL     fluticasone-vilanterol (BREO ELLIPTA) 200-25 MCG/INH oral inhaler 1 puff     fluticasone (FLONASE) 50 MCG/ACT spray 2 spray     ipratropium (ATROVENT HFA) Inhaler 1 puff     naloxone (NARCAN) injection 0.1-0.4 mg     lidocaine 1 % 1 mL     lidocaine (LMX4) kit     sodium chloride (PF) 0.9% PF flush 3 mL     sodium chloride (PF) 0.9% PF flush 3 mL     magnesium citrate solution 148 mL     ondansetron (ZOFRAN-ODT) ODT tab 4 mg    Or     ondansetron (ZOFRAN) injection 4 mg     prochlorperazine (COMPAZINE) injection 5 mg    Or     prochlorperazine (COMPAZINE) tablet 5 mg    Or     prochlorperazine (COMPAZINE) Suppository 12.5 mg     Patient is already receiving anticoagulation with heparin, enoxaparin (LOVENOX), warfarin (COUMADIN)  or other anticoagulant medication     No lozenges or gum should be given while patient on BIPAP     hypromellose-dextran (ARTIFICAL TEARS) ophthalmic solution 1 drop     insulin aspart (NovoLOG) inj (RAPID ACTING)     insulin aspart (NovoLOG) inj (RAPID ACTING)     insulin aspart (NovoLOG) inj (RAPID ACTING)     insulin aspart (NovoLOG) inj (RAPID ACTING)     [Rx hold ] aspirin EC EC tablet 81 mg     ferrous sulfate (IRON) tablet 325 mg       Caring Physician: Toña Duron MD  [facility] [place]  Pager Contact: see Physician sticky note

## 2017-04-11 NOTE — PLAN OF CARE
Problem: Goal Outcome Summary  Goal: Goal Outcome Summary  SLP: Dysphagia therapy cancelled.  Pt not appropriate for participation due to active bleeding.  ST to follow as medically appropriate.

## 2017-04-11 NOTE — PLAN OF CARE
Problem: Goal Outcome Summary  Goal: Goal Outcome Summary  Outcome: Declining  Pt. Transferred to  from  d/t increase bloody stool output. Pt passing large bright red blood clots. Pt arrived via ICU transport and was situated. Upon assessment pt had continued bloody stool, approximately 250 mL output. 1 Unit of PRBCs started. Pressures starting to trend down. Report to oncoming nurse given.

## 2017-04-11 NOTE — PROGRESS NOTES
Called re: bright red blood clots per rectum.    Per chart review, Mr. Bhatia is a 76 year old gentleman with several comorbidities who was admitted originally for worsening mental status perhaps in the setting of a UTI. He was having what was reported as melenic stools as well on admission and had an EGD 4/7 that showed several cratered ulcers generally considered low risk for re-bleeding and as such no endoscopic interventions were necessary. This morning, he apparently began passing bright red blood clots first around 0330 and there have been additional red clots passing since that point in time. His blood pressure is down-trending somewhat, although he is not tachycardic (is on BB).  CT also reviewed and shows circumferential rectal thickening and inflammatory changes thought possibly 2/2 stercoral ulceration in the setting of significant constipation.   At this time, the patient had no repeat labs drawn.     Recommended the following:  -Feel repeat UGIB based on available EGD report and the presence of purely BRB and clots would be likely of lower yield and certainly higher risk in this gentleman, but that a distal LGIB is a distinct possibility.  -Patient will need further evaluation and stabilization first, but pending stability, will likely consider flex sig for evaluation of rectum/distal colon this AM.   -Considered recommending enema administration to assist with visualization, but given concern for stercoral ulceration not recommending at this time.  -Diane Barrios and John are on the luminal GI service today.    Yves Escobar  GI fellow

## 2017-04-11 NOTE — PROGRESS NOTES
RN witnessed very large blood clots per rectum. MD notified. Lab called to draw hemoglobin and labs now. /62.

## 2017-04-11 NOTE — PROCEDURES
Gastroenterology Endoscopy [facility] Brief Operative Note    Procedure:  Flexible sigmoidoscopy   Post-operative diagnosis:  Hematochezia, anorectal ulceration   Staff Physician:  Dr. Hussein Lopez   Fellow/Assistant(s):  Stu Barrios    Specimens:  Please see final procedure note for further details.   Findings:  Ulceration from the perianal area to approximately 5cm upstream from the anal verge. Adherent clot at the dentate line, aggressively washed, no bleeding seen.    Complications:  None.   Condition:  Stable   Recommendations  Diet:  Clear liquid diet  PPI:  PPI po BID  Anti-coagulants/platelets:  Hold anticoagulation  Octreotide:  N/A  Discharge Planning:   Pending clinical improvement. If rebleeds suggest Colorectal surgery investigation given the proximity to the anal verge.

## 2017-04-11 NOTE — PLAN OF CARE
"Problem: Goal Outcome Summary  Goal: Goal Outcome Summary  Outcome: Declining  [id number]  Pt alert, disoriented to time, place and situation. When asked questions, pt yells at nursing staff and every question asked his answer is \"no\". VSS on RA. Pt assist of 2 with changes and repositioning, repositioned q 2hrs. MIVF infusing through L midline at 125ml/hr. BGs 276, 231, and 223 - covered with SSI per orders. Orta catheter in place, good urine output - catheter cares done. LLE cast in place. Bottom continues to be very excoriated. Pt incontinent of stool, last stool before bedtime was small/loose and brown. When nursing staff went in to reposition pt around 0300, large bright red clots found in pt's brief, no stool noted. Briefs weighed with diaper scale, pt denies any pain. BP's remain stable, small drop as the night went on, HR continues to be in 60-70's. Pt's abd bruised, pt denies any pain with palpation. Since 0300, pt continues to have many large bright red clots present in brief, when pt is cleaned - blood continues to drip out of anus. MDs notified around 0300 about first incident of blood found, have been in assessing pt since. LR bolus stared. Pt continues to refuse any food/beverages when offered. New PIV placed in R arm by vascular access this AM, lab in to draw labs. Pt will transfer to  for further cares.      "

## 2017-04-11 NOTE — PLAN OF CARE
Problem: Goal Outcome Summary  Goal: Goal Outcome Summary  Outcome: No Change  [id number]: Vitals stable. Tele shows mostly sinus rhythm but sometimes irregular and some bradycardia at times. Midline intact with IV fluids. L arm swollen - MD team notified and US completed (negative for DVT). Cast in place on L leg. Mechanical lift to get to recliner chair x 2. SLP attempted in AM but patient seems to be more cooperative in afternoon than AM. Disoriented x 3 (oriented to self most of the time). Swears at staff during cares. Turns q 2 hours with stool incontinence cares. Bottom very excoriated with a couple small open areas. Barrier cream applied. Domingo intact - per CDC guidelines and infection prevention, do not need to replace domingo at certain date. DD1 and supplements - encouraging patient to eat. Very poor appetite. Calorie counts started. Pt ate some dinner with assistance from wife who was at bedside. BGs q4 and carb coverage and sliding scale insulin given. Pt lives in [place] at baseline.

## 2017-04-11 NOTE — PROGRESS NOTES
Cross Cover Note  [facility] Medicine Service    Reason for call: Bleeding from Rectum      0417  Called by nurse who reported large amount of blood clots noted in the patient's diaper.  I went to see the patient .  The nurse had saved the pad and the diaper and there was an estimated 100cc of bright red blood clot in the diapers and chucks.  There was no further bleeding for a few minutes.  I asked for his AM labs to be drawn early at this time to check hemoglobin and coags.  Patient's BP was initially 128/67.  HR was 70s but the patient is on metoprolol.  I advised them to watch for additional bleeding and get lab to draw the labs.      0435  I was called again for recurrent bleeding.  Senior resident notified and present at bedside with me.  The nurse stated that he had filled 2 additonal diapers with bright red blood clots.  I again returned immediately to see the patient.  There was significant amount of blood noted in diapers.  Lab had been unable to draw blood on first attempt.  /56 with HR 70's.  Pt. Confused, but he has been confused at baseline prior to this event.  I re-ordered Hb, type and screen, and coags as STAT, ordered 1L bolus of LR, ordered 5 units PRBC to be prepared.  Hb came back at 7.7 (given his cardiac history our threshold to transfuse is 8).  I ordered 2 units PRBC to be transfused given ongoing bleeding.  I contacted GI who has been previously consulted for GI bleed and performed an EGD (finding several duodenal ulcerations).  GI advised to stabilize the patient, agreed with transfer to higher level of care.  Fellow said it would not be of benefit to do an un-prepped colonoscopy.  He recommended stabilization, serial H&H, transfuse PRN, and contact IR for possible angiography, CTA, or tagged RBC scan.    0523  Patient's blood pressure came up to 130/74 with IV fluid resusciation.  Type and screen processing, awaiting start of blood transfusion.  IR contacted to make them aware  of patient.  Recommended triple phase CT scan, however, given patient's impaired renal function, tagged RBC scan may be preferred.    0555  Anoscopy performed at bedside showed no stool in the rectal vault, continuous brisk blood flow through the anoscope.  No source of bleed could be identified.  No masses appreciated.  GI contacted again with update.  They said that they would notify their team at 7am and consider doing sigmoidoscopy or colonoscopy later this morning if bleeding persists.  There is still continuous bleeding from patient's anus, bright red.  /60, HR 72.  Wife notified by phone of situation and overnight events.    0625  Tagged RBC scan ordered, spoke with radiology resident.  Blood transfusion to start imminently.  Hb 7.7 (from 8.9).  Patient transferred to Intermediate Care unit (ICU room for overflow).  Will still be primary patient of Danna's  team.    Ongoing care to be managed by day team to whom I will sign out overnight events.    Johnathan Mak  PGY-1  [phone]

## 2017-04-12 NOTE — PROGRESS NOTES
Calorie Counts  Intake recorded for: 4/11 Kcals: 0  Protein: 0g  # Meals Recorded: 1 meal ordered from kitchen, no intake recorded.   # Supplements Recorded: no intake recorded.

## 2017-04-12 NOTE — PROGRESS NOTES
"Social Work Services Progress Note    Hospital Day: 13  Date of Initial Social Work Evaluation:  4/3/2017  Collaborated with:  Medical team, [facility] admissions -Diann [phone]    Data:  Melvin is a 76 years old male admitted from LTC for elevated WBX/creatnine, weakness, and nutrition. Pt has dementia at baseline. SW has been following and providing nearly daily updates to LTC as wife is paying privately to hold his bed each day out of pocket. CHEVY worked with Danna's med team last week to establish a DC date of 4/11-4/12, however on 4/10 pt was transferred to ICU and is said to be \"declining\". SW addressed the bed hold with the team today, they stated \"he may go sometime next week, if at all at this point, he is declining and this may turn into palliative/comfort before DC. She can let the LTC bed go\".     Intervention:  SW called Diann and updated to situation. She will call the spouse and discuss continuing to pay for the bed hold vs letting it go.    Assessment:  Spouse is a retired RN and has good insight into the patients health decline.    Plan:    Anticipated Disposition:  TBD    Barriers to d/c plan:  Disposition, medical readiness    Follow Up:  ICU SW will follow this patient, Shanon Geronimo @ [phone]    GERMANIA Schreiber  5B  (Medical/Surgical)  Phone: [phone]  Pager: [phone]     "

## 2017-04-12 NOTE — PLAN OF CARE
Problem: Goal Outcome Summary  Goal: Goal Outcome Summary  ST 4C: Recommend NPO status at this time. Pt is not following any commands. Demonstrating reduced oral awareness and elevated aspiration risk. Will follow.

## 2017-04-12 NOTE — PLAN OF CARE
Problem: Goal Outcome Summary  Goal: Goal Outcome Summary  D: Transfer to ICU for bright red bloody stools, increasing lethargy, drop in Hgb  I/A: 3 units PRBCs transfused this AM. Flex sigmoidoscopy performed at bedside. 1 small, loose but formed stool this am. Brown, not sánchez red blood. Adequate pressures after transfusion complete. % on Room Air. Nasal Cannula 2L when asleep due to sleep apnea. Disoriented x3, alert to self. Baseline per wife. Follows minimal commands.  P: Continue to monitor Hgbs. Monitor hemodynamics. Continue to follow plan of care and notify MD with changes.

## 2017-04-12 NOTE — PLAN OF CARE
Problem: Goal Outcome Summary  Goal: Goal Outcome Summary  Outcome: No Change  Pt did not have a BM overnight. Pt was tachycardic with afib. PB was stable. Was Afibrile. Sats were good on NC at 2L and on Bipap when sleeping. Replaced K. Changed NS drip to D5 drip d/t low BG. Pt was disoriented x3 throughout shift. Was restless and agitated throughout shift.   P: Speech to conduct a swallow eval. Continue to monitor and notify provider of changes.

## 2017-04-12 NOTE — PROGRESS NOTES
GASTROENTEROLOGY LUMINAL PROGRESS NOTE  4/12/2017       ASSESSMENT:  76 year old male with a history of CVA, NPH s/p  shunt, HTN, CKD, DMII and proctitis initially presented with an acute on chronic kidney injury, UTI and hypernatremia and then developed anemia with melena, requiring transfusions of pRBCs, s/p EGD which showed multiple duodenal ulcers with no active bleed and flex sig with showed perianal ulceration. Hgb now stable with no recurrent melena or hematochezia.      # Perianal ulceration   CT A/P 4/4 showed proctitis, subsequent flexible sigmoidoscopy 4/11 showed ulceration from the perianal area to approximately 5cm upstream from the anal verge. Adherent clot at the dentate line, which was aggressively washed and no bleeding seen.  -If recurrent BRBPR, please discuss with colorectal surgery given proximity of ulceration to anal verge     # Melena  # Upper GI bleed  Black tarry stools started 4/4 in the setting of supratherapeutic INR (now closer to normal s/p vitamin K).  Hgb dropped from baseline ~11 to 7.0, now back up to 9 4/12 s/p 3 u pRBC's 4/11 and stable with melena resolved. EGD 4/7 showed multiple duodenal ulcers with a clean ulcer base, has remained on IV PPI as unable to take PO.    -continue IV PPI until pt able to take PO, then would do PPI BID for 2 months, then switch to qday      GI will sign off, please call with any questions     RECOMMENDATIONS: See above     The patient care plan was discussed with Dr. Lopez, GI Luminal staff physician.    Rashawn Mueller MD  PGY-1  [phone]  _______________________________________________________________  S: No acute events overnight. Brown stool yesterday with no recurrent melena or hematochezia. Mental status remains altered.     O:  Temp:  [98.4  F (36.9  C)-99.7  F (37.6  C)] 99.4  F (37.4  C)  Heart Rate:  [] 95  Resp:  [8-24] 16  BP: ()/() 129/78  FiO2 (%):  [21 %] 21 %  SpO2:  [94 %-100 %] 100 %    Physical Exam  Gen:  NAD.   CV: RRR.   Resp: CTAB.   Abdominal: Soft, non-distended, no TTP, no HSM, +BS.   Neuro: A&Ox1 (self), moves all four extremities.     LABS:  BMP    Recent Labs  Lab 04/12/17  0446 04/11/17  0510 04/10/17  1741 04/10/17  0639 04/09/17  1925 04/09/17  0948   * 143 142 143  --  142   POTASSIUM 3.0* 4.3  --  4.1 4.4 3.3*   CHLORIDE 124* 114*  --  115*  --  113*   LORENA 5.6* 7.4*  --  7.6*  --  7.3*   CO2 17* 20  --  20  --  20   BUN 6* 7  --  9  --  12   CR 1.00 1.23  --  1.41*  --  1.38*   GLC 50* 219*  --  174*  --  192*     CBC  Recent Labs  Lab 04/12/17 0446 04/11/17 0813 04/11/17  0510  04/10/17  0639  04/09/17  0948   WBC 9.8  --   --  6.6  --  6.1  --  7.2   RBC 3.47*  --   --  2.80*  --  3.08*  --  3.08*   HGB 9.9*  < > 7.4* 7.7*  < > 8.2*  < > 8.2*   HCT 30.3*  --  24.1* 25.6*  --  27.9*  --  27.9*   MCV 87  --   --  91  --  91  --  91   MCH 28.5  --   --  27.5  --  26.6  --  26.6   MCHC 32.7  --   --  30.1*  --  29.4*  --  29.4*   RDW 19.4*  --   --  17.5*  --  17.7*  --  17.6*   *  --   --  143*  --  134*  --  124*   < > = values in this interval not displayed.  INR    Recent Labs  Lab 04/11/17  0510 04/10/17  0639 04/09/17  0948 04/08/17  0654   INR 1.17* 1.23* 1.25* 1.25*     LFTs    Recent Labs  Lab 04/12/17  0446 04/11/17  0510 04/10/17  0639 04/09/17  0948   ALKPHOS 55 83 83 86   AST 7 21 27 39   ALT 47 80* 102* 128*   BILITOTAL 0.4 0.2 0.4 0.4   PROTTOTAL 3.6* 5.0* 4.9* 4.8*   ALBUMIN 1.2* 1.4* 1.6* 1.4*      PANCNo lab results found in last 7 days.    .

## 2017-04-12 NOTE — PLAN OF CARE
Problem: Individualization  Goal: Patient Preferences  Outcome: Improving  Pt's glucose 50 this am.  D50 given IV x 1.  15 min post glucose check was 124, glucometer not downloading results properly.

## 2017-04-12 NOTE — CONSULTS
[facility] [place]      Neurology Stroke Consult    Patient Name: Melvin Bhatia  : 1940 MRN#: [id number]    STROKE DATA    Stroke Code:  Time called:  2017 170  Time patient seen:  2017 170  Onset of symptoms:  2017 1500  Last known normal (pt's baseline):  2017 1500  Head CT read by Male at:  2017 1715    TPA treatment:  Not given due to GI bleed in past 21 days.    National Institutes of Health Stroke Scale (at presentation)  NIHSS done at:  time patient seen      Score    Level of consciousness:  (0)   Alert, keenly responsive     LOC questions:  (0)   Answers both questions correctly    LOC commands:  (1)   Performs one task correctly    Best gaze:  (0)   Normal    Visual:  (0)   No visual loss    Facial palsy:  (2)   Partial paralysis (total/near total of lower face)    Motor arm (left):  (3)   No effort against gravity    Motor arm (right):  (2)   Some effort against gravity    Motor leg (left):  (3)   No effort against gravity    Motor leg (right):  (3)   No effort against gravity    Limb ataxia:  (0)   Absent    Sensory:  (0)   Normal- no sensory loss    Best language:  (1)   Mild to moderate aphasia    Dysarthria:  (1)   Mild to moderate dysarthria    Extinction and inattention:  (0)   No abnormality        NIHSS Total Score:  16        Dysphagia Screen  Time of screenin2017 1720  Screening results: Failed screening - strict NPO pending SLP evaluation     ASSESSMENT & RECOMMENDATIONS   The patient was seen for acute worsening of left facial droop.  The differential diagnosis includes stroke, TIA and locus minorus. Noncontrast head CT concerning for new subacute large right MCA distribution ischemic stroke. However, the patient is not a candidate for tPA due to recent history of GI bleed this hospitalization. As patient has decompensated heart failure and recent SHERYL, elected for MRI and MRA to further evaluate.       Impression:   Subacute R MCA ischemic stroke    Recommendations:  Acute Ischemic Stroke (without tPA) Plan  - Neurochecks q1h  - Permissive HTN; labetalol PRN for SBP > 220  - Euthermia, Euglycemia  - MRI/MRA Stroke Protocol  - rectal aspirin 300 mg daily  - Telemetry, EKG  - Bedside Glucose Monitoring  - A1c, Lipid Panel, Troponin x 3  - Recently had echo but no bubble study, pt with known a fib, will discuss TTE with bubble   - PT/OT/SLP  - PM&R  - Stroke Education  - Depression Screen  - Apnea Screen     This evening, elevate HOB to 30 degrees and monitor for acute mental status changes. Strongly RECOMMEND AGAINST HYPOTONIC FLUIDS as this can worsen cerebral edema, especially with an infarct of this size. Will reassess patient in the morning.    Prophylaxis          For VTE Prevention:  - None, per primary team     The patient will be managed by the [facility] team and  followed by the Stroke Consult service.    HPI  Melvin Bhatia is a 76 year old male with a significant past medical history of cerebrovascular disease,SAH in 2/16 with subsequent normal pressure hydrocephalus and post ventriculoperitoneal shunting, progressive cognitive dysfunction, multiple falls, HTN, Type 2 DM, hyperlipidemia, CHF, CKD, and COPD with previous R MCA stroke with left hemiplegia who was lying in bed at 3 pm and got up to a chair. At that time, nurse noted worsening of baseline left facial droop. At 5 PM this was confirmed with his spouse and stroke code was called.     Pertinent Past Medical/Surgical History  Past Medical History:   Diagnosis Date     ARDS (adult respiratory distress syndrome) (H)      Atrial fibrillation (H)     first diagnosed 11-08     Bell's palsy 1/2010     Cauda equina syndrome with neurogenic bladder (H)     self catheter     Cerebellar infarct (H) 4/2012    left cerebellar infarct.  felt embolic hx afib     CHF (congestive heart failure) (H)      Chronic airway obstruction, not elsewhere classified       Chronic infection     VRE     CKD (chronic kidney disease) stage 4, GFR 15-29 ml/min (H)      Congestive heart failure, unspecified 5/06    EF 48%     Coronary artery disease     abnl mps with ef 46% and small-med area of ischemia '08 [facility]     CVA (cerebral infarction)     old lacunar infarct in the posterior left     Diabetes      Essential hypertension, benign      Gastro-oesophageal reflux disease      Heart attack (H)      Hemorrhage of gastrointestinal tract, unspecified 2/2003    UGI Bleed with transfusion     Hydrocephalus     plan  Shunt     Hypersomnia with sleep apnea, unspecified     Bpap     Intraventricular hemorrhage 1/2012     Legionella pneumonia (H) 8/09     Lumbago      Lumbosacral plexopathy 10/09    L diabetic myotrophy     Mixed hyperlipidemia      Other osteoporosis 3/2006     Other specified anemias 2002    Nl colonoscopy/EGD; Nl iron studies 4/06     Other specified disorders of rotator cuff syndrome of shoulder and allied disorders      Other testicular hypofunction     on Androgel     Palpitations     HOLTER     Sleep apnea     uses cpap     Subarachnoid hemorrhage (H) 1/2012     Tibial plateau fracture 2/2011    left     Type II or unspecified type diabetes mellitus without mention of complication, not stated as uncontrolled      Unspecified disorder resulting from impaired renal function     Stage 3 Chronic Renal Dz       Past Surgical History:   Procedure Laterality Date     C DEXA, BONE DENSITY, AXIAL SKEL  3/2006    Osteoporosis     CARDIAC NUC CONY STRESS TEST NL  5/06    No ischemia, EF 44 %     CARDIAC NUC CONY STRESS TEST NL  6/2008, 3/09    sm to mod fixed apical defect EF 46%     ESOPHAGOSCOPY, GASTROSCOPY, DUODENOSCOPY (EGD), COMBINED N/A 4/7/2017    Procedure: COMBINED ESOPHAGOSCOPY, GASTROSCOPY, DUODENOSCOPY (EGD);  Surgeon: Hussein Lopez MD;  Location: [facility]     HC COLONOSCOPY THRU STOMA, DIAGNOSTIC  2/2003    Normal, repeat 10 yr     HC DOPPLER ECHO  PULSED, COMPLETE      EF 50-55%     IMPLANT SHUNT VENTRICULOPERITONEAL  2/28/2012    Procedure:IMPLANT SHUNT VENTRICULOPERITONEAL; Ventriculoperitoneal Shunt Placement; Surgeon:ALESHA VELAZQUEZ; Location:[facility] OR     OPEN REDUCTION INTERNAL FIXATION TIBIAL PLATEAU  3/2011    left     SIGMOIDOSCOPY FLEXIBLE N/A 4/11/2017    Procedure: SIGMOIDOSCOPY FLEXIBLE;  Surgeon: Hussein Lopez MD;  Location: [facility] GI     SURGICAL HISTORY OF -   2001    R wrist surg fx/ steel plate     SURGICAL HISTORY OF -   age 20    Inguinal hernia     SURGICAL HISTORY OF -   2003    Bladder stim surg, not sucessful     SURGICAL HISTORY OF -       Lymphedema treatment of legs     TONSILLECTOMY  child     UPPER GI ENDOSCOPY  2/2003    esophageal ulcers, gastritis, duodenitis     UPPER GI ENDOSCOPY  4/2003    erosive gastropathy from ASA       Medications: I have reviewed this patient's current medications.    Allergies: All allergies reviewed and addressed.    Family History: This patient has no significant family history.    Social History: I have reviewed this patient's social history.    Tobacco use: Former smoker, last smoked 1997    ROS:  The 10 point Review of Systems is negative other than noted in the HPI or here.     PHYSICAL EXAMINATION  Vital Signs:  B/P: 104/59,  T: 98.9,  P: 107,  R: 20    General:  patient lying in bed without any acute distress    HEENT:  normocephalic/atraumatic  Cardio:  RRR  Pulmonary:  no respiratory distress  Abdomen:  soft  Extremities:  no edema  Skin:  intact     Neurologic  Mental Status:  Opens eyes to command  Cranial Nerves: EOMI. Unable to test visual fields. Left lower facial droop, otherwise symmetric. Hearing intact to conversation. Tongue midline.   Motor:  no abnormal movements, normal tone throughout. RUE with 4/5 strength, LUE 2/5. RLE 2+/5, RLE 2/5.   Reflexes:  Reduced throughout. No clonus. Right toe up going.  Sensory:  Withdraws to pain in all four  extremities  Coordination:  Unable to assess  Station/Gait:  unable to test    Labs  Labs and Imaging reviewed and used in developing the plan; pertinent results included.     Lab Results   Component Value Date    GLC 50 (LL) 04/12/2017       The patient was discussed with the Fellow, Dr. Carlos.  The staff is Dr. Olivarez.    Richa Kim   Pager: [phone]

## 2017-04-12 NOTE — PROGRESS NOTES
[facility] Medicine - Inpatient daily progress note    Date of admission: 3/31/2017  Date of service: 4/12/2017.    Updates:  - Hemoglobin and sodium q 6 hours  - BMP at 5 pm         Assessment and Plan:      Melvin Bhatia is a 76 year old male with a significant past medical history of cerebrovascular disease, normal pressure hydrocephalus (status post ventriculoperitoneal shunting), progressive cognitive dysfunction, multiple falls, HTN, Type 2 DM, hyperlipidemia, CHF, CKD, and COPD who presented with  worsening mental status. He was found to have an pre renal SHERYL and UTI. He developed an episode of melena, anemia and significant elevation for liver enzymes.     Patient Active Problem List   Diagnosis     Osteoporosis     PAF (paroxysmal atrial fibrillation) (H)     Lumbosacral plexopathy     Testosterone deficiency     Hypertension goal BP (blood pressure) < 140/90     Atrial fibrillation (H)     CKD (chronic kidney disease) stage 3, GFR 30-59 ml/min     Hyperlipidemia LDL goal <70     Coronary artery disease     Health Care Home     ACP (advance care planning)     Elevated PSA     Anemia in chronic renal disease     Subarachnoid hemorrhage (H)     Idiopathic normal pressure hydrocephalus (INPH)     Urinary bladder neurogenic dysfunction     Stroke (H)     Cognitive deficit, post-stroke     HL (hearing loss)     Personal history of fall     GERD (gastroesophageal reflux disease)     COPD exacerbation (H)     SHIVAM (obstructive sleep apnea)     LVH (left ventricular hypertrophy)     Diastolic CHF, acute on chronic (H)     Hypoxemia     Tachyarrhythmia     Systolic and diastolic CHF, chronic (H)     Gait abnormality     Iron deficiency anemia     Allergic rhinitis     Altered mental status     VRE (vancomycin-resistant Enterococci)     Hypoglycemia     Type 2 diabetes with stage 3 chronic kidney disease GFR 30-59 (H)     Chronic obstructive pulmonary disease, unspecified COPD type (H)     Ankle  pain     Fx medial malleolus-closed     Gastrointestinal hemorrhage associated with duodenal ulcer     Anemia due to blood loss, acute     Acute cystitis     Hypernatremia     Delirium     Acute kidney injury superimposed on CKD     Closed fracture of ankle      Plan:   ## Upper GI Bleed secondary to Duodenal Ulcer   ## Lower GI Bleed  ##Acute anemia secondary to above   EGD performed last week that showed multiple duodenal ulcers, but patient was not actively bleeding at the time.  H. Pylori was negative.  Patient developed large amounts of melanotic stools with bright red 4/11.  GI performed bedside flex sig this morning that showed multiple ulcers in rectum.  Patient has not had any episodes of bloody stool for > 24 hours.    Plan:   - Q 6 hour hemoglobins  -Continue high dose Pantoprazole 40 mg IV BID  -Avoid NSAIDs  -transfuse of HGb is <8 and if with active bleeding     ## Altered Mental Status:  Fluctuating  Patient awake and interactive 2 days prior, but has not been appropriately responsive since GI bleed 4/11.  AMS most likely due to delirium and/or hypernatremia in context of dementia.   Patient may have also suffered a stroke, especially since he is prone to clots and his warfarin has been held.  He had negative CNS work  (bleed , mass or stroke ) work up.     Plan:   - To observe  -Infectious work up (LP) as well head head CT if with acute worsening mental status with focal Neurologic finding     ## Malnutrition:  Patient was NPO for several days now, low albumin. Patient with poor oral intake.  NGT was placed, but not tolerated by patient.    Plan:  - Nutrition following, appreciate recs  - NPO per nutrition (with current altered mental status)  - Consider tube feeds or TPN again if patient does not tolerate po intake over next few days    ##Paroxysmal A Fib, rate controlled  ##HFrEF, hypervolemic  ## Pulmonary Edema  Patient's weight on admission (3/31) was 80 kg.  4/11 it was 88 kg.   TTE performed  3/31 showed EF 46%.   CXR performed yesterday showed increased interstitial markings, most likely secondary to pulmonary edema from large amounts of fluids he received when he was actively bleeding.  Patient is not mobilizing fluids well due to low albumin, immobility, and chronic illness.  Will diurese as tolerated.       Plan:   - 40 IV Lasix now   -Contintinue IV Metoprolol   -Daily weights  -Input and Output     ##Hypernatremia:    Patient had been hypernatremic on admission, sodium then normalized with D5 and then he became hypernatremic again last night.  Likely due to large amounts of IV fluids patient received yesterday.       Plan:   - Continue D5 at 75 cc/h    ## Upper Extremity Swelling:  Stable  Most likely secondary to superficial thrombophlebitis or PICC infiltration.  Patient has a history of blood clots and INR is currently < 2.  LUE ultrasound was negative for DVT.    ## Acute liver injury:  Improving  Etiology of acute liver injury unknown. Work up for Viral hepatitis, toxin, drugs and BUD Chiari has all been negative. No episode of severe hypotension.  Enzymes have reverted back to normal without intervention.  GI recommending MRCP to evaluate for biliary tract obstruction (depending on ultimate goals of care).     Plan:   - No hepatotoxic medications (tylenol, statins, etc)  - Will observe and discuss possible MRCP/goals of care with wife     ## UTI: patient afebrile with negative infection markers.   Patient has a diagnosis of atonic bladder and has required intermittent self catheterizations about 5 times a day. Urine culture from 3/31 grew gram positive enterococcus species susceptible to vancomycin and unasyn. Was initially on Ceftriaxone (03/31 to 04/02) then vancomycin started (04/01-4/4), was switched to Unasyn 4/5.  Repeat UA 4/7 showed significant bacteria and WBC; however, culture has been negative, so bacteria most likely from colonization.  Antibiotics discontinued 4/9.      ## SHERYL on  CKD3 (baseline Cr 1.5-2): Thought to be prerenal from decrease PO intake , Improving     Plan:   - Follow up BMP daily  - Strict I/O   - Daily weights  - Continue telemetry      ## Type 2 DM, last A1C 6.1 on 02/20/17   Blood sugars continue to fluctuate (100s-200s) with a blood glucose of 50 overnight.    Plan:   - Hold morning Lantus today  - Continue D5  - Lantus 14 units bid  - Prandial: 2.0 unit per 15 gm CHO  - Correction: MSSI      ## Stercoral proctitis   Plan:    - Miralax daily PRN and Senna at bedtime PRN.   - HOLD PTA ferrous sulfate while being treated for constipation    #Normal Pressure Hydrocephalus  #Multi infarct Vascular Dementia  Plan:   -no acute issue   Will observe     ## Cerebrovascular disease, stable    w/ Hx stroke, normal pressure hydrocephalus (2012), status post ventriculoperitoneal shunting, subarachnoid hemorrhage(2012)     Plan: Will observe      ##High grade right ICA stenosis, diagnosed on 01/2017 MRA, stable   Patient's LAKE-VASC score is 8. He was taken off of coumadin in 2012 due to multiple falls and development of subarachnoid hemorrhage. However, during this last hospitalization, anticoagulation was re-addressed in the setting of multiple embolic events since stopping the coumadin and the decision was made to continue with coumadin (goal INR 2-3).   INR is currently sub-therapeutic at 1.25.    Plan:   -Will continue to hold warfarin for now due to high risk of bleeding      ## Physical Deconditioning    Plan:    - PT/OT      Other chronic conditions, stable- continue home medications.  ## HTN-  Not on any meds , BP controlled   ## Hyperlipidemia-  Held rosuvastatin, niacin, due to liver injury   ## COPD- To continue Breo and Atrovent inhaler   ##SHIVAM- Bipap  ##GERD - PPI   ##Allergic Rhinitis - Flonase   ## Constipation- Not an issue right now, will resume bowel regimen as needed   ## Progressive cognitive dysfunction (dementia)  ## Multiple bone fracture on left foot/ankle-  cast in place. Hx of fractures after a fall on 9/16, and 2/2017.     Fluids: D5 at 75 cc/hr  Electrolytes: hypernatremia  Nutrition: NPO   Lines:PIV   Activity: Up with assist   CODE: DNR / DNI  Prophylaxis: Coumadin held  Dispo: Expected discharge date 4-5 days pending clinical improvement                      Interval History:   Melvin Bhatia had no significant events overnight.  His blood sugar was 50 at this morning and his sodium increased to 150.  He was started on D5.  Lantus was held this morning    Patient not appropriately responsive this morning, will make eye contact and groan, does not answer questions.            Review of Systems:   Review of Systems   Constitutional:        ROS limited secondary to patient's condition   Gastrointestinal: Negative for blood in stool.            Physical Exam (Resident / Clinician):   Vitals were reviewed  Temp: 99  F (37.2  C) Temp src: Axillary BP: (!) 135/97 Pulse: 107 Heart Rate: 135 Resp: 20 SpO2: 100 % O2 Device: BiPAP/CPAP Oxygen Delivery: 2 LPM    Physical Exam   Constitutional: He appears well-developed and well-nourished.   HENT:   Mouth/Throat: No oropharyngeal exudate.   Eyes: Pupils are equal, round, and reactive to light.   Neck: Normal range of motion. Neck supple. No tracheal deviation present.   Cardiovascular: Normal rate.    Murmur (2/6 systolic murmur at RSB) heard.  Pulmonary/Chest: Effort normal and breath sounds normal. No respiratory distress. He has no wheezes. He has no rales.   Decreased breath sounds at bases   Abdominal: Soft. There is no tenderness. There is no rebound and no guarding.   Genitourinary:   Genitourinary Comments: Orta in place, Diaper in place   Musculoskeletal: He exhibits no edema (Swelling of left upper extremity, distal to elbow (including hand, which has 1-2+ edema)) or tenderness.   Left leg wrapped in cast   Neurological: He is alert.   Makes eye contact, unable to answer questions   Skin: Skin is warm and  dry. No erythema. No pallor.   Vitals reviewed.          Data:   ROUTINE LABS (Last four results)  CMP  Recent Labs  Lab 04/12/17  0446 04/11/17  0510 04/10/17  1741 04/10/17  0639 04/09/17  1925 04/09/17  0948  04/07/17  0803  04/06/17  0755   * 143 142 143  --  142  < > 146*  < > 148*   POTASSIUM 3.0* 4.3  --  4.1 4.4 3.3*  < > 3.1*  < > 3.4   CHLORIDE 124* 114*  --  115*  --  113*  < > 115*  < > 121*   CO2 17* 20  --  20  --  20  < > 21  < > 20   ANIONGAP 9 8  --  8  --  9  < > 11  < > 8   GLC 50* 219*  --  174*  --  192*  < > 95  < > 80   BUN 6* 7  --  9  --  12  < > 20  < > 28   CR 1.00 1.23  --  1.41*  --  1.38*  < > 1.49*  < > 1.51*   GFRESTIMATED 72 57*  --  49*  --  50*  < > 46*  < > 45*   GFRESTBLACK 88 69  --  59*  --  60*  < > 55*  < > 54*   LORENA 5.6* 7.4*  --  7.6*  --  7.3*  < > 6.8*  < > 7.2*   MAG  --  1.6  --  1.7  --  1.7  --   --   --  2.6*   PHOS  --  2.2*  --  2.5  --  2.0*  --  3.2  --  2.3*   PROTTOTAL 3.6* 5.0*  --  4.9*  --  4.8*  < > 4.9*  --  5.2*   ALBUMIN 1.2* 1.4*  --  1.6*  --  1.4*  < > 1.5*  --  1.6*   BILITOTAL 0.4 0.2  --  0.4  --  0.4  < > 2.3*  --  1.2   ALKPHOS 55 83  --  83  --  86  < > 96  --  108   AST 7 21  --  27  --  39  < > 122*  --  263*   ALT 47 80*  --  102*  --  128*  < > 254*  --  378*   < > = values in this interval not displayed.  CBC    Recent Labs  Lab 04/12/17  0446 04/12/17  0429 04/12/17  0059 04/11/17  2129  04/11/17  0813 04/11/17  0510  04/10/17  0639  04/09/17  0948   WBC 9.8  --   --   --   --   --  6.6  --  6.1  --  7.2   RBC 3.47*  --   --   --   --   --  2.80*  --  3.08*  --  3.08*   HGB 9.9* 9.6* 9.9* 9.3*  < > 7.4* 7.7*  < > 8.2*  < > 8.2*   HCT 30.3*  --   --   --   --  24.1* 25.6*  --  27.9*  --  27.9*   MCV 87  --   --   --   --   --  91  --  91  --  91   MCH 28.5  --   --   --   --   --  27.5  --  26.6  --  26.6   MCHC 32.7  --   --   --   --   --  30.1*  --  29.4*  --  29.4*   RDW 19.4*  --   --   --   --   --  17.5*  --  17.7*  --  17.6*    *  --   --   --   --   --  143*  --  134*  --  124*   < > = values in this interval not displayed.  INR    Recent Labs  Lab 04/11/17  0510 04/10/17  0639 04/09/17  0948 04/08/17  0654   INR 1.17* 1.23* 1.25* 1.25*     CRP    Recent Labs  Lab 04/12/17  0446 04/08/17  0654 04/05/17  0902   CRP 13.0* 21.0* 18.0*           Recent Labs  Lab 04/07/17  1550   CULT No growth           Medications:     Current Facility-Administered Medications   Medication     0.9% sodium chloride + KCl 20 mEq/L infusion     lactated ringers BOLUS 1,000 mL     lidocaine (LMX4) kit     sodium chloride (PF) 0.9% PF flush 5-50 mL     heparin lock flush 10 UNIT/ML injection 2-5 mL     lidocaine 1 % 1 mL     lidocaine (LMX4) kit     sodium chloride (PF) 0.9% PF flush 10-20 mL     heparin lock flush 10 UNIT/ML injection 5-10 mL     heparin lock flush 10 UNIT/ML injection 5-10 mL     nystatin (MYCOSTATIN) suspension 500,000 Units     dextrose 10 % 1,000 mL infusion     multivitamins with minerals (CERTAVITE/CEROVITE) liquid 15 mL     lactobacillus rhamnosus (GG) (CULTURELL) capsule 1 capsule     polyethylene glycol (MIRALAX/GLYCOLAX) Packet 17 g     senna-docusate (SENOKOT-S;PERICOLACE) 8.6-50 MG per tablet 1-2 tablet     acetaminophen (TYLENOL) solution 650 mg     insulin glargine (LANTUS) injection 14 Units     insulin glargine (LANTUS) injection 14 Units     glucose 40 % gel 15-30 g    Or     dextrose 50 % injection 25-50 mL    Or     glucagon injection 1 mg     insulin aspart (NovoLOG) inj (RAPID ACTING)     potassium chloride SA (K-DUR/KLOR-CON M) CR tablet 20-40 mEq     potassium chloride (KLOR-CON) Packet 20-40 mEq     potassium chloride 10 mEq in 100 mL intermittent infusion     potassium chloride 10 mEq in 100 mL intermittent infusion with 10 mg lidocaine     potassium chloride 20 mEq in 50 mL intermittent infusion     magnesium sulfate 4 g in 100 mL sterile water (premade)     potassium phosphate 15 mmol in D5W 250 mL  intermittent infusion     potassium phosphate 20 mmol in D5W 500 mL intermittent infusion     potassium phosphate 20 mmol in D5W 250 mL intermittent infusion     potassium phosphate 25 mmol in D5W 500 mL intermittent infusion     pantoprazole (PROTONIX) 40 mg IV push injection     [Rx hold ] metoprolol (LOPRESSOR) suspension 50 mg     [Rx hold ] rosuvastatin (CRESTOR) tablet 20 mg     Reason ACE/ARB order not selected     lidocaine 1 % 0.5-5 mL     lidocaine (LMX4) kit     sodium chloride (PF) 0.9% PF flush 10-20 mL     sodium chloride (PF) 0.9% PF flush 10 mL     fluticasone-vilanterol (BREO ELLIPTA) 200-25 MCG/INH oral inhaler 1 puff     fluticasone (FLONASE) 50 MCG/ACT spray 2 spray     ipratropium (ATROVENT HFA) Inhaler 1 puff     naloxone (NARCAN) injection 0.1-0.4 mg     lidocaine 1 % 1 mL     lidocaine (LMX4) kit     sodium chloride (PF) 0.9% PF flush 3 mL     sodium chloride (PF) 0.9% PF flush 3 mL     magnesium citrate solution 148 mL     ondansetron (ZOFRAN-ODT) ODT tab 4 mg    Or     ondansetron (ZOFRAN) injection 4 mg     prochlorperazine (COMPAZINE) injection 5 mg    Or     prochlorperazine (COMPAZINE) tablet 5 mg    Or     prochlorperazine (COMPAZINE) Suppository 12.5 mg     Patient is already receiving anticoagulation with heparin, enoxaparin (LOVENOX), warfarin (COUMADIN)  or other anticoagulant medication     No lozenges or gum should be given while patient on BIPAP     hypromellose-dextran (ARTIFICAL TEARS) ophthalmic solution 1 drop     insulin aspart (NovoLOG) inj (RAPID ACTING)     insulin aspart (NovoLOG) inj (RAPID ACTING)     insulin aspart (NovoLOG) inj (RAPID ACTING)     insulin aspart (NovoLOG) inj (RAPID ACTING)     [Rx hold ] aspirin EC EC tablet 81 mg     ferrous sulfate (IRON) tablet 325 mg       Caring Physician: Toña Duron MD  [facility]  Pager Contact: see Physician sticky note

## 2017-04-12 NOTE — PROGRESS NOTES
"S: I was notified by Fredrick's caring nurse regarding a possible stroke due to development of a facial droop. She stated that that at around 3 pm she has noticed some facile asymmetry but was been told that was not new but at around 5 pm the droop was more pronounced when they sat him up in the chair. The wife who was present confirmed that the droop is very different , more pronounced and noticeable. Talking to his wife until February he has been doing okay, he is conversant , able to express needs and wants and  wheelchair bound . He did develop a rapid cognitive decline and in the most recent Neurology clinic visit was though to be secondary to multiple infarct. During his last Neurology visit in 2/17 he has no facial asymmetry.     He was initially admitted due to worsening confusion and was found to have SHERYL and UTI. His hospital stay was complicated buy Upper and Lower GI bleed secondary to multiple duodenal ulcer and rectal ulcer. His Coumadin was stopped because of this. He also went into acute decompensated HFrEF ( EF 45%) 2 days ago and was diuresed with positive result . Currently not requiring any oxygen support, clinically volume status seems to have improved.     O: /59 (BP Location: Right arm)  Pulse 107  Temp 98.9  F (37.2  C) (Axillary)  Resp 20  Ht 1.626 m (5' 4\")  Wt 87.6 kg (193 lb 1.6 oz)  SpO2 100%  BMI 33.15 kg/m2    Gen: Patient looks a little bit sleepy but responds with stimuli. He is not in respiratory distress.   CVS: irregular rhythm, normal rate   Respiratory: no alar flaring, no intercostal retraction,chest symmetric, breath sound decreased left lung base    A: Melvin Bhatia is a 76 year old male with a significant past medical history of cerebrovascular disease, normal pressure hydrocephalus (status post ventriculoperitoneal shunting), progressive cognitive dysfunction, multiple falls, HTN, Type 2 DM, hyperlipidemia, CHF, CKD, and COPD who presented with worsening " mental status. He was found to have an pre renal SHERYL and UTI. His hospital course was complicated by gastrointestinal bleed from duodenal ulcer as well as rectal ulcer. He also had an acute decompensated HFrEF that is improved. Now with new onset facial asymmetry concerning for stroke .    P: Head CT ordered      Stroke code ordered       Will follow up recommendations      Harper Bell MD  PGY 3 [facility]  Family Medicine  Pager # [phone]

## 2017-04-13 NOTE — PROGRESS NOTES
CLINICAL NUTRITION SERVICES - REASSESSMENT NOTE     Nutrition Prescription    RECOMMENDATIONS FOR MDs/PROVIDERS TO ORDER:  - Pt with very poor nutrition for >1 week. Would be appropriate to restart nutrition support given unclear if/when pt's diet will advance (SLP rec NPO) and pt refusing food/fluid during previous SLP trials. Recommend replacing FT and restarting enteral nutrition.    Malnutrition Status:    Severe malnutrition in the context of acute illness.    Recommendations already ordered by Registered Dietitian (RD):  - D/C calorie counts and room service while pt NPO  - D/C water flushes - no FT access    Future/Additional Recommendations:  - If EN becomes POC, recommend Peptamen 1.5 to be initiated at 10 mL/hr, adv by 10 mL Q 8 hrs to goal rate of 45 mL/hr which provides 1080 mL TF/day, 1620 kcals, 73 g PRO, 832 ml free H2O, 60 g Fat (70% from MCTs), 203 g CHO and no Fiber daily. Free water flushes of 60 mL Q 4 hrs.   - If TPN becomes POC, recommend 1200mL, 200g dextrose, 80g amino acids + 250mL 20% lipids 5x/week. Start @ 150g dextrose and advance by 50g pending lytes.   - Monitor needs for possible adjustments given GI bleeds, wounds/incisions, and increased needs.  - Monitor diet per SLP pending swallow evals/AMS improvement. Restart calorie counts if pt continues with poor PO.     EVALUATION OF THE PROGRESS TOWARD GOALS   Diet: NPO per SLP on 4/12, pt previously DD1 + thin liquids  Intake  4/8: NG placed, was receiving Peptamen 1.5 @ 10 ml/hr  4/9: Pt pulled NG out (had received a total of ~150 mL of TF)     NEW FINDINGS   - SLP: 4/9 rec DD1 + thin liquids. 4/10 pt with minimal acceptance of PO and was spitting out any PO trial foods. Accepted meds with thin liquids during session.  - Per RN: Pt with inadequate intake on DD1  - Altered mental status: pt may have suffered a stroke (code called 4/12) and has not been appropriately responsive to questions since GI bleed on 4/11. Most likely d/t delirium  and/hypernatremia in the context of dementia    MALNUTRITION  % Intake: </= 50% for >/= 5 days (severe)  % Weight Loss: Weight loss does not meet criteria  Subcutaneous Fat Loss: None observed  Muscle Loss: None observed  Fluid Accumulation/Edema: None noted  Malnutrition Diagnosis: Severe malnutrition in the context of acute illness.    Previous Goals   Nutrition support within the next 1-2 days  Evaluation: Met    Previous Nutrition Diagnosis  Inadequate oral intake related to decreased appetite with confusion and limited diet as evidenced by report of poor PO 3 days PTA, poor PO since admission and limited diet with current NPO recs per SLP  Evaluation: No change    CURRENT NUTRITION DIAGNOSIS  Inadequate oral intake related to altered mental status and refusal of food/fluids while on DD1 and now NPO as evidenced by ongoing NPO recs per SLP and need for FT, however, pt removed FT after 1 day of minimal feeding.     INTERVENTIONS  Implementation  Collaboration with other providers - discussed during rounds    Goals  Diet adv v nutrition support within 1-2 days.    Monitoring/Evaluation  Progress toward goals will be monitored and evaluated per protocol.    Nic Gudino, Dietetic Intern    I agree with the above recommendations, goals, and interventions.    Johnathan Castorena RD, LD  6B Clinical Dietitian  Pager: [phone]  [facility] [phone]

## 2017-04-13 NOTE — PROGRESS NOTES
04/13/17 1500   Quick Adds   Type of Visit Initial Occupational Therapy Evaluation   Living Environment   Lives With facility resident  (was living at home in [facility] with wife prior to December)   Living Arrangements extended care facility   Number of Stairs to Enter Home 0   Number of Stairs Within Home 0   Living Environment Comment Pt was living in condo prior to december. Has been in LTC since ankle fx as pt has dementia and inability to walk.    Self-Care   Dominant Hand right   Usual Activity Tolerance poor   Current Activity Tolerance poor   Regular Exercise no   Activity/Exercise/Self-Care Comment Pt currently dependent for transfers and ADLs.    Functional Level Prior   Ambulation 1-->assistive equipment   Transferring 1-->assistive equipment   Toileting 2-->assistive person   Bathing 2-->assistive person   Prior Functional Level Comment Pt non verbal. Unable to attain PLOF, friend present and reports he was able to ambulate in 2016.    General Information   Onset of Illness/Injury or Date of Surgery - Date 03/31/17  (4/21 new MCA stroke dx)   Referring Physician Dr Mak   Patient/Family Goals Statement none stated   Additional Occupational Profile Info/Pertinent History of Current Problem 76 year old male with a significant past medical history of cerebrovascular disease, normal pressure hydrocephalus (status post ventriculoperitoneal shunting), progressive cognitive dysfunction, multiple falls, HTN, Type 2 DM, hyperlipidemia, CHF, CKD, and COPD who presented with  worsening mental status. He was found to have an pre renal SHERYL and UTI. He developed an episode of melena, anemia and significant elevation for liver enzymes.  He suffered an ischemic stroke of the right MCA diagnosed 4/12.   Precautions/Limitations fall precautions   Weight-Bearing Status - LLE nonweight-bearing   General Observations Pt with cast on LLE from ankle fx.  Friend present providing PLOF. Pt non verbal, with difficulty able to say  one curse word.  Posturing in L UE noted.    General Info Comments activity: bedrest w bathroom   Cognitive Status Examination   Orientation not oriented to person, place or time   Level of Consciousness lethargic/somnolent;confused   Able to Follow Commands severe impairment   Visual Perception   Visual Perception Comments Appears pt has L neglect, able to make eye contact on the R.  No response to writer on the L.    Sensory Examination   Sensory Comments CELESTINA   Integumentary/Edema   Integumentary/Edema Comments mild edema noted   Range of Motion (ROM)   ROM Comment deficits on L due to tone, R UE WFL all but wrist has plate from old fx, shoulder may be painful as pt reacted but was unable to ID   Strength   Strength Comments CELESTINA no command following   Hand Strength   Hand Strength Comments L hand no comman dfollowing, fair  on the R   Muscle Tone Assessment   Muscle Tone Comments increased extensor tone on the L UE   Coordination   Upper Extremity Coordination Left UE impaired   Mobility   Bed Mobility Comments Dependent   Instrumental Activities of Daily Living (IADL)   Previous Responsibilities (prior to D lived with wife and did some IADLs)   IADL Comments pt has dementia and needed assist prior to December, currently has been in LTC   Activities of Daily Living Analysis   Impairments Contributing to Impaired Activities of Daily Living balance impaired;cognition impaired;coordination impaired;flexibility decreased;motor control impaired;muscle tone abnormal;post surgical precautions;postural control impaired;ROM decreased;sensation decreased;strength decreased   General Therapy Interventions   Planned Therapy Interventions ADL retraining;transfer training;stretching;strengthening;ROM;fine motor coordination training;cognition   Clinical Impression   Criteria for Skilled Therapeutic Interventions Met yes, treatment indicated   OT Diagnosis Acute CVA   Influenced by the following impairments decreased  "strength and command following, fatigued, poor alertness, vision deficits, increased tone   Assessment of Occupational Performance 5 or more Performance Deficits   Identified Performance Deficits Pt is currently dependent for all ADLs and IADLs   Clinical Decision Making (Complexity) Moderate complexity   Therapy Frequency 5 times/wk   Predicted Duration of Therapy Intervention (days/wks) 2 weeks   Anticipated Discharge Disposition Long Term Care Facility   Risks and Benefits of Treatment have been explained. Yes   Patient, Family & other staff in agreement with plan of care Yes   Clinical Impression Comments Anticipate pt will need LTC due to current and prior needs for assist.    [facility] AM-PAC TM \"6 Clicks\"   2016, Trustees of [facility], under license to [employer].  All rights reserved.   6 Clicks Short Forms Daily Activity Inpatient Short Form   [facility] AM-PAC  \"6 Clicks\" Daily Activity Inpatient Short Form   1. Putting on and taking off regular lower body clothing? 1 - Total   2. Bathing (including washing, rinsing, drying)? 1 - Total   3. Toileting, which includes using toilet, bedpan or urinal? 1 - Total   4. Putting on and taking off regular upper body clothing? 1 - Total   5. Taking care of personal grooming such as brushing teeth? 1 - Total   6. Eating meals? 1 - Total   Daily Activity Raw Score (Score out of 24.Lower scores equate to lower levels of function) 6   Total Evaluation Time   Total Evaluation Time (Minutes) 20     "

## 2017-04-13 NOTE — PLAN OF CARE
Problem: Goal Outcome Summary  Goal: Goal Outcome Summary  D/I:  Pt not following commands all shift, moving right side, moaning.  Unable to respond to questions.  Speech here to do swallow eval - coughing with ice chips so patient continues to be NPO.  MDs notified about ST depression on monitor - 12 lead EKG done.  At approx 1500 pt placed in chair, and wife came to bedside and noticed increased left sided facial drooping.  MDs notified, stroke code called.  Pt to CT and MRI.  Labs drawn.  A/P:  VSS, in AFib.  Pt more sleepy after CT and MRI as he needed Ativan to finish the imaging.  Wife at bedside, awaiting results from CT and MRI, and an update from MDs.

## 2017-04-13 NOTE — PLAN OF CARE
Problem: Goal Outcome Summary  Goal: Goal Outcome Summary  SLP: Pt seen bedside for swallow evaluation per MD orders given evidence of new stroke. Pt presents with severe oropharyngeal dysphagia. Pt alert but lethargic and not following commands. Pt opened mouth to accept ice chips and exhibited poor oral awareness and reduced bolus formation/control. Intermittent cough response noted. Further trials discontinued due to increased risk. At this time, pt is at high risk for aspiration. Recommend continue NPO with completion of frequent oral cares. Pt may have minimal ice chips for comfort with 1:1 supervision. ST to follow for PO readiness.

## 2017-04-13 NOTE — PROGRESS NOTES
[facility]  Neurology Progress Note  04/13/17    Patient Name: Melvin Bhatia    Interval events:  BRISEYDA. Exam this morning with new findings including LUE posturing and R pupil not round, not reactive.     Objective:  B/P: 121/55, T: 98, P: 87, R: 16    GENERAL: NAD, lying on bed  HEENT: NC/AT. Mucous membranes moist.  CARDIAC: RRR without  Murmur.  RESPIRATORY: Good effort. CTAB. No w/r/r appreciated.  ABDOMINAL: Soft, non tender, non distended. + BS.   EXTREMITIES: Warm, dry.     NEUROLOGIC:  MS: Alert, responds to voice, does not follow commands  CN:  Blinks to threat BL. R pupil small, not reactive. L pupil reactive. EOMI. Right partial facial droop. Dysarthria  MOTOR: RUE with some antigravity and some resistance. RLE, LUE, LLE 2/5 and withdraw to pain. LUE internally rotates to stimuli with increased tone.  SENSORY: Withdraws to pain in all four.   Reflexes: Diffusely hyporeflexic. No R ankle clonus, toe upgoing. Cannot assess L as in cast.     MRI/MRA 4/12/17:  1. Acute infarction involving the right MCA distribution, similar to  CT same-day.  2. Proximal occlusion of the right ICA. There is some perfusion of  right-sided MCA vessels via patent anterior communicating artery. Mild  to moderate narrowing of the A1 segment of the right JAMAR.  3. The left vertebral artery is not visualized on these time-of-flight  imagings. This is unchanged compared to 1/3/2017.    CT Noncontrast 4/12/17:  1. New subacute large right MCA territory infarct involving the right  frontal and parietal regions.  2. High right frontal ventriculostomy catheter crosses the midline  with tip in the left thalamus, unchanged. Stable mild  ventriculomegaly.  3. Chronic appearing infarcts in the cerebrum and cerebellum, as  described above.    CT Noncontrast 4/13/17:  1. No new intracranial hemorrhage, mass effect, midline shift, or  changes to indicate increased intrarenal pressure.  2. Stable appearance of  large subacute right MCA territory infarct.  3. Stable appearance of chronic cerebral and cerebellar infarct as  above.      Assessment & Plan:  Mr Bhatia is a 76M seen for acute left facial droop on evening of 4/12/17, found to have new subacute large right MCA distribution ischemic stroke on CT and MRI. However, the patient was not a candidate for tPA due to recent history of GI bleed this hospitalization.     # Subacute R MCA Ischemic Stroke (without tPA)   Pt's exam with baseline L hemiplegia and mild facial droop secondary to previous R MCA stroke, now with LUE posturing with stimulation and unreactive R pupil. Repeat head CT without interval development of cerebral edema. Patient is in peak window for developing cerebral edema/malignant ICA syndrome. Continue to elevate HOB to 30 degrees and AVOID hypotonic fluids as this can worse cerebral edema, especially with an infarct of this size. Goal sodium >140. Goals of care discussion is essential in determining next steps for management.  - Neurochecks q2h   - Euthermia, Euglycemia  - tight blood sugar contorol w/bedside glucose monitoring   - rectal aspirin 300 mg daily  - Continue telemetry  - A1c 6.4  - Goals of care discussion then consider full stroke work up including lipid panel, TTE with bubble, PT/OT/SLP/PM&R, Stroke Education, Depression/Apnea Screen    IVF: LR 75cc/hr  DVT ppx: None, per primary team  CODE STATUS: DNR/DNI    Patient was seen and discussed with attending doctor, Dr. Olivarez.    Richa Kim, DO  Neurology PGY1  Pager: [phone]

## 2017-04-13 NOTE — PROGRESS NOTES
Pt arrived from  at 2345 accompanied by cassidy RN and sitter. VSS. Pt placed on tele monitor. Belongings at bedside. 2 RN skin assessment completed by Marilyn DIAZ RN and Christine ENNIS RN.

## 2017-04-13 NOTE — PROGRESS NOTES
04/13/17 1100   General Information   Onset Date 03/31/17   Start of Care Date 04/13/17   Referring Physician Johnathan Mak MD    Patient Profile Review/OT: Additional Occupational Profile Info See Profile for full history and prior level of function   Patient/Family Goals Statement Goals not stated at the time of evaluation.     Swallowing Evaluation Bedside swallow evaluation   Behaviorial Observations Confused;Distractible;Lethargic   Mode of current nutrition NPO   Respiratory Status O2 Supply   Type of O2 supply Nasal cannula   Comments Mr Bhatia is a 76M seen for acute left facial droop on evening of 4/12/17, found to have new subacute large right MCA distribution ischemic stroke on CT and MRI. However, the patient was not a candidate for tPA due to recent history of GI bleed this hospitalization.  Orders for swallow evaluation present following evidence of new stroke.     Clinical Swallow Evaluation   Oral Musculature unable to assess due to poor participation/comprehension   Structural Abnormalities none present   Dentition edentulous, does not have dentures   Laryngeal Function Voicing initiated   Additional Documentation Yes   Swallow Eval   Feeding Assistance dependent   Clinical Swallow Eval: Thin Liquid Texture Trial   Mode of Presentation, Thin Liquids spoon;fed by clinician;other (see comments)  (Fed by friend )   Volume of Liquid or Food Presented ice chips x4   Oral Phase of Swallow other (see comments)  (Poor oral awareness, reduced bolus formation/control )   Pharyngeal Phase of Swallow impaired;reduction in laryngeal movement;coughing/choking   Diagnostic Statement Positive s/s of aspiraiton.    Swallow Compensations   Swallow Compensations Pacing;Reduce amounts   Esophageal Phase of Swallow   Patient reports or presents with symptoms of esophageal dysphagia No   General Therapy Interventions   Planned Therapy Interventions Dysphagia Treatment   Dysphagia treatment Modified diet  education;Instruction of safe swallow strategies;Compensatory strategies for swallowing   Swallow Eval: Clinical Impressions   Skilled Criteria for Therapy Intervention Skilled criteria met.  Treatment indicated.   Functional Assessment Scale (FAS) 1   Treatment Diagnosis Moderate to severe oropharyngeal dysphagia    Diet texture recommendations NPO   Demonstrates Need for Referral to Another Service dietitian;occupational therapy;physical therapy   Therapy Frequency 5 times/wk   Predicted Duration of Therapy Intervention (days/wks) 2 weeks   Anticipated Discharge Disposition inpatient rehabilitation facility   Risks and Benefits of Treatment have been explained. Yes   Patient, family and/or staff in agreement with Plan of Care Yes   Clinical Impression Comments Pt seen bedside for swallow evaluation per MD orders given evidence of new stroke.  Pt presents with severe oropharyngeal dysphagia.  Pt alert but lethargic and not following commands.  Pt opened mouth to accept ice chips and exhibited poor oral awareness and reduced bolus formation/control.  Intermittent cough response noted.  Further trials discontinued due to increased risk.  At this time, pt is at high risk for aspiration.  Recommend continue NPO with completion of frequent oral cares.  Pt may have minimal ice chips for comfort with 1:1 supervision.  ST to follow for PO readiness.     Total Evaluation Time   Total Evaluation Time (Minutes) 20

## 2017-04-13 NOTE — PROGRESS NOTES
[facility] - Inpatient daily progress note    Date of admission: 3/31/2017  Date of service: 4/13/2017.           Assessment and Plan:      Melvin Bhatia is a 76 year old male with a significant past medical history of cerebrovascular disease, normal pressure hydrocephalus (status post ventriculoperitoneal shunting), progressive cognitive dysfunction, multiple falls, HTN, Type 2 DM, hyperlipidemia, CHF, CKD, and COPD who presented with  worsening mental status. He was found to have an pre renal SHERYL and UTI. He developed an episode of melena, anemia and significant elevation for liver enzymes.  He suffered an ischemic stroke of the right MCA diagnosed 4/12.    Patient Active Problem List   Diagnosis     Osteoporosis     PAF (paroxysmal atrial fibrillation) (H)     Lumbosacral plexopathy     Testosterone deficiency     Hypertension goal BP (blood pressure) < 140/90     Atrial fibrillation (H)     CKD (chronic kidney disease) stage 3, GFR 30-59 ml/min     Hyperlipidemia LDL goal <70     Coronary artery disease     Health Care Home     ACP (advance care planning)     Elevated PSA     Anemia in chronic renal disease     Subarachnoid hemorrhage (H)     Idiopathic normal pressure hydrocephalus (INPH)     Urinary bladder neurogenic dysfunction     Stroke (H)     Cognitive deficit, post-stroke     HL (hearing loss)     Personal history of fall     GERD (gastroesophageal reflux disease)     COPD exacerbation (H)     SHIVAM (obstructive sleep apnea)     LVH (left ventricular hypertrophy)     Diastolic CHF, acute on chronic (H)     Hypoxemia     Tachyarrhythmia     Systolic and diastolic CHF, chronic (H)     Gait abnormality     Iron deficiency anemia     Allergic rhinitis     Altered mental status     VRE (vancomycin-resistant Enterococci)     Hypoglycemia     Type 2 diabetes with stage 3 chronic kidney disease GFR 30-59 (H)     Chronic obstructive pulmonary disease, unspecified COPD type (H)     Ankle  pain     Fx medial malleolus-closed     Gastrointestinal hemorrhage associated with duodenal ulcer     Anemia due to blood loss, acute     Acute cystitis     Hypernatremia     Delirium     Acute kidney injury superimposed on CKD     Closed fracture of ankle      Plan:   ## Right MCA Ischemic Stroke  Stroke code was called on patient 4/12 for new facial droop.  CT showed large right MCA ischemic stroke, most likely occurred 1-2 days prior.  Stroke most likely caused by ischemia during episode of hypotension with GI bleed or from a clot (patient has long history of blood clots and warfarin has been held).    Plan:   - Neurology following, appreciate recs  - Q 2 hour neuro checks  - Labetalol prn SBP > 220  - Euglycemia  - Rectal ASA   - Telemetry  - Serial troponins  - Will discuss with wife goals of care    ## Malnutrition:  Patient was NPO for several days now, low albumin. Patient with poor oral intake prior to stroke, now unable to swallow.  NGT will need to be placed.    Plan:  - NPO per nutrition (with current altered mental status)  - Replace NGT (tomorrow)    ## Upper GI Bleed secondary to Duodenal Ulcer   ## Lower GI Bleed  ##Acute anemia secondary to above   EGD performed last week that showed multiple duodenal ulcers, but patient was not actively bleeding at the time.  H. Pylori was negative.  Patient developed large amounts of melanotic stools with bright red 4/11.  GI performed bedside flex sig 4/11 that showed multiple ulcers in rectum.  Patient has not had any episodes of bloody stool for > 48 hours.    Plan:   - Daily hemoglobins unless pt develops bloody stools  -Continue high dose Pantoprazole 40 mg IV BID  -Avoid NSAIDs  -transfuse of HGb is <8 and if with active bleeding     ##Paroxysmal A Fib, rate controlled  ##HFrEF, euvolemic  ## Pulmonary Edema: resolved   Patient's weight on admission (3/31) was 80 kg.  4/11 it was 88 kg.   TTE performed 3/31 showed EF 46%.   CXR performed 4/11 showed  increased interstitial markings, most likely secondary to pulmonary edema from large amounts of fluids he received when he was actively bleeding and patient was diuresed with IV lasix.  Today bedside U/S shows collapse of IVC indicating that patient is no longer hypervolemic.       Plan:   - Hold IV Lasix   -Contintinue IV Metoprolol   -Daily weights  -Input and Output     ##Hypernatremia:    Patient was hypernatremic on admission (most likely from dehydration and poor oral intake), sodium then normalized with D5.  Sodium is slowly trending up again.  Since the stroke, hypotonic fluids will need to be avoided to avoid cerebral edema.       Plan:   - Continue LR at 75 cc/h    ## Altered Mental Status:    Patient awake and interactive 3 days prior, but has not been appropriately responsive since GI bleed 4/11.  On exam today, patient was alert and appeared to comprehend but was unable to articulate.  Patient's mental status seems to fluctuate, most likely secondary to delirium and/or hypernatremia in context of dementia.   I do not think the stroke affected his ability to comprehend, but it is difficult to assess at this time.      Plan:   - To observe  -Infectious work up (LP) as well head head CT if with acute worsening mental status with focal Neurologic finding     ## Upper Extremity Swelling:  Stable  Most likely secondary to superficial thrombophlebitis or PICC infiltration.  Patient has a history of blood clots and INR is currently < 2.  LUE ultrasound was negative for DVT.    ## Acute liver injury:  Improving  Etiology of acute liver injury unknown. Work up for Viral hepatitis, toxin, drugs and BUD Chiari has all been negative. No episode of severe hypotension.  Enzymes have reverted back to normal without intervention.  GI recommending MRCP to evaluate for biliary tract obstruction (depending on ultimate goals of care).     Plan:   - No hepatotoxic medications (tylenol, statins, etc)  - Will observe and  discuss possible MRCP/goals of care with wife     ## UTI: patient afebrile with negative infection markers.   Patient has a diagnosis of atonic bladder and has required intermittent self catheterizations about 5 times a day. Urine culture from 3/31 grew gram positive enterococcus species susceptible to vancomycin and unasyn. Was initially on Ceftriaxone (03/31 to 04/02) then vancomycin started (04/01-4/4), was switched to Unasyn 4/5.  Repeat UA 4/7 showed significant bacteria and WBC; however, culture has been negative, so bacteria most likely from colonization.  Antibiotics discontinued 4/9.      ## SHERYL on CKD3 (baseline Cr 1.5-2): Stable  Thought to be prerenal from decrease PO intake     Plan:   - Follow up BMP daily  - Strict I/O   - Daily weights  - Continue telemetry      ## Type 2 DM, last A1C 6.1 on 02/20/17   Blood sugars fluctuate, will monitor closely.    Plan:   - Hold morning Lantus today  - Continue D5  - Lantus 14 units bid  - Prandial: 2.0 unit per 15 gm CHO (NA)  - Correction: MSSI      ## Stercoral proctitis   Plan:    - Miralax daily PRN and Senna at bedtime PRN.   - HOLD PTA ferrous sulfate while being treated for constipation    #Normal Pressure Hydrocephalus  #Multi infarct Vascular Dementia  # Cerebrovascular disease: stable  W/ Hx stroke, normal pressure hydrocephalus (2012), status post ventriculoperitoneal shunting, subarachnoid hemorrhage(2012).  Patient's neurologic issues were stable prior to stroke.  Will need to follow closely for cerebral edema caused by stroke that could potentially exacerbate patient's hydrocephalus.    Plan:  - Q 2 hour neuro checks     ##High grade right ICA stenosis, diagnosed on 01/2017 MRA, stable   Patient's LAKE-VASC score is 8. He was taken off of coumadin in 2012 due to multiple falls and development of subarachnoid hemorrhage. However, during this last hospitalization, anticoagulation was re-addressed in the setting of multiple embolic events since  stopping the coumadin and the decision was made to continue with coumadin (goal INR 2-3).   INR is currently sub-therapeutic at 1.25.    Plan:   -Will continue to hold warfarin for now due to high risk of bleeding      ## Physical Deconditioning    Plan:    - PT/OT      Other chronic conditions, stable- continue home medications.  ## HTN-  Metoprolol  ## Hyperlipidemia-  Held rosuvastatin, niacin, due to liver injury   ## COPD- To continue Breo and Atrovent inhaler   ##SHIVAM- Bipap  ##GERD - PPI   ##Allergic Rhinitis - Flonase   ## Constipation- Not an issue right now, will resume bowel regimen as needed   ## Progressive cognitive dysfunction (dementia)  ## Multiple bone fracture on left foot/ankle- cast in place. Hx of fractures after a fall on 9/16, and 2/2017.     Fluids: LR at 75 cc/hr  Electrolytes: hypernatremia  Nutrition: NPO   Lines:PIV   Activity: Up with assist   CODE: DNR / DNI  Prophylaxis: Coumadin held  Dispo: Expected discharge date 4-5 days pending clinical improvement                      Interval History:   Melvin Bhatia had a stroke code called on him yesterday for new facial droop.  CT showed large right MCA stroke.              Review of Systems:   Review of Systems   Constitutional:        ROS limited secondary to patient's condition   Gastrointestinal: Negative for blood in stool.            Physical Exam (Resident / Clinician):   Vitals were reviewed  Temp: 98  F (36.7  C) Temp src: Axillary BP: 121/55 Pulse: 87 Heart Rate: 72 Resp: 16 SpO2: 100 % O2 Device: BiPAP/CPAP      Physical Exam   Constitutional: He appears well-developed and well-nourished.   Sitting in bed, arousable   HENT:   Mouth/Throat: No oropharyngeal exudate.   Neck: Normal range of motion. Neck supple. No tracheal deviation present.   Cardiovascular: Normal rate.    Murmur (2/6 systolic murmur at RSB) heard.  Pulmonary/Chest: Effort normal and breath sounds normal. No respiratory distress. He has no wheezes. He has  no rales.   Decreased breath sounds at bases   Abdominal: Soft. There is no tenderness. There is no rebound and no guarding.   Genitourinary:   Genitourinary Comments: Orta in place, Diaper in place   Musculoskeletal: He exhibits no edema (Swelling of left upper extremity, distal to elbow (including hand, which has 1-2+ edema)) or tenderness.   Left leg wrapped in cast   Neurological: He is alert.   Makes eye contact, unable to answer questions, groans.  Will squeeze right hand on command, does not squeeze left hand.  Unable to elevate right or left arm/hand.  Will blink on command.  Dysarthric.   Skin: Skin is warm and dry. No erythema. No pallor.   Vitals reviewed.          Data:   ROUTINE LABS (Last four results)  CMP  Recent Labs  Lab 04/13/17  0533 04/12/17  2204 04/12/17  1847 04/12/17  0446 04/11/17  0510  04/10/17  0639  04/09/17  0948    142 142 150* 143  < > 143  --  142   POTASSIUM 3.5  --  3.9 3.0* 4.3  --  4.1  < > 3.3*   CHLORIDE 112*  --  111* 124* 114*  --  115*  --  113*   CO2 25  --  24 17* 20  --  20  --  20   ANIONGAP 7  --  6 9 8  --  8  --  9   *  --  187* 50* 219*  --  174*  --  192*   BUN 9  --  9 6* 7  --  9  --  12   CR 1.41*  --  1.50* 1.00 1.23  --  1.41*  --  1.38*   GFRESTIMATED 49*  --  45* 72 57*  --  49*  --  50*   GFRESTBLACK 59*  --  55* 88 69  --  59*  --  60*   LORENA 7.4*  --  7.5* 5.6* 7.4*  --  7.6*  --  7.3*   MAG  --   --   --  1.0* 1.6  --  1.7  --  1.7   PHOS  --  2.9  --  1.8* 2.2*  --  2.5  --  2.0*   PROTTOTAL 4.8*  --   --  3.6* 5.0*  --  4.9*  --  4.8*   ALBUMIN 1.6*  --   --  1.2* 1.4*  --  1.6*  --  1.4*   BILITOTAL 0.4  --   --  0.4 0.2  --  0.4  --  0.4   ALKPHOS 73  --   --  55 83  --  83  --  86   AST 15  --   --  7 21  --  27  --  39   ALT 49  --   --  47 80*  --  102*  --  128*   < > = values in this interval not displayed.  CBC    Recent Labs  Lab 04/13/17  0533 04/12/17  2204 04/12/17  1642 04/12/17  1227 04/12/17  0446  04/11/17  0813  04/11/17  0510   WBC 6.8  --  8.0  --  9.8  --   --  6.6   RBC 3.17*  --  3.44*  --  3.47*  --   --  2.80*   HGB 9.1* 9.3* 9.8* 9.5* 9.9*  < > 7.4* 7.7*   HCT 28.7*  --  30.7*  --  30.3*  --  24.1* 25.6*   MCV 91  --  89  --  87  --   --  91   MCH 28.7  --  28.5  --  28.5  --   --  27.5   MCHC 31.7  --  31.9  --  32.7  --   --  30.1*   RDW 20.0*  --  20.2*  --  19.4*  --   --  17.5*   *  --  138*  --  124*  --   --  143*   < > = values in this interval not displayed.  INR    Recent Labs  Lab 04/12/17  1847 04/11/17  0510 04/10/17  0639 04/09/17  0948   INR 1.27* 1.17* 1.23* 1.25*     CRP    Recent Labs  Lab 04/12/17  0446 04/08/17  0654   CRP 13.0* 21.0*           Recent Labs  Lab 04/07/17  1550   CULT No growth           Medications:     Current Facility-Administered Medications   Medication     metoprolol (LOPRESSOR) injection 2.5 mg     LORazepam (ATIVAN) injection 1 mg     lactated ringers infusion     aspirin EC EC tablet 325 mg    Or     aspirin Suppository 300 mg     lactated ringers BOLUS 1,000 mL     lidocaine (LMX4) kit     sodium chloride (PF) 0.9% PF flush 5-50 mL     heparin lock flush 10 UNIT/ML injection 2-5 mL     lidocaine 1 % 1 mL     lidocaine (LMX4) kit     sodium chloride (PF) 0.9% PF flush 10-20 mL     heparin lock flush 10 UNIT/ML injection 5-10 mL     heparin lock flush 10 UNIT/ML injection 5-10 mL     nystatin (MYCOSTATIN) suspension 500,000 Units     dextrose 10 % 1,000 mL infusion     multivitamins with minerals (CERTAVITE/CEROVITE) liquid 15 mL     lactobacillus rhamnosus (GG) (CULTURELL) capsule 1 capsule     polyethylene glycol (MIRALAX/GLYCOLAX) Packet 17 g     senna-docusate (SENOKOT-S;PERICOLACE) 8.6-50 MG per tablet 1-2 tablet     acetaminophen (TYLENOL) solution 650 mg     insulin glargine (LANTUS) injection 14 Units     insulin glargine (LANTUS) injection 14 Units     glucose 40 % gel 15-30 g    Or     dextrose 50 % injection 25-50 mL    Or     glucagon injection 1 mg      insulin aspart (NovoLOG) inj (RAPID ACTING)     potassium chloride SA (K-DUR/KLOR-CON M) CR tablet 20-40 mEq     potassium chloride (KLOR-CON) Packet 20-40 mEq     potassium chloride 10 mEq in 100 mL intermittent infusion     potassium chloride 10 mEq in 100 mL intermittent infusion with 10 mg lidocaine     potassium chloride 20 mEq in 50 mL intermittent infusion     magnesium sulfate 4 g in 100 mL sterile water (premade)     potassium phosphate 15 mmol in D5W 250 mL intermittent infusion     potassium phosphate 20 mmol in D5W 500 mL intermittent infusion     potassium phosphate 20 mmol in D5W 250 mL intermittent infusion     potassium phosphate 25 mmol in D5W 500 mL intermittent infusion     pantoprazole (PROTONIX) 40 mg IV push injection     [Rx hold ] metoprolol (LOPRESSOR) suspension 50 mg     [Rx hold ] rosuvastatin (CRESTOR) tablet 20 mg     Reason ACE/ARB order not selected     lidocaine 1 % 0.5-5 mL     sodium chloride (PF) 0.9% PF flush 10-20 mL     sodium chloride (PF) 0.9% PF flush 10 mL     fluticasone-vilanterol (BREO ELLIPTA) 200-25 MCG/INH oral inhaler 1 puff     fluticasone (FLONASE) 50 MCG/ACT spray 2 spray     ipratropium (ATROVENT HFA) Inhaler 1 puff     naloxone (NARCAN) injection 0.1-0.4 mg     lidocaine 1 % 1 mL     lidocaine (LMX4) kit     sodium chloride (PF) 0.9% PF flush 3 mL     sodium chloride (PF) 0.9% PF flush 3 mL     magnesium citrate solution 148 mL     ondansetron (ZOFRAN-ODT) ODT tab 4 mg    Or     ondansetron (ZOFRAN) injection 4 mg     prochlorperazine (COMPAZINE) injection 5 mg    Or     prochlorperazine (COMPAZINE) tablet 5 mg    Or     prochlorperazine (COMPAZINE) Suppository 12.5 mg     Patient is already receiving anticoagulation with heparin, enoxaparin (LOVENOX), warfarin (COUMADIN)  or other anticoagulant medication     No lozenges or gum should be given while patient on BIPAP     hypromellose-dextran (ARTIFICAL TEARS) ophthalmic solution 1 drop     insulin aspart  (NovoLOG) inj (RAPID ACTING)     insulin aspart (NovoLOG) inj (RAPID ACTING)     insulin aspart (NovoLOG) inj (RAPID ACTING)     insulin aspart (NovoLOG) inj (RAPID ACTING)     ferrous sulfate (IRON) tablet 325 mg       Caring Physician: Toña Duron MD  [facility], [facility]  Pager Contact: see Physician sticky note

## 2017-04-13 NOTE — PLAN OF CARE
Problem: Goal Outcome Summary  Goal: Goal Outcome Summary  Attempted to see pt however he was off unit for test this morning. Will follow up later this date as schedule permits.

## 2017-04-13 NOTE — PLAN OF CARE
Problem: Goal Outcome Summary  Goal: Goal Outcome Summary  OT 6B Orders received, evaluation complete, treatment initiated.  Pt with new MCA stroke, deficits in vision noted with no response to stimuli on the L, deficts in L UE tone and command following.  Non verbal except for 1 curse word.     Rec: LTC as pt is currently D for all ADLs and transfers and was at LTC/TCU prior due to inability to transfer and dementia.

## 2017-04-13 NOTE — PROGRESS NOTES
Calorie Counts    Intake Recorded For: 4/12 Kcals: 0 Protein: 0g    # Meals Recorded: No Food Intake Recorded    # Supplements Recorded: 0

## 2017-04-13 NOTE — PROGRESS NOTES
Arrival to Stroke Code: 17:06    Stroke Team escalate/de-escalate interventions: CT and MRI    Bedside Nurse providing handoff: Noé    Time left for CT: 17:15, followed by MRI    Time Returned to Unit: 18:35    Bedside Nurse given handoff to & Time: Noé, 18:40

## 2017-04-13 NOTE — PLAN OF CARE
Problem: Goal Outcome Summary  Goal: Goal Outcome Summary  Outcome: No Change  Lethargic, but arousable to voice/light touch. Pt unable to answer orientation questions. Speech garbled, incomprehensible sounds. PERRLA. Intermittently following commands. Strengths R>L. Afebrile, VSS, A fib with HR 70-80s. 21% BiPAP at HS for apnea. LS coarse and diminished. + bowel sounds in all quadrants, BM x1 overnight. NPO. Orta in place with adequate UOP. Abrasions on R) calf covered with mepilex. Open ulcer on L) buttock- mepilex applied. Coccyx pink and blanchable- mepilex and barrier paste applied. L) leg in cast. Blanchable redness on bridge of nose- gecko pad applied. LR infusing at 75 mL/hr. Repositioned Q 2hrs overnight. Continue with POC.

## 2017-04-14 NOTE — PLAN OF CARE
Problem: Goal Outcome Summary  Goal: Goal Outcome Summary  Pt seen for swallow tx- continues with severe oral pharyngeal dysphagia and remains at a high aspiration risk- continues to be intermittently lethargic- unable to follow commands to complete oral pharyngeal strengthening excs; closing mouth when attempting oral cares. Exhibited positive s/s of aspiration on 1 out of 2 ice chip trials and immediate aspiration s/s with 1/4 teaspoon amt of nectar thick liquids; pt also with wet congested sounding respirations baseline- question of secretion management. Pt also continues with reduced bolus awareness, control and formation. Pt remains at very high aspiraion risk- needs to continue to remain strict NPO and has planned [place] TF placement today. Pt is OK to have oral swab with excess oral moisture squeezed out of if for comfort. At this time would hold on ice chips for oral moisture until lethargy decreases. SLP to f/u per POC for po readiness.

## 2017-04-14 NOTE — PROGRESS NOTES
Progress Note:    Hospital Day: 13  Date of Initial SW Assessment: 04/03/2017    Data: Melvin Bhatia is a 75 yo male admitted to [facility] 03/31/2017.    Intervention: Provided update to Diann with [facility] (P: [phone], F: [phone]).    Assessment:  Pt admitted from [facility] long term care unit. Pt's spouse is privately paying for a bed hold as pt/spouse really like this facility and if they release the bed, there's a high likelihood that pt would not be able to get back into this facility. Diann at [facility] has been in contact w/pt's spouse about whether pt's spouse wants to continue this bed hold. As of this AM, pt's spouse was under the impression that pt would be hospitalized at least one more week, and pt's spouse wants to continue the bed hold.    Plan:  -Discharge plans in progress: DC timeframe unclear. Per Neuro note, difficult to assess prognosis as pt is early in his course. Primary team will be having goals of care conversation per their note.  -Barriers to discharge plan: medical stability  -Follow up plan: SW will continue to follow and assist as needed.    GERMANIA King, [facility]  6B Intermediate Care Unit   Phone: [phone]  Pager: [phone]

## 2017-04-14 NOTE — PLAN OF CARE
Problem: Goal Outcome Summary  Goal: Goal Outcome Summary  PT/6B: Cancel/Reschedule. Attempted to see patient x2 this morning, working with SLP first attempt, then out of the room second attempt.

## 2017-04-14 NOTE — PROGRESS NOTES
[facility] Nurse Inpatient Adult Pressure InjuryAssessment    Follow Up Assessment  Reason for consultation: Evaluate and treat Bilateral buttocks    Assessment:   Left lower buttock/upper thigh wound due to Pressure Injury and Incontinence Associated Dermatitis (IAD)  L) upper buttock/closer to coccyx- healing MASD    Pressure Injury is Stage either Stage 2 or 3 deferring definitive staging until wound base has evolved Present on admission No  This is a Medical Device Related Pressure Injury (MDRPI) due to Unknown. Not able to r/o actual source of pressure    Contributing factor of the pressure injury: pressure, microclimate, immobility and moisture  Status: is evolving, symptomatic. Area assessed with another [facility] Yamilex Aguilera    TREATMENT  PLAN      Left lower buttock wound every other day:    Cleanse all wound bed with Microklenz and pat dry.    Apply no sting barrier on zoe-wound skin.    Apply nickel thick amount of Medihoney (#[id number]) to open wound tissue.    Cover with Mepilex 4x4.  Orders Written  WO Nurse follow-up plan:twice weekly  Nursing to notify the Provider(s) and re-consult the WO Nurse if wound(s) deteriorates or new skin concern.    Patient History  According to provider note(s):  Melvin Bhatia is a 76 year old male with a significant past medical history of cerebrovascular disease, normal pressure hydrocephalus (status post ventriculoperitoneal shunting), progressive cognitive dysfunction, multiple falls, HTN, Type 2 DM, hyperlipidemia, CHF, CKD, and COPD who presented with a 3 day history of poor oral intake.      Objective Data   Containment of urine/stool: Indwelling catheter    Current Diet/ Nutrition:    Active Diet Order      NPO    Output:   I/O last 3 completed shifts:  In:  [I.V.:]  Out: 1500 [Urine:1500]    Skin Assessment: Aldair Aldair Score  Av.3  Min: 9  Max: 15                                Aldair Q No Data Recorded                     NSRAS No Data  Recorded       Labs:   Recent Labs  Lab 04/14/17  0535  04/12/17  1847  04/12/17  0446   HGB 8.4*  < >  --   < > 9.9*   INR  --   --  1.27*  --   --    WBC 5.9  < >  --   < > 9.8   CRP  --   --   --   --  13.0*   < > = values in this interval not displayed.        Recent Labs  Lab 04/07/17  1550   CULT No growth       Physical Exam    Skin assessment:   Focused skin inspection: Bilateral buttocks.    Wound Location:  Left lower buttock      Pic taken-4/14/17    Wound History: Pt is incontinent of bowel and has domingo's catheter. Has loose bowel movement each time with movement and turning. He was seen on April 4th and had MASD on his BL buttocks, which is healing nicely. He noted to have light purple epidermis on L) lower buttock.    Measurements (length x width x depth, in cm) 2 cm x 0.7 cm  x  0.1 cm   Wound Base:  80% dermis tissue with 20% fibrinous vs non-vibale tissue in the proximal aspect of the wound  Tunneling N/A  Undermining N/A  Palpation of the wound bed: normal   Periwound skin: intact and dry peeling epithelium  Color: pink  Temperature: normal   Drainage:, small  Description of drainage: serous  Odor: none  Pain:  Irritable but unable to vocalized    L) upper buttock/closer to coccyx- 0.3x1x0.1cm healing superficial injury 2/2 moisture    Interventions  Current support surface: Standard  Atmos Air    Current off-loading measures: Chair cushion, Foam padding and Pillows under calves  Visual inspection of wound(s) completed  Wound Care:was done per plan of care    Cleansing with NS solution  Supplies: Ordered: Medihoney  Education provided today: RN and family    Discussed plan of care with Family and Nurse    Face to face time: 20 mins

## 2017-04-14 NOTE — PROGRESS NOTES
[facility]  Neurology Progress Note  04/14/17    Patient Name: Melvin Bhatia  Admission Date:     Interval events:  BRISEYDA. Pupils now bilaterally reactive. Pt somewhat more interactive.     Objective:  B/P: 153/78, T: 98.4, P: 87, R: 18    GENERAL: NAD, lying on bed  HEENT: NC/AT. Mucous membranes moist.  CARDIAC: RRR without  Murmur.  RESPIRATORY: Good effort. CTAB. No w/r/r appreciated.  ABDOMINAL: Soft, non tender, non distended. + BS.   EXTREMITIES: Warm, dry.     NEUROLOGIC:  MS: Alert, responds to voice, does not follow commands  CN:  Blinks to threat BL. Pupils bilaterally reactive. EOMI. Right partial facial droop. Dysarthria  MOTOR: RUE with some antigravity and some resistance. RLE, LUE, LLE 2/5 and withdraw to pain and spontaneous LE movement. LUE internally rotates to stimuli with increased tone.  SENSORY: Withdraws to pain in all four.   Reflexes: Diffusely hyporeflexic. No R ankle clonus, toe upgoing. Cannot assess L as in cast.      MRI/MRA 4/12/17:  1. Acute infarction involving the right MCA distribution, similar to  CT same-day.  2. Proximal occlusion of the right ICA. There is some perfusion of  right-sided MCA vessels via patent anterior communicating artery. Mild  to moderate narrowing of the A1 segment of the right JAMAR.  3. The left vertebral artery is not visualized on these time-of-flight  imagings. This is unchanged compared to 1/3/2017.     CT Noncontrast 4/12/17:  1. New subacute large right MCA territory infarct involving the right  frontal and parietal regions.  2. High right frontal ventriculostomy catheter crosses the midline  with tip in the left thalamus, unchanged. Stable mild  ventriculomegaly.  3. Chronic appearing infarcts in the cerebrum and cerebellum, as  described above.    CT Noncontrast 4/13/17:  1. No new intracranial hemorrhage, mass effect, midline shift, or  changes to indicate increased intrarenal pressure.  2. Stable appearance of  large subacute right MCA territory infarct.  3. Stable appearance of chronic cerebral and cerebellar infarct as  above.        Assessment & Plan:  Mr Bhatia is a 76M seen for acute left facial droop on evening of 4/12/17, found to have new subacute large right MCA distribution ischemic stroke on CT and MRI. However, the patient was not a candidate for tPA due to recent history of GI bleed this hospitalization.      # Subacute R MCA Ischemic Stroke (without tPA)   Pt's exam with baseline L hemiplegia and mild facial droop secondary to previous R MCA stroke, now with LUE posturing with stimulation and unreactive R pupil. Repeat head CT without interval development of cerebral edema. Patient is in peak window for developing cerebral edema/malignant ICA syndrome. Continue to elevate HOB to 30 degrees and AVOID hypotonic fluids as this can worse cerebral edema, especially with an infarct of this size. Goal sodium >140. Family would not want decompressive craniotomy if edema developed, but need to clarify if they would want IV mannitol or hypertonic saline. Regarding prognosis, this is difficult to say at this time as it is early in his course, however, with a large R MCA stroke one can expect left sided motor deficits, as seen on exam. In regards to his speech, this does not localize in most people to the R MCA distribution and therefore is not likely explained by stroke.   - Neurochecks q2h   - Euthermia, Euglycemia  - tight blood sugar contorol w/bedside glucose monitoring   - rectal aspirin 300 mg daily  - Continue telemetry  - A1c 6.4  - Goals of care discussion then consider full stroke work up including lipid panel, TTE with bubble, PT/OT/SLP/PM&R, Stroke Education, Depression/Apnea Screen  - repeat head CT Sunday morning      IVF: LR 75cc/hr  DVT ppx: None, per primary team  CODE STATUS: DNR/DNI     Patient was seen and discussed with attending doctor, Dr. Olivarez.     Richa Kim,   Neurology  PGY1  Pager: [phone]

## 2017-04-14 NOTE — PLAN OF CARE
Problem: Goal Outcome Summary  Goal: Goal Outcome Summary  Outcome: No Change  Patient mental status waxes and wanes . Patient more awake this afternoon and evening . Follow only simple command . Difficult to assess his neuro examination . New left arm posturing and right pupil looked smaller and non reactive this morning . Repeat Head CT done ( no changes) .  Left eyeball larger than right eyeball . MD is aware . Continue neuro check every 2hrs ( peak for cerebral edema) . VSS . Afebrile. Tele A-fib 60-90 . -130 . Lantus was held this morning . Orta cath patent . Had 3 loose Brown BMS . Hemoglobin stable 9.1  . Sodium 144. troponin trending down . Speech evaluation done . Needs FT placement . Turned and oral care done every 2hrs .  Plan for   Stroke teaching with wife tomorrow at 2 pm . Stable . Continue to monitor mental/neuro and hemodynamic closely and report any concerns.

## 2017-04-14 NOTE — PLAN OF CARE
Problem: Goal Outcome Summary  Goal: Goal Outcome Summary  OT 6B Pt family present, educated on POC. Brother is PT from [place] and wife present. Pt with L UE extension posturing. Tolerated LUE ROM.  Per family he has R frozen shoulder and L shoulder pain at baseline. D lift to recliner for pressure relief, not to sit in chair >2 hours due to inability to weight shift.   Rec TCU to maximized IND post stroke, pt had been at LTC with the goal to return home to [facility] with wife when able to transfer

## 2017-04-14 NOTE — PLAN OF CARE
Problem: Goal Outcome Summary  Goal: Goal Outcome Summary  PT 6B: Physical therapy orders received. Per discussion with OT, pt with many deficits and unable to fully participate in therapies. PT will hold at this time to determine if patient has PT needs.

## 2017-04-14 NOTE — PROVIDER NOTIFICATION
Salud notified at 0300 of pt having two 5-10 second runs of A Flutter with HR in 150s. Heart rhythm otherwise alternating between A Fib and SR with frequent PACs with HR in 60s. RN noted that P wave morphology is also variable at times. No need for EKG at this time per Salud. RN will continue to monitor and notify team with any changes/concerns.

## 2017-04-14 NOTE — PLAN OF CARE
Problem: Goal Outcome Summary  Goal: Goal Outcome Summary  Outcome: No Change  Pt alert, unable to assess orientation. Left pupil slightly larger than right, right pupil slightly sluggish. Strengths R>L. Afebrile, VSS, heart rhythm fluctuating between a fib and SR with frequent PACs with HR 50-80s. 21% Bipap while asleep. NPO. + bowel sounds in all quadrants, incontinent of stool x1. Orta in place with adequate UOP. Abrasions on RLE, open wound on L) buttock, and coccyx covered with mepilex. LLE with cast in place. LR infusing at 75 mL/hr. Repositioned Q 2 hrs overnight. Continue with POC.

## 2017-04-14 NOTE — PROVIDER NOTIFICATION
Patient is more awake at 10am . Left eyeball large than right eyeball . Dr camarillo at bedside , no new intervention issued . Patient had Head CT at 0900. Will monitor closely .

## 2017-04-14 NOTE — PLAN OF CARE
Problem: Goal Outcome Summary  Goal: Goal Outcome Summary  Outcome: No Change  Shift: 9180-6155     Per report/pt family, pt mental status waxes and wanes, awake/opening eyes spont this evening; intermittently following simple commands for me (not much for even garbled speech responses, turning away from inhaler/oral swabs). Neuros Q2H to continue overnight; Difficult to assess. New findings from days,--> Left arm posturing, right pupil smaller and non reactive this morning--> From [id number], right pupil sluggish this and left remained larger, some redness noted in right eye; possibly some non-purposeful movements (as well as purposeful), of right upper & lower extremities (passed this on to overnights, again difficult to asses as he wasn't following as many commands for me). Repeat Head CT done on days no changes. Continue neuro check every 2hrs ( peak for cerebral edema).  & 118, bedtime lantus held. Repo and oral care done every 2hrs. Pt learning center, stroke education with wife tomorrow at 2pm. VSS; A-fib with HR 60's-70s. Continue to monitor mental/neuro closely, report any concerns.

## 2017-04-14 NOTE — PROGRESS NOTES
[facility] Medicine - Inpatient daily progress note    Date of admission: 3/31/2017  Date of service: 4/14/2017.           Assessment and Plan:      Melvin Bhatia is a 76 year old male with a significant past medical history of cerebrovascular disease, normal pressure hydrocephalus (status post ventriculoperitoneal shunting), progressive cognitive dysfunction, multiple falls, HTN, Type 2 DM, hyperlipidemia, CHF, CKD, and COPD who presented with  worsening mental status. He was found to have an SHERYL and UTI. He developed an episode of melena, anemia and significant elevation for liver enzymes.  He suffered an ischemic stroke of the right MCA diagnosed 4/12.    Patient Active Problem List   Diagnosis     Osteoporosis     PAF (paroxysmal atrial fibrillation) (H)     Lumbosacral plexopathy     Testosterone deficiency     Hypertension goal BP (blood pressure) < 140/90     Atrial fibrillation (H)     CKD (chronic kidney disease) stage 3, GFR 30-59 ml/min     Hyperlipidemia LDL goal <70     Coronary artery disease     Health Care Home     ACP (advance care planning)     Elevated PSA     Anemia in chronic renal disease     Subarachnoid hemorrhage (H)     Idiopathic normal pressure hydrocephalus (INPH)     Urinary bladder neurogenic dysfunction     Stroke (H)     Cognitive deficit, post-stroke     HL (hearing loss)     Personal history of fall     GERD (gastroesophageal reflux disease)     COPD exacerbation (H)     SHIVAM (obstructive sleep apnea)     LVH (left ventricular hypertrophy)     Diastolic CHF, acute on chronic (H)     Hypoxemia     Tachyarrhythmia     Systolic and diastolic CHF, chronic (H)     Gait abnormality     Iron deficiency anemia     Allergic rhinitis     Altered mental status     VRE (vancomycin-resistant Enterococci)     Hypoglycemia     Type 2 diabetes with stage 3 chronic kidney disease GFR 30-59 (H)     Chronic obstructive pulmonary disease, unspecified COPD type (H)     Ankle pain      Fx medial malleolus-closed     Gastrointestinal hemorrhage associated with duodenal ulcer     Anemia due to blood loss, acute     Acute cystitis     Hypernatremia     Delirium     Acute kidney injury superimposed on CKD     Closed fracture of ankle      Plan:   ## Right MCA Ischemic Stroke  Stroke code was called on patient 4/12 for new facial droop.  CT showed large right MCA ischemic stroke, most likely occurred 1-2 days prior.  Stroke most likely caused by ischemia during episode of hypotension with GI bleed or from a clot (patient has long history of blood clots and warfarin has been held).  Serial troponins minimally elevated, peaked 4/13 at 0.060 and then trended down.  Neuro exam mildly improved today (more movement of right side).      Plan:   - Neurology following, appreciate recs  - Labetalol prn SBP > 220  - Euglycemia  - Rectal ASA   - Telemetry    ## Malnutrition:  Patient was NPO for several days now, low albumin. Patient with poor oral intake prior to stroke, now unable to swallow.     Plan:  - Nutrition following, appreciate recs  - NG placed today  - Start tube feeds    ## Upper GI Bleed secondary to Duodenal Ulcer: stable   ## Lower GI Bleed: stable  ##Acute anemia secondary to above: stable  EGD performed last week that showed multiple duodenal ulcers, but patient was not actively bleeding at the time.  H. Pylori was negative.  Patient developed large amounts of melanotic stools with bright red 4/11.  GI performed bedside flex sig 4/11 that showed multiple ulcers in rectum.  Patient has not had any episodes of bloody stool since 4/11; however, hemoglobin dropped to 8.4 today.    Plan:   - Check hemoglobin tonight (6 pm)  - Daily hemoglobins unless pt develops bloody stools  -Continue high dose Pantoprazole 40 mg IV BID  -Avoid NSAIDs  -transfuse of HGb is <8 and if with active bleeding     ##Paroxysmal A Fib, rate controlled  ##HFrEF, euvolemic  ## Pulmonary Edema: resolved   Patient's weight on  admission (3/31) was 80 kg.  4/11 it was 88 kg.   TTE performed 3/31 showed EF 46%.   CXR performed 4/11 showed increased interstitial markings, most likely secondary to pulmonary edema from large amounts of fluids he received when he was actively bleeding and patient was diuresed with IV lasix.  Repeat CXR today again showed findings consistent with pulmonary edema and small bilateral pleural effusions.  Bedside ultrasound yesterday showed collapse of IVC, indicated intravascular euvolemia or mild hypovolemia.  On exam, breath sounds are diminished bilaterally.     Plan:   - 20 IV lasix today  -Contintinue IV Metoprolol   -Daily weights  -Input and Output     ##Hypernatremia:  resolved  Patient has had episodes of hypernatremia, most likely secondary to decreased oral intake.  Since the stroke, hypotonic fluids will need to be avoided to avoid cerebral edema.       Plan:   - Continue LR at 75 cc/h    ## Altered Mental Status:    On exam today and yesterday, patient was alert and appeared to comprehend but was unable to articulate.  Patient's mental status seems to fluctuate, most likely secondary to delirium and/or hypernatremia in context of dementia.   I do not think the stroke affected his ability to comprehend, but it is difficult to assess at this time.      Plan:   - To observe  -Infectious work up (LP) as well head head CT if with acute worsening mental status with focal Neurologic finding     ## Upper Extremity Swelling:  Stable  Most likely secondary to superficial thrombophlebitis or PICC infiltration.  Patient has a history of blood clots and INR is currently < 2.  LUE ultrasound was negative for DVT.    ## Acute liver injury:  Improving  Etiology of acute liver injury unknown. Work up for Viral hepatitis, toxin, drugs and BUD Chiari has all been negative. No episode of severe hypotension.  Enzymes have reverted back to normal without intervention.  GI recommending MRCP to evaluate for biliary tract  obstruction (depending on ultimate goals of care).     Plan:   - No hepatotoxic medications (tylenol, statins, etc)  - Will observe and discuss possible MRCP/goals of care with wife     ## UTI: patient afebrile with negative infection markers.   Patient has a diagnosis of atonic bladder and has required intermittent self catheterizations about 5 times a day. Urine culture from 3/31 grew gram positive enterococcus species susceptible to vancomycin and unasyn. Was initially on Ceftriaxone (03/31 to 04/02) then vancomycin started (04/01-4/4), was switched to Unasyn 4/5.  Repeat UA 4/7 showed significant bacteria and WBC; however, culture has been negative, so bacteria most likely from colonization.  Antibiotics discontinued 4/9.      ## SHERYL on CKD3 (baseline Cr 1.5-2): Stable  Thought to be prerenal from decrease PO intake     Plan:   - Follow up BMP daily  - Strict I/O   - Daily weights  - Continue telemetry      ## Type 2 DM, last A1C 6.1 on 02/20/17   Blood sugars fluctuate, will monitor closely.  Blood sugars have been in 100s-130s last 24 hours without Lantus.     Plan:   - Q 4 hour glucose checks  - Hold Lantus (14 units bid)  - Prandial: 2.0 unit per 15 gm CHO (NA)  - Correction: MSSI      ## Stercoral proctitis   Plan:    - Miralax daily PRN and Senna at bedtime PRN.   - HOLD PTA ferrous sulfate while being treated for constipation    #Normal Pressure Hydrocephalus  #Multi infarct Vascular Dementia  # Cerebrovascular disease: stable  W/ Hx stroke, normal pressure hydrocephalus (2012), status post ventriculoperitoneal shunting, subarachnoid hemorrhage(2012).  Patient's neurologic issues were stable prior to stroke.  Will need to follow closely for cerebral edema caused by stroke that could potentially exacerbate patient's hydrocephalus.    Plan:  - Q 2 hour neuro checks     ##High grade right ICA stenosis, diagnosed on 01/2017 MRA, stable   Patient's LAKE-VASC score is 8. He was taken off of coumadin in 2012 due  to multiple falls and development of subarachnoid hemorrhage. However, during this last hospitalization, anticoagulation was re-addressed in the setting of multiple embolic events since stopping the coumadin and the decision was made to continue with coumadin (goal INR 2-3).   INR is currently sub-therapeutic at 1.25.    Plan:   -Will continue to hold warfarin for now due to high risk of bleeding      ## Physical Deconditioning  ## s/p Right MCA Stroke    Plan:    - PT/OT  - Speech therapy      Other chronic conditions, stable- continue home medications.  ## HTN-  Metoprolol  ## Hyperlipidemia-  Held rosuvastatin, niacin, due to liver injury   ## COPD- To continue Breo and Atrovent inhaler   ##SHIVAM- Bipap  ##GERD - PPI   ##Allergic Rhinitis - Flonase   ## Constipation- Not an issue right now, will resume bowel regimen as needed   ## Progressive cognitive dysfunction (dementia)  ## Multiple bone fracture on left foot/ankle- cast in place. Hx of fractures after a fall on 9/16, and 2/2017.     Fluids: LR at 75 cc/hr  Electrolytes: stable  Nutrition: tube feeds, NPO  Lines:PIV, PICC  Activity: Up with assist   CODE: DNR / DNI  Prophylaxis: Coumadin held  Dispo: Expected discharge date 4-5 days pending clinical improvement                      Interval History:   Melvin Bhatia had a 5-10 second run of AFlutter overnight with a rate of 150s.  Otherwise, rhythm changes from Afib to NSR.  Patient is having BMs with blood.            Review of Systems:   Review of Systems   Constitutional:        ROS limited secondary to patient's condition   Gastrointestinal: Negative for blood in stool.            Physical Exam (Resident / Clinician):   Vitals were reviewed  Temp: 98.2  F (36.8  C) Temp src: Axillary BP: 145/76   Heart Rate: 76 Resp: 13 SpO2: 100 % O2 Device: BiPAP/CPAP      Physical Exam   Constitutional: He appears well-developed and well-nourished.   Sitting in bed, arousable   HENT:   Mouth/Throat: No  oropharyngeal exudate.   Eyes:   Right and left pupils reactive to light.  Right pupil sluggish and less reactive compared to left pupil.   Neck: Normal range of motion. Neck supple. No tracheal deviation present.   Cardiovascular: Normal rate.    Murmur (2/6 systolic murmur at RSB) heard.  Pulmonary/Chest: Effort normal and breath sounds normal. No respiratory distress. He has no wheezes. He has no rales.   Decreased breath sounds at bases   Abdominal: Soft. There is no tenderness. There is no rebound and no guarding.   Genitourinary:   Genitourinary Comments: Orta in place, Diaper in place   Musculoskeletal: He exhibits no edema (Swelling of left upper extremity, distal to elbow (including hand, which has 1-2+ edema)) or tenderness.   Left leg wrapped in cast   Neurological: He is alert.   Makes eye contact, unable to answer questions, groans.  Will squeeze right hand on command, does not squeeze left hand.  Raises right hand on command and on his own.  Unable to elevate left arm/hand.  Will blink on command.  Dysarthric.   Skin: Skin is warm and dry. No erythema. No pallor.   Vitals reviewed.          Data:   ROUTINE LABS (Last four results)  CMP    Recent Labs  Lab 04/14/17  1355 04/14/17  0535 04/13/17  0533 04/12/17  2204 04/12/17  1847 04/12/17  0446 04/11/17  0510   NA  --  144 144 142 142 150* 143   POTASSIUM 4.1 3.3* 3.5  --  3.9 3.0* 4.3   CHLORIDE  --  110* 112*  --  111* 124* 114*   CO2  --  24 25  --  24 17* 20   ANIONGAP  --  10 7  --  6 9 8   GLC  --  111* 120*  --  187* 50* 219*   BUN  --  9 9  --  9 6* 7   CR  --  1.23 1.41*  --  1.50* 1.00 1.23   GFRESTIMATED  --  57* 49*  --  45* 72 57*   GFRESTBLACK  --  69 59*  --  55* 88 69   LORENA  --  7.5* 7.4*  --  7.5* 5.6* 7.4*   MAG 3.1* 1.5*  --   --   --  1.0* 1.6   PHOS  --  2.5  --  2.9  --  1.8* 2.2*   PROTTOTAL  --  4.8* 4.8*  --   --  3.6* 5.0*   ALBUMIN  --  1.6* 1.6*  --   --  1.2* 1.4*   BILITOTAL  --  0.6 0.4  --   --  0.4 0.2   ALKPHOS  --  70  73  --   --  55 83   AST  --  7 15  --   --  7 21   ALT  --  36 49  --   --  47 80*     CBC    Recent Labs  Lab 04/14/17  0535 04/13/17  0533 04/12/17  2204 04/12/17  1642  04/12/17  0446   WBC 5.9 6.8  --  8.0  --  9.8   RBC 2.97* 3.17*  --  3.44*  --  3.47*   HGB 8.4* 9.1* 9.3* 9.8*  < > 9.9*   HCT 27.0* 28.7*  --  30.7*  --  30.3*   MCV 91 91  --  89  --  87   MCH 28.3 28.7  --  28.5  --  28.5   MCHC 31.1* 31.7  --  31.9  --  32.7   RDW 20.0* 20.0*  --  20.2*  --  19.4*   * 116*  --  138*  --  124*   < > = values in this interval not displayed.  INR    Recent Labs  Lab 04/12/17  1847 04/11/17  0510 04/10/17  0639 04/09/17  0948   INR 1.27* 1.17* 1.23* 1.25*     CRP    Recent Labs  Lab 04/12/17  0446 04/08/17  0654   CRP 13.0* 21.0*           Recent Labs  Lab 04/07/17  1550   CULT No growth           Medications:     Current Facility-Administered Medications   Medication     metoprolol (LOPRESSOR) injection 2.5 mg     LORazepam (ATIVAN) injection 1 mg     lactated ringers infusion     aspirin Suppository 300 mg     lidocaine (LMX4) kit     sodium chloride (PF) 0.9% PF flush 5-50 mL     heparin lock flush 10 UNIT/ML injection 2-5 mL     lidocaine 1 % 1 mL     lidocaine (LMX4) kit     sodium chloride (PF) 0.9% PF flush 10-20 mL     heparin lock flush 10 UNIT/ML injection 5-10 mL     heparin lock flush 10 UNIT/ML injection 5-10 mL     nystatin (MYCOSTATIN) suspension 500,000 Units     dextrose 10 % 1,000 mL infusion     multivitamins with minerals (CERTAVITE/CEROVITE) liquid 15 mL     lactobacillus rhamnosus (GG) (CULTURELL) capsule 1 capsule     polyethylene glycol (MIRALAX/GLYCOLAX) Packet 17 g     senna-docusate (SENOKOT-S;PERICOLACE) 8.6-50 MG per tablet 1-2 tablet     acetaminophen (TYLENOL) solution 650 mg     insulin glargine (LANTUS) injection 14 Units     insulin glargine (LANTUS) injection 14 Units     glucose 40 % gel 15-30 g    Or     dextrose 50 % injection 25-50 mL    Or     glucagon injection 1  mg     insulin aspart (NovoLOG) inj (RAPID ACTING)     potassium chloride SA (K-DUR/KLOR-CON M) CR tablet 20-40 mEq     potassium chloride (KLOR-CON) Packet 20-40 mEq     potassium chloride 10 mEq in 100 mL intermittent infusion     potassium chloride 10 mEq in 100 mL intermittent infusion with 10 mg lidocaine     potassium chloride 20 mEq in 50 mL intermittent infusion     magnesium sulfate 4 g in 100 mL sterile water (premade)     potassium phosphate 15 mmol in D5W 250 mL intermittent infusion     potassium phosphate 20 mmol in D5W 500 mL intermittent infusion     potassium phosphate 20 mmol in D5W 250 mL intermittent infusion     potassium phosphate 25 mmol in D5W 500 mL intermittent infusion     pantoprazole (PROTONIX) 40 mg IV push injection     [Rx hold ] metoprolol (LOPRESSOR) suspension 50 mg     [Rx hold ] rosuvastatin (CRESTOR) tablet 20 mg     Reason ACE/ARB order not selected     lidocaine 1 % 0.5-5 mL     sodium chloride (PF) 0.9% PF flush 10-20 mL     sodium chloride (PF) 0.9% PF flush 10 mL     fluticasone-vilanterol (BREO ELLIPTA) 200-25 MCG/INH oral inhaler 1 puff     fluticasone (FLONASE) 50 MCG/ACT spray 2 spray     ipratropium (ATROVENT HFA) Inhaler 1 puff     naloxone (NARCAN) injection 0.1-0.4 mg     magnesium citrate solution 148 mL     ondansetron (ZOFRAN-ODT) ODT tab 4 mg    Or     ondansetron (ZOFRAN) injection 4 mg     prochlorperazine (COMPAZINE) injection 5 mg    Or     prochlorperazine (COMPAZINE) tablet 5 mg    Or     prochlorperazine (COMPAZINE) Suppository 12.5 mg     Patient is already receiving anticoagulation with heparin, enoxaparin (LOVENOX), warfarin (COUMADIN)  or other anticoagulant medication     No lozenges or gum should be given while patient on BIPAP     hypromellose-dextran (ARTIFICAL TEARS) ophthalmic solution 1 drop     insulin aspart (NovoLOG) inj (RAPID ACTING)     insulin aspart (NovoLOG) inj (RAPID ACTING)     insulin aspart (NovoLOG) inj (RAPID ACTING)     insulin  aspart (NovoLOG) inj (RAPID ACTING)     ferrous sulfate (IRON) tablet 325 mg       Caring Physician: Toña Duron MD  [facility], [facility]  Pager Contact: see Physician sticky note

## 2017-04-14 NOTE — PROGRESS NOTES
CLINICAL NUTRITION SERVICES - BRIEF NOTE    NEW FINDINGS   -Diet: NPO, c/w dysphagia and AMS.  -EN Access: FT placed this morning with Radiology -->NDT; request from providers to order/start EN.  -Labs: K+ 3.2 (low), replaced to 4.1 this morning, Mg++ 1.5 (low), replaced to 3.1, Phos 2.5 (boardline low). Suspect refeeding risk given LOS w/o nutrition.    Interventions  Ordered to start Peptamen 1.5 @ 10 mL/hr and adv by 10 mL q 8 hrs to goal of 45 mL/hr as discussed in note 4/13.  Ordered 60 mL q 4 hr bazzi and liquid MVI to cover hypercatabolic demands with acute illness.    Nutrition will continue to follow.    Johnathan Castorena RD, LD  6B Clinical Dietitian  Pager: [phone]  [facility] [phone]

## 2017-04-15 NOTE — PLAN OF CARE
Problem: Goal Outcome Summary  Goal: Goal Outcome Summary  Outcome: No Change  Mental status waxes and wanes . Difficult to assess his neuro examination . Stable neuro examination( left facial droop . Left side hemiplegia) right eye pupil sluggish . Continued with neuro check every 2hrs . VSS . Afebrile. Sat above 94% on RA  . Weak coughing , need prn neb and Nasopharyngeal suctioning . Breath sound coarse . One iv lasix given .  Tele SR with frequent PVC and PACs , had a run of 19 beats of SVT during Oral suctioning .  Cross cover was notified . EKG done.S/p ND tube placement . Potassium and magnsium replaced .  FT Started at   10 mls/hr at 1600 . Next up titeration will  Be at midnight to 20 mls/hr . New pressure wound with black base found that was covered with foam  dressing . New wound care issued .  Turned with oral care  every 2hrs . Stable . Continue to monitor patient mental/neuro/hemodynamic and respiratory status closely . Report any concerns.

## 2017-04-15 NOTE — PLAN OF CARE
Problem: Goal Outcome Summary  Goal: Goal Outcome Summary  SLP: Pt seen for dysphagia treatment following initial evaluation. Pt seated upright in chair. Pt inconsistently following verbal commands 1 out of 6 opportunities with max cues. Pt sustains eye contact for short amounts of time; however no verbal output this session. Pt given ice chip trial x1 with which pt demonstrated oral loss. Pt not appropriate for additional PO trials this date due to waxing and waning attention. Recommend pt remain NPO with continued enteral support for all nutrition/hydration/medication. SLP to follow per POC.

## 2017-04-15 NOTE — PROGRESS NOTES
[facility] Medicine - Inpatient daily progress note    Date of admission: 3/31/2017  Date of service: 4/15/2017.           Assessment and Plan:      Melvin Bhatia is a 76 year old male with a significant past medical history of cerebrovascular disease, normal pressure hydrocephalus (status post ventriculoperitoneal shunting), progressive cognitive dysfunction, multiple falls, HTN, Type 2 DM, hyperlipidemia, CHF, CKD, and COPD who presented with  worsening mental status. He was found to have an SHERYL and UTI. He developed an episode of melena, anemia and significant elevation for liver enzymes.  He suffered an ischemic stroke of the right MCA diagnosed 4/12.    Patient Active Problem List   Diagnosis     Osteoporosis     PAF (paroxysmal atrial fibrillation) (H)     Lumbosacral plexopathy     Testosterone deficiency     Hypertension goal BP (blood pressure) < 140/90     Atrial fibrillation (H)     CKD (chronic kidney disease) stage 3, GFR 30-59 ml/min     Hyperlipidemia LDL goal <70     Coronary artery disease     Health Care Home     ACP (advance care planning)     Elevated PSA     Anemia in chronic renal disease     Subarachnoid hemorrhage (H)     Idiopathic normal pressure hydrocephalus (INPH)     Urinary bladder neurogenic dysfunction     Stroke (H)     Cognitive deficit, post-stroke     HL (hearing loss)     Personal history of fall     GERD (gastroesophageal reflux disease)     COPD exacerbation (H)     SHIVAM (obstructive sleep apnea)     LVH (left ventricular hypertrophy)     Diastolic CHF, acute on chronic (H)     Hypoxemia     Tachyarrhythmia     Systolic and diastolic CHF, chronic (H)     Gait abnormality     Iron deficiency anemia     Allergic rhinitis     Altered mental status     VRE (vancomycin-resistant Enterococci)     Hypoglycemia     Type 2 diabetes with stage 3 chronic kidney disease GFR 30-59 (H)     Chronic obstructive pulmonary disease, unspecified COPD type (H)     Ankle pain      Fx medial malleolus-closed     Gastrointestinal hemorrhage associated with duodenal ulcer     Anemia due to blood loss, acute     Acute cystitis     Hypernatremia     Delirium     Acute kidney injury superimposed on CKD     Closed fracture of ankle      Plan:   ## Right MCA Ischemic Stroke  Stroke code was called on patient 4/12 for new facial droop.  CT showed large right MCA ischemic stroke, most likely occurred 1-2 days prior.  Stroke most likely caused by ischemia during episode of hypotension with GI bleed or from a clot (patient has long history of blood clots and warfarin has been held).  A second stroke code was called on patient today and then cancelled after assessment.  Patient received a CT of the head which showed no change.  Patient is not a candidate for TPA, so team discussed with nursing staff to call primary team if there is a change in his neurologic status instead of calling a stroke code     Plan:   - Neurology following, appreciate recs  - Labetalol prn SBP > 220  - Euglycemia  - Rectal ASA   - Telemetry  - Neuro checks q 4 hours    ##A Fib with RVR and SVT:  ##Paroxysmal A Fib  ## Elevated troponin:  Patient's A fib had been well controlled without arrhythmias during admission until last night.  Electrolytes are WNL and patient is receiving Metoprolol.  Troponin was obtained this morning due to RVR and SVT, which was elevated at 0.081.  EKGs have not significantly changed.  - Trend troponins Q 6 hours until peak  - Contact spouse if troponins peak significantly  - Do not consult Cardiology for intervention (per spouse's wishes)    ##HFrEF, euvolemic  ## Pulmonary Edema: resolved   Patient's weight on admission (3/31) was 80 kg.  4/15 it was 85 kg (down from 88 kg).   TTE performed 3/31 showed EF 46%.   CXR performed 4/11 and 4/13 showed increased interstitial markings, most likely secondary to pulmonary edema from large amounts of fluids he received.  On exam, breath sounds are diminished  bilaterally.  Patient appears euvolemic today, but will continue to follow closely.       Plan:  - IV lasix prn  -Daily weights  -Input and Output     ## Cough:  ## COPD:  Patient has coarse breath sounds and cough with difficulty expelling secretions.    - Q 4 hour Atrovent nebs  - Oral care and suctioning prn    ## Malnutrition:  Patient was NPO for several days now, low albumin. Patient with poor oral intake prior to stroke, now unable to swallow.  NGT placed yesterday, patient is tolerating tube feeds.    Plan:  - Continue tube feeds    ## Upper GI Bleed secondary to Duodenal Ulcer: stable   ## Lower GI Bleed: stable  ##Acute anemia secondary to above: stable  EGD performed last week that showed multiple duodenal ulcers, but patient was not actively bleeding at the time.  H. Pylori was negative.  Patient developed large amounts of melanotic stools with bright red 4/11.  GI performed bedside flex sig 4/11 that showed multiple ulcers in rectum.  Patient has not had any episodes of bloody stool since 4/11; however, hemoglobin has been trending down.    Plan:   - Check hemoglobin today (4 pm)  - Daily hemoglobins unless pt develops bloody stools  -Continue high dose Pantoprazole 40 mg IV BID  -Avoid NSAIDs  -transfuse of HGb is <8 and if with active bleeding     ##Hypernatremia:  resolved  Patient has had episodes of hypernatremia, most likely secondary to decreased oral intake.  Since the stroke, hypotonic fluids will need to be avoided to avoid cerebral edema.       ## Altered Mental Status:    On exam today and yesterday, patient was alert and appeared to comprehend but was unable to articulate.  Patient's mental status seems to fluctuate, most likely secondary to delirium and/or hypernatremia in context of dementia.   I do not think the stroke affected his ability to comprehend, but it is difficult to assess at this time.      Plan:   - To observe  -Infectious work up (LP) as well head head CT if with acute  worsening mental status with focal Neurologic finding     ## Upper Extremity Swelling:  Stable  Most likely secondary to superficial thrombophlebitis or PICC infiltration.  Patient has a history of blood clots and INR is currently < 2.  LUE ultrasound was negative for DVT.     ## Acute liver injury:  Improving  Etiology of acute liver injury unknown. Work up for Viral hepatitis, toxin, drugs and BUD Chiari has all been negative. No episode of severe hypotension.  Enzymes have reverted back to normal without intervention.  GI recommending MRCP to evaluate for biliary tract obstruction (depending on ultimate goals of care).     Plan:   - No hepatotoxic medications (tylenol, statins, etc)  - Will observe and discuss possible MRCP/goals of care with wife     ## UTI: patient afebrile with negative infection markers.   Patient has a diagnosis of atonic bladder and has required intermittent self catheterizations about 5 times a day. Urine culture from 3/31 grew gram positive enterococcus species susceptible to vancomycin and unasyn. Was initially on Ceftriaxone (03/31 to 04/02) then vancomycin started (04/01-4/4), was switched to Unasyn 4/5.  Repeat UA 4/7 showed significant bacteria and WBC; however, culture has been negative, so bacteria most likely from colonization.  Antibiotics discontinued 4/9.      ## SHERYL on CKD3 (baseline Cr 1.5-2): Stable  Thought to be prerenal from decrease PO intake     Plan:   - Follow up BMP daily  - Strict I/O   - Daily weights  - Continue telemetry      ## Type 2 DM, last A1C 6.1 on 02/20/17   Blood sugars fluctuate, will monitor closely.  Blood sugars have been increasing over last 24 hours since starting tube feeds.  Patient's Lantus had been held (14 units bid), will restart at 7 units bid    Plan:   - Q 4 hour glucose checks  - Restart Lantus (7 units bid)  - Prandial: 2.0 unit per 15 gm CHO (NA)  - Correction: MSSI      ## Stercoral proctitis   Plan:    - Miralax daily PRN and Senna at  bedtime PRN.   - HOLD PTA ferrous sulfate while being treated for constipation    #Normal Pressure Hydrocephalus  #Multi infarct Vascular Dementia  # Cerebrovascular disease: stable  W/ Hx stroke, normal pressure hydrocephalus (2012), status post ventriculoperitoneal shunting, subarachnoid hemorrhage(2012).  Patient's neurologic issues were stable prior to stroke.  Will need to follow closely for cerebral edema caused by stroke that could potentially exacerbate patient's hydrocephalus.     ##High grade right ICA stenosis, diagnosed on 01/2017 MRA, stable   Patient's LAKE-VASC score is 8. He was taken off of coumadin in 2012 due to multiple falls and development of subarachnoid hemorrhage. However, during this last hospitalization, anticoagulation was re-addressed in the setting of multiple embolic events since stopping the coumadin and the decision was made to continue with coumadin (goal INR 2-3).   INR is currently sub-therapeutic.    Plan:   -Will continue to hold warfarin for now due to high risk of bleeding      ## Physical Deconditioning  ## s/p Right MCA Stroke    Plan:    - PT/OT  - Speech therapy      Other chronic conditions, stable- continue home medications.  ## HTN-  Metoprolol  ## Hyperlipidemia-  Held rosuvastatin, niacin, due to liver injury   ## COPD- To continue Breo and Atrovent inhaler   ##SHIVAM- Bipap  ##GERD - PPI   ##Allergic Rhinitis - Flonase   ## Constipation- Not an issue right now, will resume bowel regimen as needed   ## Progressive cognitive dysfunction (dementia)  ## Multiple bone fracture on left foot/ankle- cast in place. Hx of fractures after a fall on 9/16, and 2/2017.     Fluids: none  Electrolytes: stable  Nutrition: tube feeds, NPO  Lines:PIV, PICC  Activity: Up with assist   CODE: DNR / DNI  Prophylaxis: Coumadin held  Dispo: Expected discharge date 4-5 days pending clinical improvement                      Interval History:   Melvin Bhatia received a feeding tube  yesterday.  He had approximately 3 runs of SVT (each lasting about 20 beats) overnight.   His blood pressure was stable during these episodes.     A stroke code was called on patient this morning when an increase in his facial droop was noted when he was repositioned into a more upright position.  After assessment, stroke code was cancelled but patient still received a CT of the head which showed no change from prior CT.            Review of Systems:   Review of Systems   Constitutional:        ROS limited secondary to patient's condition   Gastrointestinal: Negative for blood in stool.            Physical Exam (Resident / Clinician):   Vitals were reviewed  Temp: 100.6  F (38.1  C) Temp src: Axillary BP: 115/68   Heart Rate: 117 Resp: 16 SpO2: 97 % O2 Device: BiPAP/CPAP      Physical Exam   Constitutional: He appears well-developed and well-nourished.   Sitting in bed, arousable   HENT:   Mouth/Throat: No oropharyngeal exudate.   Eyes:   Right and left pupils reactive to light.  Right pupil sluggish and less reactive compared to left pupil.   Neck: Normal range of motion. Neck supple. No tracheal deviation present.   Cardiovascular: Normal rate.    Murmur (2/6 systolic murmur at RSB) heard.  Pulmonary/Chest: Effort normal and breath sounds normal. No respiratory distress. He has no wheezes. He has no rales.   Decreased breath sounds at bases   Abdominal: Soft. There is no tenderness. There is no rebound and no guarding.   Genitourinary:   Genitourinary Comments: Orta in place, Diaper in place   Musculoskeletal: He exhibits no edema (Swelling of left upper extremity, distal to elbow (including hand, which has 1-2+ edema)) or tenderness.   Left leg wrapped in cast   Neurological: He is alert.   Makes eye contact, unable to answer questions, groans.  Patient is not following commands for me, but has followed commands for nursing staff.   Skin: Skin is warm and dry. No erythema. No pallor.   Vitals reviewed.           Data:   ROUTINE LABS (Last four results)  CMP    Recent Labs  Lab 04/15/17  0515 04/14/17  1355 04/14/17  0535 04/13/17  0533 04/12/17  2204 04/12/17  1847 04/12/17  0446     --  144 144 142 142 150*   POTASSIUM 3.5 4.1 3.3* 3.5  --  3.9 3.0*   CHLORIDE 106  --  110* 112*  --  111* 124*   CO2 28  --  24 25  --  24 17*   ANIONGAP 7  --  10 7  --  6 9   *  --  111* 120*  --  187* 50*   BUN 10  --  9 9  --  9 6*   CR 1.26*  --  1.23 1.41*  --  1.50* 1.00   GFRESTIMATED 55*  --  57* 49*  --  45* 72   GFRESTBLACK 67  --  69 59*  --  55* 88   LORENA 7.8*  --  7.5* 7.4*  --  7.5* 5.6*   MAG 2.2 3.1* 1.5*  --   --   --  1.0*   PHOS 2.2*  --  2.5  --  2.9  --  1.8*   PROTTOTAL 4.7*  --  4.8* 4.8*  --   --  3.6*   ALBUMIN 1.6*  --  1.6* 1.6*  --   --  1.2*   BILITOTAL 0.4  --  0.6 0.4  --   --  0.4   ALKPHOS 69  --  70 73  --   --  55   AST 14  --  7 15  --   --  7   ALT 29  --  36 49  --   --  47     CBC    Recent Labs  Lab 04/15/17  0515 04/14/17  1723 04/14/17  0535 04/13/17  0533  04/12/17  1642   WBC 5.1  --  5.9 6.8  --  8.0   RBC 2.87*  --  2.97* 3.17*  --  3.44*   HGB 7.9* 9.0* 8.4* 9.1*  < > 9.8*   HCT 26.3*  --  27.0* 28.7*  --  30.7*   MCV 92  --  91 91  --  89   MCH 27.5  --  28.3 28.7  --  28.5   MCHC 30.0*  --  31.1* 31.7  --  31.9   RDW 20.0*  --  20.0* 20.0*  --  20.2*   *  --  122* 116*  --  138*   < > = values in this interval not displayed.  INR    Recent Labs  Lab 04/12/17  1847 04/11/17  0510 04/10/17  0639 04/09/17  0948   INR 1.27* 1.17* 1.23* 1.25*     CRP    Recent Labs  Lab 04/12/17  0446 04/08/17  0654   CRP 13.0* 21.0*         No results for input(s): CULT in the last 168 hours.        Medications:     Current Facility-Administered Medications   Medication     [Rx hold ] insulin glargine (LANTUS) injection 14 Units     [Rx hold ] insulin glargine (LANTUS) injection 14 Units     ipratropium (ATROVENT) 0.02 % neb solution 0.5 mg     metoprolol (LOPRESSOR) injection 2.5 mg      LORazepam (ATIVAN) injection 1 mg     lactated ringers infusion     aspirin Suppository 300 mg     sodium chloride (PF) 0.9% PF flush 5-50 mL     heparin lock flush 10 UNIT/ML injection 2-5 mL     lidocaine 1 % 1 mL     lidocaine (LMX4) kit     sodium chloride (PF) 0.9% PF flush 10-20 mL     heparin lock flush 10 UNIT/ML injection 5-10 mL     heparin lock flush 10 UNIT/ML injection 5-10 mL     nystatin (MYCOSTATIN) suspension 500,000 Units     dextrose 10 % 1,000 mL infusion     multivitamins with minerals (CERTAVITE/CEROVITE) liquid 15 mL     lactobacillus rhamnosus (GG) (CULTURELL) capsule 1 capsule     polyethylene glycol (MIRALAX/GLYCOLAX) Packet 17 g     senna-docusate (SENOKOT-S;PERICOLACE) 8.6-50 MG per tablet 1-2 tablet     acetaminophen (TYLENOL) solution 650 mg     glucose 40 % gel 15-30 g    Or     dextrose 50 % injection 25-50 mL    Or     glucagon injection 1 mg     insulin aspart (NovoLOG) inj (RAPID ACTING)     potassium chloride SA (K-DUR/KLOR-CON M) CR tablet 20-40 mEq     potassium chloride (KLOR-CON) Packet 20-40 mEq     potassium chloride 10 mEq in 100 mL intermittent infusion     potassium chloride 10 mEq in 100 mL intermittent infusion with 10 mg lidocaine     potassium chloride 20 mEq in 50 mL intermittent infusion     magnesium sulfate 4 g in 100 mL sterile water (premade)     potassium phosphate 15 mmol in D5W 250 mL intermittent infusion     potassium phosphate 20 mmol in D5W 500 mL intermittent infusion     potassium phosphate 20 mmol in D5W 250 mL intermittent infusion     potassium phosphate 25 mmol in D5W 500 mL intermittent infusion     pantoprazole (PROTONIX) 40 mg IV push injection     [Rx hold ] metoprolol (LOPRESSOR) suspension 50 mg     [Rx hold ] rosuvastatin (CRESTOR) tablet 20 mg     Reason ACE/ARB order not selected     lidocaine 1 % 0.5-5 mL     sodium chloride (PF) 0.9% PF flush 10-20 mL     sodium chloride (PF) 0.9% PF flush 10 mL     fluticasone-vilanterol (BREO ELLIPTA)  200-25 MCG/INH oral inhaler 1 puff     fluticasone (FLONASE) 50 MCG/ACT spray 2 spray     ipratropium (ATROVENT HFA) Inhaler 1 puff     naloxone (NARCAN) injection 0.1-0.4 mg     magnesium citrate solution 148 mL     ondansetron (ZOFRAN-ODT) ODT tab 4 mg    Or     ondansetron (ZOFRAN) injection 4 mg     prochlorperazine (COMPAZINE) injection 5 mg    Or     prochlorperazine (COMPAZINE) tablet 5 mg    Or     prochlorperazine (COMPAZINE) Suppository 12.5 mg     Patient is already receiving anticoagulation with heparin, enoxaparin (LOVENOX), warfarin (COUMADIN)  or other anticoagulant medication     No lozenges or gum should be given while patient on BIPAP     hypromellose-dextran (ARTIFICAL TEARS) ophthalmic solution 1 drop     insulin aspart (NovoLOG) inj (RAPID ACTING)     insulin aspart (NovoLOG) inj (RAPID ACTING)     insulin aspart (NovoLOG) inj (RAPID ACTING)     insulin aspart (NovoLOG) inj (RAPID ACTING)     ferrous sulfate (IRON) tablet 325 mg       Caring Physician: Toña Duron MD  [facility], [facility]  Pager Contact: see Physician sticky note

## 2017-04-15 NOTE — PROGRESS NOTES
Arrival to Stroke Code: 0918    Stroke Team escalate/de-escalate interventions: 0921--Stroke Team determined that the patient was not having a stroke and cancelled the Stroke Code. Pt was transported to CT for evaluation of neuro symptoms that initiated the stroke code.    ED/Bedside Nurse providing handoff: Cecilia SCHULZ    Time left for CT: 0938    Time Returned to ED/Unit: 0945    ED/Bedside Nurse given handoff to & Time: Cecilia SCHULZ, 0945. No changes to patient status during trip to CT.

## 2017-04-15 NOTE — PLAN OF CARE
"Problem: Goal Outcome Summary  Goal: Goal Outcome Summary  Outcome: No Change  Blood pressure 115/68, pulse 87, temperature 100.6  F (38.1  C), temperature source Axillary, resp. rate 16, height 1.626 m (5' 4\"), weight 85 kg (187 lb 6.3 oz), SpO2 97 %.       Neuro: Alert, unable to assess orientation. Pupils reactive - sluggish. Unable to follow commands. Continue with Q2hr neuro checks.  Cardiac: SR. Multiple PAC. Episode of MAT x2 - per tele tech. Edward FLOWER notified and order for 12 Lead now.             Respiratory: O2 sats stable on RA while awake. BiPap 21% while asleep.   GI/: Orta intact with adequate urine output.  Diet/appetite: Tolerating TF @ 20mL/hr. RN to increase by 10mL this AM.  Activity:  Assist of 2. Turn and repo Q2hr.   Pain: At acceptable level on current regimen.   Skin: Intact, no new deficits noted.     R: Continue with POC. Notify primary team with changes.      "

## 2017-04-15 NOTE — PROVIDER NOTIFICATION
Notified team for new onset of 19 Beats  SVT in the rate 140 during oral suctioning . Patient VSS . Spoke to   Dr Mcmahon about the above . New order of stat EKG issued .

## 2017-04-15 NOTE — PHARMACY
Pharmacy Stroke Code Response  Pharmacist responded as part of the Stroke Code Team activation to patient care area 6B.  The Stroke Team determined that the patient was not a candidate for IV alteplase therapy and the pharmacy team was dismissed at 09:17.

## 2017-04-15 NOTE — PROGRESS NOTES
Stroke code was called for increased  left facial droop . Patient follow only simple command . Awake. No seizure activity noticed .  patient known for Right MCA stroke since 4/12 . Patient with left side hemiplegia . Pupil right sluggish . Left pupil interactive . Vitals with soft BP . Patient with low grade . Sat above 94% on RA . Tele NSR with run of SVTs. Stoke team at bedside . Plan for head CT . .

## 2017-04-16 NOTE — PROGRESS NOTES
[facility] Medicine - Inpatient daily progress note    Date of admission: 3/31/2017  Date of service: 4/16 /2017.  Addendum:   Lantus increased to 12 units          Assessment and Plan:      Melvin Bhatia is a 76 year old male with a significant past medical history of cerebrovascular disease, normal pressure hydrocephalus (status post ventriculoperitoneal shunting), progressive cognitive dysfunction, multiple falls, HTN, Type 2 DM, hyperlipidemia, CHF, CKD, and COPD who presented with  worsening mental status. He was found to have an SHERYL and UTI. He developed an episode of melena, anemia and significant elevation for liver enzymes.  He suffered an ischemic stroke of the right MCA diagnosed 4/12.    Patient Active Problem List   Diagnosis     Osteoporosis     PAF (paroxysmal atrial fibrillation) (H)     Lumbosacral plexopathy     Testosterone deficiency     Hypertension goal BP (blood pressure) < 140/90     Atrial fibrillation (H)     CKD (chronic kidney disease) stage 3, GFR 30-59 ml/min     Hyperlipidemia LDL goal <70     Coronary artery disease     Health Care Home     ACP (advance care planning)     Elevated PSA     Anemia in chronic renal disease     Subarachnoid hemorrhage (H)     Idiopathic normal pressure hydrocephalus (INPH)     Urinary bladder neurogenic dysfunction     Stroke (H)     Cognitive deficit, post-stroke     HL (hearing loss)     Personal history of fall     GERD (gastroesophageal reflux disease)     COPD exacerbation (H)     SHIVAM (obstructive sleep apnea)     LVH (left ventricular hypertrophy)     Diastolic CHF, acute on chronic (H)     Hypoxemia     Tachyarrhythmia     Systolic and diastolic CHF, chronic (H)     Gait abnormality     Iron deficiency anemia     Allergic rhinitis     Altered mental status     VRE (vancomycin-resistant Enterococci)     Hypoglycemia     Type 2 diabetes with stage 3 chronic kidney disease GFR 30-59 (H)     Chronic obstructive pulmonary disease,  unspecified COPD type (H)     Ankle pain     Fx medial malleolus-closed     Gastrointestinal hemorrhage associated with duodenal ulcer     Anemia due to blood loss, acute     Acute cystitis     Hypernatremia     Delirium     Acute kidney injury superimposed on CKD     Closed fracture of ankle      Plan:   ## New Right MCA Ischemic Stroke 4/12 , stable    - Stroke most likely caused by ischemia during episode of hypotension with GI bleed vs embolic from Afib. Patient not a candidate for TPA .  No symptoms of increase ICP.   Plan:   - to continue secondary stoke prevention . Will control blood pressure with goal of <140/90   - Labetalol prn SBP > 220  - Rectal ASA   - Telemetry  - Will dc Neuro checks , wife will not pursue aggressive interventions     ##A Fib with RVR , rate controlled   Patient had an episode of RVR yesterday with troponin leak most likely demand ischemia from RVR.  Ischemia peaked at 0.081 then down trended to 0.067 last night with heart rate being control.     Plan:   -Would not get troponin  with episodes of RVR unless patient becomes hemodynamically unstable as  his condition his options are limited if he develops ACS , not a candidate for  invasive cardiac procedure,wife not interested in aggressive management  , with this will set HR goal at 130 at rest .      Plan:   -Will continue to monitor   -Switch to Metoprolol 25  mg BID per NG   - Do not consult Cardiology for intervention (per spouse's wishes)    ##HFrEF, euvolemic  -Negative JVD distention , clear lung sounds , stable weight    Plan:  - IV lasix prn  -Daily weights  -Input and Output     ## COPD, not in exacerbation , stable    Plan:   - Q 4 hour Atrovent nebs  - Oral care and suctioning prn    ## Malnutrition:  Plan:  - Continue tube feeds, at goal rate 45ml/hr     ## Upper GI Bleed secondary to Duodenal Ulcer: stable   ## Lower GI Bleed secondary to rectal ulcer : stable  ##Acute anemia secondary to above: stable  -current  hemoglobin 8.0     Plan:   - Will just check hgb daily    - Daily hemoglobins unless pt develops bloody stools  -Continue high dose Pantoprazole 40 mg via NG   -Avoid NSAIDs  -transfuse of HGb is <8 and if with active bleeding     ##Hypernatremia:  resolved  Patient has had episodes of hypernatremia, most likely secondary to decreased oral intake.  Since the stroke, hypotonic fluids will need to be avoided to avoid cerebral edema.     Plan:   -Will monitor     ## Upper Extremity Swelling:  Less swollen   Most likely secondary to superficial thrombophlebitis vs PICC infiltration vs third spacing .  Patient has a history of blood clots and INR is currently < 2.  LUE ultrasound was negative for DVT.   Plan: Will monitor     ## Acute liver injury:  Improving  Etiology of acute liver injury unknown. Work up for Viral hepatitis, toxin, drugs and BUD Chiari has all been negative. No episode of severe hypotension.  Enzymes have reverted back to normal without intervention.  GI recommending MRCP to evaluate for biliary tract obstruction (depending on ultimate goals of care).     Plan:   - No hepatotoxic medications (tylenol, statins, etc)  - MRCP recommended but most likely will not pursue this     ## UTI: patient afebrile with negative infection markers.   Received antibiotics initially but was stopped as this is deemed more of an asymptomatic bacteriuria vs colonization        ## SHERYL on CKD3 (baseline Cr 1.5-2): improved   Thought to be prerenal from decrease PO intake     Plan:   - Follow up BMP daily  - Strict I/O   - Daily weights  - Continue telemetry      ## Type 2 DM, last A1C 6.1 on 02/20/17   - Will lossen up glucose control   Plan:   - Q 4 hour glucose checks  - Restart Lantus (12 units bid)  - Correction: High Insulin Sliding scale       ## Stercoral proctitis   Plan:    - Miralax daily PRN and Senna at bedtime PRN.   - HOLD PTA ferrous sulfate while being treated for constipation    #Normal Pressure  Hydrocephalus sequelae of subarachnoid hemorrhage   #Multi infarct Vascular Dementia  W/ Hx stroke, normal pressure hydrocephalus (2012), status post ventriculoperitoneal shunting, subarachnoid hemorrhage(2012).  Patient's neurologic issues were stable prior to stroke.  Will need to follow closely for cerebral edema caused by stroke that could potentially exacerbate patient's hydrocephalus.  Plan :   -Will observe but no aggressive interventions      ##High grade right ICA stenosis, diagnosed on 01/2017 MRA, stable   Patient's LAKE-VASC score is 8, not on anticoagulation due to recent GI bleed  Plan:   -Will continue to hold warfarin for now due to high risk of bleeding      ## Physical Deconditioning  Plan:    - PT/OT  - Speech therapy      Other chronic conditions, stable- continue home medications.  ## HTN-  Metoprolol  ## Hyperlipidemia-   rosuvastatin  ## COPD- To continue Breo and Atrovent inhaler   ##SHIVAM- Bipap  ##GERD - PPI   ## Constipation- Not an issue right now, will resume bowel regimen as needed   ## Progressive cognitive dysfunction (dementia)  ## Multiple bone fracture on left foot/ankle- cast in place. Hx of fractures after a fall on 9/16, and 2/2017.     Fluids: none  Electrolytes: stable  Nutrition: tube feeds, NPO  Lines:PIV, PICC  Activity: Up with assist   CODE: DNR / DNI  Prophylaxis: Coumadin held  Dispo: Expected discharge date 4-5 days pending clinical improvement                  Interval History:   Melvin Bhatia had no acute issue overnight, stable. Nurses notes reviewed.               Review of Systems:   Review of Systems   Constitutional:        ROS limited secondary to patient's condition   Gastrointestinal: Negative for blood in stool.            Physical Exam (Resident / Clinician):   Vitals were reviewed  Temp: 99.1  F (37.3  C) Temp src: Axillary BP: 149/74   Heart Rate: 81 Resp: 16 SpO2: 100 % O2 Device: None (Room air)      Physical Exam   Constitutional: He appears  well-developed. No distress.   Sitting in bed, arousable   Eyes: Pupils are equal, round, and reactive to light. No scleral icterus.   Right and left pupils reactive to light.  Right pupil sluggish and less reactive compared to left pupil.   Neck: Normal range of motion. Neck supple. No JVD present. No tracheal deviation present.   Cardiovascular: Normal rate.    Murmur (2/6 systolic murmur at RSB) heard.  Pulmonary/Chest: Effort normal and breath sounds normal. No respiratory distress. He has no wheezes. He has no rales.   Decreased breath sounds at bases   Abdominal: Soft. There is no tenderness. There is no rebound and no guarding.   Genitourinary:   Genitourinary Comments: Orta in place, Diaper in place   Musculoskeletal: He exhibits no edema (Swelling of left upper extremity, distal to elbow (including hand, which has 1-2+ edema)) or tenderness.   Left leg wrapped in cast   Neurological: He is alert.   Makes eye contact, unable to answer questions, groans.  Patient is not following commands for me, but has followed commands for nursing staff.   Skin: Skin is warm and dry. No erythema. No pallor.   Vitals reviewed.          Data:   ROUTINE LABS (Last four results)  CMP    Recent Labs  Lab 04/16/17  0716 04/15/17  0945 04/15/17  0515 04/14/17  1355 04/14/17  0535  04/12/17  2204    142 142  --  144  < > 142   POTASSIUM 3.9 4.3 3.5 4.1 3.3*  < >  --    CHLORIDE 106 108 106  --  110*  < >  --    CO2 26 25 28  --  24  < >  --    ANIONGAP 6 9 7  --  10  < >  --    * 199* 167*  --  111*  < >  --    BUN 11 9 10  --  9  < >  --    CR 1.13 1.29* 1.26*  --  1.23  < >  --    GFRESTIMATED 63 54* 55*  --  57*  < >  --    GFRESTBLACK 76 65 67  --  69  < >  --    LORENA 7.7* 7.6* 7.8*  --  7.5*  < >  --    MAG 2.0  --  2.2 3.1* 1.5*  --   --    PHOS 1.8*  --  2.2*  --  2.5  --  2.9   PROTTOTAL 5.0* 5.0* 4.7*  --  4.8*  < >  --    ALBUMIN 1.6* 1.5* 1.6*  --  1.6*  < >  --    BILITOTAL 0.4 0.4 0.4  --  0.6  < >  --     ALKPHOS 80 71 69  --  70  < >  --    AST 21 17 14  --  7  < >  --    ALT 26 31 29  --  36  < >  --    < > = values in this interval not displayed.  CBC    Recent Labs  Lab 04/16/17  0716 04/15/17  2141 04/15/17  1557 04/15/17  0945  04/15/17  0515  04/14/17  0535   WBC 5.1  --   --  4.7  --  5.1  --  5.9   RBC 2.91*  --   --  2.93*  --  2.87*  --  2.97*   HGB 8.0* 8.0* 8.5* 8.1*  < > 7.9*  < > 8.4*   HCT 26.8*  --   --  27.0*  --  26.3*  --  27.0*   MCV 92  --   --  92  --  92  --  91   MCH 27.5  --   --  27.6  --  27.5  --  28.3   MCHC 29.9*  --   --  30.0*  --  30.0*  --  31.1*   RDW 19.9*  --   --  20.0*  --  20.0*  --  20.0*   *  --   --  143*  --  139*  --  122*   < > = values in this interval not displayed.  INR    Recent Labs  Lab 04/12/17  1847 04/11/17  0510 04/10/17  0639   INR 1.27* 1.17* 1.23*     CRP    Recent Labs  Lab 04/12/17  0446   CRP 13.0*           Medications:     Current Facility-Administered Medications   Medication     metoprolol (LOPRESSOR) suspension 25 mg     pantoprazole (PROTONIX) suspension 40 mg     insulin aspart (NovoLOG) inj (RAPID ACTING)     ipratropium (ATROVENT) 0.02 % neb solution 0.5 mg     insulin glargine (LANTUS) injection 7 Units     aspirin suspension 325 mg     LORazepam (ATIVAN) injection 1 mg     sodium chloride (PF) 0.9% PF flush 5-50 mL     lidocaine 1 % 1 mL     lidocaine (LMX4) kit     sodium chloride (PF) 0.9% PF flush 10-20 mL     heparin lock flush 10 UNIT/ML injection 5-10 mL     heparin lock flush 10 UNIT/ML injection 5-10 mL     nystatin (MYCOSTATIN) suspension 500,000 Units     dextrose 10 % 1,000 mL infusion     multivitamins with minerals (CERTAVITE/CEROVITE) liquid 15 mL     lactobacillus rhamnosus (GG) (CULTURELL) capsule 1 capsule     polyethylene glycol (MIRALAX/GLYCOLAX) Packet 17 g     senna-docusate (SENOKOT-S;PERICOLACE) 8.6-50 MG per tablet 1-2 tablet     acetaminophen (TYLENOL) solution 650 mg     glucose 40 % gel 15-30 g    Or      dextrose 50 % injection 25-50 mL    Or     glucagon injection 1 mg     magnesium sulfate 4 g in 100 mL sterile water (premade)     potassium phosphate 15 mmol in D5W 250 mL intermittent infusion     potassium phosphate 20 mmol in D5W 500 mL intermittent infusion     potassium phosphate 20 mmol in D5W 250 mL intermittent infusion     potassium phosphate 25 mmol in D5W 500 mL intermittent infusion     [Rx hold ] metoprolol (LOPRESSOR) suspension 50 mg     [Rx hold ] rosuvastatin (CRESTOR) tablet 20 mg     Reason ACE/ARB order not selected     lidocaine 1 % 0.5-5 mL     sodium chloride (PF) 0.9% PF flush 10-20 mL     sodium chloride (PF) 0.9% PF flush 10 mL     fluticasone-vilanterol (BREO ELLIPTA) 200-25 MCG/INH oral inhaler 1 puff     fluticasone (FLONASE) 50 MCG/ACT spray 2 spray     ipratropium (ATROVENT HFA) Inhaler 1 puff     naloxone (NARCAN) injection 0.1-0.4 mg     magnesium citrate solution 148 mL     ondansetron (ZOFRAN-ODT) ODT tab 4 mg    Or     ondansetron (ZOFRAN) injection 4 mg     prochlorperazine (COMPAZINE) injection 5 mg    Or     prochlorperazine (COMPAZINE) tablet 5 mg    Or     prochlorperazine (COMPAZINE) Suppository 12.5 mg     Patient is already receiving anticoagulation with heparin, enoxaparin (LOVENOX), warfarin (COUMADIN)  or other anticoagulant medication     No lozenges or gum should be given while patient on BIPAP     hypromellose-dextran (ARTIFICAL TEARS) ophthalmic solution 1 drop     insulin aspart (NovoLOG) inj (RAPID ACTING)     ferrous sulfate (IRON) tablet 325 mg       Caring Physician: Harper Bell MD  [facility], [facility]  Pager Contact: see Physician sticky note

## 2017-04-16 NOTE — PLAN OF CARE
"Problem: Goal Outcome Summary  Goal: Goal Outcome Summary  Outcome: No Change  /66 (BP Location: Right arm)  Pulse 87  Temp 99.3  F (37.4  C) (Axillary)  Resp 18  Ht 1.626 m (5' 4\")  Wt 85 kg (187 lb 6.3 oz)  SpO2 100%  BMI 32.17 kg/m2     Neuro: Alert, unable to assess orientation. Pupils reactive - sluggish. Unable to follow commands. Continue with Q4hr neuro checks.  Cardiac: SR. Multiple PAC. Episode of MAT x1 - per tele tech.   Respiratory: O2 sats stable on RA while awake. BiPap 21% while asleep.   GI/: Orta intact with adequate urine output.  Diet/appetite: Tolerating TF @ 40mL/hr. RN to increase by 5mL at 0000 to be at goal.  Activity: Assist of 2. Turn and repo Q2hr.   Pain: At acceptable level on current regimen.   Skin: Intact, no new deficits noted.      R: Continue with POC. Notify primary team with changes.      "

## 2017-04-16 NOTE — PROGRESS NOTES
"[facility], [place]   Neurology Daily Note    Melvin Bhatia  [id number]  04/15/2017    Subjective Data: Minimal interactivity. Stroke code called this AM for worsening facial droop.    Objective Data:   /66 (BP Location: Right arm)  Pulse 87  Temp 99.3  F (37.4  C) (Axillary)  Resp 18  Ht 1.626 m (5' 4\")  Wt 85 kg (187 lb 6.3 oz)  SpO2 100%  BMI 32.17 kg/m2    Neurologic:  - somnolent but aroused by verbal stimuli  - does not follow commands  - pupils pinpoint and minimally reactive to light  - R gaze preference  - no blink to threat from L  - LUE extensor response to noxious stimulus, WD LLE  - RUE is slightly antigravity and responds appropriately to stimuli, RLE is not antigravity, but withdraws from noxious stimulus  - diffusely hyporeflexic, no clonus, R toe up    Labs:  Recent Labs   Lab Test  04/15/17   2141  04/15/17   1557  04/15/17   0945   04/15/17   0515   04/14/17   1355  04/14/17   0535   HGB  8.0*  8.5*  8.1*   < >  7.9*   < >   --   8.4*   WBC   --    --   4.7   --   5.1   --    --   5.9   PLT   --    --   143*   --   139*   --    --   122*   NA   --    --   142   --   142   --    --   144   POTASSIUM   --    --   4.3   --   3.5   --   4.1  3.3*   CR   --    --   1.29*   --   1.26*   --    --   1.23   BUN   --    --   9   --   10   --    --   9   GLC   --    --   199*   --   167*   --    --   111*    < > = values in this interval not displayed.     Imaging: Impression copied from radiologist report.  [facility]:  \"Impression:   1. No acute intracranial pathology. Specifically no acute infarct.  2. Stable right MCA territory subacute infarct and stable cerebral and  cerebellar chronic infarct.  3. Stable right frontal approach ventriculostomy catheter unchanged  normal ventricular size.\"    Assessment:  Mr Bhatia is a medically complex 75yo gentleman currently being seen by the stroke service for management of a large R MCA territory infarct 2/2 R ICA " occlusion. His exam has remained stable over several days and serial imaging has revealed relative stability of the infarction without evidence of midline shift or herniation. We estimate that his stroke is likely going to remain stable and that malignant edema is less likely at this point.    Unfortunately, we predict significant long-term disability resulting from this stroke. We suspect that his L sided limb weakness will persist and that independent ambulation will not be achieved. We further suspect prolonged bulbar weakness making swallowing unsafe and recommend consideration of a feeding tube if in line with his goals of care. These impairments will likely require significant assistance with daily cares. He is already living in a nursing home and plans to return after this hospitalization. The majority of right-handed individuals have primary language centers in the left side of the brain and as such we predict that Mr Bhatia will regain some language function.    I discussed these prognostic opinions with Mr Bhatia's wife and brother at length this evening. We reviewed his images and discussed his long-term care needs. They were appreciative of the information.    Recommendations:  - no need for repeat brain imaging if exam is stable  - continue q4h neurochecks  - euglycemia  - stain if able, although his has been avoided in the past due to liver injury; LDL goal < 70  - continue daily aspirin  - A1C goal < 7  - PT/OT/PMR  - stroke education (ordered for you)    No further stroke workup is indicated at this point. The stroke service will sign off. Please do not hesitate to contact us with further questions.    This patient was seen and discussed with attending neurointensivist, Dr. Sher Fatima, DO  PGY2 Neurology   Neurology Service  04/15/2017

## 2017-04-16 NOTE — PLAN OF CARE
Problem: Goal Outcome Summary  Goal: Goal Outcome Summary  Outcome: No Change  Pt awake throughout day, somnolent at times, opens eyes spontaneously, Neuro checks unchanged, very hard to complete due to pts mentation, pt follows some commands. Yells out intermittently. Appears to be resting comfortable throughout day, repo Q 2 hrs. Incontinent of loose stool. Mitt removed from R hand. NJ intact with TF at goal. NPO. Pt up to chair via [facility] lift, tolerated well.  Bed alarm on. Will continue to monitor per plan of cares.

## 2017-04-16 NOTE — PLAN OF CARE
Problem: Goal Outcome Summary  Goal: Goal Outcome Summary  Session canceled. Pt lethargic and not appropriate to take po upon attempt. Per RN pt has had his eyes open more this date however this was not noted during attempt. Pt continues to demonstrate significant difficulty with following commands and attending to task. SLP to follow per POC.

## 2017-04-16 NOTE — PLAN OF CARE
Problem: Goal Outcome Summary  Goal: Goal Outcome Summary  Outcome: Therapy, progress towards functional goals is fair  Pt is alert, and CELESTINA to assess orientation due to non-verbalizations from prior CVA. Vital signs stable with O2 saturations WDL on BIPAP at 21% FIO2. CELESTINA to assess if pt is symptomatic to changes in rhythm. STAT EKG ordered and provider came to assess pt. NG tube feeding advanced to 45/hr. Pt turned Q2H and skin assessed for new and current pressure ulcers. Q4H neuro checks, no changes noted. BS checks Q4H monitored. Will continue to monitor.

## 2017-04-16 NOTE — PROVIDER NOTIFICATION
Pt flips in and out of Sinus tach and a-fib/SVT with HR ranging between 129 and 157. A-fib does not sustain longer than 4-5 minutes at a time, pt remains in ST otherwise with rate in the low 100s. Vital signs, pt on BIPAP at 21%. O2 saturations WDL. PT CELESTINA for symptomatic response to rhythm. STAT EKG ordered, provider to come assess pt. Staff recommended metoprolol dose to be increased and to have a one time order to slow HR down. Provider would like to wait for EKG reading first. Will continue to monitor.

## 2017-04-16 NOTE — PLAN OF CARE
Problem: Goal Outcome Summary  Goal: Goal Outcome Summary  Outcome: No Change  Mental status waxes and wanes . Stoke code was called for increased left facial droop at 09:10 . Please see the MD notification note . Head CT results with no acute changes . Neuro assessment was changed to Q4hrs . Difficult  to do  Complete neuro examination . Patient follow simple command . Vitals with low grade temp 99's. Stable BP . Tele NSR with frequent PACs . Had a Short run of SVT at 07:45 . Troponin 0.081>> 0.080    Patient with frequent weak coughing . Needed scheduled neb and frequent oral suctioning . Low hemoglobin this morning 7.9 , recheck was 8.1>>8.5 . Had three loose Brown BM . High BG , Restarted on lantus . Tolerated FT at 40 mls/hr . Next increase to goal to 45 mls/hr at midnight . LR at 50 mls/hr .   Turned and oral care every 2 hrs .  Up on the chair  Via a lift for almost three hours . Stable . Patient on seizure precaution . Plan for recheck hemoglobin and troponin at 10 pm .   Please call team if patient with temperature above 100 , or hemoglobin less than 8.0 . And call team team first before stroke code . Stable . Continue to monitor and report any concerns.

## 2017-04-17 NOTE — PROVIDER NOTIFICATION
Salud FLOWER notified that pt HR frequently 150's unsustained - per tele HR a-fib vs MAT. No new orders at this time. Notify if sustaining  for longer than 2 min.

## 2017-04-17 NOTE — CONSULTS
GENERAL SURGERY CONSULT NOTE  4/17/2017    PRIMARY TEAM: Family Medicine    REASON FOR CONSULT: gastrostomy tube placement  LEVEL OF CONSULT: Consult, follow and place orders      ASSESSMENT: Melvin Bhatia is a 76 year old male with h/o NPH s/p  shunt placement, DMII, Afib with RVR, CHF (Ef %), HTN, CKD, COPD who was admitted with new R MCA ischemic stroke 4/12 and GI bleed 2/2 duodenal and rectal ulcer. We were consulted for enteral access for feeding     RECOMMENDATIONS:   -Will try to do a PEG on Wednesday  -discussed risks and benefits with his wife who would like to proceed with a  PEG    Patient seen, findings and plan discussed with staff Dr. Tyson.    Ludmila Palacios MD  PGY-2 General Surgery  [phone]    HISTORY PRESENTING ILLNESS: This is a Melvin Bhatia is a 76 year old male with h/o NPH s/p  shunt placement, DMII, Afib with RVR, CHF (Ef %), HTN, CKD, COPD who was admitted with new R MCA ischemic stroke 4/12 and GI bleed 2/2 duodenal and rectal ulcer. We were consulted for enteral access for feeding    Per primary team had dysphagia on admission. Has pulled two NG tubes.     Review of systems: 10 point ROS neg other than the symptoms noted above in the HPI.     PAST MEDICAL HISTORY:  Past Medical History:   Diagnosis Date     ARDS (adult respiratory distress syndrome) (H)      Atrial fibrillation (H)     first diagnosed 11-08     Bell's palsy 1/2010     Cauda equina syndrome with neurogenic bladder (H)     self catheter     Cerebellar infarct (H) 4/2012    left cerebellar infarct.  felt embolic hx afib     CHF (congestive heart failure) (H)      Chronic airway obstruction, not elsewhere classified      Chronic infection     VRE     CKD (chronic kidney disease) stage 4, GFR 15-29 ml/min (H)      Congestive heart failure, unspecified 5/06    EF 48%     Coronary artery disease     abnl mps with ef 46% and small-med area of ischemia '08 [facility]     CVA (cerebral infarction)      old lacunar infarct in the posterior left     Diabetes      Essential hypertension, benign      Gastro-oesophageal reflux disease      Heart attack (H)      Hemorrhage of gastrointestinal tract, unspecified 2/2003    UGI Bleed with transfusion     Hydrocephalus     plan  Shunt     Hypersomnia with sleep apnea, unspecified     Bpap     Intraventricular hemorrhage 1/2012     Legionella pneumonia (H) 8/09     Lumbago      Lumbosacral plexopathy 10/09    L diabetic myotrophy     Mixed hyperlipidemia      Other osteoporosis 3/2006     Other specified anemias 2002    Nl colonoscopy/EGD; Nl iron studies 4/06     Other specified disorders of rotator cuff syndrome of shoulder and allied disorders      Other testicular hypofunction     on Androgel     Palpitations     HOLTER     Sleep apnea     uses cpap     Subarachnoid hemorrhage (H) 1/2012     Tibial plateau fracture 2/2011    left     Type II or unspecified type diabetes mellitus without mention of complication, not stated as uncontrolled      Unspecified disorder resulting from impaired renal function     Stage 3 Chronic Renal Dz       PAST SURGICAL HISTORY:  Past Surgical History:   Procedure Laterality Date     C DEXA, BONE DENSITY, AXIAL SKEL  3/2006    Osteoporosis     CARDIAC NUC CONY STRESS TEST NL  5/06    No ischemia, EF 44 %     CARDIAC NUC CONY STRESS TEST NL  6/2008, 3/09    sm to mod fixed apical defect EF 46%     ESOPHAGOSCOPY, GASTROSCOPY, DUODENOSCOPY (EGD), COMBINED N/A 4/7/2017    Procedure: COMBINED ESOPHAGOSCOPY, GASTROSCOPY, DUODENOSCOPY (EGD);  Surgeon: Hussein Lopez MD;  Location: [facility]     HC COLONOSCOPY THRU STOMA, DIAGNOSTIC  2/2003    Normal, repeat 10 yr     HC DOPPLER ECHO PULSED, COMPLETE      EF 50-55%     IMPLANT SHUNT VENTRICULOPERITONEAL  2/28/2012    Procedure:IMPLANT SHUNT VENTRICULOPERITONEAL; Ventriculoperitoneal Shunt Placement; Surgeon:ALESHA VELAZQUEZ; Location:[facility]     OPEN REDUCTION INTERNAL FIXATION  TIBIAL PLATEAU  3/2011    left     SIGMOIDOSCOPY FLEXIBLE N/A 4/11/2017    Procedure: SIGMOIDOSCOPY FLEXIBLE;  Surgeon: Hussein Lopez MD;  Location: [facility]     SURGICAL HISTORY OF -   2001    R wrist surg fx/ steel plate     SURGICAL HISTORY OF -   age 20    Inguinal hernia     SURGICAL HISTORY OF -   2003    Bladder stim surg, not sucessful     SURGICAL HISTORY OF -       Lymphedema treatment of legs     TONSILLECTOMY  child     UPPER GI ENDOSCOPY  2/2003    esophageal ulcers, gastritis, duodenitis     UPPER GI ENDOSCOPY  4/2003    erosive gastropathy from ASA   Inguinal hernia when he was in his 20s    FAMILY HISTORY:  Unable to assess 2/2 mental status    SOCIAL HISTORY:  Unable to assess 2/2 mental status    ALLERGIES:  Unable to confirm 2/2 mental status  Allergies   Allergen Reactions     Penicillins Hives     Tolerated unasyn during 3/31/2017 admission      Albuterol Other (See Comments)     Albuterol Sulfate Other (See Comments)     SVT/tachycardia with albuterol     Atorvastatin Other (See Comments)     Description: Lipitor, Description: Lipitor     Atorvastatin Calcium      Cipro [Ciprofloxacin] Diarrhea     Ciprofloxacin Other (See Comments)     Description: Cipro, Description: Cipro     Simvastatin Other (See Comments)     Description: Zocor, Description: Zocor  C/o weak muscles and severe abdominal pain.     Warfarin Unknown and Other (See Comments)     MEDICATIONS:    No current facility-administered medications on file prior to encounter.   Current Outpatient Prescriptions on File Prior to Encounter:  insulin glargine (LANTUS SOLOSTAR) 100 UNIT/ML injection Inject 37 Units Subcutaneous 2 times daily (Patient taking differently: Inject 35 Units Subcutaneous 2 times daily )   aspirin 81 MG tablet Take 1 tablet (81 mg) by mouth daily   acetaminophen (TYLENOL) 325 MG tablet Take 3 tablets (975 mg) by mouth every 6 hours as needed for mild pain   warfarin (COUMADIN) 3 MG tablet Take 1 tablet (3  mg) by mouth daily (Patient taking differently: Take 2-3 mg by mouth daily )   senna-docusate (SENOKOT-S;PERICOLACE) 8.6-50 MG per tablet Take 1-2 tablets by mouth 2 times daily   fexofenadine (ALLEGRA) 180 MG tablet Take 1 tablet (180 mg) by mouth daily   budesonide-formoterol (SYMBICORT) 160-4.5 MCG/ACT Inhaler Inhale 2 puffs into the lungs 2 times daily   rosuvastatin (CRESTOR) 20 MG tablet Take 1 tablet (20 mg) by mouth daily Needs fasting labs before any further refills   RANITIDINE HCL PO Take 150 mg by mouth daily   calcium citrate (CALCITRATE) 950 MG tablet Take 1 tablet by mouth daily   Cyanocobalamin (VITAMIN B12) 1000 MCG TBCR Take 1,000 mcg by mouth daily   exenatide (BYETTA) 5 mcg/0.02mL per dose (60 doses per 1.2 mL prefilled pen) Inject 5mcg under the skin twice daily 60 minutes before meals.   metoprolol (TOPROL-XL) 50 MG 24 hr tablet Take 1 tablet (50 mg) by mouth At Bedtime   fluticasone (FLONASE) 50 MCG/ACT nasal spray Spray 2 sprays into both nostrils daily   multivitamin, therapeutic with minerals (THERA-VIT-M) TABS Take 1 tablet by mouth daily   ferrous sulfate 325 (65 FE) MG tablet Take 325 mg by mouth daily (with breakfast) 65mg elemental iron   VITAMIN D, CHOLECALCIFEROL, PO Take 2,000 Units by mouth daily   bumetanide (BUMEX) 0.5 MG tablet Take 0.5 mg by mouth 2 times daily   magnesium oxide (MAG-OX) 400 MG tablet Take 2 tablets by mouth daily.   niacin (NIASPAN) 500 MG CR tablet Take 1 tablet by mouth At Bedtime.   insulin lispro (HUMALOG KWIKPEN) 100 UNIT/ML injection Inject 30 units before breakfast and lunch and 5 units before dinner  as directed. (Patient taking differently: Inject 20 units at 1000 and 10 units at 1600 as directed.)   polyethylene glycol (MIRALAX/GLYCOLAX) Packet Take 17 g by mouth daily as needed for constipation   Ipratropium-Albuterol (COMBIVENT RESPIMAT)  MCG/ACT inhaler Inhale 1 puff into the lungs 2 times daily Not to exceed 6 doses per day.   denosumab  (PROLIA) 60 MG/ML SOLN Inject 60 mg Subcutaneous every 6 months   insulin pen needle (B-D U/F) 31G X 8 MM Use 7 daily or as directed.   blood glucose monitoring (NO BRAND SPECIFIED) test strip 1 strip by In Vitro route 4 times daily   ONE TOUCH LANCETS MISC use as directed 4 x daily and prn   ketoconazole (NIZORAL) 2 % shampoo Apply to the affected area and wash off after 5 minutes.   nitroglycerin (NITROSTAT) 0.4 MG SL tablet Place 1 tablet (0.4 mg) under the tongue every 5 minutes as needed up to 3 tablets per episode.   fluocinolone (SYNALAR) 0.01 % external solution Apply 1 to 2 times daily to scaly areas of scalp as needed.Profile Rx: patient will contact pharmacy when needed   order for DME Equipment being ordered: 4 wheeled rolling walker.   ACE/ARB NOT PRESCRIBED, INTENTIONAL, ACE & ARB not prescribed due to Hyperkalemia (baseline K+ > 5.5), Worsening renal function on ACE/ARB therapy and CKD (Chronic Kidney Disease)   insulin syringes, disposable, U-100 0.3 ML MISC 1 each 4 times daily.   ORDER FOR DME Equipment being ordered: CPAP supplies   Catheters MISC As needed       PHYSICAL EXAMINATION:  Temp:  [98.7  F (37.1  C)-99.1  F (37.3  C)] 98.9  F (37.2  C)  Heart Rate:  [] 76  Resp:  [18-20] 18  BP: (106-141)/(64-87) 139/66  SpO2:  [100 %] 100 %  General: no acute distress  Neuro: does not make eye contact, communicate or follow commands, moving and eyes open spontaneously  CV: RRR  Pulm: Non labored breathing on RA  Abd: soft, non-tender, non-distended  Ex: wwp    LABS:  LABS: Reviewed.   Arterial Blood Gases   No lab results found in last 7 days.  Complete Blood Count     Recent Labs  Lab 04/17/17  0825 04/16/17  0716 04/15/17  2141 04/15/17  1557 04/15/17  0945  04/15/17  0515   WBC 4.8 5.1  --   --  4.7  --  5.1   HGB 8.3* 8.0* 8.0* 8.5* 8.1*  < > 7.9*    138*  --   --  143*  --  139*   < > = values in this interval not displayed.  Basic Metabolic Panel    Recent Labs  Lab 04/17/17  3414  04/16/17  0716 04/15/17  0945 04/15/17  0515    138 142 142   POTASSIUM 3.7 3.9 4.3 3.5   CHLORIDE 106 106 108 106   CO2 27 26 25 28   BUN 12 11 9 10   CR 1.05 1.13 1.29* 1.26*   * 266* 199* 167*     Liver Function Tests    Recent Labs  Lab 04/16/17  0716 04/15/17  0945 04/15/17  0515 04/14/17  0535  04/12/17  1847 04/11/17  0510   AST 21 17 14 7  < >  --   < > 21   ALT 26 31 29 36  < >  --   < > 80*   ALKPHOS 80 71 69 70  < >  --   < > 83   BILITOTAL 0.4 0.4 0.4 0.6  < >  --   < > 0.2   ALBUMIN 1.6* 1.5* 1.6* 1.6*  < >  --   < > 1.4*   INR  --   --   --   --   --  1.27*  --  1.17*   < > = values in this interval not displayed.  Pancreatic Enzymes  No lab results found in last 7 days.  Coagulation Profile    Recent Labs  Lab 04/12/17 1847 04/11/17  0510   INR 1.27* 1.17*   PTT  --  29     Lactate  Invalid input(s): LACTATE    IMAGING:  No results found for this or any previous visit (from the past 24 hour(s)).

## 2017-04-17 NOTE — PROGRESS NOTES
[facility] - Inpatient daily progress note    Date of admission: 3/31/2017  Date of service: 4/17/2017.           Assessment and Plan:      Melvin Bhatia is a 76 year old male with a significant past medical history of cerebrovascular disease, normal pressure hydrocephalus (status post ventriculoperitoneal shunting), progressive cognitive dysfunction, multiple falls, HTN, Type 2 DM, hyperlipidemia, CHF, CKD, and COPD who presented with  worsening mental status. He was found to have an SHERYL and UTI. He developed an episode of melena, anemia and significant elevation for liver enzymes.  He suffered an ischemic stroke of the right MCA diagnosed 4/12.    Patient Active Problem List   Diagnosis     Osteoporosis     PAF (paroxysmal atrial fibrillation) (H)     Lumbosacral plexopathy     Testosterone deficiency     Hypertension goal BP (blood pressure) < 140/90     Atrial fibrillation (H)     CKD (chronic kidney disease) stage 3, GFR 30-59 ml/min     Hyperlipidemia LDL goal <70     Coronary artery disease     Health Care Home     ACP (advance care planning)     Elevated PSA     Anemia in chronic renal disease     Subarachnoid hemorrhage (H)     Idiopathic normal pressure hydrocephalus (INPH)     Urinary bladder neurogenic dysfunction     Stroke (H)     Cognitive deficit, post-stroke     HL (hearing loss)     Personal history of fall     GERD (gastroesophageal reflux disease)     COPD exacerbation (H)     SHIVAM (obstructive sleep apnea)     LVH (left ventricular hypertrophy)     Diastolic CHF, acute on chronic (H)     Hypoxemia     Tachyarrhythmia     Systolic and diastolic CHF, chronic (H)     Gait abnormality     Iron deficiency anemia     Allergic rhinitis     Altered mental status     VRE (vancomycin-resistant Enterococci)     Hypoglycemia     Type 2 diabetes with stage 3 chronic kidney disease GFR 30-59 (H)     Chronic obstructive pulmonary disease, unspecified COPD type (H)     Ankle pain      Fx medial malleolus-closed     Gastrointestinal hemorrhage associated with duodenal ulcer     Anemia due to blood loss, acute     Acute cystitis     Hypernatremia     Delirium     Acute kidney injury superimposed on CKD     Closed fracture of ankle      Plan:   ## New Right MCA Ischemic Stroke 4/12 , stable    Stroke most likely caused by ischemia during episode of hypotension with GI bleed vs embolic from Afib. Patient not a candidate for TPA .  No symptoms of increase ICP.     Plan:   - Continue secondary stoke prevention, keep blood pressure with goal of <140/90   - Labetalol prn SBP > 220  - ASA   - Telemetry    ## Malnutrition:  Patient has pulled out NGT twice now.  He will most likely be dependent on tube feeds for some time.  Will discuss with spouse if she would like to have G-tube surgically placed before discharge to long term care.    Plan:  - G-tube vs NGT    ##A Fib with RVR:   Patient continues to have intermittent episodes of RVR (lasts < 2 minutes) but remains HD stable    Plan:   -Would not get troponin with episodes of RVR unless patient becomes hemodynamically unstable.  Due to his condition his options are limited if he develops ACS and he is not a candidate for invasive cardiac procedure, discussed with Spouse  -Will continue to monitor   - Switch to Metoprolol 2.5 mg IV q 6 hours   - Do not consult Cardiology for intervention (per spouse's wishes)    ##HFrEF, euvolemic:  Negative JVD distention, weight stabilizing (admission weight- 80 kg (4/1), peaked at 87 kg(4/10) and now 82.5 (4/17)     Plan:  - IV lasix prn  -Daily weights  -Input and Output     ## COPD: stable      Plan:   - Q 4 hour Atrovent nebs  - Oral care and suctioning prn    ## Upper GI Bleed secondary to Duodenal Ulcer: stable   ## Lower GI Bleed secondary to rectal ulcer : stable  ##Acute anemia secondary to above: stable  Hemoglobin stable, no evidence of blood in stools.    Plan:   - Will just check hgb daily    - Daily  hemoglobins unless pt develops bloody stools  - Continue high dose Pantoprazole 40 mg IV bid  - Avoid NSAIDs  - transfuse of HGb is <8 and if with active bleeding     ##Hypernatremia:  resolved  Patient has had episodes of hypernatremia, most likely secondary to decreased oral intake.  Since the stroke, hypotonic fluids will need to be avoided to avoid cerebral edema.       Plan:   -Will monitor     ## Upper Extremity Swelling: Improving  Most likely secondary to superficial thrombophlebitis vs PICC infiltration vs third spacing .  Patient has a history of blood clots and INR is currently < 2.  LUE ultrasound was negative for DVT.     Plan:   - Will monitor     ## Acute liver injury:  Resolved  Etiology of acute liver injury unknown. Work up for Viral hepatitis, toxin, drugs and BUD Chiari has all been negative. No episode of severe hypotension.  Enzymes have reverted back to normal without intervention.  GI recommending MRCP to evaluate for biliary tract obstruction (depending on ultimate goals of care).     Plan:   - No hepatotoxic medications (tylenol, statins, etc)  - MRCP recommended but most likely will not pursue this     ## UTI vs Colonization: Resolved  Received antibiotics initially but was stopped as this is deemed more of an asymptomatic bacteriuria vs colonization rather than a UTI      ## SHERYL on CKD3 (baseline Cr 1.5-2): improved   Thought to be prerenal from decrease PO intake     Plan:   - Follow up BMP daily  - Strict I/O   - Daily weights  - Continue telemetry      ## Type 2 DM, last A1C 6.1 on 02/20/17   Blood sugars frequently fluctuate.  Patient is currently NPO, will hold Lantus.    Plan:   - Q 4 hour glucose checks  - Hold Lantus (12 units bid)  - Correction: High Insulin Sliding scale       ## Stercoral proctitis     Plan:    - Miralax daily PRN and Senna at bedtime PRN.   - HOLD PTA ferrous sulfate while being treated for constipation    #Normal Pressure Hydrocephalus sequelae of  subarachnoid hemorrhage   #Multi infarct Vascular Dementia  W/ Hx stroke, normal pressure hydrocephalus (2012), status post ventriculoperitoneal shunting, subarachnoid hemorrhage(2012).  Patient's neurologic issues were stable prior to stroke.  Will need to follow closely for cerebral edema caused by stroke that could potentially exacerbate patient's hydrocephalus.  Plan :   -Will observe but no aggressive interventions      ##High grade right ICA stenosis, diagnosed on 01/2017 MRA, stable   Patient's LAKE-VASC score is 8, not on anticoagulation due to recent GI bleed  Plan:   -Will continue to hold warfarin for now due to high risk of bleeding      ## Physical Deconditioning  Plan:    - PT/OT  - Speech therapy      Other chronic conditions, stable- continue home medications.  ## HTN-  Metoprolol  ## Hyperlipidemia-   rosuvastatin  ## COPD- To continue Breo and Atrovent inhaler   ##SHIVAM- Bipap  ##GERD - PPI   ## Constipation- Not an issue right now, will resume bowel regimen as needed   ## Progressive cognitive dysfunction (dementia)  ## Multiple bone fracture on left foot/ankle- cast in place. Hx of fractures after a fall on 9/16, and 2/2017.     Fluids: D5 LR at 75 cc/hr  Electrolytes: stable  Nutrition: NPO  Lines:PIV, PICC  Activity: Up with assist   CODE: DNR / DNI  Prophylaxis: Coumadin held  Dispo: Expected discharge date 4-5 days pending clinical improvement                  Interval History:   Melvin Bhatia pulled out NGT overnight.  Otherwise, no significant events.  He continues to have intermittent short runs of Afib with RVR, but remains hemodynamically stable.              Review of Systems:   Review of Systems   Constitutional:        ROS limited secondary to patient's condition   Gastrointestinal: Negative for blood in stool.            Physical Exam (Resident / Clinician):   Vitals were reviewed  Temp: 99  F (37.2  C) Temp src: Axillary BP: 126/77   Heart Rate: 86 Resp: 20 SpO2: 100 % O2  Device: BiPAP/CPAP      Physical Exam   Constitutional: He appears well-developed. No distress.   Sitting in bed, arousable   Eyes: Pupils are equal, round, and reactive to light. No scleral icterus.   Right and left pupils reactive to light.  Right pupil sluggish and less reactive compared to left pupil.   Neck: Normal range of motion. Neck supple. No JVD present. No tracheal deviation present.   Cardiovascular: Normal rate.    Murmur (2/6 systolic murmur at RSB) heard.  Pulmonary/Chest: Effort normal and breath sounds normal. No respiratory distress. He has no wheezes. He has no rales.   Decreased breath sounds throughout.  + Cough   Abdominal: Soft. There is no tenderness. There is no rebound and no guarding.   Genitourinary:   Genitourinary Comments: Orta in place, Diaper in place   Musculoskeletal: He exhibits no edema (Swelling of left upper extremity, distal to elbow (including hand, which has 1-2+ edema)) or tenderness.   Left leg wrapped in cast   Neurological: He is alert.   Makes eye contact, unable to answer questions, groans.  Patient will squeeze right hand on command, will not close eyes on command   Skin: Skin is warm and dry. No erythema. No pallor.   Vitals reviewed.          Data:   ROUTINE LABS (Last four results)  CMP    Recent Labs  Lab 04/17/17  0825 04/16/17  1735 04/16/17  0716 04/15/17  0945 04/15/17  0515 04/14/17  1355 04/14/17  0535     --  138 142 142  --  144   POTASSIUM 3.7  --  3.9 4.3 3.5 4.1 3.3*   CHLORIDE 106  --  106 108 106  --  110*   CO2 27  --  26 25 28  --  24   ANIONGAP 8  --  6 9 7  --  10   *  --  266* 199* 167*  --  111*   BUN 12  --  11 9 10  --  9   CR 1.05  --  1.13 1.29* 1.26*  --  1.23   GFRESTIMATED 68  --  63 54* 55*  --  57*   GFRESTBLACK 83  --  76 65 67  --  69   LORENA 8.1*  --  7.7* 7.6* 7.8*  --  7.5*   MAG 2.1  --  2.0  --  2.2 3.1* 1.5*   PHOS 2.3* 2.7 1.8*  --  2.2*  --  2.5   PROTTOTAL  --   --  5.0* 5.0* 4.7*  --  4.8*   ALBUMIN  --   --   1.6* 1.5* 1.6*  --  1.6*   BILITOTAL  --   --  0.4 0.4 0.4  --  0.6   ALKPHOS  --   --  80 71 69  --  70   AST  --   --  21 17 14  --  7   ALT  --   --  26 31 29  --  36     CBC    Recent Labs  Lab 04/17/17  0825 04/16/17  0716 04/15/17  2141 04/15/17  1557 04/15/17  0945  04/15/17  0515   WBC 4.8 5.1  --   --  4.7  --  5.1   RBC 2.99* 2.91*  --   --  2.93*  --  2.87*   HGB 8.3* 8.0* 8.0* 8.5* 8.1*  < > 7.9*   HCT 27.5* 26.8*  --   --  27.0*  --  26.3*   MCV 92 92  --   --  92  --  92   MCH 27.8 27.5  --   --  27.6  --  27.5   MCHC 30.2* 29.9*  --   --  30.0*  --  30.0*   RDW 19.3* 19.9*  --   --  20.0*  --  20.0*    138*  --   --  143*  --  139*   < > = values in this interval not displayed.  INR    Recent Labs  Lab 04/12/17  1847 04/11/17  0510   INR 1.27* 1.17*     CRP    Recent Labs  Lab 04/12/17  0446   CRP 13.0*           Medications:     Current Facility-Administered Medications   Medication     pantoprazole (PROTONIX) suspension 40 mg     insulin aspart (NovoLOG) inj (RAPID ACTING)     rosuvastatin (CRESTOR) tablet 20 mg     insulin glargine (LANTUS) injection 12 Units     metoprolol (LOPRESSOR) suspension 25 mg     ipratropium (ATROVENT) 0.02 % neb solution 0.5 mg     aspirin suspension 325 mg     LORazepam (ATIVAN) injection 1 mg     sodium chloride (PF) 0.9% PF flush 5-50 mL     lidocaine 1 % 1 mL     lidocaine (LMX4) kit     sodium chloride (PF) 0.9% PF flush 10-20 mL     heparin lock flush 10 UNIT/ML injection 5-10 mL     heparin lock flush 10 UNIT/ML injection 5-10 mL     nystatin (MYCOSTATIN) suspension 500,000 Units     dextrose 10 % 1,000 mL infusion     multivitamins with minerals (CERTAVITE/CEROVITE) liquid 15 mL     lactobacillus rhamnosus (GG) (CULTURELL) capsule 1 capsule     polyethylene glycol (MIRALAX/GLYCOLAX) Packet 17 g     senna-docusate (SENOKOT-S;PERICOLACE) 8.6-50 MG per tablet 1-2 tablet     acetaminophen (TYLENOL) solution 650 mg     glucose 40 % gel 15-30 g    Or      dextrose 50 % injection 25-50 mL    Or     glucagon injection 1 mg     magnesium sulfate 4 g in 100 mL sterile water (premade)     potassium phosphate 15 mmol in D5W 250 mL intermittent infusion     potassium phosphate 20 mmol in D5W 500 mL intermittent infusion     potassium phosphate 20 mmol in D5W 250 mL intermittent infusion     potassium phosphate 25 mmol in D5W 500 mL intermittent infusion     [Rx hold ] metoprolol (LOPRESSOR) suspension 50 mg     Reason ACE/ARB order not selected     lidocaine 1 % 0.5-5 mL     sodium chloride (PF) 0.9% PF flush 10-20 mL     sodium chloride (PF) 0.9% PF flush 10 mL     fluticasone-vilanterol (BREO ELLIPTA) 200-25 MCG/INH oral inhaler 1 puff     fluticasone (FLONASE) 50 MCG/ACT spray 2 spray     ipratropium (ATROVENT HFA) Inhaler 1 puff     naloxone (NARCAN) injection 0.1-0.4 mg     magnesium citrate solution 148 mL     ondansetron (ZOFRAN-ODT) ODT tab 4 mg    Or     ondansetron (ZOFRAN) injection 4 mg     prochlorperazine (COMPAZINE) injection 5 mg    Or     prochlorperazine (COMPAZINE) tablet 5 mg    Or     prochlorperazine (COMPAZINE) Suppository 12.5 mg     Patient is already receiving anticoagulation with heparin, enoxaparin (LOVENOX), warfarin (COUMADIN)  or other anticoagulant medication     No lozenges or gum should be given while patient on BIPAP     hypromellose-dextran (ARTIFICAL TEARS) ophthalmic solution 1 drop     insulin aspart (NovoLOG) inj (RAPID ACTING)     ferrous sulfate (IRON) tablet 325 mg       Caring Physician: Toña Duron MD  [facility] [place]  Pager Contact: see Physician sticky note

## 2017-04-17 NOTE — PROGRESS NOTES
Progress Note:    Hospital Day: 16    Data: Melvin Bhatia is a 75 yo male admitted to [facility] 03/31/17.    Intervention: Care conference held with pt's spouse Dian, pt's brother, Dr. Duron, and writer.    Assessment: Pt has pulled NG tube out twice. Pt's spouse would like to have G-tube placed before discharge. Pt's spouse also left VM w/pt's Ortho office inquiring about pt having cast removed. Pt's spouse is still privately paying for a bed hold at [facility] (P: [phone], F: [phone]).    Plan:  -Discharge plans in progress: Likely dc 1-2 days after G-tube is placed.  -Barriers to discharge plan: medical stability  -Follow up plan: SW will continue to follow and assist as needed.    GERMANIA King, [facility]  6B Intermediate Care Unit   Phone: [phone]  Pager: [phone]

## 2017-04-17 NOTE — PLAN OF CARE
"Problem: Goal Outcome Summary  Goal: Goal Outcome Summary  Outcome: No Change  /64 (BP Location: Right arm)  Pulse 87  Temp 98.7  F (37.1  C) (Axillary)  Resp 20  Ht 1.626 m (5' 4\")  Wt 85 kg (187 lb 6.3 oz)  SpO2 100%  BMI 32.17 kg/m2     Alert. Intermittently lethargic. No coherent speech, answered a couple yes/no questions with nodding. Neuro status unchanged. Afib with rates in the 80s. On room air, lung sounds coarse bilaterally. TF running at goal most of the evening, patient pulled ND. Orta in place with adequate urine output. BM x1. Repositioning q2. L lower extremity cast C/D/I. Wife present this afternoon, supportive. Continue with current POC.        "

## 2017-04-17 NOTE — PLAN OF CARE
Problem: Goal Outcome Summary  Goal: Goal Outcome Summary  Outcome: No Change  Pt awake and alert, intermittently lethargic, neuro checks unchanged, yells out occasionally however no coherent speech. Follows minimal commands. VSS, HR 80-90's afib, on RA while awake, CPAP at night, sats > 95%. Weak, non productive cough, pulm toilet. NPO, plan to replace feeding tube on Thursday. Orta intact. Incontinent of small amount of loose stool x 2. Repo Q 2 hrs, up to chair via mech lift. LLE cast intact. Family at bedside this afternoon. Will continue to monitor per plan of cares.

## 2017-04-17 NOTE — PROGRESS NOTES
[facility] Nurse Inpatient Adult Pressure InjuryAssessment    Follow Up Assessment  Reason for consultation: Evaluate and treat Bilateral buttocks    Assessment:   Left lower buttock/upper thigh wound due to Pressure Injury and Incontinence Associated Dermatitis (IAD)  L) upper buttock/closer to coccyx- healing MASD    Pressure Injury is Stage 2  Present on admission No  This is a Medical Device Related Pressure Injury (MDRPI) due to Unknown. Not able to r/o actual source of pressure    Contributing factor of the pressure injury: pressure, microclimate, immobility and moisture  Status: is evolving, symptomatic. Area assessed with another [facility] Yamilex Aguilera    TREATMENT  PLAN    Left lower buttock wound every other day:    Cleanse all wound bed with Microklenz and pat dry.    Apply no sting barrier on zoe-wound skin.    Apply nickel thick amount of Medihoney (#[id number]) to open wound tissue.    Cover with Mepilex 4x4.  Orders Written  [facility] Nurse follow-up plan: weekly  Nursing to notify the Provider(s) and re-consult the WO Nurse if wound(s) deteriorates or new skin concern.    Patient History  According to provider note(s):  Melvin Bhatia is a 76 year old male with a significant past medical history of cerebrovascular disease, normal pressure hydrocephalus (status post ventriculoperitoneal shunting), progressive cognitive dysfunction, multiple falls, HTN, Type 2 DM, hyperlipidemia, CHF, CKD, and COPD who presented with a 3 day history of poor oral intake.      Objective Data   Containment of urine/stool: Indwelling catheter    Current Diet/ Nutrition:  None      Output:   I/O last 3 completed shifts:  In: 1625 [I.V.:470; NG/GT:435]  Out: 2750 [Urine:2750]    Skin Assessment: Aldair Aldair Score  Av.2  Min: 9  Max: 15                                Aldair Q No Data Recorded                     NSRAS No Data Recorded       Labs:   Recent Labs  Lab 17  0825  17  1847  17  0446   HGB 8.3*  < >  --   <  > 9.9*   INR  --   --  1.27*  --   --    WBC 4.8  < >  --   < > 9.8   CRP  --   --   --   --  13.0*   < > = values in this interval not displayed.      No lab results found in last 7 days.    Physical Exam    Skin assessment:   Focused skin inspection: Bilateral buttocks.    Wound Location:  Left lower buttock          Pic taken-4/14/17 4/17/17    Wound History: Pt is incontinent of bowel and has domingo's catheter. Has loose bowel movement each time with movement and turning. He was seen on April 4th and had MASD on his BL buttocks, which is healing nicely. He noted to have light purple epidermis on L) lower buttock.    Measurements (length x width x depth, in cm) 2 cm x 0.7 cm  x  0.1 cm   Wound Base:  80% deep dermis white tissue with 20% pink dermis tissue in the proximal aspect of the wound  Tunneling N/A  Undermining N/A  Palpation of the wound bed: normal   Periwound skin: intact and dry peeling epithelium  Color: pink  Temperature: normal   Drainage:, small  Description of drainage: serous  Odor: none  Pain:  Irritable but unable to vocalized    L) upper buttock/closer to coccyx- 0.3x1x0.1cm healing superficial injury 2/2 moisture    Interventions  Current support surface: Standard  Atmos Air    Current off-loading measures: Chair cushion, Foam padding and Pillows under calves  Visual inspection of wound(s) completed  Wound Care:was done per plan of care    Cleansing with NS solution  Supplies: Ordered: Medihoney  Education provided today: RN and family    Discussed plan of care with Family and Nurse    Face to face time: 20 mins

## 2017-04-17 NOTE — PLAN OF CARE
"Problem: Goal Outcome Summary  Goal: Goal Outcome Summary  Outcome: No Change  /77 (BP Location: Right arm)  Pulse 87  Temp 99  F (37.2  C) (Axillary)  Resp 20  Ht 1.626 m (5' 4\")  Wt 82.5 kg (181 lb 14.1 oz)  SpO2 100%  BMI 31.22 kg/m2      Neuro: Alert, unable to assess orientation. Pupils reactive - sluggish. Unable to follow commands. Continue with Q4hr neuro checks.  Cardiac: Afib with possible MAT - per tele tech. Refer to previous note.  Respiratory: O2 sats stable on RA while awake. BiPap 21% while asleep.   GI/: Orta intact with adequate urine output. Incontinent of stool - BM x1.  Diet/appetite: NPO. No TF overnight as pt pulled ND tube on evening shift.   Activity: Assist of 2. Turn and repo Q2hr.   Pain: At acceptable level on current regimen.   Skin: Intact, no new deficits noted.      R: Continue with POC. Notify primary team with changes.      "

## 2017-04-17 NOTE — PROVIDER NOTIFICATION
[facility] service notified regarding patient's feeding tube, patient removed his tube. No previous evidence of pulling/picking earlier this evening. Evening meds given. Will address in AM.

## 2017-04-17 NOTE — PLAN OF CARE
Problem: Goal Outcome Summary  Goal: Goal Outcome Summary  Outcome: Therapy, progress towards functional goals is fair  OT 4A: Facilitated increased awareness to L side though purposful environmental set up and approach of conversation and commands toward L side. Pt requires max A to attend to L visual field and does not sustain attention for greater than 5 sec.  Provided tactile input to LUE along with positioning neck toward L to use visual feedback loop.  Pt initially requires total A for sitting balance but improves to min A through session.  Pt has protective extention reactions on RUE given loss of balance and presents with no volitional control on LUE.  Pt does not follow commands, is alert for session, and grimmaces to discomfort. Provided PROM with focus on low-load long duration to avoid increasing extensor tone with movements. Pt presents with 3 on the modified nicholas scale. Pt grimmaces with pain with shoulder flexion greater than 40 degrees and elbow flexion greater than 50 degrees. Recommend TCU at d/c.     Geomat pressure relief cushion ordered to room, recommend frequent changes in position given pt is currently dependent for mobility.   Recommend nursing/family approach and converse with patient on L side to increase awareness and attention to L side.

## 2017-04-18 NOTE — PROGRESS NOTES
[facility] Medicine - Inpatient daily progress note    Date of admission: 3/31/2017  Date of service: 4/18/2017.           Assessment and Plan:      Melvin Bhatia is a 76 year old male with a significant past medical history of cerebrovascular disease, normal pressure hydrocephalus (status post ventriculoperitoneal shunting), progressive cognitive dysfunction, multiple falls, HTN, Type 2 DM, hyperlipidemia, CHF, CKD, and COPD who presented with  worsening mental status. He was found to have an SHERYL and UTI. He developed an episode of melena, anemia and significant elevation for liver enzymes.  He suffered an ischemic stroke of the right MCA diagnosed 4/12.    Patient Active Problem List   Diagnosis     Osteoporosis     PAF (paroxysmal atrial fibrillation) (H)     Lumbosacral plexopathy     Testosterone deficiency     Hypertension goal BP (blood pressure) < 140/90     Atrial fibrillation (H)     CKD (chronic kidney disease) stage 3, GFR 30-59 ml/min     Hyperlipidemia LDL goal <70     Coronary artery disease     Health Care Home     ACP (advance care planning)     Elevated PSA     Anemia in chronic renal disease     Subarachnoid hemorrhage (H)     Idiopathic normal pressure hydrocephalus (INPH)     Urinary bladder neurogenic dysfunction     Stroke (H)     Cognitive deficit, post-stroke     HL (hearing loss)     Personal history of fall     GERD (gastroesophageal reflux disease)     COPD exacerbation (H)     SHIVAM (obstructive sleep apnea)     LVH (left ventricular hypertrophy)     Diastolic CHF, acute on chronic (H)     Hypoxemia     Tachyarrhythmia     Systolic and diastolic CHF, chronic (H)     Gait abnormality     Iron deficiency anemia     Allergic rhinitis     Altered mental status     VRE (vancomycin-resistant Enterococci)     Hypoglycemia     Type 2 diabetes with stage 3 chronic kidney disease GFR 30-59 (H)     Chronic obstructive pulmonary disease, unspecified COPD type (H)     Ankle pain      Fx medial malleolus-closed     Gastrointestinal hemorrhage associated with duodenal ulcer     Anemia due to blood loss, acute     Acute cystitis     Hypernatremia     Delirium     Acute kidney injury superimposed on CKD     Closed fracture of ankle      Plan:   ## New Right MCA Ischemic Stroke 4/12 , stable   Stroke most likely caused by ischemia during episode of hypotension with GI bleed vs embolic from Afib. Patient not a candidate for TPA .  No symptoms of increase ICP.  Patient noted to have decerebrate posturing overnight, most likely sequelae from the primary stroke.    Plan:   - Continue secondary stoke prevention, keep blood pressure with goal of <140/90   - ASA   - Telemetry    ## Malnutrition:  Patient has pulled out NGT twice now.      Plan:  - G-tube tomorrow in OR    ##A Fib with RVR: stable  Patient continues to have intermittent episodes of RVR (lasts < 2 minutes) but remains HD stable    Plan:   -Would not get troponins with episodes of RVR unless patient becomes hemodynamically unstable.  Due to his condition his options are limited if he develops ACS and he is not a candidate for invasive cardiac procedure, discussed with Spouse  -Will continue to monitor   - Switch to Metoprolol 2.5 mg IV q 6 hours   - Do not consult Cardiology for intervention (per spouse's wishes)    ##HFrEF, euvolemic:  Negative JVD distention, weight stabilizing (admission weight- 80 kg (4/1), peaked at 87 kg(4/10) and 82.5 (4/17)     Plan:  - IV lasix prn  -Daily weights  -Input and Output     ## COPD: stable      Plan:   - Q 4 hour Atrovent nebs  - Oral care and suctioning prn    ## Upper GI Bleed secondary to Duodenal Ulcer: stable   ## Lower GI Bleed secondary to rectal ulcer : stable  ##Acute anemia secondary to above: Trending down  Hemoglobin had been stable since 4/15 (8.0-8.5), but was noted to be 7.6 today and 7.9 on recheck.  Will plan to transfuse 1 U PRBC (to optimize patient prior to discharge), will discuss  with spouse first    Plan:   - Transfuse 1 U PRBC pending discussion with spouse  - Continue high dose Pantoprazole 40 mg IV bid  - Avoid NSAIDs  - transfuse of HGb is <8 and if with active bleeding     ## Multiple bone fracture on left foot/ankle  Hx of fractures after a fall on 9/16, and 2/2017.  Cast has been in place for several weeks now.  Per Dr. Huntley's note on 3/2, patient was to return to clinic for repeat x-rays.  Spoke with Orthopedic Surgery- they will remove cast prior to discharge to long term care    Plan:  - Ortho to remove cast today    ##Hypernatremia:  resolved  Patient has had episodes of hypernatremia, most likely secondary to decreased oral intake.  Since the stroke, hypotonic fluids will need to be avoided to avoid cerebral edema.       Plan:   -Will monitor     ## Upper Extremity Swelling: Improving  Most likely secondary to superficial thrombophlebitis vs PICC infiltration vs third spacing .  Patient has a history of blood clots and INR is currently < 2.  LUE ultrasound was negative for DVT.     Plan:   - Will monitor     ## Acute liver injury:  Resolved  Etiology of acute liver injury unknown. Work up for Viral hepatitis, toxin, drugs and BUD Chiari has all been negative. No episode of severe hypotension.  Enzymes have reverted back to normal without intervention.  GI recommending MRCP to evaluate for biliary tract obstruction (depending on ultimate goals of care).     Plan:   - No hepatotoxic medications (tylenol, statins, etc)  - MRCP recommended but most likely will not pursue this     ## UTI vs Colonization: Resolved  Received antibiotics initially but was stopped as this is deemed more of an asymptomatic bacteriuria vs colonization rather than a UTI      ## SHERYL on CKD3 (baseline Cr 1.5-2): improved   Thought to be prerenal from decrease PO intake     Plan:   - Follow up BMP daily  - Strict I/O   - Daily weights  - Continue telemetry      ## Type 2 DM, last A1C 6.1 on 02/20/17    Blood sugars frequently fluctuate.  Patient was NPO yesterday and blood sugars were in mid 200s with D5LR.     Plan:   - Change IVFs to LR  - Q 4 hour glucose checks  - Hold Lantus (12 units bid)  - Correction: High Insulin Sliding scale       ## Stercoral proctitis     Plan:    - Miralax daily PRN and Senna at bedtime PRN.   - HOLD PTA ferrous sulfate while being treated for constipation    #Normal Pressure Hydrocephalus sequelae of subarachnoid hemorrhage   #Multi infarct Vascular Dementia  W/ Hx stroke, normal pressure hydrocephalus (2012), status post ventriculoperitoneal shunting, subarachnoid hemorrhage(2012).  Patient's neurologic issues were stable prior to stroke.  Will need to follow closely for cerebral edema caused by stroke that could potentially exacerbate patient's hydrocephalus.  Plan :   -Will observe but no aggressive interventions      ##High grade right ICA stenosis, diagnosed on 01/2017 MRA, stable   Patient's LAKE-VASC score is 8, not on anticoagulation due to recent GI bleed  Plan:   -Will continue to hold warfarin for now due to high risk of bleeding      ## Physical Deconditioning  Plan:    - PT/OT  - Speech therapy      Other chronic conditions, stable- continue home medications.  ## HTN-  Metoprolol  ## Hyperlipidemia-   rosuvastatin  ## COPD- To continue Breo and Atrovent inhaler   ##SHIVAM- Bipap  ##GERD - PPI   ## Constipation- Not an issue right now, will resume bowel regimen as needed   ## Progressive cognitive dysfunction (dementia)     Fluids: LR at 75 cc/hr  Electrolytes: stable  Nutrition: NPO  Lines:PIV, PICC  Activity: Up with assist   CODE: DNR / DNI  Prophylaxis: Coumadin held  Dispo: Expected discharge date 4-5 days pending clinical improvement                  Interval History:   Melvin Romeroelton was seen by Surgery yesterday for G tube placement and WOC for left upper buttock wound.    Overnight, patient developed decerebrate posturing on left upper side.               Review of Systems:   Review of Systems   Constitutional:        ROS limited secondary to patient's condition   Gastrointestinal: Negative for blood in stool.            Physical Exam (Resident / Clinician):   Vitals were reviewed  Temp: 100.2  F (37.9  C) Temp src: Axillary BP: 140/69   Heart Rate: 89 Resp: 20 SpO2: 100 % O2 Device: None (Room air)      Physical Exam   Constitutional: He appears well-developed. No distress.   Sitting in bed, arousable   Eyes: Pupils are equal, round, and reactive to light. No scleral icterus.   Right and left pupils reactive to light.  Right pupil sluggish and less reactive compared to left pupil.   Neck: Normal range of motion. Neck supple. No JVD present. No tracheal deviation present.   Cardiovascular: Normal rate.    Murmur (2/6 systolic murmur at RSB) heard.  Pulmonary/Chest: Effort normal and breath sounds normal. No respiratory distress. He has no wheezes. He has no rales.   Decreased breath sounds throughout.  + Cough   Abdominal: Soft. There is no tenderness. There is no rebound and no guarding.   Genitourinary:   Genitourinary Comments: Orta in place, Diaper in place   Musculoskeletal: He exhibits no edema (Swelling of left upper extremity, distal to elbow (including hand, which has 1-2+ edema)) or tenderness.   Left upper extremity extended, externally rotated, with spasticity during active flexion at elbow.  Left leg wrapped in cast   Neurological: He is alert.   Makes eye contact, unable to answer questions.  Patient will squeeze right hand on command, will not close eyes on command   Skin: Skin is warm and dry. No erythema. No pallor.   Vitals reviewed.          Data:   ROUTINE LABS (Last four results)  CMP    Recent Labs  Lab 04/18/17  0723 04/17/17  0825 04/16/17  1735 04/16/17  0716 04/15/17  0945 04/15/17  0515  04/14/17  0535    141  --  138 142 142  --  144   POTASSIUM 3.9 3.7  --  3.9 4.3 3.5  < > 3.3*   CHLORIDE 109 106  --  106 108 106  --  110*   CO2  26 27  --  26 25 28  --  24   ANIONGAP 7 8  --  6 9 7  --  10   * 143*  --  266* 199* 167*  --  111*   BUN 11 12  --  11 9 10  --  9   CR 1.10 1.05  --  1.13 1.29* 1.26*  --  1.23   GFRESTIMATED 65 68  --  63 54* 55*  --  57*   GFRESTBLACK 78 83  --  76 65 67  --  69   LORENA 7.9* 8.1*  --  7.7* 7.6* 7.8*  --  7.5*   MAG 2.0 2.1  --  2.0  --  2.2  < > 1.5*   PHOS 2.7 2.3* 2.7 1.8*  --  2.2*  --  2.5   PROTTOTAL  --   --   --  5.0* 5.0* 4.7*  --  4.8*   ALBUMIN  --   --   --  1.6* 1.5* 1.6*  --  1.6*   BILITOTAL  --   --   --  0.4 0.4 0.4  --  0.6   ALKPHOS  --   --   --  80 71 69  --  70   AST  --   --   --  21 17 14  --  7   ALT  --   --   --  26 31 29  --  36   < > = values in this interval not displayed.  CBC    Recent Labs  Lab 04/18/17  1001 04/18/17  0723 04/17/17  0825 04/16/17  0716  04/15/17  0945   WBC  --  3.9* 4.8 5.1  --  4.7   RBC  --  2.73* 2.99* 2.91*  --  2.93*   HGB 7.9* 7.6* 8.3* 8.0*  < > 8.1*   HCT  --  25.2* 27.5* 26.8*  --  27.0*   MCV  --  92 92 92  --  92   MCH  --  27.8 27.8 27.5  --  27.6   MCHC  --  30.2* 30.2* 29.9*  --  30.0*   RDW  --  19.1* 19.3* 19.9*  --  20.0*   PLT  --  167 172 138*  --  143*   < > = values in this interval not displayed.  INR    Recent Labs  Lab 04/12/17  1847   INR 1.27*     CRP    Recent Labs  Lab 04/12/17  0446   CRP 13.0*           Medications:     Current Facility-Administered Medications   Medication     insulin glargine (LANTUS) injection 12 Units     metoprolol (LOPRESSOR) injection 2.5 mg     pantoprazole (PROTONIX) 40 mg IV push injection     dextrose 5% in lactated ringers infusion     mineral oil-hydrophilic petrolatum (AQUAPHOR)     insulin aspart (NovoLOG) inj (RAPID ACTING)     rosuvastatin (CRESTOR) tablet 20 mg     ipratropium (ATROVENT) 0.02 % neb solution 0.5 mg     aspirin suspension 325 mg     sodium chloride (PF) 0.9% PF flush 5-50 mL     lidocaine 1 % 1 mL     lidocaine (LMX4) kit     sodium chloride (PF) 0.9% PF flush 10-20 mL      heparin lock flush 10 UNIT/ML injection 5-10 mL     heparin lock flush 10 UNIT/ML injection 5-10 mL     nystatin (MYCOSTATIN) suspension 500,000 Units     dextrose 10 % 1,000 mL infusion     multivitamins with minerals (CERTAVITE/CEROVITE) liquid 15 mL     lactobacillus rhamnosus (GG) (CULTURELL) capsule 1 capsule     polyethylene glycol (MIRALAX/GLYCOLAX) Packet 17 g     senna-docusate (SENOKOT-S;PERICOLACE) 8.6-50 MG per tablet 1-2 tablet     acetaminophen (TYLENOL) solution 650 mg     glucose 40 % gel 15-30 g    Or     dextrose 50 % injection 25-50 mL    Or     glucagon injection 1 mg     magnesium sulfate 4 g in 100 mL sterile water (premade)     potassium phosphate 15 mmol in D5W 250 mL intermittent infusion     potassium phosphate 20 mmol in D5W 500 mL intermittent infusion     potassium phosphate 20 mmol in D5W 250 mL intermittent infusion     potassium phosphate 25 mmol in D5W 500 mL intermittent infusion     [Rx hold ] metoprolol (LOPRESSOR) suspension 50 mg     Reason ACE/ARB order not selected     lidocaine 1 % 0.5-5 mL     sodium chloride (PF) 0.9% PF flush 10-20 mL     sodium chloride (PF) 0.9% PF flush 10 mL     fluticasone-vilanterol (BREO ELLIPTA) 200-25 MCG/INH oral inhaler 1 puff     fluticasone (FLONASE) 50 MCG/ACT spray 2 spray     ipratropium (ATROVENT HFA) Inhaler 1 puff     naloxone (NARCAN) injection 0.1-0.4 mg     magnesium citrate solution 148 mL     ondansetron (ZOFRAN-ODT) ODT tab 4 mg    Or     ondansetron (ZOFRAN) injection 4 mg     prochlorperazine (COMPAZINE) injection 5 mg    Or     prochlorperazine (COMPAZINE) tablet 5 mg    Or     prochlorperazine (COMPAZINE) Suppository 12.5 mg     Patient is already receiving anticoagulation with heparin, enoxaparin (LOVENOX), warfarin (COUMADIN)  or other anticoagulant medication     No lozenges or gum should be given while patient on BIPAP     hypromellose-dextran (ARTIFICAL TEARS) ophthalmic solution 1 drop     insulin aspart (NovoLOG) inj  (RAPID ACTING)     ferrous sulfate (IRON) tablet 325 mg       Caring Physician: Toña Duron MD  [facility], [facility]  Pager Contact: see Physician sticky note

## 2017-04-18 NOTE — PROGRESS NOTES
CLINICAL NUTRITION SERVICES - REASSESSMENT NOTE     Nutrition Prescription    RECOMMENDATIONS FOR MDs/PROVIDERS TO ORDER:  None at this time    Malnutrition Status:    Severe malnutrition in the context of acute on chronic illness    Recommendations already ordered by Registered Dietitian (RD):  1. D/C TF order and water flushes while no TF access    Future/Additional Recommendations:  1. Monitor advancement and tolerance of TF once access obtained.   2. Monitor diet recs per SLP, if advanced >NPO/CL, start calorie counts and offer supplements  3. Once EN access obtained and confirmed by XR, recommend Peptamen 1.5 @ goal of 45 ml/hr + 4 pkts Beneprotein daily to provide 1080 mL TF/day, 1720 kcals (27 kcal/kg), 97 g PRO (1.5 gm/kg), 832 ml free H2O, 60 g Fat (70% from MCTs), 203 g CHO and no Fiber daily.  -- Start @ 10 ml and advance by 10 ml Q 8 hrs as tolerated and as long as K+/Mg++ WNL and Phos >1.9 and GRV's <500 mL.   4. Recommend 7ml Trivitamin and 220 mg Zn sulfate daily x 10 days for wound healing.      EVALUATION OF THE PROGRESS TOWARD GOALS   Diet: NPO, c/w dysphagia and AMS  Nutrition Support: No access at this time  Intake: NDT placed 4/14. Peptamen 1.5 started at 10 ml/hr and increased to goal on 4/16. Water flushes 60 ml Q 4 hrs.  - 3 day avg TF intake (4/14-4/16) of 625 mL providing 938 kcal (15 kcal/kg) and 43 gm pro (0.7 gm/kg) which meets 73% estimated energy needs and 67% estimated protein needs.     NEW FINDINGS   - Unable to see pt, per RN no coherent speech and neuro checks are unchanged. Per MD note, pt does not make eye contact, communicate or follow commands.  - Pt removed FT 4/16. Plan to place PEG tube Wednesday per Surgery note 4/17.  - Skin: Per WOCN 4/17, L upper buttock/close to coccyx - stage 2 PI, healing. See updated needs below.  - New low wt of 76 kg, which is 6.5 kg (8%) down from yesterday. Per discussion with RN, wt was taken with bedscale.     UPDATED ASSESSED NUTRITION NEEDS-  using new dosing wt of 63 kg (adjusted using lowest wt of 76 kg today)  Estimated Energy Needs: 8065-1138+ kcals/day (25-30+ kcals/kg)  Justification: Obese  Estimated Protein Needs: 82-96 grams protein/day (1.3-1.5 grams of pro/kg)  Justification: Hypercatabolism with acute illness  Estimated fluid needs: >30 ml/kg  Justification: Hydration    MALNUTRITION  % Intake: < 75% for > 7 days (non-severe)  % Weight Loss: > 2% in 1 week (severe)  Subcutaneous Fat Loss: Unable to assess, per previous assessment none observed  Muscle Loss: Unable to assess, per previous assessment none observed  Fluid Accumulation/Edema: None noted  Malnutrition Diagnosis: Severe malnutrition in the context of acute on chronic illness    Previous Goals   Diet adv v nutrition support within 1-2 days  Evaluation: Met    Previous Nutrition Diagnosis  Inadequate oral intake related to altered mental status and refusal of food/fluids while on DD1 and now NPO as evidenced by ongoing NPO recs per SLP and need for FT, however, pt removed FT after 1 day of minimal feeding  Evaluation: No longer applicable, see change below.    CURRENT NUTRITION DIAGNOSIS  Inadequate protein-energy intake related to altered mental status and ongoing SLP recs NPO as evidenced by pt reliant on TF to meet nutrition/hydration needs and 3 day avg TF intake of 938 kcal (15 kcal/kg) and 43 gm pro (0.7 gm/kg) which meets 73% estimated energy needs and 67% estimated protein needs and now with no EN access with plan for PEG tomorrow.    INTERVENTIONS  Implementation  Collaboration with other providers - discussed pt during rounds  Enteral Nutrition - D/C while without access  Feeding tube flush - D/C while without access    Goals  Resume nutrition support within 1-2 days.    Monitoring/Evaluation  Progress toward goals will be monitored and evaluated per protocol.    Nic Gudino, Dietetic Intern    I agree with the above recommendations, goals, and interventions.    Johnathan  Raffaele CUBA, WILTON  6B Clinical Dietitian  Pager: [phone]  [facility] [phone]

## 2017-04-18 NOTE — DOWNTIME EVENT NOTE
The EMR was down for 12 hours on 4/18/2017.    Marilyn Lindsay was responsible for completing the paper charting during this time period.     The following information was re-entered into the system by Marisa Miller: Vitals, I/O, Medications    The following information will remain in the paper chart: Vitals, Assessment, Shift Note, I/O, Paper EUGENE Miller  4/18/2017

## 2017-04-18 NOTE — PROGRESS NOTES
"Orthopaedic Progress Note:       Subjective:   Patient with recent h/o stroke affecting left side of body.  Pt is 2 months s/p left ankle bimalleolar fracture.  Patient has been in cast for 6 weeks.  Transitioning to TCU later this week, nursing requesting cast off and wanting to know about further immobilization or weight bearing status.  Patient is currently not able to walk due to stroke.  Pt is also diabetic.      Objective:   /82 (BP Location: Right arm)  Pulse 87  Temp 98.5  F (36.9  C) (Axillary)  Resp 18  Ht 1.626 m (5' 4\")  Wt 76 kg (167 lb 8.8 oz)  SpO2 100%  BMI 28.76 kg/m2  Gen: NAD. Resting  in bed, does not communicate with me.  Does moan out twice during procedure.  Resp: Breathing minimally laboredwith O2 by NC  : Orta in place  LLE:  Left short leg cast intact and fitting well.  Cast was removed by myself today, and patient tolerated the procedure well.  Skin was intact with no ulcerations or maceration.  Foot in neutral position with minimal swelling.  Cap refill WNL.  Pt unable to perform AROM d/t stroke affecting left side.  Pt does not respond vocally with palpation of ankle.      Labs:  Lab Results   Component Value Date    WBC 3.9 (L) 04/18/2017    HGB 7.6 (L) 04/18/2017     04/18/2017    INR 1.27 (H) 04/12/2017        Assessment & Plan:   76 year old male 2 months s/p left bimalleolar ankle fracture treated with immobilization, with h/o stroke, diabetes and obesity.     Activity: WBAT LLE, but consider bracing due to altered sensation/function from stroke, if patient will be weight bearing soon, but this seems unlikely at this time.  Recommend PRAFO in bed to prevent heel ulcers and plantar flexion contracture.    Imaging: Will get 3 view xrays of left ankle (portable) today to assess healing.  Xrays from 4/5/17 difficult to determine healing d/t cast in place.   PT/OT: Mobility, ROM, gait training, ADLs to be done at nursing home.  No restrictions on ROM.  Follow up: " with original outpatient Orthopedic Surgeon as needed for ankle symptoms, depending on discharge from TCU.    Judi Roque PA-C  Department of Orthopedics  [phone]      Xray results:  3 views of left ankle show healing trimalleolar ankle fracture in acceptable alignment with callus formation.  Healing not complete.  Recommend protected weight bearing in AFO when patient is able to stand and bear weight for one month.  Wear PRAFO at rest in bed to prevent heel ulcers.  ROM as tolerated at rest.    Judi Roque PA-C

## 2017-04-18 NOTE — PLAN OF CARE
Problem: Goal Outcome Summary  Goal: Goal Outcome Summary     SLP: Pt seen for dysphagia tx while upright in chair. Pt alert, followed 3/3 basic 1-step commands to ID window/ceiling by glance and cued cough. Pt with intermittent wet vocal quality and aggressive throat clears with trials of ice chips and nectar thick liquids by tsp. Improved tolerance of puree, but noted delayed swallow and poor awareness. Recommend continue NPO with enteral support for nutrition/hydration/medication pending goals of care; likely PEG given pt pulled previous NG x2 with persisting confusion. SLP to follow per POC.

## 2017-04-19 NOTE — ANESTHESIA CARE TRANSFER NOTE
Patient: Melvin Bhatia    Procedure(s):  Percutneous Gastrostomy Tube Insertion Latex Safe - Wound Class: II-Clean Contaminated    Diagnosis: Malnutrition   Diagnosis Additional Information: No value filed.    Anesthesia Type:   General     Note:  Airway :Face Mask  Patient transferred to:PACU  Comments: VSS, FM w/ + exchange.      Vitals: (Last set prior to Anesthesia Care Transfer)    CRNA VITALS  4/19/2017 1340 - 4/19/2017 1417      4/19/2017             Pulse: 75    SpO2: 100 %    Resp Rate (set): 10                Electronically Signed By: WHITNEY Lopez CRNA  April 19, 2017  2:17 PM

## 2017-04-19 NOTE — BRIEF OP NOTE
[facility], [place]    Brief Operative Note    Pre-operative diagnosis: Malnutrition   Post-operative diagnosis Same    Procedure: Procedure(s):  Percutneous Gastrostomy Tube Insertion Latex Safe - Wound Class: I-Clean  Surgeon: Surgeon(s) and Role:     * Akosua Tyson MD - Primary     * Ludmila Palacios MD - Resident - Assisting   Joann Tran PGY5    Anesthesia: General   Estimated blood loss: Minimal  Drains: 20 Brazilian percutaneous gastrostomy tube at 4cm  Specimens: * No specimens in log *  Findings:   None.  Complications: None.  Implants: None.    Postop Plan:   G tube to gravity for 24 hours   -OK to clamp tube for meds  -OK to initiate tube feeds tomorrow morning.     Joann Tran, PGY5

## 2017-04-19 NOTE — PROGRESS NOTES
SPIRITUAL HEALTH SERVICES  [facility] ([place] 3C   PRE-SURGERY VISIT    Had pre-surgery visit with pt.  Provided spiritual support, prayer.   Uche Raymond   Resident   Pager [phone]

## 2017-04-19 NOTE — PROGRESS NOTES
Ruth  WOC Wound Assessment:    Location Left lateral planter surface blanchable erythema :     Type: A blanchable erythema     Periwound skin: Dry flaky skin  Drainage: None  Stage if pressure ulcer: N/A  Pain: Unable to assess     Intervention: Assessed and wrote instructions of care     Plan: Plan of care for wound located on the Left lateral planter surface Cleanse with MicroKlenz moisten gauze   Pat dry. Apply no sting barrier film to th zoe wound skin allow to dry   Cover with  4 x4 Mepilex dressing  Change every other day and as needed    WOC will sign off      Face to face: 15 minutes minutes

## 2017-04-19 NOTE — PLAN OF CARE
Problem: Goal Outcome Summary  Goal: Goal Outcome Summary  Outcome: No Change  Pt alert, unable to assess orientation status. Pupils sluggish and L>R in size. Follows commands in RUE only. Afebrile, VSS, SR with frequent PACS/afib with HR 70-80s. 21% bipap at HS. LS coarse throughout. + Bowel sounds in all quadrants, small smear of BM overnight. Orta in place with adequate UOP. Mepilex on L buttocks wound, L) knee, L) foot/heel, and R) heel. Buttocks reddened and raw, barrier cream applied. LR infusing at 75 mL/hr. 1 unit of PRBCs transfused-tolerated without incident. Repositioned Q 2 hrs overnight. Plan for GJ tube placement today. Surgical scrub preparation completed. Continue with POC.

## 2017-04-19 NOTE — ANESTHESIA POSTPROCEDURE EVALUATION
Patient: Melvin Bhatia    Procedure(s):  Percutneous Gastrostomy Tube Insertion Latex Safe - Wound Class: II-Clean Contaminated    Diagnosis:Malnutrition   Diagnosis Additional Information: No value filed.    Anesthesia Type:  General    Note:  Anesthesia Post Evaluation    Patient location during evaluation: PACU  Patient participation: Able to fully participate in evaluation  Level of consciousness: awake and alert  Airway patency: patent  Cardiovascular status: acceptable  Hydration status: acceptable     Anesthetic complications: None          Last vitals:  Vitals:    04/19/17 1120 04/19/17 1415 04/19/17 1430   BP: 153/63 114/61 115/58   Pulse:      Resp: 20 16 13   Temp: 36.6  C (97.8  F) 36.4  C (97.5  F)    SpO2: 100% 100% 93%         Electronically Signed By: Susan Chen MD  April 19, 2017  2:50 PM

## 2017-04-19 NOTE — PROGRESS NOTES
[facility] Medicine - Inpatient daily progress note    Date of admission: 3/31/2017  Date of service: 4/19/2017.           Assessment and Plan:      Melvin Bhatia is a 76 year old male with a significant past medical history of cerebrovascular disease, normal pressure hydrocephalus (status post ventriculoperitoneal shunting), progressive cognitive dysfunction, multiple falls, HTN, Type 2 DM, hyperlipidemia, CHF, CKD, and COPD who presented with  worsening mental status. He was found to have an SHERYL and UTI. He developed an episode of melena, anemia and significant elevation for liver enzymes.  He suffered an ischemic stroke of the right MCA diagnosed 4/12.    Patient Active Problem List   Diagnosis     Osteoporosis     PAF (paroxysmal atrial fibrillation) (H)     Lumbosacral plexopathy     Testosterone deficiency     Hypertension goal BP (blood pressure) < 140/90     Atrial fibrillation (H)     CKD (chronic kidney disease) stage 3, GFR 30-59 ml/min     Hyperlipidemia LDL goal <70     Coronary artery disease     Health Care Home     ACP (advance care planning)     Elevated PSA     Anemia in chronic renal disease     Subarachnoid hemorrhage (H)     Idiopathic normal pressure hydrocephalus (INPH)     Urinary bladder neurogenic dysfunction     Stroke (H)     Cognitive deficit, post-stroke     HL (hearing loss)     Personal history of fall     GERD (gastroesophageal reflux disease)     COPD exacerbation (H)     SHIVAM (obstructive sleep apnea)     LVH (left ventricular hypertrophy)     Diastolic CHF, acute on chronic (H)     Hypoxemia     Tachyarrhythmia     Systolic and diastolic CHF, chronic (H)     Gait abnormality     Iron deficiency anemia     Allergic rhinitis     Altered mental status     VRE (vancomycin-resistant Enterococci)     Hypoglycemia     Type 2 diabetes with stage 3 chronic kidney disease GFR 30-59 (H)     Chronic obstructive pulmonary disease, unspecified COPD type (H)     Ankle pain      Fx medial malleolus-closed     Gastrointestinal hemorrhage associated with duodenal ulcer     Anemia due to blood loss, acute     Acute cystitis     Hypernatremia     Delirium     Acute kidney injury superimposed on CKD     Closed fracture of ankle      Plan:   ## New Right MCA Ischemic Stroke 4/12 , stable   Stroke most likely caused by ischemia during episode of hypotension with GI bleed vs embolic from Afib. Patient not a candidate for TPA .  No symptoms of increase ICP.      Plan:   - Continue secondary stoke prevention, keep blood pressure with goal of <140/90   - ASA    - Telemetry    ## Malnutrition:  Patient has pulled out NGT twice now.      Plan:  - G-tube placement today    ##A Fib with RVR: stable  Patient continues to have intermittent episodes of RVR (lasts < 2 minutes) but remains HD stable    Plan:   -Would not get troponins with episodes of RVR unless patient becomes hemodynamically unstable.  Due to his condition his options are limited if he develops ACS and he is not a candidate for invasive cardiac procedure, discussed with Spouse  -Will continue to monitor   - Continue Metoprolol 2.5 mg IV q 6 hours   - Do not consult Cardiology for intervention (per spouse's wishes)    ##HFrEF, euvolemic:  Negative JVD distention, weight stabilizing (admission weight- 80 kg (4/1), peaked at 87 kg(4/10) and 80 kg (4/17)     Plan:  - IV lasix prn  -Daily weights  -Input and Output     ## COPD: stable      Plan:   - Q 4 hour Atrovent nebs  - Oral care and suctioning prn    ## Upper GI Bleed secondary to Duodenal Ulcer: stable   ## Lower GI Bleed secondary to rectal ulcer : stable  ##Acute anemia secondary to above: Trending down  Hemoglobin had been stable since 4/15 (8.0-8.5), but was noted to be 7.6 with repeat of 7.9 on 4/18.  Received 1 U PRBC (to optimize patient prior to surgery and for discharge)    Plan:   - Continue high dose Pantoprazole 40 mg IV bid  - Avoid NSAIDs  - transfuse of HGb is <8 and if with  active bleeding     ## Multiple bone fracture on left foot/ankle  Hx of fractures after a fall on 9/16, and 2/2017.  Cast removed 4/18 by Ortho.  X-rays obtained- break not completely healed.  Ortho recs:    Plan:  - Protect weight bearing in AFO  - PRAFO in bed to prevent ulcers  - Follow up with original orthopedic surgeon for ankle symptoms    ##Hypernatremia:  resolved  Patient has had episodes of hypernatremia, most likely secondary to decreased oral intake.  Since the stroke, hypotonic fluids will need to be avoided to avoid cerebral edema.       Plan:   -Will monitor     ## Upper Extremity Swelling: Improving  Most likely secondary to superficial thrombophlebitis vs PICC infiltration vs third spacing .  Patient has a history of blood clots and INR is currently < 2.  LUE ultrasound was negative for DVT.     Plan:   - Will monitor     ## Acute liver injury:  Resolved  Etiology of acute liver injury unknown. Work up for Viral hepatitis, toxin, drugs and BUD Chiari has all been negative. No episode of severe hypotension.  Enzymes have reverted back to normal without intervention.  GI recommending MRCP to evaluate for biliary tract obstruction (depending on ultimate goals of care).     Plan:   - No hepatotoxic medications (tylenol, statins, etc)  - MRCP recommended but most likely will not pursue this     ## UTI vs Colonization: Resolved  Received antibiotics initially but was stopped as this is deemed more of an asymptomatic bacteriuria vs colonization rather than a UTI      ## SHERYL on CKD3 (baseline Cr 1.5-2): improved   Thought to be prerenal from decrease PO intake     Plan:   - Follow up BMP daily  - Strict I/O   - Daily weights  - Continue telemetry      ## Type 2 DM, last A1C 6.1 on 02/20/17   Blood sugars frequently fluctuate.  Blood sugars better controlled yesterday with LR (vs d5LR)    Plan:   - Continue LR  - Q 4 hour glucose checks  - Hold Lantus (12 units bid)  - Correction: High Insulin Sliding scale        ## Stercoral proctitis     Plan:    - Miralax daily PRN and Senna at bedtime PRN.   - HOLD PTA ferrous sulfate while being treated for constipation    #Normal Pressure Hydrocephalus sequelae of subarachnoid hemorrhage   #Multi infarct Vascular Dementia  W/ Hx stroke, normal pressure hydrocephalus (2012), status post ventriculoperitoneal shunting, subarachnoid hemorrhage(2012).  Patient's neurologic issues were stable prior to stroke.  Will need to follow closely for cerebral edema caused by stroke that could potentially exacerbate patient's hydrocephalus.  Plan :   -Will observe but no aggressive interventions      ##High grade right ICA stenosis, diagnosed on 01/2017 MRA, stable   Patient's LAKE-VASC score is 8, not on anticoagulation due to recent GI bleed  Plan:   -Will continue to hold warfarin for now due to high risk of bleeding      ## Physical Deconditioning  Plan:    - PT/OT  - Speech therapy      Other chronic conditions, stable- continue home medications.  ## HTN-  Metoprolol  ## Hyperlipidemia-   rosuvastatin  ## COPD- To continue Breo and Atrovent inhaler   ##SHIVAM- Bipap  ##GERD - PPI   ## Constipation- Not an issue right now, will resume bowel regimen as needed   ## Progressive cognitive dysfunction (dementia)     Fluids: LR at 75 cc/hr  Electrolytes: stable  Nutrition: NPO  Lines:PIV, PICC  Activity: Up with assist   CODE: DNR / DNI  Prophylaxis: Coumadin held  Dispo: Expected discharge date 4-5 days pending clinical improvement                  Interval History:   Melvin Bhatia had no events overnight.  Nursing notes reviewed.  Today Fredrick will get a G-tube            Review of Systems:   Review of Systems   Constitutional:        ROS limited secondary to patient's condition   Gastrointestinal: Negative for blood in stool.            Physical Exam (Resident / Clinician):   Vitals were reviewed  Temp: 98.6  F (37  C) Temp src: Axillary BP: 123/72   Heart Rate: 80 Resp: 15 SpO2: 100 % O2  Device: BiPAP/CPAP      Physical Exam   Constitutional: He appears well-developed. No distress.   Sitting in bed, arousable   Eyes: Pupils are equal, round, and reactive to light. No scleral icterus.   Right and left pupils reactive to light.  Right pupil sluggish and less reactive compared to left pupil.   Neck: Normal range of motion. Neck supple. No JVD present. No tracheal deviation present.   Cardiovascular: Normal rate.    Murmur (2/6 systolic murmur at RSB) heard.  Pulmonary/Chest: Effort normal and breath sounds normal. No respiratory distress. He has no wheezes. He has no rales.   Decreased breath sounds throughout.  + Cough   Abdominal: Soft. There is no tenderness. There is no rebound and no guarding.   Genitourinary:   Genitourinary Comments: Orta in place, Diaper in place   Musculoskeletal: He exhibits no edema (Swelling of left upper extremity, distal to elbow (including hand, which has 1-2+ edema)) or tenderness.   Left upper extremity extended, externally rotated, with spasticity during active flexion at elbow. Supportive boots on both lower extremities   Neurological: He is alert.   Makes eye contact, unable to answer questions.  Patient will squeeze right hand on command, will close eyes on command   Skin: Skin is warm and dry. No erythema. No pallor.   Vitals reviewed.          Data:   ROUTINE LABS (Last four results)  CMP    Recent Labs  Lab 04/19/17  0612 04/18/17  0723 04/17/17  0825 04/16/17  1735 04/16/17  0716 04/15/17  0945 04/15/17  0515  04/14/17  0535    142 141  --  138 142 142  --  144   POTASSIUM 3.8 3.9 3.7  --  3.9 4.3 3.5  < > 3.3*   CHLORIDE 106 109 106  --  106 108 106  --  110*   CO2 26 26 27  --  26 25 28  --  24   ANIONGAP 7 7 8  --  6 9 7  --  10   * 183* 143*  --  266* 199* 167*  --  111*   BUN 14 11 12  --  11 9 10  --  9   CR 1.00 1.10 1.05  --  1.13 1.29* 1.26*  --  1.23   GFRESTIMATED 72 65 68  --  63 54* 55*  --  57*   GFRESTBLACK 87 78 83  --  76 65 67   --  69   LORENA 8.4* 7.9* 8.1*  --  7.7* 7.6* 7.8*  --  7.5*   MAG 2.0 2.0 2.1  --  2.0  --  2.2  < > 1.5*   PHOS 2.4* 2.7 2.3* 2.7 1.8*  --  2.2*  --  2.5   PROTTOTAL  --   --   --   --  5.0* 5.0* 4.7*  --  4.8*   ALBUMIN  --   --   --   --  1.6* 1.5* 1.6*  --  1.6*   BILITOTAL  --   --   --   --  0.4 0.4 0.4  --  0.6   ALKPHOS  --   --   --   --  80 71 69  --  70   AST  --   --   --   --  21 17 14  --  7   ALT  --   --   --   --  26 31 29  --  36   < > = values in this interval not displayed.  CBC    Recent Labs  Lab 04/19/17  0612 04/18/17  1001 04/18/17  0723 04/17/17  0825 04/16/17  0716   WBC 4.9  --  3.9* 4.8 5.1   RBC 3.23*  --  2.73* 2.99* 2.91*   HGB 8.9* 7.9* 7.6* 8.3* 8.0*   HCT 29.4*  --  25.2* 27.5* 26.8*   MCV 91  --  92 92 92   MCH 27.6  --  27.8 27.8 27.5   MCHC 30.3*  --  30.2* 30.2* 29.9*   RDW 18.4*  --  19.1* 19.3* 19.9*     --  167 172 138*     INR    Recent Labs  Lab 04/12/17  1847   INR 1.27*     CRP  No lab results found in last 7 days.        Medications:     Current Facility-Administered Medications   Medication     ceFAZolin sodium-dextrose (ANCEF) infusion 2 g     ceFAZolin (ANCEF) 1 g vial to attach to  ml bag for ADULT or 50 ml bag for PEDS     lactated ringers infusion     aspirin Suppository 300 mg     aspirin suspension 325 mg     ipratropium (ATROVENT) 0.02 % neb solution 0.5 mg     insulin glargine (LANTUS) injection 12 Units     metoprolol (LOPRESSOR) injection 2.5 mg     pantoprazole (PROTONIX) 40 mg IV push injection     mineral oil-hydrophilic petrolatum (AQUAPHOR)     insulin aspart (NovoLOG) inj (RAPID ACTING)     rosuvastatin (CRESTOR) tablet 20 mg     sodium chloride (PF) 0.9% PF flush 5-50 mL     lidocaine 1 % 1 mL     lidocaine (LMX4) kit     sodium chloride (PF) 0.9% PF flush 10-20 mL     heparin lock flush 10 UNIT/ML injection 5-10 mL     heparin lock flush 10 UNIT/ML injection 5-10 mL     nystatin (MYCOSTATIN) suspension 500,000 Units     dextrose 10 % 1,000  mL infusion     multivitamins with minerals (CERTAVITE/CEROVITE) liquid 15 mL     lactobacillus rhamnosus (GG) (CULTURELL) capsule 1 capsule     polyethylene glycol (MIRALAX/GLYCOLAX) Packet 17 g     senna-docusate (SENOKOT-S;PERICOLACE) 8.6-50 MG per tablet 1-2 tablet     acetaminophen (TYLENOL) solution 650 mg     glucose 40 % gel 15-30 g    Or     dextrose 50 % injection 25-50 mL    Or     glucagon injection 1 mg     magnesium sulfate 4 g in 100 mL sterile water (premade)     potassium phosphate 15 mmol in D5W 250 mL intermittent infusion     potassium phosphate 20 mmol in D5W 500 mL intermittent infusion     potassium phosphate 20 mmol in D5W 250 mL intermittent infusion     potassium phosphate 25 mmol in D5W 500 mL intermittent infusion     [Rx hold ] metoprolol (LOPRESSOR) suspension 50 mg     Reason ACE/ARB order not selected     lidocaine 1 % 0.5-5 mL     sodium chloride (PF) 0.9% PF flush 10-20 mL     sodium chloride (PF) 0.9% PF flush 10 mL     fluticasone-vilanterol (BREO ELLIPTA) 200-25 MCG/INH oral inhaler 1 puff     fluticasone (FLONASE) 50 MCG/ACT spray 2 spray     ipratropium (ATROVENT HFA) Inhaler 1 puff     naloxone (NARCAN) injection 0.1-0.4 mg     magnesium citrate solution 148 mL     ondansetron (ZOFRAN-ODT) ODT tab 4 mg    Or     ondansetron (ZOFRAN) injection 4 mg     prochlorperazine (COMPAZINE) injection 5 mg    Or     prochlorperazine (COMPAZINE) tablet 5 mg    Or     prochlorperazine (COMPAZINE) Suppository 12.5 mg     Patient is already receiving anticoagulation with heparin, enoxaparin (LOVENOX), warfarin (COUMADIN)  or other anticoagulant medication     No lozenges or gum should be given while patient on BIPAP     hypromellose-dextran (ARTIFICAL TEARS) ophthalmic solution 1 drop     insulin aspart (NovoLOG) inj (RAPID ACTING)     ferrous sulfate (IRON) tablet 325 mg       Caring Physician: Toña Duron MD  [facility] [place]  Pager Contact: see Physician sticky  note

## 2017-04-19 NOTE — ANESTHESIA PREPROCEDURE EVALUATION
ANESTHESIA PREOP EVALUATION    Procedure:  IMPLANT SHUNT VENTRICULOPERITONEAL - Ventriculoperitoneal Shunt Placement    HPI: Melvin Bhatia IS A 71 year old male with significant history of CAD, MI 8/2009, paroxysmal AF, COPD, SHIVAM, CKD, IDDM who had a recent fall.  Due to coumadin and confusion, a head CT was performed that revealed subarachnoid bleed and hydrocephalus.  Pt is scheduled for a  shunt.    PMHx/PSHx/ROS:  Past Medical History:   Diagnosis Date     ARDS (adult respiratory distress syndrome) (H)      Atrial fibrillation (H)     first diagnosed 11-08     Bell's palsy 1/2010     Cauda equina syndrome with neurogenic bladder (H)     self catheter     Cerebellar infarct (H) 4/2012    left cerebellar infarct.  felt embolic hx afib     CHF (congestive heart failure) (H)      Chronic airway obstruction, not elsewhere classified      Chronic infection     VRE     CKD (chronic kidney disease) stage 4, GFR 15-29 ml/min (H)      Congestive heart failure, unspecified 5/06    EF 48%     Coronary artery disease     abnl mps with ef 46% and small-med area of ischemia '08 [facility]     CVA (cerebral infarction)     old lacunar infarct in the posterior left     Diabetes      Essential hypertension, benign      Gastro-oesophageal reflux disease      Heart attack (H)      Hemorrhage of gastrointestinal tract, unspecified 2/2003    UGI Bleed with transfusion     Hydrocephalus     plan  Shunt     Hypersomnia with sleep apnea, unspecified     Bpap     Intraventricular hemorrhage 1/2012     Legionella pneumonia (H) 8/09     Lumbago      Lumbosacral plexopathy 10/09    L diabetic myotrophy     Mixed hyperlipidemia      Other osteoporosis 3/2006     Other specified anemias 2002    Nl colonoscopy/EGD; Nl iron studies 4/06     Other specified disorders of rotator cuff syndrome of shoulder and allied disorders      Other testicular hypofunction     on Androgel     Palpitations     HOLTER     Sleep apnea     uses cpap      Subarachnoid hemorrhage (H) 1/2012     Tibial plateau fracture 2/2011    left     Type II or unspecified type diabetes mellitus without mention of complication, not stated as uncontrolled      Unspecified disorder resulting from impaired renal function     Stage 3 Chronic Renal Dz         Past Surgical History:   Procedure Laterality Date     C DEXA, BONE DENSITY, AXIAL SKEL  3/2006    Osteoporosis     CARDIAC NUC CONY STRESS TEST NL  5/06    No ischemia, EF 44 %     CARDIAC NUC CONY STRESS TEST NL  6/2008, 3/09    sm to mod fixed apical defect EF 46%     ESOPHAGOSCOPY, GASTROSCOPY, DUODENOSCOPY (EGD), COMBINED N/A 4/7/2017    Procedure: COMBINED ESOPHAGOSCOPY, GASTROSCOPY, DUODENOSCOPY (EGD);  Surgeon: Hussein Lopez MD;  Location: [facility]     HC COLONOSCOPY THRU STOMA, DIAGNOSTIC  2/2003    Normal, repeat 10 yr     HC DOPPLER ECHO PULSED, COMPLETE      EF 50-55%     IMPLANT SHUNT VENTRICULOPERITONEAL  2/28/2012    Procedure:IMPLANT SHUNT VENTRICULOPERITONEAL; Ventriculoperitoneal Shunt Placement; Surgeon:ALESHA VELAZQUEZ; Location:[facility]     OPEN REDUCTION INTERNAL FIXATION TIBIAL PLATEAU  3/2011    left     SIGMOIDOSCOPY FLEXIBLE N/A 4/11/2017    Procedure: SIGMOIDOSCOPY FLEXIBLE;  Surgeon: Hussein Lopez MD;  Location: [facility]     SURGICAL HISTORY OF -   2001    R wrist surg fx/ steel plate     SURGICAL HISTORY OF -   age 20    Inguinal hernia     SURGICAL HISTORY OF -   2003    Bladder stim surg, not sucessful     SURGICAL HISTORY OF -       Lymphedema treatment of legs     TONSILLECTOMY  child     UPPER GI ENDOSCOPY  2/2003    esophageal ulcers, gastritis, duodenitis     UPPER GI ENDOSCOPY  4/2003    erosive gastropathy from ASA       Past Anes Hx: No personal or family h/o anesthesia problems    Soc Hx:   Social History   Substance Use Topics     Smoking status: Former Smoker     Quit date: 6/26/1997     Smokeless tobacco: Never Used      Comment: quit 1997       2-3 ppd for 45 yrs       Alcohol use No      Comment: recovering since 1982           Allergies:   Allergies   Allergen Reactions     Penicillins Hives     Tolerated unasyn during 3/31/2017 admission      Albuterol Other (See Comments)     Albuterol Sulfate Other (See Comments)     SVT/tachycardia with albuterol     Atorvastatin Other (See Comments)     Description: Lipitor, Description: Lipitor     Atorvastatin Calcium      Cipro [Ciprofloxacin] Diarrhea     Ciprofloxacin Other (See Comments)     Description: Cipro, Description: Cipro     Simvastatin Other (See Comments)     Description: Zocor, Description: Zocor  C/o weak muscles and severe abdominal pain.     Warfarin Unknown and Other (See Comments)         Meds:   Prescriptions Prior to Admission   Medication Sig Dispense Refill Last Dose     sulfamethoxazole-trimethoprim (BACTRIM DS/SEPTRA DS) 800-160 MG per tablet Take 1 tablet by mouth daily   3/31/2017 at 1041     insulin glargine (LANTUS SOLOSTAR) 100 UNIT/ML injection Inject 37 Units Subcutaneous 2 times daily (Patient taking differently: Inject 35 Units Subcutaneous 2 times daily ) 30 mL 5 3/31/2017 at 0800     aspirin 81 MG tablet Take 1 tablet (81 mg) by mouth daily 100 tablet 3 3/31/2017 at 1232     acetaminophen (TYLENOL) 325 MG tablet Take 3 tablets (975 mg) by mouth every 6 hours as needed for mild pain 100 tablet  Past Week     warfarin (COUMADIN) 3 MG tablet Take 1 tablet (3 mg) by mouth daily (Patient taking differently: Take 2-3 mg by mouth daily ) 30 tablet  3/30/2017 at PM     senna-docusate (SENOKOT-S;PERICOLACE) 8.6-50 MG per tablet Take 1-2 tablets by mouth 2 times daily 100 tablet  3/31/2017 at 1041     fexofenadine (ALLEGRA) 180 MG tablet Take 1 tablet (180 mg) by mouth daily 90 tablet 0 3/31/2017 at 1041     budesonide-formoterol (SYMBICORT) 160-4.5 MCG/ACT Inhaler Inhale 2 puffs into the lungs 2 times daily 1 Inhaler 11 3/31/2017 at 0800     rosuvastatin (CRESTOR) 20 MG tablet Take 1 tablet (20 mg) by  mouth daily Needs fasting labs before any further refills 90 tablet 0 3/31/2017 at 1041     RANITIDINE HCL PO Take 150 mg by mouth daily   3/31/2017 at 1041     calcium citrate (CALCITRATE) 950 MG tablet Take 1 tablet by mouth daily   3/31/2017 at 1041     Cyanocobalamin (VITAMIN B12) 1000 MCG TBCR Take 1,000 mcg by mouth daily 100 tablet 3 3/31/2017 at 1041     exenatide (BYETTA) 5 mcg/0.02mL per dose (60 doses per 1.2 mL prefilled pen) Inject 5mcg under the skin twice daily 60 minutes before meals. 1 Syringe 1 3/31/2017 at 1530     metoprolol (TOPROL-XL) 50 MG 24 hr tablet Take 1 tablet (50 mg) by mouth At Bedtime 90 tablet 1 3/30/2017 at PM     fluticasone (FLONASE) 50 MCG/ACT nasal spray Spray 2 sprays into both nostrils daily 48 g 1 3/31/2017 at 1041     multivitamin, therapeutic with minerals (THERA-VIT-M) TABS Take 1 tablet by mouth daily   3/31/2017 at 1041     ferrous sulfate 325 (65 FE) MG tablet Take 325 mg by mouth daily (with breakfast) 65mg elemental iron   3/31/2017 at 1041     VITAMIN D, CHOLECALCIFEROL, PO Take 2,000 Units by mouth daily   3/31/2017 at 1041     bumetanide (BUMEX) 0.5 MG tablet Take 0.5 mg by mouth 2 times daily   3/31/2017 at 1041     magnesium oxide (MAG-OX) 400 MG tablet Take 2 tablets by mouth daily.   3/31/2017 at 1041     niacin (NIASPAN) 500 MG CR tablet Take 1 tablet by mouth At Bedtime. 90 tablet 2 3/31/2017 at 1041     insulin lispro (HUMALOG KWIKPEN) 100 UNIT/ML injection Inject 30 units before breakfast and lunch and 5 units before dinner  as directed. (Patient taking differently: Inject 20 units at 1000 and 10 units at 1600 as directed.) 30 mL 5 3/31/2017     polyethylene glycol (MIRALAX/GLYCOLAX) Packet Take 17 g by mouth daily as needed for constipation 7 packet 11 Unknown     Ipratropium-Albuterol (COMBIVENT RESPIMAT)  MCG/ACT inhaler Inhale 1 puff into the lungs 2 times daily Not to exceed 6 doses per day. 3 Inhaler 3 Unknown     denosumab (PROLIA) 60 MG/ML  SOLN Inject 60 mg Subcutaneous every 6 months   Unknown     insulin pen needle (B-D U/F) 31G X 8 MM Use 7 daily or as directed. 300 each 5 Unknown     blood glucose monitoring (NO BRAND SPECIFIED) test strip 1 strip by In Vitro route 4 times daily 3 Box 11 Unknown     ONE TOUCH LANCETS MISC use as directed 4 x daily and prn 120 each 3 Unknown     ketoconazole (NIZORAL) 2 % shampoo Apply to the affected area and wash off after 5 minutes. 120 mL 2 Unknown     nitroglycerin (NITROSTAT) 0.4 MG SL tablet Place 1 tablet (0.4 mg) under the tongue every 5 minutes as needed up to 3 tablets per episode. 20 tablet 11 Unknown     fluocinolone (SYNALAR) 0.01 % external solution Apply 1 to 2 times daily to scaly areas of scalp as needed.Profile Rx: patient will contact pharmacy when needed 60 mL 11 Unknown     order for DME Equipment being ordered: 4 wheeled rolling walker. 1 Device 0 Unknown     ACE/ARB NOT PRESCRIBED, INTENTIONAL, ACE & ARB not prescribed due to Hyperkalemia (baseline K+ > 5.5), Worsening renal function on ACE/ARB therapy and CKD (Chronic Kidney Disease)   Unknown     insulin syringes, disposable, U-100 0.3 ML MISC 1 each 4 times daily. 100 each prn Unknown     ORDER FOR DME Equipment being ordered: CPAP supplies 1 each 0 Unknown     Catheters MISC As needed 200 each 10 Unknown         No current outpatient prescriptions on file.         Physical Exam:  VS: T 97.8, P Data Unavailable, /89, R 16, SpO2 100%.  Weight   Wt Readings from Last 2 Encounters:   04/19/17 79 kg (174 lb 2.6 oz)   03/10/17 93 kg (205 lb)     .    Airway: MP3, >3FB, poor neck extension  Dentition: edentulous  Heart: RRR  Lungs: CTAB      NPO Status:       Labs:    BMP:  Recent Labs   Lab Test 2/27/12 0741          POTASSIUM 5.5*     CHLORIDE 115*     CO2 20     BUN 25     CR 2.06*     GLC 85     LORENA 9.4     LFTs:   Recent Labs   Lab Test 2/1/12 1009     PROTTOTAL 7.2     ALBUMIN 4.0     BILITOTAL 0.6     ALKPHOS 70     AST 25      ALT 17     BILIDIRECT --     CBC:   Recent Labs   Lab Test 2/27/12 0926     WBC 6.6     RBC 4.02*     HGB 12.3*     HCT 39.1*     MCV 97     MCH 30.6     MCHC 31.5     RDW 13.9     *     Coags:  Recent Labs   Lab Test 2/24/12 0751 8/19/09 0335     INR 1.05 --     PTT 26 --     FIBR -- 1198*     Head CT  IMPRESSION:   1. Stable lateral and third ventriculomegaly and stable small amounts  of intraventricular hemorrhage in the lateral ventricles bilaterally.  No evidence for increasing hemorrhage.  2. Mild, diffuse cerebral volume loss.    Assessment/Plan:  - ASA 3  - GETA with standard ASA monitors, IV induction, balanced anesthetic  - consider having C-MAC available  - current IV access: right forearm 22G  - Antibiotics per surgery  - PONV prophylaxis  - Blood products available, possible administration discussed with patient  - Relevant risks, benefits, alternatives and the anesthetic plan to be discussed with patient/family or family representative.  All questions were answered and there was agreement to proceed.      Magnolia Burton MD  CA-2 / [id number]    2/27/2012  6:32 PM        Anesthesia Evaluation     . Pt has had prior anesthetic.       ROS/MED HX    Pulmonary:     (+) COPD,, sleep apnea     Neurologic:     (+)CVA Parkinson's disease     Cardiovascular:     (+) hypertension--CAD, --. : . CHF . . :. .     METS/Exercise Tolerance:     Hematologic:       Musculoskeletal:       GI/Hepatic:     (+) GERD     Renal:     (+) chronic renal disease,     Endo:     (+) type I DM, .      Psychiatric:       Infectious Disease:       Other:            Physical Exam  Normal systems: cardiovascular and pulmonary    Airway   Mallampati: II  TM distance: >3 FB  Neck ROM: full    Dental   (+) lower dentures and upper dentures    Cardiovascular   Rhythm and rate: regular and normal      Pulmonary    breath sounds clear to auscultation                    Anesthesia Plan      History & Physical Review  History and physical  reviewed and following examination; no interval change.    ASA Status:  3 .    NPO Status:  > 8 hours    Plan for General and ETT with Intravenous induction. Maintenance will be Balanced.    PONV prophylaxis:  Ondansetron (or other 5HT-3) and Dexamethasone or Solumedrol  Additional equipment: Videolaryngoscope and 2nd IV      Postoperative Care  Postoperative pain management:  IV analgesics and Multi-modal analgesia.      Consents  Anesthetic plan, risks, benefits and alternatives discussed with:  Patient or representative and Patient.  Use of blood products discussed: No .   .            .  Flaca Camacho    Anesthesia Evaluation     . Pt has had prior anesthetic.            ROS/MED HX    ENT/Pulmonary:     (+)sleep apnea, COPD, , . .    Neurologic:     (+)CVA Parkinson's disease     Cardiovascular:     (+) hypertension--CAD, --. : . CHF . . :. .       METS/Exercise Tolerance:     Hematologic:         Musculoskeletal:         GI/Hepatic:     (+) GERD       Renal/Genitourinary:     (+) chronic renal disease,       Endo:     (+) type I DM, .      Psychiatric:         Infectious Disease:         Malignancy:         Other:                   Anesthesia Plan      History & Physical Review  History and physical reviewed and following examination; no interval change.    ASA Status:  3 .    NPO Status:  > 8 hours    Plan for General and ETT with Intravenous induction. Maintenance will be Balanced.    PONV prophylaxis:  Ondansetron (or other 5HT-3) and Dexamethasone or Solumedrol  Additional equipment: Videolaryngoscope and 2nd IV      Postoperative Care  Postoperative pain management:  IV analgesics and Multi-modal analgesia.      Consents  Anesthetic plan, risks, benefits and alternatives discussed with:  Patient or representative and Patient.  Use of blood products discussed: No .   .

## 2017-04-19 NOTE — PLAN OF CARE
Problem: Goal Outcome Summary  Goal: Goal Outcome Summary  Outcome: Therapy, progress toward functional goals is gradual  OT-6B: Pt required Max A and vc's for rolling to bilateral sides for sling placement. Pt required dependent lift to bedside chair. Pt engaged in self cares with Max vc's, Min [place] A for task initiation and continuation of tasks. Pt demonstrated limited communication aside from head nod during session. Pt tolerated PROM/gentle prolonged stretch to LUE. Pt tolerated session well. VSS.      REC: Discharge to rehab when medically appropriate.

## 2017-04-19 NOTE — PROVIDER NOTIFICATION
Notified abdullahi's cross cover . Patient s/p G tube placement came from PACU at 3 pm . Patient with left arm muscle twitching . Patient awake and responded with garble speech ,said  to this staff Fuck off . Patient at his baseline neuro examination .hemodynamically stable. Spoke to Dr Bell about the above . No new order issued . Will monitor closely .

## 2017-04-19 NOTE — PLAN OF CARE
Problem: Goal Outcome Summary  Goal: Goal Outcome Summary     SLP: Session cancelled. Pt NPO for PEG placement this date. Not medically appropriate for participation in PO trials. SLP to follow per POC.

## 2017-04-19 NOTE — PLAN OF CARE
Problem: Goal Outcome Summary  Goal: Goal Outcome Summary  Outcome: No Change  Mental status waxes and wanes . Calm and not any distress . Follow simple command . Stable/ no changes in  neuro examination . VSS . Afebrile . On RA . Sat above 94% .   Tele NSR with frequent PACs . Had a short run of A-fibs . Breath sound coarse . No BM during shift . Hemoglobin 7.6>> 7.9 . Getting one unit of PRBC . . Left leg cast removed by ortho team   Wound RN consulted for left foot wounds pressures. Right heel with blanchable erythema .  Rooke boot placed on feet . Up to chair for 4 hrs . Stable. Plan for G/J Tube placement . Continue to monitor and report any concerns.

## 2017-04-20 NOTE — PLAN OF CARE
Problem: Goal Outcome Summary  Goal: Goal Outcome Summary  OT 6B Pt in chair, facilitated positioning to reduce L side tone.  Pt needing physical cues to bring head into neutral, pt unable to maintain this.  With verbal and physical cues pt able to bring head up and look forward for a few seconds.  Facilitated ROM B UE LE to reduce risk of contracture, pt reporting pain at L hip, ankle and known pain at shoulders.   Rec TCU to maximize IND with transfers and ADLs post stroke.

## 2017-04-20 NOTE — PROGRESS NOTES
Progress Note:    Data: Melvin Bhatia is a 77 yo male admitted to [facility] 03/31/17.    Intervention: Spoke to pt's spouse Dian (P: [phone]). Provided update to Diann with [facility] term care unit (P: [phone], F: [phone]).    Assessment: G tube placed 4/19/17. Planning for dc Friday 4/21/17. Pt's spouse continues to privately pay for a LTC bed at [facility].    Pt's spouse Dian would like [facility] stretcher transportation arranged at [place]. Writer informed pt's spouse that [facility] will complete an Ambulance Provider Certification Statement but there is no guarantee of insurance coverage and the possible private pay cost for [facility] ([facility] Ambulance is $936 base rate plus $21.84/mile. Pt is aware of this possibility from the last time pt discharged and still wants ambulance transport set up.    Writer verified with Diann at [facility] that no pre-admission screening is needed as the last PAS was completed 02/22/17 which has been within the past 60 days.    Plan:  -Discharge plans in progress: Possible dc Friday 4/21 back to [facility] term care unit. [employer] [facility] (Ph: [phone]) arranged for Friday 4/21/17 at 1:00pm via stretcher. Ambulance PCS form on front of pt's hard chart.  -Barriers to discharge plan: medical clearance  -Follow up plan: SW will continue to follow and assist as needed.    GERMANIA King, [facility]  6B Intermediate Care Unit   Phone: [phone]  Pager: [phone]

## 2017-04-20 NOTE — PLAN OF CARE
Problem: Goal Outcome Summary  Goal: Goal Outcome Summary  Outcome: No Change  Mental status waxes and wanes . Left side neglect . Occasionally follows simple command   . Left arm muscle twitching  Noticed after G tube placement . Please see the MD notification . No new order issued .VSS . Afebrile. Sat above 94% On RA . Breath sound coarse . Needed Nasopharyngeal suctioning x2 . Had one small BM .  G tube placed to gravity . Plan to start feeding tube tomorrow am.Orta cath patent . phosphorus was replaced . Next recheck will be am lab. Up to chair x2 via a ceiling lift . Wound RN came to assess patient left foot wounds . Plan to keep the Rooke boots  , oral care and turned every 2 hrs .  Stable . Continue to monitor patient condition and report any concerns.

## 2017-04-20 NOTE — PLAN OF CARE
Problem: Goal Outcome Summary  Goal: Goal Outcome Summary     SLP: Pt seen for dysphagia tx. Improved tolerance with increased viscosities today. Pt did not verbalize or follow commands during session but able to participate in PO trials. Continue to recommend NPO with alternative means of nutrition/hydration. SLP to follow per POC.

## 2017-04-20 NOTE — PROGRESS NOTES
CLINICAL NUTRITION SERVICES - BRIEF NOTE    * Received consult for RD to assess and order TF per MNT protocol.    - EN Access: G-tube placed 4/19.   - WOCN 4/19: assessed possible wound on left foot, no PI wound but at risk    Labs:  - K+/Mg++ WNL.   - Phos 2.4 (4/19) - per RN notes, replaced and plan for recheck this AM.     Interventions  1. Ordered Peptamen 1.5 @ goal of 45 ml/hr + 4 pkts Beneprotein daily to provide 1080 mL TF/day, 1720 kcals (27 kcal/kg), 97 g PRO (1.5 gm/kg), 832 ml free H2O, 60 g Fat (70% from MCTs), 203 g CHO and no Fiber daily.  -- Start @ 10 ml and advance by 10 ml Q 8 hrs as tolerated and as long as K+/Mg++ WNL and Phos >1.9 and GRV's <500 mL.   2. Ordered 7 ml Trivitamin and 220 mg Zn sulfate daily x 10 days as indicated in RD note 4/18.     Recommendations   1. Continue to monitor K+/Mg++/Phos daily for signs of refeeding.   2. If Phos consistently low (<2.5), despite IV replacement, may consider NeutraPhos supplementation (1 pkt TID-QID) until TF at goal rate.    Nic Gudino, Dietetic Intern    I agree with the above recommendations, goals, and interventions.    Johnathan Castorena RD, LD  6B Clinical Dietitian  Pager: [phone]  [facility]COM [phone]

## 2017-04-20 NOTE — PROGRESS NOTES
Surgery Progress Note    S: no issues with the G tube overnight, 15 cc output    O:  Temp:  [97.1  F (36.2  C)-98.6  F (37  C)] 98.6  F (37  C)  Heart Rate:  [] 73  Resp:  [13-20] 18  BP: (103-155)/(52-94) 121/63  SpO2:  [92 %-100 %] 99 %    I/O last 3 completed shifts:  In: 2750 [I.V.:2500]  Out: 2015 [Urine:2000; Emesis/NG output:15]    Gen: NAD  Resp: Breathing non-labored  CV: RRR  Abd: Soft, Non-distended, Non-tender, G tube in place with no surrounding erythema edema or exudate  Ext: warm and well perfused    BMP  Recent Labs  Lab 04/20/17  0712 04/19/17  0612 04/18/17  0723 04/17/17  0825    139 142 141   POTASSIUM 3.8 3.8 3.9 3.7   CHLORIDE 105 106 109 106   LORENA 8.4* 8.4* 7.9* 8.1*   CO2 26 26 26 27   BUN 15 14 11 12   CR 1.06 1.00 1.10 1.05   * 156* 183* 143*     CBC  Recent Labs  Lab 04/20/17  0712 04/19/17  0612 04/18/17  1001 04/18/17  0723 04/17/17  0825   WBC 4.7 4.9  --  3.9* 4.8   RBC 2.97* 3.23*  --  2.73* 2.99*   HGB 8.1* 8.9* 7.9* 7.6* 8.3*   HCT 27.5* 29.4*  --  25.2* 27.5*   MCV 93 91  --  92 92   MCH 27.3 27.6  --  27.8 27.8   MCHC 29.5* 30.3*  --  30.2* 30.2*   RDW 18.5* 18.4*  --  19.1* 19.3*    197  --  167 172     INRNo lab results found in last 7 days.     HISTORY PRESENTING ILLNESS: This is a Melvin Rezaon is a 76 year old male with h/o NPH s/p  shunt placement, DMII, Afib with RVR, CHF (Ef %), HTN, CKD, COPD who was admitted with new R MCA ischemic duodenal and rectal ulcer. We were consulted for enteral access for feeding, s/p 4/19 PEG placement     RECOMMENDATIONS:   -okay to start tube feeds through the G tube today  -G tube will need to stay in place for at least six weeks  -surgery will sign off at this time, please call if additional questions or concerns     Patient findings and plan discussed with staff Dr. Tyson.     Ludmila Palacios MD  PGY-2 General Surgery  [phone]

## 2017-04-20 NOTE — PLAN OF CARE
Problem: Goal Outcome Summary  Goal: Goal Outcome Summary  Outcome: No Change  Pt remains non verbal, not following directions, neuro checks remain grossly unchanged.  Pt turned every 2 hrs.  G-tube remains to gravity, small amt 15 ml of output over night.  Orta remains in place.  VS stable, see flowsheets, pt sinus arrhythmia t/o shift, 1 small burst of afib lasted a few minutes rates 120's.

## 2017-04-21 NOTE — PROGRESS NOTES
Social Work Services Discharge Note      Patient Name:  Melvin Bhatia     Anticipated Discharge Date: Saturday 04/22/17 at 2:00pm via [facility] stretcher transport to [facility] term care unit.      Discharge Disposition:    Nursing Home Placement  -SNF:     [facility] term care unit  [address] [place] [address]  (Main Ph: [phone], Weekend RN Supervisor Ph: [phone], Fax: [phone]).    [facility] needs 2 days of TF formula sent with pt. Writer informed Nic 6B Dietetic Intern.     Writer verified with Diann at [facility] that no pre-admission screening is needed as the last PAS was completed 02/22/17 which has been within the past 60 days.    POLST faxed to [facility] today.      Additional Services/Equipment Arranged: [employer] [facility] (Ph: [phone]) arranged for 4/22/17 at 2:00pm via stretcher. Ambulance PCS in pt's hard chart.    Pt's spouse Dian would like [facility] stretcher transportation arranged at [place]. Writer informed pt's spouse that [facility] will complete an Ambulance Provider Certification Statement but there is no guarantee of insurance coverage and the possible private pay cost for [facility] (BLS) Ambulance is $936 base rate plus $21.84/mile. Pt is aware of this possibility from the last time pt discharged and still wants ambulance transport set up.     Patient / Family response to discharge plan:  Pt and spouse in agreement w/plan.     Persons notified of above discharge plan: Pt's spouse Dian (P: [phone], W: [phone]), Diann with [facility], Paxton RN, and 6B charge RN. CTS handoff sent to clinic care coordinators.    -Staff Discharge Instructions:  Please fax discharge orders and signed hard scripts for any controlled substances.  Please print a packet and send with patient.   Ambulance PCS in pt's hard chart.    RN to RN Handoff: call weekend RN supervisor )Ph: [phone]).    Raiza  GERMANIA Moore, [facility]  [facility]  Phone: [phone]  Pager: [phone]

## 2017-04-21 NOTE — PROGRESS NOTES
"Shift: 0506-8713  /83 (BP Location: Right arm)  Pulse 87  Temp 97.8  F (36.6  C) (Axillary)  Resp 16  Ht 1.626 m (5' 4\")  Wt 76.5 kg (168 lb 10.4 oz)  SpO2 99%  BMI 28.95 kg/m2  Pt opens eyes spontaneously, remained non-verbal this shift; did see pt track intermittently (want vs cant?). Pt has left sided deficits & visual cut from most recent stroke. TF infusing @30 ml/hr, plan to increase to 45 ml/hr (goal) at 0800 but phos was 1.7, MD wanted replacement/recheck prior to increasing, recheck ordered for 1845. Free water flushes increased to 160mL q4h; no appearance of discomfort/nausea, have had low residuals, continuing to assess Q4H. Speech eval again today, not ready for po, see note for details. Turn & repo q2h, up in chair for most of shift, back in bed at 1700. BGs >240's, Dr. Duron aware, increased Lantus to 12 Units for pm dose; team thinking re-feeding syndrome and bumped discharge back until tomorrow 4/22; TCU needs 2 days worth of TF sent with pt at discharge. Wife at bedside. Continue with current plan of care.     "

## 2017-04-21 NOTE — PLAN OF CARE
Problem: Goal Outcome Summary  Goal: Goal Outcome Summary  Outcome: Improving  Mental waxes and wanes . Calm and not in any distress . Follow simple command , occasionally verbalizes simples words . No changes in his neuro examination . No left arm  Muscle  twitching .VSS , Afebrile . Sat above 98% On RA . Tele NSR With frequent PACs .  Breath sounds coarse . Needed one time Nasopharyngeal suction this morning . Had one small BM .  Started FT through G tube at 0800 . Tolerated increase rate to 20 mls/hr at 4 PM   With no high volume gastric residual . Plan to increase the rate to 30 mls/hr at midnight . All patient switched to liquid . Orta cath patent . Up to recliner x2 via wheel chair x2 . Stable . Plan for discharge tomorrow if electrolytes WNL( high risk for refeeding syndrome) . Stable . Continue to monitor and report any concerns.

## 2017-04-21 NOTE — PLAN OF CARE
Problem: Goal Outcome Summary  Goal: Goal Outcome Summary  Pt seen for swallow tx- resistant to oral cares- so limited in effort; continues to not follow any comands; Not ready for po-- consistent positive s/s of aspiration today with po trials of honey thick liquids in 1/4 teaspoon and 1/2 teaspoon amts-- consistent coughing, throat clearing and needed to have oral suction. Continue NPO with frequent oral cares as able and  TF for primary nutrition and hydration. SLP to continue to see pe rPOC

## 2017-04-21 NOTE — DISCHARGE SUMMARY
[facility]  Discharge Summary    Melvin Bhatia MRN# [id number]   Age: 76 year old YOB: 1940     Date of Admission:  3/31/2017  Date of Discharge:  4/25/2017  Admitting Physician:  Amy Bonner MD  Discharge Physician:  Romario Keller MD  Contact: [phone]  Discharging Service:  [facility]     Primary Provider:   Ash Bowman  [address] [address]  [phone]          Discharge Disposition:   Discharged to long-term care facility     Upon discharge patient is stable after the recent hospitalization, however he is still requiring ongoing care for strengthening and/or medication assistance.          Condition on Discharge:   Discharge condition: Fair   Code status on discharge: DNR / DNI          Admission Diagnoses:   Cognitive deficit, 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]  Acute renal failure, unspecified acute renal failure type (H) [N17.9]  Urinary tract infection associated with indwelling urethral catheter, initial encounter [T83.511A, N39.0]          Principle Discharge Problem:   Right MCA ischemic stroke  Duodenal and Rectal ulcers  Afib with RVR  Hypertension   DM2  History of Subarachnoid hemorrhage  Normal Pressure Hydrocephalus  Ankle fracture  Severe protein and calorie Malnutrition          Additional Discharge Problems:     Patient Active Problem List   Diagnosis     Osteoporosis     PAF (paroxysmal atrial fibrillation) (H)     Lumbosacral plexopathy     Testosterone deficiency     Hypertension goal BP (blood pressure) < 140/90     Atrial fibrillation (H)     CKD (chronic kidney disease) stage 3, GFR 30-59 ml/min     Hyperlipidemia LDL goal <70     Coronary artery disease     Health Care Home     ACP (advance care planning)     Elevated PSA     Anemia in chronic renal disease     Subarachnoid hemorrhage (H)     Idiopathic normal pressure hydrocephalus (INPH)     Urinary  bladder neurogenic dysfunction     Stroke (H)     Cognitive deficit, post-stroke     HL (hearing loss)     Personal history of fall     GERD (gastroesophageal reflux disease)     COPD exacerbation (H)     SHIVAM (obstructive sleep apnea)     LVH (left ventricular hypertrophy)     Diastolic CHF, acute on chronic (H)     Hypoxemia     Tachyarrhythmia     Systolic and diastolic CHF, chronic (H)     Gait abnormality     Iron deficiency anemia     Allergic rhinitis     Altered mental status     VRE (vancomycin-resistant Enterococci)     Hypoglycemia     Type 2 diabetes with stage 3 chronic kidney disease GFR 30-59 (H)     Chronic obstructive pulmonary disease, unspecified COPD type (H)     Ankle pain     Fx medial malleolus-closed     Gastrointestinal hemorrhage associated with duodenal ulcer     Anemia due to blood loss, acute     Acute cystitis     Hypernatremia     Delirium     Acute kidney injury superimposed on CKD     Closed fracture of ankle     Ischemic stroke diagnosed during current admission (H)     Protein-calorie malnutrition (H)            Medications Prior to Admission:       No current facility-administered medications on file prior to encounter.   Current Outpatient Prescriptions on File Prior to Encounter:  acetaminophen (TYLENOL) 325 MG tablet Take 3 tablets (975 mg) by mouth every 6 hours as needed for mild pain   Cyanocobalamin (VITAMIN B12) 1000 MCG TBCR Take 1,000 mcg by mouth daily   VITAMIN D, CHOLECALCIFEROL, PO Take 2,000 Units by mouth daily   polyethylene glycol (MIRALAX/GLYCOLAX) Packet Take 17 g by mouth daily as needed for constipation   Ipratropium-Albuterol (COMBIVENT RESPIMAT)  MCG/ACT inhaler Inhale 1 puff into the lungs 2 times daily Not to exceed 6 doses per day.   insulin pen needle (B-D U/F) 31G X 8 MM Use 7 daily or as directed.   blood glucose monitoring (NO BRAND SPECIFIED) test strip 1 strip by In Vitro route 4 times daily   ONE TOUCH LANCETS MISC use as directed 4 x daily  and prn   nitroglycerin (NITROSTAT) 0.4 MG SL tablet Place 1 tablet (0.4 mg) under the tongue every 5 minutes as needed up to 3 tablets per episode.   order for DME Equipment being ordered: 4 wheeled rolling walker.   insulin syringes, disposable, U-100 0.3 ML MISC 1 each 4 times daily.   ORDER FOR DME Equipment being ordered: CPAP supplies   Catheters MISC As needed            Discharge Medications:        Review of your medicines      START taking       Dose / Directions    aspirin 10 mg/mL Susp   Used for:  Dysphagia, unspecified type   Replaces:  aspirin 81 MG tablet        Dose:  325 mg   32.5 mLs (325 mg) by Oral or NG Tube route daily   Refills:  0       dextrose 50 % injection   Used for:  Type 2 diabetes mellitus with stage 3 chronic kidney disease, with long-term current use of insulin (H)        Dose:  25-50 mL   Inject 25-50 mLs into the vein every 15 minutes as needed for low blood sugar   Refills:  0       ferrous sulfate 220 (44 FE) MG/5ML Elix   Used for:  Acute posthemorrhagic anemia   Replaces:  ferrous sulfate 325 (65 FE) MG tablet        Dose:  220 mg   Take 5 mLs (220 mg) by mouth daily   Quantity:  30 mL   Refills:  0       glucagon 1 MG Solr injection   Used for:  Type 2 diabetes mellitus with stage 3 chronic kidney disease, with long-term current use of insulin (H)        Dose:  1 mg   Inject 1 mg Subcutaneous every 15 minutes as needed for low blood sugar (May repeat x 1 only)   Refills:  0       glucose 40 % Gel gel   Used for:  Type 2 diabetes mellitus with stage 3 chronic kidney disease, with long-term current use of insulin (H)        Dose:  15-30 g   Take 15-30 g by mouth every 15 minutes as needed for low blood sugar   Refills:  0       hypromellose-dextran Soln ophthalmic solution   Used for:  Dry eyes        Dose:  1 drop   Place 1 drop into both eyes 4 times daily as needed for dry eyes   Quantity:  1 Bottle   Refills:  0       insulin aspart 100 UNIT/ML injection   Commonly known  as:  NovoLOG PEN   Used for:  Type 2 diabetes mellitus with stage 3 chronic kidney disease, with long-term current use of insulin (H)        Dose:  1-12 Units   Inject 1-12 Units Subcutaneous every 4 hours   Quantity:  3 mL   Refills:  0       ipratropium 0.02 % neb solution   Commonly known as:  ATROVENT   Used for:  Chronic obstructive pulmonary disease, unspecified COPD type (H)        Dose:  0.5 mg   Take 2.5 mLs (0.5 mg) by nebulization 2 times daily   Quantity:  225 mL   Refills:  0       metoprolol 10 mg/mL Susp   Commonly known as:  LOPRESSOR   Used for:  Secondary hypertension        Dose:  25 mg   Take 2.5 mLs (25 mg) by mouth 2 times daily   Quantity:  100 mL   Refills:  0       multivitamins with minerals Liqd liquid   Used for:  Malnutrition (H)   Replaces:  multivitamin, therapeutic with minerals Tabs tablet        Dose:  15 mL   15 mLs by Per Feeding Tube route daily   Refills:  0       ondansetron 4 MG ODT tab   Commonly known as:  ZOFRAN-ODT   Used for:  Nausea        Dose:  4 mg   Take 1 tablet (4 mg) by mouth every 6 hours as needed for nausea   Quantity:  120 tablet   Refills:  0       pantoprazole Susp suspension   Commonly known as:  PROTONIX   Used for:  Gastroesophageal reflux disease without esophagitis        Dose:  40 mg   Take 20 mLs (40 mg) by mouth 2 times daily   Quantity:  100 mL   Refills:  0       potassium & sodium phosphates 280-160-250 MG Packet   Commonly known as:  NEUTRA-PHOS   Used for:  Malnutrition (H)        Dose:  1 packet   Take 1 packet by mouth 4 times daily   Quantity:  120 each   Refills:  0       potassium chloride 10 MEQ tablet   Commonly known as:  K-TAB,KLOR-CON   Used for:  Diastolic CHF, acute on chronic (H)        Administer only when giving Bumex   Quantity:  30 tablet   Refills:  0       vitamin A-D & C drops 1500-400-35 drops   Used for:  Malnutrition (H)        Dose:  7 mL   7 mLs by Per G Tube route daily for 10 days   Quantity:  50 mL   Refills:  0          CONTINUE these medicines which may have CHANGED, or have new prescriptions. If we are uncertain of the size of tablets/capsules you have at home, strength may be listed as something that might have changed.       Dose / Directions    bumetanide 0.5 MG tablet   Commonly known as:  BUMEX   This may have changed:    - how much to take  - how to take this  - when to take this  - additional instructions   Used for:  Diastolic CHF, acute on chronic (H)        Please restart patient's home bumex dose (0.5 mg bid) if patient's weight increases by more than 2 pounds in 1 week   Quantity:  60 tablet   Refills:  0       insulin glargine 100 UNIT/ML injection   Commonly known as:  LANTUS   This may have changed:  how much to take   Used for:  Type 2 diabetes mellitus with stage 3 chronic kidney disease, with long-term current use of insulin (H)        Dose:  33 Units   Inject 33 Units Subcutaneous 2 times daily   Refills:  0       order for DME   This may have changed:  Another medication with the same name was removed. Continue taking this medication, and follow the directions you see here.   Used for:  SHIVAM (obstructive sleep apnea)        Equipment being ordered: CPAP supplies   Quantity:  1 each   Refills:  0       senna-docusate 8.6-50 MG per tablet   Commonly known as:  SENOKOT-S;PERICOLACE   This may have changed:    - how much to take  - how to take this  - when to take this   Used for:  Constipation, unspecified constipation type        Dose:  1 tablet   1 tablet by Oral or NG Tube route daily   Quantity:  100 tablet   Refills:  0         CONTINUE these medicines which have NOT CHANGED       Dose / Directions    acetaminophen 325 MG tablet   Commonly known as:  TYLENOL   Used for:  Fall, initial encounter        Dose:  975 mg   Take 3 tablets (975 mg) by mouth every 6 hours as needed for mild pain   Quantity:  100 tablet   Refills:  0       blood glucose monitoring test strip   Commonly known as:  no brand specified    Used for:  Type 2 diabetes with stage 3 chronic kidney disease GFR 30-59 (H)        Dose:  1 strip   1 strip by In Vitro route 4 times daily   Quantity:  3 Box   Refills:  11       Catheters Misc   Used for:  Neurogeneses, bladder        As needed   Quantity:  200 each   Refills:  10       insulin pen needle 31G X 8 MM   Commonly known as:  B-D U/F   Used for:  Type 2 diabetes mellitus with stage 3 chronic kidney disease, with long-term current use of insulin (H)        Use 7 daily or as directed.   Quantity:  300 each   Refills:  5       insulin syringes (disposable) U-100 0.3 ML Misc   Used for:  Type 2 diabetes, HbA1C goal < 8% (H)        Dose:  1 each   1 each 4 times daily.   Quantity:  100 each   Refills:  prn       Ipratropium-Albuterol  MCG/ACT inhaler   Commonly known as:  COMBIVENT RESPIMAT   Used for:  Chronic obstructive pulmonary disease, unspecified COPD type (H)        Dose:  1 puff   Inhale 1 puff into the lungs 2 times daily Not to exceed 6 doses per day.   Quantity:  3 Inhaler   Refills:  3       nitroglycerin 0.4 MG sublingual tablet   Commonly known as:  NITROSTAT   Used for:  Chronic obstructive pulmonary disease, unspecified COPD type (H)        Dose:  0.4 mg   Place 1 tablet (0.4 mg) under the tongue every 5 minutes as needed up to 3 tablets per episode.   Quantity:  20 tablet   Refills:  11       ONE TOUCH LANCETS Misc   Used for:  Type 2 diabetes with stage 3 chronic kidney disease GFR 30-59 (H)        use as directed 4 x daily and prn   Quantity:  120 each   Refills:  3       order for DME   Used for:  Generalized muscle weakness        Equipment being ordered: 4 wheeled rolling walker.   Quantity:  1 Device   Refills:  0       polyethylene glycol Packet   Commonly known as:  MIRALAX/GLYCOLAX   Indication:  Constipation   Used for:  Fall, initial encounter        Dose:  17 g   Take 17 g by mouth daily as needed for constipation   Quantity:  7 packet   Refills:  11       Vitamin B12  1000 MCG Tbcr        Dose:  1000 mcg   Take 1,000 mcg by mouth daily   Quantity:  100 tablet   Refills:  3       VITAMIN D (CHOLECALCIFEROL) PO        Dose:  2000 Units   Take 2,000 Units by mouth daily   Refills:  0         STOP taking          aspirin 81 MG tablet   Replaced by:  aspirin 10 mg/mL Susp           budesonide-formoterol 160-4.5 MCG/ACT Inhaler   Commonly known as:  SYMBICORT           calcium citrate 950 MG tablet   Commonly known as:  CALCITRATE           denosumab 60 MG/ML Soln injection   Commonly known as:  PROLIA           exenatide 5 MCG/0.02ML Sopn injection   Commonly known as:  BYETTA 5 MCG PEN           ferrous sulfate 325 (65 FE) MG tablet   Commonly known as:  IRON   Replaced by:  ferrous sulfate 220 (44 FE) MG/5ML Elix           fexofenadine 180 MG tablet   Commonly known as:  ALLEGRA           fluocinolone 0.01 % solution   Commonly known as:  SYNALAR           fluticasone 50 MCG/ACT spray   Commonly known as:  FLONASE           insulin lispro 100 UNIT/ML injection   Commonly known as:  HumaLOG KWIKpen           ketoconazole 2 % shampoo   Commonly known as:  NIZORAL           MAG- MG tablet   Generic drug:  magnesium oxide           metoprolol 50 MG 24 hr tablet   Commonly known as:  TOPROL-XL           multivitamin, therapeutic with minerals Tabs tablet   Replaced by:  multivitamins with minerals Liqd liquid           niacin 500 MG CR tablet   Commonly known as:  NIASPAN           RANITIDINE HCL PO           rosuvastatin 20 MG tablet   Commonly known as:  CRESTOR           sulfamethoxazole-trimethoprim 800-160 MG per tablet   Commonly known as:  BACTRIM DS/SEPTRA DS           warfarin 3 MG tablet   Commonly known as:  COUMADIN                Where to get your medicines      Some of these will need a paper prescription and others can be bought over the counter. Ask your nurse if you have questions.     You don't need a prescription for these medications      aspirin 10 mg/mL Susp      bumetanide 0.5 MG tablet     dextrose 50 % injection     ferrous sulfate 220 (44 FE) MG/5ML Elix     glucagon 1 MG Solr injection     glucose 40 % Gel gel     hypromellose-dextran Soln ophthalmic solution     insulin aspart 100 UNIT/ML injection     insulin glargine 100 UNIT/ML injection     ipratropium 0.02 % neb solution     metoprolol 10 mg/mL Susp     multivitamins with minerals Liqd liquid     ondansetron 4 MG ODT tab     pantoprazole Susp suspension     potassium & sodium phosphates 280-160-250 MG Packet     potassium chloride 10 MEQ tablet     senna-docusate 8.6-50 MG per tablet     vitamin A-D & C drops 1500-400-35 drops                  Discharge Instructions and Follow-Up:   Discharge diet: G-Tube Feeds:  1. Ordered Peptamen 1.5 @ goal of 45 ml/hr + 4 pkts Beneprotein daily to provide 1080 mL TF/day, 1720 kcals (27 kcal/kg), 97 g PRO (1.5 gm/kg), 832 ml free H2O, 60 g Fat (70% from MCTs), 203 g CHO and no Fiber daily.  -- Start @ 10 ml and advance by 10 ml Q 8 hrs as tolerated and as long as K+/Mg++ WNL and Phos >1.9 and GRV's <500 mL.   2. Ordered 7 ml Trivitamin and 220 mg Zn sulfate daily x 10 days as indicated in RD note 4/18.       Discharge activity: Out of bed to chair 2-3 times daily  PT/OT/Speech Therapy    Bipap settings: 10/5 (used at night)  Until stable off BIPAP, Oral meds should not be given until patient able to tolerate 2 hours off BIPAP. No lozenges or gum should be given while patient on BIPAP.  ~ Adjust inspiratory pressure to achieve TV greater than 8-10 mL/kg ideal body weight.  ~ Max inspiratory pressure:  25 cm H2O.  ~ Keep SaO2  at 90-95%  ~ RT to trial off BIPAP as tolerated.     Discharge follow-up: Follow up with NH physician within 2-3 days after admission  Patient will need electrolytes potassium, magnesium, phosphorus checked daily until electrolytes stabilize.    Patient was not restarted on home bumex.  This will need to be restarted if patient shows signs of volume  overload or if weight increases more than 2 pounds in 1 week.   Lines and drains: G-tube    Wound care: Plan of care for wound located on Bl buttocks/perirectal area:   Cleanse the area with Vicky cleanse and protect, very gently with soft cloth.  Apply critic aid paste BID and PRN.  With repeat application, do not scrub the paste, only remove soiled paste and reapply.  If complete removal of paste is necessary use baby oil/mineral oil and soft wash cloth.  Leave the diaper to let the area dry as much as possible.    Left lower buttock wound every other day:    Cleanse all wound bed with Microklenz and pat dry.    Apply no sting barrier on zoe-wound skin.    Apply nickel thick amount of Medihoney (#[id number]) to open wound tissue.    Cover with Mepilex 4x4.          Brief Admission History and Evaluation:   Melvin Bhatia is a 76 year old male with a significant past medical history of cerebrovascular disease, normal pressure hydrocephalus (status post ventriculoperitoneal shunting), progressive cognitive dysfunction, multiple falls, HTN, Type 2 DM, hyperlipidemia, CHF, CKD, and COPD who presented with worsening mental status          Hospital Course/Discharge Plan by Problem:   ## New Right MCA Ischemic  Stroke 4/12 , stable   Patient's mental status fluctuated during his hospitalization.  At times he was minimally responsive.  On 4/12, he was noted to have a new left facial droop.  A stroke code was called and CT showed a large right MCA stroke that most likely occurred 1-2 days prior.   Stroke most likely caused by ischemia during episode of hypotension with GI bleed vs embolic from Afib.  Neurology was consulted and followed patient for first 2-3 days after stroked.  Patient remained stable without any new complications prior to discharge.  Patient is not a candidate for TPA because of his recent GI bleed, so after discussion with Staff and Spouse, we decided not to pursue further workup if patient's  neurological status changed again.  After patient's stroke, we gave a daily aspirin and kept BP < 140/90.      ## Malnutrition:  During his hospitalization, patient had poor oral intake.  After he suffered a stroke, he was unable to swallow.  A NG tube was placed, but removed by patient.  A G-tube was placed 4/19.  Tube feeds were started 4/20, which patient tolerated well.  For the first 4 days, he he required electrolyte replacement, but his electrolytes stabilized by 4/25, so there was less concern for refeeding syndrome.        ##A Fib with RVR: stable  Patient has had intermittent episodes of RVR (lasts < 2 minutes) but was always HD stable.  After discussion with Spouse, we chose not to consult Cardiology if patient were to become HD unstable.  Unfortunately, he is not a candidate for an invasive cardiac procedure if he were to develop ACS.   Patient was continued on IV or oral metoprolol (50 mg bid) to maintain rate control.       ##HFrEF, euvolemic:  Patient has a history of HF.  FUAD performed 4/2 showed EF 46%.  Patient takes bumex 0.5 mg bid at home, but this was held during his admission.  His weight on admission was 80 kg (4/1), peaked at 87 kg(4/10) and was 76 kg (4/21) on discharge.  He was given gentle IV fluids for hydration/maintenace while he was NPO to avoid fluid overload.  He was given IV lasix intermittently (recieved 20 mg IV approximately 3-4 times during hospitalization).  Weights and I/Os were monitored on a daily basis.  His bumex will need to be restarted once he shows clinical symptoms of volume overload.  It was not restarted prior to discharge because his weight was down, nutrition had just been restarted and clinically he was euvolemic.        ## COPD: stable    Patient received Q 4 hour Atrovent nebs, oral care and suctioning prn     ## Upper GI Bleed secondary to Duodenal Ulcer: stable   ## Lower GI Bleed secondary to rectal ulcer : stable  ##Acute anemia secondary to above:  Trending down  On 4/4, patient noted to have black tarry stools.   His hemoglobin dropped, INR was supratherapeutic so he received vitamin K and 2 units PRBCs.  GI was consulted and endoscopy was performed which showed duodenal ulcerations.  Patient's hemoglobin remained stable until 4/11.  At that time, he developed large amounts of bright red blood in stool acutely.  His hemoglobin dropped again and he received another 3 units PRBCs.  A flexible sigmoidoscopy was performed which showed a large ulcer in the rectum.   After the procedure, he no longer had melana or bright red blood per rectum, but his hemoglobin did slowly trend down.  Patient received 1 unit PRBC 4/19.  He was not restarted on his warfarin because of the GI bleed.  We gave Pantoprazole 40 mg bid for GI protection.  We transfused for a hemoglobin < 8.0 (cardiac history).     ## Multiple bone fracture on left foot/ankle  Patient presented with a left leg cast on admission.  He had suffered fractures after a fall on 9/16, and 2/2017. Cast was removed 4/18 by Ortho. X-rays obtained- break not completely healed. Ortho recommended a PRAFO in bed to prevent ulcers and protect ankle with a brace with any weight bearing activity.     ##Hypernatremia: resolved  Patient had episodes of hypernatremia, which resolved with hypotonic fluids.      ## Upper Extremity Swelling: Improving  Patient had left upper extremity swelling of arm distal to elbow noted approximately 1 week into his hospitalization.  An ultrasound was negative for DVT.  The swelling was most likely secondary to superficial thrombophlebitis vs PICC infiltration.      ## Acute liver injury: Resolved  Patient had an episode of acute liver injury the first week of his hospitalization in which his AST and ALT increased to approximately 800-900.  Etiology of acute liver injury unknown. Work up for Viral hepatitis, toxin, drugs and BUD Chiari were negative and he had  no episode of severe hypotension.  Enzymes reverted back to normal without intervention.  GI recommended MRCP to evaluate for biliary tract obstruction (depending on ultimate goals of care) after discharge.  Hepatotoxic medications were avoided.       ## UTI vs Colonization: Resolved  Urine culture on admission showed enterococcus which was initially treated with antibiotics.   After review of prior cultures, antibiotics were stopped as culture was thought to be asymptomatic bacteriuria vs colonization rather than a UTI.        ## SHERYL on CKD3 (baseline Cr 1.5-2): improved   Patient presented with an elevated Cr on admission, thought to be prerenal from decrease PO intake.  Cr improved to baseline with IVFs.     ## Type 2 DM, last A1C 6.1 on 02/20/17   Blood sugars frequently fluctuate.   Prior to admission, patient was taking Lantus 37 units bid in addition to prandial and correction insulin.  We changed his Lantus dose often to prevent hypoglycemia.  On discharge, he was receiving 33 units Lantus bid in addition to high sliding scale.  His blood sugar was checked every 4 hours (continuous tube feeds).       ## Stercoral proctitis    CT scan on admission showed proctitis.  Colorectal Surgery was consulted and thought the proctitis was secondary to stool stasis.   He was having BMs, so a bowel regimen was recommended to prevent constipation.   It was recommend that he take Miralax daily prn and senna qhs prn.   Ferrous sulfate should be held while patient is being treated for constipation.  Elective flexible sigmoidoscopy or colonoscopy as an outpatient was recommended (patient later received a flex sig when he had episodes of bright red blood in stool).       #Normal Pressure Hydrocephalus sequelae of subarachnoid hemorrhage   #Multi infarct Vascular Dementia  Patient presented with a remote history of stoke (2012), normal pressure hydrocephalus and subarachnoid hemorrhage, status post ventriculoperitoneal shunting.  Patient's neurologic issues were  stable prior to stroke.         ##High grade right ICA stenosis, diagnosed on 01/2017 MRA, stable   Patient's LAKE-VASC score is 8, not on anticoagulation due to recent GI bleed.   Patient initially presented on warfarin but after GI bleed, his warfarin will need to be held indefinitely.        ## Physical Deconditioning  Patient received PT/OT and Speech therapy during admission.         Other chronic conditions, stable- continue home medications.  ## HTN- Metoprolol  ## Hyperlipidemia- rosuvastatin  ## COPD- To continue Breo and Atrovent inhaler   ##SHIVAM- Bipap  ##GERD - PPI   ## Constipation- Not an issue right now, will resume bowel regimen as needed   ## Progressive cognitive dysfunction (dementia)         Final Day of Progress before Discharge:         Interval History:  General:  less responsive   Vitals: vitals signs have been stable and no fever   Intake/Output: Weight on admission 80 kg, weight on discharge 76 kg   Nutrition: Dependent on gastric tube feedings   Mental status: less alert and less responsive   Respiratory: respiratory effort about the same and oxigenation levels about the same   Cardiovascular blood pressure stable and heart rate stable   Renal: improved renal function and urinary output   Neurologic: Left sided paralysis, dysphagic, dysarthric speech  Cognition and responsiveness decreased   Medications: See medication list   Interventions: G-tube placement   Consults: Consultation received from gastroenterology, colorectal surgery, general surgery, nutrition           Physical Exam:  Vitals were reviewed  Constitutional: He appears well-developed. No distress.   Sitting in bed, arousable   Eyes: Pupils are equal, round, and reactive to light. No scleral icterus.   Right and left pupils reactive to light. Right pupil sluggish and less reactive compared to left pupil.   Neck: Normal range of motion. Neck supple. No JVD present. No tracheal deviation present.   Cardiovascular: Normal rate.    Murmur (2/6 systolic murmur at RSB) heard.  Pulmonary/Chest: Effort normal and breath sounds normal. No respiratory distress. He has no wheezes. He has no rales.   Decreased breath sounds throughout.    Abdominal: Soft. There is no tenderness. There is no rebound and no guarding.   Genitourinary:   Genitourinary Comments: Orta in place, Diaper in place   Musculoskeletal: He exhibits no edema (Swelling of left upper extremity, distal to elbow (including hand, which has 1+ edema)) or tenderness.   Supportive boots on both lower extremities. On removal of boots, left foot noted to be significantly discolored (brawny). Warm to touch, toes not discolored, no swelling or edema   Neurological: He is alert.   Makes eye contact, unable to answer questions. Patient will squeeze right hand on command, not closing eyes on command   Skin: Skin is warm and dry. No erythema. No pallor.   Vitals reviewed.         Data:  Lab Results   Component Value Date    WBC 7.0 04/25/2017    HGB 8.9 (L) 04/25/2017    HCT 29.8 (L) 04/25/2017    MCV 91 04/25/2017     04/25/2017     Lab Results   Component Value Date     04/25/2017    POTASSIUM 3.7 04/25/2017    CHLORIDE 102 04/25/2017    CO2 28 04/25/2017     (H) 04/25/2017     All cardiac studies reviewed by me.  All imaging studies reviewed by me.    CT Head 4/12/2017  Impression:  1. New subacute large right MCA territory infarct involving the right  frontal and parietal regions.  2. High right frontal ventriculostomy catheter crosses the midline  with tip in the left thalamus, unchanged. Stable mild  ventriculomegaly.  3. Chronic appearing infarcts in the cerebrum and cerebellum, as  described above.    TTE 4/2/2017  Interpretation Summary  Technically difficult study.  The Ejection Fraction is estimated at 40-45% (calculated, 46%.)  Global right ventricular function is normal.  Mild aortic stenosis and insufficiency is present.  The inferior vena cava is normal.  Estimated mean right atrial pressure is <3  mmHg.  No pericardial effusion is present.         Pending Results:   None      It was a pleasure to participate in the care of Melvin Bhatia.  If you have questions about his care, please do not hesitate to contact the [facility] team via the hospital [facility] on which we are stationed.      [facility]  [facility], [address] - [phone]

## 2017-04-21 NOTE — PLAN OF CARE
Problem: Goal Outcome Summary  Goal: Goal Outcome Summary  Outcome: No Change  Pt remains non verbal for this writer during shift.  Pt does trach this writer when standing to the right of pt.  Pt has left sided deficits and visual cut for most recent stroke.  Tube feed at 30 ml/hr, plan to increase to 40 ml/hr at 0800, pt tolerating well, no s/s of abd discomfort/nausea appeared, residuals 30 mls every 4hrs.  Pt turned every 2 hrs.  Plan for pt to possibly discharge back to NH.

## 2017-04-21 NOTE — PROGRESS NOTES
[facility] Medicine - Inpatient daily progress note    Date of admission: 3/31/2017  Date of service: 4/21/2017.           Assessment and Plan:      Melvin Bhatia is a 76 year old male with a significant past medical history of cerebrovascular disease, normal pressure hydrocephalus (status post ventriculoperitoneal shunting), progressive cognitive dysfunction, multiple falls, HTN, Type 2 DM, hyperlipidemia, CHF, CKD, and COPD who presented with  worsening mental status. He was found to have an SHERYL and UTI. He developed an episode of melena, anemia and significant elevation for liver enzymes.  He suffered an ischemic stroke of the right MCA diagnosed 4/12.    Patient Active Problem List   Diagnosis     Osteoporosis     PAF (paroxysmal atrial fibrillation) (H)     Lumbosacral plexopathy     Testosterone deficiency     Hypertension goal BP (blood pressure) < 140/90     Atrial fibrillation (H)     CKD (chronic kidney disease) stage 3, GFR 30-59 ml/min     Hyperlipidemia LDL goal <70     Coronary artery disease     Health Care Home     ACP (advance care planning)     Elevated PSA     Anemia in chronic renal disease     Subarachnoid hemorrhage (H)     Idiopathic normal pressure hydrocephalus (INPH)     Urinary bladder neurogenic dysfunction     Stroke (H)     Cognitive deficit, post-stroke     HL (hearing loss)     Personal history of fall     GERD (gastroesophageal reflux disease)     COPD exacerbation (H)     SHIVAM (obstructive sleep apnea)     LVH (left ventricular hypertrophy)     Diastolic CHF, acute on chronic (H)     Hypoxemia     Tachyarrhythmia     Systolic and diastolic CHF, chronic (H)     Gait abnormality     Iron deficiency anemia     Allergic rhinitis     Altered mental status     VRE (vancomycin-resistant Enterococci)     Hypoglycemia     Type 2 diabetes with stage 3 chronic kidney disease GFR 30-59 (H)     Chronic obstructive pulmonary disease, unspecified COPD type (H)     Ankle pain      Fx medial malleolus-closed     Gastrointestinal hemorrhage associated with duodenal ulcer     Anemia due to blood loss, acute     Acute cystitis     Hypernatremia     Delirium     Acute kidney injury superimposed on CKD     Closed fracture of ankle      Plan:   ## New Right MCA Ischemic Stroke 4/12 , stable   Stroke most likely caused by ischemia during episode of hypotension with GI bleed vs embolic from Afib. Patient not a candidate for TPA .  No symptoms of increase ICP.  Patient had twiching on left side yesterday, most likely sequale of stroke    Plan:   - Continue secondary stoke prevention, keep blood pressure with goal of <140/90   - ASA    - Telemetry    ## Malnutrition  ## Maintenance fluids:  G-tube placed 4/19.  Tube feeds started yesterday.  Patient will reach goal rate today.  Patient is at high risk for refeeding syndrome.  His phosphorous was low today, so we will monitor him today and check electrolytes in the morning to monitor for refeeding syndrome.    Based on patient's weight, he needs approximately 116 cc/hour of maintenance fluids.  IVFs were discontinued (for preparation to long term care).   This maintenance rate of crystalloid fluids is equivalent to tube feeds at 45 cc/hr plus free water flushes of 160 cc every 4 hours.  (This also allows for extra flushes for medications.)    Plan:  - Consider Neutrophos 3 packets tid if phos continues to be low  - Continue tube feeds at goal rate of 45 cc/hr  - Free water flushes of 160 cc every 4 hours    ##A Fib with RVR: stable  Patient has had episodes of RVR (lasts < 2 minutes) over the last week, but has been HD stable    Plan:   -Would not get troponins with episodes of RVR unless patient becomes hemodynamically unstable.  Due to his condition his options are limited if he develops ACS and he is not a candidate for invasive cardiac procedure, discussed with Spouse  -Will continue to monitor   - Continue Metoprolol 25 mg suspension bid (increase  to 50 mg if needed)   - Do not consult Cardiology for intervention (per spouse's wishes)    ##HFrEF, euvolemic:  Negative JVD distention, weight stabilizing (admission weight- 80 kg (4/1), peaked at 87 kg(4/10) and 76 kg (4/21)     Plan:  - IV lasix prn  -Daily weights  -Input and Output     ## COPD: stable      Plan:   - Q 4 hour Atrovent nebs  - Oral care and suctioning prn    ## Upper GI Bleed secondary to Duodenal Ulcer: stable   ## Lower GI Bleed secondary to rectal ulcer : stable  ##Acute anemia secondary to above: Trending down  Hemoglobin had been stable since 4/15 (8.0-8.5), but was noted to be 7.6 with repeat of 7.9 on 4/18.  Received 1 U PRBC (to optimize patient prior to surgery and for discharge)    Plan:   - Continue high dose Pantoprazole 40 mg IV bid  - Avoid NSAIDs  - transfuse of HGb is <8 and if with active bleeding     ## Multiple bone fracture on left foot/ankle  Hx of fractures after a fall on 9/16, and 2/2017.  Cast removed 4/18 by Ortho.  X-rays obtained- break not completely healed.  Ortho recs:    Plan:  - Protect weight bearing in AFO  - PRAFO in bed to prevent ulcers  - Follow up with original orthopedic surgeon for ankle symptoms    ##Hypernatremia:  resolved  Patient has had episodes of hypernatremia, most likely secondary to decreased oral intake.  Since the stroke, hypotonic fluids will need to be avoided to avoid cerebral edema.       Plan:   -Will monitor     ## Upper Extremity Swelling: Improving  Most likely secondary to superficial thrombophlebitis vs PICC infiltration vs third spacing .  Patient has a history of blood clots and INR is currently < 2.  LUE ultrasound was negative for DVT.     Plan:   - Will monitor     ## Acute liver injury:  Resolved  Etiology of acute liver injury unknown. Work up for Viral hepatitis, toxin, drugs and BUD Chiari has all been negative. No episode of severe hypotension.  Enzymes have reverted back to normal without intervention.  GI recommending  MRCP to evaluate for biliary tract obstruction (depending on ultimate goals of care).     Plan:   - No hepatotoxic medications (tylenol, statins, etc)  - MRCP recommended but most likely will not pursue this     ## UTI vs Colonization: Resolved  Received antibiotics initially but was stopped as this is deemed more of an asymptomatic bacteriuria vs colonization rather than a UTI      ## SHERYL on CKD3 (baseline Cr 1.5-2): improved   Thought to be prerenal from decrease PO intake     Plan:   - Follow up BMP daily  - Strict I/O   - Daily weights  - Continue telemetry      ## Type 2 DM, last A1C 6.1 on 02/20/17   Blood sugars increasing with start of tube feeds.  Increasing dose of Lantus daily.  Patient increased from 9 units yesterday to 12 units today    Plan:   - Q 4 hour glucose checks  - Lantus 12 units bid  - Correction: Medium Insulin Sliding scale        ## Stercoral proctitis     Plan:    - Miralax daily PRN and Senna at bedtime PRN.   - HOLD PTA ferrous sulfate while being treated for constipation    #Normal Pressure Hydrocephalus sequelae of subarachnoid hemorrhage   #Multi infarct Vascular Dementia  W/ Hx stroke, normal pressure hydrocephalus (2012), status post ventriculoperitoneal shunting, subarachnoid hemorrhage(2012).  Patient's neurologic issues were stable prior to stroke.  Will need to follow closely for cerebral edema caused by stroke that could potentially exacerbate patient's hydrocephalus.  Plan :   -Will observe but no aggressive interventions      ##High grade right ICA stenosis, diagnosed on 01/2017 MRA, stable   Patient's LAKE-VASC score is 8, not on anticoagulation due to recent GI bleed  Plan:   -Will continue to hold warfarin for now due to high risk of bleeding      ## Physical Deconditioning  Plan:    - PT/OT  - Speech therapy    ## Dispo:  Discharge planned for tomorrow, 4/22.  New POLST filled out with Spouse 4/20.        Other chronic conditions, stable- continue home medications.  ##  HTN-  Metoprolol  ## Hyperlipidemia-   rosuvastatin  ## COPD- To continue Breo and Atrovent inhaler   ##SHIVAM- Bipap  ##GERD - PPI   ## Constipation- Not an issue right now, will resume bowel regimen as needed   ## Progressive cognitive dysfunction (dementia)     Fluids: none  Electrolytes: stable  Nutrition: G-tube feeds, free water flushes  Lines:PIV, PICC  Activity: Up with assist   CODE: DNR / DNI  Prophylaxis: Coumadin held  Dispo: Expected discharge date- tomorrow pending stable electrolytes                 Interval History:   Melvin Bhatia has had no significant events overnight.  His tube feeds were started yesterday and he has tolerated them without problems.            Review of Systems:   Review of Systems   Constitutional:        ROS limited secondary to patient's condition   Gastrointestinal: Negative for blood in stool.            Physical Exam (Resident / Clinician):   Vitals were reviewed  Temp: 97.2  F (36.2  C) Temp src: Axillary BP: 149/70   Heart Rate: 64 Resp: 16 SpO2: 100 % O2 Device: None (Room air)      Physical Exam   Constitutional: He appears well-developed. No distress.   Sitting in bed, arousable   Eyes: Pupils are equal, round, and reactive to light. No scleral icterus.   Right and left pupils reactive to light.  Right pupil sluggish and less reactive compared to left pupil.   Neck: Normal range of motion. Neck supple. No JVD present. No tracheal deviation present.   Cardiovascular: Normal rate.    Murmur (2/6 systolic murmur at RSB) heard.  Pulmonary/Chest: Effort normal and breath sounds normal. No respiratory distress. He has no wheezes. He has no rales.   Decreased breath sounds throughout.  + Cough   Abdominal: Soft. There is no tenderness. There is no rebound and no guarding.   Genitourinary:   Genitourinary Comments: Orta in place, Diaper in place   Musculoskeletal: He exhibits no edema (Swelling of left upper extremity, distal to elbow (including hand, which has 1-2+  edema)) or tenderness.   Supportive boots on both lower extremities   Neurological: He is alert.   Makes eye contact, unable to answer questions.  Patient will squeeze right hand on command, not closing eyes on command   Skin: Skin is warm and dry. No erythema. No pallor.   Vitals reviewed.          Data:   ROUTINE LABS (Last four results)  CMP    Recent Labs  Lab 04/21/17  0603 04/20/17  0712 04/19/17  0612 04/18/17  0723  04/16/17  0716 04/15/17  0945 04/15/17  0515    139 139 142  < > 138 142 142   POTASSIUM 3.4 3.8 3.8 3.9  < > 3.9 4.3 3.5   CHLORIDE 104 105 106 109  < > 106 108 106   CO2 28 26 26 26  < > 26 25 28   ANIONGAP 7 8 7 7  < > 6 9 7   * 142* 156* 183*  < > 266* 199* 167*   BUN 12 15 14 11  < > 11 9 10   CR 0.97 1.06 1.00 1.10  < > 1.13 1.29* 1.26*   GFRESTIMATED 75 68 72 65  < > 63 54* 55*   GFRESTBLACK >90African American GFR Calc 82 87 78  < > 76 65 67   LORENA 8.5 8.4* 8.4* 7.9*  < > 7.7* 7.6* 7.8*   MAG 2.0 2.0 2.0 2.0  < > 2.0  --  2.2   PHOS 1.7* 2.9 2.4* 2.7  < > 1.8*  --  2.2*   PROTTOTAL  --   --   --   --   --  5.0* 5.0* 4.7*   ALBUMIN  --   --   --   --   --  1.6* 1.5* 1.6*   BILITOTAL  --   --   --   --   --  0.4 0.4 0.4   ALKPHOS  --   --   --   --   --  80 71 69   AST  --   --   --   --   --  21 17 14   ALT  --   --   --   --   --  26 31 29   < > = values in this interval not displayed.  CBC    Recent Labs  Lab 04/21/17  0603 04/20/17 2035 04/20/17  0712 04/19/17  0612  04/18/17  0723   WBC 4.4  --  4.7 4.9  --  3.9*   RBC 2.98*  --  2.97* 3.23*  --  2.73*   HGB 8.3* 8.1* 8.1* 8.9*  < > 7.6*   HCT 27.3*  --  27.5* 29.4*  --  25.2*   MCV 92  --  93 91  --  92   MCH 27.9  --  27.3 27.6  --  27.8   MCHC 30.4*  --  29.5* 30.3*  --  30.2*   RDW 18.2*  --  18.5* 18.4*  --  19.1*     --  178 197  --  167   < > = values in this interval not displayed.  INR  No lab results found in last 7 days.  CRP  No lab results found in last 7 days.        Medications:     Current  Facility-Administered Medications   Medication     ipratropium (ATROVENT) 0.02 % neb solution 0.5 mg     insulin aspart (NovoLOG) inj (RAPID ACTING)     ipratropium (ATROVENT) 0.02 % neb solution 0.5 mg     insulin glargine (LANTUS) injection 12 Units     zinc sulfate (20 mg [place]. Zn/mL) solution 220 mg     vitamin A-D & C drops (TRI-VITAMIN) drops SOLN 7 mL     aspirin suspension 325 mg     metoprolol (LOPRESSOR) suspension 25 mg     pantoprazole (PROTONIX) suspension 40 mg     ferrous sulfate elixir 220 mg     ipratropium (ATROVENT HFA) Inhaler 1 puff     lidocaine 1 % 1 mL     lidocaine (LMX4) kit     sodium chloride (PF) 0.9% PF flush 3 mL     sodium chloride (PF) 0.9% PF flush 3 mL     mineral oil-hydrophilic petrolatum (AQUAPHOR)     rosuvastatin (CRESTOR) tablet 20 mg     sodium chloride (PF) 0.9% PF flush 5-50 mL     lidocaine 1 % 1 mL     lidocaine (LMX4) kit     sodium chloride (PF) 0.9% PF flush 10-20 mL     heparin lock flush 10 UNIT/ML injection 5-10 mL     heparin lock flush 10 UNIT/ML injection 5-10 mL     nystatin (MYCOSTATIN) suspension 500,000 Units     dextrose 10 % 1,000 mL infusion     multivitamins with minerals (CERTAVITE/CEROVITE) liquid 15 mL     lactobacillus rhamnosus (GG) (CULTURELL) capsule 1 capsule     polyethylene glycol (MIRALAX/GLYCOLAX) Packet 17 g     senna-docusate (SENOKOT-S;PERICOLACE) 8.6-50 MG per tablet 1-2 tablet     acetaminophen (TYLENOL) solution 650 mg     glucose 40 % gel 15-30 g    Or     dextrose 50 % injection 25-50 mL    Or     glucagon injection 1 mg     magnesium sulfate 4 g in 100 mL sterile water (premade)     potassium phosphate 15 mmol in D5W 250 mL intermittent infusion     potassium phosphate 20 mmol in D5W 500 mL intermittent infusion     potassium phosphate 20 mmol in D5W 250 mL intermittent infusion     potassium phosphate 25 mmol in D5W 500 mL intermittent infusion     [Rx hold ] metoprolol (LOPRESSOR) suspension 50 mg     Reason ACE/ARB order not  selected     lidocaine 1 % 0.5-5 mL     sodium chloride (PF) 0.9% PF flush 10-20 mL     sodium chloride (PF) 0.9% PF flush 10 mL     fluticasone (FLONASE) 50 MCG/ACT spray 2 spray     naloxone (NARCAN) injection 0.1-0.4 mg     magnesium citrate solution 148 mL     ondansetron (ZOFRAN-ODT) ODT tab 4 mg    Or     ondansetron (ZOFRAN) injection 4 mg     prochlorperazine (COMPAZINE) injection 5 mg    Or     prochlorperazine (COMPAZINE) tablet 5 mg    Or     prochlorperazine (COMPAZINE) Suppository 12.5 mg     Patient is already receiving anticoagulation with heparin, enoxaparin (LOVENOX), warfarin (COUMADIN)  or other anticoagulant medication     No lozenges or gum should be given while patient on BIPAP     hypromellose-dextran (ARTIFICAL TEARS) ophthalmic solution 1 drop     insulin aspart (NovoLOG) inj (RAPID ACTING)       Caring Physician: Toña Duron MD  [facility] [place]  Pager Contact: see Physician sticky note

## 2017-04-22 PROBLEM — E46 PROTEIN-CALORIE MALNUTRITION (H): Status: ACTIVE | Noted: 2017-01-01

## 2017-04-22 PROBLEM — I63.9 ISCHEMIC STROKE DIAGNOSED DURING CURRENT ADMISSION (H): Status: ACTIVE | Noted: 2017-01-01

## 2017-04-22 NOTE — PROGRESS NOTES
[facility] Medicine - Inpatient daily progress note    Date of admission: 3/31/2017  Date of service: 4/22/2017.           Assessment and Plan:      Melvin Bhatia is a 76 year old male with a significant past medical history of cerebrovascular disease, normal pressure hydrocephalus (status post ventriculoperitoneal shunting), progressive cognitive dysfunction, multiple falls, HTN, Type 2 DM, hyperlipidemia, CHF, CKD, and COPD who presented with  worsening mental status. He was found to have an SHERYL and UTI. He developed an episode of melena, anemia and significant elevation for liver enzymes.  He suffered an ischemic stroke of the right MCA diagnosed 4/12.    Patient Active Problem List   Diagnosis     Osteoporosis     PAF (paroxysmal atrial fibrillation) (H)     Lumbosacral plexopathy     Testosterone deficiency     Hypertension goal BP (blood pressure) < 140/90     Atrial fibrillation (H)     CKD (chronic kidney disease) stage 3, GFR 30-59 ml/min     Hyperlipidemia LDL goal <70     Coronary artery disease     Health Care Home     ACP (advance care planning)     Elevated PSA     Anemia in chronic renal disease     Subarachnoid hemorrhage (H)     Idiopathic normal pressure hydrocephalus (INPH)     Urinary bladder neurogenic dysfunction     Stroke (H)     Cognitive deficit, post-stroke     HL (hearing loss)     Personal history of fall     GERD (gastroesophageal reflux disease)     COPD exacerbation (H)     SHIVAM (obstructive sleep apnea)     LVH (left ventricular hypertrophy)     Diastolic CHF, acute on chronic (H)     Hypoxemia     Tachyarrhythmia     Systolic and diastolic CHF, chronic (H)     Gait abnormality     Iron deficiency anemia     Allergic rhinitis     Altered mental status     VRE (vancomycin-resistant Enterococci)     Hypoglycemia     Type 2 diabetes with stage 3 chronic kidney disease GFR 30-59 (H)     Chronic obstructive pulmonary disease, unspecified COPD type (H)     Ankle pain      Fx medial malleolus-closed     Gastrointestinal hemorrhage associated with duodenal ulcer     Anemia due to blood loss, acute     Acute cystitis     Hypernatremia     Delirium     Acute kidney injury superimposed on CKD     Closed fracture of ankle     Ischemic stroke diagnosed during current admission (H)     Protein-calorie malnutrition (H)      Plan:   ##Low grade fever  ## Intermittent , transient runs RVR  -Patient with T of 100.1, had 2 episode of HR in the 120s but that lasted for 1- 2 minutes but other wise hemodynamically stable. He is not coughing and has clear breath sound , although he is at ashwini for pneumonia from both bacterial and aspiration (prolonged hospital stay and poor swallowing control)  vs dehydration . RVR may be a secondary response vs uncontrolled Afib. PER WIFE and on his POLTS keen not want antibiotics .  Plan:   -Will order an infectious work up ( procalcitonin, Blood culture, urinalysis and urine culture , chest xray)   -Will start antibiotics if indicated   -Will look for reversible cause of RVR ( infection, fluids, electrolytes )  -Will replete electrolytes Mg >2 , K>4       ## Type 2 DM, last A1C 6.1 on 02/20/17 , uncontrolled with the start of tube feedings    Plan:   -Will titrate Insulin   - Q 4 hour glucose checks  - Lantus 12-->15 units  units bid  - Correction: HiInsulin Sliding scale    ##A Fib was controlled now with sporadic RVR but otherwise hemodynamically stable   Patient has had episodes of RVR (lasts < 2 minutes) over the last week, but has been HD stable  Plan:   -Would not get troponins with episodes of RVR unless patient becomes hemodynamically unstable.  Due to his condition his options are limited if he develops ACS and he is not a candidate for invasive cardiac procedure, discussed with Spouse  -Will continue to monitor   - Continue Metoprolol 25 mg suspension bid, if having more frequent HR> 140 and not due to infection or other reversible causes will increase  Metoprolol to 50 mg BID   - Do not consult Cardiology for intervention (per spouse's wishes)     ## Severe malnutrition from acute illness and may have poor intake for quite sometime now as well due to dementia  Tolerating tube feedings  Plan:  - Consider Neutrophos 3 packets tid if phos continues to be low  - Continue tube feeds at goal rate of 45 cc/hr  - Free water flushes of 160 cc every 4 hours  -Multivitamins as recommended bu Nutritionist   -Will reach out to Nutritionist again as to whether he can or when to transition him to cyclic feeding for long term planning as we will recommend that he gets up from chair and possibly can be wheeled around at the LTAC    ## New Right MCA Ischemic Stroke 4/12 , stable   Stroke most likely caused by ischemia during episode of hypotension with GI bleed vs embolic from Afib. Patient not a candidate for TPA on Aspirin . No new neurologic change.     Plan:   - Continue secondary stoke prevention, keep blood pressure with goal of <140/90   - ASA      ##HFrEF, euvolemic:  Negative JVD distention, weight stabilizing (admission weight- 80 kg (4/1), peaked at 87 kg(4/10) and 76 kg (4/21) today same as yesterday      Plan:  - IV lasix prn, resumed Bumetanide 0.5 mg BID   -Daily weights  -Input and Output   -Will monitor electrolytes     ## COPD: stable    Plan:   - Q 4 hour Atrovent nebs  - Oral care and suctioning prn    ## Upper GI Bleed secondary to Duodenal Ulcer: stable   ## Lower GI Bleed secondary to rectal ulcer : stable  ##Acute anemia secondary to above: stable   Current hemoglobin 8.3   Plan:   - Continue high dose Pantoprazole 40 mg IV bid  - Avoid NSAIDs  - transfuse of HGb is <8 and if with active bleeding     ## Multiple bone fracture on left foot/ankle  Hx of fractures after a fall on 9/16, and 2/2017.  Cast removed 4/18 by Ortho.  X-rays obtained- break not completely healed.  Ortho recs:    Plan:  - Protect weight bearing in AFO  - PRAFO in bed to prevent  ulcers  - Follow up with original orthopedic surgeon for ankle symptoms    ##Hypernatremia:  resolved  Patient has had episodes of hypernatremia, most likely secondary to decreased oral intake.  Since the stroke, hypotonic fluids will need to be avoided to avoid cerebral edema.       Plan:   -Will monitor     ## Upper Extremity Swelling: Improving  Most likely secondary to superficial thrombophlebitis vs PICC infiltration vs third spacing .  Patient has a history of blood clots and INR is currently < 2.  LUE ultrasound was negative for DVT.     Plan:   - Will monitor     ## Acute liver injury:  Resolved  Etiology of acute liver injury unknown. Work up for Viral hepatitis, toxin, drugs and BUD Chiari has all been negative. No episode of severe hypotension.  Enzymes have reverted back to normal without intervention.  GI recommending MRCP to evaluate for biliary tract obstruction (depending on ultimate goals of care).     Plan:   - No hepatotoxic medications (tylenol, statins, etc)  - MRCP recommended but most likely will not pursue this     ## UTI vs Colonization: Resolved  Received antibiotics initially but was stopped as this is deemed more of an asymptomatic bacteriuria vs colonization rather than a UTI      ## SHERYL on CKD3 (baseline Cr 1.5-2): improved   Thought to be prerenal from decrease PO intake     Plan:   - Follow up BMP daily  - Strict I/O   - Daily weights  - Continue telemetry        ## Stercoral proctitis     Plan:    - Miralax daily PRN and Senna at bedtime PRN.   - HOLD PTA ferrous sulfate while being treated for constipation    #Normal Pressure Hydrocephalus sequelae of subarachnoid hemorrhage   #Multi infarct Vascular Dementia  W/ Hx stroke, normal pressure hydrocephalus (2012), status post ventriculoperitoneal shunting, subarachnoid hemorrhage(2012).  Patient's neurologic issues were stable prior to stroke.  Will need to follow closely for cerebral edema caused by stroke that could potentially  exacerbate patient's hydrocephalus.  Plan :   -Will observe but no aggressive interventions      ##High grade right ICA stenosis, diagnosed on 01/2017 MRA, stable   Patient's LAKE-VASC score is 8, not on anticoagulation due to recent GI bleed  Plan:   -Will continue to hold warfarin for now due to high risk of bleeding      ## Physical Deconditioning  Plan:    - PT/OT  - Speech therapy    ## Dispo:  Discharge planned for tomorrow, 4/22.  New POLST filled out with Spouse 4/20.      Other chronic conditions, stable- continue home medications.  ## HTN-  At goal , Metoprolol  ## Hyperlipidemia-   rosuvastatin  ## COPD- To continue Breo and Atrovent inhaler   ##SHIVAM- Bipap  ##GERD - PPI   ## Constipation- Not an issue right now, will resume bowel regimen as needed   ## Progressive cognitive dysfunction (dementia)     Fluids: none  Electrolytes: stable  Nutrition: G-tube feeds, free water flushes  Lines:PIV, PICC  Activity: Up with assist   CODE: DNR / DNI  Prophylaxis: Coumadin held  Dispo: Expected discharge date- tomorrow pending stable electrolytes                 Interval History:   Melvin Bhatia has had no significant events overnight.  His tube feeds were started yesterday and he has tolerated them without problems. This am had low grade temperature and runs of RVR . No new neurologic deficit . He is laying in bed quitely with unlabored breathing,  not in distress . Will defer discharge to long term care facility today. Informed  on call and left a voice mail to wife regarding the change in plan.             Review of Systems:   Review of Systems   Constitutional:        ROS limited secondary to patient's condition   Gastrointestinal: Negative for blood in stool.            Physical Exam (Resident / Clinician):   Vitals were reviewed  Temp: 100.1  F (37.8  C) Temp src: Axillary BP: 116/72   Heart Rate: 79 Resp: 24 SpO2: 100 % O2 Device: None (Room air)      Physical Exam   Constitutional: He  appears well-developed. No distress.   Sitting in bed, arousable   Eyes: Pupils are equal, round, and reactive to light. No scleral icterus.   Right and left pupils reactive to light.  Right pupil sluggish and less reactive compared to left pupil.   Neck: Normal range of motion. Neck supple. No JVD present. No tracheal deviation present.   Cardiovascular: Normal rate.    Murmur (2/6 systolic murmur at RSB) heard.  Pulmonary/Chest: Effort normal and breath sounds normal. No respiratory distress. He has no wheezes. He has no rales.   Decreased breath sounds throughout.     Abdominal: Soft. There is no tenderness. There is no rebound and no guarding.   Genitourinary:   Genitourinary Comments: Orta in place, Diaper in place   Musculoskeletal: He exhibits no edema (Swelling of left upper extremity, distal to elbow (including hand, which has 1-2+ edema)) or tenderness.   Supportive boots on both lower extremities   Neurological: He is alert.   Makes eye contact, unable to answer questions.  Patient will squeeze right hand on command, not closing eyes on command   Skin: Skin is warm and dry. No erythema. No pallor.   Vitals reviewed.          Data:   ROUTINE LABS (Last four results)  CMP    Recent Labs  Lab 04/22/17  0442 04/21/17  1853 04/21/17  0603 04/20/17  0712 04/19/17  0612  04/16/17  0716     --  139 139 139  < > 138   POTASSIUM 3.8  --  3.4 3.8 3.8  < > 3.9   CHLORIDE 102  --  104 105 106  < > 106   CO2 30  --  28 26 26  < > 26   ANIONGAP 7  --  7 8 7  < > 6   *  --  230* 142* 156*  < > 266*   BUN 10  --  12 15 14  < > 11   CR 0.96  --  0.97 1.06 1.00  < > 1.13   GFRESTIMATED 76  --  75 68 72  < > 63   GFRESTBLACK >90African American GFR Calc  --  >90African American GFR Calc 82 87  < > 76   LORENA 8.2*  --  8.5 8.4* 8.4*  < > 7.7*   MAG 1.8  --  2.0 2.0 2.0  < > 2.0   PHOS 2.9 2.4* 1.7* 2.9 2.4*  < > 1.8*   PROTTOTAL  --   --   --   --   --   --  5.0*   ALBUMIN  --   --   --   --   --   --  1.6*    BILITOTAL  --   --   --   --   --   --  0.4   ALKPHOS  --   --   --   --   --   --  80   AST  --   --   --   --   --   --  21   ALT  --   --   --   --   --   --  26   < > = values in this interval not displayed.  CBC    Recent Labs  Lab 04/22/17  0442 04/21/17  0603 04/20/17  2035 04/20/17  0712 04/19/17  0612   WBC 4.9 4.4  --  4.7 4.9   RBC 3.11* 2.98*  --  2.97* 3.23*   HGB 8.8* 8.3* 8.1* 8.1* 8.9*   HCT 28.2* 27.3*  --  27.5* 29.4*   MCV 91 92  --  93 91   MCH 28.3 27.9  --  27.3 27.6   MCHC 31.2* 30.4*  --  29.5* 30.3*   RDW 17.9* 18.2*  --  18.5* 18.4*    189  --  178 197       Intake/Output Summary (Last 24 hours) at 04/22/17 1424  Last data filed at 04/22/17 1200   Gross per 24 hour   Intake             2225 ml   Output             3100 ml   Net             -875 ml       Medications:     Current Facility-Administered Medications   Medication     bumetanide (BUMEX) tablet 0.5 mg     insulin glargine (LANTUS) injection 15 Units     insulin aspart (NovoLOG) inj (RAPID ACTING)     insulin aspart (NovoLOG) inj (RAPID ACTING)     ipratropium (ATROVENT) 0.02 % neb solution 0.5 mg     ipratropium (ATROVENT) 0.02 % neb solution 0.5 mg     zinc sulfate (20 mg [place]. Zn/mL) solution 220 mg     vitamin A-D & C drops (TRI-VITAMIN) drops SOLN 7 mL     aspirin suspension 325 mg     metoprolol (LOPRESSOR) suspension 25 mg     pantoprazole (PROTONIX) suspension 40 mg     ferrous sulfate elixir 220 mg     lidocaine 1 % 1 mL     lidocaine (LMX4) kit     sodium chloride (PF) 0.9% PF flush 3 mL     sodium chloride (PF) 0.9% PF flush 3 mL     mineral oil-hydrophilic petrolatum (AQUAPHOR)     rosuvastatin (CRESTOR) tablet 20 mg     heparin lock flush 10 UNIT/ML injection 5-10 mL     heparin lock flush 10 UNIT/ML injection 5-10 mL     nystatin (MYCOSTATIN) suspension 500,000 Units     dextrose 10 % 1,000 mL infusion     multivitamins with minerals (CERTAVITE/CEROVITE) liquid 15 mL     lactobacillus rhamnosus (GG)  (CULTURELL) capsule 1 capsule     polyethylene glycol (MIRALAX/GLYCOLAX) Packet 17 g     senna-docusate (SENOKOT-S;PERICOLACE) 8.6-50 MG per tablet 1-2 tablet     acetaminophen (TYLENOL) solution 650 mg     glucose 40 % gel 15-30 g    Or     dextrose 50 % injection 25-50 mL    Or     glucagon injection 1 mg     magnesium sulfate 4 g in 100 mL sterile water (premade)     potassium phosphate 15 mmol in D5W 250 mL intermittent infusion     potassium phosphate 20 mmol in D5W 500 mL intermittent infusion     potassium phosphate 20 mmol in D5W 250 mL intermittent infusion     potassium phosphate 25 mmol in D5W 500 mL intermittent infusion     Reason ACE/ARB order not selected     sodium chloride (PF) 0.9% PF flush 10-20 mL     sodium chloride (PF) 0.9% PF flush 10 mL     fluticasone (FLONASE) 50 MCG/ACT spray 2 spray     naloxone (NARCAN) injection 0.1-0.4 mg     ondansetron (ZOFRAN-ODT) ODT tab 4 mg    Or     ondansetron (ZOFRAN) injection 4 mg     prochlorperazine (COMPAZINE) injection 5 mg    Or     prochlorperazine (COMPAZINE) tablet 5 mg    Or     prochlorperazine (COMPAZINE) Suppository 12.5 mg     Patient is already receiving anticoagulation with heparin, enoxaparin (LOVENOX), warfarin (COUMADIN)  or other anticoagulant medication     No lozenges or gum should be given while patient on BIPAP     hypromellose-dextran (ARTIFICAL TEARS) ophthalmic solution 1 drop     insulin aspart (NovoLOG) inj (RAPID ACTING)       Caring Physician: Harper Bell MD  [facility], [place]  Pager Contact: see Physician sticky note

## 2017-04-22 NOTE — PROGRESS NOTES
Social Work Services Progress Note    Hospital Day: 23    Data:    Received call from MD that patient is no longer medically ready for d/c. MD to call patient's family.    Ride rescheduled with [place] [phone] for tomorrow Sunday at 1400 - this can be cancelled should patient remain medically not ready for discharge.     Spoke with facility [facility] term care unit [address] [place] [address] (Main Ph: [phone], Weekend RN Supervisor Ph: [phone], Fax: [phone]). Spoke with Supervisor and they could accept patient for admission tomorrow should he be medically ready.     Intervention:    Rescheduling ride and admission    Assessment:  patient requiring LTC after hospital discharge    Plan:    Anticipated Disposition:  Facility:  [facility] LTC Unit    Barriers to d/c plan:  Patient no longer medically ready    Follow Up:  Tentative ride and admission set up for tomorrow 4.23.17 at 1400 pending patient's medical readiness    Alice SOLO LICSW  4/22/2017    ON CALL PAGER   0800 - 1600   [phone]  ON CALL COVERAGE AFTER 1600  [phone]

## 2017-04-22 NOTE — PLAN OF CARE
"Problem: Goal Outcome Summary  Goal: Goal Outcome Summary  Outcome: No Change  /76 (BP Location: Right arm)  Pulse 87  Temp 97.6  F (36.4  C) (Axillary)  Resp 20  Ht 1.626 m (5' 4\")  Wt 76.7 kg (169 lb 1.5 oz)  SpO2 100%  BMI 29.02 kg/m2      VSS, afebrile, sinus rhythm (frequent ectopy). Nonverbal throughout shift, left sided neglect. Orta patent with adequate urine output, No BM. Tube feed running at 40ml/hr (move up at 0800 to goal 45ml/hr). Continue to monitor and notify care team of any changes. Possible D/C today.                 "

## 2017-04-23 NOTE — PLAN OF CARE
Problem: Goal Outcome Summary  Goal: Goal Outcome Summary  SLP: Dysphagia therapy cancelled.  Per RN, pt not following commands and not appropriate for participation.  ST to follow; frequency reduced to 3x/week due to poor participation.

## 2017-04-23 NOTE — PLAN OF CARE
Problem: Goal Outcome Summary  Goal: Goal Outcome Summary  Outcome: No Change  Neuro: Nonverbal, intermittently follows commands.  Cardiac: SR, HR 70's-90's, 's-130's, afebrile.           Respiratory: Sating in 90's on RA.  GI/: Orta, adequate urine output. BM X0  Diet/appetite:  NPO;tolerating continuous TF diet running at 45 mL/hr with 160 mL flushes q4h (goal).  Activity:  Assist of 2.  Pain: No nonverbal indicators of pain present.   Skin: Rt shin covered with foam, lft lower buttocks covered with foam, mid coccyx lisandro, lft outer foot covered with foam, lft outer mid foot covered with foam, rt heel lisandro.  IV's: Lft double lumen PICC, SL.  Tubes:  G-tub, q4h residual checks per order. 5 mL total output this shift.     Pt blood sugars were sustaining in the high 200's earlier in the shift, they are now in the 300's. Gave 3 units of novolog at 0520 and another 5 units at 0600. Danna[facility] cross coverage is aware. Possible discharge tomorrow. Continue with POC. Notify primary team with changes.

## 2017-04-23 NOTE — PLAN OF CARE
Problem: Goal Outcome Summary  Goal: Goal Outcome Summary              Has been awake , non verbal , unable to follow commands. Temp 98.4 , afebrile , HR 80's A-fib ,  /84 . BG continued to be > 300 , primary MD aware and Lantus has been increased to 18 units this am. Orta patent and draining yellow clear urine . No Bm at this shift . Has been up on a chair , No sign or symptom of bleeding noted. No acute distress or discomfort noted. Bed/chair alarm on and functioning.

## 2017-04-23 NOTE — PLAN OF CARE
Problem: Goal Outcome Summary  Goal: Goal Outcome Summary  Shift: [id number]     Pt remains inconsistent with following commands during cares (mouth cares and neuro checks mainly). Pt did not discharge as planned today due to axillary temps 99F -100F and increasing intermittent tachycardia with HR ranging from 70's up to max of 140bpm, would sustain for moderate periods intermittently throughout the day 110's-130's (frequent ectopy), [facility] notified; Orders for blood cultures, UA, XR chest completed, procal, and lactic obtained. Nonverbal throughout shift, left sided neglect but did appear to have slight movements of left leg/shoulder and slight grin on left side (drawn up corners of mouth). Orta patent with adequate urine output; Smear BM x1. Tube feed running at 45ml/hr. BG's high 300's, team notified and changes made to lantus and novolog. All mepilex's changed and documented. Triggered sepsis protocol, lactic back at 1.2. Possible D/C tomorrow 4/23, social work reordered for transport, see note. Continue to monitor and notify care team of any changes.

## 2017-04-23 NOTE — PROGRESS NOTES
Social Work Services Progress Note     Hospital Day: 24     Data:   Charting reflected that patient will be medically ready for discharge tomorrow 4.24.17.     Ride rescheduled with [place] [phone] for tomorrow Monday 4.24.17 at 1400 - this can be cancelled should patient remain medically not ready for discharge.     Spoke with bedside RN and she spoke with patients wife so she is aware of change in plan from today to tomorrow.      Spoke with facility [facility] long term care unit [address] [place] [address] (Main Ph: [phone], Weekend RN Supervisor Ph: [phone], Fax: [phone]). Spoke with Supervisor and they could accept patient for admission tomorrow should he be medically ready.      Intervention:    Rescheduling ride and admission     Assessment:  patient requiring LTC after hospital discharge     Plan:  Anticipated Disposition:  Facility: [facility] LTC Unit  Barriers to d/c plan:  Patient no longer medically ready  Follow Up: Tentative ride and admission set up for tomorrow 4.24.17 at 1400 pending patient's medical readiness     Alice SOLO LICSW  4/23/2017     ON CALL PAGER 0800 - 1600 [phone]  ON CALL COVERAGE AFTER 1600 [phone]

## 2017-04-23 NOTE — PROVIDER NOTIFICATION
Pt BG continued to be high > 300 , post Lantus 18 units and sliding scale morning coverage. Dr Duron, Toña Childress, notified . No new orders at this time will continue to monitor.

## 2017-04-24 NOTE — PLAN OF CARE
Problem: Goal Outcome Summary  Goal: Goal Outcome Summary  Outcome: No Change  Neuro: Pt is alert but unable to assess orientation. Pt squeezes hand but does not open hand to command.  Cardiac: Pt not on tele. SBP stable.    Respiratory: Sating >90% on RA.  GI/: Adequate urine output. Orta in place. No BM this shift.  Diet/appetite: Tolerating TF at 45mls/hr with Q4 160cc water flushes.  Activity:  Assist of 2. Repositioned Q2 hrs.  Pain: No nonverbal signs of pain. At acceptable level on current regimen.   Skin: Intact, no new deficits noted.     Blood sugars remains elevated. 1x additional dose of insulin given, lantus dose increased this evening. Phosphorus low this AM, replaced and neutra packets 3x per day. Phosphorus recheck this afternoon 3.4. Family at bedside this evening. Will continue to monitor.      R: Continue with POC. Notify primary team with changes.

## 2017-04-24 NOTE — PROGRESS NOTES
Progress Note:    Hospital Day: 23    Data: Melvin Bhatia is a 77 yo male admitted to [facility] 03/31/17.    Intervention: Updated Diann with [facility] long term care unit (P: [phone], F: [phone]) and cancelled [facility] ride for today.    Assessment: Per MD pt not medically stable for dc today. Possible dc Tues 4/25/17 pending stable electrolytes.    [facility] (Ph: [phone]) rescheduled for 4/24/17 at 1:00pm via stretcher. Ambulance PCS in pt's hard chart.    Plan:  -Discharge plans in progress: Possible dc Tues 4/25/17 at 1:00pm via [facility] [employer] stretcher to [facility] LTC unit.  -Barriers to discharge plan: medical stability  -Follow up plan: SW will continue to follow and assist as needed.    GERMANIA King, [facility]  6B Intermediate Care Unit   Phone: [phone]  Pager: [phone]

## 2017-04-24 NOTE — PLAN OF CARE
Problem: Goal Outcome Summary  Goal: Goal Outcome Summary  Afebrile, VSS. Heart rate  in A-fib. Pt remains NPO with tube feeds at goal rate of 45cc/hour.  He has remained non-verbal through out this shift, unable to follow commands.   Orta patent, with adequate urine output. BG's remain >300. MD aware.  Plan to DC tomorrow If patient is medically ready for discharge. Ride is set up at 1400.

## 2017-04-24 NOTE — PROGRESS NOTES
[facility] Medicine - Inpatient daily progress note    Date of admission: 3/31/2017  Date of service: 4/24/2017.           Assessment and Plan:      Melvin Bhatia is a 76 year old male with a significant past medical history of cerebrovascular disease, normal pressure hydrocephalus (status post ventriculoperitoneal shunting), progressive cognitive dysfunction, multiple falls, HTN, Type 2 DM, hyperlipidemia, CHF, CKD, and COPD who presented with  worsening mental status. He was found to have an SHERYL and UTI. He developed an episode of melena, anemia and significant elevation for liver enzymes.  He suffered an ischemic stroke of the right MCA diagnosed 4/12.    Patient Active Problem List   Diagnosis     Osteoporosis     PAF (paroxysmal atrial fibrillation) (H)     Lumbosacral plexopathy     Testosterone deficiency     Hypertension goal BP (blood pressure) < 140/90     Atrial fibrillation (H)     CKD (chronic kidney disease) stage 3, GFR 30-59 ml/min     Hyperlipidemia LDL goal <70     Coronary artery disease     Health Care Home     ACP (advance care planning)     Elevated PSA     Anemia in chronic renal disease     Subarachnoid hemorrhage (H)     Idiopathic normal pressure hydrocephalus (INPH)     Urinary bladder neurogenic dysfunction     Stroke (H)     Cognitive deficit, post-stroke     HL (hearing loss)     Personal history of fall     GERD (gastroesophageal reflux disease)     COPD exacerbation (H)     SHIVAM (obstructive sleep apnea)     LVH (left ventricular hypertrophy)     Diastolic CHF, acute on chronic (H)     Hypoxemia     Tachyarrhythmia     Systolic and diastolic CHF, chronic (H)     Gait abnormality     Iron deficiency anemia     Allergic rhinitis     Altered mental status     VRE (vancomycin-resistant Enterococci)     Hypoglycemia     Type 2 diabetes with stage 3 chronic kidney disease GFR 30-59 (H)     Chronic obstructive pulmonary disease, unspecified COPD type (H)     Ankle pain      Fx medial malleolus-closed     Gastrointestinal hemorrhage associated with duodenal ulcer     Anemia due to blood loss, acute     Acute cystitis     Hypernatremia     Delirium     Acute kidney injury superimposed on CKD     Closed fracture of ankle     Ischemic stroke diagnosed during current admission (H)     Protein-calorie malnutrition (H)      Plan:   ## Type 2 DM, last A1C 6.1 on 02/20/17  Blood sugars poorly controlled with the start of tube feedings.  Will wait to transfer patient to LTAC until blood sugars are better controlled.  Plan:   -Will titrate Insulin   - Q 4 hour glucose checks  - Lantus 20-->23 units  units bid  - Correction: HiInsulin Sliding scale    ##A Fib was controlled now with sporadic RVR but otherwise hemodynamically stable   Patient has had episodes of RVR (lasts < 2 minutes) over the last week, but has been HD stable  Plan:   -Would not get troponins with episodes of RVR unless patient becomes hemodynamically unstable.  Due to his condition his options are limited if he develops ACS and he is not a candidate for invasive cardiac procedure, discussed with Spouse  -Will continue to monitor   - Continue Metoprolol 25 mg suspension bid, if having more frequent HR> 140 and not due to infection or other reversible causes will increase Metoprolol to 50 mg BID   - Do not consult Cardiology for intervention (per spouse's wishes)    ## Severe malnutrition from acute illness and may have poor intake for quite sometime now as well due to dementia  Tolerating tube feedings; however, phosphorus low yesterday and today despite starting Neutrophos.  Patient is at high risk for refeeding syndrome.    Plan:  - Continue Neutrophos 3 packets tid  - Continue tube feeds at goal rate of 45 cc/hr  - Free water flushes of 160 cc every 4 hours  - Multivitamins as recommended by Nutritionist     ## New Right MCA Ischemic Stroke 4/12, stable   Stroke most likely caused by ischemia during episode of hypotension with  GI bleed vs embolic from Afib. Patient not a candidate for TPA on Aspirin . No new neurologic change.   Plan:   - Continue secondary stoke prevention, keep blood pressure with goal of <140/90   - ASA      ##HFrEF, euvolemic:  Negative JVD distention, weight stabilizing (admission weight- 80 kg (4/1), peaked at 87 kg(4/10) and 76 kg (4/22).  Will wait on starting patient's home diuresis regimen given multiple changes in fluids/tube feeds/free water flushes over the last few days.     Plan:  - IV lasix prn  - Hold Bumetanide 0.5 mg BID   - Daily weights  - Input and Output   - Will monitor electrolytes     ## COPD: stable    Plan:   - Q 4 hour Atrovent nebs  - Oral care and suctioning prn    ## Upper GI Bleed secondary to Duodenal Ulcer: stable   ## Lower GI Bleed secondary to rectal ulcer : stable  ##Acute anemia secondary to above: stable   Current hemoglobin 8.8    Plan:   - Continue high dose Pantoprazole 40 mg IV bid  - Avoid NSAIDs  - transfuse of HGb is <8 and if with active bleeding     ## Multiple bone fracture on left foot/ankle  Hx of fractures after a fall on 9/16, and 2/2017.  Cast removed 4/18 by Ortho.  X-rays obtained- break not completely healed.  Ortho recs:    Plan:  - Protect weight bearing in AFO  - PRAFO in bed to prevent ulcers  - Follow up with original orthopedic surgeon for ankle symptoms    ##Hypernatremia:  resolved  Patient has had episodes of hypernatremia, most likely secondary to decreased oral intake.  Since the stroke, hypotonic fluids will need to be avoided to avoid cerebral edema.       Plan:   -Will monitor     ## Upper Extremity Swelling: Improving  Most likely secondary to superficial thrombophlebitis vs PICC infiltration vs third spacing .  Patient has a history of blood clots and INR is currently < 2.  LUE ultrasound was negative for DVT.     Plan:   - Will monitor     ## Acute liver injury:  Resolved  Etiology of acute liver injury unknown. Work up for viral hepatitis, toxin,  drugs and BUD Chiari has all been negative. No episode of severe hypotension.  Enzymes have reverted back to normal without intervention.  GI recommending MRCP to evaluate for biliary tract obstruction (depending on ultimate goals of care).     Plan:   - No hepatotoxic medications (tylenol, statins, etc)  - MRCP recommended but most likely will not pursue this     ## UTI vs Colonization: Resolved  Received antibiotics initially but was stopped as this is deemed more of an asymptomatic bacteriuria vs colonization rather than a UTI      ## SHERYL on CKD3 (baseline Cr 1.5-2): improved   Thought to be prerenal from decrease PO intake     Plan:   - Follow up BMP daily  - Strict I/O   - Daily weights  - Continue telemetry        ## Stercoral proctitis     Plan:    - Miralax daily PRN and Senna at bedtime PRN.   - HOLD PTA ferrous sulfate while being treated for constipation    #Normal Pressure Hydrocephalus sequelae of subarachnoid hemorrhage   #Multi infarct Vascular Dementia  W/ Hx stroke, normal pressure hydrocephalus (2012), status post ventriculoperitoneal shunting, subarachnoid hemorrhage(2012).  Patient's neurologic issues were stable prior to stroke.  Will need to follow closely for cerebral edema caused by stroke that could potentially exacerbate patient's hydrocephalus.  Plan :   -Will observe but no aggressive interventions      ##High grade right ICA stenosis, diagnosed on 01/2017 MRA, stable   Patient's LAKE-VASC score is 8, not on anticoagulation due to recent GI bleed  Plan:   -Will continue to hold warfarin for now due to high risk of bleeding      ## Physical Deconditioning  Plan:    - PT/OT  - Speech therapy    ## Dispo:  Discharge planned for tomorrow, 4/25.  New POLST filled out with Spouse 4/20.  Consider palliative care consult to further discuss goals of care with Spouse.      Other chronic conditions, stable- continue home medications.  ## HTN-  At goal , Metoprolol  ## Hyperlipidemia-    rosuvastatin  ## COPD- To continue Breo and Atrovent inhaler   ##SHIVAM- Bipap  ##GERD - PPI   ## Constipation- Not an issue right now, will resume bowel regimen as needed   ## Progressive cognitive dysfunction (dementia)     Fluids: none  Electrolytes: stable  Nutrition: G-tube feeds, free water flushes  Lines:PIV, PICC  Activity: Up with assist   CODE: DNR / DNI  Prophylaxis: Coumadin held  Dispo: Expected discharge date- tomorrow pending stable electrolytes                 Interval History:   Melvin Bhatia has had no significant events overnight.   His blood sugars have continued to be elevated.  Phosphorous continues to be low despite starting Neutrophos yesterday.  Nursing notes reviewed.            Review of Systems:   Review of Systems   Constitutional:        ROS limited secondary to patient's condition   Gastrointestinal: Negative for blood in stool.            Physical Exam (Resident / Clinician):   Vitals were reviewed  Temp: 97.7  F (36.5  C) Temp src: Axillary BP: 101/79   Heart Rate: 95 Resp: 18 SpO2: 99 % O2 Device: None (Room air)      Physical Exam   Constitutional: He appears well-developed. No distress.   Sitting in bed, arousable   Eyes: Pupils are equal, round, and reactive to light. No scleral icterus.   Right and left pupils reactive to light.  Right pupil sluggish and less reactive compared to left pupil.   Neck: Normal range of motion. Neck supple. No JVD present. No tracheal deviation present.   Cardiovascular: Normal rate.    Murmur (2/6 systolic murmur at RSB) heard.  Pulmonary/Chest: Effort normal and breath sounds normal. No respiratory distress. He has no wheezes. He has no rales.   Decreased breath sounds throughout.     Abdominal: Soft. There is no tenderness. There is no rebound and no guarding.   Genitourinary:   Genitourinary Comments: Orta in place, Diaper in place   Musculoskeletal: He exhibits no edema (Swelling of left upper extremity, distal to elbow (including hand,  which has 1-2+ edema)) or tenderness.   Supportive boots on both lower extremities   Neurological: He is alert.   Makes eye contact, unable to answer questions.  Patient will squeeze right hand on command, not closing eyes on command   Skin: Skin is warm and dry. No erythema. No pallor.   Vitals reviewed.          Data:   ROUTINE LABS (Last four results)  CMP    Recent Labs  Lab 04/24/17  0553 04/23/17  0732 04/22/17  0442 04/21/17  1853 04/21/17  0603    137 138  --  139   POTASSIUM 3.6 3.6 3.8  --  3.4   CHLORIDE 101 102 102  --  104   CO2 28 28 30  --  28   ANIONGAP 8 7 7  --  7   * 344* 244*  --  230*   BUN 19 15 10  --  12   CR 0.95 0.91 0.96  --  0.97   GFRESTIMATED 77 80 76  --  75   GFRESTBLACK >90African American GFR Calc >90African American GFR Calc >90African American GFR Calc  --  >90African American GFR Calc   LORENA 8.2* 8.3* 8.2*  --  8.5   MAG 2.1 1.9 1.8  --  2.0   PHOS 1.9* 1.7* 2.9 2.4* 1.7*     CBC    Recent Labs  Lab 04/24/17  0553 04/23/17  0732 04/22/17  0442 04/21/17  0603   WBC 6.0 5.3 4.9 4.4   RBC 3.17* 3.20* 3.11* 2.98*   HGB 8.8* 8.9* 8.8* 8.3*   HCT 28.7* 29.1* 28.2* 27.3*   MCV 91 91 91 92   MCH 27.8 27.8 28.3 27.9   MCHC 30.7* 30.6* 31.2* 30.4*   RDW 17.5* 17.6* 17.9* 18.2*    210 205 189       Intake/Output Summary (Last 24 hours) at 04/23/17    Gross per 24 hour   Intake             1700 ml   Output             2200 ml   Net             -500ml       Medications:     Current Facility-Administered Medications   Medication     insulin glargine (LANTUS) injection 23 Units     potassium & sodium phosphates (NEUTRA-PHOS) Packet 1 packet     insulin aspart (NovoLOG) inj (RAPID ACTING)     insulin aspart (NovoLOG) inj (RAPID ACTING)     ipratropium (ATROVENT) 0.02 % neb solution 0.5 mg     ipratropium (ATROVENT) 0.02 % neb solution 0.5 mg     zinc sulfate (20 mg [place]. Zn/mL) solution 220 mg     vitamin A-D & C drops (TRI-VITAMIN) drops SOLN 7 mL     aspirin suspension 325  mg     metoprolol (LOPRESSOR) suspension 25 mg     pantoprazole (PROTONIX) suspension 40 mg     ferrous sulfate elixir 220 mg     lidocaine 1 % 1 mL     lidocaine (LMX4) kit     sodium chloride (PF) 0.9% PF flush 3 mL     sodium chloride (PF) 0.9% PF flush 3 mL     mineral oil-hydrophilic petrolatum (AQUAPHOR)     rosuvastatin (CRESTOR) tablet 20 mg     heparin lock flush 10 UNIT/ML injection 5-10 mL     heparin lock flush 10 UNIT/ML injection 5-10 mL     nystatin (MYCOSTATIN) suspension 500,000 Units     dextrose 10 % 1,000 mL infusion     multivitamins with minerals (CERTAVITE/CEROVITE) liquid 15 mL     lactobacillus rhamnosus (GG) (CULTURELL) capsule 1 capsule     polyethylene glycol (MIRALAX/GLYCOLAX) Packet 17 g     senna-docusate (SENOKOT-S;PERICOLACE) 8.6-50 MG per tablet 1-2 tablet     acetaminophen (TYLENOL) solution 650 mg     glucose 40 % gel 15-30 g    Or     dextrose 50 % injection 25-50 mL    Or     glucagon injection 1 mg     magnesium sulfate 4 g in 100 mL sterile water (premade)     potassium phosphate 15 mmol in D5W 250 mL intermittent infusion     potassium phosphate 20 mmol in D5W 500 mL intermittent infusion     potassium phosphate 20 mmol in D5W 250 mL intermittent infusion     potassium phosphate 25 mmol in D5W 500 mL intermittent infusion     Reason ACE/ARB order not selected     sodium chloride (PF) 0.9% PF flush 10-20 mL     sodium chloride (PF) 0.9% PF flush 10 mL     fluticasone (FLONASE) 50 MCG/ACT spray 2 spray     naloxone (NARCAN) injection 0.1-0.4 mg     ondansetron (ZOFRAN-ODT) ODT tab 4 mg    Or     ondansetron (ZOFRAN) injection 4 mg     prochlorperazine (COMPAZINE) injection 5 mg    Or     prochlorperazine (COMPAZINE) tablet 5 mg    Or     prochlorperazine (COMPAZINE) Suppository 12.5 mg     Patient is already receiving anticoagulation with heparin, enoxaparin (LOVENOX), warfarin (COUMADIN)  or other anticoagulant medication     No lozenges or gum should be given while patient on  BIPAP     hypromellose-dextran (ARTIFICAL TEARS) ophthalmic solution 1 drop     insulin aspart (NovoLOG) inj (RAPID ACTING)       Caring Physician: Toña Duron MD  [facility], [facility]  Pager Contact: see Physician sticky note

## 2017-04-24 NOTE — PLAN OF CARE
Problem: Goal Outcome Summary  Goal: Goal Outcome Summary  Outcome: No Change  Neuro: Nonverbal, intermittently follows commands.  Cardiac:  Off tele per order. ST HR 90's-100's, 's-110's, afebrile.   Respiratory: Sating in 90's on RA.  GI/: Orta, adequate urine output. BM X 1;smear, runny  Diet/appetite: NPO;tolerating continuous TF diet running at 45 mL/hr with 160 mL flushes q4h (goal).  Activity: Assist of 2.  Pain: No nonverbal indicators of pain present.   Skin: Rt shin lisandro, lft lower buttocks covered with foam, mid coccyx lisandro, lft outer foot lisandro, lft outer mid foot lisandro, rt heel lisandro.  IV's: Lft double lumen PICC, HL.  Tubes: G-tub, q4h residual checks per order.      Pt blood sugars were sustaining in the 300's earlier in the shift, they are now in the high 200's. Gave 5 units of novolog at 0029, 5 units at 0138, and another 5 units at 0545.  [place] coverage is aware. Possible discharge today. Continue with POC. Notify primary team with changes.

## 2017-04-25 NOTE — PROGRESS NOTES
CLINICAL NUTRITION SERVICES - REASSESSMENT NOTE     Nutrition Prescription    RECOMMENDATIONS FOR MDs/PROVIDERS TO ORDER:  - Pending D/C plans would recommend Endocrinology consult for better blood sugar control (consistently >300).     Malnutrition Status:    Patient does not meet two of the above criteria necessary for diagnosing malnutrition but is at risk for malnutrition    Recommendations already ordered by Registered Dietitian (RD):  None at this time    Future/Additional Recommendations:  - Monitor GOC for possible ability to adjust TF     EVALUATION OF THE PROGRESS TOWARD GOALS   Diet: NPO, c/w dysphagia and AMS  Nutrition Support: Peptamen 1.5 via G-tube @ goal 45 ml/hr   Intake:   -5 day avg TF intake of 784 ml providing 1176 kcal (19 kcal/kg) and 53 gm protein (0.8 gm/kg) which meets 75% of low end energy needs and 65% of low end protein needs.     NEW FINDINGS   -Wt: stable 76-78 kg over the week.  -Labs: Phos intermittently low over the past week (1.7-2.9). Per previous RD brief note, recommended NeutraPhos supplementation for Phos <2.5. No NeutraPhos ordered at this time. BGs consistently >300, recommend Endocrinology consult, however, pt possibly choosing hospice/possible discharge today.  -Skin: Per WOC, no PI on left foot. WOC signed off 4/19.    MALNUTRITION  % Intake: < 75% for > 7 days (non-severe)  % Weight Loss: None noted  Subcutaneous Fat Loss: None observed  Muscle Loss: None observed  Fluid Accumulation/Edema: None noted  Malnutrition Diagnosis: Patient does not meet two of the above criteria necessary for diagnosing malnutrition but is at risk for malnutrition    Previous Goals   Resume nutrition support within 1-2 days.  Evaluation: Met    Previous Nutrition Diagnosis  Inadequate protein-energy intake related to altered mental status and ongoing SLP recs NPO as evidenced by pt reliant on TF to meet nutrition/hydration needs and 3 day avg TF intake of 938 kcal (15 kcal/kg) and 43 gm pro  (0.7 gm/kg) which meets 73% estimated energy needs and 67% estimated protein needs and now with no EN access with plan for PEG tomorrow.  Evaluation: No change    CURRENT NUTRITION DIAGNOSIS  Inadequate protein-energy intake related to AMS and ongoing SLP recs NPO as evidenced by pt reliant on TF to meet nutrition/hydration needs and 5 day avg TF intake of 784 ml providing 1176 kcal (19 kcal/kg) and 53 gm protein (0.8 gm/kg) which meets 75% of low end energy needs and 65% of low end protein needs.    INTERVENTIONS  Implementation  Collaboration with other providers - discussed pt during rounds    Goals  Total avg nutritional intake to meet a minimum of 25 kcal/kg and 1.3 g PRO/kg daily (per dosing wt 63 kg).    Monitoring/Evaluation  Progress toward goals will be monitored and evaluated per protocol.      Nic Gudino, Dietetic Intern    I agree with the assessment, interventions, and recommendations.    Faye Myers RD, LD  Unit pager: [phone]

## 2017-04-25 NOTE — PROGRESS NOTES
Social Work Services Discharge Note      ADDENDUM 1615: Palliative Care NP Renée met w/pt and pt's spouse and pt's spouse was interested in enrolling pt in hospice services. Writer met w/pt and pt's spouse to further discuss hospice services and offer agency choice. Pt's spouse was not aware of the financial implications of hospice and was informed that room & board at [facility] would be private pay if pt received hospice services. Pt's spouse was hoping that when pt returned to [facility], [employer] [facility] would resume SNF coverage. Pt's spouse has already spent thousands of dollars to privately pay to keep pt's bedhold at [facility]. Per the Medicare/MDS nurse Judi (P: [phone]) at [facility], pt could admit to [facility] LTC with coverage from [facility] since pt has 100% nutrition from TF is considered a skilled need. Pt's spouse would prefer to have pt dc to [facility] under regular rehab/SNF orders (not hospice or comfort care) and pt's spouse will enroll pt in hospice when [employer] discontinues coverage. Writer updated Ray [facility] nurse manager.    [employer] [facility] rescheduled for 1730 today.      Patient Name:  Melvin Bhatia     Anticipated Discharge Date: Tuesday 04/25/17 at 1600 via [employer] stretcher transport to [facility] long term care unit.     Discharge Disposition:    Nursing Home Placement  -SNF:     [facility] long term care unit  [address] [place] [id number]  (Main P: [phone], Admissions P: [phone], F: [phone]).     Updated Pre-Admission Screening (PAS) online form completed as last PAS was completed 02/22/17 which has been >60 days. The Level of Care (LOC) is: Determined. Confirmation Code is:  [id number]  Patient/caregiver informed of referral to [facility] Line for Pre-Admission Screening for skilled nursing facility (SNF) placement and to expect a  phone call post discharge from SNF.     Additional Services/Equipment Arranged: [facility] (Ph: [phone]) arranged for 4/25/17 at 1600 via stretcher. Ambulance PCS in pt's hard chart.     Pt's spouse Dian would like [facility] stretcher transportation arranged at dc. Writer informed pt's spouse that [facility] will complete an Ambulance Provider Certification Statement but there is no guarantee of insurance coverage and the possible private pay cost for [facility] ([facility]) Ambulance is $936 base rate plus $21.84/mile. Pt is aware of this possibility from the last time pt discharged and still wants ambulance transport set up.    POLST sent to [facility] on 4/21.  Palliative Care will be meeting w/pt's spouse prior to dc (3pm at the latest) to discuss GOC and rehospitalization wishes.     Patient / Family response to discharge plan:  Spouse in agreement w/plan.     Persons notified of above discharge plan: Pt's spouse, Diann at [facility], Palliative team, [facility] team, 6B charge RN, floor RN, Sissy SCHULZCC.    -Staff Discharge Instructions:  Please fax discharge orders and signed hard scripts for any controlled substances.  Please print a packet and send with patient.   Ambulance PCS updated and given to 6B .    RN to RN handoff: call [phone]    [place]IA King, [facility]  6B Intermediate Care Unit  Phone: [phone]  Pager: [phone]

## 2017-04-25 NOTE — PLAN OF CARE
Problem: Goal Outcome Summary  Goal: Goal Outcome Summary  SLP: Dysphagia therapy cancelled.  Pt not appropriate for participation this date.  ST to follow.

## 2017-04-25 NOTE — CONSULTS
"[facility]  Palliative Care Consultation         Melvin Bhatia MRN# [id number]   Age: 76 year old YOB: 1940   Date of Admission: 3/31/2017    Reason for consult: Goals of care  Decisional support  Patient and family support       Requesting physician/service: Toña Duron MD       Recommendations        Goals of Care  -DNR/DNI  -Goals are focused on comfort only  -Would recommend discontinuing non-comfort measures  -Would in initiate comfort measures  -Would diabetes care to avoid symptomatic hyperglycemia (blood glucose over 350 usually)  -Agree with care facility  continuing to support Fredrick and wife in this plan and creating care plan to reflect the goals of care    Discussion  Visited with wife today. Patient has significantly changed and now is unable to communicate. Wife seems to know pretty well how sick Fredrick is and wishes for Fredrick to be cared for at his care facility however does not think that coming back to the hospital would be of much benefit and would rather allow for a natural death if he were to continue to decline. Wife very much agreed with the hospice philosophy and did not want any further treatments such as antibiotics or other medical interventions that would prolong his life. Discussed code status and wife continues to want DNR/DNI. Given the financial impact, hospice is not of added benefit at this time for wife and would recommend care facility team continue to discuss utility of a \"do not re hospitalize\" order for Fredrick at the care facility.     POLST -currently has one, would recommend care facility adjusting after discussion with wife for what makes sense in their facility    WHITNEY Thomas CNS  Palliative Care Consult Team  Pager: [phone]    70 minutes spent with patient, with >50% counseling and in care coordination.        Disease Process/es & Symptoms     Worsening mental status  SHERYL  UTI  Ischemic stroke " "of the right MCA  Cerebrovascular disease  Normal pressure hydrocephalus  Progressive cognitive dysfunction  Multiple falls  HTN  DMII  Hyperlipidemia  CHF  CKD  COPD       Support/Coping   (We typically ask each of our patients the following questions:)    Are you Congregation? Spiritual? Not so much? Was previously  Who are your \"go-to\" people when you need support? Wife is a big support for patient, and is his health care agent    Plan for psychosocial/spiritual support/follow-up from palliative team: Will discuss case during palliative interdisciplinary team rounds and involve LICSW and  as needed.       Decision-Making & Goals of Care     Discussion/counseling today about prognosis/goals of care/decisions:   See above  Patient has a completed health care directive available in the chart (Y/N): Y  Health care agent (only if patient has an available, complete HCD): Dian Bhatia (spouse)  Physician orders for life-sustaining treatment (POLST) form indicates no aggressive treatment  Code Status: Do not resusitate / Do not intubate   Patient has decision-making capacity (Y/N): No    Coordination of Care     Findings & plan of care discussed with: [facility] Team   Follow-up plan from palliative team: patient is discharging today, but if not discharged will continue to follow  Thank you for involving us in the patient's care.           Chief Complaint     Goals of care         History of Present Illness     History obtained from: chart review    Melvin Bhatia is a 76 year old male with a complex PMH of ecephalopathy, idiopathic normal pressure hydrocephalus with  shunt, dementia, multiple CVAs, CKD stage 3 COPD, anemia. DMII, CAD, CHF, and neurogenic bladder who presented from his care facility with poor PO intake, and found to have SHERYL. In February he was also hospitalized for a left medial malleolus fracture which remains an issue.  While hospitalized he had a right MCA stroke and now is " unable to communicate.     Palliative care team consulted 4/24 for goals of care          Past Medical History:   I have reviewed this patient's past medical history  This patient  has a past medical history of ARDS (adult respiratory distress syndrome) (H); Atrial fibrillation (H); Bell's palsy (1/2010); Cauda equina syndrome with neurogenic bladder (H); Cerebellar infarct (H) (4/2012); CHF (congestive heart failure) (H); Chronic airway obstruction, not elsewhere classified; Chronic infection; CKD (chronic kidney disease) stage 4, GFR 15-29 ml/min (H); Congestive heart failure, unspecified (5/06); Coronary artery disease; CVA (cerebral infarction); Diabetes; Essential hypertension, benign; Gastro-oesophageal reflux disease; Heart attack (H); Hemorrhage of gastrointestinal tract, unspecified (2/2003); Hydrocephalus; Hypersomnia with sleep apnea, unspecified; Intraventricular hemorrhage (1/2012); Legionella pneumonia (H) (8/09); Lumbago; Lumbosacral plexopathy (10/09); Mixed hyperlipidemia; Other osteoporosis (3/2006); Other specified anemias (2002); Other specified disorders of rotator cuff syndrome of shoulder and allied disorders; Other testicular hypofunction; Palpitations; Sleep apnea; Subarachnoid hemorrhage (H) (1/2012); Tibial plateau fracture (2/2011); Type II or unspecified type diabetes mellitus without mention of complication, not stated as uncontrolled; and Unspecified disorder resulting from impaired renal function.           Past Surgical History:   I have reviewed this patient's past surgical history  This patient  has a past surgical history that includes surgical history of - (2001); surgical history of - (age 20); DEXA, BONE DENSITY, AXIAL SKEL (3/2006); cardiac nuc wilman stress test nl (5/06); COLONOSCOPY THRU STOMA, DIAGNOSTIC (2/2003); upper gi endoscopy (2/2003); upper gi endoscopy (4/2003); surgical history of - (2003); tonsillectomy (child); cardiac nuc wilman stress test nl (6/2008, 3/09);  DOPPLER ECHO PULSED, COMPLETE; surgical history of -; Open reduction internal fixation tibial plateau (3/2011); Implant shunt ventriculoperitoneal (2/28/2012); Esophagoscopy, gastroscopy, duodenoscopy (EGD), combined (N/A, 4/7/2017); and Sigmoidoscopy flexible (N/A, 4/11/2017).            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 in his father; DIABETES in his maternal grandmother.              Allergies:   All allergies reviewed and addressed  This patient is allergic to is allergic to penicillins; albuterol; albuterol sulfate; atorvastatin; atorvastatin calcium; cipro [ciprofloxacin]; ciprofloxacin; simvastatin; and warfarin.           Medications:   I have reviewed this patient's medication profile and medications during this hospitalization           Review of Systems:   The comprehensive review of systems is negative other than noted here and in the HPI.    Palliative Symptom Review (0=no symptom/no concern, 1=mild, 2=moderate, 3=severe):      CELESTINA given patients AMS            Physical Exam:   Vitals were reviewed  Temp: 97.6  F (36.4  C) Temp src: Axillary BP: 122/79 Pulse: 80 Heart Rate: 72 Resp: 22 SpO2: 98 % O2 Device: None (Room air)    Constitutional: Awake, gazing around room, cooperative, no apparent distress  Lungs: No increased work of breathing, good air exchange, clear to auscultation bilaterally  Neurologic: unable to communicate verbally  Neuropsychiatric: CELESTINA           Data Reviewed:     ROUTINE ICU LABS (Last four results)  CMP  Recent Labs  Lab  04/25/17  0551 04/24/17  1548 04/24/17  0553 04/23/17  0732 04/22/17  0442     --  137 137 138   POTASSIUM 3.7  --  3.6 3.6 3.8   CHLORIDE 102  --  101 102 102   CO2 28  --  28 28 30   ANIONGAP 8  --  8 7 7   *  --  256* 344* 244*   BUN 22  --  19 15 10   CR 0.97  --  0.95 0.91 0.96   GFRESTIMATED 75  --  77 80 76   GFRESTBLACK >90African American GFR Calc  --  >90African American GFR Calc >90African American GFR Calc >90African American GFR Calc   LORENA 8.3*  --  8.2* 8.3* 8.2*   MAG 2.2  --  2.1 1.9 1.8   PHOS 2.7 3.4 1.9* 1.7* 2.9     CBC  Recent Labs  Lab 04/25/17  0551 04/24/17  0553 04/23/17  0732 04/22/17  0442   WBC 7.0 6.0 5.3 4.9   RBC 3.27* 3.17* 3.20* 3.11*   HGB 8.9* 8.8* 8.9* 8.8*   HCT 29.8* 28.7* 29.1* 28.2*   MCV 91 91 91 91   MCH 27.2 27.8 27.8 28.3   MCHC 29.9* 30.7* 30.6* 31.2*   RDW 17.3* 17.5* 17.6* 17.9*    253 210 205     INRNo lab results found in last 7 days.  Arterial Blood GasNo lab results found in last 7 days.

## 2017-04-25 NOTE — PROVIDER NOTIFICATION
Notified abdullahi's cross cover for  . Patient on sliding scale before meal and bedtime . Patient is NPO and on continuous feeding tube . lantus was increased to 27 Units . Per sliding scale patient got 7 units.  Spoke to Dr Gregg about the above . Additional 5 units given . Plan to recheck BG every 4 hrs and to notified cross cover for the BG level .

## 2017-04-25 NOTE — PLAN OF CARE
Problem: Goal Outcome Summary  Goal: Goal Outcome Summary  Outcome: No Change  Neuro: Nonverbal, intermittently follows commands.  Cardiac: ST HR 70's-80's's, -130's, afebrile.   Respiratory: Sating in 90's on RA.  GI/: Orta, adequate urine output. BM X 0  Diet/appetite: NPO;tolerating continuous TF diet running at 45 mL/hr with 160 mL flushes q4h (goal).  Activity: Assist of 2.  Pain: No nonverbal indicators of pain present.   Skin: Rt shin lisandro, lft lower buttocks covered with foam, mid coccyx lisandro, lft outer foot lisandro, lft outer mid foot lisandro, rt heel lisandro, rooke boots on.  IV's: Lft double lumen PICC, HL.  Tubes: G-tub, q4h residual checks per order.      Pt blood sugars were sustaining in the 300's earlier in the shift, they are now in the high 200's. Gave 10 units of novolog at 0055. [facility] cross coverage is aware. Possible discharge today at 1pm to [facility] LTC unit per . Continue with POC. Notify primary team with changes.

## 2017-04-25 NOTE — PLAN OF CARE
Problem: Goal Outcome Summary  Goal: Goal Outcome Summary  Outcome: Therapy, progress toward functional goals is gradual  OT-6B: Pt required Max A and Max vc's for rolling to bilateral sides. Pt required dependent sling transfer to bedside chair. Pt required Max Pauma A and vc's for engagement in self cares. Therapist performed PROM to LUE and AAROM to RUE x 10 reps throughout all planes. Pt with very limited active participation in session.      REC: Discharge to TCU when medically appropriate.

## 2017-04-26 NOTE — PROGRESS NOTES
"[facility]  \"Hello, my name is Daniela Alicea , and I am calling from the [facility].  I want to check in and see how you are doing, after leaving the hospital.  You may also receive a call from your Care Coordinator (care team), but I want to make sure you don t have any urgent needs.  I have a couple questions to review with you:     Post-Discharge Outreach                                                    Melvin Bhatia is a 76 year old male     Follow-up Appointments           Follow Up and recommended labs and tests       Follow up with shelter physician. The following labs/tests are recommended: CBC ,CMP, magnesium, Phosphorous on a daily basis until stable without replacement (tube feeds started 4/30- patient at high risk for refeeding syndrome).  Consider adding ACE inhibitor if BP stable  Adjust Bumex or other diuretics as needed   Adjust Metoprolol for Afib as needed                       Your next 10 appointments already scheduled            May 11, 2017 1:00 PM CDT   Return Visit with Johnathan Walsh MD   [facility] ([facility])     [address]  [address]  [facility] [address] [phone]                  May 23, 2017 11:00 AM CDT   SHORT with Oma Gagnon RPH   [facility] ([facility])     [address] [place] [id number] [phone]              Care Team:    Patient Care Team       Relationship Specialty Notifications Start End    Ash Bowman MD PCP - General Family Practice  6/30/14     Phone: [phone] Fax: [phone]         [address] [address]    Jayant Chaudhari MD MD Nephrology  7/16/13     Phone: [phone] Fax: [phone]         [facility] [address] [facility] [address]    Oma Gagnon RPH Pharmacist Pharmacist  5/19/14      [place] " [facility] [address] [address] [facility] [address]    Nya Owens, RN Nurse Coordinator Neurology Admissions 6/16/16     Phone: [phone] Pager: [phone]        Una Young, LP   Psychology  6/30/16     Phone: [phone] Fax: [phone]         [facility] [address] [facility] [address] [facility] [address]    Jimmy Huntley MD MD Student in organized health care education/training program  10/15/16     Phone: [phone] Fax: [phone]         [address] [facility] [address]            Transition of Care Review                                                      Did you have a surgery or procedure during your hospital visit? Yes   If yes, do you have any of the following:     Signs of infection:  NO    Pain:  No     Pain Scale (0-10) 0/10     Location: NA    Wound/incision concerns? Na    Do you have all of your medications/refills?  Yes    Are you having any side effects or questions about your medication(s)? No    Do you have any new or worsening symptoms?  No    Do you have any future appointments scheduled?   Yes       Next 5 appointments (look out 90 days)     May 23, 2017 11:00 AM CDT   SHORT with Oma Gagnon RPH   [facility] ([facility])    [address] [place] [address] [phone]                      Plan                                                      Thanks for your time.  Your Care Coordinator may follow-up within the next couple days.  In the meantime if you have questions, concerns or problems call your care team.        Daniela Alicea

## 2017-04-26 NOTE — PLAN OF CARE
Problem: Goal Outcome Summary  Goal: Goal Outcome Summary  Outcome: Adequate for Discharge Date Met:  04/25/17  Goal: Goal Outcome Summary  Outcome: No Change  Neuro: Pt is alert but unable to assess orientation. Pt is nonverbal. Minimal movement on left side from previous stroke.  Cardiac: Pt not on tele. SBP stable.   Respiratory: Sating >90% on RA.  GI/: Orta in place. Adequate urine output.  Diet/appetite: Tolerating TF at 45mls/hr with Q4 160cc water flushes.  Activity: Assist of 2. Repositioned Q2 hrs. Pt up in chair for 2+ hours.  Pain: No nonverbal signs of pain. At acceptable level on current regimen.   Skin: Intact, no new deficits noted.      Blood sugars remains elevated. Lantus dose increased. Sliding scale coverage changed to Q4 hours.Family at bedside this evening. Will continue to monitor.       R: Continue with POC. Notify primary team with changes.

## 2017-04-26 NOTE — OP NOTE
Operative Note     Pre-operative diagnosis: Malnutrition   Post-operative diagnosis Same     Procedure: Procedure(s):  Percutneous Gastrostomy Tube Insertion Latex Safe - Wound Class: I-Clean  Surgeon: Surgeon(s) and Role:  * Akosua Tyson MD - Primary  * Ludmila Palacios MD - Resident - Assisting  Joann Tran PGY5    DESCRIPTION OF PROCEDURE: The endoscope was introduced into the posterior pharynx and advanced down through the esophagus. The esophagus was found to be normal without any evidence of infection or pathology. This was advanced to the stomach and then through the pylorus through to the second portion of the duodenum. The visualized portion of the duodenum, the stomach and the esophagus were all  without evidence of abnormal findings. A portion of the anterior stomach was identified using both transillumination and balloting to identify a spot along the greater curvature. Local anesthetic was used in the skin and a small incision was made. An Angiocath needle was placed under single pass into the stomach. The needle was removed and the guidewire was placed into the stomach. This was grasped using a snare through the endoscope and pulled out through the oral cavity, where it was figure-of-eight looped onto the G-tube. The snare tubing was used as a guide down through the G-tube and using a push pull technique, the camera and the G-tube were advanced all the way back through the stomach until the bumper was flush against the stomach and the body wall. The snare was removed. The guidewire was removed using manual traction on the G-tube and the bumper was placed onto the G-tube at 3 cm. The locking mechanism was placed on the G-tube and the G-tube cut off and placed to gravity drainage. The stomach was desufflated. Inspection of the esophagus on the way out again revealed no pathology. The patient tolerated the procedure well without any obvious complications.     Akosua Tyson

## 2017-04-26 NOTE — PLAN OF CARE
Problem: Goal Outcome Summary  Goal: Goal Outcome Summary  Occupational Therapy Discharge Summary     Reason for therapy discharge:    Discharged to long term care facility.     Progress towards therapy goal(s). See goals on Care Plan in [facility] electronic health record for goal details.  Goals partially met.  Barriers to achieving goals:   limited tolerance for therapy and discharge from facility.     Therapy recommendation(s):    Continued therapy is recommended.  Rationale/Recommendations:  Discharge to TCU to progress independence with functional transfers, ADLs, further address cognition. . .

## 2017-05-01 ENCOUNTER — DOCUMENTATION ONLY (OUTPATIENT)
Dept: CASE MANAGEMENT | Facility: CLINIC | Age: 77
End: 2017-05-01

## 2017-05-01 NOTE — PROGRESS NOTES
Patient discharged from the hospital to LTC on 4/25.     FPA CC does not need to follow stay due to LTC status.     Vidhi Brooks

## 2017-05-04 ENCOUNTER — DOCUMENTATION ONLY (OUTPATIENT)
Dept: CASE MANAGEMENT | Facility: CLINIC | Age: 77
End: 2017-05-04

## 2017-05-04 NOTE — PROGRESS NOTES
Clinic Care Coordination Contact  Care Team Conversations    Notification received today from [facility] that patient passed away on 4/28/2017 at [facility].    Plan: FYI to Dr. Bowman.  Please forward to your medical records department so chart can be updated.    LEX Leiva, RN, PHN  [facility] Care Coordinator  [phone]  May 4, 2017

## 2020-01-01 NOTE — OR NURSING
"Procedure: EGD /s interventions.  Sedation: Conscious Sedation  Specimens: None.  O2: 2 L/min via NC throughout procedure.  Room air upon conclusion of procedure et transport to [facility].  Note: Pt tolerated procedure fair.   Report:  Post-procedure report called to PCU RN: Lupis *[id number]  Total sedation time: 11 minutes    Additional Notes: Uncertain of patient's orientation.  Only responds with monosyllabic \"yes\", \"no\" et profanity.. Resistant to  Minimal care activities (e.g., positioning, monitor placement, O2 cannula, etc.)  Repositioned /q 30 minutes prior to initiation of procedure after arrival in room.     1330: unable to find rationale for Rt upper arm limb alert. Called Teal who confirmed that BP's have been taken in Rt upper arm throughout stay.  Fx Lt lower leg, PICC Lt upper arm, B/P's Rt lower leg inconsistent with Rt upper arm. Pt increasingly restless. Cardiac monitor shows periods of atrial fibrillation, trigeminy PVC's, couplet PVC's et frequent PAC's with sinus rhythm. A-fib rate 110's, sinus rate 70's. SpO2  throughout on 2L/NC. Hypotensive with systolic 78-99, diastolic 41-69. Charge nurse appraised of status. Dr. Lopez appraised upon arrival. ETCO2 monitored throughout sedation.    Rosio Rivas RN  [facility]  " Yes

## 2020-06-04 NOTE — PLAN OF CARE
Problem: Goal Outcome Summary  Goal: Goal Outcome Summary  Outcome: No Change  Pt alert and oriented to self only. VSS on bipap, tachycardic to 110's-MD paged. Denies pain/nausea. Repositioned q2h. Does not call, rounded on frequently. MIFV to right hand PIV at 125 ml/hr. I&O q4h. DD1 + nectar thickened liquids. BMP q6h, creatinine downtrending. WOC consult pending for excoriated buttocks. Soft tarry green stool x 2. Orta patent with yellow output. Tele a-fib, tachy.        [History reviewed] : History reviewed. [Medications and Allergies reviewed] : Medications and allergies reviewed.

## 2021-05-25 ENCOUNTER — RECORDS - HEALTHEAST (OUTPATIENT)
Dept: ADMINISTRATIVE | Facility: CLINIC | Age: 81
End: 2021-05-25

## 2022-07-20 NOTE — PROGRESS NOTES
DISCHARGE                         4/25/2017  6:07 PM  ----------------------------------------------------------------------------  Discharged to: [facility]  Via: [employer]  Accompanied by: EMS  Discharge Instructions: Discharge instructions faxed to facility. Diet, activity, medications, follow up appointments, when to call the MD, aftercare instructions.  Prescriptions: To be filled by facility pharmacy; medication list reviewed & sent with pt  Follow Up Appointments: arranged; information given  Belongings: All sent with pt  IV: PICC removed.  Telemetry: Done.    Report given to RN at [facility]. All questions answered as able. Family updated.    Discharge Paperwork: Sent with patient to [facility].    no

## 2023-11-17 NOTE — PROGRESS NOTES
[facility], [place]      Neurology Stroke Code - Canceled    Patient Name: Melvin Bhatia  : 1940 MRN#: [id number]    STROKE DATA     Stroke Code:  Time called:  04/15/2017 0915  Time patient seen:  04/15/2017 0917  Onset of symptoms:  04/15/2017 0900  Last known normal (pt's baseline):  04/15/2017 unknown  Head CT read by Dr Galdamez at:  04/15/2017 0955     Stroke Code canceled at 0921 (time): no change in symptoms from known recent stroke.    Discussed why stroke code canceled with nurse.    TPA treatment:  Not given due to major infarct (hypodensity > 1/3 hemisphere on CT), GI bleed in past 21 days.    National Institutes of Health Stroke Scale (at presentation)  NIHSS done at:  time patient seen      Score    Level of consciousness:  (1)   Not alert; arousable w/ minor stim to obey/answer/respond    LOC questions:  (2)   Answers neither question correctly    LOC commands:  (0)   Performs both tasks correctly    Best gaze:  (1)   Partial gaze palsy    Visual:  (2)   Complete hemianopia    Facial palsy:  (2)   Partial paralysis (total/near total of lower face)    Motor arm (left):  (4)   No movement    Motor arm (right):  (2)   Some effort against gravity    Motor leg (left):  (3)   No effort against gravity    Motor leg (right):  (3)   No effort against gravity    Limb ataxia:  (0)   Absent    Sensory:  (0)   Normal- no sensory loss    Best language:  (2)   Severe aphasia    Dysarthria:  (2)   Severe dysaphria    Extinction and inattention:  (0)   No abnormality        NIHSS Total Score:  24           ASSESSMENT & RECOMMENDATONS     Stroke code was activated due to worsening L facial droop. Mr Bhatia is familiar to the stroke service. On initial evaluation he did not appear significantly changed from prior assessments and the stroke code was canceled. No further workup is indicated.    Recommendations:   - no further workup at this time  - please see stroke service  progress note from today's date for additional information    The patient will be managed by the family medicine team and  we will sign off at this time.  Thank you for the consult.  Contact the stroke team if you have any further questions    The patient was seen and discussed with the neurocritical care fellow, Dr Galdamez and the attending, Dr Sher Fatima, DO  PGY2 Neurology     The patient is a 32y Female complaining of flank pain.

## 2025-01-13 NOTE — PROGRESS NOTES
[facility] Medicine - Inpatient daily progress note    Date of admission: 3/31/2017  Date of service: 4/20/2017.           Assessment and Plan:      Melvin Bhatia is a 76 year old male with a significant past medical history of cerebrovascular disease, normal pressure hydrocephalus (status post ventriculoperitoneal shunting), progressive cognitive dysfunction, multiple falls, HTN, Type 2 DM, hyperlipidemia, CHF, CKD, and COPD who presented with  worsening mental status. He was found to have an SHERYL and UTI. He developed an episode of melena, anemia and significant elevation for liver enzymes.  He suffered an ischemic stroke of the right MCA diagnosed 4/12.    Patient Active Problem List   Diagnosis     Osteoporosis     PAF (paroxysmal atrial fibrillation) (H)     Lumbosacral plexopathy     Testosterone deficiency     Hypertension goal BP (blood pressure) < 140/90     Atrial fibrillation (H)     CKD (chronic kidney disease) stage 3, GFR 30-59 ml/min     Hyperlipidemia LDL goal <70     Coronary artery disease     Health Care Home     ACP (advance care planning)     Elevated PSA     Anemia in chronic renal disease     Subarachnoid hemorrhage (H)     Idiopathic normal pressure hydrocephalus (INPH)     Urinary bladder neurogenic dysfunction     Stroke (H)     Cognitive deficit, post-stroke     HL (hearing loss)     Personal history of fall     GERD (gastroesophageal reflux disease)     COPD exacerbation (H)     SHIVAM (obstructive sleep apnea)     LVH (left ventricular hypertrophy)     Diastolic CHF, acute on chronic (H)     Hypoxemia     Tachyarrhythmia     Systolic and diastolic CHF, chronic (H)     Gait abnormality     Iron deficiency anemia     Allergic rhinitis     Altered mental status     VRE (vancomycin-resistant Enterococci)     Hypoglycemia     Type 2 diabetes with stage 3 chronic kidney disease GFR 30-59 (H)     Chronic obstructive pulmonary disease, unspecified COPD type (H)     Ankle pain      Procedure:  Right thumb carpometacarpal joint arthroplasty with TightRope suspension (Right: Finger)  RELEASE CARPAL TUNNEL - Right (Right: Wrist)  RELEASE CUBITAL TUNNEL - Right, with possible anterior transposition (Right: Elbow)    ECHO (04/10/23):   Left Ventricle: Left ventricular cavity size is normal. Wall thickness is mildly increased. There is mild concentric hypertrophy. The left ventricular ejection fraction is 55%. Systolic function is normal. inferior mild segmental hypokinetic Diastolic function is normal.     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 wall, greater than 8 weeks   (+) Other chest pain   (+) Primary hypertension      ENDO   (+) Type 2 diabetes mellitus with circulatory disorder, without long-term current use of insulin (HCC)      GI/HEPATIC (within normal limits)      /RENAL (within normal limits)      GYN (within normal limits)      HEMATOLOGY (within normal limits)      MUSCULOSKELETAL   (+) Arthritis of carpometacarpal (CMC) joint of right thumb      NEURO/PSYCH (within normal limits)      PULMONARY (within normal limits)      Lab Results   Component Value Date    WBC 9.14 03/02/2024    HGB 17.1 (H) 03/02/2024    HCT 50.7 (H) 03/02/2024    MCV 94 03/02/2024     03/02/2024     Lab Results   Component Value Date     05/26/2018    SODIUM 144 11/15/2024    K 4.1 11/15/2024     11/15/2024    CO2 27 11/15/2024    ANIONGAP 13.0 05/26/2018    AGAP 10 11/15/2024    BUN 21 11/15/2024    CREATININE 0.90 11/15/2024    GLUC 105 (H) 11/06/2024    GLUF 111 (H) 11/15/2024    CALCIUM 9.5 11/15/2024    AST 22 11/15/2024    ALT 15 11/15/2024    ALKPHOS 65 11/15/2024    PROT 7.2 05/26/2018    TP 7.9 11/15/2024    BILITOT 0.7 05/26/2018    TBILI 0.34 11/15/2024    EGFR 93 11/15/2024     Lab Results   Component Value Date    PTT 25 11/30/2015     Lab  Fx medial malleolus-closed     Gastrointestinal hemorrhage associated with duodenal ulcer     Anemia due to blood loss, acute     Acute cystitis     Hypernatremia     Delirium     Acute kidney injury superimposed on CKD     Closed fracture of ankle      Plan:   ## New Right MCA Ischemic Stroke 4/12 , stable   Stroke most likely caused by ischemia during episode of hypotension with GI bleed vs embolic from Afib. Patient not a candidate for TPA .  No symptoms of increase ICP.  Patient had twiching on left side yesterday, most likely sequale of stroke    Plan:   - Continue secondary stoke prevention, keep blood pressure with goal of <140/90   - ASA    - Telemetry    ## Malnutrition:  G-tube placed yesterday.      Plan:  - Nutrition consult  - Start tube feeds    ##A Fib with RVR: stable  Patient has had episodes of RVR (lasts < 2 minutes) over the last week, but has been HD stable    Plan:   -Would not get troponins with episodes of RVR unless patient becomes hemodynamically unstable.  Due to his condition his options are limited if he develops ACS and he is not a candidate for invasive cardiac procedure, discussed with Spouse  -Will continue to monitor   - Continue Metoprolol 2.5 mg IV q 6 hours   - Do not consult Cardiology for intervention (per spouse's wishes)    ##HFrEF, euvolemic:  Negative JVD distention, weight stabilizing (admission weight- 80 kg (4/1), peaked at 87 kg(4/10) and 77 kg (4/20)     Plan:  - IV lasix prn  -Daily weights  -Input and Output     ## COPD: stable      Plan:   - Q 4 hour Atrovent nebs  - Oral care and suctioning prn    ## Upper GI Bleed secondary to Duodenal Ulcer: stable   ## Lower GI Bleed secondary to rectal ulcer : stable  ##Acute anemia secondary to above: Trending down  Hemoglobin had been stable since 4/15 (8.0-8.5), but was noted to be 7.6 with repeat of 7.9 on 4/18.  Received 1 U PRBC (to optimize patient prior to surgery and for discharge)    Plan:   - Continue high dose  Results   Component Value Date    INR 1.01 11/30/2015    INR 0.94 06/24/2015    PROTIME 13.1 11/30/2015    PROTIME 12.3 06/24/2015       Physical Exam    Airway    Mallampati score: II  TM Distance: >3 FB  Neck ROM: full     Dental   No notable dental hx     Cardiovascular  Rhythm: regular, Rate: normal, Cardiovascular exam normal    Pulmonary  Pulmonary exam normal     Other Findings        Anesthesia Plan  ASA Score- 3     Anesthesia Type- regional with ASA Monitors.         Additional Monitors:     Airway Plan:     Comment: Patient seen and examined.  History reviewed.  Patient to be done under TIVA and routine monitors.  Supraclavicular block to be performed preoperatively for post-op pain.  Risks discussed with the patient. Consent obtained..       Plan Factors-Exercise tolerance (METS): >4 METS.    Chart reviewed. EKG reviewed.  Existing labs reviewed. Patient summary reviewed.      Patient did not smoke on day of surgery.    Obstructive sleep apnea risk education given perioperatively.        Induction- intravenous.    Postoperative Plan- Plan for postoperative opioid use.         Informed Consent- Anesthetic plan and risks discussed with patient.  I personally reviewed this patient with the CRNA. Discussed and agreed on the Anesthesia Plan with the CRNA..      ):   Pantoprazole 40 mg IV bid  - Avoid NSAIDs  - transfuse of HGb is <8 and if with active bleeding     ## Multiple bone fracture on left foot/ankle  Hx of fractures after a fall on 9/16, and 2/2017.  Cast removed 4/18 by Ortho.  X-rays obtained- break not completely healed.  Ortho recs:    Plan:  - Protect weight bearing in AFO  - PRAFO in bed to prevent ulcers  - Follow up with original orthopedic surgeon for ankle symptoms    ##Hypernatremia:  resolved  Patient has had episodes of hypernatremia, most likely secondary to decreased oral intake.  Since the stroke, hypotonic fluids will need to be avoided to avoid cerebral edema.       Plan:   -Will monitor     ## Upper Extremity Swelling: Improving  Most likely secondary to superficial thrombophlebitis vs PICC infiltration vs third spacing .  Patient has a history of blood clots and INR is currently < 2.  LUE ultrasound was negative for DVT.     Plan:   - Will monitor     ## Acute liver injury:  Resolved  Etiology of acute liver injury unknown. Work up for Viral hepatitis, toxin, drugs and BUD Chiari has all been negative. No episode of severe hypotension.  Enzymes have reverted back to normal without intervention.  GI recommending MRCP to evaluate for biliary tract obstruction (depending on ultimate goals of care).     Plan:   - No hepatotoxic medications (tylenol, statins, etc)  - MRCP recommended but most likely will not pursue this     ## UTI vs Colonization: Resolved  Received antibiotics initially but was stopped as this is deemed more of an asymptomatic bacteriuria vs colonization rather than a UTI      ## SHERYL on CKD3 (baseline Cr 1.5-2): improved   Thought to be prerenal from decrease PO intake     Plan:   - Follow up BMP daily  - Strict I/O   - Daily weights  - Continue telemetry      ## Type 2 DM, last A1C 6.1 on 02/20/17   Blood sugars frequently fluctuate.  Blood sugars better controlled with LR.  Will DC LR after tube feeds at goal, then restart  Lantus.    Plan:   - Continue LR  - Q 4 hour glucose checks  - Hold Lantus (12 units bid)  - Correction: High Insulin Sliding scale        ## Stercoral proctitis     Plan:    - Miralax daily PRN and Senna at bedtime PRN.   - HOLD PTA ferrous sulfate while being treated for constipation    #Normal Pressure Hydrocephalus sequelae of subarachnoid hemorrhage   #Multi infarct Vascular Dementia  W/ Hx stroke, normal pressure hydrocephalus (2012), status post ventriculoperitoneal shunting, subarachnoid hemorrhage(2012).  Patient's neurologic issues were stable prior to stroke.  Will need to follow closely for cerebral edema caused by stroke that could potentially exacerbate patient's hydrocephalus.  Plan :   -Will observe but no aggressive interventions      ##High grade right ICA stenosis, diagnosed on 01/2017 MRA, stable   Patient's LAKE-VASC score is 8, not on anticoagulation due to recent GI bleed  Plan:   -Will continue to hold warfarin for now due to high risk of bleeding      ## Physical Deconditioning  Plan:    - PT/OT  - Speech therapy    ## Dispo:  Discharge planned for tomorrow, 4/21. Patient will need a new POLST to be filled out with spouse today.  Discuss:    - Terms for transfusion  - Respiratory failure  - Clots/stroke      Other chronic conditions, stable- continue home medications.  ## HTN-  Metoprolol  ## Hyperlipidemia-   rosuvastatin  ## COPD- To continue Breo and Atrovent inhaler   ##SHIVAM- Bipap  ##GERD - PPI   ## Constipation- Not an issue right now, will resume bowel regimen as needed   ## Progressive cognitive dysfunction (dementia)     Fluids: LR at 75 cc/hr  Electrolytes: stable  Nutrition: G-tube feeds  Lines:PIV, PICC  Activity: Up with assist   CODE: DNR / DNI  Prophylaxis: Coumadin held  Dispo: Expected discharge date- tomorrow pending clinical improvement                  Interval History:   Melvin Bhatia received a G-tube yesterday without any complications.  Patient noted to be  twitching on left side after surgery yesterday.  Nursing notes reviewed.            Review of Systems:   Review of Systems   Constitutional:        ROS limited secondary to patient's condition   Gastrointestinal: Negative for blood in stool.            Physical Exam (Resident / Clinician):   Vitals were reviewed  Temp: 97.6  F (36.4  C) Temp src: Axillary BP: 113/60   Heart Rate: 77 Resp: 18 SpO2: 99 % O2 Device: BiPAP/CPAP Oxygen Delivery: 6 LPM    Physical Exam   Constitutional: He appears well-developed. No distress.   Sitting in bed, arousable   Eyes: Pupils are equal, round, and reactive to light. No scleral icterus.   Right and left pupils reactive to light.  Right pupil sluggish and less reactive compared to left pupil.   Neck: Normal range of motion. Neck supple. No JVD present. No tracheal deviation present.   Cardiovascular: Normal rate.    Murmur (2/6 systolic murmur at RSB) heard.  Pulmonary/Chest: Effort normal and breath sounds normal. No respiratory distress. He has no wheezes. He has no rales.   Decreased breath sounds throughout.  + Cough   Abdominal: Soft. There is no tenderness. There is no rebound and no guarding.   Genitourinary:   Genitourinary Comments: Orta in place, Diaper in place   Musculoskeletal: He exhibits no edema (Swelling of left upper extremity, distal to elbow (including hand, which has 1-2+ edema)) or tenderness.   Supportive boots on both lower extremities   Neurological: He is alert.   Makes eye contact, unable to answer questions.  Patient will squeeze right hand on command, not closing eyes on command   Skin: Skin is warm and dry. No erythema. No pallor.   Vitals reviewed.          Data:   ROUTINE LABS (Last four results)  CMP    Recent Labs  Lab 04/20/17  0712 04/19/17  0612 04/18/17  0723 04/17/17  0825  04/16/17  0716 04/15/17  0945 04/15/17  0515  04/14/17  0535    139 142 141  --  138 142 142  --  144   POTASSIUM 3.8 3.8 3.9 3.7  --  3.9 4.3 3.5  < > 3.3*    CHLORIDE 105 106 109 106  --  106 108 106  --  110*   CO2 26 26 26 27  --  26 25 28  --  24   ANIONGAP 8 7 7 8  --  6 9 7  --  10   * 156* 183* 143*  --  266* 199* 167*  --  111*   BUN 15 14 11 12  --  11 9 10  --  9   CR 1.06 1.00 1.10 1.05  --  1.13 1.29* 1.26*  --  1.23   GFRESTIMATED 68 72 65 68  --  63 54* 55*  --  57*   GFRESTBLACK 82 87 78 83  --  76 65 67  --  69   LORENA 8.4* 8.4* 7.9* 8.1*  --  7.7* 7.6* 7.8*  --  7.5*   MAG 2.0 2.0 2.0 2.1  --  2.0  --  2.2  < > 1.5*   PHOS 2.9 2.4* 2.7 2.3*  < > 1.8*  --  2.2*  --  2.5   PROTTOTAL  --   --   --   --   --  5.0* 5.0* 4.7*  --  4.8*   ALBUMIN  --   --   --   --   --  1.6* 1.5* 1.6*  --  1.6*   BILITOTAL  --   --   --   --   --  0.4 0.4 0.4  --  0.6   ALKPHOS  --   --   --   --   --  80 71 69  --  70   AST  --   --   --   --   --  21 17 14  --  7   ALT  --   --   --   --   --  26 31 29  --  36   < > = values in this interval not displayed.  CBC    Recent Labs  Lab 04/20/17  0712 04/19/17  0612 04/18/17  1001 04/18/17  0723 04/17/17  0825   WBC 4.7 4.9  --  3.9* 4.8   RBC 2.97* 3.23*  --  2.73* 2.99*   HGB 8.1* 8.9* 7.9* 7.6* 8.3*   HCT 27.5* 29.4*  --  25.2* 27.5*   MCV 93 91  --  92 92   MCH 27.3 27.6  --  27.8 27.8   MCHC 29.5* 30.3*  --  30.2* 30.2*   RDW 18.5* 18.4*  --  19.1* 19.3*    197  --  167 172     INR  No lab results found in last 7 days.  CRP  No lab results found in last 7 days.        Medications:     Current Facility-Administered Medications   Medication     ceFAZolin sodium-dextrose (ANCEF) infusion 2 g     ceFAZolin (ANCEF) 1 g vial to attach to  ml bag for ADULT or 50 ml bag for PEDS     ipratropium (ATROVENT HFA) Inhaler 1 puff     lidocaine 1 % 1 mL     lidocaine (LMX4) kit     sodium chloride (PF) 0.9% PF flush 3 mL     sodium chloride (PF) 0.9% PF flush 3 mL     lactated ringers infusion     aspirin Suppository 300 mg     aspirin suspension 325 mg     ipratropium (ATROVENT) 0.02 % neb solution 0.5 mg     insulin  glargine (LANTUS) injection 12 Units     metoprolol (LOPRESSOR) injection 2.5 mg     pantoprazole (PROTONIX) 40 mg IV push injection     mineral oil-hydrophilic petrolatum (AQUAPHOR)     insulin aspart (NovoLOG) inj (RAPID ACTING)     rosuvastatin (CRESTOR) tablet 20 mg     sodium chloride (PF) 0.9% PF flush 5-50 mL     lidocaine 1 % 1 mL     lidocaine (LMX4) kit     sodium chloride (PF) 0.9% PF flush 10-20 mL     heparin lock flush 10 UNIT/ML injection 5-10 mL     heparin lock flush 10 UNIT/ML injection 5-10 mL     nystatin (MYCOSTATIN) suspension 500,000 Units     dextrose 10 % 1,000 mL infusion     multivitamins with minerals (CERTAVITE/CEROVITE) liquid 15 mL     lactobacillus rhamnosus (GG) (CULTURELL) capsule 1 capsule     polyethylene glycol (MIRALAX/GLYCOLAX) Packet 17 g     senna-docusate (SENOKOT-S;PERICOLACE) 8.6-50 MG per tablet 1-2 tablet     acetaminophen (TYLENOL) solution 650 mg     glucose 40 % gel 15-30 g    Or     dextrose 50 % injection 25-50 mL    Or     glucagon injection 1 mg     magnesium sulfate 4 g in 100 mL sterile water (premade)     potassium phosphate 15 mmol in D5W 250 mL intermittent infusion     potassium phosphate 20 mmol in D5W 500 mL intermittent infusion     potassium phosphate 20 mmol in D5W 250 mL intermittent infusion     potassium phosphate 25 mmol in D5W 500 mL intermittent infusion     [Rx hold ] metoprolol (LOPRESSOR) suspension 50 mg     Reason ACE/ARB order not selected     lidocaine 1 % 0.5-5 mL     sodium chloride (PF) 0.9% PF flush 10-20 mL     sodium chloride (PF) 0.9% PF flush 10 mL     fluticasone-vilanterol (BREO ELLIPTA) 200-25 MCG/INH oral inhaler 1 puff     fluticasone (FLONASE) 50 MCG/ACT spray 2 spray     naloxone (NARCAN) injection 0.1-0.4 mg     magnesium citrate solution 148 mL     ondansetron (ZOFRAN-ODT) ODT tab 4 mg    Or     ondansetron (ZOFRAN) injection 4 mg     prochlorperazine (COMPAZINE) injection 5 mg    Or     prochlorperazine (COMPAZINE) tablet  5 mg    Or     prochlorperazine (COMPAZINE) Suppository 12.5 mg     Patient is already receiving anticoagulation with heparin, enoxaparin (LOVENOX), warfarin (COUMADIN)  or other anticoagulant medication     No lozenges or gum should be given while patient on BIPAP     hypromellose-dextran (ARTIFICAL TEARS) ophthalmic solution 1 drop     insulin aspart (NovoLOG) inj (RAPID ACTING)     ferrous sulfate (IRON) tablet 325 mg       Caring Physician: Toña Duron MD  [facility], [place]  Pager Contact: see Physician sticky note

## 2025-05-27 NOTE — PHARMACY
Pharmacy Stroke Code Response  Pharmacist responded as part of the Stroke Code Team activation to patient care area 4C.      The Stroke Team determined that the patient was not a candidate for IV alteplase therapy and the pharmacy team was dismissed at 1721.     Carol Ewing, KimberD     20

## (undated) DEVICE — LINEN TOWEL PACK X5 5464

## (undated) DEVICE — TUBING SUCTION 10'X3/16" N510

## (undated) DEVICE — SOL WATER IRRIG 1000ML BOTTLE 2F7114

## (undated) DEVICE — TUBE GASTROSTOMY PONSKY PULL DELUXE 20FR 000792

## (undated) DEVICE — LABEL MEDICATION SYSTEM 3303-P

## (undated) DEVICE — JELLY LUBRICATING SURGILUBE 2OZ TUBE

## (undated) RX ORDER — LORAZEPAM 2 MG/ML
INJECTION INTRAMUSCULAR
Status: DISPENSED
Start: 2017-01-01

## (undated) RX ORDER — FENTANYL CITRATE 50 UG/ML
INJECTION, SOLUTION INTRAMUSCULAR; INTRAVENOUS
Status: DISPENSED
Start: 2017-01-01

## (undated) RX ORDER — SIMETHICONE 40MG/0.6ML
SUSPENSION, DROPS(FINAL DOSAGE FORM)(ML) ORAL
Status: DISPENSED
Start: 2017-01-01